# Patient Record
Sex: FEMALE | Race: WHITE | NOT HISPANIC OR LATINO | Employment: OTHER | URBAN - NONMETROPOLITAN AREA
[De-identification: names, ages, dates, MRNs, and addresses within clinical notes are randomized per-mention and may not be internally consistent; named-entity substitution may affect disease eponyms.]

---

## 2017-01-13 ENCOUNTER — OFFICE VISIT (OUTPATIENT)
Dept: PULMONOLOGY | Facility: CLINIC | Age: 72
End: 2017-01-13

## 2017-01-13 VITALS
SYSTOLIC BLOOD PRESSURE: 128 MMHG | RESPIRATION RATE: 18 BRPM | HEIGHT: 59 IN | HEART RATE: 81 BPM | DIASTOLIC BLOOD PRESSURE: 64 MMHG | BODY MASS INDEX: 16.53 KG/M2 | WEIGHT: 82 LBS | OXYGEN SATURATION: 99 %

## 2017-01-13 DIAGNOSIS — J44.9 CHRONIC OBSTRUCTIVE PULMONARY DISEASE, UNSPECIFIED COPD TYPE (HCC): ICD-10-CM

## 2017-01-13 DIAGNOSIS — R06.02 SHORTNESS OF BREATH: Primary | ICD-10-CM

## 2017-01-13 DIAGNOSIS — R09.02 HYPOXIA: ICD-10-CM

## 2017-01-13 DIAGNOSIS — R93.89 ABNORMAL CT OF THE CHEST: ICD-10-CM

## 2017-01-13 DIAGNOSIS — J30.89 OTHER ALLERGIC RHINITIS: ICD-10-CM

## 2017-01-13 PROCEDURE — 99214 OFFICE O/P EST MOD 30 MIN: CPT | Performed by: INTERNAL MEDICINE

## 2017-01-13 RX ORDER — AZELASTINE 1 MG/ML
2 SPRAY, METERED NASAL 2 TIMES DAILY
Qty: 1 EACH | Refills: 5 | Status: SHIPPED | OUTPATIENT
Start: 2017-01-13 | End: 2017-10-04 | Stop reason: SDUPTHER

## 2017-01-13 RX ORDER — BUDESONIDE AND FORMOTEROL FUMARATE DIHYDRATE 160; 4.5 UG/1; UG/1
2 AEROSOL RESPIRATORY (INHALATION) 2 TIMES DAILY
Qty: 1 INHALER | Refills: 5 | Status: SHIPPED | OUTPATIENT
Start: 2017-01-13 | End: 2017-03-02 | Stop reason: SDUPTHER

## 2017-01-13 RX ORDER — PREDNISONE 20 MG/1
20 TABLET ORAL DAILY
Status: ON HOLD | COMMUNITY
End: 2017-02-14

## 2017-01-13 NOTE — LETTER
"January 13, 2017     DONTRELL Regan  [address]  [address], [place] [id number]    Patient: Joelle Yee   YOB: 1945   Date of Visit: 1/13/2017       Dear DONTRELL Cedeño:    Joelle Yee was in my office today. Below is a copy of my note.    If you have questions, please do not hesitate to call me. I look forward to following Joelle along with you.         Sincerely,        Rebeka Esparza MD        CC: No Recipients    Chief Complaint   Patient presents with   • Follow-up         Subjective   Joelle Yee is a 71 y.o. female.     History of Present Illness   Patient comes in today for follow-up after 6 months.  She is complaining of somewhat worsening symptoms of shortness of breath.  She says that about 2-3 months ago she started noticing worsening symptoms including increased cough and sputum production and actually has been given 3 different rounds of steroids and antibiotics.  She was later on started on steroids by her surgeon for some \"issues with her colon and weight loss\"    The patient continues to have shortness of breath on exertion and feels that her exercise capacity is definitely worsened.  She is also complaining of lower extremity pain along with some back pain but does not feel that that has worsened to the point where it could explain her symptoms    She is using her Symbicort on a regular basis although start using Spiriva more than 3 months ago.  She is using her Combivent at least 2-3 times a day     She unfortunately continues to smoke 3-4 cigarettes per day.    She is complaining of runny nose and dribbling in the back of her throat which occasionally gets worse.  She also says that she has a \"sore\" in her right nostril which gets worse because of oxygen use.    She is also using oxygen      The following portions of the patient's history were reviewed and updated as appropriate: allergies, current medications, past family history, past medical " "history, past social history and past surgical history.    Review of Systems   HENT: Positive for postnasal drip. Negative for sinus pressure, sneezing and sore throat.    Respiratory: Positive for cough, shortness of breath and wheezing.    Cardiovascular: Negative for leg swelling.       Objective   Visit Vitals   • /64   • Pulse 81   • Resp 18   • Ht 59\" (149.9 cm)   • Wt 82 lb (37.2 kg)   • SpO2 99%   • BMI 16.56 kg/m2     Physical Exam  General:              Did not seem to be in any acute distress.    ENT:               Nares were erythematous.              Oropharynx was cobblestoned. No lesions noted.               Dentures noted.      Neck:               Supple    Cardiovascular:               S1 + S2.  Regular     Respiratory:              Respiratory effort was adequate              Hyperresonance to percussion.              Decreased Air Entry Bilaterally with scattered wheezing. No crackles heard.    Musculoskeletal/Extremities:              No significant edema noted in the lower extremities.              No clubbing noted.              Normal gait.    Neurologic/Psychiatric:              A&Ox3.               Affect was appropriate.      Assessment/Plan   Joelle was seen today for follow-up.    Diagnoses and all orders for this visit:    Shortness of breath    Chronic obstructive pulmonary disease, unspecified COPD type  -     Pulmonary Function Test; Future    Abnormal CT of the chest  Comments:  Resolved.    Other allergic rhinitis    Hypoxia    Other orders  -     tiotropium (SPIRIVA HANDIHALER) 18 MCG per inhalation capsule; Place 1 capsule into inhaler and inhale Daily.  -     budesonide-formoterol (SYMBICORT) 160-4.5 MCG/ACT inhaler; Inhale 2 puffs 2 (Two) Times a Day. Inhale 1 puff twice daily. Rinse mouth after use.  -     azelastine (ASTELIN) 0.1 % nasal spray; 2 sprays into each nostril 2 (Two) Times a Day. Use in each nostril as directed         Return in about 6 weeks (around " 2/24/2017) for Recheck, PFTs.    DISCUSSION (if any):  I reviewed the patient's PFTs from the last visit and unfortunately they were not currently performed and were difficult to interpret.  Her alpha-1 antitrypsin level was normal.  I have also reviewed the patient's CT images and shared them with her.  There is definite improvement in the right lower lobe abnormality with some ongoing scarring.    As far as patient's worsening symptoms are concerned, I have told her that she has stopped Spiriva which may explain her chronic worsening.  Also the fact that she is on decent dose of steroids for abdominal/colonic reasons, but continues to struggle with exertion points towards a chronic change rather than an acute event such as acute bronchitis etc.    I have restarted the patient on Spiriva     She was advised to continue Symbicort     Patient was advised to use albuterol based rescue inhaler for when necessary purposes    Patient was also advised to keep a log of the use of rescue inhaler.      I've advised the patient to continue using Astelin on a regular basis, since she continues to have symptoms suggestive of poor controlled allergic rhinitis    I've also asked her to consider nasal saline use    I will try to obtain her stress test and echocardiogram from the last cardiology office visit to make sure that her worsening symptoms and not from underlying cardiac etiology    PFTs will be obtained upon follow-up      Errors in dictation may reflect use of voice recognition software and not all errors in transcription may have been detected prior to signing    This document was electronically signed by Rebeka Esparza MD January 13, 2017  11:09 AM

## 2017-01-13 NOTE — PROGRESS NOTES
"Chief Complaint   Patient presents with   • Follow-up         Subjective   Joelle Yee is a 71 y.o. female.     History of Present Illness   Patient comes in today for follow-up after 6 months.  She is complaining of somewhat worsening symptoms of shortness of breath.  She says that about 2-3 months ago she started noticing worsening symptoms including increased cough and sputum production and actually has been given 3 different rounds of steroids and antibiotics.  She was later on started on steroids by her surgeon for some \"issues with her colon and weight loss\"    The patient continues to have shortness of breath on exertion and feels that her exercise capacity is definitely worsened.  She is also complaining of lower extremity pain along with some back pain but does not feel that that has worsened to the point where it could explain her symptoms    She is using her Symbicort on a regular basis although start using Spiriva more than 3 months ago.  She is using her Combivent at least 2-3 times a day     She unfortunately continues to smoke 3-4 cigarettes per day.    She is complaining of runny nose and dribbling in the back of her throat which occasionally gets worse.  She also says that she has a \"sore\" in her right nostril which gets worse because of oxygen use.    She is also using oxygen      The following portions of the patient's history were reviewed and updated as appropriate: allergies, current medications, past family history, past medical history, past social history and past surgical history.    Review of Systems   HENT: Positive for postnasal drip. Negative for sinus pressure, sneezing and sore throat.    Respiratory: Positive for cough, shortness of breath and wheezing.    Cardiovascular: Negative for leg swelling.       Objective   Visit Vitals   • /64   • Pulse 81   • Resp 18   • Ht 59\" (149.9 cm)   • Wt 82 lb (37.2 kg)   • SpO2 99%   • BMI 16.56 kg/m2     Physical Exam  General:       "        Did not seem to be in any acute distress.    ENT:               Nares were erythematous.              Oropharynx was cobblestoned. No lesions noted.               Dentures noted.      Neck:               Supple    Cardiovascular:               S1 + S2.  Regular     Respiratory:              Respiratory effort was adequate              Hyperresonance to percussion.              Decreased Air Entry Bilaterally with scattered wheezing. No crackles heard.    Musculoskeletal/Extremities:              No significant edema noted in the lower extremities.              No clubbing noted.              Normal gait.    Neurologic/Psychiatric:              A&Ox3.               Affect was appropriate.      Assessment/Plan   Joelle was seen today for follow-up.    Diagnoses and all orders for this visit:    Shortness of breath    Chronic obstructive pulmonary disease, unspecified COPD type  -     Pulmonary Function Test; Future    Abnormal CT of the chest  Comments:  Resolved.    Other allergic rhinitis    Hypoxia    Other orders  -     tiotropium (SPIRIVA HANDIHALER) 18 MCG per inhalation capsule; Place 1 capsule into inhaler and inhale Daily.  -     budesonide-formoterol (SYMBICORT) 160-4.5 MCG/ACT inhaler; Inhale 2 puffs 2 (Two) Times a Day. Inhale 1 puff twice daily. Rinse mouth after use.  -     azelastine (ASTELIN) 0.1 % nasal spray; 2 sprays into each nostril 2 (Two) Times a Day. Use in each nostril as directed         Return in about 6 weeks (around 2/24/2017) for Recheck, PFTs.    DISCUSSION (if any):  I reviewed the patient's PFTs from the last visit and unfortunately they were not currently performed and were difficult to interpret.  Her alpha-1 antitrypsin level was normal.  I have also reviewed the patient's CT images and shared them with her.  There is definite improvement in the right lower lobe abnormality with some ongoing scarring.    As far as patient's worsening symptoms are concerned, I have told her  that she has stopped Spiriva which may explain her chronic worsening.  Also the fact that she is on decent dose of steroids for abdominal/colonic reasons, but continues to struggle with exertion points towards a chronic change rather than an acute event such as acute bronchitis etc.    I have restarted the patient on Spiriva     She was advised to continue Symbicort     Patient was advised to use albuterol based rescue inhaler for when necessary purposes    Patient was also advised to keep a log of the use of rescue inhaler.      I've advised the patient to continue using Astelin on a regular basis, since she continues to have symptoms suggestive of poor controlled allergic rhinitis    I've also asked her to consider nasal saline use    I will try to obtain her stress test and echocardiogram from the last cardiology office visit to make sure that her worsening symptoms and not from underlying cardiac etiology    PFTs will be obtained upon follow-up      Errors in dictation may reflect use of voice recognition software and not all errors in transcription may have been detected prior to signing    This document was electronically signed by Rebeka Esparza MD January 13, 2017  11:09 AM

## 2017-01-13 NOTE — MR AVS SNAPSHOT
Joelle Yee   1/13/2017 10:00 AM   Office Visit    Dept Phone:  [phone]   Encounter #:  [id number]    Provider:  Rebeka Esparza MD   Department:  [facility] PULMONARY CRITICAL CARE AND SLEEP                Your Full Care Plan              Today's Medication Changes          These changes are accurate as of: 1/13/17 11:13 AM.  If you have any questions, ask your nurse or doctor.               Medication(s)that have changed:     tiotropium 18 MCG per inhalation capsule   Commonly known as:  SPIRIVA HANDIHALER   Place 1 capsule into inhaler and inhale Daily.   What changed:  See the new instructions.   Changed by:  Rebeka Esparza MD            Where to Get Your Medications      These medications were sent to [address] - [phone] [place] [id number] [address] [address]     Phone:  [phone]     azelastine 0.1 % nasal spray    budesonide-formoterol 160-4.5 MCG/ACT inhaler    tiotropium 18 MCG per inhalation capsule                  Your Updated Medication List          This list is accurate as of: 1/13/17 11:13 AM.  Always use your most recent med list.                albuterol (2.5 MG/3ML) 0.083% nebulizer solution   Commonly known as:  PROVENTIL       azelastine 0.1 % nasal spray   Commonly known as:  ASTELIN   2 sprays into each nostril 2 (Two) Times a Day. Use in each nostril as directed       budesonide-formoterol 160-4.5 MCG/ACT inhaler   Commonly known as:  SYMBICORT   Inhale 2 puffs 2 (Two) Times a Day. Inhale 1 puff twice daily. Rinse mouth after use.       calcium acetate 667 MG capsule   Commonly known as:  PHOSLO       CARAFATE PO       CLARITIN 10 MG capsule   Generic drug:  Loratadine       cyclobenzaprine 10 MG tablet   Commonly known as:  FLEXERIL       estradiol 0.1 MG/GM vaginal cream   Commonly known as:  ESTRACE       FLUTTER device   1 Device as needed (as directed).       gabapentin 400 MG capsule   Commonly known as:  NEURONTIN       HYDROcodone-acetaminophen  MG per tablet   Commonly known as:  NORCO       IMODIUM A-D 2 MG tablet   Generic drug:  loperamide       * ipratropium-albuterol 0.5-2.5 mg/mL nebulizer   Commonly known as:  DUO-NEB       * COMBIVENT IN       melatonin 5 MG tablet tablet       metoprolol succinate XL 50 MG 24 hr tablet   Commonly known as:  TOPROL-XL       METOPROLOL TARTRATE PO       MYRBETRIQ 50 MG tablet sustained-release 24 hour   Generic drug:  Mirabegron ER       NICOTROL IN       nystatin 708625 UNIT/GM cream   Commonly known as:  MYCOSTATIN       ondansetron ODT 4 MG disintegrating tablet   Commonly known as:  ZOFRAN-ODT       oxybutynin XL 10 MG 24 hr tablet   Commonly known as:  DITROPAN-XL       OXYGEN-HELIUM IN       phenazopyridine 100 MG tablet   Commonly known as:  PYRIDIUM       potassium bicarbornate & potassium chloride 25 MEQ disintegrating tablet       predniSONE 20 MG tablet   Commonly known as:  DELTASONE       * PREMARIN VA       * PREMARIN 0.625 MG/GM vaginal cream   Generic drug:  conjugated estrogens       * PRILOSEC 40 MG capsule   Generic drug:  omeprazole       * omeprazole 20 MG capsule   Commonly known as:  priLOSEC       ranitidine 300 MG capsule   Commonly known as:  ZANTAC       tiotropium 18 MCG per inhalation capsule   Commonly known as:  SPIRIVA HANDIHALER   Place 1 capsule into inhaler and inhale Daily.       traZODone 50 MG tablet   Commonly known as:  DESYREL       venlafaxine 75 MG tablet   Commonly known as:  EFFEXOR       VITAMIN B12 PO       VOLTAREN TD       * Notice:  This list has 6 medication(s) that are the same as other medications prescribed for you. Read the directions carefully, and ask your doctor or other care provider to review them with you.            You Were Diagnosed With        Codes Comments    Shortness of breath    -  Primary ICD-10-CM: R06.02  ICD-9-CM: 786.05     Chronic obstructive  pulmonary disease, unspecified COPD type     ICD-10-CM: J44.9  ICD-9-CM: 496     Abnormal CT of the chest     ICD-10-CM: R93.8  ICD-9-CM: 793.2 Resolved.    Other allergic rhinitis     ICD-10-CM: J30.89  ICD-9-CM: 477.8     Hypoxia     ICD-10-CM: R09.02  ICD-9-CM: 799.02       Medications to be Given to You by a Medical Professional     Due       Frequency    2016 urea (CARMOL) 40 % cream  2 Times Daily      Instructions     None    Patient Instructions History      Upcoming Appointments     Visit Type Date Time Department    FOLLOW UP 2017 10:00 AM [facility]    FOLLOW UP 3/2/2017 11:15 AM [facility]      MyChart Signup     [facility] [employer] allows you to send messages to your doctor, view your test results, renew your prescriptions, schedule appointments, and more. To sign up, go to [employer] and click on the Sign Up Now link in the New User? box. Enter your [employer] Activation Code exactly as it appears below along with the last four digits of your Social Security Number and your Date of Birth () to complete the sign-up process. If you do not sign up before the expiration date, you must request a new code.    [employer] Activation Code: [email or web address]  Expires: 2017 11:13 AM    If you have questions, you can email [email or web address] or call [phone] to talk to our [employer] staff. Remember, [employer] is NOT to be used for urgent needs. For medical emergencies, dial 911.               Other Info from Your Visit           Your Appointments     Mar 02, 2017 11:15 AM EST   Follow Up with Rebeka Esparza MD   [facility]ULMON[facility]ITICAL CARE AND SLEEP (--)    [address] [facility]  [address] [place] [id number] [phone]           Arrive 15 minutes prior to appointment.              Allergies     Dust Mite Extract      Dust    Grass      Mold Extract [Trichophyton] Allergy       Reason for Visit     Follow-up       "     Vital Signs     Blood Pressure Pulse Respirations Height Weight Oxygen Saturation    128/64 81 18 59\" (149.9 cm) 82 lb (37.2 kg) 99%    Body Mass Index Smoking Status                16.56 kg/m2 Current Every Day Smoker          Problems and Diagnoses Noted     Chronic airway obstruction    Shortness of breath    -  Primary    Abnormal CT of the chest        Other allergic rhinitis        Decreased oxygen supply            "

## 2017-01-22 ENCOUNTER — APPOINTMENT (OUTPATIENT)
Dept: GENERAL RADIOLOGY | Facility: HOSPITAL | Age: 72
End: 2017-01-22

## 2017-01-22 ENCOUNTER — HOSPITAL ENCOUNTER (EMERGENCY)
Facility: HOSPITAL | Age: 72
Discharge: HOME | End: 2017-01-23
Attending: STUDENT IN AN ORGANIZED HEALTH CARE EDUCATION/TRAINING PROGRAM | Admitting: STUDENT IN AN ORGANIZED HEALTH CARE EDUCATION/TRAINING PROGRAM

## 2017-01-22 DIAGNOSIS — J44.9 CHRONIC OBSTRUCTIVE PULMONARY DISEASE, UNSPECIFIED COPD TYPE (HCC): ICD-10-CM

## 2017-01-22 DIAGNOSIS — F41.9 ANXIETY: Primary | ICD-10-CM

## 2017-01-22 LAB
ALBUMIN SERPL-MCNC: 3.7 G/DL (ref 3.5–5)
ALBUMIN/GLOB SERPL: 1.2 G/DL (ref 1–2)
ALP SERPL-CCNC: 100 U/L (ref 38–126)
ALT SERPL W P-5'-P-CCNC: 33 U/L (ref 13–69)
ANION GAP SERPL CALCULATED.3IONS-SCNC: 12.6 MMOL/L
AST SERPL-CCNC: 22 U/L (ref 15–46)
BASOPHILS # BLD AUTO: 0.07 10*3/MM3 (ref 0–0.2)
BASOPHILS NFR BLD AUTO: 0.4 % (ref 0–2.5)
BILIRUB SERPL-MCNC: 0.4 MG/DL (ref 0.2–1.3)
BUN BLD-MCNC: 22 MG/DL (ref 7–20)
BUN/CREAT SERPL: 31.4 (ref 7.1–23.5)
CALCIUM SPEC-SCNC: 8.9 MG/DL (ref 8.4–10.2)
CHLORIDE SERPL-SCNC: 98 MMOL/L (ref 98–107)
CO2 SERPL-SCNC: 29 MMOL/L (ref 26–30)
CREAT BLD-MCNC: 0.7 MG/DL (ref 0.6–1.3)
DEPRECATED RDW RBC AUTO: 56 FL (ref 37–54)
EOSINOPHIL # BLD AUTO: 0.07 10*3/MM3 (ref 0–0.7)
EOSINOPHIL NFR BLD AUTO: 0.4 % (ref 0–7)
ERYTHROCYTE [DISTWIDTH] IN BLOOD BY AUTOMATED COUNT: 15.3 % (ref 11.5–14.5)
GFR SERPL CREATININE-BSD FRML MDRD: 82 ML/MIN/1.73
GLOBULIN UR ELPH-MCNC: 3.1 GM/DL
GLUCOSE BLD-MCNC: 91 MG/DL (ref 74–98)
HCT VFR BLD AUTO: 40.6 % (ref 37–47)
HGB BLD-MCNC: 13.2 G/DL (ref 12–16)
IMM GRANULOCYTES # BLD: 0.93 10*3/MM3 (ref 0–0.06)
IMM GRANULOCYTES NFR BLD: 5.4 % (ref 0–0.6)
LYMPHOCYTES # BLD AUTO: 2.04 10*3/MM3 (ref 0.6–3.4)
LYMPHOCYTES NFR BLD AUTO: 11.8 % (ref 10–50)
MCH RBC QN AUTO: 32.5 PG (ref 27–31)
MCHC RBC AUTO-ENTMCNC: 32.5 G/DL (ref 30–37)
MCV RBC AUTO: 100 FL (ref 81–99)
MONOCYTES # BLD AUTO: 1.16 10*3/MM3 (ref 0–0.9)
MONOCYTES NFR BLD AUTO: 6.7 % (ref 0–12)
NEUTROPHILS # BLD AUTO: 13.01 10*3/MM3 (ref 2–6.9)
NEUTROPHILS NFR BLD AUTO: 75.3 % (ref 37–80)
NRBC BLD MANUAL-RTO: 0 /100 WBC (ref 0–0)
NT-PROBNP SERPL-MCNC: 130 PG/ML (ref 0–125)
PLAT MORPH BLD: NORMAL
PLATELET # BLD AUTO: 184 10*3/MM3 (ref 130–400)
PMV BLD AUTO: 9.4 FL (ref 6–12)
POTASSIUM BLD-SCNC: 3.6 MMOL/L (ref 3.5–5.1)
PROT SERPL-MCNC: 6.8 G/DL (ref 6.3–8.2)
RBC # BLD AUTO: 4.06 10*6/MM3 (ref 4.2–5.4)
RBC MORPH BLD: NORMAL
SODIUM BLD-SCNC: 136 MMOL/L (ref 137–145)
TROPONIN I SERPL-MCNC: <0.012 NG/ML (ref 0.03–0.11)
WBC MORPH BLD: NORMAL
WBC NRBC COR # BLD: 17.28 10*3/MM3 (ref 4.8–10.8)

## 2017-01-22 PROCEDURE — 80053 COMPREHEN METABOLIC PANEL: CPT | Performed by: STUDENT IN AN ORGANIZED HEALTH CARE EDUCATION/TRAINING PROGRAM

## 2017-01-22 PROCEDURE — 94640 AIRWAY INHALATION TREATMENT: CPT

## 2017-01-22 PROCEDURE — 99284 EMERGENCY DEPT VISIT MOD MDM: CPT

## 2017-01-22 PROCEDURE — 82375 ASSAY CARBOXYHB QUANT: CPT

## 2017-01-22 PROCEDURE — 83880 ASSAY OF NATRIURETIC PEPTIDE: CPT | Performed by: STUDENT IN AN ORGANIZED HEALTH CARE EDUCATION/TRAINING PROGRAM

## 2017-01-22 PROCEDURE — 36600 WITHDRAWAL OF ARTERIAL BLOOD: CPT

## 2017-01-22 PROCEDURE — 85007 BL SMEAR W/DIFF WBC COUNT: CPT | Performed by: STUDENT IN AN ORGANIZED HEALTH CARE EDUCATION/TRAINING PROGRAM

## 2017-01-22 PROCEDURE — 96366 THER/PROPH/DIAG IV INF ADDON: CPT

## 2017-01-22 PROCEDURE — 96375 TX/PRO/DX INJ NEW DRUG ADDON: CPT

## 2017-01-22 PROCEDURE — 25010000002 MAGNESIUM SULFATE 2 GM/50ML SOLUTION: Performed by: STUDENT IN AN ORGANIZED HEALTH CARE EDUCATION/TRAINING PROGRAM

## 2017-01-22 PROCEDURE — 25010000002 METHYLPREDNISOLONE PER 125 MG: Performed by: STUDENT IN AN ORGANIZED HEALTH CARE EDUCATION/TRAINING PROGRAM

## 2017-01-22 PROCEDURE — 84484 ASSAY OF TROPONIN QUANT: CPT | Performed by: STUDENT IN AN ORGANIZED HEALTH CARE EDUCATION/TRAINING PROGRAM

## 2017-01-22 PROCEDURE — 25010000002 LORAZEPAM PER 2 MG: Performed by: STUDENT IN AN ORGANIZED HEALTH CARE EDUCATION/TRAINING PROGRAM

## 2017-01-22 PROCEDURE — 71020 HC CHEST PA AND LATERAL: CPT

## 2017-01-22 PROCEDURE — 85025 COMPLETE CBC W/AUTO DIFF WBC: CPT | Performed by: STUDENT IN AN ORGANIZED HEALTH CARE EDUCATION/TRAINING PROGRAM

## 2017-01-22 PROCEDURE — 93005 ELECTROCARDIOGRAM TRACING: CPT | Performed by: STUDENT IN AN ORGANIZED HEALTH CARE EDUCATION/TRAINING PROGRAM

## 2017-01-22 PROCEDURE — 82805 BLOOD GASES W/O2 SATURATION: CPT

## 2017-01-22 PROCEDURE — 83050 HGB METHEMOGLOBIN QUAN: CPT

## 2017-01-22 PROCEDURE — 96365 THER/PROPH/DIAG IV INF INIT: CPT

## 2017-01-22 RX ORDER — IPRATROPIUM BROMIDE AND ALBUTEROL SULFATE 2.5; .5 MG/3ML; MG/3ML
3 SOLUTION RESPIRATORY (INHALATION) ONCE
Status: COMPLETED | OUTPATIENT
Start: 2017-01-22 | End: 2017-01-22

## 2017-01-22 RX ORDER — MAGNESIUM SULFATE HEPTAHYDRATE 40 MG/ML
2 INJECTION, SOLUTION INTRAVENOUS ONCE
Status: COMPLETED | OUTPATIENT
Start: 2017-01-22 | End: 2017-01-23

## 2017-01-22 RX ORDER — SODIUM CHLORIDE 0.9 % (FLUSH) 0.9 %
10 SYRINGE (ML) INJECTION AS NEEDED
Status: DISCONTINUED | OUTPATIENT
Start: 2017-01-22 | End: 2017-01-23 | Stop reason: HOSPADM

## 2017-01-22 RX ORDER — METHYLPREDNISOLONE SODIUM SUCCINATE 125 MG/2ML
125 INJECTION, POWDER, LYOPHILIZED, FOR SOLUTION INTRAMUSCULAR; INTRAVENOUS ONCE
Status: COMPLETED | OUTPATIENT
Start: 2017-01-22 | End: 2017-01-22

## 2017-01-22 RX ORDER — LORAZEPAM 2 MG/ML
1 INJECTION INTRAMUSCULAR ONCE
Status: COMPLETED | OUTPATIENT
Start: 2017-01-22 | End: 2017-01-22

## 2017-01-22 RX ADMIN — METHYLPREDNISOLONE SODIUM SUCCINATE 125 MG: 125 INJECTION, POWDER, FOR SOLUTION INTRAMUSCULAR; INTRAVENOUS at 23:43

## 2017-01-22 RX ADMIN — LORAZEPAM 1 MG: 2 INJECTION INTRAMUSCULAR; INTRAVENOUS at 23:45

## 2017-01-22 RX ADMIN — IPRATROPIUM BROMIDE AND ALBUTEROL SULFATE 3 ML: .5; 3 SOLUTION RESPIRATORY (INHALATION) at 23:37

## 2017-01-22 RX ADMIN — MAGNESIUM SULFATE HEPTAHYDRATE 2 G: 40 INJECTION, SOLUTION INTRAVENOUS at 23:47

## 2017-01-23 VITALS
SYSTOLIC BLOOD PRESSURE: 104 MMHG | HEIGHT: 60 IN | BODY MASS INDEX: 16.69 KG/M2 | TEMPERATURE: 97.9 F | RESPIRATION RATE: 18 BRPM | WEIGHT: 85 LBS | HEART RATE: 112 BPM | DIASTOLIC BLOOD PRESSURE: 63 MMHG | OXYGEN SATURATION: 97 %

## 2017-01-23 LAB
ARTERIAL PATENCY WRIST A: POSITIVE
BASE EXCESS BLDA CALC-SCNC: 2.2 MMOL/L
BDY SITE: ABNORMAL
COHGB MFR BLD: ABNORMAL %
HCO3 BLDA-SCNC: 26.1 MMOL/L (ref 22–28)
HGB BLDA-MCNC: 14.4 G/DL (ref 12–18)
HOLD SPECIMEN: NORMAL
HOROWITZ INDEX BLD+IHG-RTO: 28 %
METHGB BLD QL: 0.4 %
MODALITY: ABNORMAL
OXYHGB MFR BLDV: 92 % (ref 94–99)
PCO2 BLDA: 36.9 MM HG (ref 35–45)
PH BLDA: 7.46 PH UNITS
PO2 BLDA: 78.6 MM HG (ref 75–100)
WHOLE BLOOD HOLD SPECIMEN: NORMAL
WHOLE BLOOD HOLD SPECIMEN: NORMAL

## 2017-01-23 PROCEDURE — 25010000002 DEXAMETHASONE SODIUM PHOSPHATE 10 MG/ML SOLUTION: Performed by: STUDENT IN AN ORGANIZED HEALTH CARE EDUCATION/TRAINING PROGRAM

## 2017-01-23 PROCEDURE — 96361 HYDRATE IV INFUSION ADD-ON: CPT

## 2017-01-23 RX ORDER — DEXAMETHASONE SODIUM PHOSPHATE 10 MG/ML
10 INJECTION, SOLUTION INTRAMUSCULAR; INTRAVENOUS ONCE
Status: COMPLETED | OUTPATIENT
Start: 2017-01-23 | End: 2017-01-23

## 2017-01-23 RX ORDER — LEVOFLOXACIN 500 MG/1
500 TABLET, FILM COATED ORAL DAILY
Qty: 7 TABLET | Refills: 0 | Status: SHIPPED | OUTPATIENT
Start: 2017-01-23 | End: 2017-02-13

## 2017-01-23 RX ADMIN — SODIUM CHLORIDE 500 ML: 9 INJECTION, SOLUTION INTRAVENOUS at 01:29

## 2017-01-23 RX ADMIN — DEXAMETHASONE SODIUM PHOSPHATE 10 MG: 10 INJECTION, SOLUTION INTRAMUSCULAR; INTRAVENOUS at 03:35

## 2017-01-23 NOTE — ED PROVIDER NOTES
Subjective   HPI Comments: Patient is a 71-year-old female that presents with approximately 3 months of persistent shortness of breath.  She states she feels like she cannot get enough air in.  States she was lying there at home tonight in bed and was concerned that she would not wake up tomorrow.  Patient states it very scary because she lives alone.  States she did use 2 breathing treatments before she came in.  Patient denies fever, chills, sweats, but does endorse a productive cough which produces a whitish phlegm.  Patient wears 2 L via nasal cannula 24 hours a day and is on oral steroids chronically.  She states 2 months ago she was placed on 40 mg daily and has tapered herself down to 10 mg daily currently.  She reports she's been on 2 different rounds of antibiotics but is currently off of antibiotics.  Patient's shortness of breath is worse with exertion and currently nothing makes better.      Review of Systems   All other systems reviewed and are negative.      Past Medical History   Diagnosis Date   • Abdominal pain    • Acute bronchitis    • Ankle pain    • Anxiety 1980   • Atopic dermatitis    • Backache    • Bipolar disorder    • Bronchiectasis    • Chronic obstructive lung disease 2008   • Colon cancer screening    • COPD (chronic obstructive pulmonary disease)    • Cystocele    • Depressed    • Depression 1980   • Fatigue      Chronic pafigue 2012   • Fibromyalgia 2008   • GERD (gastroesophageal reflux disease)    • Gout    • Heartburn      Chronic historu of epigastric heartburn currently controlled on Prilesec 40 mg every morning along with Zantac 300 Mg daily at bedtime   • High cholesterol 2012   • Hip pain    • Hyperlipidemia    • Insomnia    • Joint pain    • Kidney infection    • Loss of appetite    • Low back pain 1995   • Melena    • Nausea and vomiting    • On home oxygen therapy    • Osteoarthritis 2010   • Pain in limb    • Palpitations    • Pinched nerve      Pinched nerves 2011   •  Recurrent urinary tract infection    • Sciatica 6789-1049   • Shortness of breath    • Sinus problem    • Sleep apnea 1998   • Upset stomach    • Vitamin B12 deficiency    • Weight loss, abnormal      Weight loss is stabel. On pund weight gain since 01/2016       Allergies   Allergen Reactions   • Dust Mite Extract      Dust   • Grass    • Mold Extract [Trichophyton]        Past Surgical History   Procedure Laterality Date   • Appendectomy  1965   • Back surgery  2005   • Cataract extraction Bilateral 1978   • Gallbladder surgery  1985   • Knee surgery Left 1979     Knee Cap   • Tubal abdominal ligation  1971   • Eye surgery       Put in implants    • Abdominal hernia repair     • Cholecystectomy         Family History   Problem Relation Age of Onset   • Ovarian cancer Mother    • Cancer Mother    • Lung cancer Father    • Heart disease Brother        Social History     Social History   • Marital status: Single     Spouse name: N/A   • Number of children: N/A   • Years of education: N/A     Social History Main Topics   • Smoking status: Current Every Day Smoker     Packs/day: 0.25     Years: 35.00   • Smokeless tobacco: Never Used      Comment: 1PPD   • Alcohol use No   • Drug use: No   • Sexual activity: Defer     Other Topics Concern   • None     Social History Narrative   • None           Objective   Physical Exam   Nursing note and vitals reviewed.  GEN: Mild respiratory distress  Head: Normocephalic, atraumatic  Eyes: Pupils equal round reactive to light  ENT: Posterior pharynx normal in appearance, oral mucosa is moist  Chest: Nontender to palpation  Cardiovascular: Tachycardic in the 100-110 range  Lungs: Tachypnea with a rate of 22, coarse wheezes throughout all air fields  Abdomen: Soft, nontender, nondistended, no peritoneal signs  Extremities: No edema, normal appearance  Neuro: GCS 15  Psych: Patient seems very anxious    Procedures         ED Course  ED Course                  MDM  Number of Diagnoses  or Management Options  Anxiety:   Chronic obstructive pulmonary disease, unspecified COPD type:   Diagnosis management comments: Differential diagnosis for this patient would include COPD, asthma, bronchitis, pneumonia, congestive heart failure, pulmonary embolus, acute coronary syndrome, anxiety, or other concerns.  EKG shows sinus tachycardia with a rate of 105.  FL interval is 110.  QTC is 359.  QRS is 66.  No significant ST segments.  This is an abnormal EKG  No significant laboratory abnormalities at this time  Chest x-ray shows no acute cardiopulmonary pathology       Amount and/or Complexity of Data Reviewed  Clinical lab tests: ordered and reviewed  Tests in the radiology section of CPT®: ordered and reviewed  Decide to obtain previous medical records or to obtain history from someone other than the patient: yes  Review and summarize past medical records: yes  Independent visualization of images, tracings, or specimens: yes        Final diagnoses:   Anxiety   Chronic obstructive pulmonary disease, unspecified COPD type            Femi Doherty MD  01/23/17 0309

## 2017-02-03 ENCOUNTER — TRANSCRIBE ORDERS (OUTPATIENT)
Dept: CT IMAGING | Facility: HOSPITAL | Age: 72
End: 2017-02-03

## 2017-02-03 DIAGNOSIS — J44.1 ACUTE EXACERBATION OF CHRONIC OBSTRUCTIVE PULMONARY DISEASE (COPD) (HCC): Primary | ICD-10-CM

## 2017-02-06 ENCOUNTER — HOSPITAL ENCOUNTER (OUTPATIENT)
Dept: CT IMAGING | Facility: HOSPITAL | Age: 72
Discharge: HOME | End: 2017-02-06
Admitting: PHYSICIAN ASSISTANT

## 2017-02-06 DIAGNOSIS — J44.1 ACUTE EXACERBATION OF CHRONIC OBSTRUCTIVE PULMONARY DISEASE (COPD) (HCC): ICD-10-CM

## 2017-02-06 PROCEDURE — 0 IOPAMIDOL 61 % SOLUTION: Performed by: PHYSICIAN ASSISTANT

## 2017-02-06 PROCEDURE — 71260 CT THORAX DX C+: CPT

## 2017-02-06 RX ADMIN — IOPAMIDOL 100 ML: 612 INJECTION, SOLUTION INTRAVENOUS at 14:15

## 2017-02-13 ENCOUNTER — APPOINTMENT (OUTPATIENT)
Dept: CT IMAGING | Facility: HOSPITAL | Age: 72
End: 2017-02-13

## 2017-02-13 ENCOUNTER — HOSPITAL ENCOUNTER (INPATIENT)
Facility: HOSPITAL | Age: 72
LOS: 3 days | Discharge: HOME HEALTH | End: 2017-02-17
Attending: EMERGENCY MEDICINE | Admitting: INTERNAL MEDICINE

## 2017-02-13 ENCOUNTER — APPOINTMENT (OUTPATIENT)
Dept: GENERAL RADIOLOGY | Facility: HOSPITAL | Age: 72
End: 2017-02-13

## 2017-02-13 DIAGNOSIS — J44.9 CHRONIC BRONCHITIS WITH COPD (CHRONIC OBSTRUCTIVE PULMONARY DISEASE) (HCC): ICD-10-CM

## 2017-02-13 DIAGNOSIS — N30.00 ACUTE CYSTITIS WITHOUT HEMATURIA: ICD-10-CM

## 2017-02-13 DIAGNOSIS — J42 CHRONIC BRONCHITIS, UNSPECIFIED CHRONIC BRONCHITIS TYPE (HCC): ICD-10-CM

## 2017-02-13 DIAGNOSIS — K51.00 PANCOLITIS (HCC): Primary | ICD-10-CM

## 2017-02-13 DIAGNOSIS — J18.9 PNEUMONIA OF RIGHT LOWER LOBE DUE TO INFECTIOUS ORGANISM: ICD-10-CM

## 2017-02-13 PROBLEM — R19.7 DIARRHEA OF PRESUMED INFECTIOUS ORIGIN: Status: ACTIVE | Noted: 2017-02-13

## 2017-02-13 LAB
ALBUMIN SERPL-MCNC: 4.2 G/DL (ref 3.5–5)
ALBUMIN/GLOB SERPL: 1.3 G/DL (ref 1–2)
ALP SERPL-CCNC: 109 U/L (ref 38–126)
ALT SERPL W P-5'-P-CCNC: 18 U/L (ref 13–69)
ANION GAP SERPL CALCULATED.3IONS-SCNC: 12.3 MMOL/L
AST SERPL-CCNC: 20 U/L (ref 15–46)
BACTERIA UR QL AUTO: ABNORMAL /HPF
BASOPHILS # BLD AUTO: 0.09 10*3/MM3 (ref 0–0.2)
BASOPHILS NFR BLD AUTO: 0.4 % (ref 0–2.5)
BILIRUB SERPL-MCNC: 0.8 MG/DL (ref 0.2–1.3)
BILIRUB UR QL STRIP: ABNORMAL
BUN BLD-MCNC: 10 MG/DL (ref 7–20)
BUN/CREAT SERPL: 12.5 (ref 7.1–23.5)
C DIFF TOX A+B STL QL IA: POSITIVE
CALCIUM SPEC-SCNC: 9.7 MG/DL (ref 8.4–10.2)
CHLORIDE SERPL-SCNC: 99 MMOL/L (ref 98–107)
CLARITY UR: ABNORMAL
CO2 SERPL-SCNC: 29 MMOL/L (ref 26–30)
COLOR UR: YELLOW
CREAT BLD-MCNC: 0.8 MG/DL (ref 0.6–1.3)
D-LACTATE SERPL-SCNC: 2.1 MMOL/L (ref 0.5–2)
DEPRECATED RDW RBC AUTO: 55 FL (ref 37–54)
EOSINOPHIL # BLD AUTO: 0.02 10*3/MM3 (ref 0–0.7)
EOSINOPHIL NFR BLD AUTO: 0.1 % (ref 0–7)
ERYTHROCYTE [DISTWIDTH] IN BLOOD BY AUTOMATED COUNT: 15.2 % (ref 11.5–14.5)
GASTROCULT GAST QL: NEGATIVE
GFR SERPL CREATININE-BSD FRML MDRD: 71 ML/MIN/1.73
GLOBULIN UR ELPH-MCNC: 3.3 GM/DL
GLUCOSE BLD-MCNC: 158 MG/DL (ref 74–98)
GLUCOSE UR STRIP-MCNC: NEGATIVE MG/DL
HCT VFR BLD AUTO: 43.9 % (ref 37–47)
HGB BLD-MCNC: 14.5 G/DL (ref 12–16)
HGB UR QL STRIP.AUTO: ABNORMAL
HOLD SPECIMEN: NORMAL
HOLD SPECIMEN: NORMAL
HYALINE CASTS UR QL AUTO: ABNORMAL /LPF
IMM GRANULOCYTES # BLD: 0.31 10*3/MM3 (ref 0–0.06)
IMM GRANULOCYTES NFR BLD: 1.4 % (ref 0–0.6)
KETONES UR QL STRIP: ABNORMAL
LEUKOCYTE ESTERASE UR QL STRIP.AUTO: NEGATIVE
LIPASE SERPL-CCNC: <10 U/L (ref 23–300)
LYMPHOCYTES # BLD AUTO: 2.05 10*3/MM3 (ref 0.6–3.4)
LYMPHOCYTES NFR BLD AUTO: 9.4 % (ref 10–50)
MCH RBC QN AUTO: 32.4 PG (ref 27–31)
MCHC RBC AUTO-ENTMCNC: 33 G/DL (ref 30–37)
MCV RBC AUTO: 98 FL (ref 81–99)
MONOCYTES # BLD AUTO: 0.7 10*3/MM3 (ref 0–0.9)
MONOCYTES NFR BLD AUTO: 3.2 % (ref 0–12)
MUCOUS THREADS URNS QL MICRO: ABNORMAL /HPF
NEUTROPHILS # BLD AUTO: 18.63 10*3/MM3 (ref 2–6.9)
NEUTROPHILS NFR BLD AUTO: 85.5 % (ref 37–80)
NITRITE UR QL STRIP: NEGATIVE
NRBC BLD MANUAL-RTO: 0 /100 WBC (ref 0–0)
PH UR STRIP.AUTO: 5.5 [PH] (ref 5–8)
PLATELET # BLD AUTO: 222 10*3/MM3 (ref 130–400)
PMV BLD AUTO: 9.4 FL (ref 6–12)
POTASSIUM BLD-SCNC: 3.3 MMOL/L (ref 3.5–5.1)
PROT SERPL-MCNC: 7.5 G/DL (ref 6.3–8.2)
PROT UR QL STRIP: ABNORMAL
RBC # BLD AUTO: 4.48 10*6/MM3 (ref 4.2–5.4)
RBC # UR: ABNORMAL /HPF
REF LAB TEST METHOD: ABNORMAL
SODIUM BLD-SCNC: 137 MMOL/L (ref 137–145)
SP GR UR STRIP: >=1.03 (ref 1–1.03)
SQUAMOUS #/AREA URNS HPF: ABNORMAL /HPF
TROPONIN I SERPL-MCNC: 0.02 NG/ML (ref 0–0.03)
UROBILINOGEN UR QL STRIP: ABNORMAL
WBC NRBC COR # BLD: 21.8 10*3/MM3 (ref 4.8–10.8)
WBC STL QL MICRO: ABNORMAL
WBC UR QL AUTO: ABNORMAL /HPF
WHOLE BLOOD HOLD SPECIMEN: NORMAL
WHOLE BLOOD HOLD SPECIMEN: NORMAL

## 2017-02-13 PROCEDURE — 83690 ASSAY OF LIPASE: CPT | Performed by: EMERGENCY MEDICINE

## 2017-02-13 PROCEDURE — 82270 OCCULT BLOOD FECES: CPT | Performed by: NURSE PRACTITIONER

## 2017-02-13 PROCEDURE — 81001 URINALYSIS AUTO W/SCOPE: CPT | Performed by: PHYSICIAN ASSISTANT

## 2017-02-13 PROCEDURE — 25010000002 PIPERACILLIN SOD-TAZOBACTAM PER 1 G: Performed by: NURSE PRACTITIONER

## 2017-02-13 PROCEDURE — G0378 HOSPITAL OBSERVATION PER HR: HCPCS

## 2017-02-13 PROCEDURE — 87046 STOOL CULTR AEROBIC BACT EA: CPT | Performed by: NURSE PRACTITIONER

## 2017-02-13 PROCEDURE — 85025 COMPLETE CBC W/AUTO DIFF WBC: CPT | Performed by: EMERGENCY MEDICINE

## 2017-02-13 PROCEDURE — 25010000002 PIPERACILLIN SOD-TAZOBACTAM PER 1 G: Performed by: PHYSICIAN ASSISTANT

## 2017-02-13 PROCEDURE — 87205 SMEAR GRAM STAIN: CPT | Performed by: NURSE PRACTITIONER

## 2017-02-13 PROCEDURE — 94640 AIRWAY INHALATION TREATMENT: CPT

## 2017-02-13 PROCEDURE — 87045 FECES CULTURE AEROBIC BACT: CPT | Performed by: NURSE PRACTITIONER

## 2017-02-13 PROCEDURE — 25010000002 ENOXAPARIN PER 10 MG: Performed by: NURSE PRACTITIONER

## 2017-02-13 PROCEDURE — 83605 ASSAY OF LACTIC ACID: CPT | Performed by: PHYSICIAN ASSISTANT

## 2017-02-13 PROCEDURE — 25810000003 SODIUM CHLORIDE 0.9 % WITH KCL 20 MEQ 20-0.9 MEQ/L-% SOLUTION: Performed by: NURSE PRACTITIONER

## 2017-02-13 PROCEDURE — 84484 ASSAY OF TROPONIN QUANT: CPT | Performed by: PHYSICIAN ASSISTANT

## 2017-02-13 PROCEDURE — 99284 EMERGENCY DEPT VISIT MOD MDM: CPT

## 2017-02-13 PROCEDURE — 93005 ELECTROCARDIOGRAM TRACING: CPT | Performed by: PHYSICIAN ASSISTANT

## 2017-02-13 PROCEDURE — 71020 HC CHEST PA AND LATERAL: CPT

## 2017-02-13 PROCEDURE — 25010000003 POTASSIUM CHLORIDE 10 MEQ/100ML SOLUTION: Performed by: NURSE PRACTITIONER

## 2017-02-13 PROCEDURE — 0 IOPAMIDOL 61 % SOLUTION: Performed by: EMERGENCY MEDICINE

## 2017-02-13 PROCEDURE — 80053 COMPREHEN METABOLIC PANEL: CPT | Performed by: EMERGENCY MEDICINE

## 2017-02-13 PROCEDURE — 74177 CT ABD & PELVIS W/CONTRAST: CPT

## 2017-02-13 PROCEDURE — 87324 CLOSTRIDIUM AG IA: CPT | Performed by: PHYSICIAN ASSISTANT

## 2017-02-13 PROCEDURE — 25010000002 ONDANSETRON PER 1 MG: Performed by: PHYSICIAN ASSISTANT

## 2017-02-13 PROCEDURE — 87040 BLOOD CULTURE FOR BACTERIA: CPT | Performed by: PHYSICIAN ASSISTANT

## 2017-02-13 PROCEDURE — 99222 1ST HOSP IP/OBS MODERATE 55: CPT | Performed by: NURSE PRACTITIONER

## 2017-02-13 RX ORDER — VENLAFAXINE 75 MG/1
75 TABLET ORAL 2 TIMES DAILY
Status: DISCONTINUED | OUTPATIENT
Start: 2017-02-13 | End: 2017-02-17 | Stop reason: HOSPADM

## 2017-02-13 RX ORDER — MIRTAZAPINE 15 MG/1
30 TABLET, FILM COATED ORAL NIGHTLY
Status: DISCONTINUED | OUTPATIENT
Start: 2017-02-13 | End: 2017-02-17 | Stop reason: HOSPADM

## 2017-02-13 RX ORDER — CETIRIZINE HYDROCHLORIDE 10 MG/1
10 TABLET ORAL DAILY
Status: ON HOLD | COMMUNITY
End: 2018-07-10

## 2017-02-13 RX ORDER — METOPROLOL SUCCINATE 50 MG/1
50 TABLET, EXTENDED RELEASE ORAL
Status: DISCONTINUED | OUTPATIENT
Start: 2017-02-13 | End: 2017-02-17 | Stop reason: HOSPADM

## 2017-02-13 RX ORDER — AZELASTINE 1 MG/ML
2 SPRAY, METERED NASAL DAILY
Status: DISCONTINUED | OUTPATIENT
Start: 2017-02-13 | End: 2017-02-17 | Stop reason: HOSPADM

## 2017-02-13 RX ORDER — ONDANSETRON 4 MG/1
4 TABLET, FILM COATED ORAL EVERY 6 HOURS PRN
Status: DISCONTINUED | OUTPATIENT
Start: 2017-02-13 | End: 2017-02-17 | Stop reason: HOSPADM

## 2017-02-13 RX ORDER — ACETAMINOPHEN 325 MG/1
650 TABLET ORAL EVERY 4 HOURS PRN
Status: DISCONTINUED | OUTPATIENT
Start: 2017-02-13 | End: 2017-02-17 | Stop reason: HOSPADM

## 2017-02-13 RX ORDER — OXYBUTYNIN CHLORIDE 5 MG/1
10 TABLET, EXTENDED RELEASE ORAL DAILY
Status: DISCONTINUED | OUTPATIENT
Start: 2017-02-13 | End: 2017-02-17 | Stop reason: HOSPADM

## 2017-02-13 RX ORDER — SODIUM CHLORIDE 9 MG/ML
100 INJECTION, SOLUTION INTRAVENOUS CONTINUOUS
Status: DISCONTINUED | OUTPATIENT
Start: 2017-02-13 | End: 2017-02-13

## 2017-02-13 RX ORDER — ONDANSETRON 4 MG/1
4 TABLET, ORALLY DISINTEGRATING ORAL EVERY 6 HOURS PRN
Status: DISCONTINUED | OUTPATIENT
Start: 2017-02-13 | End: 2017-02-17 | Stop reason: HOSPADM

## 2017-02-13 RX ORDER — CHOLECALCIFEROL (VITAMIN D3) 125 MCG
500 CAPSULE ORAL DAILY
Status: DISCONTINUED | OUTPATIENT
Start: 2017-02-13 | End: 2017-02-17 | Stop reason: HOSPADM

## 2017-02-13 RX ORDER — POTASSIUM CHLORIDE 20 MEQ/1
40 TABLET, EXTENDED RELEASE ORAL ONCE
Status: COMPLETED | OUTPATIENT
Start: 2017-02-13 | End: 2017-02-13

## 2017-02-13 RX ORDER — LANOLIN ALCOHOL/MO/W.PET/CERES
5 CREAM (GRAM) TOPICAL NIGHTLY
Status: DISCONTINUED | OUTPATIENT
Start: 2017-02-13 | End: 2017-02-17 | Stop reason: HOSPADM

## 2017-02-13 RX ORDER — POTASSIUM CHLORIDE 7.45 MG/ML
10 INJECTION INTRAVENOUS
Status: DISCONTINUED | OUTPATIENT
Start: 2017-02-13 | End: 2017-02-13

## 2017-02-13 RX ORDER — HYDROCODONE BITARTRATE AND ACETAMINOPHEN 7.5; 325 MG/1; MG/1
1 TABLET ORAL EVERY 6 HOURS PRN
Status: DISCONTINUED | OUTPATIENT
Start: 2017-02-13 | End: 2017-02-17 | Stop reason: HOSPADM

## 2017-02-13 RX ORDER — NICOTINE 21 MG/24HR
1 PATCH, TRANSDERMAL 24 HOURS TRANSDERMAL EVERY 24 HOURS
Status: DISCONTINUED | OUTPATIENT
Start: 2017-02-13 | End: 2017-02-17 | Stop reason: HOSPADM

## 2017-02-13 RX ORDER — SODIUM CHLORIDE 0.9 % (FLUSH) 0.9 %
1-10 SYRINGE (ML) INJECTION AS NEEDED
Status: DISCONTINUED | OUTPATIENT
Start: 2017-02-13 | End: 2017-02-17 | Stop reason: HOSPADM

## 2017-02-13 RX ORDER — CALCIUM ACETATE 667 MG/1
1334 CAPSULE ORAL
Status: DISCONTINUED | OUTPATIENT
Start: 2017-02-13 | End: 2017-02-17 | Stop reason: HOSPADM

## 2017-02-13 RX ORDER — ONDANSETRON 2 MG/ML
4 INJECTION INTRAMUSCULAR; INTRAVENOUS ONCE
Status: COMPLETED | OUTPATIENT
Start: 2017-02-13 | End: 2017-02-13

## 2017-02-13 RX ORDER — SODIUM CHLORIDE AND POTASSIUM CHLORIDE 150; 900 MG/100ML; MG/100ML
50 INJECTION, SOLUTION INTRAVENOUS CONTINUOUS
Status: DISPENSED | OUTPATIENT
Start: 2017-02-13 | End: 2017-02-16

## 2017-02-13 RX ORDER — BUDESONIDE AND FORMOTEROL FUMARATE DIHYDRATE 160; 4.5 UG/1; UG/1
2 AEROSOL RESPIRATORY (INHALATION) 2 TIMES DAILY
Status: DISCONTINUED | OUTPATIENT
Start: 2017-02-13 | End: 2017-02-17 | Stop reason: HOSPADM

## 2017-02-13 RX ORDER — IPRATROPIUM BROMIDE AND ALBUTEROL SULFATE 2.5; .5 MG/3ML; MG/3ML
3 SOLUTION RESPIRATORY (INHALATION) EVERY 4 HOURS PRN
Status: DISCONTINUED | OUTPATIENT
Start: 2017-02-13 | End: 2017-02-17 | Stop reason: HOSPADM

## 2017-02-13 RX ORDER — CYCLOBENZAPRINE HCL 10 MG
10 TABLET ORAL DAILY
Status: DISCONTINUED | OUTPATIENT
Start: 2017-02-13 | End: 2017-02-17 | Stop reason: HOSPADM

## 2017-02-13 RX ORDER — IPRATROPIUM BROMIDE AND ALBUTEROL SULFATE 2.5; .5 MG/3ML; MG/3ML
3 SOLUTION RESPIRATORY (INHALATION)
Status: DISCONTINUED | OUTPATIENT
Start: 2017-02-13 | End: 2017-02-17 | Stop reason: HOSPADM

## 2017-02-13 RX ORDER — BUDESONIDE AND FORMOTEROL FUMARATE DIHYDRATE 160; 4.5 UG/1; UG/1
2 AEROSOL RESPIRATORY (INHALATION)
Status: DISCONTINUED | OUTPATIENT
Start: 2017-02-13 | End: 2017-02-13 | Stop reason: SDUPTHER

## 2017-02-13 RX ORDER — MIRTAZAPINE 30 MG/1
30 TABLET, FILM COATED ORAL NIGHTLY
COMMUNITY
End: 2020-07-01

## 2017-02-13 RX ORDER — ONDANSETRON 2 MG/ML
4 INJECTION INTRAMUSCULAR; INTRAVENOUS EVERY 6 HOURS PRN
Status: DISCONTINUED | OUTPATIENT
Start: 2017-02-13 | End: 2017-02-17 | Stop reason: HOSPADM

## 2017-02-13 RX ORDER — POTASSIUM CHLORIDE 750 MG/1
40 CAPSULE, EXTENDED RELEASE ORAL ONCE
Status: COMPLETED | OUTPATIENT
Start: 2017-02-13 | End: 2017-02-13

## 2017-02-13 RX ORDER — SODIUM CHLORIDE 0.9 % (FLUSH) 0.9 %
10 SYRINGE (ML) INJECTION AS NEEDED
Status: DISCONTINUED | OUTPATIENT
Start: 2017-02-13 | End: 2017-02-17 | Stop reason: HOSPADM

## 2017-02-13 RX ORDER — PANTOPRAZOLE SODIUM 40 MG/10ML
40 INJECTION, POWDER, LYOPHILIZED, FOR SOLUTION INTRAVENOUS
Status: DISCONTINUED | OUTPATIENT
Start: 2017-02-14 | End: 2017-02-17 | Stop reason: HOSPADM

## 2017-02-13 RX ORDER — CETIRIZINE HYDROCHLORIDE 10 MG/1
10 TABLET ORAL DAILY
Status: DISCONTINUED | OUTPATIENT
Start: 2017-02-13 | End: 2017-02-17 | Stop reason: HOSPADM

## 2017-02-13 RX ADMIN — SODIUM CHLORIDE 1000 ML: 9 INJECTION, SOLUTION INTRAVENOUS at 17:28

## 2017-02-13 RX ADMIN — MIRTAZAPINE 30 MG: 15 TABLET, FILM COATED ORAL at 21:16

## 2017-02-13 RX ADMIN — BUDESONIDE AND FORMOTEROL FUMARATE DIHYDRATE 2 PUFF: 160; 4.5 AEROSOL RESPIRATORY (INHALATION) at 21:17

## 2017-02-13 RX ADMIN — CALCIUM ACETATE 1334 MG: 667 CAPSULE ORAL at 18:12

## 2017-02-13 RX ADMIN — SODIUM CHLORIDE 1000 ML: 9 INJECTION, SOLUTION INTRAVENOUS at 12:11

## 2017-02-13 RX ADMIN — SODIUM CHLORIDE 1158 ML: 9 INJECTION, SOLUTION INTRAVENOUS at 15:10

## 2017-02-13 RX ADMIN — AZELASTINE HYDROCHLORIDE 2 SPRAY: 137 SPRAY, METERED NASAL at 18:04

## 2017-02-13 RX ADMIN — TAZOBACTAM SODIUM AND PIPERACILLIN SODIUM 3.38 G: 375; 3 INJECTION, SOLUTION INTRAVENOUS at 22:23

## 2017-02-13 RX ADMIN — ONDANSETRON 4 MG: 2 INJECTION INTRAMUSCULAR; INTRAVENOUS at 12:12

## 2017-02-13 RX ADMIN — POTASSIUM CHLORIDE 10 MEQ: 10 INJECTION, SOLUTION INTRAVENOUS at 18:05

## 2017-02-13 RX ADMIN — MELATONIN TAB 3 MG 4.5 MG: 3 TAB at 21:15

## 2017-02-13 RX ADMIN — CYANOCOBALAMIN TAB 500 MCG 500 MCG: 500 TAB at 18:12

## 2017-02-13 RX ADMIN — METRONIDAZOLE 500 MG: 500 INJECTION, SOLUTION INTRAVENOUS at 15:36

## 2017-02-13 RX ADMIN — POTASSIUM CHLORIDE 40 MEQ: 750 CAPSULE, EXTENDED RELEASE ORAL at 13:28

## 2017-02-13 RX ADMIN — METOPROLOL SUCCINATE 50 MG: 50 TABLET, EXTENDED RELEASE ORAL at 18:10

## 2017-02-13 RX ADMIN — VENLAFAXINE 75 MG: 75 TABLET ORAL at 18:12

## 2017-02-13 RX ADMIN — NICOTINE 1 PATCH: 21 PATCH TRANSDERMAL at 18:16

## 2017-02-13 RX ADMIN — TAZOBACTAM SODIUM AND PIPERACILLIN SODIUM 4.5 G: 500; 4 INJECTION, SOLUTION INTRAVENOUS at 16:10

## 2017-02-13 RX ADMIN — ENOXAPARIN SODIUM 40 MG: 40 INJECTION SUBCUTANEOUS at 18:08

## 2017-02-13 RX ADMIN — HYDROCODONE BITARTRATE AND ACETAMINOPHEN 1 TABLET: 7.5; 325 TABLET ORAL at 18:55

## 2017-02-13 RX ADMIN — CYCLOBENZAPRINE HYDROCHLORIDE 10 MG: 10 TABLET, FILM COATED ORAL at 18:11

## 2017-02-13 RX ADMIN — METRONIDAZOLE 500 MG: 500 INJECTION, SOLUTION INTRAVENOUS at 23:45

## 2017-02-13 RX ADMIN — IOPAMIDOL 100 ML: 612 INJECTION, SOLUTION INTRAVENOUS at 13:54

## 2017-02-13 RX ADMIN — IPRATROPIUM BROMIDE AND ALBUTEROL SULFATE 3 ML: .5; 3 SOLUTION RESPIRATORY (INHALATION) at 20:16

## 2017-02-13 RX ADMIN — POTASSIUM CHLORIDE AND SODIUM CHLORIDE 100 ML/HR: 900; 150 INJECTION, SOLUTION INTRAVENOUS at 18:58

## 2017-02-13 RX ADMIN — POTASSIUM CHLORIDE 40 MEQ: 20 TABLET, EXTENDED RELEASE ORAL at 18:55

## 2017-02-13 RX ADMIN — CETIRIZINE HYDROCHLORIDE 10 MG: 10 TABLET, FILM COATED ORAL at 18:12

## 2017-02-14 LAB
ANION GAP SERPL CALCULATED.3IONS-SCNC: 7 MMOL/L
BASOPHILS # BLD AUTO: 0.03 10*3/MM3 (ref 0–0.2)
BASOPHILS NFR BLD AUTO: 0.2 % (ref 0–2.5)
BUN BLD-MCNC: 5 MG/DL (ref 7–20)
BUN/CREAT SERPL: 8.3 (ref 7.1–23.5)
CALCIUM SPEC-SCNC: 7.9 MG/DL (ref 8.4–10.2)
CHLORIDE SERPL-SCNC: 111 MMOL/L (ref 98–107)
CO2 SERPL-SCNC: 23 MMOL/L (ref 26–30)
CREAT BLD-MCNC: 0.6 MG/DL (ref 0.6–1.3)
DEPRECATED RDW RBC AUTO: 56.8 FL (ref 37–54)
EOSINOPHIL # BLD AUTO: 0.05 10*3/MM3 (ref 0–0.7)
EOSINOPHIL NFR BLD AUTO: 0.3 % (ref 0–7)
ERYTHROCYTE [DISTWIDTH] IN BLOOD BY AUTOMATED COUNT: 15.3 % (ref 11.5–14.5)
GFR SERPL CREATININE-BSD FRML MDRD: 99 ML/MIN/1.73
GLUCOSE BLD-MCNC: 71 MG/DL (ref 74–98)
HCT VFR BLD AUTO: 33.3 % (ref 37–47)
HGB BLD-MCNC: 10.8 G/DL (ref 12–16)
IMM GRANULOCYTES # BLD: 0.17 10*3/MM3 (ref 0–0.06)
IMM GRANULOCYTES NFR BLD: 1.1 % (ref 0–0.6)
LYMPHOCYTES # BLD AUTO: 1.48 10*3/MM3 (ref 0.6–3.4)
LYMPHOCYTES NFR BLD AUTO: 9.8 % (ref 10–50)
MAGNESIUM SERPL-MCNC: 1.8 MG/DL (ref 1.6–2.3)
MCH RBC QN AUTO: 32.6 PG (ref 27–31)
MCHC RBC AUTO-ENTMCNC: 32.4 G/DL (ref 30–37)
MCV RBC AUTO: 100.6 FL (ref 81–99)
MONOCYTES # BLD AUTO: 0.78 10*3/MM3 (ref 0–0.9)
MONOCYTES NFR BLD AUTO: 5.2 % (ref 0–12)
NEUTROPHILS # BLD AUTO: 12.52 10*3/MM3 (ref 2–6.9)
NEUTROPHILS NFR BLD AUTO: 83.4 % (ref 37–80)
NRBC BLD MANUAL-RTO: 0 /100 WBC (ref 0–0)
PLATELET # BLD AUTO: 140 10*3/MM3 (ref 130–400)
PMV BLD AUTO: 9.9 FL (ref 6–12)
POTASSIUM BLD-SCNC: 4 MMOL/L (ref 3.5–5.1)
RBC # BLD AUTO: 3.31 10*6/MM3 (ref 4.2–5.4)
SODIUM BLD-SCNC: 137 MMOL/L (ref 137–145)
WBC NRBC COR # BLD: 15.03 10*3/MM3 (ref 4.8–10.8)

## 2017-02-14 PROCEDURE — 25010000002 ENOXAPARIN PER 10 MG: Performed by: NURSE PRACTITIONER

## 2017-02-14 PROCEDURE — 94640 AIRWAY INHALATION TREATMENT: CPT

## 2017-02-14 PROCEDURE — 80048 BASIC METABOLIC PNL TOTAL CA: CPT | Performed by: NURSE PRACTITIONER

## 2017-02-14 PROCEDURE — 99223 1ST HOSP IP/OBS HIGH 75: CPT | Performed by: INTERNAL MEDICINE

## 2017-02-14 PROCEDURE — 25810000003 SODIUM CHLORIDE 0.9 % WITH KCL 20 MEQ 20-0.9 MEQ/L-% SOLUTION: Performed by: NURSE PRACTITIONER

## 2017-02-14 PROCEDURE — 99232 SBSQ HOSP IP/OBS MODERATE 35: CPT | Performed by: NURSE PRACTITIONER

## 2017-02-14 PROCEDURE — 25010000002 PIPERACILLIN SOD-TAZOBACTAM PER 1 G: Performed by: NURSE PRACTITIONER

## 2017-02-14 PROCEDURE — 94667 MNPJ CHEST WALL 1ST: CPT

## 2017-02-14 PROCEDURE — 83735 ASSAY OF MAGNESIUM: CPT | Performed by: NURSE PRACTITIONER

## 2017-02-14 PROCEDURE — 85025 COMPLETE CBC W/AUTO DIFF WBC: CPT | Performed by: NURSE PRACTITIONER

## 2017-02-14 RX ORDER — METRONIDAZOLE 500 MG/1
500 TABLET ORAL EVERY 8 HOURS
Status: DISCONTINUED | OUTPATIENT
Start: 2017-02-14 | End: 2017-02-17 | Stop reason: HOSPADM

## 2017-02-14 RX ADMIN — HYDROCODONE BITARTRATE AND ACETAMINOPHEN 1 TABLET: 7.5; 325 TABLET ORAL at 21:57

## 2017-02-14 RX ADMIN — HYDROCODONE BITARTRATE AND ACETAMINOPHEN 1 TABLET: 7.5; 325 TABLET ORAL at 15:34

## 2017-02-14 RX ADMIN — VENLAFAXINE 75 MG: 75 TABLET ORAL at 17:07

## 2017-02-14 RX ADMIN — IPRATROPIUM BROMIDE AND ALBUTEROL SULFATE 3 ML: .5; 3 SOLUTION RESPIRATORY (INHALATION) at 16:07

## 2017-02-14 RX ADMIN — IPRATROPIUM BROMIDE AND ALBUTEROL SULFATE 3 ML: .5; 3 SOLUTION RESPIRATORY (INHALATION) at 07:24

## 2017-02-14 RX ADMIN — CALCIUM ACETATE 1334 MG: 667 CAPSULE ORAL at 17:07

## 2017-02-14 RX ADMIN — IPRATROPIUM BROMIDE AND ALBUTEROL SULFATE 3 ML: .5; 3 SOLUTION RESPIRATORY (INHALATION) at 13:02

## 2017-02-14 RX ADMIN — NYSTATIN 500000 UNITS: 100000 SUSPENSION ORAL at 11:13

## 2017-02-14 RX ADMIN — NICOTINE 1 PATCH: 21 PATCH TRANSDERMAL at 17:08

## 2017-02-14 RX ADMIN — CYANOCOBALAMIN TAB 500 MCG 500 MCG: 500 TAB at 08:09

## 2017-02-14 RX ADMIN — ENOXAPARIN SODIUM 40 MG: 40 INJECTION SUBCUTANEOUS at 08:09

## 2017-02-14 RX ADMIN — BUDESONIDE AND FORMOTEROL FUMARATE DIHYDRATE 2 PUFF: 160; 4.5 AEROSOL RESPIRATORY (INHALATION) at 20:26

## 2017-02-14 RX ADMIN — CETIRIZINE HYDROCHLORIDE 10 MG: 10 TABLET, FILM COATED ORAL at 08:10

## 2017-02-14 RX ADMIN — POTASSIUM CHLORIDE AND SODIUM CHLORIDE 75 ML/HR: 900; 150 INJECTION, SOLUTION INTRAVENOUS at 17:07

## 2017-02-14 RX ADMIN — NYSTATIN 500000 UNITS: 100000 SUSPENSION ORAL at 17:13

## 2017-02-14 RX ADMIN — AZELASTINE HYDROCHLORIDE 2 SPRAY: 137 SPRAY, METERED NASAL at 08:10

## 2017-02-14 RX ADMIN — CYCLOBENZAPRINE HYDROCHLORIDE 10 MG: 10 TABLET, FILM COATED ORAL at 08:10

## 2017-02-14 RX ADMIN — NYSTATIN 500000 UNITS: 100000 SUSPENSION ORAL at 20:25

## 2017-02-14 RX ADMIN — OXYBUTYNIN CHLORIDE 10 MG: 5 TABLET, EXTENDED RELEASE ORAL at 08:09

## 2017-02-14 RX ADMIN — METRONIDAZOLE 500 MG: 500 INJECTION, SOLUTION INTRAVENOUS at 08:09

## 2017-02-14 RX ADMIN — ACETAMINOPHEN 650 MG: 325 TABLET, FILM COATED ORAL at 05:20

## 2017-02-14 RX ADMIN — MELATONIN TAB 3 MG 4.5 MG: 3 TAB at 20:25

## 2017-02-14 RX ADMIN — METOPROLOL SUCCINATE 50 MG: 50 TABLET, EXTENDED RELEASE ORAL at 08:10

## 2017-02-14 RX ADMIN — BUDESONIDE AND FORMOTEROL FUMARATE DIHYDRATE 2 PUFF: 160; 4.5 AEROSOL RESPIRATORY (INHALATION) at 07:25

## 2017-02-14 RX ADMIN — MIRTAZAPINE 30 MG: 15 TABLET, FILM COATED ORAL at 20:25

## 2017-02-14 RX ADMIN — TAZOBACTAM SODIUM AND PIPERACILLIN SODIUM 3.38 G: 375; 3 INJECTION, SOLUTION INTRAVENOUS at 05:20

## 2017-02-14 RX ADMIN — METRONIDAZOLE 500 MG: 500 TABLET ORAL at 23:48

## 2017-02-14 RX ADMIN — PANTOPRAZOLE SODIUM 40 MG: 40 INJECTION, POWDER, FOR SOLUTION INTRAVENOUS at 05:20

## 2017-02-14 RX ADMIN — VENLAFAXINE 75 MG: 75 TABLET ORAL at 08:09

## 2017-02-14 RX ADMIN — CALCIUM ACETATE 1334 MG: 667 CAPSULE ORAL at 08:10

## 2017-02-14 RX ADMIN — METRONIDAZOLE 500 MG: 500 TABLET ORAL at 17:07

## 2017-02-14 RX ADMIN — TAZOBACTAM SODIUM AND PIPERACILLIN SODIUM 3.38 G: 375; 3 INJECTION, SOLUTION INTRAVENOUS at 11:13

## 2017-02-15 LAB
ANION GAP SERPL CALCULATED.3IONS-SCNC: 10.5 MMOL/L
BACTERIA SPEC AEROBE CULT: NORMAL
BASOPHILS # BLD AUTO: 0.02 10*3/MM3 (ref 0–0.2)
BASOPHILS NFR BLD AUTO: 0.2 % (ref 0–2.5)
BUN BLD-MCNC: <2 MG/DL (ref 7–20)
BUN/CREAT SERPL: ABNORMAL (ref 7.1–23.5)
CALCIUM SPEC-SCNC: 8.3 MG/DL (ref 8.4–10.2)
CHLORIDE SERPL-SCNC: 112 MMOL/L (ref 98–107)
CO2 SERPL-SCNC: 22 MMOL/L (ref 26–30)
CREAT BLD-MCNC: 0.5 MG/DL (ref 0.6–1.3)
DEPRECATED RDW RBC AUTO: 54.7 FL (ref 37–54)
EOSINOPHIL # BLD AUTO: 0.07 10*3/MM3 (ref 0–0.7)
EOSINOPHIL NFR BLD AUTO: 0.7 % (ref 0–7)
ERYTHROCYTE [DISTWIDTH] IN BLOOD BY AUTOMATED COUNT: 15.1 % (ref 11.5–14.5)
GFR SERPL CREATININE-BSD FRML MDRD: 122 ML/MIN/1.73
GLUCOSE BLD-MCNC: 91 MG/DL (ref 74–98)
HCT VFR BLD AUTO: 33.4 % (ref 37–47)
HGB BLD-MCNC: 10.7 G/DL (ref 12–16)
IMM GRANULOCYTES # BLD: 0.18 10*3/MM3 (ref 0–0.06)
IMM GRANULOCYTES NFR BLD: 1.8 % (ref 0–0.6)
LYMPHOCYTES # BLD AUTO: 1.43 10*3/MM3 (ref 0.6–3.4)
LYMPHOCYTES NFR BLD AUTO: 14.6 % (ref 10–50)
MAGNESIUM SERPL-MCNC: 1.7 MG/DL (ref 1.6–2.3)
MCH RBC QN AUTO: 31.8 PG (ref 27–31)
MCHC RBC AUTO-ENTMCNC: 32 G/DL (ref 30–37)
MCV RBC AUTO: 99.4 FL (ref 81–99)
MONOCYTES # BLD AUTO: 0.65 10*3/MM3 (ref 0–0.9)
MONOCYTES NFR BLD AUTO: 6.7 % (ref 0–12)
NEUTROPHILS # BLD AUTO: 7.42 10*3/MM3 (ref 2–6.9)
NEUTROPHILS NFR BLD AUTO: 76 % (ref 37–80)
NRBC BLD MANUAL-RTO: 0 /100 WBC (ref 0–0)
PLATELET # BLD AUTO: 192 10*3/MM3 (ref 130–400)
PMV BLD AUTO: 9.6 FL (ref 6–12)
POTASSIUM BLD-SCNC: 3.5 MMOL/L (ref 3.5–5.1)
RBC # BLD AUTO: 3.36 10*6/MM3 (ref 4.2–5.4)
SODIUM BLD-SCNC: 141 MMOL/L (ref 137–145)
WBC NRBC COR # BLD: 9.77 10*3/MM3 (ref 4.8–10.8)

## 2017-02-15 PROCEDURE — 94799 UNLISTED PULMONARY SVC/PX: CPT

## 2017-02-15 PROCEDURE — 25010000002 MAGNESIUM SULFATE 2 GM/50ML SOLUTION: Performed by: NURSE PRACTITIONER

## 2017-02-15 PROCEDURE — 94640 AIRWAY INHALATION TREATMENT: CPT

## 2017-02-15 PROCEDURE — 25010000002 ENOXAPARIN PER 10 MG: Performed by: NURSE PRACTITIONER

## 2017-02-15 PROCEDURE — 99232 SBSQ HOSP IP/OBS MODERATE 35: CPT | Performed by: NURSE PRACTITIONER

## 2017-02-15 PROCEDURE — 99232 SBSQ HOSP IP/OBS MODERATE 35: CPT | Performed by: INTERNAL MEDICINE

## 2017-02-15 PROCEDURE — 85025 COMPLETE CBC W/AUTO DIFF WBC: CPT | Performed by: NURSE PRACTITIONER

## 2017-02-15 PROCEDURE — 83735 ASSAY OF MAGNESIUM: CPT | Performed by: NURSE PRACTITIONER

## 2017-02-15 PROCEDURE — 80048 BASIC METABOLIC PNL TOTAL CA: CPT | Performed by: NURSE PRACTITIONER

## 2017-02-15 RX ORDER — MAGNESIUM SULFATE HEPTAHYDRATE 40 MG/ML
2 INJECTION, SOLUTION INTRAVENOUS ONCE
Status: COMPLETED | OUTPATIENT
Start: 2017-02-15 | End: 2017-02-15

## 2017-02-15 RX ADMIN — CALCIUM ACETATE 1334 MG: 667 CAPSULE ORAL at 09:16

## 2017-02-15 RX ADMIN — MELATONIN TAB 3 MG 4.5 MG: 3 TAB at 22:23

## 2017-02-15 RX ADMIN — CETIRIZINE HYDROCHLORIDE 10 MG: 10 TABLET, FILM COATED ORAL at 09:12

## 2017-02-15 RX ADMIN — METRONIDAZOLE 500 MG: 500 TABLET ORAL at 16:16

## 2017-02-15 RX ADMIN — VENLAFAXINE 75 MG: 75 TABLET ORAL at 18:16

## 2017-02-15 RX ADMIN — NYSTATIN 500000 UNITS: 100000 SUSPENSION ORAL at 12:02

## 2017-02-15 RX ADMIN — OXYBUTYNIN CHLORIDE 10 MG: 5 TABLET, EXTENDED RELEASE ORAL at 09:12

## 2017-02-15 RX ADMIN — MAGNESIUM SULFATE HEPTAHYDRATE 2 G: 40 INJECTION, SOLUTION INTRAVENOUS at 11:56

## 2017-02-15 RX ADMIN — METOPROLOL SUCCINATE 50 MG: 50 TABLET, EXTENDED RELEASE ORAL at 09:12

## 2017-02-15 RX ADMIN — ENOXAPARIN SODIUM 40 MG: 40 INJECTION SUBCUTANEOUS at 09:11

## 2017-02-15 RX ADMIN — NYSTATIN 500000 UNITS: 100000 SUSPENSION ORAL at 22:23

## 2017-02-15 RX ADMIN — IPRATROPIUM BROMIDE AND ALBUTEROL SULFATE 3 ML: .5; 3 SOLUTION RESPIRATORY (INHALATION) at 16:06

## 2017-02-15 RX ADMIN — CYANOCOBALAMIN TAB 500 MCG 500 MCG: 500 TAB at 09:12

## 2017-02-15 RX ADMIN — NYSTATIN 500000 UNITS: 100000 SUSPENSION ORAL at 18:16

## 2017-02-15 RX ADMIN — CYCLOBENZAPRINE HYDROCHLORIDE 10 MG: 10 TABLET, FILM COATED ORAL at 09:12

## 2017-02-15 RX ADMIN — BUDESONIDE AND FORMOTEROL FUMARATE DIHYDRATE 2 PUFF: 160; 4.5 AEROSOL RESPIRATORY (INHALATION) at 20:53

## 2017-02-15 RX ADMIN — NICOTINE 1 PATCH: 21 PATCH TRANSDERMAL at 18:27

## 2017-02-15 RX ADMIN — NYSTATIN 500000 UNITS: 100000 SUSPENSION ORAL at 09:12

## 2017-02-15 RX ADMIN — METRONIDAZOLE 500 MG: 500 TABLET ORAL at 09:16

## 2017-02-15 RX ADMIN — PANTOPRAZOLE SODIUM 40 MG: 40 INJECTION, POWDER, FOR SOLUTION INTRAVENOUS at 05:51

## 2017-02-15 RX ADMIN — BUDESONIDE AND FORMOTEROL FUMARATE DIHYDRATE 2 PUFF: 160; 4.5 AEROSOL RESPIRATORY (INHALATION) at 07:06

## 2017-02-15 RX ADMIN — CALCIUM ACETATE 1334 MG: 667 CAPSULE ORAL at 12:02

## 2017-02-15 RX ADMIN — AZELASTINE HYDROCHLORIDE 2 SPRAY: 137 SPRAY, METERED NASAL at 09:47

## 2017-02-15 RX ADMIN — IPRATROPIUM BROMIDE AND ALBUTEROL SULFATE 3 ML: .5; 3 SOLUTION RESPIRATORY (INHALATION) at 20:53

## 2017-02-15 RX ADMIN — IPRATROPIUM BROMIDE AND ALBUTEROL SULFATE 3 ML: .5; 3 SOLUTION RESPIRATORY (INHALATION) at 07:04

## 2017-02-15 RX ADMIN — MIRTAZAPINE 30 MG: 15 TABLET, FILM COATED ORAL at 22:23

## 2017-02-15 RX ADMIN — VENLAFAXINE 75 MG: 75 TABLET ORAL at 09:12

## 2017-02-16 ENCOUNTER — APPOINTMENT (OUTPATIENT)
Dept: GENERAL RADIOLOGY | Facility: HOSPITAL | Age: 72
End: 2017-02-16

## 2017-02-16 LAB
ANION GAP SERPL CALCULATED.3IONS-SCNC: 8.6 MMOL/L
ANISOCYTOSIS BLD QL: ABNORMAL
BUN BLD-MCNC: <2 MG/DL (ref 7–20)
BUN/CREAT SERPL: ABNORMAL (ref 7.1–23.5)
CALCIUM SPEC-SCNC: 8 MG/DL (ref 8.4–10.2)
CHLORIDE SERPL-SCNC: 111 MMOL/L (ref 98–107)
CO2 SERPL-SCNC: 25 MMOL/L (ref 26–30)
CREAT BLD-MCNC: 0.6 MG/DL (ref 0.6–1.3)
DEPRECATED RDW RBC AUTO: 55.4 FL (ref 37–54)
EOSINOPHIL # BLD MANUAL: 0.07 10*3/MM3 (ref 0–0.7)
EOSINOPHIL NFR BLD MANUAL: 1 % (ref 0–7)
ERYTHROCYTE [DISTWIDTH] IN BLOOD BY AUTOMATED COUNT: 15 % (ref 11.5–14.5)
GFR SERPL CREATININE-BSD FRML MDRD: 99 ML/MIN/1.73
GLUCOSE BLD-MCNC: 80 MG/DL (ref 74–98)
HCT VFR BLD AUTO: 32.9 % (ref 37–47)
HGB BLD-MCNC: 10.6 G/DL (ref 12–16)
LYMPHOCYTES # BLD MANUAL: 2.74 10*3/MM3 (ref 0.6–3.4)
LYMPHOCYTES NFR BLD MANUAL: 38 % (ref 10–50)
LYMPHOCYTES NFR BLD MANUAL: 5 % (ref 0–12)
MCH RBC QN AUTO: 32 PG (ref 27–31)
MCHC RBC AUTO-ENTMCNC: 32.2 G/DL (ref 30–37)
MCV RBC AUTO: 99.4 FL (ref 81–99)
METAMYELOCYTES NFR BLD MANUAL: 3 % (ref 0–0)
MONOCYTES # BLD AUTO: 0.36 10*3/MM3 (ref 0–0.9)
NEUTROPHILS # BLD AUTO: 3.82 10*3/MM3 (ref 2–6.9)
NEUTROPHILS NFR BLD MANUAL: 43 % (ref 37–80)
NEUTS BAND NFR BLD MANUAL: 10 % (ref 0–6)
PLAT MORPH BLD: NORMAL
PLATELET # BLD AUTO: 217 10*3/MM3 (ref 130–400)
PMV BLD AUTO: 9.9 FL (ref 6–12)
POTASSIUM BLD-SCNC: 3.6 MMOL/L (ref 3.5–5.1)
RBC # BLD AUTO: 3.31 10*6/MM3 (ref 4.2–5.4)
SCAN SLIDE: NORMAL
SODIUM BLD-SCNC: 141 MMOL/L (ref 137–145)
TOXIC GRANULATION: ABNORMAL
WBC NRBC COR # BLD: 7.2 10*3/MM3 (ref 4.8–10.8)

## 2017-02-16 PROCEDURE — 94640 AIRWAY INHALATION TREATMENT: CPT

## 2017-02-16 PROCEDURE — 25010000002 ENOXAPARIN PER 10 MG: Performed by: NURSE PRACTITIONER

## 2017-02-16 PROCEDURE — 99232 SBSQ HOSP IP/OBS MODERATE 35: CPT | Performed by: INTERNAL MEDICINE

## 2017-02-16 PROCEDURE — 87147 CULTURE TYPE IMMUNOLOGIC: CPT | Performed by: NURSE PRACTITIONER

## 2017-02-16 PROCEDURE — 87077 CULTURE AEROBIC IDENTIFY: CPT | Performed by: NURSE PRACTITIONER

## 2017-02-16 PROCEDURE — 87186 SC STD MICRODIL/AGAR DIL: CPT | Performed by: NURSE PRACTITIONER

## 2017-02-16 PROCEDURE — 80048 BASIC METABOLIC PNL TOTAL CA: CPT | Performed by: NURSE PRACTITIONER

## 2017-02-16 PROCEDURE — 25810000003 SODIUM CHLORIDE 0.9 % WITH KCL 20 MEQ 20-0.9 MEQ/L-% SOLUTION: Performed by: NURSE PRACTITIONER

## 2017-02-16 PROCEDURE — 85025 COMPLETE CBC W/AUTO DIFF WBC: CPT | Performed by: NURSE PRACTITIONER

## 2017-02-16 PROCEDURE — 87086 URINE CULTURE/COLONY COUNT: CPT | Performed by: NURSE PRACTITIONER

## 2017-02-16 PROCEDURE — 99232 SBSQ HOSP IP/OBS MODERATE 35: CPT | Performed by: NURSE PRACTITIONER

## 2017-02-16 PROCEDURE — 94799 UNLISTED PULMONARY SVC/PX: CPT

## 2017-02-16 PROCEDURE — 71010 HC CHEST PA OR AP: CPT

## 2017-02-16 PROCEDURE — 85007 BL SMEAR W/DIFF WBC COUNT: CPT | Performed by: NURSE PRACTITIONER

## 2017-02-16 RX ADMIN — CYANOCOBALAMIN TAB 500 MCG 500 MCG: 500 TAB at 09:34

## 2017-02-16 RX ADMIN — HYDROCODONE BITARTRATE AND ACETAMINOPHEN 1 TABLET: 7.5; 325 TABLET ORAL at 21:04

## 2017-02-16 RX ADMIN — METOPROLOL SUCCINATE 50 MG: 50 TABLET, EXTENDED RELEASE ORAL at 09:34

## 2017-02-16 RX ADMIN — AZELASTINE HYDROCHLORIDE 2 SPRAY: 137 SPRAY, METERED NASAL at 09:35

## 2017-02-16 RX ADMIN — IPRATROPIUM BROMIDE AND ALBUTEROL SULFATE 3 ML: .5; 3 SOLUTION RESPIRATORY (INHALATION) at 07:15

## 2017-02-16 RX ADMIN — NYSTATIN 500000 UNITS: 100000 SUSPENSION ORAL at 09:34

## 2017-02-16 RX ADMIN — HYDROCODONE BITARTRATE AND ACETAMINOPHEN 1 TABLET: 7.5; 325 TABLET ORAL at 09:35

## 2017-02-16 RX ADMIN — CALCIUM ACETATE 1334 MG: 667 CAPSULE ORAL at 09:33

## 2017-02-16 RX ADMIN — NYSTATIN 500000 UNITS: 100000 SUSPENSION ORAL at 16:56

## 2017-02-16 RX ADMIN — OXYBUTYNIN CHLORIDE 10 MG: 5 TABLET, EXTENDED RELEASE ORAL at 09:34

## 2017-02-16 RX ADMIN — MELATONIN TAB 3 MG 4.5 MG: 3 TAB at 21:01

## 2017-02-16 RX ADMIN — NYSTATIN 500000 UNITS: 100000 SUSPENSION ORAL at 12:10

## 2017-02-16 RX ADMIN — METRONIDAZOLE 500 MG: 500 TABLET ORAL at 09:35

## 2017-02-16 RX ADMIN — IPRATROPIUM BROMIDE AND ALBUTEROL SULFATE 3 ML: .5; 3 SOLUTION RESPIRATORY (INHALATION) at 16:21

## 2017-02-16 RX ADMIN — VENLAFAXINE 75 MG: 75 TABLET ORAL at 09:34

## 2017-02-16 RX ADMIN — PANTOPRAZOLE SODIUM 40 MG: 40 INJECTION, POWDER, FOR SOLUTION INTRAVENOUS at 06:25

## 2017-02-16 RX ADMIN — MIRTAZAPINE 30 MG: 15 TABLET, FILM COATED ORAL at 21:01

## 2017-02-16 RX ADMIN — ENOXAPARIN SODIUM 40 MG: 40 INJECTION SUBCUTANEOUS at 09:33

## 2017-02-16 RX ADMIN — IPRATROPIUM BROMIDE AND ALBUTEROL SULFATE 3 ML: .5; 3 SOLUTION RESPIRATORY (INHALATION) at 19:40

## 2017-02-16 RX ADMIN — NICOTINE 1 PATCH: 21 PATCH TRANSDERMAL at 16:58

## 2017-02-16 RX ADMIN — CYCLOBENZAPRINE HYDROCHLORIDE 10 MG: 10 TABLET, FILM COATED ORAL at 09:34

## 2017-02-16 RX ADMIN — BUDESONIDE AND FORMOTEROL FUMARATE DIHYDRATE 2 PUFF: 160; 4.5 AEROSOL RESPIRATORY (INHALATION) at 19:40

## 2017-02-16 RX ADMIN — CALCIUM ACETATE 1334 MG: 667 CAPSULE ORAL at 16:57

## 2017-02-16 RX ADMIN — NYSTATIN 500000 UNITS: 100000 SUSPENSION ORAL at 21:01

## 2017-02-16 RX ADMIN — METRONIDAZOLE 500 MG: 500 TABLET ORAL at 00:11

## 2017-02-16 RX ADMIN — CALCIUM ACETATE 1334 MG: 667 CAPSULE ORAL at 12:10

## 2017-02-16 RX ADMIN — METRONIDAZOLE 500 MG: 500 TABLET ORAL at 16:57

## 2017-02-16 RX ADMIN — CETIRIZINE HYDROCHLORIDE 10 MG: 10 TABLET, FILM COATED ORAL at 09:34

## 2017-02-16 RX ADMIN — POTASSIUM CHLORIDE AND SODIUM CHLORIDE 50 ML/HR: 900; 150 INJECTION, SOLUTION INTRAVENOUS at 02:33

## 2017-02-16 RX ADMIN — BUDESONIDE AND FORMOTEROL FUMARATE DIHYDRATE 2 PUFF: 160; 4.5 AEROSOL RESPIRATORY (INHALATION) at 07:15

## 2017-02-16 RX ADMIN — METRONIDAZOLE 500 MG: 500 TABLET ORAL at 23:49

## 2017-02-16 RX ADMIN — VENLAFAXINE 75 MG: 75 TABLET ORAL at 16:57

## 2017-02-17 VITALS
WEIGHT: 91.38 LBS | HEART RATE: 90 BPM | DIASTOLIC BLOOD PRESSURE: 72 MMHG | SYSTOLIC BLOOD PRESSURE: 141 MMHG | OXYGEN SATURATION: 98 % | BODY MASS INDEX: 17.94 KG/M2 | HEIGHT: 60 IN | TEMPERATURE: 98.5 F | RESPIRATION RATE: 16 BRPM

## 2017-02-17 PROBLEM — A04.72 C. DIFFICILE COLITIS: Status: ACTIVE | Noted: 2017-02-17

## 2017-02-17 PROBLEM — J44.9 CHRONIC BRONCHITIS WITH COPD (CHRONIC OBSTRUCTIVE PULMONARY DISEASE) (HCC): Status: ACTIVE | Noted: 2017-02-17

## 2017-02-17 LAB
ANION GAP SERPL CALCULATED.3IONS-SCNC: 8.7 MMOL/L
BUN BLD-MCNC: 3 MG/DL (ref 7–20)
BUN/CREAT SERPL: 5 (ref 7.1–23.5)
CALCIUM SPEC-SCNC: 8.4 MG/DL (ref 8.4–10.2)
CHLORIDE SERPL-SCNC: 111 MMOL/L (ref 98–107)
CO2 SERPL-SCNC: 26 MMOL/L (ref 26–30)
CREAT BLD-MCNC: 0.6 MG/DL (ref 0.6–1.3)
DEPRECATED RDW RBC AUTO: 55.5 FL (ref 37–54)
ERYTHROCYTE [DISTWIDTH] IN BLOOD BY AUTOMATED COUNT: 15.3 % (ref 11.5–14.5)
GFR SERPL CREATININE-BSD FRML MDRD: 99 ML/MIN/1.73
GLUCOSE BLD-MCNC: 83 MG/DL (ref 74–98)
HCT VFR BLD AUTO: 33.4 % (ref 37–47)
HGB BLD-MCNC: 10.8 G/DL (ref 12–16)
MAGNESIUM SERPL-MCNC: 1.8 MG/DL (ref 1.6–2.3)
MCH RBC QN AUTO: 32.1 PG (ref 27–31)
MCHC RBC AUTO-ENTMCNC: 32.3 G/DL (ref 30–37)
MCV RBC AUTO: 99.4 FL (ref 81–99)
PLATELET # BLD AUTO: 220 10*3/MM3 (ref 130–400)
PMV BLD AUTO: 9.7 FL (ref 6–12)
POTASSIUM BLD-SCNC: 3.7 MMOL/L (ref 3.5–5.1)
RBC # BLD AUTO: 3.36 10*6/MM3 (ref 4.2–5.4)
SODIUM BLD-SCNC: 142 MMOL/L (ref 137–145)
WBC NRBC COR # BLD: 8.25 10*3/MM3 (ref 4.8–10.8)

## 2017-02-17 PROCEDURE — 94640 AIRWAY INHALATION TREATMENT: CPT

## 2017-02-17 PROCEDURE — 83735 ASSAY OF MAGNESIUM: CPT | Performed by: NURSE PRACTITIONER

## 2017-02-17 PROCEDURE — 80048 BASIC METABOLIC PNL TOTAL CA: CPT | Performed by: NURSE PRACTITIONER

## 2017-02-17 PROCEDURE — 85027 COMPLETE CBC AUTOMATED: CPT | Performed by: NURSE PRACTITIONER

## 2017-02-17 PROCEDURE — 25010000002 ENOXAPARIN PER 10 MG: Performed by: NURSE PRACTITIONER

## 2017-02-17 PROCEDURE — 99232 SBSQ HOSP IP/OBS MODERATE 35: CPT | Performed by: INTERNAL MEDICINE

## 2017-02-17 PROCEDURE — 99239 HOSP IP/OBS DSCHRG MGMT >30: CPT | Performed by: NURSE PRACTITIONER

## 2017-02-17 RX ORDER — METRONIDAZOLE 500 MG/1
500 TABLET ORAL EVERY 8 HOURS
Qty: 21 TABLET | Refills: 0 | Status: SHIPPED | OUTPATIENT
Start: 2017-02-17 | End: 2017-02-24

## 2017-02-17 RX ORDER — NICOTINE 21 MG/24HR
1 PATCH, TRANSDERMAL 24 HOURS TRANSDERMAL EVERY 24 HOURS
Qty: 30 PATCH | Refills: 2 | Status: SHIPPED | OUTPATIENT
Start: 2017-02-17 | End: 2017-10-04

## 2017-02-17 RX ORDER — NICOTINE 21 MG/24HR
1 PATCH, TRANSDERMAL 24 HOURS TRANSDERMAL EVERY 24 HOURS
Status: DISCONTINUED | OUTPATIENT
Start: 2017-02-17 | End: 2017-02-17 | Stop reason: SDUPTHER

## 2017-02-17 RX ORDER — CALCIUM ACETATE 667 MG/1
1334 CAPSULE ORAL
Qty: 180 CAPSULE | Status: ON HOLD
Start: 2017-02-17 | End: 2018-07-10

## 2017-02-17 RX ORDER — NICOTINE 21 MG/24HR
1 PATCH, TRANSDERMAL 24 HOURS TRANSDERMAL EVERY 24 HOURS
Qty: 30 PATCH | Refills: 0 | Status: SHIPPED | OUTPATIENT
Start: 2017-02-17 | End: 2017-03-19

## 2017-02-17 RX ADMIN — ENOXAPARIN SODIUM 40 MG: 40 INJECTION SUBCUTANEOUS at 09:39

## 2017-02-17 RX ADMIN — CYCLOBENZAPRINE HYDROCHLORIDE 10 MG: 10 TABLET, FILM COATED ORAL at 09:40

## 2017-02-17 RX ADMIN — METOPROLOL SUCCINATE 50 MG: 50 TABLET, EXTENDED RELEASE ORAL at 09:40

## 2017-02-17 RX ADMIN — CETIRIZINE HYDROCHLORIDE 10 MG: 10 TABLET, FILM COATED ORAL at 09:40

## 2017-02-17 RX ADMIN — PANTOPRAZOLE SODIUM 40 MG: 40 INJECTION, POWDER, FOR SOLUTION INTRAVENOUS at 06:39

## 2017-02-17 RX ADMIN — BUDESONIDE AND FORMOTEROL FUMARATE DIHYDRATE 2 PUFF: 160; 4.5 AEROSOL RESPIRATORY (INHALATION) at 07:29

## 2017-02-17 RX ADMIN — VENLAFAXINE 75 MG: 75 TABLET ORAL at 09:40

## 2017-02-17 RX ADMIN — NYSTATIN 500000 UNITS: 100000 SUSPENSION ORAL at 09:39

## 2017-02-17 RX ADMIN — AZELASTINE HYDROCHLORIDE 2 SPRAY: 137 SPRAY, METERED NASAL at 09:39

## 2017-02-17 RX ADMIN — CALCIUM ACETATE 1334 MG: 667 CAPSULE ORAL at 09:41

## 2017-02-17 RX ADMIN — CYANOCOBALAMIN TAB 500 MCG 500 MCG: 500 TAB at 09:40

## 2017-02-17 RX ADMIN — IPRATROPIUM BROMIDE AND ALBUTEROL SULFATE 3 ML: .5; 3 SOLUTION RESPIRATORY (INHALATION) at 07:30

## 2017-02-17 RX ADMIN — OXYBUTYNIN CHLORIDE 10 MG: 5 TABLET, EXTENDED RELEASE ORAL at 09:40

## 2017-02-17 RX ADMIN — METRONIDAZOLE 500 MG: 500 TABLET ORAL at 09:40

## 2017-02-18 LAB
BACTERIA SPEC AEROBE CULT: NORMAL
BACTERIA SPEC AEROBE CULT: NORMAL

## 2017-02-19 LAB — BACTERIA SPEC AEROBE CULT: ABNORMAL

## 2017-03-02 ENCOUNTER — OFFICE VISIT (OUTPATIENT)
Dept: PULMONOLOGY | Facility: CLINIC | Age: 72
End: 2017-03-02

## 2017-03-02 VITALS
HEIGHT: 60 IN | OXYGEN SATURATION: 97 % | DIASTOLIC BLOOD PRESSURE: 58 MMHG | RESPIRATION RATE: 16 BRPM | SYSTOLIC BLOOD PRESSURE: 114 MMHG | WEIGHT: 91 LBS | BODY MASS INDEX: 17.87 KG/M2 | HEART RATE: 97 BPM

## 2017-03-02 DIAGNOSIS — R09.02 HYPOXIA: ICD-10-CM

## 2017-03-02 DIAGNOSIS — J44.9 CHRONIC OBSTRUCTIVE PULMONARY DISEASE, UNSPECIFIED COPD TYPE (HCC): ICD-10-CM

## 2017-03-02 DIAGNOSIS — R93.89 ABNORMAL CT OF THE CHEST: ICD-10-CM

## 2017-03-02 DIAGNOSIS — R06.02 SHORTNESS OF BREATH: Primary | ICD-10-CM

## 2017-03-02 PROCEDURE — 94620 PR PULMONARY STRESS TESTING,SIMPLE: CPT | Performed by: INTERNAL MEDICINE

## 2017-03-02 PROCEDURE — 99214 OFFICE O/P EST MOD 30 MIN: CPT | Performed by: INTERNAL MEDICINE

## 2017-03-02 RX ORDER — BUDESONIDE AND FORMOTEROL FUMARATE DIHYDRATE 160; 4.5 UG/1; UG/1
2 AEROSOL RESPIRATORY (INHALATION) 2 TIMES DAILY
Qty: 1 INHALER | Refills: 5 | Status: SHIPPED | OUTPATIENT
Start: 2017-03-02 | End: 2017-06-05 | Stop reason: SDUPTHER

## 2017-03-02 NOTE — PROGRESS NOTES
"Chief Complaint   Patient presents with   • Follow-up         Subjective   Joelle Yee is a 71 y.o. female.     History of Present Illness   Patient comes today for follow up of shortness of breath. Patient has moderate COPD and she was actually recently hospitalized with C. difficile colitis.  It was also felt that she had bronchitis and was prescribed antibiotics and after the antibiotics, she developed C. difficile colitis and had to be admitted to the hospital.    Symptoms have been stable since the hospital discharge.  She is undergoing physical therapy at home    She reports improvement in her appetite and strength.  She continued to smoke 1 or 2 cigarettes a day.  She is using nicotine patches.    She is on Spiriva and Symbicort    The following portions of the patient's history were reviewed and updated as appropriate: allergies, current medications, past family history, past medical history, past social history and past surgical history.    Review of Systems   HENT: Positive for sore throat. Negative for sinus pressure and sneezing.    Respiratory: Positive for cough and shortness of breath. Negative for wheezing.        Objective   Visit Vitals   • /58   • Pulse 97   • Resp 16   • Ht 60\" (152.4 cm)   • Wt 91 lb (41.3 kg)   • SpO2 97%   • BMI 17.77 kg/m2     Physical Exam  General:              Did not seem to be in any acute distress.    ENT:               Nares were patent.              Oropharynx was moist. No lesions noted.               Dentures noted.     Neck:               Supple    Cardiovascular:               S1 + S2.  Regular     Respiratory:              Respiratory effort was adequate.              Hyperresonance to percussion.              Decreased Air Entry Bilaterally with scattered wheezing. No crackles heard.    Musculoskeletal/Extremities:              No significant edema noted in the lower extremities.              No clubbing noted.              Normal " gait.    Neurologic/Psychiatric:              A&Ox3.               Affect was appropriate.      Assessment/Plan   Joelle was seen today for follow-up.    Diagnoses and all orders for this visit:    Shortness of breath  -     Pulmonary Function Test; Future    Chronic obstructive pulmonary disease, unspecified COPD type  -     Pulmonary Function Test; Future  -     Converted Six Minute Walk    Abnormal CT of the chest    Hypoxia    Other orders  -     budesonide-formoterol (SYMBICORT) 160-4.5 MCG/ACT inhaler; Inhale 2 puffs 2 (Two) Times a Day. Inhale 1 puff twice daily. Rinse mouth after use.  -     tiotropium (SPIRIVA HANDIHALER) 18 MCG per inhalation capsule; Place 1 capsule into inhaler and inhale Daily.           Return in about 3 months (around 6/2/2017) for Recheck, PFTs.    DISCUSSION (if any):  Orders as above.    CT scan will be requested in 6 months.  Her last CT scan was done on 6 February which showed bibasilar scarring.    She was advised to quit smoking    Converted Six Minute Walk  Date/Time: 3/9/2017 2:41 PM  Performed by: REBEKA ESPARZA  Authorized by: REBEKA ESPARZA   Comments: 6 minute walk test    Total distance walked: 252 meters  Oxygen levels stayed at more than 94 % during the walk            Errors in dictation may reflect use of voice recognition software and not all errors in transcription may have been detected prior to signing    This document was electronically signed by Rebeka Esparza MD March 2, 2017.  4:50 PM.

## 2017-03-03 ENCOUNTER — LAB REQUISITION (OUTPATIENT)
Dept: LAB | Facility: HOSPITAL | Age: 72
End: 2017-03-03

## 2017-03-03 DIAGNOSIS — A04.72 ENTEROCOLITIS DUE TO CLOSTRIDIUM DIFFICILE: ICD-10-CM

## 2017-03-03 LAB — C DIFF TOX A+B STL QL IA: NEGATIVE

## 2017-03-03 PROCEDURE — 87324 CLOSTRIDIUM AG IA: CPT | Performed by: INTERNAL MEDICINE

## 2017-03-07 ENCOUNTER — APPOINTMENT (OUTPATIENT)
Dept: CT IMAGING | Facility: HOSPITAL | Age: 72
End: 2017-03-07

## 2017-03-07 ENCOUNTER — HOSPITAL ENCOUNTER (INPATIENT)
Facility: HOSPITAL | Age: 72
LOS: 6 days | Discharge: HOME | End: 2017-03-14
Attending: STUDENT IN AN ORGANIZED HEALTH CARE EDUCATION/TRAINING PROGRAM | Admitting: INTERNAL MEDICINE

## 2017-03-07 DIAGNOSIS — R53.1 WEAKNESS: ICD-10-CM

## 2017-03-07 DIAGNOSIS — R09.02 HYPOXIA: ICD-10-CM

## 2017-03-07 DIAGNOSIS — J44.9 CHRONIC OBSTRUCTIVE PULMONARY DISEASE, UNSPECIFIED COPD TYPE (HCC): ICD-10-CM

## 2017-03-07 DIAGNOSIS — Q82.9 PALMAR, PLANTAR, AND DISSEMINATED POROKERATOSIS: ICD-10-CM

## 2017-03-07 DIAGNOSIS — J18.9 PNEUMONIA OF RIGHT LOWER LOBE DUE TO INFECTIOUS ORGANISM: Primary | ICD-10-CM

## 2017-03-07 PROBLEM — J44.1 COPD EXACERBATION (HCC): Status: ACTIVE | Noted: 2017-03-07

## 2017-03-07 LAB
ALBUMIN SERPL-MCNC: 3.5 G/DL (ref 3.5–5)
ALBUMIN/GLOB SERPL: 1.1 G/DL (ref 1–2)
ALP SERPL-CCNC: 120 U/L (ref 38–126)
ALT SERPL W P-5'-P-CCNC: 17 U/L (ref 13–69)
ANION GAP SERPL CALCULATED.3IONS-SCNC: 13.5 MMOL/L
ARTERIAL PATENCY WRIST A: POSITIVE
AST SERPL-CCNC: 17 U/L (ref 15–46)
BASE EXCESS BLDA CALC-SCNC: -0.2 MMOL/L
BASOPHILS # BLD AUTO: 0.05 10*3/MM3 (ref 0–0.2)
BASOPHILS NFR BLD AUTO: 0.3 % (ref 0–2.5)
BDY SITE: ABNORMAL
BILIRUB SERPL-MCNC: 0.4 MG/DL (ref 0.2–1.3)
BUN BLD-MCNC: 9 MG/DL (ref 7–20)
BUN/CREAT SERPL: 15 (ref 7.1–23.5)
CALCIUM SPEC-SCNC: 8.6 MG/DL (ref 8.4–10.2)
CHLORIDE SERPL-SCNC: 103 MMOL/L (ref 98–107)
CO2 SERPL-SCNC: 26 MMOL/L (ref 26–30)
COHGB MFR BLD: 2 %
CREAT BLD-MCNC: 0.6 MG/DL (ref 0.6–1.3)
DEPRECATED RDW RBC AUTO: 55.5 FL (ref 37–54)
EOSINOPHIL # BLD AUTO: 0.05 10*3/MM3 (ref 0–0.7)
EOSINOPHIL NFR BLD AUTO: 0.3 % (ref 0–7)
ERYTHROCYTE [DISTWIDTH] IN BLOOD BY AUTOMATED COUNT: 15 % (ref 11.5–14.5)
FLUAV AG NPH QL: NEGATIVE
FLUBV AG NPH QL IA: NEGATIVE
GFR SERPL CREATININE-BSD FRML MDRD: 99 ML/MIN/1.73
GLOBULIN UR ELPH-MCNC: 3.1 GM/DL
GLUCOSE BLD-MCNC: 121 MG/DL (ref 74–98)
HCO3 BLDA-SCNC: 23.1 MMOL/L (ref 22–28)
HCT VFR BLD AUTO: 36.9 % (ref 37–47)
HGB BLD-MCNC: 11.9 G/DL (ref 12–16)
HGB BLDA-MCNC: 11.1 G/DL (ref 12–18)
HOLD SPECIMEN: NORMAL
HOLD SPECIMEN: NORMAL
IMM GRANULOCYTES # BLD: 0.45 10*3/MM3 (ref 0–0.06)
IMM GRANULOCYTES NFR BLD: 2.3 % (ref 0–0.6)
LYMPHOCYTES # BLD AUTO: 1.58 10*3/MM3 (ref 0.6–3.4)
LYMPHOCYTES NFR BLD AUTO: 8 % (ref 10–50)
MCH RBC QN AUTO: 32.1 PG (ref 27–31)
MCHC RBC AUTO-ENTMCNC: 32.2 G/DL (ref 30–37)
MCV RBC AUTO: 99.5 FL (ref 81–99)
METHGB BLD QL: 0.3 %
MODALITY: ABNORMAL
MONOCYTES # BLD AUTO: 1.11 10*3/MM3 (ref 0–0.9)
MONOCYTES NFR BLD AUTO: 5.6 % (ref 0–12)
NEUTROPHILS # BLD AUTO: 16.59 10*3/MM3 (ref 2–6.9)
NEUTROPHILS NFR BLD AUTO: 83.5 % (ref 37–80)
NRBC BLD MANUAL-RTO: 0 /100 WBC (ref 0–0)
NT-PROBNP SERPL-MCNC: 417 PG/ML (ref 0–125)
OXYHGB MFR BLDV: 88.5 % (ref 94–99)
PCO2 BLDA: 30.1 MM HG (ref 35–45)
PH BLDA: 7.49 PH UNITS
PLATELET # BLD AUTO: 272 10*3/MM3 (ref 130–400)
PMV BLD AUTO: 10.3 FL (ref 6–12)
PO2 BLDA: 55.6 MM HG (ref 75–100)
POTASSIUM BLD-SCNC: 3.5 MMOL/L (ref 3.5–5.1)
PROT SERPL-MCNC: 6.6 G/DL (ref 6.3–8.2)
RBC # BLD AUTO: 3.71 10*6/MM3 (ref 4.2–5.4)
SODIUM BLD-SCNC: 139 MMOL/L (ref 137–145)
TROPONIN I SERPL-MCNC: <0.012 NG/ML (ref 0–0.03)
WBC NRBC COR # BLD: 19.83 10*3/MM3 (ref 4.8–10.8)
WHOLE BLOOD HOLD SPECIMEN: NORMAL
WHOLE BLOOD HOLD SPECIMEN: NORMAL

## 2017-03-07 PROCEDURE — G0378 HOSPITAL OBSERVATION PER HR: HCPCS

## 2017-03-07 PROCEDURE — 94640 AIRWAY INHALATION TREATMENT: CPT

## 2017-03-07 PROCEDURE — 25010000002 LEVOFLOXACIN PER 250 MG: Performed by: PHYSICIAN ASSISTANT

## 2017-03-07 PROCEDURE — 87804 INFLUENZA ASSAY W/OPTIC: CPT | Performed by: STUDENT IN AN ORGANIZED HEALTH CARE EDUCATION/TRAINING PROGRAM

## 2017-03-07 PROCEDURE — 84484 ASSAY OF TROPONIN QUANT: CPT | Performed by: PHYSICIAN ASSISTANT

## 2017-03-07 PROCEDURE — 71250 CT THORAX DX C-: CPT

## 2017-03-07 PROCEDURE — 94799 UNLISTED PULMONARY SVC/PX: CPT

## 2017-03-07 PROCEDURE — 83050 HGB METHEMOGLOBIN QUAN: CPT

## 2017-03-07 PROCEDURE — 36600 WITHDRAWAL OF ARTERIAL BLOOD: CPT

## 2017-03-07 PROCEDURE — 80053 COMPREHEN METABOLIC PANEL: CPT | Performed by: PHYSICIAN ASSISTANT

## 2017-03-07 PROCEDURE — 25010000002 VANCOMYCIN PER 500 MG: Performed by: PHYSICIAN ASSISTANT

## 2017-03-07 PROCEDURE — 83880 ASSAY OF NATRIURETIC PEPTIDE: CPT | Performed by: PHYSICIAN ASSISTANT

## 2017-03-07 PROCEDURE — 87040 BLOOD CULTURE FOR BACTERIA: CPT | Performed by: STUDENT IN AN ORGANIZED HEALTH CARE EDUCATION/TRAINING PROGRAM

## 2017-03-07 PROCEDURE — 99284 EMERGENCY DEPT VISIT MOD MDM: CPT

## 2017-03-07 PROCEDURE — 25010000002 PIPERACILLIN SOD-TAZOBACTAM PER 1 G: Performed by: PHYSICIAN ASSISTANT

## 2017-03-07 PROCEDURE — 93005 ELECTROCARDIOGRAM TRACING: CPT | Performed by: PHYSICIAN ASSISTANT

## 2017-03-07 PROCEDURE — 99222 1ST HOSP IP/OBS MODERATE 55: CPT | Performed by: INTERNAL MEDICINE

## 2017-03-07 PROCEDURE — 25010000002 HEPARIN (PORCINE) 5000 UNIT/0.5ML SOLUTION: Performed by: INTERNAL MEDICINE

## 2017-03-07 PROCEDURE — 82805 BLOOD GASES W/O2 SATURATION: CPT

## 2017-03-07 PROCEDURE — 82375 ASSAY CARBOXYHB QUANT: CPT

## 2017-03-07 PROCEDURE — 85025 COMPLETE CBC W/AUTO DIFF WBC: CPT | Performed by: PHYSICIAN ASSISTANT

## 2017-03-07 PROCEDURE — 25010000002 PIPERACILLIN SOD-TAZOBACTAM PER 1 G: Performed by: INTERNAL MEDICINE

## 2017-03-07 PROCEDURE — 93005 ELECTROCARDIOGRAM TRACING: CPT

## 2017-03-07 RX ORDER — NICOTINE 21 MG/24HR
1 PATCH, TRANSDERMAL 24 HOURS TRANSDERMAL EVERY 24 HOURS
Status: DISCONTINUED | OUTPATIENT
Start: 2017-03-07 | End: 2017-03-08

## 2017-03-07 RX ORDER — CETIRIZINE HYDROCHLORIDE 10 MG/1
5 TABLET ORAL DAILY
Status: DISCONTINUED | OUTPATIENT
Start: 2017-03-08 | End: 2017-03-14 | Stop reason: HOSPADM

## 2017-03-07 RX ORDER — SODIUM CHLORIDE 0.9 % (FLUSH) 0.9 %
1-10 SYRINGE (ML) INJECTION AS NEEDED
Status: DISCONTINUED | OUTPATIENT
Start: 2017-03-07 | End: 2017-03-14 | Stop reason: HOSPADM

## 2017-03-07 RX ORDER — LEVOFLOXACIN 5 MG/ML
750 INJECTION, SOLUTION INTRAVENOUS
Status: DISCONTINUED | OUTPATIENT
Start: 2017-03-09 | End: 2017-03-10

## 2017-03-07 RX ORDER — LACTOBACILLUS ACIDOPHILUS / LACTOBACILLUS BULGARICUS 100 MILLION CFU STRENGTH
1 GRANULES ORAL 3 TIMES DAILY
Status: DISCONTINUED | OUTPATIENT
Start: 2017-03-07 | End: 2017-03-14 | Stop reason: HOSPADM

## 2017-03-07 RX ORDER — ONDANSETRON 4 MG/1
4 TABLET, ORALLY DISINTEGRATING ORAL EVERY 6 HOURS PRN
Status: DISCONTINUED | OUTPATIENT
Start: 2017-03-07 | End: 2017-03-14 | Stop reason: HOSPADM

## 2017-03-07 RX ORDER — ACETAMINOPHEN 325 MG/1
650 TABLET ORAL EVERY 4 HOURS PRN
Status: DISCONTINUED | OUTPATIENT
Start: 2017-03-07 | End: 2017-03-14 | Stop reason: HOSPADM

## 2017-03-07 RX ORDER — VENLAFAXINE 75 MG/1
75 TABLET ORAL 2 TIMES DAILY
Status: DISCONTINUED | OUTPATIENT
Start: 2017-03-08 | End: 2017-03-14 | Stop reason: HOSPADM

## 2017-03-07 RX ORDER — NICOTINE 21 MG/24HR
1 PATCH, TRANSDERMAL 24 HOURS TRANSDERMAL EVERY 24 HOURS
Status: DISCONTINUED | OUTPATIENT
Start: 2017-03-07 | End: 2017-03-14 | Stop reason: HOSPADM

## 2017-03-07 RX ORDER — PANTOPRAZOLE SODIUM 40 MG/1
40 TABLET, DELAYED RELEASE ORAL
Status: DISCONTINUED | OUTPATIENT
Start: 2017-03-08 | End: 2017-03-14 | Stop reason: HOSPADM

## 2017-03-07 RX ORDER — CALCIUM ACETATE 667 MG/1
1334 CAPSULE ORAL
Status: DISCONTINUED | OUTPATIENT
Start: 2017-03-08 | End: 2017-03-14 | Stop reason: HOSPADM

## 2017-03-07 RX ORDER — HEPARIN SODIUM 5000 [USP'U]/.5ML
5000 INJECTION, SOLUTION INTRAVENOUS; SUBCUTANEOUS EVERY 8 HOURS SCHEDULED
Status: DISCONTINUED | OUTPATIENT
Start: 2017-03-07 | End: 2017-03-14 | Stop reason: HOSPADM

## 2017-03-07 RX ORDER — UREA 40 %
CREAM (GRAM) TOPICAL 2 TIMES DAILY
Status: DISCONTINUED | OUTPATIENT
Start: 2017-03-08 | End: 2017-03-07 | Stop reason: CLARIF

## 2017-03-07 RX ORDER — CYCLOBENZAPRINE HCL 10 MG
10 TABLET ORAL DAILY
Status: DISCONTINUED | OUTPATIENT
Start: 2017-03-08 | End: 2017-03-14 | Stop reason: HOSPADM

## 2017-03-07 RX ORDER — IPRATROPIUM BROMIDE AND ALBUTEROL SULFATE 2.5; .5 MG/3ML; MG/3ML
3 SOLUTION RESPIRATORY (INHALATION)
Status: DISCONTINUED | OUTPATIENT
Start: 2017-03-07 | End: 2017-03-08 | Stop reason: SDUPTHER

## 2017-03-07 RX ORDER — CHOLECALCIFEROL (VITAMIN D3) 125 MCG
500 CAPSULE ORAL DAILY
Status: DISCONTINUED | OUTPATIENT
Start: 2017-03-08 | End: 2017-03-14 | Stop reason: HOSPADM

## 2017-03-07 RX ORDER — LANOLIN ALCOHOL/MO/W.PET/CERES
10 CREAM (GRAM) TOPICAL NIGHTLY
Status: DISCONTINUED | OUTPATIENT
Start: 2017-03-07 | End: 2017-03-14 | Stop reason: HOSPADM

## 2017-03-07 RX ORDER — GABAPENTIN 400 MG/1
400 CAPSULE ORAL NIGHTLY
Status: DISCONTINUED | OUTPATIENT
Start: 2017-03-07 | End: 2017-03-14 | Stop reason: HOSPADM

## 2017-03-07 RX ORDER — OXYBUTYNIN CHLORIDE 5 MG/1
10 TABLET, EXTENDED RELEASE ORAL DAILY
Status: DISCONTINUED | OUTPATIENT
Start: 2017-03-08 | End: 2017-03-14 | Stop reason: HOSPADM

## 2017-03-07 RX ORDER — UREA 200 MG/G
GEL TOPICAL 2 TIMES DAILY
Status: DISCONTINUED | OUTPATIENT
Start: 2017-03-08 | End: 2017-03-14 | Stop reason: HOSPADM

## 2017-03-07 RX ORDER — BUDESONIDE AND FORMOTEROL FUMARATE DIHYDRATE 160; 4.5 UG/1; UG/1
2 AEROSOL RESPIRATORY (INHALATION) 2 TIMES DAILY
Status: DISCONTINUED | OUTPATIENT
Start: 2017-03-08 | End: 2017-03-14 | Stop reason: HOSPADM

## 2017-03-07 RX ORDER — AZELASTINE 1 MG/ML
2 SPRAY, METERED NASAL 2 TIMES DAILY
Status: DISCONTINUED | OUTPATIENT
Start: 2017-03-08 | End: 2017-03-14 | Stop reason: HOSPADM

## 2017-03-07 RX ORDER — MIRTAZAPINE 15 MG/1
30 TABLET, FILM COATED ORAL NIGHTLY
Status: DISCONTINUED | OUTPATIENT
Start: 2017-03-07 | End: 2017-03-14 | Stop reason: HOSPADM

## 2017-03-07 RX ORDER — IPRATROPIUM BROMIDE AND ALBUTEROL SULFATE 2.5; .5 MG/3ML; MG/3ML
3 SOLUTION RESPIRATORY (INHALATION) ONCE
Status: COMPLETED | OUTPATIENT
Start: 2017-03-07 | End: 2017-03-07

## 2017-03-07 RX ORDER — SODIUM CHLORIDE 9 MG/ML
50 INJECTION, SOLUTION INTRAVENOUS CONTINUOUS
Status: DISCONTINUED | OUTPATIENT
Start: 2017-03-07 | End: 2017-03-12

## 2017-03-07 RX ORDER — TRAZODONE HYDROCHLORIDE 50 MG/1
50 TABLET ORAL NIGHTLY
Status: DISCONTINUED | OUTPATIENT
Start: 2017-03-07 | End: 2017-03-14 | Stop reason: HOSPADM

## 2017-03-07 RX ORDER — IPRATROPIUM BROMIDE AND ALBUTEROL SULFATE 2.5; .5 MG/3ML; MG/3ML
3 SOLUTION RESPIRATORY (INHALATION)
Status: DISCONTINUED | OUTPATIENT
Start: 2017-03-07 | End: 2017-03-14 | Stop reason: HOSPADM

## 2017-03-07 RX ORDER — METOPROLOL SUCCINATE 50 MG/1
50 TABLET, EXTENDED RELEASE ORAL
Status: DISCONTINUED | OUTPATIENT
Start: 2017-03-08 | End: 2017-03-14 | Stop reason: HOSPADM

## 2017-03-07 RX ORDER — LEVOFLOXACIN 5 MG/ML
750 INJECTION, SOLUTION INTRAVENOUS ONCE
Status: COMPLETED | OUTPATIENT
Start: 2017-03-07 | End: 2017-03-07

## 2017-03-07 RX ORDER — HYDROCODONE BITARTRATE AND ACETAMINOPHEN 10; 325 MG/1; MG/1
1 TABLET ORAL EVERY 6 HOURS PRN
Status: DISCONTINUED | OUTPATIENT
Start: 2017-03-07 | End: 2017-03-14 | Stop reason: HOSPADM

## 2017-03-07 RX ORDER — SUCRALFATE 1 G/1
1 TABLET ORAL
Status: DISCONTINUED | OUTPATIENT
Start: 2017-03-07 | End: 2017-03-08

## 2017-03-07 RX ADMIN — IPRATROPIUM BROMIDE AND ALBUTEROL SULFATE 3 ML: .5; 3 SOLUTION RESPIRATORY (INHALATION) at 15:10

## 2017-03-07 RX ADMIN — LACTOBACILLUS ACIDOPHILUS / LACTOBACILLUS BULGARICUS 1 PACKET: 100 MILLION CFU STRENGTH GRANULES at 21:40

## 2017-03-07 RX ADMIN — METRONIDAZOLE 500 MG: 500 INJECTION, SOLUTION INTRAVENOUS at 21:40

## 2017-03-07 RX ADMIN — SODIUM CHLORIDE 500 ML: 9 INJECTION, SOLUTION INTRAVENOUS at 15:33

## 2017-03-07 RX ADMIN — MIRTAZAPINE 30 MG: 15 TABLET, FILM COATED ORAL at 23:20

## 2017-03-07 RX ADMIN — SODIUM CHLORIDE: 9 INJECTION, SOLUTION INTRAVENOUS at 18:10

## 2017-03-07 RX ADMIN — MELATONIN TAB 3 MG 10.5 MG: 3 TAB at 23:19

## 2017-03-07 RX ADMIN — IPRATROPIUM BROMIDE AND ALBUTEROL SULFATE 3 ML: .5; 3 SOLUTION RESPIRATORY (INHALATION) at 20:04

## 2017-03-07 RX ADMIN — TAZOBACTAM SODIUM AND PIPERACILLIN SODIUM 4.5 G: 500; 4 INJECTION, SOLUTION INTRAVENOUS at 23:22

## 2017-03-07 RX ADMIN — SODIUM CHLORIDE 100 ML/HR: 9 INJECTION, SOLUTION INTRAVENOUS at 20:32

## 2017-03-07 RX ADMIN — GABAPENTIN 400 MG: 400 CAPSULE ORAL at 23:21

## 2017-03-07 RX ADMIN — VANCOMYCIN HYDROCHLORIDE 750 MG: 750 INJECTION, SOLUTION INTRAVENOUS at 18:30

## 2017-03-07 RX ADMIN — TAZOBACTAM SODIUM AND PIPERACILLIN SODIUM 4.5 G: 500; 4 INJECTION, SOLUTION INTRAVENOUS at 16:32

## 2017-03-07 RX ADMIN — HEPARIN SODIUM 5000 UNITS: 5000 INJECTION, SOLUTION INTRAVENOUS; SUBCUTANEOUS at 21:45

## 2017-03-07 RX ADMIN — LEVOFLOXACIN 750 MG: 5 INJECTION, SOLUTION INTRAVENOUS at 20:00

## 2017-03-07 NOTE — H&P
[facility]   HISTORY AND PHYSICAL      Name:  Joelle Yee   Age:  71 y.o.  Sex:  female  :  1945  MRN:  [id number]   Visit Number:  [id number]  Admission Date:  3/7/2017  Date Of Service:  17  Primary Care Physician:  DONTRELL Regan    Admitting Diagnosis:  1. Pneumonia of right lower lobe due to infectious organism    2. Weakness    3. Hypoxia    4. Chronic obstructive pulmonary disease, unspecified COPD type        History Of Presenting Illness:      The patient is a 71-year-old female who was recently discharged on 2017 for acute COPD exacerbation and pancolitis at the time secondary to underlying C. difficile along with acute cystitis who comes into the emergency now for progressive shortness of breath that started worsening since yesterday night.  She reports thick heavy greenish productive sputum.  She reports pleuritic chest pain upon taking deep breath and coughing.  She has been increasing her neb frequencies for the past 24 hours with minimal relief.  She knows of no sick contacts, however, has been recently admitted to the hospital.  She reports no other symptoms including chest pain, nausea, vomiting, abdominal pain, but has had 3 episodes of watery diarrhea earlier today.  She reports that her diarrhea when she left the hospital eventually resolved but has recurred earlier today.  CT of the chest showed right upper, middle and lower lobe pneumonia.  White count is elevated at 19.8.  Patient does report a history of having difficulty swallowing at times, particularly meat that is not cut into small bits.  Patient has been covered for healthcare associated pneumonia with IV Vanco and Levaquin and Zosyn.  Pulmonary has been consulted for further recommendations.  Due to her history of C. Difficile will also initiate Flagyl and a Probiotic.  We'll acquire C. difficile studies and stool cultures.        The following systems were reviewed and  negative;  constitution, eyes, ENT, respiratory, cardiovascular, gastrointestinal, genitourinary, musculoskeletal, neurological and behavioral/psych, skin except as above.     Past Medical History:    Past Medical History   Diagnosis Date   • Abdominal pain    • Acute bronchitis    • Ankle pain    • Anxiety 1980   • Atopic dermatitis    • Backache    • Bipolar disorder    • Bronchiectasis    • Chronic obstructive lung disease 2008   • Colon cancer screening    • COPD (chronic obstructive pulmonary disease)    • Cystocele    • Depressed    • Depression 1980   • Fatigue      Chronic pafigue 2012   • Fibromyalgia 2008   • GERD (gastroesophageal reflux disease)    • Gout    • Heartburn      Chronic historu of epigastric heartburn currently controlled on Prilesec 40 mg every morning along with Zantac 300 Mg daily at bedtime   • High cholesterol 2012   • Hip pain    • Hyperlipidemia    • Hypertension    • Insomnia    • Joint pain    • Kidney infection    • Loss of appetite    • Low back pain 1995   • Melena    • Nausea and vomiting    • On home oxygen therapy    • Osteoarthritis 2010   • Pain in limb    • Palpitations    • Pinched nerve      Pinched nerves 2011   • Recurrent urinary tract infection    • Sciatica [id number]   • Shortness of breath    • Sinus problem    • Sleep apnea 1998   • Upset stomach    • Valvular heart disease    • Vitamin B12 deficiency    • Weight loss, abnormal      Weight loss is stabel. On pund weight gain since 01/2016       Past Surgical history:    Past Surgical History   Procedure Laterality Date   • Appendectomy  1965   • Back surgery  2005   • Cataract extraction Bilateral 1978   • Gallbladder surgery  1985   • Knee surgery Left 1979     Knee Cap   • Tubal abdominal ligation  1971   • Eye surgery       Put in implants    • Abdominal hernia repair     • Cholecystectomy         Social History:    Social History     Social History   • Marital status: Single     Spouse name: N/A   • Number of  children: N/A   • Years of education: N/A     Social History Main Topics   • Smoking status: Current Every Day Smoker     Packs/day: 0.50     Years: 35.00   • Smokeless tobacco: Never Used      Comment: 1/2 to 1 ppd   • Alcohol use No   • Drug use: No   • Sexual activity: Defer     Other Topics Concern   • None     Social History Narrative       Family History:    Family History   Problem Relation Age of Onset   • Ovarian cancer Mother    • Cancer Mother    • Lung cancer Father    • Heart disease Brother            Allergies:      Dust mite extract; Grass; and Mold extract [trichophyton]    Home Medications:    Prior to Admission Medications     Prescriptions Last Dose Informant Patient Reported? Taking?    azelastine (ASTELIN) 0.1 % nasal spray   No No    2 sprays into each nostril 2 (Two) Times a Day. Use in each nostril as directed    budesonide-formoterol (SYMBICORT) 160-4.5 MCG/ACT inhaler   No No    Inhale 2 puffs 2 (Two) Times a Day. Inhale 1 puff twice daily. Rinse mouth after use.    calcium acetate (PHOS BINDER,) 667 MG capsule capsule   No No    Take 2 capsules by mouth 3 (Three) Times a Day With Meals.    cetirizine (zyrTEC) 10 MG tablet   Yes No    Take 10 mg by mouth Daily.    conjugated estrogens (PREMARIN) vaginal cream  Self Yes No    Insert 0.5 g into the vagina Every Other Day.    Cyanocobalamin (VITAMIN B12 PO)  Self Yes No    Take 500 mcg by mouth Daily.    cyclobenzaprine (FLEXERIL) 10 MG tablet   Yes No    Take 1 tablet by mouth daily.    Diclofenac Sodium (VOLTAREN TD)   Yes No    Place  on the skin As Needed. Voltaren GEL     gabapentin (NEURONTIN) 400 MG capsule  Self Yes No    Take 400 mg by mouth Daily.    HYDROcodone-acetaminophen (NORCO)  MG per tablet   Yes No    Take  by mouth. Take 1 tablet every 8 hours as needed for pain.    ipratropium-albuterol (DUO-NEB) 0.5-2.5 mg/mL nebulizer   Yes No    Ipratropium-Albuterol SOLN; Patient Sig: Ipratropium-Albuterol SOLN USE 1 UNIT DOSE  IN NEBULIZER 3-4 TIMES DAILY.; 0; 21-May-2014; Active    melatonin 5 MG tablet tablet   Yes No    Take 5 mg by mouth every night.    metoprolol succinate XL (TOPROL-XL) 50 MG 24 hr tablet   Yes No    Mirabegron ER (MYRBETRIQ) 50 MG tablet sustained-release 24 hour   Yes No    Take  by mouth.    mirtazapine (REMERON) 30 MG tablet   Yes No    Take 30 mg by mouth Every Night.    nicotine (NICODERM CQ) 21 MG/24HR patch   No No    Place 1 patch on the skin Daily for 30 days. Follow up with PCP prior to the end of this prescription for a tapered down dose.    nicotine (NICODERM CQ) 21 MG/24HR patch   No No    Place 1 patch on the skin Daily. DONOT smoke with patch on.    nystatin (MYCOSTATIN) 157999 UNIT/ML suspension   Yes No    Swish and swallow 500,000 Units 4 (Four) Times a Day.    omeprazole (PriLOSEC) 20 MG capsule   Yes No    ondansetron ODT (ZOFRAN-ODT) 4 MG disintegrating tablet   Yes No    Take 1 tablet by mouth every 6 (six) hours as needed.    OXYGEN-HELIUM IN   Yes No    Oxygen; Patient Sig: Oxygen 2 liter as needed; 0; 21-May-2014; Active    Respiratory Therapy Supplies (FLUTTER) device   No No    1 Device as needed (as directed).    Sucralfate (CARAFATE PO)   Yes No    Take 2 tablets by mouth 4 (four) times a day.    tiotropium (SPIRIVA HANDIHALER) 18 MCG per inhalation capsule   No No    Place 1 capsule into inhaler and inhale Daily.    traZODone (DESYREL) 50 MG tablet   Yes No    Take 1 tablet by mouth every night.    urea (CARMOL) 40 % cream   No No    venlafaxine (EFFEXOR) 75 MG tablet   Yes No    Take 1 tablet by mouth 2 (two) times a day.              Vital Signs:    Temp:  [99.3 °F (37.4 °C)] 99.3 °F (37.4 °C)  Heart Rate:  [110-121] 116  Resp:  [20] 20  BP: (114)/(54) 114/54    Last 3 weights    03/07/17  1424   Weight: 86 lb (39 kg)       Body mass index is 16.8 kg/(m^2).    Physical Exam:      General Appearance:    Alert and cooperative, not in any acute distress.   Head:    Atraumatic and  normocephalic, without obvious abnormality.   Eyes:            PERRLA, conjunctivae and sclerae normal, no Icterus. No pallor. Extra-occular movements are within normal limits.   Ears:    Ears appear intact with no abnormalities noted.   Throat:   No oral lesions, no thrush, oral mucosa moist.   Neck:   Supple, trachea midline, no carotid bruit.   Back:     No tenderness to palpation, range of motion normal.   Lungs:    Diffuse expiratory wheezing throughout lung all lung fields, decreased breath sounds on the right lung     Heart:    Normal S1 and S2, no murmur, no gallop, no rub.   Abdomen:     Normal bowel sounds,  Soft non-tender, non-distended, no guarding, no rebound tenderness   Extremities:   Moves all extremities well, no edema      Skin:   No bruising , no rashes   Neurologic:   Cranial nerves 2 - 12 grossly intact, sensation intact, Motor power is normal and equal bilaterally.       EKG:      EKG shows sinus tachycardia 1 21 bpm with no acute ST elevations or depressions.    Labs:      Results from last 7 days  Lab Units 03/07/17  1430   WBC 10*3/mm3 19.83*   HEMOGLOBIN g/dL 11.9*   HEMATOCRIT % 36.9*   PLATELETS 10*3/mm3 272       Results from last 7 days  Lab Units 03/07/17  1430   SODIUM mmol/L 139   POTASSIUM mmol/L 3.5   CHLORIDE mmol/L 103   TOTAL CO2 mmol/L 26.0   BUN mg/dL 9   CREATININE mg/dL 0.60   CALCIUM mg/dL 8.6   BILIRUBIN mg/dL 0.4   ALK PHOS U/L 120   ALT (SGPT) U/L 17   AST (SGOT) U/L 17   GLUCOSE mg/dL 121*             0  Lab Value Date/Time   PTT 33 02/24/2014 0830               Results from last 7 days  Lab Units 03/07/17  1430   TROPONIN I ng/mL <0.012             Cultures:         Radiology:    Imaging Results (last 72 hours)     Procedure Component Value Units Date/Time    CT Chest Without Contrast [[id number]] Collected:  03/07/17 1528     Updated:  03/07/17 1531    Narrative:       PROCEDURE: CT CHEST WO CONTRAST-     HISTORY: soa     COMPARISON:  None .     PROCEDURE:  Multiple  axial CT images were obtained from the thoracic  inlet through the upper abdomen without the use of contrast.      FINDINGS:   Soft tissue windows reveal no axillary, hilar or mediastinal adenopathy.  Heart size is normal. The aorta is normal in caliber. There are no  pleural or pericardial effusions. Lung windows reveal centrilobular  emphysema. There are patchy tree-in-bud opacities throughout the right  upper lobe, right lower lobe and right middle lobe with associated  bronchial wall thickening. The visualized upper abdomen is unremarkable.  Bone windows reveal no acute osseous abnormalities.       Impression:       Patchy opacities involving the right upper lobe, right lower  lobe and right middle lobe consistent with a pneumonia.                  This study was performed with techniques to keep radiation doses as low  as reasonably achievable (ALARA). Individualized dose reduction  techniques using automated exposure control or adjustment of mA and/or  kV according to the patient size were employed.         This report was finalized on 3/7/2017 3:29 PM by Ignacia Lara M.D..          Results Review: I have personally reviewed laboratory data, culture results, radiology studies and antimicrobial therapy.    Assessment:      1.  Acute COPD exacerbation.  2.  Chronic COPD on 2 L nasal cannula at home as needed.  3.  Right-sided pneumonia.  4.  History of C. difficile colitis.  5.  Obstructive sleep apnea, noncompliant.  6.  Hypertension.  7.  Hyperlipidemia.  8.  B12 deficiency.  9.  Fibromyalgia.  10.  Depression, fatigue.  11.  History of dermatitis.  12.  History of acute cystitis.    -We'll initiate broad-spectrum antibiotic coverage with IV Vanco and Levaquin and Zosyn therapy.  CT chest showing right sided pneumonia.  Will acquire speech consult eval.  Initiate neb treatments every 4 hours.  Consult Dr. Esparza for further recommendations regarding right-sided pneumonia.  We'll initiate also Flagyl  therapy for recent history of C. difficile colitis along with probiotic therapy.      Suleman Fernandez MD  03/07/17  5:12 PM

## 2017-03-07 NOTE — ED PROVIDER NOTES
Subjective   HPI Comments: 71-year-old female with shortness of breath.  She is recently in the hospital for C. difficile colitis.  She states she has been home since her discharge was doing well but then began to feel weak and short of breath.  She states that she feels fever and chills weakness and shortness of breath.  She has a history of COPD.  He said despite taking her breathing treatment she still having difficulty breathing.      History provided by:  Patient   used: Yes        Review of Systems   Constitutional: Positive for chills, fatigue and fever.   Respiratory: Positive for cough and shortness of breath.    Neurological: Positive for weakness.       Past Medical History   Diagnosis Date   • Abdominal pain    • Acute bronchitis    • Ankle pain    • Anxiety 1980   • Atopic dermatitis    • Backache    • Bipolar disorder    • Bronchiectasis    • Chronic obstructive lung disease 2008   • Colon cancer screening    • COPD (chronic obstructive pulmonary disease)    • Cystocele    • Depressed    • Depression 1980   • Fatigue      Chronic pafigue 2012   • Fibromyalgia 2008   • GERD (gastroesophageal reflux disease)    • Gout    • Heartburn      Chronic historu of epigastric heartburn currently controlled on Prilesec 40 mg every morning along with Zantac 300 Mg daily at bedtime   • High cholesterol 2012   • Hip pain    • Hyperlipidemia    • Hypertension    • Insomnia    • Joint pain    • Kidney infection    • Loss of appetite    • Low back pain 1995   • Melena    • Nausea and vomiting    • On home oxygen therapy    • Osteoarthritis 2010   • Pain in limb    • Palpitations    • Pinched nerve      Pinched nerves 2011   • Recurrent urinary tract infection    • Sciatica [id number]   • Shortness of breath    • Sinus problem    • Sleep apnea 1998   • Upset stomach    • Valvular heart disease    • Vitamin B12 deficiency    • Weight loss, abnormal      Weight loss is stabel. On pund weight gain since  01/2016       Allergies   Allergen Reactions   • Dust Mite Extract      Dust   • Grass    • Mold Extract [Trichophyton]        Past Surgical History   Procedure Laterality Date   • Appendectomy  1965   • Back surgery  2005   • Cataract extraction Bilateral 1978   • Gallbladder surgery  1985   • Knee surgery Left 1979     Knee Cap   • Tubal abdominal ligation  1971   • Eye surgery       Put in implants    • Abdominal hernia repair     • Cholecystectomy         Family History   Problem Relation Age of Onset   • Ovarian cancer Mother    • Cancer Mother    • Lung cancer Father    • Heart disease Brother        Social History     Social History   • Marital status: Single     Spouse name: N/A   • Number of children: N/A   • Years of education: N/A     Social History Main Topics   • Smoking status: Current Every Day Smoker     Packs/day: 0.50     Years: 35.00   • Smokeless tobacco: Never Used      Comment: 1/2 to 1 ppd   • Alcohol use No   • Drug use: No   • Sexual activity: Defer     Other Topics Concern   • None     Social History Narrative           Objective   Physical Exam   Constitutional: She is oriented to person, place, and time. She appears well-developed and well-nourished.   HENT:   Head: Normocephalic.   Eyes: EOM are normal. Pupils are equal, round, and reactive to light.   Neck: Normal range of motion. Neck supple.   Cardiovascular: Regular rhythm.    tachy   Pulmonary/Chest: Effort normal and breath sounds normal.   Abdominal: Soft. Bowel sounds are normal.   Musculoskeletal: Normal range of motion.   Neurological: She is alert and oriented to person, place, and time. She has normal reflexes.   Skin: Skin is warm and dry.   Psychiatric: She has a normal mood and affect.   Nursing note and vitals reviewed.      Procedures         ED Course  ED Course   Comment By Time   Discussed with Dr. Fernandez he agreed to accept admit to [facility] Main Tee Jr., PA-C 03/07 6330                  [facility]  Number of  Diagnoses or Management Options  Chronic obstructive pulmonary disease, unspecified COPD type: established and improving  Hypoxia: established and improving  Pneumonia of right lower lobe due to infectious organism: established and improving  Weakness: established and improving     Amount and/or Complexity of Data Reviewed  Clinical lab tests: ordered and reviewed  Tests in the radiology section of CPT®: ordered and reviewed  Decide to obtain previous medical records or to obtain history from someone other than the patient: yes  Discuss the patient with other providers: yes    Critical Care  Total time providing critical care: < 30 minutes    Patient Progress  Patient progress: stable      Final diagnoses:   Pneumonia of right lower lobe due to infectious organism   Weakness   Hypoxia   Chronic obstructive pulmonary disease, unspecified COPD type            Main Tee Jr., PA-C  03/07/17 9330

## 2017-03-07 NOTE — PROGRESS NOTES
"Pharmacokinetic Initial Note - Vancomycin    Joelle Yee is a 71 y.o. female  60\" (152.4 cm) 93 lb 8 oz (42.4 kg)    Indication for use: pneumonia, hospital acquired    Results from last 7 days     Lab Units 03/07/17  1430   WBC 10*3/mm3 19.83*   CREATININE mg/dL 0.60      Estimated Creatinine Clearance: 43.2 mL/min (by C-G formula based on Cr of 0.6).  Temp Readings from Last 1 Encounters:   03/07/17 98.4 °F (36.9 °C) (Oral)     Assessment/Plan  Initiated Vancomycin 750 mg (~17mg/kg) IV every 24 hours. Will order Vancomycin trough when pharmacokinetically appropriate.  Pharmacy will monitor renal function and adjust dose accordingly.     Linda Domínguez RPH  03/07/17 6:05 PM    "

## 2017-03-08 LAB
ANION GAP SERPL CALCULATED.3IONS-SCNC: 10.1 MMOL/L
BUN BLD-MCNC: 4 MG/DL (ref 7–20)
BUN/CREAT SERPL: 6.7 (ref 7.1–23.5)
CALCIUM SPEC-SCNC: 8 MG/DL (ref 8.4–10.2)
CHLORIDE SERPL-SCNC: 106 MMOL/L (ref 98–107)
CO2 SERPL-SCNC: 27 MMOL/L (ref 26–30)
CREAT BLD-MCNC: 0.6 MG/DL (ref 0.6–1.3)
DEPRECATED RDW RBC AUTO: 53.6 FL (ref 37–54)
ERYTHROCYTE [DISTWIDTH] IN BLOOD BY AUTOMATED COUNT: 15 % (ref 11.5–14.5)
GFR SERPL CREATININE-BSD FRML MDRD: 99 ML/MIN/1.73
GLUCOSE BLD-MCNC: 89 MG/DL (ref 74–98)
HCT VFR BLD AUTO: 31.1 % (ref 37–47)
HGB BLD-MCNC: 10.1 G/DL (ref 12–16)
MCH RBC QN AUTO: 31.7 PG (ref 27–31)
MCHC RBC AUTO-ENTMCNC: 32.5 G/DL (ref 30–37)
MCV RBC AUTO: 97.5 FL (ref 81–99)
PLATELET # BLD AUTO: 233 10*3/MM3 (ref 130–400)
PMV BLD AUTO: 10.1 FL (ref 6–12)
POTASSIUM BLD-SCNC: 3.1 MMOL/L (ref 3.5–5.1)
RBC # BLD AUTO: 3.19 10*6/MM3 (ref 4.2–5.4)
SODIUM BLD-SCNC: 140 MMOL/L (ref 137–145)
WBC NRBC COR # BLD: 15.04 10*3/MM3 (ref 4.8–10.8)

## 2017-03-08 PROCEDURE — 25010000002 VANCOMYCIN PER 500 MG: Performed by: INTERNAL MEDICINE

## 2017-03-08 PROCEDURE — 94799 UNLISTED PULMONARY SVC/PX: CPT

## 2017-03-08 PROCEDURE — 99232 SBSQ HOSP IP/OBS MODERATE 35: CPT | Performed by: INTERNAL MEDICINE

## 2017-03-08 PROCEDURE — G8998 SWALLOW D/C STATUS: HCPCS

## 2017-03-08 PROCEDURE — 25010000002 HEPARIN (PORCINE) 5000 UNIT/0.5ML SOLUTION: Performed by: INTERNAL MEDICINE

## 2017-03-08 PROCEDURE — 85027 COMPLETE CBC AUTOMATED: CPT | Performed by: INTERNAL MEDICINE

## 2017-03-08 PROCEDURE — 80048 BASIC METABOLIC PNL TOTAL CA: CPT | Performed by: INTERNAL MEDICINE

## 2017-03-08 PROCEDURE — 99223 1ST HOSP IP/OBS HIGH 75: CPT | Performed by: INTERNAL MEDICINE

## 2017-03-08 PROCEDURE — G8996 SWALLOW CURRENT STATUS: HCPCS

## 2017-03-08 PROCEDURE — 92610 EVALUATE SWALLOWING FUNCTION: CPT

## 2017-03-08 PROCEDURE — G8997 SWALLOW GOAL STATUS: HCPCS

## 2017-03-08 PROCEDURE — 25010000002 PIPERACILLIN SOD-TAZOBACTAM PER 1 G: Performed by: INTERNAL MEDICINE

## 2017-03-08 RX ORDER — POTASSIUM CHLORIDE 20 MEQ/1
40 TABLET, EXTENDED RELEASE ORAL ONCE
Status: COMPLETED | OUTPATIENT
Start: 2017-03-08 | End: 2017-03-08

## 2017-03-08 RX ORDER — METRONIDAZOLE 500 MG/1
500 TABLET ORAL EVERY 8 HOURS SCHEDULED
Status: DISCONTINUED | OUTPATIENT
Start: 2017-03-08 | End: 2017-03-10

## 2017-03-08 RX ORDER — POTASSIUM CHLORIDE 20 MEQ/1
40 TABLET, EXTENDED RELEASE ORAL ONCE
Status: DISCONTINUED | OUTPATIENT
Start: 2017-03-08 | End: 2017-03-14 | Stop reason: HOSPADM

## 2017-03-08 RX ORDER — BENZONATATE 100 MG/1
100 CAPSULE ORAL EVERY 6 HOURS PRN
Status: DISCONTINUED | OUTPATIENT
Start: 2017-03-08 | End: 2017-03-14 | Stop reason: HOSPADM

## 2017-03-08 RX ADMIN — NICOTINE 1 PATCH: 21 PATCH TRANSDERMAL at 23:31

## 2017-03-08 RX ADMIN — UREA: 17 CREAM TOPICAL at 10:29

## 2017-03-08 RX ADMIN — HEPARIN SODIUM 5000 UNITS: 5000 INJECTION, SOLUTION INTRAVENOUS; SUBCUTANEOUS at 05:05

## 2017-03-08 RX ADMIN — Medication 10 ML: at 10:10

## 2017-03-08 RX ADMIN — METRONIDAZOLE 500 MG: 500 INJECTION, SOLUTION INTRAVENOUS at 05:06

## 2017-03-08 RX ADMIN — CYANOCOBALAMIN TAB 500 MCG 500 MCG: 500 TAB at 10:09

## 2017-03-08 RX ADMIN — Medication 10 ML: at 14:18

## 2017-03-08 RX ADMIN — BUDESONIDE AND FORMOTEROL FUMARATE DIHYDRATE 2 PUFF: 160; 4.5 AEROSOL RESPIRATORY (INHALATION) at 07:07

## 2017-03-08 RX ADMIN — SODIUM CHLORIDE 125 ML/HR: 9 INJECTION, SOLUTION INTRAVENOUS at 23:38

## 2017-03-08 RX ADMIN — NYSTATIN 500000 UNITS: 500000 SUSPENSION ORAL at 11:47

## 2017-03-08 RX ADMIN — IPRATROPIUM BROMIDE AND ALBUTEROL SULFATE 3 ML: .5; 3 SOLUTION RESPIRATORY (INHALATION) at 00:14

## 2017-03-08 RX ADMIN — METOPROLOL SUCCINATE 50 MG: 50 TABLET, EXTENDED RELEASE ORAL at 10:08

## 2017-03-08 RX ADMIN — LACTOBACILLUS ACIDOPHILUS / LACTOBACILLUS BULGARICUS 1 PACKET: 100 MILLION CFU STRENGTH GRANULES at 10:10

## 2017-03-08 RX ADMIN — BUDESONIDE AND FORMOTEROL FUMARATE DIHYDRATE 2 PUFF: 160; 4.5 AEROSOL RESPIRATORY (INHALATION) at 20:11

## 2017-03-08 RX ADMIN — AZELASTINE HYDROCHLORIDE 2 SPRAY: 137 SPRAY, METERED NASAL at 17:35

## 2017-03-08 RX ADMIN — DICLOFENAC 1 G: 10 GEL TOPICAL at 10:11

## 2017-03-08 RX ADMIN — CALCIUM ACETATE 1334 MG: 667 CAPSULE ORAL at 10:12

## 2017-03-08 RX ADMIN — TAZOBACTAM SODIUM AND PIPERACILLIN SODIUM 4.5 G: 500; 4 INJECTION, SOLUTION INTRAVENOUS at 10:10

## 2017-03-08 RX ADMIN — TAZOBACTAM SODIUM AND PIPERACILLIN SODIUM 4.5 G: 500; 4 INJECTION, SOLUTION INTRAVENOUS at 17:36

## 2017-03-08 RX ADMIN — VENLAFAXINE 75 MG: 75 TABLET ORAL at 10:08

## 2017-03-08 RX ADMIN — HYDROCODONE BITARTRATE AND ACETAMINOPHEN 1 TABLET: 10; 325 TABLET ORAL at 00:52

## 2017-03-08 RX ADMIN — NYSTATIN 500000 UNITS: 500000 SUSPENSION ORAL at 10:10

## 2017-03-08 RX ADMIN — HEPARIN SODIUM 5000 UNITS: 5000 INJECTION, SOLUTION INTRAVENOUS; SUBCUTANEOUS at 14:17

## 2017-03-08 RX ADMIN — HEPARIN SODIUM 5000 UNITS: 5000 INJECTION, SOLUTION INTRAVENOUS; SUBCUTANEOUS at 21:06

## 2017-03-08 RX ADMIN — CALCIUM ACETATE 1334 MG: 667 CAPSULE ORAL at 11:48

## 2017-03-08 RX ADMIN — BENZONATATE 100 MG: 100 CAPSULE, LIQUID FILLED ORAL at 17:37

## 2017-03-08 RX ADMIN — OXYBUTYNIN CHLORIDE 10 MG: 5 TABLET, EXTENDED RELEASE ORAL at 10:09

## 2017-03-08 RX ADMIN — DICLOFENAC 1 G: 10 GEL TOPICAL at 21:07

## 2017-03-08 RX ADMIN — DICLOFENAC 1 G: 10 GEL TOPICAL at 17:36

## 2017-03-08 RX ADMIN — METRONIDAZOLE 500 MG: 500 TABLET ORAL at 14:18

## 2017-03-08 RX ADMIN — AZELASTINE HYDROCHLORIDE 2 SPRAY: 137 SPRAY, METERED NASAL at 10:11

## 2017-03-08 RX ADMIN — HYDROCODONE BITARTRATE AND ACETAMINOPHEN 1 TABLET: 10; 325 TABLET ORAL at 23:30

## 2017-03-08 RX ADMIN — BENZONATATE 100 MG: 100 CAPSULE, LIQUID FILLED ORAL at 23:30

## 2017-03-08 RX ADMIN — BENZONATATE 100 MG: 100 CAPSULE, LIQUID FILLED ORAL at 10:10

## 2017-03-08 RX ADMIN — PANTOPRAZOLE SODIUM 40 MG: 40 TABLET, DELAYED RELEASE ORAL at 05:06

## 2017-03-08 RX ADMIN — NYSTATIN 500000 UNITS: 500000 SUSPENSION ORAL at 17:36

## 2017-03-08 RX ADMIN — NYSTATIN 500000 UNITS: 500000 SUSPENSION ORAL at 21:05

## 2017-03-08 RX ADMIN — MELATONIN TAB 3 MG 10.5 MG: 3 TAB at 23:30

## 2017-03-08 RX ADMIN — ACETAMINOPHEN 650 MG: 325 TABLET, FILM COATED ORAL at 21:02

## 2017-03-08 RX ADMIN — IPRATROPIUM BROMIDE AND ALBUTEROL SULFATE 3 ML: .5; 3 SOLUTION RESPIRATORY (INHALATION) at 16:26

## 2017-03-08 RX ADMIN — TAZOBACTAM SODIUM AND PIPERACILLIN SODIUM 4.5 G: 500; 4 INJECTION, SOLUTION INTRAVENOUS at 04:03

## 2017-03-08 RX ADMIN — VANCOMYCIN HYDROCHLORIDE 750 MG: 750 INJECTION, SOLUTION INTRAVENOUS at 23:38

## 2017-03-08 RX ADMIN — METRONIDAZOLE 500 MG: 500 TABLET ORAL at 21:03

## 2017-03-08 RX ADMIN — MIRTAZAPINE 30 MG: 15 TABLET, FILM COATED ORAL at 23:29

## 2017-03-08 RX ADMIN — CALCIUM ACETATE 1334 MG: 667 CAPSULE ORAL at 17:36

## 2017-03-08 RX ADMIN — VENLAFAXINE 75 MG: 75 TABLET ORAL at 17:35

## 2017-03-08 RX ADMIN — POTASSIUM CHLORIDE 40 MEQ: 20 TABLET, EXTENDED RELEASE ORAL at 11:46

## 2017-03-08 RX ADMIN — IPRATROPIUM BROMIDE AND ALBUTEROL SULFATE 3 ML: .5; 3 SOLUTION RESPIRATORY (INHALATION) at 11:38

## 2017-03-08 RX ADMIN — GABAPENTIN 400 MG: 400 CAPSULE ORAL at 21:03

## 2017-03-08 RX ADMIN — LACTOBACILLUS ACIDOPHILUS / LACTOBACILLUS BULGARICUS 1 PACKET: 100 MILLION CFU STRENGTH GRANULES at 21:05

## 2017-03-08 RX ADMIN — CYCLOBENZAPRINE HYDROCHLORIDE 10 MG: 10 TABLET, FILM COATED ORAL at 10:08

## 2017-03-08 RX ADMIN — ACETAMINOPHEN 650 MG: 325 TABLET, FILM COATED ORAL at 11:51

## 2017-03-08 RX ADMIN — ONDANSETRON 4 MG: 4 TABLET, ORALLY DISINTEGRATING ORAL at 00:48

## 2017-03-08 RX ADMIN — TRAZODONE HYDROCHLORIDE 50 MG: 50 TABLET ORAL at 23:29

## 2017-03-08 RX ADMIN — IPRATROPIUM BROMIDE AND ALBUTEROL SULFATE 3 ML: .5; 3 SOLUTION RESPIRATORY (INHALATION) at 20:11

## 2017-03-08 RX ADMIN — TAZOBACTAM SODIUM AND PIPERACILLIN SODIUM 4.5 G: 500; 4 INJECTION, SOLUTION INTRAVENOUS at 23:31

## 2017-03-08 RX ADMIN — HYDROCODONE BITARTRATE AND ACETAMINOPHEN 1 TABLET: 10; 325 TABLET ORAL at 17:37

## 2017-03-08 RX ADMIN — IPRATROPIUM BROMIDE AND ALBUTEROL SULFATE 3 ML: .5; 3 SOLUTION RESPIRATORY (INHALATION) at 07:06

## 2017-03-08 RX ADMIN — HYDROCODONE BITARTRATE AND ACETAMINOPHEN 1 TABLET: 10; 325 TABLET ORAL at 10:09

## 2017-03-08 RX ADMIN — CETIRIZINE HYDROCHLORIDE 5 MG: 10 TABLET, FILM COATED ORAL at 10:09

## 2017-03-08 RX ADMIN — Medication 10 ML: at 17:36

## 2017-03-08 RX ADMIN — BENZONATATE 100 MG: 100 CAPSULE, LIQUID FILLED ORAL at 01:03

## 2017-03-08 RX ADMIN — LACTOBACILLUS ACIDOPHILUS / LACTOBACILLUS BULGARICUS 1 PACKET: 100 MILLION CFU STRENGTH GRANULES at 17:37

## 2017-03-08 NOTE — PROGRESS NOTES
Acute Care - Speech Language Pathology   Swallow Initial Evaluation  Christopher     Patient Name: Joelle Yee  : 1945  MRN: [id number]  Today's Date: 3/8/2017        Referring Physician: Jim      Admit Date: 3/7/2017    Visit Dx:     ICD-10-CM ICD-9-CM   1. Pneumonia of right lower lobe due to infectious organism J18.9 483.8   2. Weakness R53.1 780.79   3. Hypoxia R09.02 799.02   4. Chronic obstructive pulmonary disease, unspecified COPD type J44.9 496   5. Palmar, plantar, and disseminated porokeratosis Q82.9 757.39     Patient Active Problem List   Diagnosis   • Dysphagia   • Dyspepsia   • Esophageal reflux   • Anxiety   • High cholesterol   • Bipolar disorder   • COPD (chronic obstructive pulmonary disease)   • Depression   • Gout   • Hyperlipidemia   • Insomnia   • Osteoarthritis   • Sciatica   • Sleep apnea   • Vitamin B 12 deficiency   • Pancolitis   • Acute cystitis without hematuria   • C. difficile colitis   • Chronic bronchitis with COPD (chronic obstructive pulmonary disease)   • Pneumonia of right lower lobe due to infectious organism   • COPD exacerbation   • Pneumonia involving right lung     Past Medical History   Diagnosis Date   • Abdominal pain    • Acute bronchitis    • Ankle pain    • Anxiety    • Atopic dermatitis    • Backache    • Bipolar disorder    • Bronchiectasis    • Chronic obstructive lung disease    • Colon cancer screening    • COPD (chronic obstructive pulmonary disease)    • Cystocele    • Depressed    • Depression    • Fatigue      Chronic pafigue    • Fibromyalgia    • GERD (gastroesophageal reflux disease)    • Gout    • Heartburn      Chronic historu of epigastric heartburn currently controlled on Prilesec 40 mg every morning along with Zantac 300 Mg daily at bedtime   • High cholesterol    • Hip pain    • Hyperlipidemia    • Hypertension    • Insomnia    • Joint pain    • Kidney infection    • Loss of appetite    • Low back pain     • Melena    • Nausea and vomiting    • On home oxygen therapy    • Osteoarthritis 2010   • Pain in limb    • Palpitations    • Pinched nerve      Pinched nerves 2011   • Recurrent urinary tract infection    • Sciatica [id number]   • Shortness of breath    • Sinus problem    • Sleep apnea 1998   • Upset stomach    • Valvular heart disease    • Vitamin B12 deficiency    • Weight loss, abnormal      Weight loss is stabel. On pund weight gain since 01/2016     Past Surgical History   Procedure Laterality Date   • Appendectomy  1965   • Back surgery  2005   • Cataract extraction Bilateral 1978   • Gallbladder surgery  1985   • Knee surgery Left 1979     Knee Cap   • Tubal abdominal ligation  1971   • Eye surgery       Put in implants    • Abdominal hernia repair     • Cholecystectomy            SWALLOW EVALUATION (last 72 hours)      Swallow Evaluation       03/08/17 1035                Rehab Evaluation    Document Type evaluation  -TM        Subjective Information no complaints  -TM        Patient Effort, Rehab Treatment good  -TM        Symptoms Noted During/After Treatment other (see comments)   coughing prior to po trials  -TM        General Information    Patient Profile Review yes  -TM        Onset of Illness/Injury 03/07/17  -TM        Referring Physician Jim  -TM        Pertinent History Of Current Problem pneumonia dx; pt. has hx of third stage dysphagia  -TM        Current Diet Limitations no diet restrictions  -TM        Prior Level of Function- Communication functional in all spheres  -TM        Prior Level of Function- Swallowing no diet consistency restrictions  -TM        Plans/Goals Discussed With patient  -TM        Barriers to Rehab none identified  -TM        Clinical Impression    Patient's Goals For Discharge patient did not state   denies dysphagia  -TM        SLP Swallowing Diagnosis other (see comments);esophageal dysfunction   functional swallow without oropharyngeal dysphagia  -TM         Criteria for Skilled Therapeutic Interventions Met no problems identified which require skilled intervention  -TM        FCM, Swallowing 7-->Level 7  -TM        Therapy Frequency other (see comments)   none needed at this time  -TM        SLP Diet Recommendation regular textures;thin liquids  -TM        SLP Rec. for Method of Medication Administration meds whole with thin liquid  -TM        Anticipated Discharge Disposition home  -TM        Vital Signs    O2 Delivery Pre Treatment supplemental O2  -TM        O2 Delivery Intra Treatment supplemental O2  -TM        O2 Delivery Post Treatment supplemental O2  -TM        Pre Patient Position Sitting  -TM        Intra Patient Position Sitting  -TM        Post Patient Position Sitting  -TM        Pain Assessment    Pain Assessment No/denies pain  -TM        Cognitive Assessment/Intervention    Current Cognitive/Communication Assessment functional  -TM        Orientation Status oriented x 4  -TM        Follows Commands/Answers Questions 100% of the time  -TM        Personal Safety WNL/WFL  -TM        Short/Long Term Memory other (see comments)   WNL  -TM        Oral Motor Structure and Function    Oral Motor Anatomy and Physiology patient demonstrates anatomy and physiology that is WNL  -TM        Dentition Assessment present and adequate  -TM        Secretion Management WNL/WFL  -TM        Velar Elevation bilateral:;WNL (within normal limits)  -TM        Volitional Swallow no difficulties initiating volitional swallow  -TM        Volitional Cough no difficulties initiating volitional cough  -TM        Oral Musculature General Assessment WNL (within normal limits)  -TM        General Feeding/Swallowing Observations    Current Feeding Method oral feeding methods  -TM        Respiratory Support Currently in Use nasal cannula in use  -TM        Observations of Posture During Feeding upright and WNL  -TM        General Swallow Observations    General Swallow Observations WFL   -TM        Clinical Swallow Exam    Mode of Presentation fed by clinician  -TM        Oral Phase Results intact oral phase without signs of dysfunction  -TM        Pharyngeal Phase Results no signs/symptoms of pharyngeal impairment  -TM        Summary of Clinical Exam Bedsideval of swallow completed w/pt. seated upright in bed.  Pt. was coughing prior to po trials.  She denied oropharyngeal dysphagia.  She did report, however, that she has third stage dysphagia (GERD and HH).  Pt. demonstrated functional oral phase and no overt s/s aspiration or other pharyngeal phase dysphagia with any trial.  No particular concerns at this time as RN also reported no observed difficulty and pt. denied dysphagia.     -TM        Swallow Recommendations    Eating Assistance no assistance needed with self eating  -TM        Modified Eating Strategies reflux precautions;upright positioning 90 degrees  -TM        Other Recommendations regular;thin;meds whole  -TM        Recommended Diet regular textures;thin liquids  -TM        Positioning and Restraints    Pre-Treatment Position in bed  -TM        Post Treatment Position bed  -TM        In Bed call light within reach;sitting  -TM          User Key  (r) = Recorded By, (t) = Taken By, (c) = Cosigned By    Initials Name Effective Dates     Sonia Méndez 10/26/16 -         EDUCATION  The patient has been educated in the following areas:   Dysphagia (Swallowing Impairment).    SLP Recommendation and Plan  SLP Swallowing Diagnosis: other (see comments), esophageal dysfunction (functional swallow without oropharyngeal dysphagia)  SLP Diet Recommendation: regular textures, thin liquids     SLP Rec. for Method of Medication Administration: meds whole with thin liquid        Criteria for Skilled Therapeutic Interventions Met: no problems identified which require skilled intervention  Anticipated Discharge Disposition: home     Therapy Frequency: other (see comments) (none needed at this  time)             Plan of Care Review  Plan Of Care Reviewed With: patient  Progress: progress toward functional goals as expected  Outcome Summary/Follow up Plan: tolerate regular diet with thin liquids with reflux precautions             Time Calculation:         Time Calculation- SLP       03/08/17 1214          Time Calculation- SLP    SLP Start Time 1035  -TM      SLP Received On 03/07/17  -        User Key  (r) = Recorded By, (t) = Taken By, (c) = Cosigned By    Initials Name Provider Type     Sonia Méndez Speech and Language Pathologist          Therapy Charges for Today     Code Description Service Date Service Provider Modifiers Qty    08779111201 HC ST SWALLOWING CURRENT STATUS 3/8/2017 Sonia Honey , CH 1    74685061142 HC ST SWALLOWING PROJECTED 3/8/2017 Sonia Honey ,  1    46217459495 HC ST SWALLOWING DISCHARGE 3/8/2017 Sonia Honey ,  1    19336523585 HC ST EVAL ORAL PHARYNG SWALLOW 4 3/8/2017 Sonia Méndez GN 1          SLP G-Codes  Functional Limitations: Swallowing  Swallow Current Status (): 0 percent impaired, limited or restricted  Swallow Goal Status (): 0 percent impaired, limited or restricted  Swallow Discharge Status (): 0 percent impaired, limited or restricted    Sonia  Honey  3/8/2017

## 2017-03-08 NOTE — CONSULTS
"Date of consultation:   3/8/2017    Requested by:   Hospitalist Service.     PCP: DONTRELL Regan      Reason:  Pneumonia    History of Present Illness:  71-year-old female with past medical history significant for moderate COPD who was recently discharged from this facility and had to be admitted for C. difficile colitis.  The patient was actually recently seen in my office and says that 2 days later she developed cough, fever and phlegm production.  She felt \"weak\".  She was evaluated in the ER and with evidence of pneumonia diagnosed on the chest x-ray and CT scan, she was started on broad-spectrum antibiotics and was admitted to the hospitalist service.    The patient has significant cough which is sometimes productive of dark yellowish sputum.  She denies any sick contacts    Review of System: All other review of systems negative except weakness, occasional back pain, occasional headaches, cough, easy bruisability.    Past Medical History:  Past Medical History   Diagnosis Date   • Abdominal pain    • Acute bronchitis    • Ankle pain    • Anxiety 1980   • Atopic dermatitis    • Backache    • Bipolar disorder    • Bronchiectasis    • Chronic obstructive lung disease 2008   • Colon cancer screening    • COPD (chronic obstructive pulmonary disease)    • Cystocele    • Depressed    • Depression 1980   • Fatigue      Chronic pafigue 2012   • Fibromyalgia 2008   • GERD (gastroesophageal reflux disease)    • Gout    • Heartburn      Chronic historu of epigastric heartburn currently controlled on Prilesec 40 mg every morning along with Zantac 300 Mg daily at bedtime   • High cholesterol 2012   • Hip pain    • Hyperlipidemia    • Hypertension    • Insomnia    • Joint pain    • Kidney infection    • Loss of appetite    • Low back pain 1995   • Melena    • Nausea and vomiting    • On home oxygen therapy    • Osteoarthritis 2010   • Pain in limb    • Palpitations    • Pinched nerve      Pinched nerves 2011   • " "Recurrent urinary tract infection    • Sciatica [id number]   • Shortness of breath    • Sinus problem    • Sleep apnea 1998   • Upset stomach    • Valvular heart disease    • Vitamin B12 deficiency    • Weight loss, abnormal      Weight loss is stabel. On pund weight gain since 01/2016   Recent C Diff.    Past Surgical History:  Past Surgical History   Procedure Laterality Date   • Appendectomy  1965   • Back surgery  2005   • Cataract extraction Bilateral 1978   • Gallbladder surgery  1985   • Knee surgery Left 1979     Knee Cap   • Tubal abdominal ligation  1971   • Eye surgery       Put in implants    • Abdominal hernia repair     • Cholecystectomy           Family History:  Family History   Problem Relation Age of Onset   • Ovarian cancer Mother    • Cancer Mother    • Lung cancer Father    • Heart disease Brother          Social History:  Social History     Social History   • Marital status: Single     Spouse name: N/A   • Number of children: N/A   • Years of education: N/A     Social History Main Topics   • Smoking status: Current Every Day Smoker     Packs/day: 0.50     Years: 35.00   • Smokeless tobacco: Never Used      Comment: 1/2 to 1 ppd   • Alcohol use No   • Drug use: No   • Sexual activity: Defer     Other Topics Concern   • None     Social History Narrative         Physical Exam:  Visit Vitals   • /64 (BP Location: Left arm, Patient Position: Lying)   • Pulse 113   • Temp 98.2 °F (36.8 °C) (Oral)   • Resp 19   • Ht 60\" (152.4 cm)   • Wt 93 lb 9.6 oz (42.5 kg)   • LMP  (LMP Unknown)   • SpO2 97%   • BMI 18.28 kg/m2       Constitutional:             General: Mild respiratory distress noted.    Head/Face/Eyes:             No significant facial abnormalities seen.             Extra ocular movement was intact.             Pupils appeared equal    ENT:             Hearing was intact              No nasal erythema noted.              Oropharynx was moist. No lesions noted.     Neck:             Supple. " No JVD noted.              Thyroid gland did not seem to be enlarged    Cardiovascular:              S1 + S2. Regular.    Respiratory:            Respiratory effort was somewhat labored.             Decreased Air Entry Bilaterally with Right sided crackles. No wheezing noted.    Abdomen:            Soft.  Bowel sounds were positive.  No obvious organomegaly.    Extremities:            No edema noted.            No cyanosis noted            No clubbing noted            Gait could not be assessed at this time.    Neurologic/Psychiatric:             Affect appeared fair.             Awake, alert and oriented x 3.             Able to follow simple commands.    Skin:             No rash noted.             Warm and dry          Labs:   Reviewed. Pertinent labs were noted.     Lab Results   Component Value Date    WBC 15.04 (H) 03/08/2017    HGB 10.1 (L) 03/08/2017    HCT 31.1 (L) 03/08/2017    MCV 97.5 03/08/2017     03/08/2017       Lab Results   Component Value Date    GLUCOSE 89 03/08/2017    CALCIUM 8.0 (L) 03/08/2017     03/08/2017    K 3.1 (L) 03/08/2017    CO2 27.0 03/08/2017     03/08/2017    BUN 4 (L) 03/08/2017    CREATININE 0.60 03/08/2017    EGFRIFNONA 99 03/08/2017    BCR 6.7 (L) 03/08/2017    ANIONGAP 10.1 03/08/2017         ABG:  Lab Results   Component Value Date    PHART 7.492 03/07/2017    CUL6GSY 30.1 (L) 03/07/2017    PO2ART 55.6 (L) 03/07/2017    HGBBG 11.1 (L) 03/07/2017    CARBOXYHGB 2.0 03/07/2017           Imaging Study: Images reviewed personally and discussed with patient    Imaging Results (last 72 hours)     Procedure Component Value Units Date/Time    CT Chest Without Contrast [[id number]] Collected:  03/07/17 1528     Updated:  03/07/17 1531    Narrative:       PROCEDURE: CT CHEST WO CONTRAST-     HISTORY: soa     COMPARISON:  None .     PROCEDURE:  Multiple axial CT images were obtained from the thoracic  inlet through the upper abdomen without the use of contrast.       FINDINGS:   Soft tissue windows reveal no axillary, hilar or mediastinal adenopathy.  Heart size is normal. The aorta is normal in caliber. There are no  pleural or pericardial effusions. Lung windows reveal centrilobular  emphysema. There are patchy tree-in-bud opacities throughout the right  upper lobe, right lower lobe and right middle lobe with associated  bronchial wall thickening. The visualized upper abdomen is unremarkable.  Bone windows reveal no acute osseous abnormalities.       Impression:       Patchy opacities involving the right upper lobe, right lower  lobe and right middle lobe consistent with a pneumonia.               This study was performed with techniques to keep radiation doses as low  as reasonably achievable (ALARA). Individualized dose reduction  techniques using automated exposure control or adjustment of mA and/or  kV according to the patient size were employed.         This report was finalized on 3/7/2017 3:29 PM by Ignacia Lara M.D..            Assessment:  1.  Right-sided pneumonia  2.  Moderate COPD.  No significant exacerbation at this time  3.  Recent C. difficile colitis  4.  Smoking    Discussion/Recommendations:     I agree with current antibiotic selection and would recommend to continue Flagyl as well.  I would recommend close follow-up of her culture data and de-escalation of therapy as and when appropriate.    Although there is no evidence of exacerbation at this time, we will need to keep a close eye on her.  She may need steroids if she develops any significant wheezing.    The plan was discussed with the patient.  I have also discussed the case with the nursing staff.    Recommendations were also discussed with the referring provider.     I would like to thank you for the opportunity to participate in the care of this patient.  We will communicate changes and recommendations, if and when necessary.      Rebeka Esparza MD  03/08/17  7:48 AM

## 2017-03-08 NOTE — PROGRESS NOTES
[facility]    [facility]    Name:  Joelle Yee   Age:  71 y.o.  Sex:  female  :  1945  MRN:  [id number]   Visit Number:  [id number]  Admission Date:  3/7/2017  Date Of Service:  17  Primary Care Physician:  DONTRELL Regan     LOS: 0 days :  Patient Care Team:  DONTRELL Regan as PCP - General:    Chief Complaint:      SOA, weakness, dehydration    Subjective / Interval History:     Patient fairly soa and wheezy but overall improved from yesterday.  She appears dehydrated and tachycardic.  Will increase IV fluid rate.  Poor po intake.      Vital Signs:    Temp:  [98.1 °F (36.7 °C)-99.3 °F (37.4 °C)] 98.4 °F (36.9 °C)  Heart Rate:  [105-131] 109  Resp:  [16-24] 20  BP: (114-148)/(54-74) 116/70    Intake and output:    Intake/Output       17 0700 - 17 0659 17 0700 - 17 0659    Intake (ml) 1250 480    Output (ml) 600 --    Net (ml) 650 480    Last Weight 93 lb 9.6 oz (42.5 kg) --          Physical Examination:    General Appearance:  Alert and cooperative, not in any acute distress.   Head:  Atraumatic and normocephalic, without obvious abnormality.   Eyes:      PERRLA, Extra-occular movements are within normal limits.   Lungs:   Diffuse heavy expiratory wheezing   Heart:  Normal S1 and S2, no murmur, no gallop, no rub.   Abdomen:   Normal bowel sounds,  Soft non-tender, non-distended, no guarding, no rebound tenderness   Extremities: No edema                     Laboratory results:      Results from last 7 days  Lab Units 17  0611 17  1430   SODIUM mmol/L 140 139   POTASSIUM mmol/L 3.1* 3.5   CHLORIDE mmol/L 106 103   TOTAL CO2 mmol/L 27.0 26.0   BUN mg/dL 4* 9   CREATININE mg/dL 0.60 0.60   CALCIUM mg/dL 8.0* 8.6   BILIRUBIN mg/dL  --  0.4   ALK PHOS U/L  --  120   ALT (SGPT) U/L  --  17   AST (SGOT) U/L  --  17   GLUCOSE mg/dL 89 121*       Results from last 7 days  Lab Units 17  0611 17  1430   WBC  10*3/mm3 15.04* 19.83*   HEMOGLOBIN g/dL 10.1* 11.9*   HEMATOCRIT % 31.1* 36.9*   PLATELETS 10*3/mm3 233 272       Results from last 7 days  Lab Units 03/07/17  1430   TROPONIN I ng/mL <0.012           I have reviewed the patient's laboratory results.    Radiology results:    Imaging Results (last 24 hours)     Procedure Component Value Units Date/Time    CT Chest Without Contrast [[id number]] Collected:  03/07/17 1528     Updated:  03/07/17 1531    Narrative:       PROCEDURE: CT CHEST WO CONTRAST-     HISTORY: soa     COMPARISON:  None .     PROCEDURE:  Multiple axial CT images were obtained from the thoracic  inlet through the upper abdomen without the use of contrast.      FINDINGS:   Soft tissue windows reveal no axillary, hilar or mediastinal adenopathy.  Heart size is normal. The aorta is normal in caliber. There are no  pleural or pericardial effusions. Lung windows reveal centrilobular  emphysema. There are patchy tree-in-bud opacities throughout the right  upper lobe, right lower lobe and right middle lobe with associated  bronchial wall thickening. The visualized upper abdomen is unremarkable.  Bone windows reveal no acute osseous abnormalities.       Impression:       Patchy opacities involving the right upper lobe, right lower  lobe and right middle lobe consistent with a pneumonia.                  This study was performed with techniques to keep radiation doses as low  as reasonably achievable (ALARA). Individualized dose reduction  techniques using automated exposure control or adjustment of mA and/or  kV according to the patient size were employed.         This report was finalized on 3/7/2017 3:29 PM by Ignacia Lara M.D..          I have reviewed the patient's radiology reports.    Medication Review:     I have reviewed the patients active and prn medications.       Assessment/Plan:    1. Acute COPD exacerbation.  2. Chronic COPD on 2 L nasal cannula at home as needed.  3. Right-sided  pneumonia.  4. History of C. difficile colitis.  5. Obstructive sleep apnea, noncompliant.  6. Hypertension.  7. Hyperlipidemia.  8. B12 deficiency.  9. Fibromyalgia.  10. Depression, fatigue.  11. History of dermatitis.  12. History of acute cystitis.     -Continue broad-spectrum antibiotic coverage with IV Vanco and Levaquin and Zosyn therapy and O2 therapy.  Patient only on 2 LNC PRN at home. Leukocytosis improving on current abx regimen. CT chest showing right sided pneumonia. Initiate neb treatments every 4 hours and symbicort. Consult Dr. Esparza for further recommendations regarding right-sided pneumonia. Will concurrently initiate Flagyl therapy for recent history of C. difficile colitis along with probiotic therapy.  No current diarrhea. Speech pathology recommended: regular textures, thin liquids.  Correcting hypokalemia.      Suleman Fernandez MD  03/08/17  1:56 PM

## 2017-03-08 NOTE — PLAN OF CARE
Problem: Patient Care Overview (Adult)  Goal: Plan of Care Review  Outcome: Ongoing (interventions implemented as appropriate)    03/08/17 0128   Coping/Psychosocial Response Interventions   Plan Of Care Reviewed With patient   Patient Care Overview   Progress no change   Outcome Evaluation   Outcome Summary/Follow up Plan Patient admitted with pneumonia, currently on neb treatments, oxygen, and antibiotics to improve.

## 2017-03-08 NOTE — PLAN OF CARE
Problem: Patient Care Overview (Adult)  Goal: Plan of Care Review  Outcome: Ongoing (interventions implemented as appropriate)    03/08/17 1213   Coping/Psychosocial Response Interventions   Plan Of Care Reviewed With patient   Patient Care Overview   Progress progress toward functional goals as expected   Outcome Evaluation   Outcome Summary/Follow up Plan tolerate regular diet with thin liquids with reflux precautions

## 2017-03-08 NOTE — PROGRESS NOTES
"Adult Nutrition  Assessment/PES    Patient Name:  Joelle Yee  YOB: 1945  MRN: [id number]  Admit Date:  3/7/2017    Assessment Date:  3/8/2017        Reason for Assessment       03/08/17 1455    Reason for Assessment    Reason For Assessment/Visit admission assessment    Identified At Risk By Screening Criteria BMI;weight status    Diagnosis Diagnosis    Pulmonary/Critical Care Pneumonia                Anthropometrics       03/08/17 1457    Anthropometrics    Height Method Stated    Height 152.4 cm (60\")    Weight Method Bed scale    Weight 42.5 kg (93 lb 11.1 oz)    Ideal Body Weight (IBW)    Ideal Body Weight (IBW), Female 46.26    % Ideal Body Weight 92.06    Body Mass Index (BMI)    BMI (kg/m2) 18.34    BMI Grade 17 - 19 low grade I            Labs/Tests/Procedures/Meds       03/08/17 1458    Labs/Tests/Procedures/Meds    Labs/Tests Review Reviewed   Low: K+, BUN, Calcium, Hgb, Hct    Procedure Review SLP    Swallow eval status Done    Type of SLP Evaluation Bedside   SLP recs. Regular solids with thin liquids    Medication Review Reviewed, pertinent;Antibiotic;Other (comment)   Probiotic Vitamin B12 noted              Estimated/Assessed Needs       03/08/17 1459    Calculation Measurements    Weight Used For Calculations 46.3 kg (101 lb 15.8 oz)   IBW    Height Used for Calculations 1.524 m (5')    Estimated/Assessed Energy Needs    Energy Need Method --    Age 71    RMR ([place]-St. Jeor Equation) 899.1    Activity Factors ([place] St Jeor)  Confined to bed  1.2    Estimated Kcal Range  ~7112-0735 Kcal    Estimated/Assessed Protein Needs    Weight Used for Protein Calculation 46.3 kg (101 lb 15.8 oz)   IBW    Protein (gm/kg) 1.2    1.2 Gm Protein (gm) 55.51    Estimated Protein Range ~46.26 - 55.51 gm    Estimated/Assessed Fluid Needs    Fluid Need Method RDA method    RDA Method (mL)  1700   ~1678-1916 ml/day              Evaluation of Received Nutrient/Fluid Intake       03/08/17 1503 "    PO Evaluation    Number of Days PO Intake Evaluated 1 day    Number of Meals 2    % PO Intake 62.5              Problem/Interventions:        Problem 1       03/08/17 1504    Nutrition Diagnoses Problem 1    Problem 1 Increased Nutrient Needs    Macronutrient Kcal;Protein;Fluid    Micronutrient Vitamin;Mineral    Etiology (related to) Medical Diagnosis    Pulmonary/Critical Care COPD;Pneumonia    Signs/Symptoms (evidenced by) Other (comment)   Excessive Caloric Expenditure secondary to pulmonary dysfunction            Problem 2       03/08/17 1505    Nutrition Diagnoses Problem 2    Problem 2 Underweight    Etiology (related to) Factors Affecting Nutrition    Appetite Fair    Signs/Symptoms (evidenced by) BMI    BMI 18 - 18.9                  Intervention Goal       03/08/17 1506    Intervention Goal    General Meet nutritional needs for age/condition    PO PO intake (%)    PO Intake % 75 %    Weight Appropriate weight gain            Nutrition Intervention       03/08/17 1506    Nutrition Intervention    RD/Tech Action Encourage intake;Supplement provided;Follow Tx progress;Advise available snack;Interview for preference            Nutrition Prescription       03/08/17 1506    Nutrition Prescription PO    PO Prescription Begin/change supplement;Begin/change diet    Supplement Boost Plus;Magic Cup    Supplement Frequency 3 times a day;2 times a day   Magic Cup BID, Boost Plus TID    Other Modifiers High protein/high calorie    New PO Prescription Ordered? No, recommended   Supplements ordered    Other Orders    Obtain Weight Daily    Obtain Weight Ordered? No, recommended    Supplement Vitamin mineral supplement   Consider adding MVI with minerals daily    Supplement Ordered? No, recommended    Other Consider adding appetite stimulant            Education/Evaluation       03/08/17 1508    Education    Education Provided education regarding;Education topics    Education Topics Cardiac heart health;Other (comment)    COPD, PNA diet     Monitor/Evaluation    Monitor Per protocol;PO intake;Supplement intake;Pertinent labs;Weight        Comments:  Rec. #1: Consider adding High Calorie, High Protein to current diet order. Rec. #2: Consider adding appetite stimulant and MVI with minerals daily. Pt. Consumes ~62.5% of meals on average per NSG report. Pt. To receive nutritional supplements Boost TID and Magic Cup BID. RD to follow pt. Consult RD PRN.     Electronically signed by:  Deirdre Harrison RD  03/08/17 3:12 PM

## 2017-03-08 NOTE — PROGRESS NOTES
Continued Stay Note   Christopher     Patient Name: Joelle Yee  MRN: [id number]  Today's Date: 3/8/2017    Admit Date: 3/7/2017          Discharge Plan       03/08/17 1004    Case Management/Social Work Plan    Plan Discharge Planning    Additional Comments JAMES met with pt at bedside for discharge planning. Pt was also discharged from hospital less than a month ago. Pt reports that she still uses 2 liters of oxygen PRN provided by [employer]. She currently has HH services through [facility] and plans on resuming care with them upon discharge. JAMES spoke with Bridgette with [facility] and she advised everything they will need upon pt's discharge. She states that she is still awaiting contact with [facility] [place]. I will f/u on this. Pt states that she was doing okay and able to get her medications including smoking cessation patches since last discharge from hospital until now. She states that she has pneumonia. Her anticipated goal at discharge is to return home with HH services.     Final Note    Final Note Will continue to follow and assist with disposition needs.              Discharge Codes     None            April  MACO Mendoza  10:14 AM  03/08/17

## 2017-03-09 LAB
ANION GAP SERPL CALCULATED.3IONS-SCNC: 9.4 MMOL/L
BUN BLD-MCNC: 7 MG/DL (ref 7–20)
BUN/CREAT SERPL: 10 (ref 7.1–23.5)
CALCIUM SPEC-SCNC: 8.2 MG/DL (ref 8.4–10.2)
CHLORIDE SERPL-SCNC: 110 MMOL/L (ref 98–107)
CO2 SERPL-SCNC: 26 MMOL/L (ref 26–30)
CREAT BLD-MCNC: 0.7 MG/DL (ref 0.6–1.3)
DEPRECATED RDW RBC AUTO: 55.7 FL (ref 37–54)
ERYTHROCYTE [DISTWIDTH] IN BLOOD BY AUTOMATED COUNT: 15.1 % (ref 11.5–14.5)
GFR SERPL CREATININE-BSD FRML MDRD: 82 ML/MIN/1.73
GLUCOSE BLD-MCNC: 75 MG/DL (ref 74–98)
HCT VFR BLD AUTO: 29.6 % (ref 37–47)
HGB BLD-MCNC: 9.4 G/DL (ref 12–16)
MCH RBC QN AUTO: 31.5 PG (ref 27–31)
MCHC RBC AUTO-ENTMCNC: 31.8 G/DL (ref 30–37)
MCV RBC AUTO: 99.3 FL (ref 81–99)
PLATELET # BLD AUTO: 247 10*3/MM3 (ref 130–400)
PMV BLD AUTO: 10.3 FL (ref 6–12)
POTASSIUM BLD-SCNC: 3.4 MMOL/L (ref 3.5–5.1)
RBC # BLD AUTO: 2.98 10*6/MM3 (ref 4.2–5.4)
SODIUM BLD-SCNC: 142 MMOL/L (ref 137–145)
WBC NRBC COR # BLD: 12.33 10*3/MM3 (ref 4.8–10.8)

## 2017-03-09 PROCEDURE — 99232 SBSQ HOSP IP/OBS MODERATE 35: CPT | Performed by: INTERNAL MEDICINE

## 2017-03-09 PROCEDURE — 25010000002 PIPERACILLIN SOD-TAZOBACTAM PER 1 G: Performed by: INTERNAL MEDICINE

## 2017-03-09 PROCEDURE — 25010000002 LEVOFLOXACIN PER 250 MG: Performed by: INTERNAL MEDICINE

## 2017-03-09 PROCEDURE — 25010000002 HEPARIN (PORCINE) 5000 UNIT/0.5ML SOLUTION: Performed by: INTERNAL MEDICINE

## 2017-03-09 PROCEDURE — 94799 UNLISTED PULMONARY SVC/PX: CPT

## 2017-03-09 PROCEDURE — 85027 COMPLETE CBC AUTOMATED: CPT | Performed by: INTERNAL MEDICINE

## 2017-03-09 PROCEDURE — 80202 ASSAY OF VANCOMYCIN: CPT

## 2017-03-09 PROCEDURE — 80048 BASIC METABOLIC PNL TOTAL CA: CPT | Performed by: INTERNAL MEDICINE

## 2017-03-09 RX ORDER — CODEINE PHOSPHATE AND GUAIFENESIN 10; 100 MG/5ML; MG/5ML
5 SOLUTION ORAL EVERY 6 HOURS PRN
Status: DISCONTINUED | OUTPATIENT
Start: 2017-03-09 | End: 2017-03-14 | Stop reason: HOSPADM

## 2017-03-09 RX ORDER — POTASSIUM CHLORIDE 20 MEQ/1
40 TABLET, EXTENDED RELEASE ORAL 2 TIMES DAILY WITH MEALS
Status: COMPLETED | OUTPATIENT
Start: 2017-03-09 | End: 2017-03-10

## 2017-03-09 RX ORDER — MEGESTROL ACETATE 40 MG/ML
400 SUSPENSION ORAL DAILY
Status: DISCONTINUED | OUTPATIENT
Start: 2017-03-09 | End: 2017-03-14 | Stop reason: HOSPADM

## 2017-03-09 RX ORDER — POTASSIUM CHLORIDE 20 MEQ/1
40 TABLET, EXTENDED RELEASE ORAL ONCE
Status: COMPLETED | OUTPATIENT
Start: 2017-03-09 | End: 2017-03-09

## 2017-03-09 RX ADMIN — LACTOBACILLUS ACIDOPHILUS / LACTOBACILLUS BULGARICUS 1 PACKET: 100 MILLION CFU STRENGTH GRANULES at 20:27

## 2017-03-09 RX ADMIN — ACETAMINOPHEN 650 MG: 325 TABLET, FILM COATED ORAL at 05:31

## 2017-03-09 RX ADMIN — HEPARIN SODIUM 5000 UNITS: 5000 INJECTION, SOLUTION INTRAVENOUS; SUBCUTANEOUS at 14:42

## 2017-03-09 RX ADMIN — POTASSIUM CHLORIDE 40 MEQ: 20 TABLET, EXTENDED RELEASE ORAL at 18:04

## 2017-03-09 RX ADMIN — HYDROCODONE BITARTRATE AND ACETAMINOPHEN 1 TABLET: 10; 325 TABLET ORAL at 20:26

## 2017-03-09 RX ADMIN — METRONIDAZOLE 500 MG: 500 TABLET ORAL at 06:03

## 2017-03-09 RX ADMIN — METRONIDAZOLE 500 MG: 500 TABLET ORAL at 21:35

## 2017-03-09 RX ADMIN — MIRTAZAPINE 30 MG: 15 TABLET, FILM COATED ORAL at 21:35

## 2017-03-09 RX ADMIN — BUDESONIDE AND FORMOTEROL FUMARATE DIHYDRATE 2 PUFF: 160; 4.5 AEROSOL RESPIRATORY (INHALATION) at 19:36

## 2017-03-09 RX ADMIN — LEVOFLOXACIN 750 MG: 5 INJECTION, SOLUTION INTRAVENOUS at 17:55

## 2017-03-09 RX ADMIN — CETIRIZINE HYDROCHLORIDE 5 MG: 10 TABLET, FILM COATED ORAL at 08:52

## 2017-03-09 RX ADMIN — NYSTATIN 500000 UNITS: 500000 SUSPENSION ORAL at 20:26

## 2017-03-09 RX ADMIN — VENLAFAXINE 75 MG: 75 TABLET ORAL at 08:52

## 2017-03-09 RX ADMIN — IPRATROPIUM BROMIDE AND ALBUTEROL SULFATE 3 ML: .5; 3 SOLUTION RESPIRATORY (INHALATION) at 07:26

## 2017-03-09 RX ADMIN — TAZOBACTAM SODIUM AND PIPERACILLIN SODIUM 4.5 G: 500; 4 INJECTION, SOLUTION INTRAVENOUS at 16:31

## 2017-03-09 RX ADMIN — MELATONIN TAB 3 MG 10.5 MG: 3 TAB at 21:35

## 2017-03-09 RX ADMIN — BUDESONIDE AND FORMOTEROL FUMARATE DIHYDRATE 2 PUFF: 160; 4.5 AEROSOL RESPIRATORY (INHALATION) at 07:27

## 2017-03-09 RX ADMIN — DICLOFENAC 1 G: 10 GEL TOPICAL at 20:29

## 2017-03-09 RX ADMIN — CALCIUM ACETATE 1334 MG: 667 CAPSULE ORAL at 08:54

## 2017-03-09 RX ADMIN — TAZOBACTAM SODIUM AND PIPERACILLIN SODIUM 4.5 G: 500; 4 INJECTION, SOLUTION INTRAVENOUS at 06:04

## 2017-03-09 RX ADMIN — TRAZODONE HYDROCHLORIDE 50 MG: 50 TABLET ORAL at 21:35

## 2017-03-09 RX ADMIN — DICLOFENAC 1 G: 10 GEL TOPICAL at 08:50

## 2017-03-09 RX ADMIN — UREA: 17 CREAM TOPICAL at 08:51

## 2017-03-09 RX ADMIN — TAZOBACTAM SODIUM AND PIPERACILLIN SODIUM 4.5 G: 500; 4 INJECTION, SOLUTION INTRAVENOUS at 21:36

## 2017-03-09 RX ADMIN — METRONIDAZOLE 500 MG: 500 TABLET ORAL at 14:42

## 2017-03-09 RX ADMIN — METOPROLOL SUCCINATE 50 MG: 50 TABLET, EXTENDED RELEASE ORAL at 08:52

## 2017-03-09 RX ADMIN — GUAIFENESIN AND CODEINE PHOSPHATE 5 ML: 100; 10 SOLUTION ORAL at 18:23

## 2017-03-09 RX ADMIN — IPRATROPIUM BROMIDE AND ALBUTEROL SULFATE 3 ML: .5; 3 SOLUTION RESPIRATORY (INHALATION) at 19:33

## 2017-03-09 RX ADMIN — LACTOBACILLUS ACIDOPHILUS / LACTOBACILLUS BULGARICUS 1 PACKET: 100 MILLION CFU STRENGTH GRANULES at 16:31

## 2017-03-09 RX ADMIN — HYDROCODONE BITARTRATE AND ACETAMINOPHEN 1 TABLET: 10; 325 TABLET ORAL at 12:36

## 2017-03-09 RX ADMIN — HEPARIN SODIUM 5000 UNITS: 5000 INJECTION, SOLUTION INTRAVENOUS; SUBCUTANEOUS at 21:35

## 2017-03-09 RX ADMIN — HEPARIN SODIUM 5000 UNITS: 5000 INJECTION, SOLUTION INTRAVENOUS; SUBCUTANEOUS at 06:03

## 2017-03-09 RX ADMIN — UREA: 17 CREAM TOPICAL at 17:59

## 2017-03-09 RX ADMIN — MEGESTROL ACETATE 400 MG: 40 SUSPENSION ORAL at 12:31

## 2017-03-09 RX ADMIN — IPRATROPIUM BROMIDE AND ALBUTEROL SULFATE 3 ML: .5; 3 SOLUTION RESPIRATORY (INHALATION) at 13:11

## 2017-03-09 RX ADMIN — CALCIUM ACETATE 1334 MG: 667 CAPSULE ORAL at 17:56

## 2017-03-09 RX ADMIN — SODIUM CHLORIDE 125 ML/HR: 9 INJECTION, SOLUTION INTRAVENOUS at 10:23

## 2017-03-09 RX ADMIN — GABAPENTIN 400 MG: 400 CAPSULE ORAL at 20:26

## 2017-03-09 RX ADMIN — AZELASTINE HYDROCHLORIDE 2 SPRAY: 137 SPRAY, METERED NASAL at 17:57

## 2017-03-09 RX ADMIN — PANTOPRAZOLE SODIUM 40 MG: 40 TABLET, DELAYED RELEASE ORAL at 06:03

## 2017-03-09 RX ADMIN — ONDANSETRON 4 MG: 4 TABLET, ORALLY DISINTEGRATING ORAL at 09:03

## 2017-03-09 RX ADMIN — VENLAFAXINE 75 MG: 75 TABLET ORAL at 17:54

## 2017-03-09 RX ADMIN — BENZONATATE 100 MG: 100 CAPSULE, LIQUID FILLED ORAL at 06:03

## 2017-03-09 RX ADMIN — AZELASTINE HYDROCHLORIDE 2 SPRAY: 137 SPRAY, METERED NASAL at 08:54

## 2017-03-09 RX ADMIN — LACTOBACILLUS ACIDOPHILUS / LACTOBACILLUS BULGARICUS 1 PACKET: 100 MILLION CFU STRENGTH GRANULES at 08:51

## 2017-03-09 RX ADMIN — TAZOBACTAM SODIUM AND PIPERACILLIN SODIUM 4.5 G: 500; 4 INJECTION, SOLUTION INTRAVENOUS at 10:23

## 2017-03-09 RX ADMIN — CYCLOBENZAPRINE HYDROCHLORIDE 10 MG: 10 TABLET, FILM COATED ORAL at 08:53

## 2017-03-09 RX ADMIN — CALCIUM ACETATE 1334 MG: 667 CAPSULE ORAL at 12:30

## 2017-03-09 RX ADMIN — CONJUGATED ESTROGENS 0.5 APPLICATION: 0.62 CREAM VAGINAL at 08:50

## 2017-03-09 RX ADMIN — CYANOCOBALAMIN TAB 500 MCG 500 MCG: 500 TAB at 08:51

## 2017-03-09 RX ADMIN — NYSTATIN 500000 UNITS: 500000 SUSPENSION ORAL at 17:54

## 2017-03-09 RX ADMIN — HYDROCODONE BITARTRATE AND ACETAMINOPHEN 1 TABLET: 10; 325 TABLET ORAL at 06:03

## 2017-03-09 RX ADMIN — NYSTATIN 500000 UNITS: 500000 SUSPENSION ORAL at 12:31

## 2017-03-09 RX ADMIN — SODIUM CHLORIDE 125 ML/HR: 9 INJECTION, SOLUTION INTRAVENOUS at 22:16

## 2017-03-09 RX ADMIN — NYSTATIN 500000 UNITS: 500000 SUSPENSION ORAL at 08:52

## 2017-03-09 RX ADMIN — NICOTINE 1 PATCH: 21 PATCH TRANSDERMAL at 22:16

## 2017-03-09 RX ADMIN — POTASSIUM CHLORIDE 40 MEQ: 20 TABLET, EXTENDED RELEASE ORAL at 09:03

## 2017-03-09 NOTE — NURSING NOTE
LACE teaching for COPD/ Pneumonia instructed on what they are, causes, signs and symptoms and treatment with teachback

## 2017-03-09 NOTE — PROGRESS NOTES
S: Awake and alert.  Continues to complain of cough.  No diarrhea reported    O:Vital signs reviewed.     General: Mild respiratory distress noted.  Neck: No significant JVD   Cardiovascular: S1 + S2.  Regular   Respiratory: Minimal wheezing heard.  Right greater than left crackles noted  Extremities: No significant  edema noted.  Neurologic:  AAOx3. Was able to follow commands     Labs:   Lab Results (last 24 hours)     Procedure Component Value Units Date/Time    Blood Culture With YONAS [[id number]]  (Normal) Collected:  03/07/17 1645    Specimen:  Blood from Arm, Right Updated:  03/08/17 1801     Blood Culture No growth at 24 hours     Blood Culture With YONAS [[id number]]  (Normal) Collected:  03/07/17 1656    Specimen:  Blood from Arm, Right Updated:  03/08/17 1801     Blood Culture No growth at 24 hours     CBC (No Diff) [[id number]]  (Abnormal) Collected:  03/09/17 0528    Specimen:  Blood Updated:  03/09/17 0638     WBC 12.33 (H) 10*3/mm3      RBC 2.98 (L) 10*6/mm3      Hemoglobin 9.4 (L) g/dL      Hematocrit 29.6 (L) %      MCV 99.3 (H) fL      MCH 31.5 (H) pg      MCHC 31.8 g/dL      RDW 15.1 (H) %      RDW-SD 55.7 (H) fl      MPV 10.3 fL      Platelets 247 10*3/mm3     Basic Metabolic Panel [[id number]]  (Abnormal) Collected:  03/09/17 0528    Specimen:  Blood Updated:  03/09/17 0655     Glucose 75 mg/dL      BUN 7 mg/dL      Creatinine 0.70 mg/dL      Sodium 142 mmol/L      Potassium 3.4 (L) mmol/L      Chloride 110 (H) mmol/L      CO2 26.0 mmol/L      Calcium 8.2 (L) mg/dL      eGFR Non African Amer 82 mL/min/1.73      BUN/Creatinine Ratio 10.0      Anion Gap 9.4 mmol/L     Narrative:       The MDRD GFR formula is only valid for adults with stable renal function between ages 18 and 70.            Assessment & Recommendations/Plan:   1.  Right-sided pneumonia     2.  Underlying severe COPD     3.  Recent C. difficile     The patient continues to have significant cough therefore I will try codeine.  She has failed  to respond to Tessalon Perles.      Rebeka Esparza MD  03/09/17  9:14 AM

## 2017-03-09 NOTE — PROGRESS NOTES
[facility]    PROGRESS NOTE    Name:  Joelle Yee   Age:  71 y.o.  Sex:  female  :  1945  MRN:  [id number]   Visit Number:  [id number]  Admission Date:  3/7/2017  Date Of Service:  17  Primary Care Physician:  DONTRELL Regan     LOS: 1 day :  Patient Care Team:  DONTRELL Regan as PCP - General:    Chief Complaint:      SOA    Subjective / Interval History:     Patient SOA slowly improving.   Still with heavy expiratory wheezing. No CP, no N/V or abdominal pain.  Poor chronic appetite.  Will add Megace per dietary recs.  Continue with Broad-Spectrum IV abx.  Dr. Esparza following.      Vital Signs:    Temp:  [97.7 °F (36.5 °C)-98.4 °F (36.9 °C)] 98 °F (36.7 °C)  Heart Rate:  [] 94  Resp:  [16-20] 19  BP: (103-121)/(50-72) 114/71    Intake and output:    Intake/Output       17 0700 - 17 0659 17 0700 - 03/10/17 0659    Intake (ml) 2646 4387.5    Output (ml) 600 --    Net (ml) 2046 4387.5    Last Weight 93 lb 9.6 oz (42.5 kg) --          Physical Examination:    General Appearance:  Alert and cooperative, not in any acute distress.   Head:  Atraumatic and normocephalic, without obvious abnormality.   Eyes:      PERRLA, Extra-occular movements are within normal limits.   Lungs:   Diffuse heavy expiratory wheezing   Heart:  Normal S1 and S2, no murmur, no gallop, no rub.   Abdomen:   Normal bowel sounds,  Soft non-tender, non-distended, no guarding, no rebound tenderness   Extremities: No edema                     Laboratory results:      Results from last 7 days  Lab Units 17  0528 17  0611 17  1430   SODIUM mmol/L 142 140 139   POTASSIUM mmol/L 3.4* 3.1* 3.5   CHLORIDE mmol/L 110* 106 103   TOTAL CO2 mmol/L 26.0 27.0 26.0   BUN mg/dL 7 4* 9   CREATININE mg/dL 0.70 0.60 0.60   CALCIUM mg/dL 8.2* 8.0* 8.6   BILIRUBIN mg/dL  --   --  0.4   ALK PHOS U/L  --   --  120   ALT (SGPT) U/L  --   --  17   AST (SGOT) U/L  --    --  17   GLUCOSE mg/dL 75 89 121*       Results from last 7 days  Lab Units 03/09/17  0528 03/08/17  0611 03/07/17  1430   WBC 10*3/mm3 12.33* 15.04* 19.83*   HEMOGLOBIN g/dL 9.4* 10.1* 11.9*   HEMATOCRIT % 29.6* 31.1* 36.9*   PLATELETS 10*3/mm3 247 233 272       Results from last 7 days  Lab Units 03/07/17  1430   TROPONIN I ng/mL <0.012           I have reviewed the patient's laboratory results.    Radiology results:    Imaging Results (last 24 hours)     ** No results found for the last 24 hours. **          I have reviewed the patient's radiology reports.    Medication Review:     I have reviewed the patients active and prn medications.       Assessment/Plan:  1. Acute COPD exacerbation.  2. Chronic COPD on 2 L nasal cannula at home as needed.  3. Right-sided pneumonia.  4. History of C. difficile colitis.  5. Obstructive sleep apnea, noncompliant.  6. Hypertension.  7. Hyperlipidemia.  8. B12 deficiency.  9. Fibromyalgia.  10. Depression, fatigue.  11. History of dermatitis.  12. History of acute cystitis.      -Continue broad-spectrum antibiotic coverage with IV Vanco and Levaquin and Zosyn therapy and O2 therapy for HCAP. Patient only on 2 LNC PRN at home.   Currently on 3 LNC. Leukocytosis has steadily improved on current abx regimen. CT chest showing right sided pneumonia, upper/mid/lower. Initiate neb treatments every 4 hours and symbicort. Consulted Dr. Esparza for further recommendations regarding right-sided pneumonia. Will concurrently initiate Flagyl therapy for recent history of C. difficile colitis along with probiotic therapy. No current diarrhea. Speech pathology recommended: regular textures, thin liquids. Correcting hypokalemia, repeat labs for am.  Likely DC in 2-3 days.    Suleman Fernandez MD  03/09/17  11:40 AM

## 2017-03-10 ENCOUNTER — APPOINTMENT (OUTPATIENT)
Dept: GENERAL RADIOLOGY | Facility: HOSPITAL | Age: 72
End: 2017-03-10

## 2017-03-10 DIAGNOSIS — R06.02 SHORTNESS OF BREATH: ICD-10-CM

## 2017-03-10 DIAGNOSIS — J44.9 CHRONIC OBSTRUCTIVE PULMONARY DISEASE, UNSPECIFIED COPD TYPE (HCC): ICD-10-CM

## 2017-03-10 LAB
ANION GAP SERPL CALCULATED.3IONS-SCNC: 7.6 MMOL/L
ANISOCYTOSIS BLD QL: ABNORMAL
BASOPHILS # BLD MANUAL: 0.13 10*3/MM3 (ref 0–0.2)
BASOPHILS NFR BLD AUTO: 1 % (ref 0–2.5)
BUN BLD-MCNC: 4 MG/DL (ref 7–20)
BUN/CREAT SERPL: 6.7 (ref 7.1–23.5)
C DIFF TOX A+B STL QL IA: NEGATIVE
CALCIUM SPEC-SCNC: 8.5 MG/DL (ref 8.4–10.2)
CHLORIDE SERPL-SCNC: 107 MMOL/L (ref 98–107)
CO2 SERPL-SCNC: 29 MMOL/L (ref 26–30)
CREAT BLD-MCNC: 0.6 MG/DL (ref 0.6–1.3)
DEPRECATED RDW RBC AUTO: 54.9 FL (ref 37–54)
ERYTHROCYTE [DISTWIDTH] IN BLOOD BY AUTOMATED COUNT: 15.1 % (ref 11.5–14.5)
GFR SERPL CREATININE-BSD FRML MDRD: 99 ML/MIN/1.73
GLUCOSE BLD-MCNC: 88 MG/DL (ref 74–98)
HCT VFR BLD AUTO: 31.6 % (ref 37–47)
HGB BLD-MCNC: 10.1 G/DL (ref 12–16)
LYMPHOCYTES # BLD MANUAL: 2.11 10*3/MM3 (ref 0.6–3.4)
LYMPHOCYTES NFR BLD MANUAL: 16 % (ref 10–50)
LYMPHOCYTES NFR BLD MANUAL: 4 % (ref 0–12)
MCH RBC QN AUTO: 31.9 PG (ref 27–31)
MCHC RBC AUTO-ENTMCNC: 32 G/DL (ref 30–37)
MCV RBC AUTO: 99.7 FL (ref 81–99)
METAMYELOCYTES NFR BLD MANUAL: 6 % (ref 0–0)
MONOCYTES # BLD AUTO: 0.53 10*3/MM3 (ref 0–0.9)
NEUTROPHILS # BLD AUTO: 9.63 10*3/MM3 (ref 2–6.9)
NEUTROPHILS NFR BLD MANUAL: 66 % (ref 37–80)
NEUTS BAND NFR BLD MANUAL: 7 % (ref 0–6)
PLATELET # BLD AUTO: 253 10*3/MM3 (ref 130–400)
PMV BLD AUTO: 10 FL (ref 6–12)
POIKILOCYTOSIS BLD QL SMEAR: ABNORMAL
POTASSIUM BLD-SCNC: 3.6 MMOL/L (ref 3.5–5.1)
RBC # BLD AUTO: 3.17 10*6/MM3 (ref 4.2–5.4)
SCAN SLIDE: NORMAL
SMALL PLATELETS BLD QL SMEAR: ADEQUATE
SODIUM BLD-SCNC: 140 MMOL/L (ref 137–145)
TOXIC GRANULATION: ABNORMAL
VANCOMYCIN TROUGH SERPL-MCNC: <5 MCG/ML (ref 5–15)
WBC NRBC COR # BLD: 13.19 10*3/MM3 (ref 4.8–10.8)

## 2017-03-10 PROCEDURE — 87045 FECES CULTURE AEROBIC BACT: CPT | Performed by: INTERNAL MEDICINE

## 2017-03-10 PROCEDURE — 25010000002 METHYLPREDNISOLONE PER 40 MG: Performed by: FAMILY MEDICINE

## 2017-03-10 PROCEDURE — 85025 COMPLETE CBC W/AUTO DIFF WBC: CPT | Performed by: INTERNAL MEDICINE

## 2017-03-10 PROCEDURE — 87324 CLOSTRIDIUM AG IA: CPT | Performed by: INTERNAL MEDICINE

## 2017-03-10 PROCEDURE — 87046 STOOL CULTR AEROBIC BACT EA: CPT | Performed by: INTERNAL MEDICINE

## 2017-03-10 PROCEDURE — 25010000002 VANCOMYCIN PER 500 MG: Performed by: INTERNAL MEDICINE

## 2017-03-10 PROCEDURE — 94799 UNLISTED PULMONARY SVC/PX: CPT

## 2017-03-10 PROCEDURE — 80048 BASIC METABOLIC PNL TOTAL CA: CPT | Performed by: INTERNAL MEDICINE

## 2017-03-10 PROCEDURE — 25010000002 PIPERACILLIN SOD-TAZOBACTAM PER 1 G: Performed by: INTERNAL MEDICINE

## 2017-03-10 PROCEDURE — 71010 HC CHEST PA OR AP: CPT

## 2017-03-10 PROCEDURE — 85007 BL SMEAR W/DIFF WBC COUNT: CPT | Performed by: INTERNAL MEDICINE

## 2017-03-10 PROCEDURE — 99232 SBSQ HOSP IP/OBS MODERATE 35: CPT | Performed by: FAMILY MEDICINE

## 2017-03-10 PROCEDURE — 25010000002 HEPARIN (PORCINE) 5000 UNIT/0.5ML SOLUTION: Performed by: INTERNAL MEDICINE

## 2017-03-10 RX ORDER — LEVOFLOXACIN 5 MG/ML
500 INJECTION, SOLUTION INTRAVENOUS
Status: DISCONTINUED | OUTPATIENT
Start: 2017-03-11 | End: 2017-03-14 | Stop reason: HOSPADM

## 2017-03-10 RX ORDER — FLUCONAZOLE 100 MG/1
100 TABLET ORAL EVERY 24 HOURS
Status: DISCONTINUED | OUTPATIENT
Start: 2017-03-10 | End: 2017-03-14 | Stop reason: HOSPADM

## 2017-03-10 RX ORDER — METHYLPREDNISOLONE SODIUM SUCCINATE 40 MG/ML
40 INJECTION, POWDER, LYOPHILIZED, FOR SOLUTION INTRAMUSCULAR; INTRAVENOUS EVERY 8 HOURS
Status: DISCONTINUED | OUTPATIENT
Start: 2017-03-10 | End: 2017-03-13

## 2017-03-10 RX ADMIN — MELATONIN TAB 3 MG 10.5 MG: 3 TAB at 21:19

## 2017-03-10 RX ADMIN — IPRATROPIUM BROMIDE AND ALBUTEROL SULFATE 3 ML: .5; 3 SOLUTION RESPIRATORY (INHALATION) at 19:20

## 2017-03-10 RX ADMIN — GUAIFENESIN AND CODEINE PHOSPHATE 5 ML: 100; 10 SOLUTION ORAL at 16:02

## 2017-03-10 RX ADMIN — HEPARIN SODIUM 5000 UNITS: 5000 INJECTION, SOLUTION INTRAVENOUS; SUBCUTANEOUS at 13:34

## 2017-03-10 RX ADMIN — LACTOBACILLUS ACIDOPHILUS / LACTOBACILLUS BULGARICUS 1 PACKET: 100 MILLION CFU STRENGTH GRANULES at 21:19

## 2017-03-10 RX ADMIN — VANCOMYCIN HYDROCHLORIDE 750 MG: 750 INJECTION, SOLUTION INTRAVENOUS at 00:19

## 2017-03-10 RX ADMIN — PANTOPRAZOLE SODIUM 40 MG: 40 TABLET, DELAYED RELEASE ORAL at 05:27

## 2017-03-10 RX ADMIN — METHYLPREDNISOLONE SODIUM SUCCINATE 40 MG: 40 INJECTION, POWDER, FOR SOLUTION INTRAMUSCULAR; INTRAVENOUS at 17:17

## 2017-03-10 RX ADMIN — UREA: 17 CREAM TOPICAL at 09:09

## 2017-03-10 RX ADMIN — NYSTATIN 500000 UNITS: 500000 SUSPENSION ORAL at 09:00

## 2017-03-10 RX ADMIN — HYDROCODONE BITARTRATE AND ACETAMINOPHEN 1 TABLET: 10; 325 TABLET ORAL at 05:29

## 2017-03-10 RX ADMIN — LACTOBACILLUS ACIDOPHILUS / LACTOBACILLUS BULGARICUS 1 PACKET: 100 MILLION CFU STRENGTH GRANULES at 16:02

## 2017-03-10 RX ADMIN — LACTOBACILLUS ACIDOPHILUS / LACTOBACILLUS BULGARICUS 1 PACKET: 100 MILLION CFU STRENGTH GRANULES at 09:07

## 2017-03-10 RX ADMIN — VENLAFAXINE 75 MG: 75 TABLET ORAL at 17:17

## 2017-03-10 RX ADMIN — DICLOFENAC 1 G: 10 GEL TOPICAL at 21:22

## 2017-03-10 RX ADMIN — HEPARIN SODIUM 5000 UNITS: 5000 INJECTION, SOLUTION INTRAVENOUS; SUBCUTANEOUS at 05:27

## 2017-03-10 RX ADMIN — GUAIFENESIN AND CODEINE PHOSPHATE 5 ML: 100; 10 SOLUTION ORAL at 21:19

## 2017-03-10 RX ADMIN — AZELASTINE HYDROCHLORIDE 2 SPRAY: 137 SPRAY, METERED NASAL at 17:17

## 2017-03-10 RX ADMIN — TRAZODONE HYDROCHLORIDE 50 MG: 50 TABLET ORAL at 21:20

## 2017-03-10 RX ADMIN — SODIUM CHLORIDE 125 ML/HR: 9 INJECTION, SOLUTION INTRAVENOUS at 12:01

## 2017-03-10 RX ADMIN — BUDESONIDE AND FORMOTEROL FUMARATE DIHYDRATE 2 PUFF: 160; 4.5 AEROSOL RESPIRATORY (INHALATION) at 07:39

## 2017-03-10 RX ADMIN — GUAIFENESIN AND CODEINE PHOSPHATE 5 ML: 100; 10 SOLUTION ORAL at 00:19

## 2017-03-10 RX ADMIN — FLUCONAZOLE 100 MG: 100 TABLET ORAL at 17:17

## 2017-03-10 RX ADMIN — TAZOBACTAM SODIUM AND PIPERACILLIN SODIUM 4.5 G: 500; 4 INJECTION, SOLUTION INTRAVENOUS at 10:53

## 2017-03-10 RX ADMIN — NYSTATIN 500000 UNITS: 500000 SUSPENSION ORAL at 12:00

## 2017-03-10 RX ADMIN — NICOTINE 1 PATCH: 21 PATCH TRANSDERMAL at 22:24

## 2017-03-10 RX ADMIN — IPRATROPIUM BROMIDE AND ALBUTEROL SULFATE 3 ML: .5; 3 SOLUTION RESPIRATORY (INHALATION) at 16:05

## 2017-03-10 RX ADMIN — NYSTATIN 500000 UNITS: 500000 SUSPENSION ORAL at 17:17

## 2017-03-10 RX ADMIN — MEGESTROL ACETATE 400 MG: 40 SUSPENSION ORAL at 09:07

## 2017-03-10 RX ADMIN — IPRATROPIUM BROMIDE AND ALBUTEROL SULFATE 3 ML: .5; 3 SOLUTION RESPIRATORY (INHALATION) at 13:15

## 2017-03-10 RX ADMIN — METOPROLOL SUCCINATE 50 MG: 50 TABLET, EXTENDED RELEASE ORAL at 09:08

## 2017-03-10 RX ADMIN — CALCIUM ACETATE 1334 MG: 667 CAPSULE ORAL at 12:02

## 2017-03-10 RX ADMIN — CALCIUM ACETATE 1334 MG: 667 CAPSULE ORAL at 17:18

## 2017-03-10 RX ADMIN — CETIRIZINE HYDROCHLORIDE 5 MG: 10 TABLET, FILM COATED ORAL at 09:07

## 2017-03-10 RX ADMIN — VENLAFAXINE 75 MG: 75 TABLET ORAL at 09:07

## 2017-03-10 RX ADMIN — HYDROCODONE BITARTRATE AND ACETAMINOPHEN 1 TABLET: 10; 325 TABLET ORAL at 12:00

## 2017-03-10 RX ADMIN — IPRATROPIUM BROMIDE AND ALBUTEROL SULFATE 3 ML: .5; 3 SOLUTION RESPIRATORY (INHALATION) at 07:39

## 2017-03-10 RX ADMIN — METRONIDAZOLE 500 MG: 500 TABLET ORAL at 13:34

## 2017-03-10 RX ADMIN — TAZOBACTAM SODIUM AND PIPERACILLIN SODIUM 4.5 G: 500; 4 INJECTION, SOLUTION INTRAVENOUS at 03:52

## 2017-03-10 RX ADMIN — POTASSIUM CHLORIDE 40 MEQ: 20 TABLET, EXTENDED RELEASE ORAL at 09:29

## 2017-03-10 RX ADMIN — HEPARIN SODIUM 5000 UNITS: 5000 INJECTION, SOLUTION INTRAVENOUS; SUBCUTANEOUS at 21:19

## 2017-03-10 RX ADMIN — CALCIUM ACETATE 1334 MG: 667 CAPSULE ORAL at 09:00

## 2017-03-10 RX ADMIN — MIRTAZAPINE 30 MG: 15 TABLET, FILM COATED ORAL at 21:20

## 2017-03-10 RX ADMIN — BUDESONIDE AND FORMOTEROL FUMARATE DIHYDRATE 2 PUFF: 160; 4.5 AEROSOL RESPIRATORY (INHALATION) at 19:20

## 2017-03-10 RX ADMIN — OXYBUTYNIN CHLORIDE 10 MG: 5 TABLET, EXTENDED RELEASE ORAL at 09:07

## 2017-03-10 RX ADMIN — CYANOCOBALAMIN TAB 500 MCG 500 MCG: 500 TAB at 09:09

## 2017-03-10 RX ADMIN — DICLOFENAC 1 G: 10 GEL TOPICAL at 09:09

## 2017-03-10 RX ADMIN — METRONIDAZOLE 500 MG: 500 TABLET ORAL at 05:27

## 2017-03-10 RX ADMIN — CYCLOBENZAPRINE HYDROCHLORIDE 10 MG: 10 TABLET, FILM COATED ORAL at 09:09

## 2017-03-10 RX ADMIN — HYDROCODONE BITARTRATE AND ACETAMINOPHEN 1 TABLET: 10; 325 TABLET ORAL at 19:32

## 2017-03-10 RX ADMIN — NYSTATIN 500000 UNITS: 500000 SUSPENSION ORAL at 21:19

## 2017-03-10 RX ADMIN — GABAPENTIN 400 MG: 400 CAPSULE ORAL at 21:19

## 2017-03-10 RX ADMIN — AZELASTINE HYDROCHLORIDE 2 SPRAY: 137 SPRAY, METERED NASAL at 09:09

## 2017-03-10 NOTE — PLAN OF CARE
Problem: Patient Care Overview (Adult)  Goal: Plan of Care Review  Outcome: Ongoing (interventions implemented as appropriate)    03/10/17 1612   Coping/Psychosocial Response Interventions   Plan Of Care Reviewed With patient   Patient Care Overview   Progress improving   Outcome Evaluation   Outcome Summary/Follow up Plan Pt. is still very weak; some coughing spells and SOA; but O2 sats stay 95-97% on 2 L.       Goal: Adult Individualization and Mutuality  Outcome: Ongoing (interventions implemented as appropriate)  Goal: Discharge Needs Assessment  Outcome: Ongoing (interventions implemented as appropriate)    Problem: Pneumonia (Adult)  Goal: Signs and Symptoms of Listed Potential Problems Will be Absent or Manageable (Pneumonia)  Outcome: Ongoing (interventions implemented as appropriate)

## 2017-03-10 NOTE — PLAN OF CARE
Problem: Patient Care Overview (Adult)  Goal: Plan of Care Review  Outcome: Ongoing (interventions implemented as appropriate)    03/10/17 0400   Coping/Psychosocial Response Interventions   Plan Of Care Reviewed With patient   Patient Care Overview   Progress progress towards functional goals is fair   Outcome Evaluation   Outcome Summary/Follow up Plan o2 sats maintaining >90%, pt continues to c/o dyspnea w/activity, freq np cough       Goal: Adult Individualization and Mutuality  Outcome: Ongoing (interventions implemented as appropriate)  Goal: Discharge Needs Assessment  Outcome: Ongoing (interventions implemented as appropriate)    Problem: Pneumonia (Adult)  Goal: Signs and Symptoms of Listed Potential Problems Will be Absent or Manageable (Pneumonia)  Outcome: Ongoing (interventions implemented as appropriate)

## 2017-03-10 NOTE — NURSING NOTE
LACE teaching for COPD/ Pneumonia  Pt able to give teachback regarding prevention, and management and give return demonstration of purse lip breathing

## 2017-03-10 NOTE — PROGRESS NOTES
"Pharmacokinetic Follow-up Note - Vancomycin    Joelle Yee is a 71 y.o. female  60\" (152.4 cm) 93 lb 9.6 oz (42.5 kg)    Indication for use: HCA-pneumonia      Results from last 7 days     Lab Units 03/09/17  0528 03/08/17  0611 03/07/17  1430   WBC 10*3/mm3 12.33* 15.04* 19.83*   CREATININE mg/dL 0.70 0.60 0.60      Estimated Creatinine Clearance: 43.3 mL/min (by C-G formula based on Cr of 0.7).  Temp Readings from Last 1 Encounters:   03/10/17 98 °F (36.7 °C) (Oral)     Lab Results   Component Value Date    [facility] <5.00 (L) 03/09/2017       Current Vancomycin Dose:  750 mg IVPB every 24 hours, day 3 of therapy.    Assessment/Plan:  Trough is low, will change patient to 750mg iv every 18 hours, next dose due 3-10-17 1800. Next trough at 3-12-17 at 0530. Pharmacy will continue to monitor renal function and adjust dose accordingly.    Clarisa Main RPH   03/10/17 3:15 AM    "

## 2017-03-10 NOTE — PROGRESS NOTES
[facility]    Name:  Joelle Yee   Age:  71 y.o.  Sex:  female  :  1945  MRN:  [id number]   Visit Number:  [id number]  Admission Date:  3/7/2017  Date Of Service:  03/10/17  Primary Care Physician:  DONTRELL Regan     LOS: 2 days :  Patient Care Team:  DONTRELL Regan as PCP - General:    Chief Complaint:      Right lung pneumonia/acute exacerbation of COPD.    Subjective / Interval History:     Patient is sitting up at the edge of the bed at time of my exam.  She denies any fever or chills.  She does continue to have cough which is productive of clear to green phlegm at times.  No rashes.  No intolerance to current medications.  She is tolerating by mouth intake.  No dysphagia or nausea or vomiting.  Good urine output without hematuria or dysuria.  Denies any bright red blood or black or tarry stools.  Patient continues to have contact precautions.  Nursing has no concerns at this time.  Patient underwent a repeat chest x-ray this morning.    Review of Systems:     General ROS: Patient denies any fevers, chills or loss of consciousness.  Respiratory ROS: Cough productive of phlegm.  Cardiovascular ROS: Denies chest pain or palpitations. No history of exertional chest pain.  Gastrointestinal ROS: Denies nausea and vomiting. Denies any abdominal pain. No diarrhea.  Neurological ROS: Denies any focal weakness. No loss of consciousness. Denies any numbness. Denies neck pain.  Dermatological ROS: Denies any redness or pruritis.    Vital Signs:    Temp:  [97.4 °F (36.3 °C)-98.3 °F (36.8 °C)] 98.3 °F (36.8 °C)  Heart Rate:  [] 88  Resp:  [16-22] 20  BP: (108-130)/(69-84) 110/71    Intake and output:    I/O last 3 completed shifts:  In: 8784.5 [P.O.:1440; I.V.:7094.5; IV Piggyback:250]  Out: 3950 [Urine:3950]  I/O this shift:  In: 1000 [P.O.:600; I.V.:300; IV Piggyback:100]  Out: 1700 [Urine:1700]    Physical Examination:    General  Appearance:  Alert and cooperative, not in any acute distress. thin/frail build.     Head:  Atraumatic and normocephalic, without obvious abnormality.   Eyes:          PERRLA, conjunctivae and sclerae normal, no Icterus. No pallor. Extra-occular movements are within normal limits.   Neck: Supple, trachea midline, no thyromegaly, no carotid bruit.   Lungs:   Chest shape is normal. Breath sounds heard bilaterally.  End expiratory wheezes bilaterally. No Pleural rub or bronchial breathing. prolonged expiratory phase bilaterally.  Moderate to severe referred upper airway congestion, worse in the right lung.  Audible air exchange with clearing of phlegm on cough.  Mildly rhonchus breath sounds noted to the right lung on comparison to the left although present on both.     Heart:  Normal S1 and S2, no murmur, no gallop, no rub. No JVD   Abdomen:   Normal bowel sounds, no masses, no organomegaly. Soft       non-tender, non-distended, no guarding, no rebound tenderness.   Extremities: Moves all extremities well, no edema, no cyanosis, no            clubbing.   Skin: No bleeding, bruising or rash.   Neurologic: Awake, alert and oriented times 3. Moves all 4 extremities equally.   Laboratory results:      Results from last 7 days  Lab Units 03/10/17  0616 03/09/17  0528 03/08/17  0611 03/07/17  1430   SODIUM mmol/L 140 142 140 139   POTASSIUM mmol/L 3.6 3.4* 3.1* 3.5   CHLORIDE mmol/L 107 110* 106 103   TOTAL CO2 mmol/L 29.0 26.0 27.0 26.0   BUN mg/dL 4* 7 4* 9   CREATININE mg/dL 0.60 0.70 0.60 0.60   CALCIUM mg/dL 8.5 8.2* 8.0* 8.6   BILIRUBIN mg/dL  --   --   --  0.4   ALK PHOS U/L  --   --   --  120   ALT (SGPT) U/L  --   --   --  17   AST (SGOT) U/L  --   --   --  17   GLUCOSE mg/dL 88 75 89 121*       Results from last 7 days  Lab Units 03/10/17  0616 03/09/17  0528 03/08/17  0611   WBC 10*3/mm3 13.19* 12.33* 15.04*   HEMOGLOBIN g/dL 10.1* 9.4* 10.1*   HEMATOCRIT % 31.6* 29.6* 31.1*   PLATELETS 10*3/mm3 253 641 233            Results from last 7 days  Lab Units 03/07/17  1430   TROPONIN I ng/mL <0.012       Results from last 7 days  Lab Units 03/07/17  1656 03/07/17  1645   BLOODCX  No growth at 2 days No growth at 2 days       I have reviewed the patient's laboratory results.    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     FINDINGS: The heart is normal in size. The mediastinum is unremarkable.  There are chronic interstitial lung changes. There are no focal  opacities were effusions. There is no pneumothorax.  There are no acute  osseous abnormalities.           Impression:       No acute cardiopulmonary process.     Continued followup is recommended.     This report was finalized on 3/10/2017 9:34 AM by Ignacia Lara M.D..          I have reviewed the patient's radiology reports.    Medication Review:     I have reviewed the patients active and prn medications.         Assessment:  Principal Problem:    Pneumonia involving right lung  Active Problems:    Pneumonia of right lower lobe due to infectious organism    COPD exacerbation        Plan:    Pulmonology is not available to see patient today or tomorrow.  I have made the decision to discontinue broad-spectrum use of antibiotics given her negative blood cultures ×2.  To continue her on Levaquin 500 mg IV daily at this time.  We'll add Diflucan 100 mg orally daily.  I have also started Solu-Medrol 40 mg IV every 8 hours.  Discontinue Flagyl use due to negative C. difficile toxin and no active diarrhea.  Continue DuoNeb breathing treatments.  Chest x-ray  reviewed today which showed no active or acute abnormalities.  Again reviewed findings on CT scan done 3 days ago which showed opacity to the right upper, lower and middle lobes.  Tree in bud presentation as well.  Plan to follow patient clinically over the course of the next 2448 hrs.  Pulmonology will be available on Sunday.  If patient fails to improve or continues to worsen, likely will need bronchoscopy.  Continue oxygen supplementation at this time as well.  Influenza A and B were negative, we'll discontinue precautions.    Erlin Pool DO  03/10/17  3:59 PM

## 2017-03-11 PROCEDURE — 94799 UNLISTED PULMONARY SVC/PX: CPT

## 2017-03-11 PROCEDURE — 25010000002 HEPARIN (PORCINE) 5000 UNIT/0.5ML SOLUTION: Performed by: INTERNAL MEDICINE

## 2017-03-11 PROCEDURE — 25010000002 LEVOFLOXACIN PER 250 MG: Performed by: FAMILY MEDICINE

## 2017-03-11 PROCEDURE — 99232 SBSQ HOSP IP/OBS MODERATE 35: CPT | Performed by: INTERNAL MEDICINE

## 2017-03-11 PROCEDURE — 25010000002 METHYLPREDNISOLONE PER 40 MG: Performed by: FAMILY MEDICINE

## 2017-03-11 RX ADMIN — NYSTATIN 500000 UNITS: 500000 SUSPENSION ORAL at 17:39

## 2017-03-11 RX ADMIN — CYCLOBENZAPRINE HYDROCHLORIDE 10 MG: 10 TABLET, FILM COATED ORAL at 09:24

## 2017-03-11 RX ADMIN — METOPROLOL SUCCINATE 50 MG: 50 TABLET, EXTENDED RELEASE ORAL at 09:25

## 2017-03-11 RX ADMIN — CALCIUM ACETATE 1334 MG: 667 CAPSULE ORAL at 12:51

## 2017-03-11 RX ADMIN — MEGESTROL ACETATE 400 MG: 40 SUSPENSION ORAL at 09:26

## 2017-03-11 RX ADMIN — BUDESONIDE AND FORMOTEROL FUMARATE DIHYDRATE 2 PUFF: 160; 4.5 AEROSOL RESPIRATORY (INHALATION) at 07:33

## 2017-03-11 RX ADMIN — BUDESONIDE AND FORMOTEROL FUMARATE DIHYDRATE 2 PUFF: 160; 4.5 AEROSOL RESPIRATORY (INHALATION) at 17:41

## 2017-03-11 RX ADMIN — OXYBUTYNIN CHLORIDE 10 MG: 5 TABLET, EXTENDED RELEASE ORAL at 09:24

## 2017-03-11 RX ADMIN — GUAIFENESIN AND CODEINE PHOSPHATE 5 ML: 100; 10 SOLUTION ORAL at 22:31

## 2017-03-11 RX ADMIN — MIRTAZAPINE 30 MG: 15 TABLET, FILM COATED ORAL at 22:32

## 2017-03-11 RX ADMIN — GUAIFENESIN AND CODEINE PHOSPHATE 5 ML: 100; 10 SOLUTION ORAL at 15:02

## 2017-03-11 RX ADMIN — METHYLPREDNISOLONE SODIUM SUCCINATE 40 MG: 40 INJECTION, POWDER, FOR SOLUTION INTRAMUSCULAR; INTRAVENOUS at 17:39

## 2017-03-11 RX ADMIN — AZELASTINE HYDROCHLORIDE 2 SPRAY: 137 SPRAY, METERED NASAL at 09:27

## 2017-03-11 RX ADMIN — CYANOCOBALAMIN TAB 500 MCG 500 MCG: 500 TAB at 09:24

## 2017-03-11 RX ADMIN — AZELASTINE HYDROCHLORIDE 2 SPRAY: 137 SPRAY, METERED NASAL at 17:41

## 2017-03-11 RX ADMIN — HYDROCODONE BITARTRATE AND ACETAMINOPHEN 1 TABLET: 10; 325 TABLET ORAL at 09:26

## 2017-03-11 RX ADMIN — CALCIUM ACETATE 1334 MG: 667 CAPSULE ORAL at 17:41

## 2017-03-11 RX ADMIN — LACTOBACILLUS ACIDOPHILUS / LACTOBACILLUS BULGARICUS 1 PACKET: 100 MILLION CFU STRENGTH GRANULES at 15:02

## 2017-03-11 RX ADMIN — DICLOFENAC 1 G: 10 GEL TOPICAL at 17:41

## 2017-03-11 RX ADMIN — LACTOBACILLUS ACIDOPHILUS / LACTOBACILLUS BULGARICUS 1 PACKET: 100 MILLION CFU STRENGTH GRANULES at 09:24

## 2017-03-11 RX ADMIN — METHYLPREDNISOLONE SODIUM SUCCINATE 40 MG: 40 INJECTION, POWDER, FOR SOLUTION INTRAMUSCULAR; INTRAVENOUS at 00:03

## 2017-03-11 RX ADMIN — NICOTINE 1 PATCH: 21 PATCH TRANSDERMAL at 22:32

## 2017-03-11 RX ADMIN — MELATONIN TAB 3 MG 10.5 MG: 3 TAB at 22:32

## 2017-03-11 RX ADMIN — UREA: 17 CREAM TOPICAL at 09:30

## 2017-03-11 RX ADMIN — VENLAFAXINE 75 MG: 75 TABLET ORAL at 09:26

## 2017-03-11 RX ADMIN — HEPARIN SODIUM 5000 UNITS: 5000 INJECTION, SOLUTION INTRAVENOUS; SUBCUTANEOUS at 06:06

## 2017-03-11 RX ADMIN — HYDROCODONE BITARTRATE AND ACETAMINOPHEN 1 TABLET: 10; 325 TABLET ORAL at 17:40

## 2017-03-11 RX ADMIN — TRAZODONE HYDROCHLORIDE 50 MG: 50 TABLET ORAL at 21:23

## 2017-03-11 RX ADMIN — HEPARIN SODIUM 5000 UNITS: 5000 INJECTION, SOLUTION INTRAVENOUS; SUBCUTANEOUS at 21:23

## 2017-03-11 RX ADMIN — ONDANSETRON 4 MG: 4 TABLET, ORALLY DISINTEGRATING ORAL at 09:26

## 2017-03-11 RX ADMIN — HEPARIN SODIUM 5000 UNITS: 5000 INJECTION, SOLUTION INTRAVENOUS; SUBCUTANEOUS at 15:02

## 2017-03-11 RX ADMIN — BENZONATATE 100 MG: 100 CAPSULE, LIQUID FILLED ORAL at 09:24

## 2017-03-11 RX ADMIN — BENZONATATE 100 MG: 100 CAPSULE, LIQUID FILLED ORAL at 21:23

## 2017-03-11 RX ADMIN — METHYLPREDNISOLONE SODIUM SUCCINATE 40 MG: 40 INJECTION, POWDER, FOR SOLUTION INTRAMUSCULAR; INTRAVENOUS at 09:24

## 2017-03-11 RX ADMIN — IPRATROPIUM BROMIDE AND ALBUTEROL SULFATE 3 ML: .5; 3 SOLUTION RESPIRATORY (INHALATION) at 12:15

## 2017-03-11 RX ADMIN — CONJUGATED ESTROGENS 0.5 APPLICATION: 0.62 CREAM VAGINAL at 09:28

## 2017-03-11 RX ADMIN — LACTOBACILLUS ACIDOPHILUS / LACTOBACILLUS BULGARICUS 1 PACKET: 100 MILLION CFU STRENGTH GRANULES at 21:23

## 2017-03-11 RX ADMIN — IPRATROPIUM BROMIDE AND ALBUTEROL SULFATE 3 ML: .5; 3 SOLUTION RESPIRATORY (INHALATION) at 07:32

## 2017-03-11 RX ADMIN — VENLAFAXINE 75 MG: 75 TABLET ORAL at 17:40

## 2017-03-11 RX ADMIN — NYSTATIN 500000 UNITS: 500000 SUSPENSION ORAL at 09:26

## 2017-03-11 RX ADMIN — PANTOPRAZOLE SODIUM 40 MG: 40 TABLET, DELAYED RELEASE ORAL at 06:06

## 2017-03-11 RX ADMIN — IPRATROPIUM BROMIDE AND ALBUTEROL SULFATE 3 ML: .5; 3 SOLUTION RESPIRATORY (INHALATION) at 19:27

## 2017-03-11 RX ADMIN — NYSTATIN 500000 UNITS: 500000 SUSPENSION ORAL at 12:51

## 2017-03-11 RX ADMIN — LEVOFLOXACIN 500 MG: 5 INJECTION, SOLUTION INTRAVENOUS at 18:05

## 2017-03-11 RX ADMIN — DICLOFENAC 1 G: 10 GEL TOPICAL at 22:33

## 2017-03-11 RX ADMIN — FLUCONAZOLE 100 MG: 100 TABLET ORAL at 17:40

## 2017-03-11 RX ADMIN — CALCIUM ACETATE 1334 MG: 667 CAPSULE ORAL at 09:27

## 2017-03-11 RX ADMIN — NYSTATIN 500000 UNITS: 500000 SUSPENSION ORAL at 21:23

## 2017-03-11 RX ADMIN — DICLOFENAC 1 G: 10 GEL TOPICAL at 09:29

## 2017-03-11 RX ADMIN — GABAPENTIN 400 MG: 400 CAPSULE ORAL at 21:23

## 2017-03-11 RX ADMIN — CETIRIZINE HYDROCHLORIDE 5 MG: 10 TABLET, FILM COATED ORAL at 09:25

## 2017-03-11 RX ADMIN — UREA: 17 CREAM TOPICAL at 17:41

## 2017-03-11 NOTE — PROGRESS NOTES
"Hospitalist Progress Note.    LOS: 3 days    Patient Care Team:  DONTRELL Regan as PCP - General    Chief Complaint:    Chief Complaint   Patient presents with   • Shortness of Breath       Subjective   Patient seen and examined this morning.  Events from last night noted.  Patient denies having any fevers chills.  No nausea or vomiting no abdominal pain.  Denies any chest pain still gets short of breath and cough as well as sputum production.  There is no significant edema.   Patient also denies having new onset weakness of numbness of either extremity.  She does feel that she may be slightly better but was significantly short of breath walking to the bathroom which is about 10 steps.      Review of Systems:    The pertinent  ROS was done and it is noted above, rest  was negative.    Objective     Vital Signs  Visit Vitals   • /79 (BP Location: Right arm, Patient Position: Lying)   • Pulse 98   • Temp 98.3 °F (36.8 °C) (Oral)   • Resp 18   • Ht 60\" (152.4 cm)   • Wt 94 lb 9.6 oz (42.9 kg)   • LMP  (LMP Unknown)   • SpO2 97%   • BMI 18.48 kg/m2         I/O this shift:  In: 720 [P.O.:720]  Out: 200 [Urine:200]    Intake/Output Summary (Last 24 hours) at 03/11/17 1531  Last data filed at 03/11/17 1522   Gross per 24 hour   Intake   1510 ml   Output   2500 ml   Net   -990 ml       Physical Exam:    General Appearance: alert, oriented x 3, no acute distress,   HEENT: pupils round and reactive to light, oral mucosa dry, extra occular movements intact.  Neck: supple, no JVD, trachea midline  Lungs: Scattered wheezing all over the chest and rhonchi  Heart: RRR, normal S1 and S2, no S3, no rub  Abdomen: soft, non-tender, no palpable bladder, present bowel sounds to auscultation  Extremities: no edema, cyanosis or clubbing.   Neuro: normal speech and mental status, grossly non focal.     Results Review:      Results from last 7 days  Lab Units 03/10/17  0616 03/09/17  0528 03/08/17  0611 03/07/17  1430   SODIUM " mmol/L 140 142 140 139   POTASSIUM mmol/L 3.6 3.4* 3.1* 3.5   CHLORIDE mmol/L 107 110* 106 103   TOTAL CO2 mmol/L 29.0 26.0 27.0 26.0   BUN mg/dL 4* 7 4* 9   CREATININE mg/dL 0.60 0.70 0.60 0.60   CALCIUM mg/dL 8.5 8.2* 8.0* 8.6   BILIRUBIN mg/dL  --   --   --  0.4   ALK PHOS U/L  --   --   --  120   ALT (SGPT) U/L  --   --   --  17   AST (SGOT) U/L  --   --   --  17   GLUCOSE mg/dL 88 75 89 121*       Estimated Creatinine Clearance: 43.7 mL/min (by C-G formula based on Cr of 0.6).                  Results from last 7 days  Lab Units 03/10/17  0616 03/09/17  0528 03/08/17  0611 03/07/17  1430   WBC 10*3/mm3 13.19* 12.33* 15.04* 19.83*   HEMOGLOBIN g/dL 10.1* 9.4* 10.1* 11.9*   PLATELETS 10*3/mm3 253 247 233 272               Imaging Results (last 24 hours)     ** No results found for the last 24 hours. **          azelastine 2 spray Each Nare BID   budesonide-formoterol 2 puff Inhalation BID   calcium acetate 1,334 mg Oral TID With Meals   cetirizine 5 mg Oral Daily   conjugated estrogens 0.5 g Vaginal Every Other Day   cyclobenzaprine 10 mg Oral Daily   diclofenac 1 g Topical TID   FLORANEX 1 packet Oral TID   fluconazole 100 mg Oral Q24H   gabapentin 400 mg Oral Nightly   heparin (porcine) 5,000 Units Subcutaneous Q8H   ipratropium-albuterol 3 mL Nebulization 4x Daily - RT   levoFLOXacin 500 mg Intravenous Q48H   megestrol acetate 400 mg Oral Daily   melatonin 10.5 mg Oral Nightly   methylPREDNISolone sodium succinate 40 mg Intravenous Q8H   metoprolol succinate XL 50 mg Oral Q24H   mirtazapine 30 mg Oral Nightly   nicotine 1 patch Transdermal Q24H   nystatin 500,000 Units Swish & Swallow 4x Daily   oxybutynin XL 10 mg Oral Daily   pantoprazole 40 mg Oral Q AM   potassium chloride 40 mEq Oral Once   traZODone 50 mg Oral Nightly   urea  Topical BID   venlafaxine 75 mg Oral BID   cyancobalamin 500 mcg Oral Daily       Pharmacy Consult     Pharmacy to dose vancomycin     sodium chloride 50 mL/hr Last Rate: 125 mL/hr  (03/10/17 1201)       Medication Review:   Current Facility-Administered Medications   Medication Dose Route Frequency Provider Last Rate Last Dose   • acetaminophen (TYLENOL) tablet 650 mg  650 mg Oral Q4H PRN Suleman Fernandez MD   650 mg at 03/09/17 0531   • azelastine (ASTELIN) nasal spray 2 spray  2 spray Each Nare BID Maurilio Melvin MD   2 spray at 03/11/17 0927   • benzonatate (TESSALON) capsule 100 mg  100 mg Oral Q6H PRN Maurilio Melvin MD   100 mg at 03/11/17 0924   • budesonide-formoterol (SYMBICORT) 160-4.5 MCG/ACT inhaler 2 puff  2 puff Inhalation BID Maurilio Melvin MD   2 puff at 03/11/17 0733   • calcium acetate (PHOS BINDER)) capsule 1,334 mg  1,334 mg Oral TID With Meals Maurilio Melvin MD   1,334 mg at 03/11/17 1251   • cetirizine (zyrTEC) tablet 5 mg  5 mg Oral Daily Maurilio Melvin MD   5 mg at 03/11/17 0925   • conjugated estrogens (PREMARIN) vaginal cream 0.5 application  0.5 g Vaginal Every Other Day Maurilio Melvin MD   0.5 application at 03/11/17 0928   • cyclobenzaprine (FLEXERIL) tablet 10 mg  10 mg Oral Daily Maurilio Melvin MD   10 mg at 03/11/17 0924   • diclofenac (VOLTAREN) 1 % gel 1 g  1 g Topical TID Maurilio Melvin MD   1 g at 03/11/17 0929   • FLORANEX oral packet 1 packet  1 packet Oral TID Suleman Fernandez MD   1 packet at 03/11/17 1502   • fluconazole (DIFLUCAN) tablet 100 mg  100 mg Oral Q24H Erlin Pool DO   100 mg at 03/10/17 1717   • gabapentin (NEURONTIN) capsule 400 mg  400 mg Oral Nightly Maurilio Melvin MD   400 mg at 03/10/17 2119   • guaiFENesin-codeine (ROMILAR-AC) syrup 5 mL  5 mL Oral Q6H PRN Rebeka Esparza MD   5 mL at 03/11/17 1502   • heparin (porcine) injection 5,000 Units  5,000 Units Subcutaneous Q8H Suleman Fernandez MD   5,000 Units at 03/11/17 1502   • HYDROcodone-acetaminophen (NORCO)  MG per tablet 1 tablet  1 tablet Oral Q6H PRN Maurilio Melvin MD   1 tablet at 03/11/17 0986   • ipratropium-albuterol (DUO-NEB) nebulizer solution 3 mL  3 mL Nebulization 4x  Daily - RT Maurilio Melvin MD   3 mL at 03/11/17 1215   • levoFLOXacin (LEVAQUIN) 500 mg/100 mL D5W (premix) (LEVAQUIN) 500 mg  500 mg Intravenous Q48H Erlin Pool DO       • megestrol acetate (MEGACE) oral suspension 400 mg  400 mg Oral Daily Suleman Fernandez MD   400 mg at 03/11/17 0926   • melatonin tablet 10.5 mg  10.5 mg Oral Nightly Maurilio Melvin MD   10.5 mg at 03/10/17 2119   • methylPREDNISolone sodium succinate (SOLU-Medrol) injection 40 mg  40 mg Intravenous Q8H Erlin Pool DO   40 mg at 03/11/17 0924   • metoprolol succinate XL (TOPROL-XL) 24 hr tablet 50 mg  50 mg Oral Q24H Maurilio Melvin MD   50 mg at 03/11/17 0925   • mirtazapine (REMERON) tablet 30 mg  30 mg Oral Nightly Maurilio Melvin MD   30 mg at 03/10/17 2120   • nicotine (NICODERM CQ) 21 MG/24HR patch 1 patch  1 patch Transdermal Q24H Maurilio Melvin MD   1 patch at 03/10/17 2224   • nystatin (MYCOSTATIN) 985249 UNIT/ML suspension 500,000 Units  500,000 Units Swish & Swallow 4x Daily Maurilio Melvin MD   500,000 Units at 03/11/17 1251   • ondansetron ODT (ZOFRAN-ODT) disintegrating tablet 4 mg  4 mg Oral Q6H PRN Maurilio Melvin MD   4 mg at 03/11/17 0926   • oxybutynin XL (DITROPAN-XL) 24 hr tablet 10 mg  10 mg Oral Daily Maurilio Melvin MD   10 mg at 03/11/17 0924   • pantoprazole (PROTONIX) EC tablet 40 mg  40 mg Oral Q AM Maurilio Melvin MD   40 mg at 03/11/17 0606   • Pharmacy Consult   Does not apply Continuous PRN Suleman Fernandez MD       • Pharmacy to dose vancomycin   Does not apply Continuous PRN Suleman Fernandez MD       • potassium chloride (K-DUR,KLOR-CON) CR tablet 40 mEq  40 mEq Oral Once Suleman Fernandez MD   40 mEq at 03/08/17 1022   • sodium chloride 0.9 % flush 1-10 mL  1-10 mL Intravenous PRN Suleman Fernandez MD   10 mL at 03/08/17 1736   • sodium chloride 0.9 % infusion  50 mL/hr Intravenous Continuous Erlinremedios Pool  mL/hr at 03/10/17 1201 125 mL/hr at 03/10/17 1201   • traZODone (DESYREL) tablet 50 mg  50 mg Oral  Nightly Maurilio Melvin MD   50 mg at 03/10/17 2120   • urea (CARMOL) 20 % cream   Topical BID Maurilio Melvin MD       • venlafaxine (EFFEXOR) tablet 75 mg  75 mg Oral BID Maurilio Melvin MD   75 mg at 03/11/17 0926   • vitamin B-12 (CYANOCOBALAMIN) tablet 500 mcg  500 mcg Oral Daily Mauriilo Melvin MD   500 mcg at 03/11/17 0924       Assessment/Plan      Principal Problem:    Pneumonia involving right lung  Active Problems:    Pneumonia of right lower lobe due to infectious organism    COPD exacerbation    Patient still is the significantly short of breath continue current treatment plan including steroids and nebulizers and antibiotics.  She does clearly feel that she is improving.  Continue to follow labs.    Details were discussed with the patient  Rest as ordered.    Jordi Morris MD  03/11/17  3:31 PM  Please note that portions of this note may have been completed with a voice recognition program. Efforts were made to edit the dictations, but occasionally words are mistranscribed.

## 2017-03-11 NOTE — PLAN OF CARE
Problem: Patient Care Overview (Adult)  Goal: Plan of Care Review  Outcome: Ongoing (interventions implemented as appropriate)    03/11/17 0321   Coping/Psychosocial Response Interventions   Plan Of Care Reviewed With patient   Patient Care Overview   Progress improving   Outcome Evaluation   Outcome Summary/Follow up Plan dyspnea on exertion, c/o weakness, frequent nonproductive cough       Goal: Adult Individualization and Mutuality  Outcome: Ongoing (interventions implemented as appropriate)  Goal: Discharge Needs Assessment  Outcome: Ongoing (interventions implemented as appropriate)    Problem: Pneumonia (Adult)  Goal: Signs and Symptoms of Listed Potential Problems Will be Absent or Manageable (Pneumonia)  Outcome: Ongoing (interventions implemented as appropriate)

## 2017-03-12 LAB
ALBUMIN SERPL-MCNC: 3.1 G/DL (ref 3.5–5)
ANION GAP SERPL CALCULATED.3IONS-SCNC: 10.2 MMOL/L
BACTERIA SPEC AEROBE CULT: NORMAL
BUN BLD-MCNC: 14 MG/DL (ref 7–20)
BUN/CREAT SERPL: 23.3 (ref 7.1–23.5)
C DIFF TOX A+B STL QL IA: NEGATIVE
CALCIUM SPEC-SCNC: 9.3 MG/DL (ref 8.4–10.2)
CHLORIDE SERPL-SCNC: 110 MMOL/L (ref 98–107)
CO2 SERPL-SCNC: 28 MMOL/L (ref 26–30)
CREAT BLD-MCNC: 0.6 MG/DL (ref 0.6–1.3)
DEPRECATED RDW RBC AUTO: 55.3 FL (ref 37–54)
ERYTHROCYTE [DISTWIDTH] IN BLOOD BY AUTOMATED COUNT: 15.2 % (ref 11.5–14.5)
GFR SERPL CREATININE-BSD FRML MDRD: 99 ML/MIN/1.73
GLUCOSE BLD-MCNC: 125 MG/DL (ref 74–98)
HCT VFR BLD AUTO: 33.3 % (ref 37–47)
HGB BLD-MCNC: 10.6 G/DL (ref 12–16)
MCH RBC QN AUTO: 31.5 PG (ref 27–31)
MCHC RBC AUTO-ENTMCNC: 31.8 G/DL (ref 30–37)
MCV RBC AUTO: 99.1 FL (ref 81–99)
PHOSPHATE SERPL-MCNC: 3.6 MG/DL (ref 2.5–4.5)
PLATELET # BLD AUTO: 329 10*3/MM3 (ref 130–400)
PMV BLD AUTO: 10.2 FL (ref 6–12)
POTASSIUM BLD-SCNC: 4.2 MMOL/L (ref 3.5–5.1)
RBC # BLD AUTO: 3.36 10*6/MM3 (ref 4.2–5.4)
SODIUM BLD-SCNC: 144 MMOL/L (ref 137–145)
WBC NRBC COR # BLD: 17.49 10*3/MM3 (ref 4.8–10.8)

## 2017-03-12 PROCEDURE — 99232 SBSQ HOSP IP/OBS MODERATE 35: CPT | Performed by: INTERNAL MEDICINE

## 2017-03-12 PROCEDURE — 25010000002 HEPARIN (PORCINE) 5000 UNIT/0.5ML SOLUTION: Performed by: INTERNAL MEDICINE

## 2017-03-12 PROCEDURE — 87324 CLOSTRIDIUM AG IA: CPT | Performed by: FAMILY MEDICINE

## 2017-03-12 PROCEDURE — 25010000002 METHYLPREDNISOLONE PER 40 MG: Performed by: FAMILY MEDICINE

## 2017-03-12 PROCEDURE — 94799 UNLISTED PULMONARY SVC/PX: CPT

## 2017-03-12 PROCEDURE — 80069 RENAL FUNCTION PANEL: CPT | Performed by: INTERNAL MEDICINE

## 2017-03-12 PROCEDURE — 85027 COMPLETE CBC AUTOMATED: CPT | Performed by: INTERNAL MEDICINE

## 2017-03-12 RX ADMIN — HYDROCODONE BITARTRATE AND ACETAMINOPHEN 1 TABLET: 10; 325 TABLET ORAL at 17:42

## 2017-03-12 RX ADMIN — NYSTATIN 500000 UNITS: 500000 SUSPENSION ORAL at 10:24

## 2017-03-12 RX ADMIN — METHYLPREDNISOLONE SODIUM SUCCINATE 40 MG: 40 INJECTION, POWDER, FOR SOLUTION INTRAMUSCULAR; INTRAVENOUS at 10:26

## 2017-03-12 RX ADMIN — SODIUM CHLORIDE 50 ML/HR: 9 INJECTION, SOLUTION INTRAVENOUS at 05:29

## 2017-03-12 RX ADMIN — IPRATROPIUM BROMIDE AND ALBUTEROL SULFATE 3 ML: .5; 3 SOLUTION RESPIRATORY (INHALATION) at 20:58

## 2017-03-12 RX ADMIN — METOPROLOL SUCCINATE 50 MG: 50 TABLET, EXTENDED RELEASE ORAL at 10:29

## 2017-03-12 RX ADMIN — HEPARIN SODIUM 5000 UNITS: 5000 INJECTION, SOLUTION INTRAVENOUS; SUBCUTANEOUS at 05:22

## 2017-03-12 RX ADMIN — VENLAFAXINE 75 MG: 75 TABLET ORAL at 17:33

## 2017-03-12 RX ADMIN — DICLOFENAC 1 G: 10 GEL TOPICAL at 22:43

## 2017-03-12 RX ADMIN — FLUCONAZOLE 100 MG: 100 TABLET ORAL at 17:33

## 2017-03-12 RX ADMIN — NYSTATIN 500000 UNITS: 500000 SUSPENSION ORAL at 17:33

## 2017-03-12 RX ADMIN — METHYLPREDNISOLONE SODIUM SUCCINATE 40 MG: 40 INJECTION, POWDER, FOR SOLUTION INTRAMUSCULAR; INTRAVENOUS at 00:29

## 2017-03-12 RX ADMIN — MIRTAZAPINE 30 MG: 15 TABLET, FILM COATED ORAL at 22:57

## 2017-03-12 RX ADMIN — AZELASTINE HYDROCHLORIDE 2 SPRAY: 137 SPRAY, METERED NASAL at 18:07

## 2017-03-12 RX ADMIN — NYSTATIN 500000 UNITS: 500000 SUSPENSION ORAL at 22:42

## 2017-03-12 RX ADMIN — GUAIFENESIN AND CODEINE PHOSPHATE 5 ML: 100; 10 SOLUTION ORAL at 04:14

## 2017-03-12 RX ADMIN — DICLOFENAC 1 G: 10 GEL TOPICAL at 17:36

## 2017-03-12 RX ADMIN — UREA: 17 CREAM TOPICAL at 17:37

## 2017-03-12 RX ADMIN — HYDROCODONE BITARTRATE AND ACETAMINOPHEN 1 TABLET: 10; 325 TABLET ORAL at 10:26

## 2017-03-12 RX ADMIN — BUDESONIDE AND FORMOTEROL FUMARATE DIHYDRATE 2 PUFF: 160; 4.5 AEROSOL RESPIRATORY (INHALATION) at 08:18

## 2017-03-12 RX ADMIN — METHYLPREDNISOLONE SODIUM SUCCINATE 40 MG: 40 INJECTION, POWDER, FOR SOLUTION INTRAMUSCULAR; INTRAVENOUS at 17:37

## 2017-03-12 RX ADMIN — UREA: 17 CREAM TOPICAL at 10:32

## 2017-03-12 RX ADMIN — GUAIFENESIN AND CODEINE PHOSPHATE 5 ML: 100; 10 SOLUTION ORAL at 22:57

## 2017-03-12 RX ADMIN — CYANOCOBALAMIN TAB 500 MCG 500 MCG: 500 TAB at 10:28

## 2017-03-12 RX ADMIN — NICOTINE 1 PATCH: 21 PATCH TRANSDERMAL at 22:43

## 2017-03-12 RX ADMIN — LACTOBACILLUS ACIDOPHILUS / LACTOBACILLUS BULGARICUS 1 PACKET: 100 MILLION CFU STRENGTH GRANULES at 22:42

## 2017-03-12 RX ADMIN — OXYBUTYNIN CHLORIDE 10 MG: 5 TABLET, EXTENDED RELEASE ORAL at 10:26

## 2017-03-12 RX ADMIN — IPRATROPIUM BROMIDE AND ALBUTEROL SULFATE 3 ML: .5; 3 SOLUTION RESPIRATORY (INHALATION) at 08:16

## 2017-03-12 RX ADMIN — AZELASTINE HYDROCHLORIDE 2 SPRAY: 137 SPRAY, METERED NASAL at 10:32

## 2017-03-12 RX ADMIN — PANTOPRAZOLE SODIUM 40 MG: 40 TABLET, DELAYED RELEASE ORAL at 05:22

## 2017-03-12 RX ADMIN — CYCLOBENZAPRINE HYDROCHLORIDE 10 MG: 10 TABLET, FILM COATED ORAL at 10:30

## 2017-03-12 RX ADMIN — ONDANSETRON 4 MG: 4 TABLET, ORALLY DISINTEGRATING ORAL at 10:26

## 2017-03-12 RX ADMIN — CETIRIZINE HYDROCHLORIDE 5 MG: 10 TABLET, FILM COATED ORAL at 10:28

## 2017-03-12 RX ADMIN — CALCIUM ACETATE 1334 MG: 667 CAPSULE ORAL at 12:05

## 2017-03-12 RX ADMIN — NYSTATIN 500000 UNITS: 500000 SUSPENSION ORAL at 12:04

## 2017-03-12 RX ADMIN — GABAPENTIN 400 MG: 400 CAPSULE ORAL at 22:42

## 2017-03-12 RX ADMIN — DICLOFENAC 1 G: 10 GEL TOPICAL at 10:32

## 2017-03-12 RX ADMIN — TRAZODONE HYDROCHLORIDE 50 MG: 50 TABLET ORAL at 22:42

## 2017-03-12 RX ADMIN — VENLAFAXINE 75 MG: 75 TABLET ORAL at 10:30

## 2017-03-12 RX ADMIN — MEGESTROL ACETATE 400 MG: 40 SUSPENSION ORAL at 10:25

## 2017-03-12 RX ADMIN — CALCIUM ACETATE 1334 MG: 667 CAPSULE ORAL at 10:38

## 2017-03-12 RX ADMIN — MELATONIN TAB 3 MG 10.5 MG: 3 TAB at 22:57

## 2017-03-12 RX ADMIN — CALCIUM ACETATE 1334 MG: 667 CAPSULE ORAL at 17:33

## 2017-03-12 RX ADMIN — HEPARIN SODIUM 5000 UNITS: 5000 INJECTION, SOLUTION INTRAVENOUS; SUBCUTANEOUS at 14:55

## 2017-03-12 RX ADMIN — BUDESONIDE AND FORMOTEROL FUMARATE DIHYDRATE 2 PUFF: 160; 4.5 AEROSOL RESPIRATORY (INHALATION) at 20:59

## 2017-03-12 RX ADMIN — LACTOBACILLUS ACIDOPHILUS / LACTOBACILLUS BULGARICUS 1 PACKET: 100 MILLION CFU STRENGTH GRANULES at 10:24

## 2017-03-12 RX ADMIN — LACTOBACILLUS ACIDOPHILUS / LACTOBACILLUS BULGARICUS 1 PACKET: 100 MILLION CFU STRENGTH GRANULES at 17:33

## 2017-03-12 NOTE — PROGRESS NOTES
"Hospitalist Progress Note.    LOS: 4 days    Patient Care Team:  DONTRELL Regan as PCP - General    Chief Complaint:    Chief Complaint   Patient presents with   • Shortness of Breath       Subjective   Patient seen and examined this morning.  Events from last night noted.  Patient denies having any fevers chills.  No nausea or vomiting no abdominal pain.  Denies any chest pain still gets short of breath and cough as well as sputum production.  Today she feels that she is slightly better as compared to yesterday.  There is no significant edema.   Patient also denies having new onset weakness of numbness of either extremity.      Review of Systems:    The pertinent  ROS was done and it is noted above, rest  was negative.    Objective     Vital Signs  Visit Vitals   • /72   • Pulse 100   • Temp 98.5 °F (36.9 °C) (Oral)   • Resp 18   • Ht 60\" (152.4 cm)   • Wt 96 lb 1.6 oz (43.6 kg)   • LMP  (LMP Unknown)   • SpO2 97%   • BMI 18.77 kg/m2         I/O this shift:  In: 360 [P.O.:360]  Out: 100 [Urine:100]    Intake/Output Summary (Last 24 hours) at 03/12/17 1536  Last data filed at 03/12/17 0818   Gross per 24 hour   Intake   3666 ml   Output   1550 ml   Net   2116 ml       Physical Exam:    General Appearance: alert, oriented x 3, no acute distress,   HEENT: pupils round and reactive to light, oral mucosa dry, extra occular movements intact.  Neck: supple, no JVD, trachea midline  Lungs: Scattered wheezing all over the chest and rhonchi  Heart: RRR, normal S1 and S2, no S3, no rub  Abdomen: soft, non-tender, no palpable bladder, present bowel sounds to auscultation  Extremities: no edema, cyanosis or clubbing.   Neuro: normal speech and mental status, grossly non focal.     Results Review:      Results from last 7 days  Lab Units 03/12/17  0646 03/10/17  0616 03/09/17  0528  03/07/17  1430   SODIUM mmol/L 144 140 142  < > 139   POTASSIUM mmol/L 4.2 3.6 3.4*  < > 3.5   CHLORIDE mmol/L 110* 107 110*  < > 103 "   TOTAL CO2 mmol/L 28.0 29.0 26.0  < > 26.0   BUN mg/dL 14 4* 7  < > 9   CREATININE mg/dL 0.60 0.60 0.70  < > 0.60   CALCIUM mg/dL 9.3 8.5 8.2*  < > 8.6   BILIRUBIN mg/dL  --   --   --   --  0.4   ALK PHOS U/L  --   --   --   --  120   ALT (SGPT) U/L  --   --   --   --  17   AST (SGOT) U/L  --   --   --   --  17   GLUCOSE mg/dL 125* 88 75  < > 121*   < > = values in this interval not displayed.    Estimated Creatinine Clearance: 44.4 mL/min (by C-G formula based on Cr of 0.6).      Results from last 7 days  Lab Units 03/12/17  0646   PHOSPHORUS mg/dL 3.6               Results from last 7 days  Lab Units 03/12/17  0646 03/10/17  0616 03/09/17  0528 03/08/17  0611 03/07/17  1430   WBC 10*3/mm3 17.49* 13.19* 12.33* 15.04* 19.83*   HEMOGLOBIN g/dL 10.6* 10.1* 9.4* 10.1* 11.9*   PLATELETS 10*3/mm3 329 253 247 233 272               Imaging Results (last 24 hours)     ** No results found for the last 24 hours. **          azelastine 2 spray Each Nare BID   budesonide-formoterol 2 puff Inhalation BID   calcium acetate 1,334 mg Oral TID With Meals   cetirizine 5 mg Oral Daily   conjugated estrogens 0.5 g Vaginal Every Other Day   cyclobenzaprine 10 mg Oral Daily   diclofenac 1 g Topical TID   FLORANEX 1 packet Oral TID   fluconazole 100 mg Oral Q24H   gabapentin 400 mg Oral Nightly   heparin (porcine) 5,000 Units Subcutaneous Q8H   ipratropium-albuterol 3 mL Nebulization 4x Daily - RT   levoFLOXacin 500 mg Intravenous Q48H   megestrol acetate 400 mg Oral Daily   melatonin 10.5 mg Oral Nightly   methylPREDNISolone sodium succinate 40 mg Intravenous Q8H   metoprolol succinate XL 50 mg Oral Q24H   mirtazapine 30 mg Oral Nightly   nicotine 1 patch Transdermal Q24H   nystatin 500,000 Units Swish & Swallow 4x Daily   oxybutynin XL 10 mg Oral Daily   pantoprazole 40 mg Oral Q AM   potassium chloride 40 mEq Oral Once   traZODone 50 mg Oral Nightly   urea  Topical BID   venlafaxine 75 mg Oral BID   cyancobalamin 500 mcg Oral Daily        Pharmacy Consult     sodium chloride 50 mL/hr Last Rate: 50 mL/hr (03/12/17 0529)       Medication Review:   Current Facility-Administered Medications   Medication Dose Route Frequency Provider Last Rate Last Dose   • acetaminophen (TYLENOL) tablet 650 mg  650 mg Oral Q4H PRN Suleman Fernandez MD   650 mg at 03/09/17 0531   • azelastine (ASTELIN) nasal spray 2 spray  2 spray Each Nare BID Maurilio Melvin MD   2 spray at 03/12/17 1032   • benzonatate (TESSALON) capsule 100 mg  100 mg Oral Q6H PRN Maurilio Melvin MD   100 mg at 03/11/17 2123   • budesonide-formoterol (SYMBICORT) 160-4.5 MCG/ACT inhaler 2 puff  2 puff Inhalation BID Maurilio Melvin MD   2 puff at 03/12/17 0818   • calcium acetate (PHOS BINDER)) capsule 1,334 mg  1,334 mg Oral TID With Meals Maurilio Melvin MD   1,334 mg at 03/12/17 1205   • cetirizine (zyrTEC) tablet 5 mg  5 mg Oral Daily Maurilio Melvin MD   5 mg at 03/12/17 1028   • conjugated estrogens (PREMARIN) vaginal cream 0.5 application  0.5 g Vaginal Every Other Day Maurilio Melvin MD   0.5 application at 03/11/17 0928   • cyclobenzaprine (FLEXERIL) tablet 10 mg  10 mg Oral Daily Maurilio Melvin MD   10 mg at 03/12/17 1030   • diclofenac (VOLTAREN) 1 % gel 1 g  1 g Topical TID Maurilio Melvin MD   1 g at 03/12/17 1032   • FLORANEX oral packet 1 packet  1 packet Oral TID Suleman Fernandez MD   1 packet at 03/12/17 1024   • fluconazole (DIFLUCAN) tablet 100 mg  100 mg Oral Q24H Erlin Pool DO   100 mg at 03/11/17 1740   • gabapentin (NEURONTIN) capsule 400 mg  400 mg Oral Nightly Maurilio Melvin MD   400 mg at 03/11/17 2123   • guaiFENesin-codeine (ROMILAR-AC) syrup 5 mL  5 mL Oral Q6H PRN Rebeka Esparza MD   5 mL at 03/12/17 0414   • heparin (porcine) injection 5,000 Units  5,000 Units Subcutaneous Q8H Suleman Fernandez MD   5,000 Units at 03/12/17 1455   • HYDROcodone-acetaminophen (NORCO)  MG per tablet 1 tablet  1 tablet Oral Q6H PRN Maurilio Melvin MD   1 tablet at 03/12/17 1026   •  ipratropium-albuterol (DUO-NEB) nebulizer solution 3 mL  3 mL Nebulization 4x Daily - RT Maurilio Melvin MD   3 mL at 03/12/17 0816   • levoFLOXacin (LEVAQUIN) 500 mg/100 mL D5W (premix) (LEVAQUIN) 500 mg  500 mg Intravenous Q48H Erlin DARRIN Pool, DO   500 mg at 03/11/17 1805   • megestrol acetate (MEGACE) oral suspension 400 mg  400 mg Oral Daily Suleman Fernandez MD   400 mg at 03/12/17 1025   • melatonin tablet 10.5 mg  10.5 mg Oral Nightly Maurilio Melvin MD   10.5 mg at 03/11/17 2232   • methylPREDNISolone sodium succinate (SOLU-Medrol) injection 40 mg  40 mg Intravenous Q8H Erlin DARRIN Pool, DO   40 mg at 03/12/17 1026   • metoprolol succinate XL (TOPROL-XL) 24 hr tablet 50 mg  50 mg Oral Q24H Maurilio Melvin MD   50 mg at 03/12/17 1029   • mirtazapine (REMERON) tablet 30 mg  30 mg Oral Nightly Maurilio Melvin MD   30 mg at 03/11/17 2232   • nicotine (NICODERM CQ) 21 MG/24HR patch 1 patch  1 patch Transdermal Q24H Maurilio Melvin MD   1 patch at 03/11/17 2232   • nystatin (MYCOSTATIN) 050835 UNIT/ML suspension 500,000 Units  500,000 Units Swish & Swallow 4x Daily Maurilio Melvin MD   500,000 Units at 03/12/17 1204   • ondansetron ODT (ZOFRAN-ODT) disintegrating tablet 4 mg  4 mg Oral Q6H PRN Maurilio Melvin MD   4 mg at 03/12/17 1026   • oxybutynin XL (DITROPAN-XL) 24 hr tablet 10 mg  10 mg Oral Daily Maurilio Melvin MD   10 mg at 03/12/17 1026   • pantoprazole (PROTONIX) EC tablet 40 mg  40 mg Oral Q AM Maurilio Melvin MD   40 mg at 03/12/17 0522   • Pharmacy Consult   Does not apply Continuous PRN Suleman Fernandez MD       • potassium chloride (K-DUR,KLOR-CON) CR tablet 40 mEq  40 mEq Oral Once Suleman Fernandez MD   40 mEq at 03/08/17 1022   • sodium chloride 0.9 % flush 1-10 mL  1-10 mL Intravenous PRN Suleman Fernandez MD   10 mL at 03/08/17 1736   • sodium chloride 0.9 % infusion  50 mL/hr Intravenous Continuous Erlin DARRIN Pool DO 50 mL/hr at 03/12/17 0529 50 mL/hr at 03/12/17 0529   • traZODone (DESYREL) tablet 50 mg  50  mg Oral Nightly Maurilio Melvin MD   50 mg at 03/11/17 2123   • urea (CARMOL) 20 % cream   Topical BID Maurilio Melvin MD       • venlafaxine (EFFEXOR) tablet 75 mg  75 mg Oral BID Maurilio Melvin MD   75 mg at 03/12/17 1030   • vitamin B-12 (CYANOCOBALAMIN) tablet 500 mcg  500 mcg Oral Daily Maurilio Melvin MD   500 mcg at 03/12/17 1028       Assessment/Plan      Principal Problem:    Pneumonia involving right lung  Active Problems:    Pneumonia of right lower lobe due to infectious organism    COPD exacerbation    Patient still is the significantly short of breath continue current treatment plan including steroids and nebulizers and antibiotics.  She does clearly feel that she is improving.  If she continues to improve as I can see the difference between yesterday and today she may be able to go home tomorrow.  She will still need prednisone taper when she goes home.  Continue to follow labs.    Details were discussed with the patient  Rest as ordered.    Jordi Morris MD  03/12/17  3:36 PM  Please note that portions of this note may have been completed with a voice recognition program. Efforts were made to edit the dictations, but occasionally words are mistranscribed.

## 2017-03-12 NOTE — PLAN OF CARE
Problem: Patient Care Overview (Adult)  Goal: Plan of Care Review  Outcome: Ongoing (interventions implemented as appropriate)    03/12/17 0604   Coping/Psychosocial Response Interventions   Plan Of Care Reviewed With patient   Patient Care Overview   Progress improving   Outcome Evaluation   Outcome Summary/Follow up Plan supplemental oxygen prn (mostly at night), dyspnea on exertion, frequent nonproductive cough, possible d/c home today       Goal: Adult Individualization and Mutuality  Outcome: Ongoing (interventions implemented as appropriate)  Goal: Discharge Needs Assessment  Outcome: Ongoing (interventions implemented as appropriate)    Problem: Pneumonia (Adult)  Goal: Signs and Symptoms of Listed Potential Problems Will be Absent or Manageable (Pneumonia)  Outcome: Ongoing (interventions implemented as appropriate)

## 2017-03-12 NOTE — PLAN OF CARE
Problem: Patient Care Overview (Adult)  Goal: Plan of Care Review  Outcome: Ongoing (interventions implemented as appropriate)    03/12/17 8197   Coping/Psychosocial Response Interventions   Plan Of Care Reviewed With patient   Patient Care Overview   Progress improving   Outcome Evaluation   Outcome Summary/Follow up Plan Pt. is still SOA on exertion and still has crackles in the bases. But she feels stronger and is hoping to go home tomorrow.       Goal: Adult Individualization and Mutuality  Outcome: Ongoing (interventions implemented as appropriate)  Goal: Discharge Needs Assessment  Outcome: Ongoing (interventions implemented as appropriate)    Problem: Pneumonia (Adult)  Goal: Signs and Symptoms of Listed Potential Problems Will be Absent or Manageable (Pneumonia)  Outcome: Ongoing (interventions implemented as appropriate)

## 2017-03-13 ENCOUNTER — APPOINTMENT (OUTPATIENT)
Dept: GENERAL RADIOLOGY | Facility: HOSPITAL | Age: 72
End: 2017-03-13

## 2017-03-13 LAB
ALBUMIN SERPL-MCNC: 2.9 G/DL (ref 3.5–5)
ANION GAP SERPL CALCULATED.3IONS-SCNC: 8.1 MMOL/L
BACTERIA SPEC AEROBE CULT: ABNORMAL
BACTERIA SPEC AEROBE CULT: NORMAL
BILIRUB UR QL STRIP: NEGATIVE
BUN BLD-MCNC: 16 MG/DL (ref 7–20)
BUN/CREAT SERPL: 26.7 (ref 7.1–23.5)
CALCIUM SPEC-SCNC: 9.2 MG/DL (ref 8.4–10.2)
CHLORIDE SERPL-SCNC: 106 MMOL/L (ref 98–107)
CLARITY UR: CLEAR
CO2 SERPL-SCNC: 30 MMOL/L (ref 26–30)
COLOR UR: YELLOW
CREAT BLD-MCNC: 0.6 MG/DL (ref 0.6–1.3)
DEPRECATED RDW RBC AUTO: 53.9 FL (ref 37–54)
ERYTHROCYTE [DISTWIDTH] IN BLOOD BY AUTOMATED COUNT: 15.1 % (ref 11.5–14.5)
GFR SERPL CREATININE-BSD FRML MDRD: 99 ML/MIN/1.73
GLUCOSE BLD-MCNC: 115 MG/DL (ref 74–98)
GLUCOSE UR STRIP-MCNC: NEGATIVE MG/DL
HCT VFR BLD AUTO: 31 % (ref 37–47)
HGB BLD-MCNC: 10.2 G/DL (ref 12–16)
HGB UR QL STRIP.AUTO: NEGATIVE
KETONES UR QL STRIP: NEGATIVE
LEUKOCYTE ESTERASE UR QL STRIP.AUTO: NEGATIVE
MCH RBC QN AUTO: 32.2 PG (ref 27–31)
MCHC RBC AUTO-ENTMCNC: 32.9 G/DL (ref 30–37)
MCV RBC AUTO: 97.8 FL (ref 81–99)
NITRITE UR QL STRIP: NEGATIVE
PH UR STRIP.AUTO: 6 [PH] (ref 5–8)
PHOSPHATE SERPL-MCNC: 3.7 MG/DL (ref 2.5–4.5)
PLATELET # BLD AUTO: 316 10*3/MM3 (ref 130–400)
PMV BLD AUTO: 10.1 FL (ref 6–12)
POTASSIUM BLD-SCNC: 4.1 MMOL/L (ref 3.5–5.1)
PROT UR QL STRIP: NEGATIVE
RBC # BLD AUTO: 3.17 10*6/MM3 (ref 4.2–5.4)
SODIUM BLD-SCNC: 140 MMOL/L (ref 137–145)
SP GR UR STRIP: 1.02 (ref 1–1.03)
UROBILINOGEN UR QL STRIP: NORMAL
WBC NRBC COR # BLD: 18.82 10*3/MM3 (ref 4.8–10.8)

## 2017-03-13 PROCEDURE — 63710000001 PREDNISONE PER 5 MG: Performed by: INTERNAL MEDICINE

## 2017-03-13 PROCEDURE — 94799 UNLISTED PULMONARY SVC/PX: CPT

## 2017-03-13 PROCEDURE — 25010000002 METHYLPREDNISOLONE PER 40 MG: Performed by: FAMILY MEDICINE

## 2017-03-13 PROCEDURE — 71010 HC CHEST PA OR AP: CPT

## 2017-03-13 PROCEDURE — 25010000002 HEPARIN (PORCINE) 5000 UNIT/0.5ML SOLUTION: Performed by: INTERNAL MEDICINE

## 2017-03-13 PROCEDURE — 25010000002 LEVOFLOXACIN PER 250 MG: Performed by: FAMILY MEDICINE

## 2017-03-13 PROCEDURE — 99232 SBSQ HOSP IP/OBS MODERATE 35: CPT | Performed by: NURSE PRACTITIONER

## 2017-03-13 PROCEDURE — 81003 URINALYSIS AUTO W/O SCOPE: CPT | Performed by: INTERNAL MEDICINE

## 2017-03-13 PROCEDURE — 80069 RENAL FUNCTION PANEL: CPT | Performed by: INTERNAL MEDICINE

## 2017-03-13 PROCEDURE — 99232 SBSQ HOSP IP/OBS MODERATE 35: CPT | Performed by: INTERNAL MEDICINE

## 2017-03-13 PROCEDURE — 85027 COMPLETE CBC AUTOMATED: CPT | Performed by: INTERNAL MEDICINE

## 2017-03-13 RX ORDER — LORAZEPAM 0.5 MG/1
0.5 TABLET ORAL EVERY 6 HOURS PRN
Status: DISCONTINUED | OUTPATIENT
Start: 2017-03-13 | End: 2017-03-14 | Stop reason: HOSPADM

## 2017-03-13 RX ORDER — PREDNISONE 20 MG/1
20 TABLET ORAL
Status: DISCONTINUED | OUTPATIENT
Start: 2017-03-13 | End: 2017-03-14 | Stop reason: HOSPADM

## 2017-03-13 RX ADMIN — IPRATROPIUM BROMIDE AND ALBUTEROL SULFATE 3 ML: .5; 3 SOLUTION RESPIRATORY (INHALATION) at 07:54

## 2017-03-13 RX ADMIN — LACTOBACILLUS ACIDOPHILUS / LACTOBACILLUS BULGARICUS 1 PACKET: 100 MILLION CFU STRENGTH GRANULES at 16:56

## 2017-03-13 RX ADMIN — MEGESTROL ACETATE 400 MG: 40 SUSPENSION ORAL at 09:37

## 2017-03-13 RX ADMIN — METOPROLOL SUCCINATE 50 MG: 50 TABLET, EXTENDED RELEASE ORAL at 09:40

## 2017-03-13 RX ADMIN — CALCIUM ACETATE 1334 MG: 667 CAPSULE ORAL at 09:37

## 2017-03-13 RX ADMIN — DICLOFENAC 1 G: 10 GEL TOPICAL at 17:16

## 2017-03-13 RX ADMIN — GABAPENTIN 400 MG: 400 CAPSULE ORAL at 22:17

## 2017-03-13 RX ADMIN — CETIRIZINE HYDROCHLORIDE 5 MG: 10 TABLET, FILM COATED ORAL at 09:38

## 2017-03-13 RX ADMIN — HYDROCODONE BITARTRATE AND ACETAMINOPHEN 1 TABLET: 10; 325 TABLET ORAL at 17:15

## 2017-03-13 RX ADMIN — DICLOFENAC 1 G: 10 GEL TOPICAL at 22:18

## 2017-03-13 RX ADMIN — MELATONIN TAB 3 MG 10.5 MG: 3 TAB at 23:44

## 2017-03-13 RX ADMIN — LACTOBACILLUS ACIDOPHILUS / LACTOBACILLUS BULGARICUS 1 PACKET: 100 MILLION CFU STRENGTH GRANULES at 09:35

## 2017-03-13 RX ADMIN — PREDNISONE 20 MG: 20 TABLET ORAL at 16:57

## 2017-03-13 RX ADMIN — FLUCONAZOLE 100 MG: 100 TABLET ORAL at 16:57

## 2017-03-13 RX ADMIN — PREDNISONE 20 MG: 20 TABLET ORAL at 09:50

## 2017-03-13 RX ADMIN — CYANOCOBALAMIN TAB 500 MCG 500 MCG: 500 TAB at 09:40

## 2017-03-13 RX ADMIN — AZELASTINE HYDROCHLORIDE 2 SPRAY: 137 SPRAY, METERED NASAL at 18:14

## 2017-03-13 RX ADMIN — CALCIUM ACETATE 1334 MG: 667 CAPSULE ORAL at 18:12

## 2017-03-13 RX ADMIN — CONJUGATED ESTROGENS 0.5 APPLICATION: 0.62 CREAM VAGINAL at 09:51

## 2017-03-13 RX ADMIN — BUDESONIDE AND FORMOTEROL FUMARATE DIHYDRATE 2 PUFF: 160; 4.5 AEROSOL RESPIRATORY (INHALATION) at 18:13

## 2017-03-13 RX ADMIN — IPRATROPIUM BROMIDE AND ALBUTEROL SULFATE 3 ML: .5; 3 SOLUTION RESPIRATORY (INHALATION) at 20:31

## 2017-03-13 RX ADMIN — DICLOFENAC 1 G: 10 GEL TOPICAL at 09:51

## 2017-03-13 RX ADMIN — VENLAFAXINE 75 MG: 75 TABLET ORAL at 18:13

## 2017-03-13 RX ADMIN — AZELASTINE HYDROCHLORIDE 2 SPRAY: 137 SPRAY, METERED NASAL at 09:43

## 2017-03-13 RX ADMIN — NYSTATIN 500000 UNITS: 500000 SUSPENSION ORAL at 18:29

## 2017-03-13 RX ADMIN — UREA: 17 CREAM TOPICAL at 09:45

## 2017-03-13 RX ADMIN — PANTOPRAZOLE SODIUM 40 MG: 40 TABLET, DELAYED RELEASE ORAL at 05:09

## 2017-03-13 RX ADMIN — LACTOBACILLUS ACIDOPHILUS / LACTOBACILLUS BULGARICUS 1 PACKET: 100 MILLION CFU STRENGTH GRANULES at 22:17

## 2017-03-13 RX ADMIN — LEVOFLOXACIN 500 MG: 5 INJECTION, SOLUTION INTRAVENOUS at 22:27

## 2017-03-13 RX ADMIN — ACETAMINOPHEN 650 MG: 325 TABLET, FILM COATED ORAL at 15:00

## 2017-03-13 RX ADMIN — NYSTATIN 500000 UNITS: 500000 SUSPENSION ORAL at 09:40

## 2017-03-13 RX ADMIN — HEPARIN SODIUM 5000 UNITS: 5000 INJECTION, SOLUTION INTRAVENOUS; SUBCUTANEOUS at 22:17

## 2017-03-13 RX ADMIN — NYSTATIN 500000 UNITS: 500000 SUSPENSION ORAL at 12:31

## 2017-03-13 RX ADMIN — METHYLPREDNISOLONE SODIUM SUCCINATE 40 MG: 40 INJECTION, POWDER, FOR SOLUTION INTRAMUSCULAR; INTRAVENOUS at 00:19

## 2017-03-13 RX ADMIN — CYCLOBENZAPRINE HYDROCHLORIDE 10 MG: 10 TABLET, FILM COATED ORAL at 09:38

## 2017-03-13 RX ADMIN — BUDESONIDE AND FORMOTEROL FUMARATE DIHYDRATE 2 PUFF: 160; 4.5 AEROSOL RESPIRATORY (INHALATION) at 07:55

## 2017-03-13 RX ADMIN — VENLAFAXINE 75 MG: 75 TABLET ORAL at 09:37

## 2017-03-13 RX ADMIN — HEPARIN SODIUM 5000 UNITS: 5000 INJECTION, SOLUTION INTRAVENOUS; SUBCUTANEOUS at 14:17

## 2017-03-13 RX ADMIN — NYSTATIN 500000 UNITS: 500000 SUSPENSION ORAL at 22:17

## 2017-03-13 RX ADMIN — IPRATROPIUM BROMIDE AND ALBUTEROL SULFATE 3 ML: .5; 3 SOLUTION RESPIRATORY (INHALATION) at 16:30

## 2017-03-13 RX ADMIN — GUAIFENESIN AND CODEINE PHOSPHATE 5 ML: 100; 10 SOLUTION ORAL at 23:44

## 2017-03-13 RX ADMIN — NICOTINE 1 PATCH: 21 PATCH TRANSDERMAL at 22:19

## 2017-03-13 RX ADMIN — HEPARIN SODIUM 5000 UNITS: 5000 INJECTION, SOLUTION INTRAVENOUS; SUBCUTANEOUS at 05:09

## 2017-03-13 RX ADMIN — MIRTAZAPINE 30 MG: 15 TABLET, FILM COATED ORAL at 22:17

## 2017-03-13 RX ADMIN — LORAZEPAM 0.5 MG: 0.5 TABLET ORAL at 10:15

## 2017-03-13 RX ADMIN — TRAZODONE HYDROCHLORIDE 50 MG: 50 TABLET ORAL at 22:20

## 2017-03-13 RX ADMIN — CALCIUM ACETATE 1334 MG: 667 CAPSULE ORAL at 12:31

## 2017-03-13 NOTE — PLAN OF CARE
Problem: Patient Care Overview (Adult)  Goal: Plan of Care Review  Outcome: Ongoing (interventions implemented as appropriate)    03/13/17 0627   Coping/Psychosocial Response Interventions   Plan Of Care Reviewed With patient   Patient Care Overview   Progress improving   Outcome Evaluation   Outcome Summary/Follow up Plan soa with exertion continues, pt feels like she is getting stronger, pt coughing less frequently, anticipates d/c home today       Goal: Adult Individualization and Mutuality  Outcome: Ongoing (interventions implemented as appropriate)  Goal: Discharge Needs Assessment  Outcome: Ongoing (interventions implemented as appropriate)    Problem: Pneumonia (Adult)  Goal: Signs and Symptoms of Listed Potential Problems Will be Absent or Manageable (Pneumonia)  Outcome: Ongoing (interventions implemented as appropriate)

## 2017-03-13 NOTE — PROGRESS NOTES
"  S: Awake and alert.  She is breathing better. Cough has improved. Currently on 2L NC.    O:Vital signs reviewed.   Visit Vitals   • /83 (BP Location: Right arm, Patient Position: Lying)   • Pulse 82   • Temp 97.8 °F (36.6 °C) (Oral)   • Resp 19   • Ht 60\" (152.4 cm)   • Wt 97 lb (44 kg)   • LMP  (LMP Unknown)   • SpO2 98%   • BMI 18.94 kg/m2       General: Mild respiratory distress noted.  Neck: No significant JVD   Cardiovascular: S1 + S2.  Regular, tachycardic.  Respiratory: Minimal wheezing heard.  Right greater than left crackles noted.   Extremities: No significant  edema noted.  Neurologic:  AAOx3. Was able to follow commands     Labs:   Lab Results (last 24 hours)     Procedure Component Value Units Date/Time    Clostridium Difficile EIA [[id number]]  (Normal) Collected:  03/12/17 1132    Specimen:  Stool from Per Rectum Updated:  03/12/17 1232     C difficile Toxins A+B, EIA Negative     Blood Culture With YONAS [[id number]]  (Normal) Collected:  03/07/17 1656    Specimen:  Blood from Arm, Right Updated:  03/12/17 1901     Blood Culture No growth at 5 days     Blood Culture With YONAS [[id number]]  (Normal) Collected:  03/07/17 1645    Specimen:  Blood from Arm, Right Updated:  03/13/17 0601     Blood Culture No growth at 5 days     CBC (No Diff) [[id number]]  (Abnormal) Collected:  03/13/17 0611    Specimen:  Blood Updated:  03/13/17 0632     WBC 18.82 (H) 10*3/mm3      RBC 3.17 (L) 10*6/mm3      Hemoglobin 10.2 (L) g/dL      Hematocrit 31.0 (L) %      MCV 97.8 fL      MCH 32.2 (H) pg      MCHC 32.9 g/dL      RDW 15.1 (H) %      RDW-SD 53.9 fl      MPV 10.1 fL      Platelets 316 10*3/mm3     Stool Culture [[id number]]  (Abnormal) Collected:  03/10/17 1859    Specimen:  Stool from Per Rectum Updated:  03/13/17 0646     Stool Culture Scant growth (1+) Candida albicans (A)       Yeast - id to follow          Narrative:       No Salmonella, Shigella, Campylobacter, E coli O157:H7, or Yersinia isolated  No Normal " Fecal Ashly    Renal Function Panel [[id number]]  (Abnormal) Collected:  03/13/17 0611    Specimen:  Blood Updated:  03/13/17 0723     Glucose 115 (H) mg/dL      BUN 16 mg/dL      Creatinine 0.60 mg/dL      Sodium 140 mmol/L      Potassium 4.1 mmol/L      Chloride 106 mmol/L      CO2 30.0 mmol/L      Calcium 9.2 mg/dL      Albumin 2.90 (L) g/dL      Phosphorus 3.7 mg/dL      Anion Gap 8.1 mmol/L      BUN/Creatinine Ratio 26.7 (H)      eGFR Non African Amer 99 mL/min/1.73     Narrative:       The MDRD GFR formula is only valid for adults with stable renal function between ages 18 and 70.            Assessment & Recommendations/Plan:   1.  Right-sided pneumonia   Currently on Levaquin.    2.  Underlying severe COPD   She continues Symbicort and Duonebs.    3.  Recent C. difficile   No diarrhea.    Will order an chest x-ray today.      DWAYNE Molina  03/13/17  9:19 AM    The elevated WBC could also be from steroids. Chest X Ray was reviewed and there seems to be improvement in the subtle opacities that were visible on the Chest X Ray from 3/10/2017.    I supervised the care provided by the APRN. Patient was seen & examined independently. Patient's records were reviewed with the APRN. Imaging studies & laboratory data were reviewed, as appropriate. Assessment & recommendations were discussed with APRN in detail. I agree with recommendations, as outlined in the note.    Rebeka Esparza MD  03/13/17  12:36 PM

## 2017-03-13 NOTE — NURSING NOTE
LACE Teaching for COPD/ Pneumonia  Pt able to give teachback  Regarding when to call the doctor or come to ER with what signs and symptoms

## 2017-03-13 NOTE — PROGRESS NOTES
"Hospitalist Progress Note.    LOS: 5 days    Patient Care Team:  DONTRELL Regan as PCP - General    Chief Complaint:    Chief Complaint   Patient presents with   • Shortness of Breath       Subjective   Patient seen and examined this morning.  Events from last night noted.  Patient denies having any fevers chills.  No nausea or vomiting no abdominal pain.  Denies any chest pain still gets short of breath and cough as well as sputum production.  Today she feels that she is slightly better as compared to yesterday.  There is no significant edema.   Patient also denies having new onset weakness of numbness of either extremity.  She was able to walk to the bathroom and back without much problem.  Clinically she does seems to be fairly stable.    Review of Systems:    The pertinent  ROS was done and it is noted above, rest  was negative.    Objective     Vital Signs  Visit Vitals   • /73 (BP Location: Right arm, Patient Position: Lying)   • Pulse 92   • Temp 98.1 °F (36.7 °C) (Oral)   • Resp 18   • Ht 60\" (152.4 cm)   • Wt 97 lb (44 kg)   • LMP  (LMP Unknown)   • SpO2 98%   • BMI 18.94 kg/m2         I/O this shift:  In: 720 [P.O.:720]  Out: 100 [Urine:100]    Intake/Output Summary (Last 24 hours) at 03/13/17 1605  Last data filed at 03/13/17 1533   Gross per 24 hour   Intake   1330 ml   Output    750 ml   Net    580 ml       Physical Exam:    General Appearance: alert, oriented x 3, no acute distress,   HEENT: pupils round and reactive to light, oral mucosa dry, extra occular movements intact.  Neck: supple, no JVD, trachea midline  Lungs: Scattered wheezing all over the chest and rhonchi  Heart: RRR, normal S1 and S2, no S3, no rub  Abdomen: soft, non-tender, no palpable bladder, present bowel sounds to auscultation  Extremities: no edema, cyanosis or clubbing.   Neuro: normal speech and mental status, grossly non focal.     Results Review:      Results from last 7 days  Lab Units 03/13/17  0611 " 03/12/17  0646 03/10/17  0616  03/07/17  1430   SODIUM mmol/L 140 144 140  < > 139   POTASSIUM mmol/L 4.1 4.2 3.6  < > 3.5   CHLORIDE mmol/L 106 110* 107  < > 103   TOTAL CO2 mmol/L 30.0 28.0 29.0  < > 26.0   BUN mg/dL 16 14 4*  < > 9   CREATININE mg/dL 0.60 0.60 0.60  < > 0.60   CALCIUM mg/dL 9.2 9.3 8.5  < > 8.6   BILIRUBIN mg/dL  --   --   --   --  0.4   ALK PHOS U/L  --   --   --   --  120   ALT (SGPT) U/L  --   --   --   --  17   AST (SGOT) U/L  --   --   --   --  17   GLUCOSE mg/dL 115* 125* 88  < > 121*   < > = values in this interval not displayed.    Estimated Creatinine Clearance: 44.8 mL/min (by C-G formula based on Cr of 0.6).      Results from last 7 days  Lab Units 03/13/17  0611 03/12/17  0646   PHOSPHORUS mg/dL 3.7 3.6               Results from last 7 days  Lab Units 03/13/17  0611 03/12/17  0646 03/10/17  0616 03/09/17  0528 03/08/17  0611   WBC 10*3/mm3 18.82* 17.49* 13.19* 12.33* 15.04*   HEMOGLOBIN g/dL 10.2* 10.6* 10.1* 9.4* 10.1*   PLATELETS 10*3/mm3 316 329 253 247 233               Imaging Results (last 24 hours)     ** No results found for the last 24 hours. **          azelastine 2 spray Each Nare BID   budesonide-formoterol 2 puff Inhalation BID   calcium acetate 1,334 mg Oral TID With Meals   cetirizine 5 mg Oral Daily   conjugated estrogens 0.5 g Vaginal Every Other Day   cyclobenzaprine 10 mg Oral Daily   diclofenac 1 g Topical TID   FLORANEX 1 packet Oral TID   fluconazole 100 mg Oral Q24H   gabapentin 400 mg Oral Nightly   heparin (porcine) 5,000 Units Subcutaneous Q8H   ipratropium-albuterol 3 mL Nebulization 4x Daily - RT   levoFLOXacin 500 mg Intravenous Q48H   megestrol acetate 400 mg Oral Daily   melatonin 10.5 mg Oral Nightly   metoprolol succinate XL 50 mg Oral Q24H   mirtazapine 30 mg Oral Nightly   nicotine 1 patch Transdermal Q24H   nystatin 500,000 Units Swish & Swallow 4x Daily   oxybutynin XL 10 mg Oral Daily   pantoprazole 40 mg Oral Q AM   potassium chloride 40 mEq  Oral Once   predniSONE 20 mg Oral Daily With Breakfast   traZODone 50 mg Oral Nightly   urea  Topical BID   venlafaxine 75 mg Oral BID   cyancobalamin 500 mcg Oral Daily       Pharmacy Consult        Medication Review:   Current Facility-Administered Medications   Medication Dose Route Frequency Provider Last Rate Last Dose   • acetaminophen (TYLENOL) tablet 650 mg  650 mg Oral Q4H PRN Suleman Fernandez MD   650 mg at 03/13/17 1500   • azelastine (ASTELIN) nasal spray 2 spray  2 spray Each Nare BID Maurilio Melvin MD   2 spray at 03/13/17 0943   • benzonatate (TESSALON) capsule 100 mg  100 mg Oral Q6H PRN Maurilio Melvin MD   100 mg at 03/11/17 2123   • budesonide-formoterol (SYMBICORT) 160-4.5 MCG/ACT inhaler 2 puff  2 puff Inhalation BID Maurilio Melvin MD   2 puff at 03/13/17 0755   • calcium acetate (PHOS BINDER)) capsule 1,334 mg  1,334 mg Oral TID With Meals Maurilio Melvin MD   1,334 mg at 03/13/17 1231   • cetirizine (zyrTEC) tablet 5 mg  5 mg Oral Daily Maurilio Melvin MD   5 mg at 03/13/17 0938   • conjugated estrogens (PREMARIN) vaginal cream 0.5 application  0.5 g Vaginal Every Other Day Maurilio Melvin MD   0.5 application at 03/13/17 0951   • cyclobenzaprine (FLEXERIL) tablet 10 mg  10 mg Oral Daily Maurilio Melvin MD   10 mg at 03/13/17 0938   • diclofenac (VOLTAREN) 1 % gel 1 g  1 g Topical TID Maurilio Melvin MD   1 g at 03/13/17 0951   • FLORANEX oral packet 1 packet  1 packet Oral TID Suleman Fernandez MD   1 packet at 03/13/17 0935   • fluconazole (DIFLUCAN) tablet 100 mg  100 mg Oral Q24H Erlin Pool DO   100 mg at 03/12/17 1733   • gabapentin (NEURONTIN) capsule 400 mg  400 mg Oral Nightly Maurilio Melvin MD   400 mg at 03/12/17 2242   • guaiFENesin-codeine (ROMILAR-AC) syrup 5 mL  5 mL Oral Q6H PRN Rebeka Esparza MD   5 mL at 03/12/17 2258   • heparin (porcine) injection 5,000 Units  5,000 Units Subcutaneous Q8H Suleman Fernandez MD   5,000 Units at 03/13/17 1417   • HYDROcodone-acetaminophen (NORCO)   MG per tablet 1 tablet  1 tablet Oral Q6H PRN Maurilio Melvin MD   1 tablet at 03/12/17 1742   • ipratropium-albuterol (DUO-NEB) nebulizer solution 3 mL  3 mL Nebulization 4x Daily - RT Maurilio Melvin MD   3 mL at 03/13/17 0754   • levoFLOXacin (LEVAQUIN) 500 mg/100 mL D5W (premix) (LEVAQUIN) 500 mg  500 mg Intravenous Q48H Erlinremedios Pool,    500 mg at 03/11/17 1805   • LORazepam (ATIVAN) tablet 0.5 mg  0.5 mg Oral Q6H PRN Jordi Morris MD   0.5 mg at 03/13/17 1015   • megestrol acetate (MEGACE) oral suspension 400 mg  400 mg Oral Daily Suleman Fernandez MD   400 mg at 03/13/17 0937   • melatonin tablet 10.5 mg  10.5 mg Oral Nightly Maurilio Melvin MD   10.5 mg at 03/12/17 2257   • metoprolol succinate XL (TOPROL-XL) 24 hr tablet 50 mg  50 mg Oral Q24H Maurilio Melvin MD   50 mg at 03/13/17 0940   • mirtazapine (REMERON) tablet 30 mg  30 mg Oral Nightly Maurilio Melvin MD   30 mg at 03/12/17 2257   • nicotine (NICODERM CQ) 21 MG/24HR patch 1 patch  1 patch Transdermal Q24H Maurilio Melvin MD   1 patch at 03/12/17 2243   • nystatin (MYCOSTATIN) 837588 UNIT/ML suspension 500,000 Units  500,000 Units Swish & Swallow 4x Daily Maurilio Melvin MD   500,000 Units at 03/13/17 1231   • ondansetron ODT (ZOFRAN-ODT) disintegrating tablet 4 mg  4 mg Oral Q6H PRN Maurilio Melvin MD   4 mg at 03/12/17 1026   • oxybutynin XL (DITROPAN-XL) 24 hr tablet 10 mg  10 mg Oral Daily Maurilio Melvin MD   10 mg at 03/12/17 1026   • pantoprazole (PROTONIX) EC tablet 40 mg  40 mg Oral Q AM Maurilio Melvin MD   40 mg at 03/13/17 0509   • Pharmacy Consult   Does not apply Continuous PRN Suleman Fernandez MD       • potassium chloride (K-DUR,KLOR-CON) CR tablet 40 mEq  40 mEq Oral Once Suleman Fernandez MD   40 mEq at 03/08/17 1022   • predniSONE (DELTASONE) tablet 20 mg  20 mg Oral Daily With Breakfast Jordi Morris MD   20 mg at 03/13/17 0950   • sodium chloride 0.9 % flush 1-10 mL  1-10 mL Intravenous PRN Suleman Fernandez MD   10 mL at 03/08/17  1736   • traZODone (DESYREL) tablet 50 mg  50 mg Oral Nightly Maurilio Melvin MD   50 mg at 03/12/17 2242   • urea (CARMOL) 20 % cream   Topical BID Maurilio Melvin MD       • venlafaxine (EFFEXOR) tablet 75 mg  75 mg Oral BID Maurilio Melvin MD   75 mg at 03/13/17 0937   • vitamin B-12 (CYANOCOBALAMIN) tablet 500 mcg  500 mcg Oral Daily Maurilio Melvin MD   500 mcg at 03/13/17 0940       Assessment/Plan        1.   Pneumonia of right lower lobe due to infectious organism: Continue current treatment plan.  There is persistently worsening white count, I'll go ahead and stop the IV steroid-induced and switch her to oral prednisone and see if that would help.  2.   COPD exacerbation: Continue current treatment plan clinically appears to be improving except for the white count that may be contributed because of the steroids.  If she continues to improve and white count is stable likely discharge in the morning.    Details were discussed with the patient  Rest as ordered.    Jordi Morris MD  03/13/17  4:05 PM     Please note that portions of this note may have been completed with a voice recognition program. Efforts were made to edit the dictations, but occasionally words are mistranscribed.

## 2017-03-14 VITALS
HEIGHT: 60 IN | OXYGEN SATURATION: 98 % | RESPIRATION RATE: 18 BRPM | TEMPERATURE: 98.2 F | BODY MASS INDEX: 19.49 KG/M2 | HEART RATE: 88 BPM | WEIGHT: 99.3 LBS | DIASTOLIC BLOOD PRESSURE: 70 MMHG | SYSTOLIC BLOOD PRESSURE: 132 MMHG

## 2017-03-14 LAB
DEPRECATED RDW RBC AUTO: 53.1 FL (ref 37–54)
ERYTHROCYTE [DISTWIDTH] IN BLOOD BY AUTOMATED COUNT: 15.2 % (ref 11.5–14.5)
HCT VFR BLD AUTO: 32.1 % (ref 37–47)
HGB BLD-MCNC: 10.4 G/DL (ref 12–16)
MCH RBC QN AUTO: 31.5 PG (ref 27–31)
MCHC RBC AUTO-ENTMCNC: 32.4 G/DL (ref 30–37)
MCV RBC AUTO: 97.3 FL (ref 81–99)
PLATELET # BLD AUTO: 333 10*3/MM3 (ref 130–400)
PMV BLD AUTO: 10.3 FL (ref 6–12)
RBC # BLD AUTO: 3.3 10*6/MM3 (ref 4.2–5.4)
WBC NRBC COR # BLD: 17.37 10*3/MM3 (ref 4.8–10.8)

## 2017-03-14 PROCEDURE — 94799 UNLISTED PULMONARY SVC/PX: CPT

## 2017-03-14 PROCEDURE — 25010000002 HEPARIN (PORCINE) 5000 UNIT/0.5ML SOLUTION: Performed by: INTERNAL MEDICINE

## 2017-03-14 PROCEDURE — 85027 COMPLETE CBC AUTOMATED: CPT | Performed by: INTERNAL MEDICINE

## 2017-03-14 PROCEDURE — 99232 SBSQ HOSP IP/OBS MODERATE 35: CPT | Performed by: NURSE PRACTITIONER

## 2017-03-14 PROCEDURE — 63710000001 PREDNISONE PER 5 MG: Performed by: INTERNAL MEDICINE

## 2017-03-14 PROCEDURE — 99239 HOSP IP/OBS DSCHRG MGMT >30: CPT | Performed by: INTERNAL MEDICINE

## 2017-03-14 RX ORDER — LEVOFLOXACIN 500 MG/1
500 TABLET, FILM COATED ORAL DAILY
Qty: 5 TABLET | Refills: 0 | Status: SHIPPED | OUTPATIENT
Start: 2017-03-14 | End: 2017-03-29 | Stop reason: HOSPADM

## 2017-03-14 RX ORDER — METHYLPREDNISOLONE 4 MG/1
TABLET ORAL
Qty: 21 TABLET | Refills: 0 | Status: SHIPPED | OUTPATIENT
Start: 2017-03-14 | End: 2017-03-29 | Stop reason: HOSPADM

## 2017-03-14 RX ORDER — BENZONATATE 100 MG/1
100 CAPSULE ORAL 3 TIMES DAILY PRN
Qty: 50 CAPSULE | Refills: 0 | Status: SHIPPED | OUTPATIENT
Start: 2017-03-14 | End: 2018-07-05 | Stop reason: HOSPADM

## 2017-03-14 RX ADMIN — HYDROCODONE BITARTRATE AND ACETAMINOPHEN 1 TABLET: 10; 325 TABLET ORAL at 06:52

## 2017-03-14 RX ADMIN — VENLAFAXINE 75 MG: 75 TABLET ORAL at 08:34

## 2017-03-14 RX ADMIN — METOPROLOL SUCCINATE 50 MG: 50 TABLET, EXTENDED RELEASE ORAL at 08:36

## 2017-03-14 RX ADMIN — PREDNISONE 20 MG: 20 TABLET ORAL at 08:34

## 2017-03-14 RX ADMIN — OXYBUTYNIN CHLORIDE 10 MG: 5 TABLET, EXTENDED RELEASE ORAL at 08:34

## 2017-03-14 RX ADMIN — IPRATROPIUM BROMIDE AND ALBUTEROL SULFATE 3 ML: .5; 3 SOLUTION RESPIRATORY (INHALATION) at 07:33

## 2017-03-14 RX ADMIN — CYANOCOBALAMIN TAB 500 MCG 500 MCG: 500 TAB at 08:35

## 2017-03-14 RX ADMIN — LACTOBACILLUS ACIDOPHILUS / LACTOBACILLUS BULGARICUS 1 PACKET: 100 MILLION CFU STRENGTH GRANULES at 08:33

## 2017-03-14 RX ADMIN — CETIRIZINE HYDROCHLORIDE 5 MG: 10 TABLET, FILM COATED ORAL at 08:35

## 2017-03-14 RX ADMIN — AZELASTINE HYDROCHLORIDE 2 SPRAY: 137 SPRAY, METERED NASAL at 08:41

## 2017-03-14 RX ADMIN — UREA: 17 CREAM TOPICAL at 08:45

## 2017-03-14 RX ADMIN — HEPARIN SODIUM 5000 UNITS: 5000 INJECTION, SOLUTION INTRAVENOUS; SUBCUTANEOUS at 05:59

## 2017-03-14 RX ADMIN — DICLOFENAC 1 G: 10 GEL TOPICAL at 08:43

## 2017-03-14 RX ADMIN — MEGESTROL ACETATE 400 MG: 40 SUSPENSION ORAL at 08:36

## 2017-03-14 RX ADMIN — CYCLOBENZAPRINE HYDROCHLORIDE 10 MG: 10 TABLET, FILM COATED ORAL at 08:34

## 2017-03-14 RX ADMIN — PANTOPRAZOLE SODIUM 40 MG: 40 TABLET, DELAYED RELEASE ORAL at 05:59

## 2017-03-14 RX ADMIN — NYSTATIN 500000 UNITS: 500000 SUSPENSION ORAL at 08:36

## 2017-03-14 RX ADMIN — BUDESONIDE AND FORMOTEROL FUMARATE DIHYDRATE 2 PUFF: 160; 4.5 AEROSOL RESPIRATORY (INHALATION) at 07:33

## 2017-03-14 RX ADMIN — IPRATROPIUM BROMIDE AND ALBUTEROL SULFATE 3 ML: .5; 3 SOLUTION RESPIRATORY (INHALATION) at 12:36

## 2017-03-14 RX ADMIN — LORAZEPAM 0.5 MG: 0.5 TABLET ORAL at 06:52

## 2017-03-14 RX ADMIN — NYSTATIN 500000 UNITS: 500000 SUSPENSION ORAL at 12:31

## 2017-03-14 RX ADMIN — CALCIUM ACETATE 1334 MG: 667 CAPSULE ORAL at 08:40

## 2017-03-14 RX ADMIN — CALCIUM ACETATE 1334 MG: 667 CAPSULE ORAL at 12:30

## 2017-03-14 NOTE — PLAN OF CARE
Problem: Patient Care Overview (Adult)  Goal: Plan of Care Review  Outcome: Ongoing (interventions implemented as appropriate)    03/14/17 0508   Coping/Psychosocial Response Interventions   Plan Of Care Reviewed With patient   Patient Care Overview   Progress progress toward functional goals is gradual   Outcome Evaluation   Outcome Summary/Follow up Plan dyspnea with exertion continues, less frequent coughing bouts, possible d/c home if WBC's improve       Goal: Adult Individualization and Mutuality  Outcome: Ongoing (interventions implemented as appropriate)  Goal: Discharge Needs Assessment  Outcome: Ongoing (interventions implemented as appropriate)    Problem: Pneumonia (Adult)  Goal: Signs and Symptoms of Listed Potential Problems Will be Absent or Manageable (Pneumonia)  Outcome: Ongoing (interventions implemented as appropriate)

## 2017-03-14 NOTE — DISCHARGE SUMMARY
[facility] HOSPITALIST   DISCHARGE SUMMARY      Name:  Joelle Yee   Age:  71 y.o.  Sex:  female  :  1945  MRN:  [id number]   Visit Number:  [id number]  Primary Care Physician:  DONTRELL Regan  Date of Discharge:  3/14/2017  Admission Date:  3/7/2017      Discharge Diagnosis:     Principal Problem:    Pneumonia involving right lung  Active Problems:    Esophageal reflux    Anxiety    High cholesterol    COPD (chronic obstructive pulmonary disease)    Depression    Gout    Insomnia    Pneumonia of right lower lobe due to infectious organism    COPD exacerbation          Consults:     Consults     Date and Time Order Name Status Description    3/7/2017 1741 Inpatient Consult to Pulmonology Completed     3/7/2017 1617 Hospitalist (on-call MD unless specified) Completed     2017 1000 Inpatient Consult to Pulmonology Completed     2017 1509 Hospitalist (on-call MD unless specified) Completed           Procedures Performed:    None           Hospital Course:   The patient was admitted on 3/7/2017  Please see H&P for details on admission HPI and ROS.  Patient was initially admitted and treated for pneumonia and COPD exacerbation Dr. Esparza was consulted whose pulmonologist.  His recommendations were followed patient continued to wheeze and starting to improve.  The day before discharge she had increased white count that also improved at the time of discharge.  She will need a repeat set of labs in about 2 weeks on next follow-up with the primary care.  She is given Medrol Dosepak as well as Levaquin for 5 days she should be okay after all this is completed prescriptions were written and given to the patient.    Vital Signs:    Temp:  [97.3 °F (36.3 °C)-98.2 °F (36.8 °C)] 98.2 °F (36.8 °C)  Heart Rate:  [76-96] 88  Resp:  [16-20] 18  BP: (132-153)/(70-83) 132/70            Physical Exam:   General Appearance: alert, oriented x 3, no acute distress,   HEENT: pupils round and  reactive to light, oral mucosa dry, extra occular movements intact.  Neck: supple, no JVD, trachea midline  Lungs: Clear to Auscultation but decreased all over, unlabored breathing effort.  Heart: RRR, normal S1 and S2, no S3, no rub  Abdomen: soft, non-tender, no palpable bladder, present bowel sounds to auscultation  Extremities: no edema, cyanosis or clubbing.   Neuro: normal speech and mental status, grossly non focal.    Pertinent Lab Results:       Results from last 7 days  Lab Units 03/13/17  0611 03/12/17  0646 03/10/17  0616  03/07/17  1430   SODIUM mmol/L 140 144 140  < > 139   POTASSIUM mmol/L 4.1 4.2 3.6  < > 3.5   CHLORIDE mmol/L 106 110* 107  < > 103   TOTAL CO2 mmol/L 30.0 28.0 29.0  < > 26.0   BUN mg/dL 16 14 4*  < > 9   CREATININE mg/dL 0.60 0.60 0.60  < > 0.60   CALCIUM mg/dL 9.2 9.3 8.5  < > 8.6   BILIRUBIN mg/dL  --   --   --   --  0.4   ALK PHOS U/L  --   --   --   --  120   ALT (SGPT) U/L  --   --   --   --  17   AST (SGOT) U/L  --   --   --   --  17   GLUCOSE mg/dL 115* 125* 88  < > 121*   < > = values in this interval not displayed.    Results from last 7 days  Lab Units 03/14/17  0556 03/13/17  0611 03/12/17  0646   WBC 10*3/mm3 17.37* 18.82* 17.49*   HEMOGLOBIN g/dL 10.4* 10.2* 10.6*   HEMATOCRIT % 32.1* 31.0* 33.3*   PLATELETS 10*3/mm3 333 316 329         BLOOD CULTURE   Date Value Ref Range Status   03/07/2017 No growth at 5 days  Final   03/07/2017 No growth at 5 days  Final       Pertinent Radiology Results:  Imaging Results (most recent)     Procedure Component Value Units Date/Time    CT Chest Without Contrast [[id number]] Collected:  03/07/17 1528     Updated:  03/07/17 1531    Narrative:       PROCEDURE: CT CHEST WO CONTRAST-     HISTORY: soa     COMPARISON:  None .     PROCEDURE:  Multiple axial CT images were obtained from the thoracic  inlet through the upper abdomen without the use of contrast.      FINDINGS:   Soft tissue windows reveal no axillary, hilar or mediastinal  adenopathy.  Heart size is normal. The aorta is normal in caliber. There are no  pleural or pericardial effusions. Lung windows reveal centrilobular  emphysema. There are patchy tree-in-bud opacities throughout the right  upper lobe, right lower lobe and right middle lobe with associated  bronchial wall thickening. The visualized upper abdomen is unremarkable.  Bone windows reveal no acute osseous abnormalities.       Impression:       Patchy opacities involving the right upper lobe, right lower  lobe and right middle lobe consistent with a pneumonia.                  This study was performed with techniques to keep radiation doses as low  as reasonably achievable (ALARA). Individualized dose reduction  techniques using automated exposure control or adjustment of mA and/or  kV according to the patient size were employed.         This report was finalized on 3/7/2017 3:29 PM by Ignacia Lara M.D..    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     FINDINGS: The heart is normal in size. The mediastinum is unremarkable.  There are chronic interstitial lung changes. There are no focal  opacities were effusions. There is no pneumothorax.  There are no acute  osseous abnormalities.           Impression:       No acute cardiopulmonary process.     Continued followup is recommended.     This report was finalized on 3/10/2017 9:34 AM by Ignacia Lara M.D..    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         COMPARISON:  03/10/2017.     FINDINGS:  Portable view of the chest demonstrates the lungs to be  grossly clear. Minimal left pleural scarring or pleural effusion is  noted. The mediastinum is unremarkable.     The heart size is normal.            Impression:       No significant change with left pleural scarring or minimal  left pleural effusion present.      This report was finalized on 3/13/2017 1:10 PM by Hansel Fernández MD.                  Discharge Disposition:    Home or Self Care    Discharge Medication:     JoselitoSudeepJoellekatia Osorioe   Home Medication Instructions KWAN:[id number]    Printed on:03/14/17 1243   Medication Information                      azelastine (ASTELIN) 0.1 % nasal spray  2 sprays into each nostril 2 (Two) Times a Day. Use in each nostril as directed             benzonatate (TESSALON) 100 MG capsule  Take 1 capsule by mouth 3 (Three) Times a Day As Needed for cough.             budesonide-formoterol (SYMBICORT) 160-4.5 MCG/ACT inhaler  Inhale 2 puffs 2 (Two) Times a Day. Inhale 1 puff twice daily. Rinse mouth after use.             calcium acetate (PHOS BINDER,) 667 MG capsule capsule  Take 2 capsules by mouth 3 (Three) Times a Day With Meals.             cetirizine (zyrTEC) 10 MG tablet  Take 10 mg by mouth Daily.             conjugated estrogens (PREMARIN) vaginal cream  Insert 0.5 g into the vagina Every Other Day.             Cyanocobalamin (VITAMIN B12 PO)  Take 500 mcg by mouth Daily.             cyclobenzaprine (FLEXERIL) 10 MG tablet  Take 1 tablet by mouth daily.             Diclofenac Sodium (VOLTAREN TD)  Place  on the skin As Needed. Voltaren GEL              gabapentin (NEURONTIN) 400 MG capsule  Take 400 mg by mouth Every Night.             HYDROcodone-acetaminophen (NORCO)  MG per tablet  Take  by mouth. Take 1 tablet every 8 hours as needed for pain.              ipratropium-albuterol (DUO-NEB) 0.5-2.5 mg/mL nebulizer  Ipratropium-Albuterol SOLN; Patient Sig: Ipratropium-Albuterol SOLN USE 1 UNIT DOSE IN NEBULIZER 3-4 TIMES DAILY.; 0; 21-May-2014; Active             melatonin 5 MG tablet tablet  Take 10 mg by mouth Every Night. Patient takes 10 mg at night             MethylPREDNISolone (MEDROL, THANH,) 4 MG tablet  Take as directed on package instructions.             metoprolol succinate XL (TOPROL-XL) 50 MG 24 hr tablet               Mirabegron ER (MYRBETRIQ) 50 MG tablet sustained-release 24 hour  Take  by mouth.             mirtazapine (REMERON) 30 MG tablet  Take 30 mg by mouth Every Night.             nicotine (NICODERM CQ) 21 MG/24HR patch  Place 1 patch on the skin Daily for 30 days. Follow up with PCP prior to the end of this prescription for a tapered down dose.             nicotine (NICODERM CQ) 21 MG/24HR patch  Place 1 patch on the skin Daily. DONOT smoke with patch on.             nystatin (MYCOSTATIN) 171919 UNIT/ML suspension  Swish and swallow 500,000 Units 4 (Four) Times a Day.             nystatin (MYCOSTATIN) 802589 UNIT/ML suspension  Swish and swallow 5 mL 4 (Four) Times a Day.             omeprazole (PriLOSEC) 20 MG capsule               ondansetron ODT (ZOFRAN-ODT) 4 MG disintegrating tablet  Take 1 tablet by mouth every 6 (six) hours as needed.             OXYGEN-HELIUM IN  Oxygen; Patient Sig: Oxygen 2 liter as needed; 0; 21-May-2014; Active             Respiratory Therapy Supplies (FLUTTER) device  1 Device as needed (as directed).             Sucralfate (CARAFATE PO)  Take 2 tablets by mouth 4 (four) times a day.             tiotropium (SPIRIVA HANDIHALER) 18 MCG per inhalation capsule  Place 1 capsule into inhaler and inhale Daily.             traZODone (DESYREL) 50 MG tablet  Take 1 tablet by mouth every night.             venlafaxine (EFFEXOR) 75 MG tablet  Take 1 tablet by mouth 2 (two) times a day.                 Discharge Diet:       healthy heart diet    Follow-up Appointments:    Future Appointments  Date Time Provider Department Center   6/5/2017 10:30 AM [facility] None   6/5/2017 11:15 AM [facility]     Additional Instructions for the Follow-ups that You Need to Schedule     Discharge Follow-up with Specified Provider    As directed    To:  DWAYNE Maria   Follow Up:  2 Weeks   Follow Up Details:  F/U after hospital stay.                 Test Results Pending at Discharge:           Jordi Morris MD  03/14/17  12:43 PM    Time: Discharge 35 min

## 2017-03-14 NOTE — PLAN OF CARE
Problem: Patient Care Overview (Adult)  Goal: Plan of Care Review  Outcome: Ongoing (interventions implemented as appropriate)    03/14/17 1311   Coping/Psychosocial Response Interventions   Plan Of Care Reviewed With patient   Patient Care Overview   Progress improving   Outcome Evaluation   Outcome Summary/Follow up Plan Pt to be discharged today.        Goal: Adult Individualization and Mutuality  Outcome: Ongoing (interventions implemented as appropriate)    Problem: Pneumonia (Adult)  Goal: Signs and Symptoms of Listed Potential Problems Will be Absent or Manageable (Pneumonia)  Outcome: Ongoing (interventions implemented as appropriate)

## 2017-03-14 NOTE — NURSING NOTE
BETITO teaching complete and able to give teachback on when to call the doctor or come to ER with what signs and symptoms

## 2017-03-14 NOTE — PROGRESS NOTES
"  S: Sitting up on the side of the bed without oxygen and in no respiratory distress. Cough has improved significantly.    O:Vital signs reviewed.   Visit Vitals   • /83   • Pulse 96   • Temp 97.3 °F (36.3 °C) (Oral)   • Resp 18   • Ht 60\" (152.4 cm)   • Wt 99 lb 4.8 oz (45 kg)   • LMP  (LMP Unknown)   • SpO2 99%   • BMI 19.39 kg/m2       General: No respiratory distress noted.  Neck: No significant JVD   Cardiovascular: S1 + S2.  Regular, tachycardic.  Respiratory: No wheezing.  Right greater than left crackles noted.   Extremities: No significant  edema noted.  Neurologic:  AAOx3. Was able to follow commands     Labs:   Lab Results (last 24 hours)     Procedure Component Value Units Date/Time    Urinalysis With / Microscopic If Indicated [[id number]]  (Normal) Collected:  03/13/17 1120    Specimen:  Urine from Urine, Clean Catch Updated:  03/13/17 1143     Color, UA Yellow      Appearance, UA Clear      pH, UA 6.0      Specific Gravity, UA 1.025      Glucose, UA Negative      Ketones, UA Negative      Bilirubin, UA Negative      Blood, UA Negative      Protein, UA Negative      Leuk Esterase, UA Negative      Nitrite, UA Negative      Urobilinogen, UA 0.2 E.U./dL     Narrative:       Urine microscopic not indicated.    CBC (No Diff) [[id number]]  (Abnormal) Collected:  03/14/17 0556    Specimen:  Blood Updated:  03/14/17 0629     WBC 17.37 (H) 10*3/mm3      RBC 3.30 (L) 10*6/mm3      Hemoglobin 10.4 (L) g/dL      Hematocrit 32.1 (L) %      MCV 97.3 fL      MCH 31.5 (H) pg      MCHC 32.4 g/dL      RDW 15.2 (H) %      RDW-SD 53.1 fl      MPV 10.3 fL      Platelets 333 10*3/mm3             Assessment & Recommendations/Plan:   1.  Right-sided pneumonia   Currently on Levaquin.    2.  Underlying severe COPD   She continues Symbicort, Duonebs, and Prednisone.    3.  Recent C. difficile   No diarrhea.      Kaelyn Dixon, APRN  03/14/17  9:48 AM    No C/O diarrhea. No significant cough.    No significant " crackles.    Chest XRay from yesterday was reviewed.     I supervised the care provided by the APRN. Patient was seen & examined independently. Patient's records were reviewed with the APRN. Imaging studies & laboratory data were reviewed, as appropriate. Assessment & recommendations were discussed with APRN in detail. I agree with recommendations, as outlined in the note.    Rebeka Esparza MD  03/14/17  10:49 AM      Can be d/alona home. F/U in 3-4 weeks with DWAYNE Maria.    Rebeka Esparza MD  03/14/17  10:50 AM

## 2017-03-15 NOTE — PROGRESS NOTES
Continued Stay Note  ARLENE White     Patient Name: Joelle Yee  MRN: [id number]  Today's Date: 3/15/2017    Admit Date: 3/7/2017          Discharge Plan       03/15/17 0835    Final Note    Final Note Discharged to home 3/14/17.  Pt established with [facility].              Discharge Codes     None        Expected Discharge Date and Time     Expected Discharge Date Expected Discharge Time    Mar 14, 2017             Jumana Saleh

## 2017-03-20 ENCOUNTER — APPOINTMENT (OUTPATIENT)
Dept: CT IMAGING | Facility: HOSPITAL | Age: 72
End: 2017-03-20

## 2017-03-20 ENCOUNTER — HOSPITAL ENCOUNTER (EMERGENCY)
Facility: HOSPITAL | Age: 72
Discharge: HOME | End: 2017-03-20
Attending: EMERGENCY MEDICINE | Admitting: EMERGENCY MEDICINE

## 2017-03-20 VITALS
HEART RATE: 101 BPM | RESPIRATION RATE: 22 BRPM | WEIGHT: 90 LBS | TEMPERATURE: 98.4 F | OXYGEN SATURATION: 96 % | DIASTOLIC BLOOD PRESSURE: 71 MMHG | SYSTOLIC BLOOD PRESSURE: 140 MMHG | HEIGHT: 60 IN | BODY MASS INDEX: 17.67 KG/M2

## 2017-03-20 DIAGNOSIS — J18.9 PNEUMONIA OF RIGHT MIDDLE LOBE DUE TO INFECTIOUS ORGANISM: ICD-10-CM

## 2017-03-20 DIAGNOSIS — J44.0 CHRONIC OBSTRUCTIVE PULMONARY DISEASE WITH ACUTE LOWER RESPIRATORY INFECTION (HCC): Primary | ICD-10-CM

## 2017-03-20 LAB
ALBUMIN SERPL-MCNC: 4.1 G/DL (ref 3.5–5)
ALBUMIN/GLOB SERPL: 1.3 G/DL (ref 1–2)
ALP SERPL-CCNC: 120 U/L (ref 38–126)
ALT SERPL W P-5'-P-CCNC: 29 U/L (ref 13–69)
ANION GAP SERPL CALCULATED.3IONS-SCNC: 13.1 MMOL/L
ANISOCYTOSIS BLD QL: NORMAL
AST SERPL-CCNC: 37 U/L (ref 15–46)
BASOPHILS # BLD AUTO: 0.05 10*3/MM3 (ref 0–0.2)
BASOPHILS NFR BLD AUTO: 0.4 % (ref 0–2.5)
BILIRUB SERPL-MCNC: 0.3 MG/DL (ref 0.2–1.3)
BILIRUB UR QL STRIP: NEGATIVE
BUN BLD-MCNC: 23 MG/DL (ref 7–20)
BUN/CREAT SERPL: 38.3 (ref 7.1–23.5)
CALCIUM SPEC-SCNC: 9 MG/DL (ref 8.4–10.2)
CHLORIDE SERPL-SCNC: 102 MMOL/L (ref 98–107)
CLARITY UR: CLEAR
CO2 SERPL-SCNC: 26 MMOL/L (ref 26–30)
COLOR UR: YELLOW
CREAT BLD-MCNC: 0.6 MG/DL (ref 0.6–1.3)
D-LACTATE SERPL-SCNC: 1.1 MMOL/L (ref 0.5–2)
DEPRECATED RDW RBC AUTO: 57.1 FL (ref 37–54)
EOSINOPHIL # BLD AUTO: 0.01 10*3/MM3 (ref 0–0.7)
EOSINOPHIL NFR BLD AUTO: 0.1 % (ref 0–7)
ERYTHROCYTE [DISTWIDTH] IN BLOOD BY AUTOMATED COUNT: 15.9 % (ref 11.5–14.5)
GFR SERPL CREATININE-BSD FRML MDRD: 99 ML/MIN/1.73
GLOBULIN UR ELPH-MCNC: 3.1 GM/DL
GLUCOSE BLD-MCNC: 126 MG/DL (ref 74–98)
GLUCOSE UR STRIP-MCNC: NEGATIVE MG/DL
HCT VFR BLD AUTO: 39.2 % (ref 37–47)
HGB BLD-MCNC: 12.6 G/DL (ref 12–16)
HGB UR QL STRIP.AUTO: NEGATIVE
HOLD SPECIMEN: NORMAL
HOLD SPECIMEN: NORMAL
IMM GRANULOCYTES # BLD: 0.96 10*3/MM3 (ref 0–0.06)
IMM GRANULOCYTES NFR BLD: 7 % (ref 0–0.6)
KETONES UR QL STRIP: NEGATIVE
LEUKOCYTE ESTERASE UR QL STRIP.AUTO: NEGATIVE
LYMPHOCYTES # BLD AUTO: 1.49 10*3/MM3 (ref 0.6–3.4)
LYMPHOCYTES NFR BLD AUTO: 10.9 % (ref 10–50)
MCH RBC QN AUTO: 31.6 PG (ref 27–31)
MCHC RBC AUTO-ENTMCNC: 32.1 G/DL (ref 30–37)
MCV RBC AUTO: 98.2 FL (ref 81–99)
MONOCYTES # BLD AUTO: 1.44 10*3/MM3 (ref 0–0.9)
MONOCYTES NFR BLD AUTO: 10.6 % (ref 0–12)
NEUTROPHILS # BLD AUTO: 9.68 10*3/MM3 (ref 2–6.9)
NEUTROPHILS NFR BLD AUTO: 71 % (ref 37–80)
NITRITE UR QL STRIP: NEGATIVE
NRBC BLD MANUAL-RTO: 0 /100 WBC (ref 0–0)
NT-PROBNP SERPL-MCNC: 78.6 PG/ML (ref 0–125)
PH UR STRIP.AUTO: 5.5 [PH] (ref 5–8)
PLATELET # BLD AUTO: 550 10*3/MM3 (ref 130–400)
PMV BLD AUTO: 9.5 FL (ref 6–12)
POTASSIUM BLD-SCNC: 4.1 MMOL/L (ref 3.5–5.1)
PROT SERPL-MCNC: 7.2 G/DL (ref 6.3–8.2)
PROT UR QL STRIP: NEGATIVE
RBC # BLD AUTO: 3.99 10*6/MM3 (ref 4.2–5.4)
SMALL PLATELETS BLD QL SMEAR: NORMAL
SODIUM BLD-SCNC: 137 MMOL/L (ref 137–145)
SP GR UR STRIP: 1.01 (ref 1–1.03)
TROPONIN I SERPL-MCNC: <0.012 NG/ML (ref 0–0.03)
UROBILINOGEN UR QL STRIP: NORMAL
WBC MORPH BLD: NORMAL
WBC NRBC COR # BLD: 13.63 10*3/MM3 (ref 4.8–10.8)
WHOLE BLOOD HOLD SPECIMEN: NORMAL
WHOLE BLOOD HOLD SPECIMEN: NORMAL

## 2017-03-20 PROCEDURE — 84484 ASSAY OF TROPONIN QUANT: CPT | Performed by: EMERGENCY MEDICINE

## 2017-03-20 PROCEDURE — 71275 CT ANGIOGRAPHY CHEST: CPT

## 2017-03-20 PROCEDURE — 96374 THER/PROPH/DIAG INJ IV PUSH: CPT

## 2017-03-20 PROCEDURE — 85025 COMPLETE CBC W/AUTO DIFF WBC: CPT | Performed by: EMERGENCY MEDICINE

## 2017-03-20 PROCEDURE — 83605 ASSAY OF LACTIC ACID: CPT | Performed by: EMERGENCY MEDICINE

## 2017-03-20 PROCEDURE — 25010000002 METHYLPREDNISOLONE PER 125 MG: Performed by: EMERGENCY MEDICINE

## 2017-03-20 PROCEDURE — 83880 ASSAY OF NATRIURETIC PEPTIDE: CPT | Performed by: EMERGENCY MEDICINE

## 2017-03-20 PROCEDURE — 93005 ELECTROCARDIOGRAM TRACING: CPT | Performed by: EMERGENCY MEDICINE

## 2017-03-20 PROCEDURE — 81003 URINALYSIS AUTO W/O SCOPE: CPT | Performed by: EMERGENCY MEDICINE

## 2017-03-20 PROCEDURE — 0 IOPAMIDOL 61 % SOLUTION: Performed by: EMERGENCY MEDICINE

## 2017-03-20 PROCEDURE — 85007 BL SMEAR W/DIFF WBC COUNT: CPT | Performed by: EMERGENCY MEDICINE

## 2017-03-20 PROCEDURE — 80053 COMPREHEN METABOLIC PANEL: CPT | Performed by: EMERGENCY MEDICINE

## 2017-03-20 PROCEDURE — 36415 COLL VENOUS BLD VENIPUNCTURE: CPT

## 2017-03-20 PROCEDURE — 99285 EMERGENCY DEPT VISIT HI MDM: CPT

## 2017-03-20 RX ORDER — PREDNISONE 20 MG/1
60 TABLET ORAL DAILY
Qty: 15 TABLET | Refills: 0 | Status: SHIPPED | OUTPATIENT
Start: 2017-03-20 | End: 2017-03-29 | Stop reason: HOSPADM

## 2017-03-20 RX ORDER — LEVOFLOXACIN 500 MG/1
500 TABLET, FILM COATED ORAL DAILY
Qty: 5 TABLET | Refills: 0 | Status: SHIPPED | OUTPATIENT
Start: 2017-03-20 | End: 2017-03-29 | Stop reason: HOSPADM

## 2017-03-20 RX ORDER — METHYLPREDNISOLONE SODIUM SUCCINATE 125 MG/2ML
125 INJECTION, POWDER, LYOPHILIZED, FOR SOLUTION INTRAMUSCULAR; INTRAVENOUS ONCE
Status: COMPLETED | OUTPATIENT
Start: 2017-03-20 | End: 2017-03-20

## 2017-03-20 RX ORDER — SODIUM CHLORIDE 0.9 % (FLUSH) 0.9 %
10 SYRINGE (ML) INJECTION AS NEEDED
Status: DISCONTINUED | OUTPATIENT
Start: 2017-03-20 | End: 2017-03-20 | Stop reason: HOSPADM

## 2017-03-20 RX ADMIN — IOPAMIDOL 80 ML: 612 INJECTION, SOLUTION INTRAVENOUS at 17:39

## 2017-03-20 RX ADMIN — METHYLPREDNISOLONE SODIUM SUCCINATE 125 MG: 125 INJECTION, POWDER, FOR SOLUTION INTRAMUSCULAR; INTRAVENOUS at 17:21

## 2017-03-20 NOTE — ED PROVIDER NOTES
Subjective   HPI Comments: 71-year-old female presenting with shortness of breath.  She states that for several months she's had shortness of breath.  She's been in and out of the hospital for these complaints.  She most recently was admitted with a right-sided pneumonia.  She left 2 days ago and states that she never really felt much better prior to leaving.  She does have some cough.  Cough has been largely nonproductive.  As any fevers, chest pain, nausea, vomiting, abdominal pain.  She does continue to smoke occasionally.      Review of Systems   Constitutional: Negative for chills and fever.   HENT: Negative for congestion, rhinorrhea and sore throat.    Eyes: Negative for pain.   Respiratory: Positive for cough and shortness of breath.    Cardiovascular: Negative for chest pain, palpitations and leg swelling.   Gastrointestinal: Negative for abdominal pain, diarrhea, nausea and vomiting.   Genitourinary: Negative for dysuria.   Musculoskeletal: Negative for arthralgias.   Skin: Negative for rash.   Neurological: Negative for weakness and numbness.   Psychiatric/Behavioral: Negative for behavioral problems.       Past Medical History   Diagnosis Date   • Abdominal pain    • Acute bronchitis    • Ankle pain    • Anxiety 1980   • Atopic dermatitis    • Backache    • Bipolar disorder    • Bronchiectasis    • Chronic obstructive lung disease 2008   • Colon cancer screening    • COPD (chronic obstructive pulmonary disease)    • Cystocele    • Depressed    • Depression 1980   • Fatigue      Chronic pafigue 2012   • Fibromyalgia 2008   • GERD (gastroesophageal reflux disease)    • Gout    • Heartburn      Chronic historu of epigastric heartburn currently controlled on Prilesec 40 mg every morning along with Zantac 300 Mg daily at bedtime   • High cholesterol 2012   • Hip pain    • Hyperlipidemia    • Hypertension    • Insomnia    • Joint pain    • Kidney infection    • Loss of appetite    • Low back pain 1995   •  Melena    • Nausea and vomiting    • On home oxygen therapy    • Osteoarthritis 2010   • Pain in limb    • Palpitations    • Pinched nerve      Pinched nerves 2011   • Recurrent urinary tract infection    • Sciatica [id number]   • Shortness of breath    • Sinus problem    • Sleep apnea 1998   • Upset stomach    • Valvular heart disease    • Vitamin B12 deficiency    • Weight loss, abnormal      Weight loss is stabel. On pund weight gain since 01/2016       Allergies   Allergen Reactions   • Dust Mite Extract      Dust   • Grass    • Mold Extract [Trichophyton]        Past Surgical History   Procedure Laterality Date   • Appendectomy  1965   • Back surgery  2005   • Cataract extraction Bilateral 1978   • Gallbladder surgery  1985   • Knee surgery Left 1979     Knee Cap   • Tubal abdominal ligation  1971   • Eye surgery       Put in implants    • Abdominal hernia repair     • Cholecystectomy         Family History   Problem Relation Age of Onset   • Ovarian cancer Mother    • Cancer Mother    • Lung cancer Father    • Heart disease Brother        Social History     Social History   • Marital status: Single     Spouse name: N/A   • Number of children: N/A   • Years of education: N/A     Social History Main Topics   • Smoking status: Current Every Day Smoker     Packs/day: 0.50     Years: 35.00   • Smokeless tobacco: Never Used      Comment: 1/2 to 1 ppd   • Alcohol use No   • Drug use: No   • Sexual activity: Defer     Other Topics Concern   • None     Social History Narrative           Objective   Physical Exam   Constitutional: She is oriented to person, place, and time. No distress.   Frail, chronically ill-appearing   HENT:   Head: Normocephalic and atraumatic.   Right Ear: External ear normal.   Left Ear: External ear normal.   Nose: Nose normal.   Mouth/Throat: Oropharynx is clear and moist.   Eyes: Conjunctivae and EOM are normal. Pupils are equal, round, and reactive to light.   Neck: Normal range of motion.  Neck supple.   Cardiovascular: Normal rate, regular rhythm, normal heart sounds and intact distal pulses.    Pulmonary/Chest: Effort normal.   No increased work of breathing, scattered expiratory wheezes throughout, good air movement   Abdominal: Soft. Bowel sounds are normal. She exhibits no distension. There is no tenderness. There is no rebound and no guarding.   Musculoskeletal: Normal range of motion. She exhibits no edema, tenderness or deformity.   Neurological: She is alert and oriented to person, place, and time.   Skin: Skin is warm and dry. No rash noted.   Psychiatric: She has a normal mood and affect. Her behavior is normal.   Nursing note and vitals reviewed.      Procedures         ED Course  ED Course                  MDM  Number of Diagnoses or Management Options  Chronic obstructive pulmonary disease with acute lower respiratory infection:   Pneumonia of right middle lobe due to infectious organism:   Diagnosis management comments: 71-year-old female with shortness of breath.  Chronically ill-appearing frail female in no distress with normal vital signs and exam as above.  She does have some wheezes bilaterally.  Given her significant history we'll check a repeat CT scan of her chest.  We'll check labs and EKG.  We'll treat symptomatically for a COPD exacerbation.  Disposition pending workup.  -labs  -ekg  -ct chest  -iv meds  -neb    Ddx: copd, pna, bronchitis, acs, chf    Ekg: sinus tachycardia, no st changes    Labs with mild leukocytosis, improved from discharge. CT with ?infiltrate in RML, this is dramatically improved from previous scan. She is feeling quite a bit better and would like to go home. I will put her on a few extra days of steroids/antibiotics. She will follow up with her primary and pulmonologist. Return precautions discussed. She is comfortable with and understanding of the plan.       Amount and/or Complexity of Data Reviewed  Decide to obtain previous medical records or to  obtain history from someone other than the patient: yes        Final diagnoses:   Chronic obstructive pulmonary disease with acute lower respiratory infection   Pneumonia of right middle lobe due to infectious organism            Hussein Oconnor MD  03/20/17 0709

## 2017-03-24 ENCOUNTER — APPOINTMENT (OUTPATIENT)
Dept: GENERAL RADIOLOGY | Facility: HOSPITAL | Age: 72
End: 2017-03-24

## 2017-03-24 ENCOUNTER — HOSPITAL ENCOUNTER (INPATIENT)
Facility: HOSPITAL | Age: 72
LOS: 1 days | Discharge: HOME HEALTH | End: 2017-03-29
Attending: FAMILY MEDICINE | Admitting: INTERNAL MEDICINE

## 2017-03-24 DIAGNOSIS — J44.1 COPD WITH ACUTE EXACERBATION (HCC): Primary | ICD-10-CM

## 2017-03-24 DIAGNOSIS — Z74.09 IMPAIRED MOBILITY AND ADLS: ICD-10-CM

## 2017-03-24 DIAGNOSIS — Z78.9 IMPAIRED MOBILITY AND ADLS: ICD-10-CM

## 2017-03-24 DIAGNOSIS — R50.9 FEVER, UNSPECIFIED FEVER CAUSE: ICD-10-CM

## 2017-03-24 DIAGNOSIS — Z74.09 IMPAIRED FUNCTIONAL MOBILITY, BALANCE, GAIT, AND ENDURANCE: ICD-10-CM

## 2017-03-24 DIAGNOSIS — Q82.9 PALMAR, PLANTAR, AND DISSEMINATED POROKERATOSIS: ICD-10-CM

## 2017-03-24 LAB
ALBUMIN SERPL-MCNC: 3.8 G/DL (ref 3.5–5)
ALBUMIN/GLOB SERPL: 1.2 G/DL (ref 1–2)
ALP SERPL-CCNC: 94 U/L (ref 38–126)
ALT SERPL W P-5'-P-CCNC: 40 U/L (ref 13–69)
ANION GAP SERPL CALCULATED.3IONS-SCNC: 11.1 MMOL/L
AST SERPL-CCNC: 35 U/L (ref 15–46)
BASOPHILS # BLD AUTO: 0.03 10*3/MM3 (ref 0–0.2)
BASOPHILS NFR BLD AUTO: 0.2 % (ref 0–2.5)
BILIRUB SERPL-MCNC: 0.4 MG/DL (ref 0.2–1.3)
BUN BLD-MCNC: 17 MG/DL (ref 7–20)
BUN/CREAT SERPL: 34 (ref 7.1–23.5)
CALCIUM SPEC-SCNC: 8.8 MG/DL (ref 8.4–10.2)
CHLORIDE SERPL-SCNC: 100 MMOL/L (ref 98–107)
CO2 SERPL-SCNC: 30 MMOL/L (ref 26–30)
CREAT BLD-MCNC: 0.5 MG/DL (ref 0.6–1.3)
DEPRECATED RDW RBC AUTO: 56.6 FL (ref 37–54)
EOSINOPHIL # BLD AUTO: 0.01 10*3/MM3 (ref 0–0.7)
EOSINOPHIL NFR BLD AUTO: 0.1 % (ref 0–7)
ERYTHROCYTE [DISTWIDTH] IN BLOOD BY AUTOMATED COUNT: 15.8 % (ref 11.5–14.5)
GFR SERPL CREATININE-BSD FRML MDRD: 122 ML/MIN/1.73
GLOBULIN UR ELPH-MCNC: 3.2 GM/DL
GLUCOSE BLD-MCNC: 97 MG/DL (ref 74–98)
HCT VFR BLD AUTO: 36 % (ref 37–47)
HGB BLD-MCNC: 11.7 G/DL (ref 12–16)
IMM GRANULOCYTES # BLD: 0.46 10*3/MM3 (ref 0–0.06)
IMM GRANULOCYTES NFR BLD: 2.9 % (ref 0–0.6)
LYMPHOCYTES # BLD AUTO: 2.49 10*3/MM3 (ref 0.6–3.4)
LYMPHOCYTES NFR BLD AUTO: 15.7 % (ref 10–50)
MCH RBC QN AUTO: 31.6 PG (ref 27–31)
MCHC RBC AUTO-ENTMCNC: 32.5 G/DL (ref 30–37)
MCV RBC AUTO: 97.3 FL (ref 81–99)
MONOCYTES # BLD AUTO: 1.23 10*3/MM3 (ref 0–0.9)
MONOCYTES NFR BLD AUTO: 7.8 % (ref 0–12)
NEUTROPHILS # BLD AUTO: 11.61 10*3/MM3 (ref 2–6.9)
NEUTROPHILS NFR BLD AUTO: 73.3 % (ref 37–80)
NRBC BLD MANUAL-RTO: 0 /100 WBC (ref 0–0)
NT-PROBNP SERPL-MCNC: 265 PG/ML (ref 0–125)
PLATELET # BLD AUTO: 404 10*3/MM3 (ref 130–400)
PMV BLD AUTO: 9 FL (ref 6–12)
POTASSIUM BLD-SCNC: 4.1 MMOL/L (ref 3.5–5.1)
PROT SERPL-MCNC: 7 G/DL (ref 6.3–8.2)
RBC # BLD AUTO: 3.7 10*6/MM3 (ref 4.2–5.4)
SODIUM BLD-SCNC: 137 MMOL/L (ref 137–145)
TROPONIN I SERPL-MCNC: <0.012 NG/ML (ref 0–0.03)
WBC NRBC COR # BLD: 15.83 10*3/MM3 (ref 4.8–10.8)

## 2017-03-24 PROCEDURE — 99285 EMERGENCY DEPT VISIT HI MDM: CPT

## 2017-03-24 PROCEDURE — 93005 ELECTROCARDIOGRAM TRACING: CPT

## 2017-03-24 PROCEDURE — 36415 COLL VENOUS BLD VENIPUNCTURE: CPT

## 2017-03-24 PROCEDURE — 71020 HC CHEST PA AND LATERAL: CPT

## 2017-03-24 PROCEDURE — 85025 COMPLETE CBC W/AUTO DIFF WBC: CPT

## 2017-03-24 PROCEDURE — 84484 ASSAY OF TROPONIN QUANT: CPT

## 2017-03-24 PROCEDURE — 80053 COMPREHEN METABOLIC PANEL: CPT

## 2017-03-24 PROCEDURE — 94640 AIRWAY INHALATION TREATMENT: CPT

## 2017-03-24 PROCEDURE — 93005 ELECTROCARDIOGRAM TRACING: CPT | Performed by: FAMILY MEDICINE

## 2017-03-24 PROCEDURE — 83880 ASSAY OF NATRIURETIC PEPTIDE: CPT

## 2017-03-24 RX ORDER — IPRATROPIUM BROMIDE AND ALBUTEROL SULFATE 2.5; .5 MG/3ML; MG/3ML
3 SOLUTION RESPIRATORY (INHALATION) ONCE
Status: COMPLETED | OUTPATIENT
Start: 2017-03-24 | End: 2017-03-24

## 2017-03-24 RX ORDER — SODIUM CHLORIDE 0.9 % (FLUSH) 0.9 %
10 SYRINGE (ML) INJECTION AS NEEDED
Status: DISCONTINUED | OUTPATIENT
Start: 2017-03-24 | End: 2017-03-25

## 2017-03-24 RX ADMIN — IPRATROPIUM BROMIDE AND ALBUTEROL SULFATE 3 ML: .5; 3 SOLUTION RESPIRATORY (INHALATION) at 23:53

## 2017-03-25 PROBLEM — J44.1 COPD WITH ACUTE EXACERBATION (HCC): Status: ACTIVE | Noted: 2017-03-25

## 2017-03-25 LAB
ALBUMIN SERPL-MCNC: 3.9 G/DL (ref 3.5–5)
ALBUMIN/GLOB SERPL: 1.2 G/DL (ref 1–2)
ALP SERPL-CCNC: 105 U/L (ref 38–126)
ALT SERPL W P-5'-P-CCNC: 42 U/L (ref 13–69)
ANION GAP SERPL CALCULATED.3IONS-SCNC: 13.1 MMOL/L
AST SERPL-CCNC: 30 U/L (ref 15–46)
BASOPHILS # BLD AUTO: 0.03 10*3/MM3 (ref 0–0.2)
BASOPHILS NFR BLD AUTO: 0.3 % (ref 0–2.5)
BILIRUB SERPL-MCNC: 0.4 MG/DL (ref 0.2–1.3)
BUN BLD-MCNC: 13 MG/DL (ref 7–20)
BUN/CREAT SERPL: 26 (ref 7.1–23.5)
CALCIUM SPEC-SCNC: 9.2 MG/DL (ref 8.4–10.2)
CHLORIDE SERPL-SCNC: 103 MMOL/L (ref 98–107)
CO2 SERPL-SCNC: 28 MMOL/L (ref 26–30)
CREAT BLD-MCNC: 0.5 MG/DL (ref 0.6–1.3)
DEPRECATED RDW RBC AUTO: 55.1 FL (ref 37–54)
EOSINOPHIL # BLD AUTO: 0 10*3/MM3 (ref 0–0.7)
EOSINOPHIL NFR BLD AUTO: 0 % (ref 0–7)
ERYTHROCYTE [DISTWIDTH] IN BLOOD BY AUTOMATED COUNT: 15.7 % (ref 11.5–14.5)
GFR SERPL CREATININE-BSD FRML MDRD: 122 ML/MIN/1.73
GLOBULIN UR ELPH-MCNC: 3.2 GM/DL
GLUCOSE BLD-MCNC: 129 MG/DL (ref 74–98)
HCT VFR BLD AUTO: 37.4 % (ref 37–47)
HGB BLD-MCNC: 12.2 G/DL (ref 12–16)
HOLD SPECIMEN: NORMAL
IMM GRANULOCYTES # BLD: 0.46 10*3/MM3 (ref 0–0.06)
IMM GRANULOCYTES NFR BLD: 4.1 % (ref 0–0.6)
LYMPHOCYTES # BLD AUTO: 0.81 10*3/MM3 (ref 0.6–3.4)
LYMPHOCYTES NFR BLD AUTO: 7.2 % (ref 10–50)
MCH RBC QN AUTO: 31.5 PG (ref 27–31)
MCHC RBC AUTO-ENTMCNC: 32.6 G/DL (ref 30–37)
MCV RBC AUTO: 96.6 FL (ref 81–99)
MONOCYTES # BLD AUTO: 0.27 10*3/MM3 (ref 0–0.9)
MONOCYTES NFR BLD AUTO: 2.4 % (ref 0–12)
NEUTROPHILS # BLD AUTO: 9.64 10*3/MM3 (ref 2–6.9)
NEUTROPHILS NFR BLD AUTO: 86 % (ref 37–80)
NRBC BLD MANUAL-RTO: 0 /100 WBC (ref 0–0)
PLATELET # BLD AUTO: 378 10*3/MM3 (ref 130–400)
PMV BLD AUTO: 9.4 FL (ref 6–12)
POTASSIUM BLD-SCNC: 4.1 MMOL/L (ref 3.5–5.1)
PROT SERPL-MCNC: 7.1 G/DL (ref 6.3–8.2)
RBC # BLD AUTO: 3.87 10*6/MM3 (ref 4.2–5.4)
SODIUM BLD-SCNC: 140 MMOL/L (ref 137–145)
WBC NRBC COR # BLD: 11.21 10*3/MM3 (ref 4.8–10.8)
WHOLE BLOOD HOLD SPECIMEN: NORMAL
WHOLE BLOOD HOLD SPECIMEN: NORMAL

## 2017-03-25 PROCEDURE — G0378 HOSPITAL OBSERVATION PER HR: HCPCS

## 2017-03-25 PROCEDURE — 80053 COMPREHEN METABOLIC PANEL: CPT | Performed by: INTERNAL MEDICINE

## 2017-03-25 PROCEDURE — 99219 PR INITIAL OBSERVATION CARE/DAY 50 MINUTES: CPT | Performed by: INTERNAL MEDICINE

## 2017-03-25 PROCEDURE — 94799 UNLISTED PULMONARY SVC/PX: CPT

## 2017-03-25 PROCEDURE — 85025 COMPLETE CBC W/AUTO DIFF WBC: CPT | Performed by: INTERNAL MEDICINE

## 2017-03-25 PROCEDURE — 94640 AIRWAY INHALATION TREATMENT: CPT

## 2017-03-25 PROCEDURE — 25010000002 CEFTRIAXONE: Performed by: FAMILY MEDICINE

## 2017-03-25 PROCEDURE — 25010000002 METHYLPREDNISOLONE PER 40 MG: Performed by: INTERNAL MEDICINE

## 2017-03-25 PROCEDURE — 25010000002 ENOXAPARIN PER 10 MG: Performed by: INTERNAL MEDICINE

## 2017-03-25 RX ORDER — VENLAFAXINE 75 MG/1
75 TABLET ORAL 2 TIMES DAILY
Status: DISCONTINUED | OUTPATIENT
Start: 2017-03-25 | End: 2017-03-29 | Stop reason: HOSPADM

## 2017-03-25 RX ORDER — TRAZODONE HYDROCHLORIDE 50 MG/1
50 TABLET ORAL NIGHTLY
Status: DISCONTINUED | OUTPATIENT
Start: 2017-03-25 | End: 2017-03-29 | Stop reason: HOSPADM

## 2017-03-25 RX ORDER — NICOTINE 21 MG/24HR
1 PATCH, TRANSDERMAL 24 HOURS TRANSDERMAL EVERY 24 HOURS
Status: DISCONTINUED | OUTPATIENT
Start: 2017-03-25 | End: 2017-03-29 | Stop reason: HOSPADM

## 2017-03-25 RX ORDER — TRAZODONE HYDROCHLORIDE 50 MG/1
50 TABLET ORAL NIGHTLY
Status: DISCONTINUED | OUTPATIENT
Start: 2017-03-25 | End: 2017-03-25 | Stop reason: SDUPTHER

## 2017-03-25 RX ORDER — BUDESONIDE AND FORMOTEROL FUMARATE DIHYDRATE 160; 4.5 UG/1; UG/1
2 AEROSOL RESPIRATORY (INHALATION) 2 TIMES DAILY
Status: DISCONTINUED | OUTPATIENT
Start: 2017-03-25 | End: 2017-03-29 | Stop reason: HOSPADM

## 2017-03-25 RX ORDER — GABAPENTIN 400 MG/1
400 CAPSULE ORAL NIGHTLY
Status: DISCONTINUED | OUTPATIENT
Start: 2017-03-25 | End: 2017-03-29 | Stop reason: HOSPADM

## 2017-03-25 RX ORDER — HYDROCODONE BITARTRATE AND ACETAMINOPHEN 10; 325 MG/1; MG/1
1 TABLET ORAL EVERY 6 HOURS PRN
Status: DISCONTINUED | OUTPATIENT
Start: 2017-03-25 | End: 2017-03-29 | Stop reason: HOSPADM

## 2017-03-25 RX ORDER — METOPROLOL SUCCINATE 25 MG/1
25 TABLET, EXTENDED RELEASE ORAL
Status: DISCONTINUED | OUTPATIENT
Start: 2017-03-25 | End: 2017-03-28

## 2017-03-25 RX ORDER — METHYLPREDNISOLONE SODIUM SUCCINATE 40 MG/ML
40 INJECTION, POWDER, LYOPHILIZED, FOR SOLUTION INTRAMUSCULAR; INTRAVENOUS EVERY 8 HOURS
Status: DISCONTINUED | OUTPATIENT
Start: 2017-03-25 | End: 2017-03-28

## 2017-03-25 RX ORDER — IPRATROPIUM BROMIDE AND ALBUTEROL SULFATE 2.5; .5 MG/3ML; MG/3ML
3 SOLUTION RESPIRATORY (INHALATION)
Status: DISCONTINUED | OUTPATIENT
Start: 2017-03-25 | End: 2017-03-27

## 2017-03-25 RX ORDER — PANTOPRAZOLE SODIUM 40 MG/1
40 TABLET, DELAYED RELEASE ORAL
Status: DISCONTINUED | OUTPATIENT
Start: 2017-03-25 | End: 2017-03-29 | Stop reason: HOSPADM

## 2017-03-25 RX ORDER — MIRTAZAPINE 15 MG/1
30 TABLET, FILM COATED ORAL NIGHTLY
Status: DISCONTINUED | OUTPATIENT
Start: 2017-03-25 | End: 2017-03-29 | Stop reason: HOSPADM

## 2017-03-25 RX ORDER — ACETAMINOPHEN 325 MG/1
650 TABLET ORAL EVERY 4 HOURS PRN
Status: DISCONTINUED | OUTPATIENT
Start: 2017-03-25 | End: 2017-03-29 | Stop reason: HOSPADM

## 2017-03-25 RX ORDER — CALCIUM ACETATE 667 MG/1
1334 CAPSULE ORAL
Status: DISCONTINUED | OUTPATIENT
Start: 2017-03-25 | End: 2017-03-27 | Stop reason: SDUPTHER

## 2017-03-25 RX ORDER — BENZONATATE 100 MG/1
100 CAPSULE ORAL 3 TIMES DAILY PRN
Status: DISCONTINUED | OUTPATIENT
Start: 2017-03-25 | End: 2017-03-29 | Stop reason: HOSPADM

## 2017-03-25 RX ORDER — LORAZEPAM 0.5 MG/1
0.5 TABLET ORAL EVERY 6 HOURS PRN
Status: DISCONTINUED | OUTPATIENT
Start: 2017-03-25 | End: 2017-03-29 | Stop reason: HOSPADM

## 2017-03-25 RX ORDER — UREA 40 %
CREAM (GRAM) TOPICAL 2 TIMES DAILY
Status: DISCONTINUED | OUTPATIENT
Start: 2017-03-25 | End: 2017-03-25 | Stop reason: RX

## 2017-03-25 RX ORDER — MIRTAZAPINE 15 MG/1
30 TABLET, FILM COATED ORAL NIGHTLY
Status: DISCONTINUED | OUTPATIENT
Start: 2017-03-25 | End: 2017-03-25

## 2017-03-25 RX ORDER — MAGNESIUM HYDROXIDE/ALUMINUM HYDROXICE/SIMETHICONE 120; 1200; 1200 MG/30ML; MG/30ML; MG/30ML
30 SUSPENSION ORAL EVERY 6 HOURS PRN
Status: DISCONTINUED | OUTPATIENT
Start: 2017-03-25 | End: 2017-03-29 | Stop reason: HOSPADM

## 2017-03-25 RX ORDER — UREA 200 MG/G
GEL TOPICAL 2 TIMES DAILY
Status: DISCONTINUED | OUTPATIENT
Start: 2017-03-25 | End: 2017-03-29 | Stop reason: HOSPADM

## 2017-03-25 RX ORDER — CYCLOBENZAPRINE HCL 10 MG
10 TABLET ORAL DAILY
Status: DISCONTINUED | OUTPATIENT
Start: 2017-03-25 | End: 2017-03-29 | Stop reason: HOSPADM

## 2017-03-25 RX ADMIN — NYSTATIN 500000 UNITS: 100000 SUSPENSION ORAL at 08:37

## 2017-03-25 RX ADMIN — IPRATROPIUM BROMIDE AND ALBUTEROL SULFATE 3 ML: .5; 3 SOLUTION RESPIRATORY (INHALATION) at 12:53

## 2017-03-25 RX ADMIN — ENOXAPARIN SODIUM 30 MG: 30 INJECTION SUBCUTANEOUS at 08:37

## 2017-03-25 RX ADMIN — METHYLPREDNISOLONE SODIUM SUCCINATE 40 MG: 40 INJECTION, POWDER, FOR SOLUTION INTRAMUSCULAR; INTRAVENOUS at 19:05

## 2017-03-25 RX ADMIN — TRAZODONE HYDROCHLORIDE 50 MG: 50 TABLET ORAL at 03:52

## 2017-03-25 RX ADMIN — MIRTAZAPINE 30 MG: 15 TABLET, FILM COATED ORAL at 03:52

## 2017-03-25 RX ADMIN — TRAZODONE HYDROCHLORIDE 50 MG: 50 TABLET ORAL at 20:04

## 2017-03-25 RX ADMIN — HYDROCODONE BITARTRATE AND ACETAMINOPHEN 1 TABLET: 10; 325 TABLET ORAL at 03:52

## 2017-03-25 RX ADMIN — MIRTAZAPINE 30 MG: 15 TABLET, FILM COATED ORAL at 20:04

## 2017-03-25 RX ADMIN — METHYLPREDNISOLONE SODIUM SUCCINATE 40 MG: 40 INJECTION, POWDER, FOR SOLUTION INTRAMUSCULAR; INTRAVENOUS at 12:55

## 2017-03-25 RX ADMIN — BUDESONIDE AND FORMOTEROL FUMARATE DIHYDRATE 2 PUFF: 160; 4.5 AEROSOL RESPIRATORY (INHALATION) at 20:10

## 2017-03-25 RX ADMIN — NYSTATIN 500000 UNITS: 100000 SUSPENSION ORAL at 12:55

## 2017-03-25 RX ADMIN — BENZONATATE 100 MG: 100 CAPSULE, LIQUID FILLED ORAL at 21:05

## 2017-03-25 RX ADMIN — METOPROLOL SUCCINATE 25 MG: 25 TABLET, EXTENDED RELEASE ORAL at 08:36

## 2017-03-25 RX ADMIN — UREA: 17 CREAM TOPICAL at 08:39

## 2017-03-25 RX ADMIN — BUDESONIDE AND FORMOTEROL FUMARATE DIHYDRATE 2 PUFF: 160; 4.5 AEROSOL RESPIRATORY (INHALATION) at 07:29

## 2017-03-25 RX ADMIN — NICOTINE 1 PATCH: 21 PATCH TRANSDERMAL at 08:37

## 2017-03-25 RX ADMIN — LORAZEPAM 0.5 MG: 0.5 TABLET ORAL at 21:05

## 2017-03-25 RX ADMIN — UREA: 17 CREAM TOPICAL at 17:58

## 2017-03-25 RX ADMIN — VENLAFAXINE 75 MG: 75 TABLET ORAL at 08:36

## 2017-03-25 RX ADMIN — VENLAFAXINE 75 MG: 75 TABLET ORAL at 17:52

## 2017-03-25 RX ADMIN — CEFTRIAXONE 1 G: 1 INJECTION, POWDER, FOR SOLUTION INTRAMUSCULAR; INTRAVENOUS at 02:03

## 2017-03-25 RX ADMIN — IPRATROPIUM BROMIDE AND ALBUTEROL SULFATE 3 ML: .5; 3 SOLUTION RESPIRATORY (INHALATION) at 07:28

## 2017-03-25 RX ADMIN — CALCIUM ACETATE 1334 MG: 667 CAPSULE ORAL at 08:38

## 2017-03-25 RX ADMIN — CALCIUM ACETATE 1334 MG: 667 CAPSULE ORAL at 12:56

## 2017-03-25 RX ADMIN — NYSTATIN 500000 UNITS: 100000 SUSPENSION ORAL at 20:04

## 2017-03-25 RX ADMIN — IPRATROPIUM BROMIDE AND ALBUTEROL SULFATE 3 ML: .5; 3 SOLUTION RESPIRATORY (INHALATION) at 20:10

## 2017-03-25 RX ADMIN — CALCIUM ACETATE 1334 MG: 667 CAPSULE ORAL at 17:58

## 2017-03-25 RX ADMIN — HYDROCODONE BITARTRATE AND ACETAMINOPHEN 1 TABLET: 10; 325 TABLET ORAL at 12:55

## 2017-03-25 RX ADMIN — METHYLPREDNISOLONE SODIUM SUCCINATE 40 MG: 40 INJECTION, POWDER, FOR SOLUTION INTRAMUSCULAR; INTRAVENOUS at 03:51

## 2017-03-25 RX ADMIN — NYSTATIN 500000 UNITS: 100000 SUSPENSION ORAL at 17:52

## 2017-03-25 RX ADMIN — GABAPENTIN 400 MG: 400 CAPSULE ORAL at 20:04

## 2017-03-25 RX ADMIN — PANTOPRAZOLE SODIUM 40 MG: 40 TABLET, DELAYED RELEASE ORAL at 06:01

## 2017-03-25 RX ADMIN — CYCLOBENZAPRINE HYDROCHLORIDE 10 MG: 10 TABLET, FILM COATED ORAL at 08:36

## 2017-03-25 NOTE — H&P
[facility] MEDICINE   HISTORY AND PHYSICAL      Name:  Joelle Yee   Age:  71 y.o.  Sex:  female  :  1945  MRN:  [id number]   Visit Number:  [id number]  Admission Date:  3/24/2017  Date Of Service:  17  Primary Care Physician:  DONTRELL Regan     Admitting diagnosis:    Principal Problem:    COPD with acute exacerbation  Active Problems:    Dysphagia    Esophageal reflux    Anxiety    High cholesterol    Bipolar disorder    Depression    Gout    Hyperlipidemia    Insomnia    Osteoarthritis    Sciatica        History Of Presenting Illness:      Miss Riojas came to the ER because of a smothering shortness of breath and cough.  She has had multiple hospitalizations and ER visits in the recent last few months with exacerbation of COPD and once for pneumonia.  During this ER visit she was evaluated and found to be having the no infiltrate or signs of pneumonia on the x-ray but not dyspneic and short of breath with wheezing.  She had the 2 nebulizer treatments in the ER and did not improve and was still wheezing and was admitted for further management of the above.  She has been recently on oral steroids and Levaquin and also with a history of C. difficile Levaquin has been stopped and a dose of Rocephin was given in the ER.  Besides that patient has a history of GERD and depression insomnia arthritis with chronic pain and hyperlipidemia.  She states that she lives alone and gets nervous and anxious and has been having difficult time with having to quit smoking.  She has tried to stop smoking and since last 1 week she has not smoked.  Other review of systems unremarkable except as stated above    Review Of Systems:     The following systems were reviewed and negative;  constitution, eyes, ENT, respiratory, cardiovascular, gastrointestinal, genitourinary, musculoskeletal, neurological and behavioral/psych,  Skin except as above.     Past Medical History:    Past Medical History:    Diagnosis Date   • Abdominal pain    • Acute bronchitis    • Ankle pain    • Anxiety 1980   • Atopic dermatitis    • Backache    • Bipolar disorder    • Bronchiectasis    • Chronic obstructive lung disease 2008   • Colon cancer screening    • COPD (chronic obstructive pulmonary disease)    • Cystocele    • Depressed    • Depression 1980   • Fatigue     Chronic pafigue 2012   • Fibromyalgia 2008   • GERD (gastroesophageal reflux disease)    • Gout    • Heartburn     Chronic historu of epigastric heartburn currently controlled on Prilesec 40 mg every morning along with Zantac 300 Mg daily at bedtime   • High cholesterol 2012   • Hip pain    • Hyperlipidemia    • Hypertension    • Insomnia    • Joint pain    • Kidney infection    • Loss of appetite    • Low back pain 1995   • Melena    • Nausea and vomiting    • On home oxygen therapy    • Osteoarthritis 2010   • Pain in limb    • Palpitations    • Pinched nerve     Pinched nerves 2011   • Recurrent urinary tract infection    • Sciatica [id number]   • Shortness of breath    • Sinus problem    • Sleep apnea 1998   • Upset stomach    • Valvular heart disease    • Vitamin B12 deficiency    • Weight loss, abnormal     Weight loss is stabel. On pund weight gain since 01/2016       Past Surgical history:    Past Surgical History:   Procedure Laterality Date   • ABDOMINAL HERNIA REPAIR     • APPENDECTOMY  1965   • BACK SURGERY  2005   • CATARACT EXTRACTION Bilateral 1978   • CHOLECYSTECTOMY     • EYE SURGERY      Put in implants    • GALLBLADDER SURGERY  1985   • KNEE SURGERY Left 1979    Knee Cap   • TUBAL ABDOMINAL LIGATION  1971       Social History:    Social History     Social History   • Marital status: Single     Spouse name: N/A   • Number of children: N/A   • Years of education: N/A     Social History Main Topics   • Smoking status: Current Every Day Smoker     Packs/day: 0.50     Years: 35.00   • Smokeless tobacco: Never Used      Comment: 1/2 to 1 ppd   • Alcohol  use No   • Drug use: No   • Sexual activity: Defer     Other Topics Concern   • None     Social History Narrative       Family History:    Family History   Problem Relation Age of Onset   • Ovarian cancer Mother    • Cancer Mother    • Lung cancer Father    • Heart disease Brother          Allergies:      Dust mite extract; Grass; and Mold extract [trichophyton]    Home Medications:    Prior to Admission Medications     Prescriptions Last Dose Informant Patient Reported? Taking?    azelastine (ASTELIN) 0.1 % nasal spray 3/24/2017  No Yes    2 sprays into each nostril 2 (Two) Times a Day. Use in each nostril as directed    benzonatate (TESSALON) 100 MG capsule 3/24/2017  No Yes    Take 1 capsule by mouth 3 (Three) Times a Day As Needed for cough.    budesonide-formoterol (SYMBICORT) 160-4.5 MCG/ACT inhaler 3/24/2017  No Yes    Inhale 2 puffs 2 (Two) Times a Day. Inhale 1 puff twice daily. Rinse mouth after use.    calcium acetate (PHOS BINDER,) 667 MG capsule capsule 3/24/2017  No Yes    Take 2 capsules by mouth 3 (Three) Times a Day With Meals.    cetirizine (zyrTEC) 10 MG tablet 3/24/2017  Yes Yes    Take 10 mg by mouth Daily.    conjugated estrogens (PREMARIN) vaginal cream 3/24/2017 Self Yes Yes    Insert 0.5 g into the vagina Every Other Day.    Cyanocobalamin (VITAMIN B12 PO) 3/24/2017 Self Yes Yes    Take 500 mcg by mouth Daily.    cyclobenzaprine (FLEXERIL) 10 MG tablet 3/24/2017  Yes Yes    Take 1 tablet by mouth daily.    Diclofenac Sodium (VOLTAREN TD) 3/24/2017  Yes Yes    Place  on the skin As Needed. Voltaren GEL     gabapentin (NEURONTIN) 400 MG capsule 3/24/2017 Self Yes Yes    Take 400 mg by mouth Every Night.    HYDROcodone-acetaminophen (NORCO)  MG per tablet 3/24/2017  Yes Yes    Take  by mouth. Take 1 tablet every 8 hours as needed for pain.    ipratropium-albuterol (DUO-NEB) 0.5-2.5 mg/mL nebulizer 3/24/2017  Yes Yes    Ipratropium-Albuterol SOLN; Patient Sig: Ipratropium-Albuterol SOLN  USE 1 UNIT DOSE IN NEBULIZER 3-4 TIMES DAILY.; 0; 21-May-2014; Active    melatonin 5 MG tablet tablet 3/24/2017  Yes Yes    Take 10 mg by mouth Every Night. Patient takes 10 mg at night    metoprolol succinate XL (TOPROL-XL) 50 MG 24 hr tablet 3/24/2017  Yes Yes    Mirabegron ER (MYRBETRIQ) 50 MG tablet sustained-release 24 hour 3/24/2017  Yes Yes    Take  by mouth.    mirtazapine (REMERON) 30 MG tablet 3/24/2017  Yes Yes    Take 30 mg by mouth Every Night.    nicotine (NICODERM CQ) 21 MG/24HR patch 3/24/2017  No Yes    Place 1 patch on the skin Daily. DONOT smoke with patch on.    nystatin (MYCOSTATIN) 162710 UNIT/ML suspension 3/24/2017  Yes Yes    Swish and swallow 500,000 Units 4 (Four) Times a Day.    omeprazole (PriLOSEC) 20 MG capsule 3/24/2017  Yes Yes    ondansetron ODT (ZOFRAN-ODT) 4 MG disintegrating tablet 3/24/2017  Yes Yes    Take 1 tablet by mouth every 6 (six) hours as needed.    OXYGEN-HELIUM IN 3/25/2017  Yes Yes    Oxygen; Patient Sig: Oxygen 2 liter as needed; 0; 21-May-2014; Active    Respiratory Therapy Supplies (FLUTTER) device 3/25/2017  No Yes    1 Device as needed (as directed).    Sucralfate (CARAFATE PO)   Yes Yes    Take 2 tablets by mouth 4 (four) times a day.    tiotropium (SPIRIVA HANDIHALER) 18 MCG per inhalation capsule 3/24/2017  No Yes    Place 1 capsule into inhaler and inhale Daily.    traZODone (DESYREL) 50 MG tablet 3/24/2017  Yes Yes    Take 1 tablet by mouth every night.    venlafaxine (EFFEXOR) 75 MG tablet 3/24/2017  Yes Yes    Take 1 tablet by mouth 2 (two) times a day.    levoFLOXacin (LEVAQUIN) 500 MG tablet   No No    Take 1 tablet by mouth Daily.    levoFLOXacin (LEVAQUIN) 500 MG tablet   No No    Take 1 tablet by mouth Daily for 5 days.    MethylPREDNISolone (MEDROL, THANH,) 4 MG tablet   No No    Take as directed on package instructions.    nystatin (MYCOSTATIN) 030718 UNIT/ML suspension   No No    Swish and swallow 5 mL 4 (Four) Times a Day.    predniSONE (DELTASONE)  20 MG tablet   No No    Take 3 tablets by mouth Daily for 5 days.    urea (CARMOL) 40 % cream   No No                 Vital Signs:    Temp:  [98.2 °F (36.8 °C)-99 °F (37.2 °C)] 98.2 °F (36.8 °C)  Heart Rate:  [105-110] 105  Resp:  [18-22] 22  BP: (144-151)/(75-76) 151/75    Last 3 weights    03/24/17 2152   Weight: 86 lb (39 kg)       Body mass index is 16.8 kg/(m^2).    Physical Exam:      General Appearance:    Alert and cooperative,  And lying comfortably in bed   Head:    Atraumatic and normocephalic, without obvious abnormality.   Eyes:            PERRLA, conjunctivae and sclerae normal, no Icterus. No pallor. Extra-occular movements are within normal limits.   Ears:    Ears appear intact with no abnormalities noted.   Throat:   No oral lesions, no thrush, oral mucosa moist.   Neck:   Supple, trachea midline, no thyromegaly, no carotid bruit, no lymphadenopathy   Lungs:     Chest shape is normal. Breath sounds heard bilaterally equally.  No crackles or wheezing. No Pleural rub or bronchial breathing.      Heart:    Normal S1 and S2, no murmur, no gallop, no rub. No JVD   Abdomen:     Normal bowel sounds, no masses, no organomegaly. Soft        non-tender, non-distended, no guarding, no rebound                tenderness   Extremities:   Moves all extremities well, no edema, no cyanosis, no             clubbing   Skin:   No  bruising or rash   Neurologic:   Cranial nerves 2 - 12 grossly intact, sensation intact, Motor power is normal and equal bilaterally.       EKG:      No acute ischemic findings    Labs:    Lab Results (last 24 hours)     Procedure Component Value Units Date/Time    CBC & Differential [[id number]] Collected:  03/24/17 2215    Specimen:  Blood Updated:  03/24/17 2225    Narrative:       The following orders were created for panel order CBC & Differential.  Procedure                               Abnormality         Status                     ---------                               -----------          ------                     CBC Auto Differential[[id number]]         Abnormal            Final result                 Please view results for these tests on the individual orders.    CBC Auto Differential [[id number]]  (Abnormal) Collected:  03/24/17 2215    Specimen:  Blood Updated:  03/24/17 2225     WBC 15.83 (H) 10*3/mm3      RBC 3.70 (L) 10*6/mm3      Hemoglobin 11.7 (L) g/dL      Hematocrit 36.0 (L) %      MCV 97.3 fL      MCH 31.6 (H) pg      MCHC 32.5 g/dL      RDW 15.8 (H) %      RDW-SD 56.6 (H) fl      MPV 9.0 fL      Platelets 404 (H) 10*3/mm3      Neutrophil % 73.3 %      Lymphocyte % 15.7 %      Monocyte % 7.8 %      Eosinophil % 0.1 %      Basophil % 0.2 %      Immature Grans % 2.9 (H) %      Neutrophils, Absolute 11.61 (H) 10*3/mm3      Lymphocytes, Absolute 2.49 10*3/mm3      Monocytes, Absolute 1.23 (H) 10*3/mm3      Eosinophils, Absolute 0.01 10*3/mm3      Basophils, Absolute 0.03 10*3/mm3      Immature Grans, Absolute 0.46 (H) 10*3/mm3      nRBC 0.0 /100 WBC     Comprehensive Metabolic Panel [[id number]]  (Abnormal) Collected:  03/24/17 2216    Specimen:  Blood Updated:  03/24/17 2253     Glucose 97 mg/dL      BUN 17 mg/dL      Creatinine 0.50 (L) mg/dL      Sodium 137 mmol/L      Potassium 4.1 mmol/L      Chloride 100 mmol/L      CO2 30.0 mmol/L      Calcium 8.8 mg/dL      Total Protein 7.0 g/dL      Albumin 3.80 g/dL      ALT (SGPT) 40 U/L      AST (SGOT) 35 U/L      Alkaline Phosphatase 94 U/L      Total Bilirubin 0.4 mg/dL      eGFR Non African Amer 122 mL/min/1.73      Globulin 3.2 gm/dL      A/G Ratio 1.2 g/dL      BUN/Creatinine Ratio 34.0 (H)     Anion Gap 11.1 mmol/L     Narrative:       The MDRD GFR formula is only valid for adults with stable renal function between ages 18 and 70.    Troponin [[id number]]  (Normal) Collected:  03/24/17 2216    Specimen:  Blood Updated:  03/24/17 2253     Troponin I <0.012 ng/mL     Narrative:       Normal Patient Upper Reference Limit (URL) (99th  Percentile)=0.03 ng/mL   Non-AMI Illness Reference Limit=0.03-0.11 ng/mL   AMI Confirmation=0.12 ng/mL and above    BNP [[id number]]  (Abnormal) Collected:  03/24/17 2216    Specimen:  Blood Updated:  03/24/17 2255     proBNP 265.0 (C) pg/mL     Gold Top - SST [[id number]] Collected:  03/24/17 2216    Specimen:  Blood Updated:  03/25/17 0306     Extra Tube Hold for add-ons.      Auto resulted.       Light Blue Top [[id number]] Collected:  03/24/17 2216    Specimen:  Blood Updated:  03/25/17 0306     Extra Tube hold for add-on      Auto resulted       Lavender Top [[id number]] Collected:  03/24/17 2215    Specimen:  Blood Updated:  03/25/17 0306     Extra Tube hold for add-on      Auto resulted       [place] Draw [[id number]] Collected:  03/24/17 2215    Specimen:  Blood Updated:  03/25/17 0308    Narrative:       The following orders were created for panel order [place] Draw.  Procedure                               Abnormality         Status                     ---------                               -----------         ------                     Light Blue Top[[id number]]                                    Final result               Lavender Top[[id number]]                                      Final result               Gold Top - SST[[id number]]                                    Final result               Green Top (No Gel)[[id number]]                                                             Please view results for these tests on the individual orders.          Radiology:    Imaging Results (last 72 hours)     Procedure Component Value Units Date/Time    XR Chest 2 View [[id number]] Resulted:  03/25/17 0109     Updated:  03/25/17 0109          Assessment:    Assessment/Plan     Principal Problem:    COPD with acute exacerbation  Active Problems:    Dysphagia    Esophageal reflux    Anxiety    High cholesterol    Bipolar disorder    Depression    Gout    Hyperlipidemia    Insomnia    Osteoarthritis    Sciatica        Plan:     #1  acute exacerbation of COPD patient will be admitted for observation started on IV bronchodilators IV steroids and Rocephin for possible bronchopulmonary infection but no true pneumonia.  #2 leukocytosis this seems to be because of her steroid related and not to infectious etiology as a patient has been adequately treated and the other is been no fever and other source of infection but the is covered with Rocephin at present for possible bronchitis  Depression and anxiety we will continue her home medication and follow up.  Also will continue rest of her other medications for other medical problems and the patient will possibly be able to be discharged and now 24-48 hours when stabilized with her COPD and needs to quit smoking for long-term to help with her condition.    Gordo Edmonds MD  03/25/17  3:08 AM    Please note that portions of this note may have been completed with a voice recognition program. Efforts were made to edit the dictations, but occasionally words are mistranscribed.

## 2017-03-25 NOTE — PLAN OF CARE
Problem: COPD, Chronic Bronchitis/Emphysema (Adult)  Goal: Signs and Symptoms of Listed Potential Problems Will be Absent or Manageable (COPD, Chronic Bronchitis/Emphysema)  Outcome: Ongoing (interventions implemented as appropriate)    Problem: Patient Care Overview (Adult)  Goal: Plan of Care Review  Outcome: Ongoing (interventions implemented as appropriate)  Goal: Adult Individualization and Mutuality  Outcome: Ongoing (interventions implemented as appropriate)  Goal: Discharge Needs Assessment  Outcome: Ongoing (interventions implemented as appropriate)

## 2017-03-25 NOTE — PLAN OF CARE
Problem: COPD, Chronic Bronchitis/Emphysema (Adult)  Goal: Signs and Symptoms of Listed Potential Problems Will be Absent or Manageable (COPD, Chronic Bronchitis/Emphysema)  Outcome: Ongoing (interventions implemented as appropriate)

## 2017-03-25 NOTE — PROGRESS NOTES
Continued Stay Note  ARLENE White     Patient Name: Joelle Yee  MRN: [id number]  Today's Date: 3/25/2017    Admit Date: 3/24/2017          Discharge Plan       03/25/17 0858    Case Management/Social Work Plan    Plan Home home [facility]     Patient/Family In Agreement With Plan yes    Additional Comments Pt lives alone has [facility] and [facility] home program that  will coming three days a week .She has [facility] at [facility] continuosly she is compliant with Premie[facility] .Elises any needs               Discharge Codes     None            Sydney Goff

## 2017-03-25 NOTE — ED PROVIDER NOTES
Subjective   HPI Comments: 71 yr old WF presenting to ED secondary to having increased SOA, mild fever. Pt with history of COPD, and having increased air hunger over the past 2 days. Seen recently on Monday and discharged with antibiotics and steroids. Pt wears 2 L oxygen at home. Been using nicotine patch for past couple days and trying not to smoke. Pt reports that since Oct. She hasn't been able to get over bronchitis with admissions for pneumonia. No sore throat, no chest pain. No leg swelling. Pt states that she has been on so many antibiotics in the past that she ended up with C. Diff. No n/v/d. No abdominal pain.       History provided by:  Patient      Review of Systems   Constitutional: Positive for fatigue and fever.   Respiratory: Positive for cough, chest tightness and shortness of breath.    All other systems reviewed and are negative.      Past Medical History:   Diagnosis Date   • Abdominal pain    • Acute bronchitis    • Ankle pain    • Anxiety 1980   • Atopic dermatitis    • Backache    • Bipolar disorder    • Bronchiectasis    • Chronic obstructive lung disease 2008   • Colon cancer screening    • COPD (chronic obstructive pulmonary disease)    • Cystocele    • Depressed    • Depression 1980   • Fatigue     Chronic pafigue 2012   • Fibromyalgia 2008   • GERD (gastroesophageal reflux disease)    • Gout    • Heartburn     Chronic historu of epigastric heartburn currently controlled on Prilesec 40 mg every morning along with Zantac 300 Mg daily at bedtime   • High cholesterol 2012   • Hip pain    • Hyperlipidemia    • Hypertension    • Insomnia    • Joint pain    • Kidney infection    • Loss of appetite    • Low back pain 1995   • Melena    • Nausea and vomiting    • On home oxygen therapy    • Osteoarthritis 2010   • Pain in limb    • Palpitations    • Pinched nerve     Pinched nerves 2011   • Recurrent urinary tract infection    • Sciatica [id number]   • Shortness of breath    • Sinus problem    •  Sleep apnea 1998   • Upset stomach    • Valvular heart disease    • Vitamin B12 deficiency    • Weight loss, abnormal     Weight loss is stabel. On pund weight gain since 01/2016       Allergies   Allergen Reactions   • Dust Mite Extract      Dust   • Grass    • Mold Extract [Trichophyton]        Past Surgical History:   Procedure Laterality Date   • ABDOMINAL HERNIA REPAIR     • APPENDECTOMY  1965   • BACK SURGERY  2005   • CATARACT EXTRACTION Bilateral 1978   • CHOLECYSTECTOMY     • EYE SURGERY      Put in implants    • GALLBLADDER SURGERY  1985   • KNEE SURGERY Left 1979    Knee Cap   • TUBAL ABDOMINAL LIGATION  1971       Family History   Problem Relation Age of Onset   • Ovarian cancer Mother    • Cancer Mother    • Lung cancer Father    • Heart disease Brother        Social History     Social History   • Marital status: Single     Spouse name: N/A   • Number of children: N/A   • Years of education: N/A     Social History Main Topics   • Smoking status: Current Every Day Smoker     Packs/day: 0.50     Years: 35.00   • Smokeless tobacco: Never Used      Comment: 1/2 to 1 ppd   • Alcohol use No   • Drug use: No   • Sexual activity: Defer     Other Topics Concern   • None     Social History Narrative           Objective   Physical Exam   Constitutional: She is oriented to person, place, and time. She appears well-developed.   Thin female in mild respiratory distress   HENT:   Head: Normocephalic and atraumatic.   Mouth/Throat: Oropharynx is clear and moist.   Eyes: EOM are normal. Pupils are equal, round, and reactive to light.   Neck: Neck supple.   Cardiovascular: Regular rhythm and normal heart sounds.  Tachycardia present.    Pulmonary/Chest: She is in respiratory distress. She has wheezes.   Pt with coarse breath sounds throughout   Abdominal: Soft.   Musculoskeletal: Normal range of motion. She exhibits no edema.   Neurological: She is alert and oriented to person, place, and time.   Skin: Skin is warm and  dry.   Psychiatric: She has a normal mood and affect. Her behavior is normal.   Nursing note and vitals reviewed.      ECG 12 Lead    Date/Time: 3/25/2017 1:08 AM  Performed by: THOMAS TAVAREZ  Authorized by: THOMAS TAVAREZ   Interpreted by physician  Comparison: not compared with previous ECG   Rhythm: sinus tachycardia  Rate: tachycardic  BPM: 100  Conduction: conduction normal  ST Segments: ST segments normal  T Waves: T waves normal  Other: no other findings  Clinical impression: non-specific ECG               ED Course  ED Course      XR Chest 2 View   ED Interpretation   No acute cardiopulmonary process        Pt stable; Decreased Oxygen to 3L from 6 on arrival. Sat and vitals within normal limits except for HR which fluctuates from   Pt with Sputum change, fever, increased SOA, and failure of outpatient treatmen       MDM    Final diagnoses:   COPD with acute exacerbation   Fever, unspecified fever cause            Thomas Tavarez DO  03/25/17 0124

## 2017-03-26 LAB
ALBUMIN SERPL-MCNC: 3.7 G/DL (ref 3.5–5)
ALBUMIN/GLOB SERPL: 1.2 G/DL (ref 1–2)
ALP SERPL-CCNC: 113 U/L (ref 38–126)
ALT SERPL W P-5'-P-CCNC: 37 U/L (ref 13–69)
ANION GAP SERPL CALCULATED.3IONS-SCNC: 12.1 MMOL/L
AST SERPL-CCNC: 24 U/L (ref 15–46)
BASOPHILS # BLD AUTO: 0.04 10*3/MM3 (ref 0–0.2)
BASOPHILS NFR BLD AUTO: 0.3 % (ref 0–2.5)
BILIRUB SERPL-MCNC: 0.3 MG/DL (ref 0.2–1.3)
BUN BLD-MCNC: 20 MG/DL (ref 7–20)
BUN/CREAT SERPL: 33.3 (ref 7.1–23.5)
CALCIUM SPEC-SCNC: 9 MG/DL (ref 8.4–10.2)
CHLORIDE SERPL-SCNC: 104 MMOL/L (ref 98–107)
CO2 SERPL-SCNC: 29 MMOL/L (ref 26–30)
CREAT BLD-MCNC: 0.6 MG/DL (ref 0.6–1.3)
DEPRECATED RDW RBC AUTO: 55.7 FL (ref 37–54)
EOSINOPHIL # BLD AUTO: 0 10*3/MM3 (ref 0–0.7)
EOSINOPHIL NFR BLD AUTO: 0 % (ref 0–7)
ERYTHROCYTE [DISTWIDTH] IN BLOOD BY AUTOMATED COUNT: 15.6 % (ref 11.5–14.5)
GFR SERPL CREATININE-BSD FRML MDRD: 99 ML/MIN/1.73
GLOBULIN UR ELPH-MCNC: 3.1 GM/DL
GLUCOSE BLD-MCNC: 116 MG/DL (ref 74–98)
HCT VFR BLD AUTO: 37.1 % (ref 37–47)
HGB BLD-MCNC: 12.1 G/DL (ref 12–16)
IMM GRANULOCYTES # BLD: 0.52 10*3/MM3 (ref 0–0.06)
IMM GRANULOCYTES NFR BLD: 3.3 % (ref 0–0.6)
LYMPHOCYTES # BLD AUTO: 1.18 10*3/MM3 (ref 0.6–3.4)
LYMPHOCYTES NFR BLD AUTO: 7.4 % (ref 10–50)
MCH RBC QN AUTO: 31.7 PG (ref 27–31)
MCHC RBC AUTO-ENTMCNC: 32.6 G/DL (ref 30–37)
MCV RBC AUTO: 97.1 FL (ref 81–99)
MONOCYTES # BLD AUTO: 1.12 10*3/MM3 (ref 0–0.9)
MONOCYTES NFR BLD AUTO: 7 % (ref 0–12)
NEUTROPHILS # BLD AUTO: 13.07 10*3/MM3 (ref 2–6.9)
NEUTROPHILS NFR BLD AUTO: 82 % (ref 37–80)
NRBC BLD MANUAL-RTO: 0 /100 WBC (ref 0–0)
PLATELET # BLD AUTO: 359 10*3/MM3 (ref 130–400)
PMV BLD AUTO: 9.5 FL (ref 6–12)
POTASSIUM BLD-SCNC: 4.1 MMOL/L (ref 3.5–5.1)
PROT SERPL-MCNC: 6.8 G/DL (ref 6.3–8.2)
RBC # BLD AUTO: 3.82 10*6/MM3 (ref 4.2–5.4)
SODIUM BLD-SCNC: 141 MMOL/L (ref 137–145)
WBC NRBC COR # BLD: 15.93 10*3/MM3 (ref 4.8–10.8)

## 2017-03-26 PROCEDURE — 25010000002 METHYLPREDNISOLONE PER 40 MG: Performed by: INTERNAL MEDICINE

## 2017-03-26 PROCEDURE — 80053 COMPREHEN METABOLIC PANEL: CPT | Performed by: INTERNAL MEDICINE

## 2017-03-26 PROCEDURE — 25010000002 CEFTRIAXONE: Performed by: INTERNAL MEDICINE

## 2017-03-26 PROCEDURE — 85025 COMPLETE CBC W/AUTO DIFF WBC: CPT | Performed by: INTERNAL MEDICINE

## 2017-03-26 PROCEDURE — G0378 HOSPITAL OBSERVATION PER HR: HCPCS

## 2017-03-26 PROCEDURE — 25010000002 ENOXAPARIN PER 10 MG: Performed by: INTERNAL MEDICINE

## 2017-03-26 PROCEDURE — 99232 SBSQ HOSP IP/OBS MODERATE 35: CPT | Performed by: HOSPITALIST

## 2017-03-26 PROCEDURE — 94799 UNLISTED PULMONARY SVC/PX: CPT

## 2017-03-26 RX ORDER — SODIUM CHLORIDE 0.9 % (FLUSH) 0.9 %
1-10 SYRINGE (ML) INJECTION AS NEEDED
Status: DISCONTINUED | OUTPATIENT
Start: 2017-03-26 | End: 2017-03-29 | Stop reason: HOSPADM

## 2017-03-26 RX ADMIN — HYDROCODONE BITARTRATE AND ACETAMINOPHEN 1 TABLET: 10; 325 TABLET ORAL at 23:04

## 2017-03-26 RX ADMIN — Medication 10 ML: at 17:04

## 2017-03-26 RX ADMIN — METHYLPREDNISOLONE SODIUM SUCCINATE 40 MG: 40 INJECTION, POWDER, FOR SOLUTION INTRAMUSCULAR; INTRAVENOUS at 11:40

## 2017-03-26 RX ADMIN — GABAPENTIN 400 MG: 400 CAPSULE ORAL at 21:30

## 2017-03-26 RX ADMIN — CONJUGATED ESTROGENS 0.5 APPLICATION: 0.62 CREAM VAGINAL at 08:49

## 2017-03-26 RX ADMIN — VENLAFAXINE 75 MG: 75 TABLET ORAL at 08:49

## 2017-03-26 RX ADMIN — Medication 10 ML: at 11:41

## 2017-03-26 RX ADMIN — NYSTATIN 500000 UNITS: 100000 SUSPENSION ORAL at 11:40

## 2017-03-26 RX ADMIN — METHYLPREDNISOLONE SODIUM SUCCINATE 40 MG: 40 INJECTION, POWDER, FOR SOLUTION INTRAMUSCULAR; INTRAVENOUS at 21:30

## 2017-03-26 RX ADMIN — CALCIUM ACETATE 1334 MG: 667 CAPSULE ORAL at 08:49

## 2017-03-26 RX ADMIN — LORAZEPAM 0.5 MG: 0.5 TABLET ORAL at 23:04

## 2017-03-26 RX ADMIN — BUDESONIDE AND FORMOTEROL FUMARATE DIHYDRATE 2 PUFF: 160; 4.5 AEROSOL RESPIRATORY (INHALATION) at 19:39

## 2017-03-26 RX ADMIN — NYSTATIN 500000 UNITS: 100000 SUSPENSION ORAL at 21:30

## 2017-03-26 RX ADMIN — IPRATROPIUM BROMIDE AND ALBUTEROL SULFATE 3 ML: .5; 3 SOLUTION RESPIRATORY (INHALATION) at 19:39

## 2017-03-26 RX ADMIN — METHYLPREDNISOLONE SODIUM SUCCINATE 40 MG: 40 INJECTION, POWDER, FOR SOLUTION INTRAMUSCULAR; INTRAVENOUS at 04:42

## 2017-03-26 RX ADMIN — IPRATROPIUM BROMIDE AND ALBUTEROL SULFATE 3 ML: .5; 3 SOLUTION RESPIRATORY (INHALATION) at 15:23

## 2017-03-26 RX ADMIN — HYDROCODONE BITARTRATE AND ACETAMINOPHEN 1 TABLET: 10; 325 TABLET ORAL at 17:04

## 2017-03-26 RX ADMIN — CALCIUM ACETATE 1334 MG: 667 CAPSULE ORAL at 17:04

## 2017-03-26 RX ADMIN — CEFTRIAXONE 1 G: 1 INJECTION, POWDER, FOR SOLUTION INTRAMUSCULAR; INTRAVENOUS at 01:12

## 2017-03-26 RX ADMIN — NYSTATIN 500000 UNITS: 100000 SUSPENSION ORAL at 08:49

## 2017-03-26 RX ADMIN — ENOXAPARIN SODIUM 30 MG: 30 INJECTION SUBCUTANEOUS at 08:48

## 2017-03-26 RX ADMIN — IPRATROPIUM BROMIDE AND ALBUTEROL SULFATE 3 ML: .5; 3 SOLUTION RESPIRATORY (INHALATION) at 09:31

## 2017-03-26 RX ADMIN — TRAZODONE HYDROCHLORIDE 50 MG: 50 TABLET ORAL at 21:30

## 2017-03-26 RX ADMIN — NICOTINE 1 PATCH: 21 PATCH TRANSDERMAL at 08:49

## 2017-03-26 RX ADMIN — PANTOPRAZOLE SODIUM 40 MG: 40 TABLET, DELAYED RELEASE ORAL at 05:09

## 2017-03-26 RX ADMIN — LORAZEPAM 0.5 MG: 0.5 TABLET ORAL at 17:03

## 2017-03-26 RX ADMIN — BUDESONIDE AND FORMOTEROL FUMARATE DIHYDRATE 2 PUFF: 160; 4.5 AEROSOL RESPIRATORY (INHALATION) at 09:33

## 2017-03-26 RX ADMIN — VENLAFAXINE 75 MG: 75 TABLET ORAL at 17:03

## 2017-03-26 RX ADMIN — CYCLOBENZAPRINE HYDROCHLORIDE 10 MG: 10 TABLET, FILM COATED ORAL at 08:49

## 2017-03-26 RX ADMIN — METOPROLOL SUCCINATE 25 MG: 25 TABLET, EXTENDED RELEASE ORAL at 08:49

## 2017-03-26 RX ADMIN — MIRTAZAPINE 30 MG: 15 TABLET, FILM COATED ORAL at 21:30

## 2017-03-26 RX ADMIN — UREA: 17 CREAM TOPICAL at 17:04

## 2017-03-26 RX ADMIN — HYDROCODONE BITARTRATE AND ACETAMINOPHEN 1 TABLET: 10; 325 TABLET ORAL at 09:01

## 2017-03-26 RX ADMIN — CALCIUM ACETATE 1334 MG: 667 CAPSULE ORAL at 12:52

## 2017-03-26 RX ADMIN — NYSTATIN 500000 UNITS: 100000 SUSPENSION ORAL at 17:03

## 2017-03-26 RX ADMIN — LORAZEPAM 0.5 MG: 0.5 TABLET ORAL at 09:01

## 2017-03-26 NOTE — PROGRESS NOTES
Patient is seen and examined in follow-up of early morning admission for AECOPD by Dr. Edmonds.   She is feeling well today, reports some minor improvement in her breathing.  She is in no distress and has just finished her lunch.    Will continue with plan as outlined by Dr. Edmonds in his H&P earlier today.

## 2017-03-26 NOTE — PLAN OF CARE
Problem: COPD, Chronic Bronchitis/Emphysema (Adult)  Goal: Signs and Symptoms of Listed Potential Problems Will be Absent or Manageable (COPD, Chronic Bronchitis/Emphysema)  Outcome: Ongoing (interventions implemented as appropriate)    Problem: Patient Care Overview (Adult)  Goal: Plan of Care Review  Outcome: Ongoing (interventions implemented as appropriate)

## 2017-03-26 NOTE — PLAN OF CARE
Problem: COPD, Chronic Bronchitis/Emphysema (Adult)  Goal: Signs and Symptoms of Listed Potential Problems Will be Absent or Manageable (COPD, Chronic Bronchitis/Emphysema)  Outcome: Outcome(s) achieved Date Met:  03/26/17

## 2017-03-27 LAB
ALBUMIN SERPL-MCNC: 3.6 G/DL (ref 3.5–5)
ALBUMIN/GLOB SERPL: 1.2 G/DL (ref 1–2)
ALP SERPL-CCNC: 89 U/L (ref 38–126)
ALT SERPL W P-5'-P-CCNC: 47 U/L (ref 13–69)
ANION GAP SERPL CALCULATED.3IONS-SCNC: 11.7 MMOL/L
AST SERPL-CCNC: 36 U/L (ref 15–46)
BASOPHILS # BLD AUTO: 0.05 10*3/MM3 (ref 0–0.2)
BASOPHILS NFR BLD AUTO: 0.3 % (ref 0–2.5)
BILIRUB SERPL-MCNC: 0.4 MG/DL (ref 0.2–1.3)
BUN BLD-MCNC: 23 MG/DL (ref 7–20)
BUN/CREAT SERPL: 38.3 (ref 7.1–23.5)
CALCIUM SPEC-SCNC: 9.2 MG/DL (ref 8.4–10.2)
CHLORIDE SERPL-SCNC: 99 MMOL/L (ref 98–107)
CO2 SERPL-SCNC: 33 MMOL/L (ref 26–30)
CREAT BLD-MCNC: 0.6 MG/DL (ref 0.6–1.3)
DEPRECATED RDW RBC AUTO: 56.2 FL (ref 37–54)
EOSINOPHIL # BLD AUTO: 0 10*3/MM3 (ref 0–0.7)
EOSINOPHIL NFR BLD AUTO: 0 % (ref 0–7)
ERYTHROCYTE [DISTWIDTH] IN BLOOD BY AUTOMATED COUNT: 15.5 % (ref 11.5–14.5)
GFR SERPL CREATININE-BSD FRML MDRD: 99 ML/MIN/1.73
GLOBULIN UR ELPH-MCNC: 3.1 GM/DL
GLUCOSE BLD-MCNC: 105 MG/DL (ref 74–98)
HCT VFR BLD AUTO: 38.1 % (ref 37–47)
HGB BLD-MCNC: 12.5 G/DL (ref 12–16)
IMM GRANULOCYTES # BLD: 0.65 10*3/MM3 (ref 0–0.06)
IMM GRANULOCYTES NFR BLD: 4 % (ref 0–0.6)
LYMPHOCYTES # BLD AUTO: 1.1 10*3/MM3 (ref 0.6–3.4)
LYMPHOCYTES NFR BLD AUTO: 6.9 % (ref 10–50)
MCH RBC QN AUTO: 32.2 PG (ref 27–31)
MCHC RBC AUTO-ENTMCNC: 32.8 G/DL (ref 30–37)
MCV RBC AUTO: 98.2 FL (ref 81–99)
MONOCYTES # BLD AUTO: 0.47 10*3/MM3 (ref 0–0.9)
MONOCYTES NFR BLD AUTO: 2.9 % (ref 0–12)
NEUTROPHILS # BLD AUTO: 13.78 10*3/MM3 (ref 2–6.9)
NEUTROPHILS NFR BLD AUTO: 85.9 % (ref 37–80)
NRBC BLD MANUAL-RTO: 0 /100 WBC (ref 0–0)
PLATELET # BLD AUTO: 289 10*3/MM3 (ref 130–400)
PMV BLD AUTO: 9.8 FL (ref 6–12)
POTASSIUM BLD-SCNC: 4.7 MMOL/L (ref 3.5–5.1)
PROT SERPL-MCNC: 6.7 G/DL (ref 6.3–8.2)
RBC # BLD AUTO: 3.88 10*6/MM3 (ref 4.2–5.4)
SODIUM BLD-SCNC: 139 MMOL/L (ref 137–145)
WBC NRBC COR # BLD: 16.05 10*3/MM3 (ref 4.8–10.8)

## 2017-03-27 PROCEDURE — G0378 HOSPITAL OBSERVATION PER HR: HCPCS

## 2017-03-27 PROCEDURE — 80053 COMPREHEN METABOLIC PANEL: CPT | Performed by: INTERNAL MEDICINE

## 2017-03-27 PROCEDURE — 25010000002 ENOXAPARIN PER 10 MG: Performed by: INTERNAL MEDICINE

## 2017-03-27 PROCEDURE — 94799 UNLISTED PULMONARY SVC/PX: CPT

## 2017-03-27 PROCEDURE — 85025 COMPLETE CBC W/AUTO DIFF WBC: CPT | Performed by: INTERNAL MEDICINE

## 2017-03-27 PROCEDURE — 25010000002 METHYLPREDNISOLONE PER 40 MG: Performed by: INTERNAL MEDICINE

## 2017-03-27 PROCEDURE — 25010000002 CEFTRIAXONE: Performed by: INTERNAL MEDICINE

## 2017-03-27 PROCEDURE — 99232 SBSQ HOSP IP/OBS MODERATE 35: CPT | Performed by: NURSE PRACTITIONER

## 2017-03-27 RX ORDER — GUAIFENESIN 600 MG/1
600 TABLET, EXTENDED RELEASE ORAL 2 TIMES DAILY
Status: DISCONTINUED | OUTPATIENT
Start: 2017-03-27 | End: 2017-03-29 | Stop reason: HOSPADM

## 2017-03-27 RX ORDER — METOPROLOL SUCCINATE 25 MG/1
25 TABLET, EXTENDED RELEASE ORAL DAILY
COMMUNITY
End: 2017-03-29 | Stop reason: HOSPADM

## 2017-03-27 RX ORDER — LOSARTAN POTASSIUM 25 MG/1
25 TABLET ORAL DAILY
COMMUNITY
End: 2018-12-06 | Stop reason: HOSPADM

## 2017-03-27 RX ORDER — IPRATROPIUM BROMIDE AND ALBUTEROL SULFATE 2.5; .5 MG/3ML; MG/3ML
3 SOLUTION RESPIRATORY (INHALATION)
Status: DISCONTINUED | OUTPATIENT
Start: 2017-03-27 | End: 2017-03-29 | Stop reason: HOSPADM

## 2017-03-27 RX ORDER — CALCIUM ACETATE 667 MG/1
1334 TABLET ORAL
Status: DISCONTINUED | OUTPATIENT
Start: 2017-03-27 | End: 2017-03-29 | Stop reason: HOSPADM

## 2017-03-27 RX ORDER — MONTELUKAST SODIUM 10 MG/1
10 TABLET ORAL NIGHTLY
COMMUNITY
End: 2017-06-05

## 2017-03-27 RX ORDER — ALBUTEROL SULFATE 2.5 MG/3ML
2.5 SOLUTION RESPIRATORY (INHALATION)
COMMUNITY
End: 2018-07-24

## 2017-03-27 RX ADMIN — HYDROCODONE BITARTRATE AND ACETAMINOPHEN 1 TABLET: 10; 325 TABLET ORAL at 13:09

## 2017-03-27 RX ADMIN — VENLAFAXINE 75 MG: 75 TABLET ORAL at 18:09

## 2017-03-27 RX ADMIN — HYDROCODONE BITARTRATE AND ACETAMINOPHEN 1 TABLET: 10; 325 TABLET ORAL at 21:39

## 2017-03-27 RX ADMIN — UREA: 17 CREAM TOPICAL at 18:09

## 2017-03-27 RX ADMIN — IPRATROPIUM BROMIDE AND ALBUTEROL SULFATE 3 ML: .5; 3 SOLUTION RESPIRATORY (INHALATION) at 07:13

## 2017-03-27 RX ADMIN — Medication 10 ML: at 21:40

## 2017-03-27 RX ADMIN — BUDESONIDE AND FORMOTEROL FUMARATE DIHYDRATE 2 PUFF: 160; 4.5 AEROSOL RESPIRATORY (INHALATION) at 19:05

## 2017-03-27 RX ADMIN — NYSTATIN 500000 UNITS: 100000 SUSPENSION ORAL at 21:39

## 2017-03-27 RX ADMIN — NYSTATIN 500000 UNITS: 100000 SUSPENSION ORAL at 13:10

## 2017-03-27 RX ADMIN — METHYLPREDNISOLONE SODIUM SUCCINATE 40 MG: 40 INJECTION, POWDER, FOR SOLUTION INTRAMUSCULAR; INTRAVENOUS at 21:39

## 2017-03-27 RX ADMIN — NYSTATIN 500000 UNITS: 100000 SUSPENSION ORAL at 18:08

## 2017-03-27 RX ADMIN — CYCLOBENZAPRINE HYDROCHLORIDE 10 MG: 10 TABLET, FILM COATED ORAL at 08:55

## 2017-03-27 RX ADMIN — CALCIUM ACETATE 1334 MG: 667 TABLET ORAL at 13:09

## 2017-03-27 RX ADMIN — ENOXAPARIN SODIUM 30 MG: 30 INJECTION SUBCUTANEOUS at 08:59

## 2017-03-27 RX ADMIN — LORAZEPAM 0.5 MG: 0.5 TABLET ORAL at 21:39

## 2017-03-27 RX ADMIN — GABAPENTIN 400 MG: 400 CAPSULE ORAL at 21:39

## 2017-03-27 RX ADMIN — NYSTATIN 500000 UNITS: 100000 SUSPENSION ORAL at 08:56

## 2017-03-27 RX ADMIN — CALCIUM ACETATE 1334 MG: 667 CAPSULE ORAL at 08:57

## 2017-03-27 RX ADMIN — VENLAFAXINE 75 MG: 75 TABLET ORAL at 08:55

## 2017-03-27 RX ADMIN — METHYLPREDNISOLONE SODIUM SUCCINATE 40 MG: 40 INJECTION, POWDER, FOR SOLUTION INTRAMUSCULAR; INTRAVENOUS at 03:54

## 2017-03-27 RX ADMIN — IPRATROPIUM BROMIDE AND ALBUTEROL SULFATE 3 ML: .5; 3 SOLUTION RESPIRATORY (INHALATION) at 18:56

## 2017-03-27 RX ADMIN — METOPROLOL SUCCINATE 25 MG: 25 TABLET, EXTENDED RELEASE ORAL at 08:55

## 2017-03-27 RX ADMIN — METHYLPREDNISOLONE SODIUM SUCCINATE 40 MG: 40 INJECTION, POWDER, FOR SOLUTION INTRAMUSCULAR; INTRAVENOUS at 13:10

## 2017-03-27 RX ADMIN — CALCIUM ACETATE 1334 MG: 667 TABLET ORAL at 18:08

## 2017-03-27 RX ADMIN — CEFTRIAXONE 1 G: 1 INJECTION, POWDER, FOR SOLUTION INTRAMUSCULAR; INTRAVENOUS at 02:43

## 2017-03-27 RX ADMIN — GUAIFENESIN 600 MG: 600 TABLET, EXTENDED RELEASE ORAL at 15:23

## 2017-03-27 RX ADMIN — NICOTINE 1 PATCH: 21 PATCH TRANSDERMAL at 08:55

## 2017-03-27 RX ADMIN — PANTOPRAZOLE SODIUM 40 MG: 40 TABLET, DELAYED RELEASE ORAL at 05:33

## 2017-03-27 RX ADMIN — TRAZODONE HYDROCHLORIDE 50 MG: 50 TABLET ORAL at 21:40

## 2017-03-27 RX ADMIN — GUAIFENESIN 600 MG: 600 TABLET, EXTENDED RELEASE ORAL at 18:09

## 2017-03-27 RX ADMIN — MIRTAZAPINE 30 MG: 15 TABLET, FILM COATED ORAL at 21:40

## 2017-03-27 RX ADMIN — LORAZEPAM 0.5 MG: 0.5 TABLET ORAL at 15:24

## 2017-03-27 RX ADMIN — BUDESONIDE AND FORMOTEROL FUMARATE DIHYDRATE 2 PUFF: 160; 4.5 AEROSOL RESPIRATORY (INHALATION) at 07:13

## 2017-03-27 NOTE — PROGRESS NOTES
"  INPATIENT PROGRESS NOTE    Date of Admission: 3/24/2017  Length of Stay: 0  Primary Care Physician: DONTRELL Regan    Subjective   HPI:  Patient was admitted with AE COPD - she has had multiple admissions/ER presentations for the same recently.    Interval History:  03/27/17 - Patient seen and examined. She reports ongoing cough and occasional feelings of \"smothering\".  She had some trouble with anxiety since yesterday and was given ativan which \"helped her calm down\" a little bit.  She states her breathing is better but not back to normal.    Review Of Systems:  Otherwise complete ROS is negative except as mentioned above    Objective      Temp:  [97.6 °F (36.4 °C)-98.7 °F (37.1 °C)] 98.6 °F (37 °C)  Heart Rate:  [] 100  Resp:  [16-20] 18  BP: (145-164)/(77-89) 164/85    Gen: Alert, appropriate, pleasant and interactive, although somewhat anxious.  HEENT: EOMI, ATNC, MMM  Neck: Supple  Heart: S1S2, RRR  Lungs: exp wheezes bilaterally  Abdomen: Soft, NTND, BS+  Extremities: Warm, well-perfused, + pulses  Skin: P/W/D  Neuro: A/O x3, speech clear    Results Review:    I have reviewed the labs, radiology results and diagnostic studies.      Results from last 7 days  Lab Units 03/26/17 0559 03/25/17  0631 03/24/17  2216   SODIUM mmol/L 141 140 137   POTASSIUM mmol/L 4.1 4.1 4.1   CHLORIDE mmol/L 104 103 100   TOTAL CO2 mmol/L 29.0 28.0 30.0   BUN mg/dL 20 13 17   CREATININE mg/dL 0.60 0.50* 0.50*   CALCIUM mg/dL 9.0 9.2 8.8   BILIRUBIN mg/dL 0.3 0.4 0.4   ALK PHOS U/L 113 105 94   ALT (SGPT) U/L 37 42 40   AST (SGOT) U/L 24 30 35   GLUCOSE mg/dL 116* 129* 97         Results from last 7 days  Lab Units 03/26/17  0559 03/25/17  0631 03/24/17  2215   WBC 10*3/mm3 15.93* 11.21* 15.83*   HEMOGLOBIN g/dL 12.1 12.2 11.7*   HEMATOCRIT % 37.1 37.4 36.0*   PLATELETS 10*3/mm3 359 378 404*     Imaging Results (most recent)     Procedure Component Value Units Date/Time    XR Chest 2 View [[id number]] Collected:  " 03/25/17 0824     Updated:  03/26/17 0723    Narrative:       2 VIEW CHEST     INDICATION: Shortness of breath.     FINDINGS: 2 views, compared with 03/13/2017. EKG leads overlie the  chest. Heart size is normal. No pneumothorax. Slightly coarsened  interstitial opacities are likely chronic. Trace scarring or atelectasis  in the lung bases appears fairly similar. No effusion. Degenerative  changes are present in the spine and shoulders. Lower left rib fractures  are probably old and appear unchanged.       Impression:       Trace basilar scarring or atelectasis. No significant change  otherwise.     This report was finalized on 3/26/2017 7:21 AM by Reese Keane MD.          I have reviewed the medications.      Current Facility-Administered Medications:   •  acetaminophen (TYLENOL) tablet 650 mg, 650 mg, Oral, Q4H PRN, Gordo Edmonds MD  •  aluminum-magnesium hydroxide-simethicone (MAALOX/MYLANTA) suspension 30 mL, 30 mL, Oral, Q6H PRN, Gordo Edmonds MD  •  benzonatate (TESSALON) capsule 100 mg, 100 mg, Oral, TID PRN, Gordo Edmonds MD, 100 mg at 03/25/17 2105  •  budesonide-formoterol (SYMBICORT) 160-4.5 MCG/ACT inhaler 2 puff, 2 puff, Inhalation, BID, Gordo Edmonds MD, 2 puff at 03/26/17 1939  •  calcium acetate (PHOS BINDER)) capsule 1,334 mg, 1,334 mg, Oral, TID With Meals, Gordo Edmonds MD, 1,334 mg at 03/26/17 1704  •  cefTRIAXone (ROCEPHIN) 1 g/50 mL 0.9% NS VTB (mbp), 1 g, Intravenous, Q24H, Gordo Edmonds MD, Stopped at 03/26/17 0242  •  conjugated estrogens (PREMARIN) vaginal cream 0.5 application, 0.5 g, Vaginal, Every Other Day, Gordo Edmonds MD, 0.5 application at 03/26/17 0849  •  cyclobenzaprine (FLEXERIL) tablet 10 mg, 10 mg, Oral, Daily, Gordo Edmonds MD, 10 mg at 03/26/17 0849  •  enoxaparin (LOVENOX) syringe 30 mg, 30 mg, Subcutaneous, Daily, Gordo Edmonds MD, 30 mg at 03/26/17 0848  •  gabapentin (NEURONTIN) capsule 400 mg, 400 mg, Oral, Nightly, Gordo Edmonds MD, 400 mg at  03/26/17 2130  •  HYDROcodone-acetaminophen (NORCO)  MG per tablet 1 tablet, 1 tablet, Oral, Q6H PRN, Gordo Edmonds MD, 1 tablet at 03/26/17 2304  •  ipratropium-albuterol (DUO-NEB) nebulizer solution 3 mL, 3 mL, Nebulization, 4x Daily - RT, Gordo Edmonds MD, 3 mL at 03/26/17 1939  •  LORazepam (ATIVAN) tablet 0.5 mg, 0.5 mg, Oral, Q6H PRN, Jeremi Luo DO, 0.5 mg at 03/26/17 2304  •  magnesium hydroxide (MILK OF MAGNESIA) suspension 2400 mg/10mL 10 mL, 10 mL, Oral, Daily PRN, Gordo Edmonds MD  •  methylPREDNISolone sodium succinate (SOLU-Medrol) injection 40 mg, 40 mg, Intravenous, Q8H, Gordo Edmonds MD, 40 mg at 03/26/17 2130  •  metoprolol succinate XL (TOPROL-XL) 24 hr tablet 25 mg, 25 mg, Oral, Q24H, Gordo Edmonds MD, 25 mg at 03/26/17 0849  •  mirtazapine (REMERON) tablet 30 mg, 30 mg, Oral, Nightly, Gordo Edmonds MD, 30 mg at 03/26/17 2130  •  nicotine (NICODERM CQ) 21 MG/24HR patch 1 patch, 1 patch, Transdermal, Q24H, Gordo Edmonds MD, 1 patch at 03/26/17 0849  •  nystatin (MYCOSTATIN) 813450 UNIT/ML suspension 500,000 Units, 500,000 Units, Swish & Swallow, 4x Daily, Gordo Edmonds MD, 500,000 Units at 03/26/17 2130  •  pantoprazole (PROTONIX) EC tablet 40 mg, 40 mg, Oral, Q AM, Gordo Edmonds MD, 40 mg at 03/26/17 0509  •  sodium chloride 0.9 % flush 1-10 mL, 1-10 mL, Intravenous, PRN, Nas Patricia MD, 10 mL at 03/26/17 1704  •  traZODone (DESYREL) tablet 50 mg, 50 mg, Oral, Nightly, Gordo Edmonds MD, 50 mg at 03/26/17 0770  •  urea (CARMOL) 20 % cream, , Topical, BID, Gordo Edmonds MD  •  venlafaxine (EFFEXOR) tablet 75 mg, 75 mg, Oral, BID, Gordo Edmonds MD, 75 mg at 03/26/17 0968    Assessment/Plan     Problem List  Principal Problem:    COPD with acute exacerbation  Active Problems:    Dysphagia    Esophageal reflux    Anxiety    High cholesterol    Bipolar disorder    Depression    Gout    Hyperlipidemia    Insomnia    Osteoarthritis     Sciatica      Assessment/Plan      AE COPD  -- bronchodilators, IV steroids and ceftriaxone    Leukocytosis  - likely steroid induced, continue to monitor    Anxiety  - PRN ativan  - wellbutrin    HTN  - home meds    Tobacco Abuse  - cessation counseling  - nicotine replacement    DVT Prophylaxis  - enoxaparin    Continue treatment as above  Will need to f/u with pulmonary after discharge  Anticipate home soon               Nas Patricia MD 03/27/17 1:47 AM

## 2017-03-27 NOTE — PLAN OF CARE
Problem: Patient Care Overview (Adult)  Goal: Discharge Needs Assessment  Outcome: Ongoing (interventions implemented as appropriate)    03/27/17 5266   Discharge Needs Assessment   Concerns To Be Addressed no discharge needs identified   Readmission Within The Last 30 Days unable to assess

## 2017-03-27 NOTE — PLAN OF CARE
Problem: Patient Care Overview (Adult)  Goal: Plan of Care Review  Outcome: Ongoing (interventions implemented as appropriate)    03/27/17 0439   Coping/Psychosocial Response Interventions   Plan Of Care Reviewed With patient   Patient Care Overview   Progress improving   Outcome Evaluation   Outcome Summary/Follow up Plan reports less short of air than yesterday       Goal: Adult Individualization and Mutuality  Outcome: Ongoing (interventions implemented as appropriate)  Goal: Discharge Needs Assessment  Outcome: Ongoing (interventions implemented as appropriate)

## 2017-03-27 NOTE — PROGRESS NOTES
PROGRESS NOTE        Date of Admission: 3/24/2017  Length of Stay: 0  Primary Care Physician: DONTRELL Regan    Subjective   Chief Complaint: Cough and SOA  HPI:  Patient was seen today.  No events overnight.  She is sitting up to bedside in a chair.  Denies chest pain.  She was recently discharged from this facility for treatment of COPD exacerbation.        Review Of Systems:   Review of Systems   General ROS: Patient denies any fevers, chills or loss of consciousness. Complains of generalized weakness.   Psychological ROS: Denies any hallucinations and delusions.  Ophthalmic ROS: Denies any diplopia or transient loss of vision.  ENT ROS: Denies sore throat, nasal congestion or ear pain.   Allergy and Immunology ROS: Denies rash or itching.  Hematological and Lymphatic ROS: Denies neck swelling or easy bleeding.  Endocrine ROS: Denies any recent unintentional weight gain or loss.  Breast ROS: Denies any pain or swelling.  Respiratory ROS: cough and shortness of breath.   Cardiovascular ROS: Denies chest pain or palpitations. No history of exertional chest pain.   Gastrointestinal ROS: Denies nausea and vomiting. Denies any abdominal pain. No diarrhea.  Genito-Urinary ROS: Denies dysuria or hematuria.  Musculoskeletal ROS: Complains of chronic back pain. No muscle pain. No calf pain.   Neurological ROS: Denies any focal weakness. No loss of consciousness. Denies any numbness. Denies neck pain.   Dermatological ROS: Denies any redness or pruritis.    Objective      Temp:  [97.2 °F (36.2 °C)-98.6 °F (37 °C)] 97.6 °F (36.4 °C)  Heart Rate:  [] 111  Resp:  [16-20] 18  BP: (105-173)/(78-94) 145/78  Physical Exam    General Appearance:  Alert and cooperative, not in any acute distress.   Head:  Atraumatic and normocephalic, without obvious abnormality.   Eyes:          PERRLA, conjunctivae and sclerae normal, no Icterus. No pallor. Extra-occular movements are within normal limits.   Ears:  Ears appear  intact with no abnormalities noted.   Throat: No oral lesions, no thrush, oral mucosa moist.   Neck: Supple, trachea midline, no thyromegaly, no carotid bruit.   Back:   No kyphoscoliosis present. No tenderness to palpation,   range of motion normal.   Lungs:   Chest shape is normal. Breath sounds heard bilaterally equally.  wheezing. No Pleural rub or bronchial breathing.   Heart:  Normal S1 and S2, no murmur, no gallop, no rub. No JVD   Abdomen:   Normal bowel sounds, no masses, no organomegaly. Soft     non-tender, non-distended, no guarding, no rebound                tenderness   Extremities: Moves all extremities well, no edema, no cyanosis, no clubbing.   Pulses: Pulses palpable and equal bilaterally   Skin: No bleeding, bruising or rash   Lymph nodes: No palpable adenopathy   Neurologic:    Psychiatric/Behavior:     Cranial nerves 2 - 12 grossly intact, sensation intact, Motor power is normal and equal bilaterally.  Mood normal, behavior normal       Results Review:    I have reviewed the labs, radiology results and diagnostic studies.      Results from last 7 days  Lab Units 03/27/17  0510   WBC 10*3/mm3 16.05*   HEMOGLOBIN g/dL 12.5   PLATELETS 10*3/mm3 289       Results from last 7 days  Lab Units 03/27/17  0510   SODIUM mmol/L 139   POTASSIUM mmol/L 4.7   TOTAL CO2 mmol/L 33.0*   CREATININE mg/dL 0.60   GLUCOSE mg/dL 105*       Culture Data:    Radiology Data:   Cardiology Data:    I have reviewed the medications.    Assessment/Plan     Assessment and Plan:  1.  COPD with exacerbation-  Patient is on IV antibiotics, breathing treatments, mucolytics, and IV steroids.  Dr. Esparza has been consulted.   2.  Anxiety- Ativan as needed  3.  Leukocytosis- Likely secondary to steroids  4.  Tobacco abuse- COunseling          DVT prophylaxis:  Discharge Planning:   DWAYNE Bhatt 03/27/17 12:09 PM

## 2017-03-28 LAB
ANION GAP SERPL CALCULATED.3IONS-SCNC: 11.8 MMOL/L
BASOPHILS # BLD AUTO: 0.06 10*3/MM3 (ref 0–0.2)
BASOPHILS NFR BLD AUTO: 0.4 % (ref 0–2.5)
BUN BLD-MCNC: 22 MG/DL (ref 7–20)
BUN/CREAT SERPL: 36.7 (ref 7.1–23.5)
CALCIUM SPEC-SCNC: 9.3 MG/DL (ref 8.4–10.2)
CHLORIDE SERPL-SCNC: 100 MMOL/L (ref 98–107)
CO2 SERPL-SCNC: 34 MMOL/L (ref 26–30)
CREAT BLD-MCNC: 0.6 MG/DL (ref 0.6–1.3)
DEPRECATED RDW RBC AUTO: 56.8 FL (ref 37–54)
EOSINOPHIL # BLD AUTO: 0.01 10*3/MM3 (ref 0–0.7)
EOSINOPHIL NFR BLD AUTO: 0.1 % (ref 0–7)
ERYTHROCYTE [DISTWIDTH] IN BLOOD BY AUTOMATED COUNT: 15.7 % (ref 11.5–14.5)
GFR SERPL CREATININE-BSD FRML MDRD: 99 ML/MIN/1.73
GLUCOSE BLD-MCNC: 113 MG/DL (ref 74–98)
HCT VFR BLD AUTO: 41.9 % (ref 37–47)
HGB BLD-MCNC: 13.4 G/DL (ref 12–16)
IMM GRANULOCYTES # BLD: 0.8 10*3/MM3 (ref 0–0.06)
IMM GRANULOCYTES NFR BLD: 5.3 % (ref 0–0.6)
LYMPHOCYTES # BLD AUTO: 0.86 10*3/MM3 (ref 0.6–3.4)
LYMPHOCYTES NFR BLD AUTO: 5.7 % (ref 10–50)
MCH RBC QN AUTO: 31.4 PG (ref 27–31)
MCHC RBC AUTO-ENTMCNC: 32 G/DL (ref 30–37)
MCV RBC AUTO: 98.1 FL (ref 81–99)
MONOCYTES # BLD AUTO: 0.56 10*3/MM3 (ref 0–0.9)
MONOCYTES NFR BLD AUTO: 3.7 % (ref 0–12)
NEUTROPHILS # BLD AUTO: 12.85 10*3/MM3 (ref 2–6.9)
NEUTROPHILS NFR BLD AUTO: 84.8 % (ref 37–80)
NRBC BLD MANUAL-RTO: 0 /100 WBC (ref 0–0)
PLAT MORPH BLD: NORMAL
PLATELET # BLD AUTO: 300 10*3/MM3 (ref 130–400)
PMV BLD AUTO: 9.7 FL (ref 6–12)
POTASSIUM BLD-SCNC: 4.8 MMOL/L (ref 3.5–5.1)
RBC # BLD AUTO: 4.27 10*6/MM3 (ref 4.2–5.4)
RBC MORPH BLD: NORMAL
SODIUM BLD-SCNC: 141 MMOL/L (ref 137–145)
WBC MORPH BLD: NORMAL
WBC NRBC COR # BLD: 15.14 10*3/MM3 (ref 4.8–10.8)

## 2017-03-28 PROCEDURE — 25010000002 METHYLPREDNISOLONE PER 40 MG: Performed by: INTERNAL MEDICINE

## 2017-03-28 PROCEDURE — 80048 BASIC METABOLIC PNL TOTAL CA: CPT | Performed by: NURSE PRACTITIONER

## 2017-03-28 PROCEDURE — 97165 OT EVAL LOW COMPLEX 30 MIN: CPT

## 2017-03-28 PROCEDURE — 25010000002 CEFTRIAXONE: Performed by: INTERNAL MEDICINE

## 2017-03-28 PROCEDURE — 25010000002 METHYLPREDNISOLONE PER 80 MG: Performed by: INTERNAL MEDICINE

## 2017-03-28 PROCEDURE — 97162 PT EVAL MOD COMPLEX 30 MIN: CPT

## 2017-03-28 PROCEDURE — 99232 SBSQ HOSP IP/OBS MODERATE 35: CPT | Performed by: NURSE PRACTITIONER

## 2017-03-28 PROCEDURE — 99223 1ST HOSP IP/OBS HIGH 75: CPT | Performed by: INTERNAL MEDICINE

## 2017-03-28 PROCEDURE — 94799 UNLISTED PULMONARY SVC/PX: CPT

## 2017-03-28 PROCEDURE — 85025 COMPLETE CBC W/AUTO DIFF WBC: CPT | Performed by: NURSE PRACTITIONER

## 2017-03-28 PROCEDURE — 85007 BL SMEAR W/DIFF WBC COUNT: CPT | Performed by: NURSE PRACTITIONER

## 2017-03-28 PROCEDURE — 25010000002 ENOXAPARIN PER 10 MG: Performed by: INTERNAL MEDICINE

## 2017-03-28 PROCEDURE — 94760 N-INVAS EAR/PLS OXIMETRY 1: CPT

## 2017-03-28 RX ORDER — METHYLPREDNISOLONE ACETATE 80 MG/ML
40 INJECTION, SUSPENSION INTRA-ARTICULAR; INTRALESIONAL; INTRAMUSCULAR; SOFT TISSUE ONCE
Status: COMPLETED | OUTPATIENT
Start: 2017-03-28 | End: 2017-03-28

## 2017-03-28 RX ORDER — METOPROLOL SUCCINATE 50 MG/1
50 TABLET, EXTENDED RELEASE ORAL
Status: DISCONTINUED | OUTPATIENT
Start: 2017-03-28 | End: 2017-03-29 | Stop reason: HOSPADM

## 2017-03-28 RX ORDER — LORAZEPAM 0.5 MG/1
1 TABLET ORAL EVERY 12 HOURS
Status: DISCONTINUED | OUTPATIENT
Start: 2017-03-28 | End: 2017-03-29 | Stop reason: HOSPADM

## 2017-03-28 RX ADMIN — CEFTRIAXONE 1 G: 1 INJECTION, POWDER, FOR SOLUTION INTRAMUSCULAR; INTRAVENOUS at 02:56

## 2017-03-28 RX ADMIN — CALCIUM ACETATE 1334 MG: 667 TABLET ORAL at 12:16

## 2017-03-28 RX ADMIN — CALCIUM ACETATE 1334 MG: 667 TABLET ORAL at 08:31

## 2017-03-28 RX ADMIN — NYSTATIN 500000 UNITS: 100000 SUSPENSION ORAL at 08:31

## 2017-03-28 RX ADMIN — METHYLPREDNISOLONE ACETATE 40 MG: 80 INJECTION, SUSPENSION INTRA-ARTICULAR; INTRALESIONAL; INTRAMUSCULAR; SOFT TISSUE at 08:49

## 2017-03-28 RX ADMIN — GUAIFENESIN 600 MG: 600 TABLET, EXTENDED RELEASE ORAL at 17:13

## 2017-03-28 RX ADMIN — ENOXAPARIN SODIUM 30 MG: 30 INJECTION SUBCUTANEOUS at 08:31

## 2017-03-28 RX ADMIN — LORAZEPAM 1 MG: 0.5 TABLET ORAL at 08:33

## 2017-03-28 RX ADMIN — BUDESONIDE AND FORMOTEROL FUMARATE DIHYDRATE 2 PUFF: 160; 4.5 AEROSOL RESPIRATORY (INHALATION) at 06:46

## 2017-03-28 RX ADMIN — LORAZEPAM 1 MG: 0.5 TABLET ORAL at 21:47

## 2017-03-28 RX ADMIN — IPRATROPIUM BROMIDE AND ALBUTEROL SULFATE 3 ML: .5; 3 SOLUTION RESPIRATORY (INHALATION) at 12:46

## 2017-03-28 RX ADMIN — METOPROLOL SUCCINATE 50 MG: 50 TABLET, EXTENDED RELEASE ORAL at 08:32

## 2017-03-28 RX ADMIN — NYSTATIN 500000 UNITS: 100000 SUSPENSION ORAL at 17:13

## 2017-03-28 RX ADMIN — VENLAFAXINE 75 MG: 75 TABLET ORAL at 08:32

## 2017-03-28 RX ADMIN — CYCLOBENZAPRINE HYDROCHLORIDE 10 MG: 10 TABLET, FILM COATED ORAL at 08:32

## 2017-03-28 RX ADMIN — BUDESONIDE AND FORMOTEROL FUMARATE DIHYDRATE 2 PUFF: 160; 4.5 AEROSOL RESPIRATORY (INHALATION) at 19:16

## 2017-03-28 RX ADMIN — NYSTATIN 500000 UNITS: 100000 SUSPENSION ORAL at 21:46

## 2017-03-28 RX ADMIN — IPRATROPIUM BROMIDE AND ALBUTEROL SULFATE 3 ML: .5; 3 SOLUTION RESPIRATORY (INHALATION) at 19:16

## 2017-03-28 RX ADMIN — GABAPENTIN 400 MG: 400 CAPSULE ORAL at 21:46

## 2017-03-28 RX ADMIN — PANTOPRAZOLE SODIUM 40 MG: 40 TABLET, DELAYED RELEASE ORAL at 05:31

## 2017-03-28 RX ADMIN — TRAZODONE HYDROCHLORIDE 50 MG: 50 TABLET ORAL at 21:47

## 2017-03-28 RX ADMIN — VENLAFAXINE 75 MG: 75 TABLET ORAL at 17:12

## 2017-03-28 RX ADMIN — METHYLPREDNISOLONE SODIUM SUCCINATE 40 MG: 40 INJECTION, POWDER, FOR SOLUTION INTRAMUSCULAR; INTRAVENOUS at 05:30

## 2017-03-28 RX ADMIN — CALCIUM ACETATE 1334 MG: 667 TABLET ORAL at 17:12

## 2017-03-28 RX ADMIN — NICOTINE 1 PATCH: 21 PATCH TRANSDERMAL at 11:28

## 2017-03-28 RX ADMIN — NYSTATIN 500000 UNITS: 100000 SUSPENSION ORAL at 12:16

## 2017-03-28 RX ADMIN — IPRATROPIUM BROMIDE AND ALBUTEROL SULFATE 3 ML: .5; 3 SOLUTION RESPIRATORY (INHALATION) at 00:30

## 2017-03-28 RX ADMIN — MIRTAZAPINE 30 MG: 15 TABLET, FILM COATED ORAL at 21:46

## 2017-03-28 RX ADMIN — HYDROCODONE BITARTRATE AND ACETAMINOPHEN 1 TABLET: 10; 325 TABLET ORAL at 08:48

## 2017-03-28 RX ADMIN — GUAIFENESIN 600 MG: 600 TABLET, EXTENDED RELEASE ORAL at 08:32

## 2017-03-28 RX ADMIN — HYDROCODONE BITARTRATE AND ACETAMINOPHEN 1 TABLET: 10; 325 TABLET ORAL at 17:12

## 2017-03-28 RX ADMIN — IPRATROPIUM BROMIDE AND ALBUTEROL SULFATE 3 ML: .5; 3 SOLUTION RESPIRATORY (INHALATION) at 06:45

## 2017-03-28 NOTE — CONSULTS
"Date of consultation:   March 28, 2017    Requested by:   Hospitalist Service.     PCP: DONTRELL Regan      Reason:  SOB. ? AECOPD?    History of Present Illness:  Patient was admitted to the hospitalist service after she presented to the emergency room twice with symptoms of almost sudden onset shortness of breath.  The patient denies any fever, chills or increased cough or phlegm production.  Apart from the fact that she is extremely weak from the past 2 admissions, one of them related to Clostridium difficile and the other one related to a pneumonia, she denies any significant change in her baseline shortness of breath.    She was using a medication including Spiriva, Symbicort and oxygen.  She was also using her nebulized treatments as prescribed.     She also mentions no significant increase in wheezing.  She says that even after her discharge she was feeling \"jittery\".    Review of System: All other review of systems negative except indicated in HPI. Also c/o weakness, leg cramps occasionally. No diarrhea. No fever. No chills.     Past Medical History:  Past Medical History:   Diagnosis Date   • Abdominal pain    • Acute bronchitis    • Ankle pain    • Anxiety 1980   • Atopic dermatitis    • Backache    • Bipolar disorder    • Bronchiectasis    • Chronic obstructive lung disease 2008   • Colon cancer screening    • COPD (chronic obstructive pulmonary disease)    • Cystocele    • Depressed    • Depression 1980   • Fatigue     Chronic pafigue 2012   • Fibromyalgia 2008   • GERD (gastroesophageal reflux disease)    • Gout    • Heartburn     Chronic historu of epigastric heartburn currently controlled on Prilesec 40 mg every morning along with Zantac 300 Mg daily at bedtime   • High cholesterol 2012   • Hip pain    • Hyperlipidemia    • Hypertension    • Insomnia    • Joint pain    • Kidney infection    • Loss of appetite    • Low back pain 1995   • Melena    • Nausea and vomiting    • On home oxygen therapy " "   • Osteoarthritis 2010   • Pain in limb    • Palpitations    • Pinched nerve     Pinched nerves 2011   • Recurrent urinary tract infection    • Sciatica [id number]   • Shortness of breath    • Sinus problem    • Sleep apnea 1998   • Upset stomach    • Valvular heart disease    • Vitamin B12 deficiency    • Weight loss, abnormal     Weight loss is stabel. On pund weight gain since 01/2016         Past Surgical History:  Past Surgical History:   Procedure Laterality Date   • ABDOMINAL HERNIA REPAIR     • APPENDECTOMY  1965   • BACK SURGERY  2005   • CATARACT EXTRACTION Bilateral 1978   • CHOLECYSTECTOMY     • EYE SURGERY      Put in implants    • GALLBLADDER SURGERY  1985   • KNEE SURGERY Left 1979    Knee Cap   • TUBAL ABDOMINAL LIGATION  1971         Family History:  Family History   Problem Relation Age of Onset   • Ovarian cancer Mother    • Cancer Mother    • Lung cancer Father    • Heart disease Brother          Social History:  Social History     Social History   • Marital status: Single     Spouse name: N/A   • Number of children: N/A   • Years of education: N/A     Social History Main Topics   • Smoking status: Current Every Day Smoker     Packs/day: 0.50     Years: 35.00   • Smokeless tobacco: Never Used      Comment: 1/2 to 1 ppd   • Alcohol use No   • Drug use: No   • Sexual activity: Defer     Other Topics Concern   • None     Social History Narrative         Physical Exam:  /94 (BP Location: Right arm, Patient Position: Lying)  Pulse 104  Temp 98.4 °F (36.9 °C) (Oral)   Resp 18  Ht 60\" (152.4 cm)  Wt 92 lb 1 oz (41.8 kg)  LMP  (LMP Unknown)  SpO2 99%  BMI 17.98 kg/m2    Constitutional:             General: No significant respiratory distress noted.    Head/Face/Eyes:             No significant facial abnormalities seen.             Extra ocular movement was intact.             Pupils appeared equal    ENT:             Hearing was intact.             Dentures noted.              No nasal " erythema noted.              Oropharynx was moist. No lesions noted.     Neck:             Supple. No JVD noted.              Thyroid gland did not seem to be enlarged    Cardiovascular:              S1 + S2. Tachy.    Respiratory:            Respiratory effort was minimally labored.             Hyperresonance to percussion.            Decreased Air Entry Bilaterally with mild to moderate wheezing.    Abdomen:            Soft.  Bowel sounds were positive.  No obvious organomegaly.    Extremities:            No edema noted.            No cyanosis noted            No clubbing noted            Gait could not be assessed at this time.    Neurologic/Psychiatric:             Affect appeared fair.             Awake, alert and oriented x 3.             Able to follow simple commands.             Appears anxious and somewhat tremulous.    Skin:             No rash noted.             Warm and dry          Labs:   Reviewed. Pertinent labs were noted.     Lab Results   Component Value Date    WBC 15.14 (H) 03/28/2017    HGB 13.4 03/28/2017    HCT 41.9 03/28/2017    MCV 98.1 03/28/2017     03/28/2017       Lab Results   Component Value Date    GLUCOSE 113 (H) 03/28/2017    CALCIUM 9.3 03/28/2017     03/28/2017    K 4.8 03/28/2017    CO2 34.0 (H) 03/28/2017     03/28/2017    BUN 22 (H) 03/28/2017    CREATININE 0.60 03/28/2017    EGFRIFNONA 99 03/28/2017    BCR 36.7 (H) 03/28/2017    ANIONGAP 11.8 03/28/2017           Imaging Study: Images reviewed personally and discussed with patient.    Imaging Results (last 72 hours)     Procedure Component Value Units Date/Time    XR Chest 2 View [[id number]] Collected:  03/25/17 0824     Updated:  03/26/17 0723    Narrative:       2 VIEW CHEST     INDICATION: Shortness of breath.     FINDINGS: 2 views, compared with 03/13/2017. EKG leads overlie the  chest. Heart size is normal. No pneumothorax. Slightly coarsened  interstitial opacities are likely chronic. Trace scarring or  atelectasis  in the lung bases appears fairly similar. No effusion. Degenerative  changes are present in the spine and shoulders. Lower left rib fractures  are probably old and appear unchanged.       Impression:       Trace basilar scarring or atelectasis. No significant change  otherwise.     This report was finalized on 3/26/2017 7:21 AM by Reese Keane MD.            Assessment:  1.  Shortness of breath  2.  Anxiety  3.  Likely COPD exacerbation  4.  No evidence of pneumonia  5.  Chronic hypoxemia  6.  Hypertension  7.  Tachycardia      Discussion/Recommendations:   Although COPD exacerbation definitely remains a concern, I believe her symptoms are more in line with anxiety causing panic attacks.  The fact that there has been no significant increase in cough, phlegm production or fever and the fact that the symptoms were somewhat of a sudden onset which improved after she was given Ativan with considerable improvement in shortness of breath, makes anxiety more likely rather than a true exacerbation.    Since there is no bronchospasm at this time, I have discontinued the Solu-Medrol and will administer Depo-Medrol ×1.    The patient is clearly tachycardic and somewhat hypertensive therefore I have increased the metoprolol to 50 mg.    The plan was discussed with the patient.  I have also discussed the case with the nursing staff.    Recommendations were also discussed with the referring provider.     I would like to thank you for the opportunity to participate in the care of this patient.  We will communicate changes and recommendations, if and when necessary.      Rebeka Esparza MD  03/28/17  7:24 AM    Please note that portions of this note may have been completed with a voice recognition software. Every effort was made to edit the dictation, but occasionally words are mistranscribed.

## 2017-03-28 NOTE — PROGRESS NOTES
PROGRESS NOTE        Date of Admission: 3/24/2017  Length of Stay: 0  Primary Care Physician: DNOTRELL Regan    Subjective   Chief Complaint: Cough and SOA  HPI:  Patient was seen today.  No events overnight.  She is sitting up to bedside in a chair.  Denies chest pain.  She did ambulate in the vaughan today.  She feels that the Ativan is helping most of any treatment.   consulted for outpatient referral to behavioral health for anxiety.      Review Of Systems:   Review of Systems   General ROS: Patient denies any fevers, chills or loss of consciousness. Complains of generalized weakness.   Psychological ROS: Denies any hallucinations and delusions.  Ophthalmic ROS: Denies any diplopia or transient loss of vision.  ENT ROS: Denies sore throat, nasal congestion or ear pain.   Allergy and Immunology ROS: Denies rash or itching.  Hematological and Lymphatic ROS: Denies neck swelling or easy bleeding.  Endocrine ROS: Denies any recent unintentional weight gain or loss.  Breast ROS: Denies any pain or swelling.  Respiratory ROS: cough and shortness of breath.   Cardiovascular ROS: Denies chest pain or palpitations. No history of exertional chest pain.   Gastrointestinal ROS: Denies nausea and vomiting. Denies any abdominal pain. No diarrhea.  Genito-Urinary ROS: Denies dysuria or hematuria.  Musculoskeletal ROS: Complains of chronic back pain. No muscle pain. No calf pain.   Neurological ROS: Denies any focal weakness. No loss of consciousness. Denies any numbness. Denies neck pain.   Dermatological ROS: Denies any redness or pruritis.    Objective      Temp:  [97.3 °F (36.3 °C)-98.6 °F (37 °C)] 98.6 °F (37 °C)  Heart Rate:  [102-113] 103  Resp:  [18-20] 18  BP: (139-158)/(74-94) 139/77  Physical Exam    General Appearance:  Alert and cooperative, not in any acute distress.   Head:  Atraumatic and normocephalic, without obvious abnormality.   Eyes:          PERRLA, conjunctivae and sclerae normal, no  Icterus. No pallor. Extra-occular movements are within normal limits.   Ears:  Ears appear intact with no abnormalities noted.   Throat: No oral lesions, no thrush, oral mucosa moist.   Neck: Supple, trachea midline, no thyromegaly, no carotid bruit.   Back:   No kyphoscoliosis present. No tenderness to palpation,   range of motion normal.   Lungs:   Chest shape is normal. Breath sounds heard bilaterally equally.  Wheezing resolved. No Pleural rub or bronchial breathing.   Heart:  Normal S1 and S2, no murmur, no gallop, no rub. No JVD   Abdomen:   Normal bowel sounds, no masses, no organomegaly. Soft     non-tender, non-distended, no guarding, no rebound                tenderness   Extremities: Moves all extremities well, no edema, no cyanosis, no clubbing.   Pulses: Pulses palpable and equal bilaterally   Skin: No bleeding, bruising or rash   Lymph nodes: No palpable adenopathy   Neurologic:    Psychiatric/Behavior:     Cranial nerves 2 - 12 grossly intact, sensation intact, Motor power is normal and equal bilaterally.  Mood normal, behavior normal       Results Review:    I have reviewed the labs, radiology results and diagnostic studies.      Results from last 7 days  Lab Units 03/28/17  0510   WBC 10*3/mm3 15.14*   HEMOGLOBIN g/dL 13.4   PLATELETS 10*3/mm3 300       Results from last 7 days  Lab Units 03/28/17  0510   SODIUM mmol/L 141   POTASSIUM mmol/L 4.8   TOTAL CO2 mmol/L 34.0*   CREATININE mg/dL 0.60   GLUCOSE mg/dL 113*       Culture Data:    Radiology Data:   Cardiology Data:    I have reviewed the medications.    Assessment/Plan     Assessment and Plan:  1.  COPD with exacerbation-  Patient is on IV antibiotics, breathing treatments, mucolytics, and IV steroids.  Dr. Esparza has been consulted.   2.  Anxiety- Ativan as needed.  Will arrange outpatient evaluation with behavioral health.  3.  Leukocytosis- Likely secondary to steroids showing some improvement.   4.  Chronic hypoxic respiratory failure- On  home oxygen   5.  Tobacco abuse- COunseling          DVT prophylaxis:  Discharge Planning:   DWAYNE Bhatt 03/28/17 12:09 PM

## 2017-03-28 NOTE — PLAN OF CARE
Problem: Patient Care Overview (Adult)  Goal: Plan of Care Review  Outcome: Ongoing (interventions implemented as appropriate)    03/28/17 1825   Coping/Psychosocial Response Interventions   Plan Of Care Reviewed With patient   Patient Care Overview   Progress improving       Goal: Adult Individualization and Mutuality  Outcome: Ongoing (interventions implemented as appropriate)  Goal: Discharge Needs Assessment  Outcome: Ongoing (interventions implemented as appropriate)

## 2017-03-28 NOTE — PROGRESS NOTES
Acute Care - Occupational Therapy Initial Evaluation  [facility]     Patient Name: Joelle Yee  : 1945  MRN: [id number]  Today's Date: 3/28/2017  Onset of Illness/Injury or Date of Surgery Date: 17  Date of Referral to OT: 17  Referring Physician: Dr. Esparza    Admit Date: 3/24/2017       ICD-10-CM ICD-9-CM   1. COPD with acute exacerbation J44.1 491.21   2. Fever, unspecified fever cause R50.9 780.60   3. Palmar, plantar, and disseminated porokeratosis Q82.9 757.39   4. Impaired mobility and ADLs Z74.09 799.89     Patient Active Problem List   Diagnosis   • Dysphagia   • Dyspepsia   • Esophageal reflux   • Anxiety   • High cholesterol   • Bipolar disorder   • COPD (chronic obstructive pulmonary disease)   • Depression   • Gout   • Hyperlipidemia   • Insomnia   • Osteoarthritis   • Sciatica   • Sleep apnea   • Vitamin B 12 deficiency   • Pancolitis   • Acute cystitis without hematuria   • C. difficile colitis   • Chronic bronchitis with COPD (chronic obstructive pulmonary disease)   • Pneumonia of right lower lobe due to infectious organism   • COPD exacerbation   • Pneumonia involving right lung   • COPD with acute exacerbation     Past Medical History:   Diagnosis Date   • Abdominal pain    • Acute bronchitis    • Ankle pain    • Anxiety    • Atopic dermatitis    • Backache    • Bipolar disorder    • Bronchiectasis    • Chronic obstructive lung disease    • Colon cancer screening    • COPD (chronic obstructive pulmonary disease)    • Cystocele    • Depressed    • Depression    • Fatigue     Chronic pafigue    • Fibromyalgia    • GERD (gastroesophageal reflux disease)    • Gout    • Heartburn     Chronic historu of epigastric heartburn currently controlled on Prilesec 40 mg every morning along with Zantac 300 Mg daily at bedtime   • High cholesterol    • Hip pain    • Hyperlipidemia    • Hypertension    • Insomnia    • Joint pain    • Kidney infection    • Loss of  appetite    • Low back pain 1995   • Melena    • Nausea and vomiting    • On home oxygen therapy    • Osteoarthritis 2010   • Pain in limb    • Palpitations    • Pinched nerve     Pinched nerves 2011   • Recurrent urinary tract infection    • Sciatica [id number]   • Shortness of breath    • Sinus problem    • Sleep apnea 1998   • Upset stomach    • Valvular heart disease    • Vitamin B12 deficiency    • Weight loss, abnormal     Weight loss is stabel. On pund weight gain since 01/2016     Past Surgical History:   Procedure Laterality Date   • ABDOMINAL HERNIA REPAIR     • APPENDECTOMY  1965   • BACK SURGERY  2005   • CATARACT EXTRACTION Bilateral 1978   • CHOLECYSTECTOMY     • EYE SURGERY      Put in implants    • GALLBLADDER SURGERY  1985   • KNEE SURGERY Left 1979    Knee Cap   • TUBAL ABDOMINAL LIGATION  1971          OT ASSESSMENT FLOWSHEET (last 72 hours)      OT Evaluation       03/28/17 1022 03/28/17 1021             Rehab Evaluation    Document Type evaluation  - evaluation  -JR       Subjective Information agree to therapy;complains of;pain  - agree to therapy;complains of;pain;fatigue  -JR       Patient Effort, Rehab Treatment adequate  - adequate  -JR       Symptoms Noted During/After Treatment increased pain;shortness of breath  - increased pain;shortness of breath  -       General Information    Patient Profile Review yes  - yes  -JR       Onset of Illness/Injury or Date of Surgery Date 03/24/17  -AH 03/27/17  -JR       Referring Physician Dr. Esparza  - Erik Esparza MD  -JR       General Observations Pt received supine in bed with 2L O2 via nc  - Supine IV LUE, Oxygen at 2LPM per nasal cannula  -       Pertinent History Of Current Problem soa, COPD exacerbation  -        Precautions/Limitations oxygen therapy device and L/min  - oxygen therapy device and L/min   2LPM per nasal cannula  -JR       Prior Level of Function independent:;all household mobility;ADL's  -  independent:;all household mobility  -       Equipment Currently Used at Home shower chair;grab bar;oxygen  - shower chair;oxygen;grab bar  -       Plans/Goals Discussed With patient;agreed upon  - patient;agreed upon  -       Risks Reviewed patient:;increased discomfort  - patient:;increased discomfort  -       Benefits Reviewed patient:;improve function;increase independence;increase strength  - patient:;increase balance;increase strength;increase independence;improve function  -       Barriers to Rehab medically complex  - medically complex  -       Living Environment    Lives With alone  - alone  -       Living Arrangements apartment  - apartment  -       Home Accessibility bed and bath on same level  - bed and bath on same level;stairs to enter home  -       Number of Stairs to Enter Home 1  - 1  -JR       Clinical Impression    Date of Referral to OT 03/28/17  -        OT Diagnosis weakness, decreased independence with self care tasks  -        Patient/Family Goals Statement Pt wants to return home and get back to her PLOF  -        Criteria for Skilled Therapeutic Interventions Met yes;treatment indicated  -        Rehab Potential good, to achieve stated therapy goals  -        Therapy Frequency 3-5 times/wk  -        Anticipated Discharge Disposition home with home health  -        Functional Level Prior    Ambulation 0-->independent  -        Transferring 0-->independent  -        Toileting 0-->independent  -        Bathing 0-->independent  -        Dressing 0-->independent  -        Eating 0-->independent  -        Communication 0-->understands/communicates without difficulty  -        Swallowing 0-->swallows foods/liquids without difficulty  -        Vital Signs    O2 Delivery Pre Treatment supplemental O2   2L  -AH        O2 Delivery Intra Treatment supplemental O2   2L  -AH        O2 Delivery Post Treatment supplemental O2   2L  -AH         Pain Assessment    Pain Assessment 0-10  - 0-10  -JR       Pain Score 6  - 6  -JR       Pain Type Chronic pain  - Chronic pain  -JR       Pain Location Back  - Back  -JR       Pain Orientation Lower  - Lower  -JR       Pain Descriptors Aching  - Aching  -JR       Pain Frequency Constant/continuous  - Constant/continuous  -JR       Pain Intervention(s) Repositioned;Ambulation/increased activity  -        Vision Assessment/Intervention    Visual Impairment WFL with corrective lenses  -        Cognitive Assessment/Intervention    Current Cognitive/Communication Assessment functional  -        Orientation Status oriented x 4  -        Follows Commands/Answers Questions able to follow multi-step instructions  -        ROM (Range of Motion)    General ROM no range of motion deficits identified  -        MMT (Manual Muscle Testing)    General MMT Assessment no strength deficits identified  -        Bed Mobility, Assessment/Treatment    Bed Mob, Supine to Sit, [place] independent  -        Transfer Assessment/Treatment    Transfers, Sit-Stand [place] contact guard assist  -        Transfers, Stand-Sit [place] contact guard assist  -        Functional Mobility    Functional Mobility- Ind. Level contact guard assist  -        Functional Mobility-Distance (Feet) 356  -        Functional Mobility- Safety Issues supplemental O2  -        Upper Body Bathing Assessment/Training    UB Bathing Assess/Train, [place] Level independent  -        Lower Body Bathing Assessment/Training    LB Bathing Assess/Train, [place] Level contact guard assist  -        LB Bathing Assess/Train, Comment Pt gets soa with bathing tasks  -        Upper Body Dressing Assessment/Training    UB Dressing Assess/Train, [place] independent  -        Lower Body Dressing Assessment/Training    LB Dressing Assess/Train, [place] contact guard assist  -        LB Dressing  Assess/Train, Comment Pt gets soa with dressing tasks  -        Toileting Assessment/Training    Toileting Assess/Train, Indepen Level independent  -        Grooming Assessment/Training    Grooming Assess/Train, Indepen Level independent  -        General Therapy Interventions    Planned Therapy Interventions energy conservation;strengthening  -        Positioning and Restraints    Pre-Treatment Position in bed  -        Post Treatment Position chair  -        In Chair reclined;call light within reach;encouraged to call for assist  -          User Key  (r) = Recorded By, (t) = Taken By, (c) = Cosigned By    Initials Name Effective Dates     Jonna Callahan 10/26/16 -     JR Adelina Jones, PT 10/26/16 -            Occupational Therapy Education     Title: PT OT SLP Therapies (Active)     Topic: Occupational Therapy (Active)     Point: ADL training (Done)    Description: Instruct learner(s) on proper safety adaptation and remediation techniques during self care or transfers.   Instruct in proper use of assistive devices.    Learning Progress Summary    Learner Readiness Method Response Comment Documented by Status   Patient Acceptance E VU Role of OT  03/28/17 1349 Done                      User Key     Initials Effective Dates Name Provider Type Discipline     10/26/16 -  Jonna Callahan Occupational Therapist OT                  OT Recommendation and Plan  Anticipated Discharge Disposition: home with home health  Planned Therapy Interventions: energy conservation, strengthening  Therapy Frequency: 3-5 times/wk  Plan of Care Review  Plan Of Care Reviewed With: patient  Progress:  (OT evaluation completed today.)  Outcome Summary/Follow up Plan: Pt presents with decreased tolerance to activity d/t soa.  Pt is expected to benefit from OT to improve her self care, functional mobility and tolerance to activity.          OT Goals       03/28/17 1350          Strength OT LTG    Strength Goal OT LTG, Date  Established 03/28/17  -      Strength Goal OT LTG, Time to Achieve 2 wks  -AH      Strength Goal OT LTG, Measure to Achieve Pt will participate in BUE strengthening ex to improve her functional strength and endurance  -      Strength Goal OT LTG, Outcome goal ongoing  -AH      LB Dressing OT LTG    LB Dressing Goal OT LTG, Date Established 03/28/17  -AH      LB Dressing Goal OT LTG, Time to Achieve 2 wks  -AH      LB Dressing Goal OT LTG, [place] Level independent  -AH      LB Dressing Goal OT LTG, Outcome goal ongoing  -      Activity Tolerance OT LTG    Activity Tolerance Goal OT LTG, Date Established 03/28/17  -AH      Activity Tolerance Goal OT LTG, Time to Achieve 2 wks  -AH      Activity Tolerance Goal OT LTG, Activity Level 15 min activity  -      Activity Tolerance Goal OT LTG, Outcome goal ongoing  -        User Key  (r) = Recorded By, (t) = Taken By, (c) = Cosigned By    Initials Name Provider Type     Jonna Callahan Occupational Therapist                Outcome Measures       03/28/17 1022          How much help from another is currently needed...    Putting on and taking off regular lower body clothing? 3  -AH      Bathing (including washing, rinsing, and drying) 3  -AH      Toileting (which includes using toilet bed pan or urinal) 4  -AH      Putting on and taking off regular upper body clothing 4  -AH      Taking care of personal grooming (such as brushing teeth) 4  -AH      Eating meals 4  -      Score 22  -      Functional Assessment    Outcome Measure Options AM-PAC 6 Clicks Daily Activity (OT)  -        User Key  (r) = Recorded By, (t) = Taken By, (c) = Cosigned By    Initials Name Provider Type     Jonna Callahan Occupational Therapist          Time Calculation:   OT Start Time: 1022    Therapy Charges for Today     Code Description Service Date Service Provider Modifiers Qty    [id number]  OT EVAL LOW COMPLEXITY 4 3/28/2017 Jonna Callahan GO 1               Jonna   [place]  3/28/2017

## 2017-03-28 NOTE — PLAN OF CARE
Problem: Patient Care Overview (Adult)  Goal: Plan of Care Review  Outcome: Ongoing (interventions implemented as appropriate)    03/28/17 1350   Coping/Psychosocial Response Interventions   Plan Of Care Reviewed With patient   Patient Care Overview   Progress (OT evaluation completed today.)   Outcome Evaluation   Outcome Summary/Follow up Plan Pt presents with decreased tolerance to activity d/t soa. Pt is expected to benefit from OT to improve her self care, functional mobility and tolerance to activity.         Problem: Inpatient Occupational Therapy  Goal: Strength Goal LTG- OT  Outcome: Ongoing (interventions implemented as appropriate)  Goal: LB Dressing Goal LTG- OT  Outcome: Ongoing (interventions implemented as appropriate)    03/28/17 1350   LB Dressing OT LTG   LB Dressing Goal OT LTG, Date Established 03/28/17   LB Dressing Goal OT LTG, Time to Achieve 2 wks   LB Dressing Goal OT LTG, [place] Level independent   LB Dressing Goal OT LTG, Outcome goal ongoing       Goal: Activity Tolerance Goal LTG- OT  Outcome: Ongoing (interventions implemented as appropriate)    03/28/17 1350   Activity Tolerance OT LTG   Activity Tolerance Goal OT LTG, Date Established 03/28/17   Activity Tolerance Goal OT LTG, Time to Achieve 2 wks   Activity Tolerance Goal OT LTG, Activity Level 15 min activity   Activity Tolerance Goal OT LTG, Outcome goal ongoing

## 2017-03-28 NOTE — PROGRESS NOTES
"Adult Nutrition  Assessment/PES    Patient Name:  Joelle Yee  YOB: 1945  MRN: [id number]  Admit Date:  3/24/2017    Assessment Date:  3/28/2017        Reason for Assessment       03/28/17 1135    Reason for Assessment    Reason For Assessment/Visit admission assessment;diagnosis/disease state    Diagnosis Diagnosis    Endocrine DM Type 2    Pulmonary/Critical Care COPD                Anthropometrics       03/28/17 1138    Anthropometrics    Height Method Stated    Height 152.4 cm (60\")    Weight Method Bed scale    Weight 41.8 kg (92 lb 2.4 oz)    Ideal Body Weight (IBW)    Ideal Body Weight (IBW), Female 46.26    % Ideal Body Weight 90.55    Body Mass Index (BMI)    BMI (kg/m2) 18.03    BMI Grade 17 - 19 low grade I            Labs/Tests/Procedures/Meds       03/28/17 1139    Labs/Tests/Procedures/Meds    Labs/Tests Review Reviewed   High: Glu, BUN              Estimated/Assessed Needs       03/28/17 1141    Calculation Measurements    Weight Used For Calculations 41.8 kg (92 lb 2.4 oz)    Height Used for Calculations 1.524 m (5')    Estimated/Assessed Energy Needs    Energy Need Method Mozio-St Wuglyor    Age 89    RMR (Mozio-StNow In Storeor Equation) 764.5    Activity Factors (Mozio St Wuglyor)  Confined to bed  1.2    Estimated Kcal Range  ~2097-0109 kcal    Estimated/Assessed Protein Needs    Weight Used for Protein Calculation 41.8 kg (92 lb 2.4 oz)    Protein (gm/kg) 1.2    1.2 Gm Protein (gm) 50.16    Estimated Protein Range ~41.8-50 grams    Estimated/Assessed Fluid Needs    Fluid Need Method RDA method    RDA Method (mL)  1700   9866-2871 mL            Nutrition Prescription Ordered       03/28/17 1143    Nutrition Prescription PO    Current PO Diet Regular    Common Modifiers Cardiac;Consistent Carbohydrate            Evaluation of Received Nutrient/Fluid Intake       03/28/17 1141    PO Evaluation    Number of Days PO Intake Evaluated 2 days    Number of Meals 6    % PO Intake 45.8%    "           Problem/Interventions:        Problem 1       03/28/17 1147    Nutrition Diagnoses Problem 1    Problem 1 Underweight    Etiology (related to) Medical Diagnosis;Functional Diagnosis    Functional Diagnosis Dysphagia    Signs/Symptoms (evidenced by) PO Intake    Percent (%) intake recorded 45 %    Over number of meals 6            Problem 2       03/28/17 1148    Nutrition Diagnoses Problem 2    Problem 2 Increased Nutrient Needs    Macronutrient Kcal;Protein    Micronutrient Vitamin;Mineral    Etiology (related to) Medical Diagnosis    Pulmonary/Critical Care COPD    Signs/Symptoms (evidenced by) --   Increased energy expenditure due to pulmonary dysfunction                  Intervention Goal       03/28/17 1158    Intervention Goal    General Disease management/therapy;Meet nutritional needs for age/condition;Provide information regarding MNT for treatment/condition    PO PO intake (%);Increase intake    PO Intake % 75 %    Weight Appropriate weight gain            Nutrition Intervention       03/28/17 1159    Nutrition Intervention    RD/Tech Action Advise available snack;Follow Tx progress;Encourage intake;Recommend/ordered    Recommended/Ordered Supplement            Nutrition Prescription       03/28/17 1159    Nutrition Prescription PO    PO Prescription Begin/change supplement    Supplement Boost    Supplement Frequency 2 times a day    Common Modifiers Other (comment)   Remove Consistent Carbohydrate    New PO Prescription Ordered? Yes    Other Orders    Obtain Weight Daily    Obtain Weight Ordered? No, recommended    Supplement Vitamin mineral supplement    Supplement Ordered? No, recommended            Education/Evaluation       03/28/17 1200    Education    Education Will Instruct as appropriate    Monitor/Evaluation    Monitor Per protocol;I&O;PO intake;Supplement intake;Weight;Pertinent labs        Comments:  Rec #1: Consider removing Consistent Carbohydrate from diet order; encourage intake.  Rec #2: Consider offering MVI with minerals daily. Supplement ordered Boost BID. RD to follow pt. Consult RD PRN.    Electronically signed by:  Prema Mooney RD  03/28/17 12:05 PM

## 2017-03-28 NOTE — PROGRESS NOTES
Continued Stay Note  [facility]     Patient Name: Joelle Yee  MRN: [id number]  Today's Date: 3/28/2017    Admit Date: 3/24/2017          Discharge Plan       03/28/17 1303    Case Management/Social Work Plan    Plan Consult from medical provider and CM for behavioral health follow up. Discussed with patient [facility] behavioral health clinic and provided contact information. Also, provided [employer] 211 number for other resources. Patient aware of  medical transportation options.     Patient/Family In Agreement With Plan yes    Additional Comments Behavioral health resources provided for follow up.      Final Note    Final Note Following for social needs.       03/28/17 1150    Case Management/Social Work Plan    Plan Home with home health    Patient/Family In Agreement With Plan yes    Additional Comments Spoke to patient, [facility] Will need new order. Denies any needs today. CM to follow. Patient [place] Medicaid  [place] program and will not need new order for that              Discharge Codes     None        Expected Discharge Date and Time     Expected Discharge Date Expected Discharge Time    Mar 29, 2017             Geno Blanco

## 2017-03-28 NOTE — PROGRESS NOTES
Continued Stay Note  ARLENE White     Patient Name: Joelle Yee  MRN: [id number]  Today's Date: 3/28/2017    Admit Date: 3/24/2017          Discharge Plan       03/28/17 1150    Case Management/Social Work Plan    Plan Home with home health    Patient/Family In Agreement With Plan yes    Additional Comments Spoke to patient, plans to go home with home health per [employer]. Will need new order. Denies any needs today. CM to follow. Patient has Medicaid  [place] program and will not need new order for that              Discharge Codes     None        Expected Discharge Date and Time     Expected Discharge Date Expected Discharge Time    Mar 27, 2017             Amy Garnica RN

## 2017-03-28 NOTE — PLAN OF CARE
Problem: Patient Care Overview (Adult)  Goal: Plan of Care Review  Outcome: Ongoing (interventions implemented as appropriate)    03/27/17 0439 03/28/17 0652   Coping/Psychosocial Response Interventions   Plan Of Care Reviewed With --  patient   Patient Care Overview   Progress --  improving   Outcome Evaluation   Outcome Summary/Follow up Plan reports less short of air than yesterday --

## 2017-03-28 NOTE — PLAN OF CARE
Problem: Patient Care Overview (Adult)  Goal: Plan of Care Review  Outcome: Ongoing (interventions implemented as appropriate)    03/28/17 1808   Coping/Psychosocial Response Interventions   Plan Of Care Reviewed With patient   Outcome Evaluation   Outcome Summary/Follow up Plan Patient participates well in PT evaluation and is expected to benefit from additional PT while hospitalized and upon discharge to home with home health care.         Problem: Inpatient Physical Therapy  Goal: Transfer Training Goal 1 LTG- PT  Outcome: Ongoing (interventions implemented as appropriate)    03/28/17 1021   Transfer Training PT LTG   Transfer Training PT LTG, Date Established 03/28/17   Transfer Training PT LTG, Time to Achieve 1 wk   Transfer Training PT LTG, Activity Type bed to chair /chair to bed;sit to stand/stand to sit   Transfer Training PT LTG, [place] Level supervision required   Transfer Training PT LTG, Assist Device walker, rolling   Transfer Training PT LTG, Date Goal Reviewed 03/28/17   Transfer Training PT LTG, Outcome goal ongoing       Goal: Gait Training Goal LTG- PT  Outcome: Ongoing (interventions implemented as appropriate)    03/28/17 1021   Gait Training PT LTG   Gait Training Goal PT LTG, Date Established 03/28/17   Gait Training Goal PT LTG, Time to Achieve 2 wks   Gait Training Goal PT LTG, [place] Level supervision required   Gait Training Goal PT LTG, Assist Device walker, rolling   Gait Training Goal PT LTG, Distance to Achieve 350   Gait Training Goal PT LTG, Additional Goal without shortness of breath   Gait Training Goal PT LTG, Date Goal Reviewed 03/28/17   Gait Training Goal PT LTG, Outcome goal ongoing

## 2017-03-28 NOTE — PROGRESS NOTES
Acute Care - Physical Therapy Initial Evaluation  [facility]     Patient Name: Joelle Yee  : 1945  MRN: [id number]  Today's Date: 3/28/2017   Onset of Illness/Injury or Date of Surgery Date: 17  Date of Referral to PT: 17  Referring Physician: Dr. Esparza      Admit Date: 3/24/2017     Visit Dx:    ICD-10-CM ICD-9-CM   1. COPD with acute exacerbation J44.1 491.21   2. Fever, unspecified fever cause R50.9 780.60   3. Palmar, plantar, and disseminated porokeratosis Q82.9 757.39   4. Impaired mobility and ADLs Z74.09 799.89   5. Impaired functional mobility, balance, gait, and endurance Z74.09 V49.89     Patient Active Problem List   Diagnosis   • Dysphagia   • Dyspepsia   • Esophageal reflux   • Anxiety   • High cholesterol   • Bipolar disorder   • COPD (chronic obstructive pulmonary disease)   • Depression   • Gout   • Hyperlipidemia   • Insomnia   • Osteoarthritis   • Sciatica   • Sleep apnea   • Vitamin B 12 deficiency   • Pancolitis   • Acute cystitis without hematuria   • C. difficile colitis   • Chronic bronchitis with COPD (chronic obstructive pulmonary disease)   • Pneumonia of right lower lobe due to infectious organism   • COPD exacerbation   • Pneumonia involving right lung   • COPD with acute exacerbation     Past Medical History:   Diagnosis Date   • Abdominal pain    • Acute bronchitis    • Ankle pain    • Anxiety    • Atopic dermatitis    • Backache    • Bipolar disorder    • Bronchiectasis    • Chronic obstructive lung disease    • Colon cancer screening    • COPD (chronic obstructive pulmonary disease)    • Cystocele    • Depressed    • Depression    • Fatigue     Chronic pafigue    • Fibromyalgia    • GERD (gastroesophageal reflux disease)    • Gout    • Heartburn     Chronic historu of epigastric heartburn currently controlled on Prilesec 40 mg every morning along with Zantac 300 Mg daily at bedtime   • High cholesterol    • Hip pain    • Hyperlipidemia     • Hypertension    • Insomnia    • Joint pain    • Kidney infection    • Loss of appetite    • Low back pain 1995   • Melena    • Nausea and vomiting    • On home oxygen therapy    • Osteoarthritis 2010   • Pain in limb    • Palpitations    • Pinched nerve     Pinched nerves 2011   • Recurrent urinary tract infection    • Sciatica [id number]   • Shortness of breath    • Sinus problem    • Sleep apnea 1998   • Upset stomach    • Valvular heart disease    • Vitamin B12 deficiency    • Weight loss, abnormal     Weight loss is stabel. On pund weight gain since 01/2016     Past Surgical History:   Procedure Laterality Date   • ABDOMINAL HERNIA REPAIR     • APPENDECTOMY  1965   • BACK SURGERY  2005   • CATARACT EXTRACTION Bilateral 1978   • CHOLECYSTECTOMY     • EYE SURGERY      Put in implants    • GALLBLADDER SURGERY  1985   • KNEE SURGERY Left 1979    Knee Cap   • TUBAL ABDOMINAL LIGATION  1971          PT ASSESSMENT (last 72 hours)      PT Evaluation       03/28/17 1022 03/28/17 1021    Rehab Evaluation    Document Type evaluation  - evaluation  -    Subjective Information agree to therapy;complains of;pain  - agree to therapy;complains of;pain;fatigue  -JR    Patient Effort, Rehab Treatment adequate  - adequate  -JR    Symptoms Noted During/After Treatment increased pain;shortness of breath  - increased pain;shortness of breath  -    General Information    Patient Profile Review yes  - yes  -JR    Onset of Illness/Injury or Date of Surgery Date 03/24/17  - 03/27/17  -JR    Referring Physician Dr. Esparza  - Erik Esparza MD  -JR    General Observations Pt received supine in bed with 2L O2 via nc  - Supine IV LUE, Oxygen at 2LPM per nasal cannula  -JR    Pertinent History Of Current Problem soa, COPD exacerbation  -     Precautions/Limitations oxygen therapy device and L/min  - oxygen therapy device and L/min   2LPM per nasal cannula  -JR    Prior Level of Function independent:;all household  mobility;ADL's  - independent:;all household mobility  -JR    Equipment Currently Used at Home shower chair;grab bar;oxygen  - shower chair;oxygen;grab bar  -JR    Plans/Goals Discussed With patient;agreed upon  - patient;agreed upon  -    Risks Reviewed patient:;increased discomfort  - patient:;increased discomfort  -    Benefits Reviewed patient:;improve function;increase independence;increase strength  - patient:;increase balance;increase strength;increase independence;improve function  -    Barriers to Rehab medically complex  - medically complex  -    Living Environment    Lives With alone  - alone  -JR    Living Arrangements apartment  - apartment  -    Home Accessibility bed and bath on same level  - bed and bath on same level;stairs to enter home  -    Number of Stairs to Enter Home 1  - 1  -JR    Clinical Impression    Date of Referral to PT  03/28/17  -    PT Diagnosis  Gait abnormality, QUEZADA  -JR    Prognosis  Good  -    Patient/Family Goals Statement  Patient wants to go home as soon as possible  -    Criteria for Skilled Therapeutic Interventions Met  yes;treatment indicated  -    Pathology/Pathophysiology Noted (Describe Specifically for Each System)  musculoskeletal;pulmonary;cardiovascular  -    Impairments Found (describe specific impairments)  gait, locomotion, and balance;aerobic capacity/endurance  -    Rehab Potential  good, to achieve stated therapy goals  -    Vital Signs    O2 Delivery Pre Treatment supplemental O2   2L  -AH     O2 Delivery Intra Treatment supplemental O2   2L  -AH     O2 Delivery Post Treatment supplemental O2   2L  -AH     Pain Assessment    Pain Assessment 0-10  - 0-10  -    Pain Score 6  - 6  -JR    Pain Type Chronic pain  - Chronic pain  -    Pain Location Back  - Back  -JR    Pain Orientation Lower  - Lower  -JR    Pain Descriptors Aching  - Aching  -JR    Pain Frequency Constant/continuous  -  Constant/continuous  -    Pain Intervention(s) Repositioned;Ambulation/increased activity  - Ambulation/increased activity  -    Vision Assessment/Intervention    Visual Impairment WFL with corrective lenses  -     Cognitive Assessment/Intervention    Current Cognitive/Communication Assessment functional  - functional  -    Orientation Status oriented x 4  - oriented x 4  -    Follows Commands/Answers Questions able to follow multi-step instructions  - able to follow multi-step instructions;100% of the time  -    Personal Safety  decreased awareness, need for safety  -    ROM (Range of Motion)    General ROM no range of motion deficits identified  - no range of motion deficits identified  -    MMT (Manual Muscle Testing)    General MMT Assessment no strength deficits identified  -     General MMT Assessment Detail  Functionally 3+/5  -JR    Bed Mobility, Assessment/Treatment    Bed Mob, Supine to Sit, [place] independent  - independent  -    Transfer Assessment/Treatment    Transfers, Sit-Stand [place] contact guard assist  - contact guard assist  -    Transfers, Stand-Sit [place] contact guard assist  - contact guard assist  -    Gait Assessment/Treatment    Gait, [place] Level  contact guard assist  -    Gait, Assistive Device  other (see comments)   oxygen 2 LPM per nasal cannula  -    Gait, Distance (Feet)  356  -    Gait, Gait Pattern Analysis  swing-to gait  -    Gait, Gait Deviations  toe-to-floor clearance decreased;stride length decreased;narrow base;victorino decreased  -    Gait, Safety Issues  supplemental O2   2LPM per nasal cannula  -JR    Positioning and Restraints    Pre-Treatment Position in bed  - in bed  -    Post Treatment Position chair  - chair  -    In Chair reclined;call light within reach;encouraged to call for assist  - reclined;call light within reach;encouraged to call for assist  -      User Key  (r) =  Recorded By, (t) = Taken By, (c) = Cosigned By    Initials Name Provider Type    CADEN Callahan Occupational Therapist     Adelina Jones, PT Physical Therapist          Physical Therapy Education     Title: PT OT SLP Therapies (Active)     Topic: Physical Therapy (Active)     Point: Mobility training (Done)    Learning Progress Summary    Learner Readiness Method Response Comment Documented by Status   Patient Acceptance E VU Pursed lip breathing coordinating with mobility  03/28/17 1805 Done                      User Key     Initials Effective Dates Name Provider Type Discipline     10/26/16 -  Adelina Jones, PT Physical Therapist PT                PT Recommendation and Plan  Anticipated Discharge Disposition: home with home health  Planned Therapy Interventions: strengthening, balance training, transfer training, gait training, patient/family education  PT Frequency: daily  Plan of Care Review  Plan Of Care Reviewed With: (P) patient  Outcome Summary/Follow up Plan: (P) Patient participates well in PT evaluation and is expected to benefit from additional PT while hospitalized and upon discharge to home with home health care.          IP PT Goals       03/28/17 1021          Transfer Training PT LTG    Transfer Training PT LTG, Date Established (P)  03/28/17  -      Transfer Training PT LTG, Time to Achieve (P)  1 wk  -JR      Transfer Training PT LTG, Activity Type (P)  bed to chair /chair to bed;sit to stand/stand to sit  -JR      Transfer Training PT LTG, [place] Level (P)  supervision required  -JR      Transfer Training PT LTG, Assist Device (P)  walker, rolling  -JR      Transfer Training PT  LTG, Date Goal Reviewed (P)  03/28/17  -      Transfer Training PT LTG, Outcome (P)  goal ongoing  -JR      Gait Training PT LTG    Gait Training Goal PT LTG, Date Established (P)  03/28/17  -      Gait Training Goal PT LTG, Time to Achieve (P)  2 wks  -JR      Gait Training Goal PT LTG, [place] Level  (P)  supervision required  -      Gait Training Goal PT LTG, Assist Device (P)  walker, rolling  -JR      Gait Training Goal PT LTG, Distance to Achieve (P)  350  -      Gait Training Goal PT LTG, Additional Goal (P)  without shortness of breath  -      Gait Training Goal PT LTG, Date Goal Reviewed (P)  03/28/17  -      Gait Training Goal PT LTG, Outcome (P)  goal ongoing  -        User Key  (r) = Recorded By, (t) = Taken By, (c) = Cosigned By    Initials Name Provider Type    JR Adelina Jones, PT Physical Therapist                Outcome Measures       03/28/17 1022 03/28/17 1021       How much help from another person do you currently need...    Turning from your back to your side while in flat bed without using bedrails?  4  -JR     Moving from lying on back to sitting on the side of a flat bed without bedrails?  4  -JR     Moving to and from a bed to a chair (including a wheelchair)?  3  -JR     Standing up from a chair using your arms (e.g., wheelchair, bedside chair)?  3  -JR     Climbing 3-5 steps with a railing?  3  -JR     To walk in hospital room?  3  -JR     AM-PAC 6 Clicks Score  20  -JR     How much help from another is currently needed...    Putting on and taking off regular lower body clothing? 3  -AH      Bathing (including washing, rinsing, and drying) 3  -AH      Toileting (which includes using toilet bed pan or urinal) 4  -AH      Putting on and taking off regular upper body clothing 4  -AH      Taking care of personal grooming (such as brushing teeth) 4  -AH      Eating meals 4  -      Score 22  -      Functional Assessment    Outcome Measure Options AM-PAC 6 Clicks Daily Activity (OT)  - AM-PAC 6 Clicks Basic Mobility (PT)  -       User Key  (r) = Recorded By, (t) = Taken By, (c) = Cosigned By    Initials Name Provider Type     Jonna Callahan Occupational Therapist    JR Adelina Jones, PT Physical Therapist           Time Calculation:         PT Charges       03/28/17 2916           Time Calculation    Start Time 1021  -JR      PT Received On 03/28/17  -      PT Goal Re-Cert Due Date 04/07/17  -JR        User Key  (r) = Recorded By, (t) = Taken By, (c) = Cosigned By    Initials Name Provider Type    JR Adelina Jones PT Physical Therapist          Therapy Charges for Today     Code Description Service Date Service Provider Modifiers Qty    17684919508 HC PT EVAL MOD COMPLEXITY 4 3/28/2017 Adelina Jones, PT GP 1          PT G-Codes  Outcome Measure Options: AM-PAC 6 Clicks Daily Activity (OT)      Adelina Jones PT  3/28/2017

## 2017-03-29 ENCOUNTER — APPOINTMENT (OUTPATIENT)
Dept: GENERAL RADIOLOGY | Facility: HOSPITAL | Age: 72
End: 2017-03-29

## 2017-03-29 VITALS
BODY MASS INDEX: 18.28 KG/M2 | RESPIRATION RATE: 19 BRPM | OXYGEN SATURATION: 99 % | WEIGHT: 93.1 LBS | TEMPERATURE: 97.9 F | HEART RATE: 100 BPM | HEIGHT: 60 IN | SYSTOLIC BLOOD PRESSURE: 141 MMHG | DIASTOLIC BLOOD PRESSURE: 76 MMHG

## 2017-03-29 PROBLEM — J96.11 CHRONIC RESPIRATORY FAILURE WITH HYPOXIA (HCC): Status: ACTIVE | Noted: 2017-03-29

## 2017-03-29 LAB
ANISOCYTOSIS BLD QL: ABNORMAL
DEPRECATED RDW RBC AUTO: 55.8 FL (ref 37–54)
ERYTHROCYTE [DISTWIDTH] IN BLOOD BY AUTOMATED COUNT: 15.7 % (ref 11.5–14.5)
HCT VFR BLD AUTO: 38.7 % (ref 37–47)
HGB BLD-MCNC: 12.3 G/DL (ref 12–16)
LYMPHOCYTES # BLD MANUAL: 1.48 10*3/MM3 (ref 0.6–3.4)
LYMPHOCYTES NFR BLD MANUAL: 5 % (ref 0–12)
LYMPHOCYTES NFR BLD MANUAL: 8 % (ref 10–50)
MCH RBC QN AUTO: 31 PG (ref 27–31)
MCHC RBC AUTO-ENTMCNC: 31.8 G/DL (ref 30–37)
MCV RBC AUTO: 97.5 FL (ref 81–99)
MONOCYTES # BLD AUTO: 0.93 10*3/MM3 (ref 0–0.9)
NEUTROPHILS # BLD AUTO: 16.13 10*3/MM3 (ref 2–6.9)
NEUTROPHILS NFR BLD MANUAL: 82 % (ref 37–80)
NEUTS BAND NFR BLD MANUAL: 5 % (ref 0–6)
PLATELET # BLD AUTO: 237 10*3/MM3 (ref 130–400)
PMV BLD AUTO: 9.6 FL (ref 6–12)
RBC # BLD AUTO: 3.97 10*6/MM3 (ref 4.2–5.4)
SCAN SLIDE: NORMAL
SMALL PLATELETS BLD QL SMEAR: ADEQUATE
STOMATOCYTES BLD QL SMEAR: ABNORMAL
WBC MORPH BLD: NORMAL
WBC NRBC COR # BLD: 18.54 10*3/MM3 (ref 4.8–10.8)

## 2017-03-29 PROCEDURE — 94799 UNLISTED PULMONARY SVC/PX: CPT

## 2017-03-29 PROCEDURE — 71020 HC CHEST PA AND LATERAL: CPT

## 2017-03-29 PROCEDURE — 85007 BL SMEAR W/DIFF WBC COUNT: CPT | Performed by: NURSE PRACTITIONER

## 2017-03-29 PROCEDURE — 99239 HOSP IP/OBS DSCHRG MGMT >30: CPT | Performed by: NURSE PRACTITIONER

## 2017-03-29 PROCEDURE — 99232 SBSQ HOSP IP/OBS MODERATE 35: CPT | Performed by: NURSE PRACTITIONER

## 2017-03-29 PROCEDURE — 25010000002 CEFTRIAXONE: Performed by: INTERNAL MEDICINE

## 2017-03-29 PROCEDURE — 85025 COMPLETE CBC W/AUTO DIFF WBC: CPT | Performed by: NURSE PRACTITIONER

## 2017-03-29 PROCEDURE — 25010000002 METHYLPREDNISOLONE PER 40 MG: Performed by: NURSE PRACTITIONER

## 2017-03-29 PROCEDURE — 25010000002 ENOXAPARIN PER 10 MG: Performed by: INTERNAL MEDICINE

## 2017-03-29 RX ORDER — METOPROLOL SUCCINATE 50 MG/1
50 TABLET, EXTENDED RELEASE ORAL
Qty: 30 TABLET | Refills: 0 | Status: SHIPPED | OUTPATIENT
Start: 2017-03-29 | End: 2018-02-28 | Stop reason: DRUGHIGH

## 2017-03-29 RX ORDER — METHYLPREDNISOLONE ACETATE 40 MG/ML
40 INJECTION, SUSPENSION INTRA-ARTICULAR; INTRALESIONAL; INTRAMUSCULAR; SOFT TISSUE ONCE
Status: COMPLETED | OUTPATIENT
Start: 2017-03-29 | End: 2017-03-29

## 2017-03-29 RX ORDER — LORAZEPAM 0.5 MG/1
0.5 TABLET ORAL EVERY 8 HOURS PRN
Qty: 20 TABLET | Refills: 0 | Status: SHIPPED | OUTPATIENT
Start: 2017-03-29 | End: 2017-04-04

## 2017-03-29 RX ADMIN — LORAZEPAM 1 MG: 0.5 TABLET ORAL at 08:27

## 2017-03-29 RX ADMIN — CALCIUM ACETATE 1334 MG: 667 TABLET ORAL at 08:27

## 2017-03-29 RX ADMIN — BUDESONIDE AND FORMOTEROL FUMARATE DIHYDRATE 2 PUFF: 160; 4.5 AEROSOL RESPIRATORY (INHALATION) at 07:16

## 2017-03-29 RX ADMIN — METOPROLOL SUCCINATE 50 MG: 50 TABLET, EXTENDED RELEASE ORAL at 08:26

## 2017-03-29 RX ADMIN — IPRATROPIUM BROMIDE AND ALBUTEROL SULFATE 3 ML: .5; 3 SOLUTION RESPIRATORY (INHALATION) at 13:05

## 2017-03-29 RX ADMIN — PANTOPRAZOLE SODIUM 40 MG: 40 TABLET, DELAYED RELEASE ORAL at 06:08

## 2017-03-29 RX ADMIN — NICOTINE 1 PATCH: 21 PATCH TRANSDERMAL at 08:28

## 2017-03-29 RX ADMIN — IPRATROPIUM BROMIDE AND ALBUTEROL SULFATE 3 ML: .5; 3 SOLUTION RESPIRATORY (INHALATION) at 00:30

## 2017-03-29 RX ADMIN — HYDROCODONE BITARTRATE AND ACETAMINOPHEN 1 TABLET: 10; 325 TABLET ORAL at 08:39

## 2017-03-29 RX ADMIN — NYSTATIN 500000 UNITS: 100000 SUSPENSION ORAL at 08:26

## 2017-03-29 RX ADMIN — CALCIUM ACETATE 1334 MG: 667 TABLET ORAL at 11:33

## 2017-03-29 RX ADMIN — CYCLOBENZAPRINE HYDROCHLORIDE 10 MG: 10 TABLET, FILM COATED ORAL at 08:28

## 2017-03-29 RX ADMIN — NYSTATIN 500000 UNITS: 100000 SUSPENSION ORAL at 11:33

## 2017-03-29 RX ADMIN — GUAIFENESIN 600 MG: 600 TABLET, EXTENDED RELEASE ORAL at 08:28

## 2017-03-29 RX ADMIN — ENOXAPARIN SODIUM 30 MG: 30 INJECTION SUBCUTANEOUS at 08:26

## 2017-03-29 RX ADMIN — IPRATROPIUM BROMIDE AND ALBUTEROL SULFATE 3 ML: .5; 3 SOLUTION RESPIRATORY (INHALATION) at 07:15

## 2017-03-29 RX ADMIN — METHYLPREDNISOLONE ACETATE 40 MG: 40 INJECTION, SUSPENSION INTRA-ARTICULAR; INTRALESIONAL; INTRAMUSCULAR; SOFT TISSUE at 15:13

## 2017-03-29 RX ADMIN — CEFTRIAXONE 1 G: 1 INJECTION, POWDER, FOR SOLUTION INTRAMUSCULAR; INTRAVENOUS at 02:35

## 2017-03-29 RX ADMIN — VENLAFAXINE 75 MG: 75 TABLET ORAL at 08:27

## 2017-03-29 NOTE — PROGRESS NOTES
"  S: She is still having some shortness of breath but it has improved. She has a non-productive cough. She requests something possibly for anxiety. Her oxygen saturation is 98% on 3 LPM.     O:Vital signs reviewed.   /76 (BP Location: Right arm, Patient Position: Lying)  Pulse 104  Temp 97.9 °F (36.6 °C) (Oral)   Resp 18  Ht 60\" (152.4 cm)  Wt 93 lb 1.6 oz (42.2 kg)  LMP  (LMP Unknown)  SpO2 99%  BMI 18.18 kg/m2      General:  respiratory distress noted.  Neck: No JVD   Cardiovascular: S1 + S2. Tachycardic.  Respiratory: Expiratory wheezing heard. No crackles noted.  Extremities: No edema noted.  Neurologic: AAOx3. Was able to follow commands     Labs: Reviewed.      Results from last 7 days  Lab Units 03/28/17  0510 03/27/17  0510 03/26/17  0559 03/25/17  0631   SODIUM mmol/L 141 139 141 140   POTASSIUM mmol/L 4.8 4.7 4.1 4.1   CHLORIDE mmol/L 100 99 104 103   TOTAL CO2 mmol/L 34.0* 33.0* 29.0 28.0   BUN mg/dL 22* 23* 20 13   CREATININE mg/dL 0.60 0.60 0.60 0.50*   CALCIUM mg/dL 9.3 9.2 9.0 9.2   BILIRUBIN mg/dL  --  0.4 0.3 0.4   ALK PHOS U/L  --  89 113 105   ALT (SGPT) U/L  --  47 37 42   AST (SGOT) U/L  --  36 24 30   GLUCOSE mg/dL 113* 105* 116* 129*                 Results from last 7 days  Lab Units 03/29/17  0514 03/28/17  0510 03/27/17  0510 03/26/17  0559 03/25/17  0631   WBC 10*3/mm3 18.54* 15.14* 16.05* 15.93* 11.21*   HEMOGLOBIN g/dL 12.3 13.4 12.5 12.1 12.2   PLATELETS 10*3/mm3 237 300 289 359 378                      CXRay:   Imaging Results (last 24 hours)     Procedure Component Value Units Date/Time    XR Chest PA & Lateral [[id number]] Collected:  03/29/17 1015     Updated:  03/29/17 1027    Narrative:       PROCEDURE: XR CHEST PA AND LATERAL-        HISTORY: cough wheeze worsening; J44.1-Chronic obstructive pulmonary  disease with (acute) exacerbation; R50.9-Fever, unspecified;  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     FINDINGS: The heart is normal in size. The mediastinum is unremarkable.  Linear opacities are present in both lung bases likely reflecting  atelectasis. The lungs are otherwise clear. There is no pneumothorax.  There are no acute osseous abnormalities.           Impression:       Linear opacities in the lung bases likely reflecting  atelectasis.           This report was finalized on 3/29/2017 10:18 AM by Ignacia Lara M.D..            Assessment & Recommendations/Plan:     1. Shortness of breath  2. Anxiety  3. Likely COPD exacerbation  4. No evidence of pneumonia  5. Chronic hypoxemia  6. Hypertension  7. Tachycardia    I will give the patient a steroid injection today prior to discharge. I will see her in the clinic next week.     Kaelyn Dixon, DWAYNE  03/29/17  12:44 PM    Please note that portions of this note may have been completed with a voice recognition software. Every effort was made to edit the dictation, but occasionally words are mistranscribed.

## 2017-03-29 NOTE — DISCHARGE SUMMARY
[facility]   DISCHARGE SUMMARY    Name:  Joelle Yee   Age:  71 y.o.  Sex:  female  :  1945  MRN:  [id number]   Visit Number:  [id number]  Primary Care Physician:  DONTRELL Regan  Date of Discharge:  3/29/2017  Admission Date:  3/24/2017      Presenting Problem:    COPD with acute exacerbation [J44.1]  COPD with acute exacerbation [J44.1]  COPD with acute exacerbation [J44.1]       Discharge Diagnosis:     Principal Problem:    COPD with acute exacerbation  Active Problems:    Anxiety    Chronic respiratory failure with hypoxia    Esophageal reflux    High cholesterol    Bipolar disorder    Depression    Gout    Hyperlipidemia    Insomnia    Osteoarthritis    Sciatica        Past Medical History:  Past Medical History:   Diagnosis Date   • Abdominal pain    • Acute bronchitis    • Ankle pain    • Anxiety    • Atopic dermatitis    • Backache    • Bipolar disorder    • Bronchiectasis    • Chronic obstructive lung disease    • Colon cancer screening    • COPD (chronic obstructive pulmonary disease)    • Cystocele    • Depressed    • Depression    • Fatigue     Chronic pafigue    • Fibromyalgia    • GERD (gastroesophageal reflux disease)    • Gout    • Heartburn     Chronic historu of epigastric heartburn currently controlled on Prilesec 40 mg every morning along with Zantac 300 Mg daily at bedtime   • High cholesterol    • Hip pain    • Hyperlipidemia    • Hypertension    • Insomnia    • Joint pain    • Kidney infection    • Loss of appetite    • Low back pain    • Melena    • Nausea and vomiting    • On home oxygen therapy    • Osteoarthritis    • Pain in limb    • Palpitations    • Pinched nerve     Pinched nerves    • Recurrent urinary tract infection    • Sciatica [id number]   • Shortness of breath    • Sinus problem    • Sleep apnea    • Upset stomach    • Valvular heart disease    • Vitamin B12 deficiency    • Weight loss,  abnormal     Weight loss is stabel. On pund weight gain since 01/2016         Consults:     Consults     Date and Time Order Name Status Description    3/27/2017 1209 Inpatient Consult to Pulmonology Completed     3/7/2017 1741 Inpatient Consult to Pulmonology Completed     3/7/2017 1617 Hospitalist (on-call MD unless specified) Completed           Procedures Performed:  None             History of presenting illness:  This is a 71-year-old female with past medical history significant for COPD on home oxygen, chronic hypoxic respiratory failure, chronic back pain, and hypertension.  He was recently discharged from this facility on 3/14/17 were at that time she was treated for right lower lobe pneumonia.  According to the patient after she got home and finished her antibiotic she started becoming more symptomatic with cough shortness of breath.  Have a history of nicotine abuse however she tried to stop smoking 1 week prior to readmission.  She was seen and evaluated in the emergency room chest x-ray was done which did not show any evidence of pneumonia.  She was admitted to the hospitalist service for COPD exacerbation      Hospital Course:  Upon admission to the hospitalist service patient was continued on IV antibodies in the form Rocephin and azithromycin.  She was continued on IV steroids as well as breathing treatments and mucolytics.  Dr. Esparza with pulmonary was consulted.  Patient had reported to the emergency room twice with symptoms of sudden onset of shortness of breath.  She does have a history of anxiety.  She was started on Ativan during this admission which she feels is helping her breathing more than any of the other medications.  Patient states that her biggest concern after her last discharge which she was just feeling anxious and jittery which then caused her to become increasingly short of breath.  Social work was consulted to provide information for outpatient behavioral health for evaluation of  her anxiety.    I have spoken with pulmonary services who feels that she is stable for discharge home.  She does appear to be at her baseline.  Her biggest issue does appear to be more anxiety related and these appear to be more episodes of anxiety.  The Ativan has been helping.  I will give her prescription for Ativan as well as encouraged patient to make an appointment with outpatient behavioral services so that she can have further evaluation for her anxiety.  He has been given a dose of Depo-Medrol today so she will not need any steroids upon discharge.  I will stop her antibiotic since she had been on antibiotic since she came back in this admission and she's had 5 days of antibiotic during this admission.  Due to the risk of C. difficile given that she's had C. difficile in the past.  There is no evidence of pneumonia on a repeat chest x-ray so at this point she does not need to go home on antibiotic.  He is stable from the hospitalist standpoint for discharge home.  She does have some leukocytosis which is likely secondary to the dose of Depo-Medrol that she received yesterday and IV steroids should been receiving prior to that.  She will need a follow-up CBC next week.  She can follow-up with her primary care provider and have this done on an outpatient basis.    Vital Signs:    Temp:  [93.1 °F (33.9 °C)-98.9 °F (37.2 °C)] 97.9 °F (36.6 °C)  Heart Rate:  [] 100  Resp:  [16-22] 19  BP: (125-167)/(69-80) 141/76    Physical Exam:  General Appearance:    Alert and cooperative, not in any acute distress.   Head:    Atraumatic and normocephalic, without obvious abnormality.   Eyes:            PERRLA, conjunctivae and sclerae normal, no Icterus. No pallor. Extra-occular movements are within normal limits.   Ears:    Ears appear intact with no abnormalities noted.   Throat:   No oral lesions, no thrush, oral mucosa moist.   Neck:   Supple, trachea midline, no thyromegaly, no carotid bruit.   Back:     No  kyphoscoliosis present. No tenderness to palpation,   range of motion normal.   Lungs:     Chest shape is normal. Breath sounds heard bilaterally equally.  No crackles.  Few Wheezes. No Pleural rub or bronchial breathing.    Heart:    Normal S1 and S2, no murmur, no gallop, no rub. No JVD   Abdomen:     Normal bowel sounds, no masses, no organomegaly. Soft        non-tender, non-distended, no guarding, no rebound                tenderness   Extremities:   Moves all extremities well, no edema, no cyanosis, no             clubbing   Pulses:   Pulses palpable and equal bilaterally   Skin:   No bleeding, bruising or rash   Lymph nodes:  Psychiatric/Behavior:    No palpable adenopathy    Normal mood, normal behavior   Neurologic:   Cranial nerves 2 - 12 grossly intact, sensation intact, Motor power is normal and equal bilaterally.           Pertinent Lab Results:       Results from last 7 days  Lab Units 03/28/17  0510 03/27/17  0510 03/26/17  0559 03/25/17  0631   SODIUM mmol/L 141 139 141 140   POTASSIUM mmol/L 4.8 4.7 4.1 4.1   CHLORIDE mmol/L 100 99 104 103   TOTAL CO2 mmol/L 34.0* 33.0* 29.0 28.0   BUN mg/dL 22* 23* 20 13   CREATININE mg/dL 0.60 0.60 0.60 0.50*   CALCIUM mg/dL 9.3 9.2 9.0 9.2   BILIRUBIN mg/dL  --  0.4 0.3 0.4   ALK PHOS U/L  --  89 113 105   ALT (SGPT) U/L  --  47 37 42   AST (SGOT) U/L  --  36 24 30   GLUCOSE mg/dL 113* 105* 116* 129*       Results from last 7 days  Lab Units 03/29/17  0514 03/28/17  0510 03/27/17  0510   WBC 10*3/mm3 18.54* 15.14* 16.05*   HEMOGLOBIN g/dL 12.3 13.4 12.5   HEMATOCRIT % 38.7 41.9 38.1   PLATELETS 10*3/mm3 237 300 289                Pertinent Radiology Results:    Imaging Results (all)     Procedure Component Value Units Date/Time    XR Chest 2 View [[id number]] Collected:  03/25/17 0824     Updated:  03/26/17 0723    Narrative:       2 VIEW CHEST     INDICATION: Shortness of breath.     FINDINGS: 2 views, compared with 03/13/2017. EKG leads overlie the  chest. Heart  size is normal. No pneumothorax. Slightly coarsened  interstitial opacities are likely chronic. Trace scarring or atelectasis  in the lung bases appears fairly similar. No effusion. Degenerative  changes are present in the spine and shoulders. Lower left rib fractures  are probably old and appear unchanged.       Impression:       Trace basilar scarring or atelectasis. No significant change  otherwise.     This report was finalized on 3/26/2017 7:21 AM by Reese Keane MD.    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     FINDINGS: The heart is normal in size. The mediastinum is unremarkable.  Linear opacities are present in both lung bases likely reflecting  atelectasis. The lungs are otherwise clear. There is no pneumothorax.  There are no acute osseous abnormalities.           Impression:       Linear opacities in the lung bases likely reflecting  atelectasis.           This report was finalized on 3/29/2017 10:18 AM by Ignacia Lara M.D..          Condition on Discharge:    Stable        Discharge Disposition:    Home or Self Care    Discharge Medication:     Joelle Yee   Home Medication Instructions KWAN:[id number]    Printed on:03/29/17 1305   Medication Information                      albuterol (PROVENTIL) (2.5 MG/3ML) 0.083% nebulizer solution  Take 2.5 mg by nebulization 4 (Four) Times a Day.             azelastine (ASTELIN) 0.1 % nasal spray  2 sprays into each nostril 2 (Two) Times a Day. Use in each nostril as directed             benzonatate (TESSALON) 100 MG capsule  Take 1 capsule by mouth 3 (Three) Times a Day As  Needed for cough.             budesonide-formoterol (SYMBICORT) 160-4.5 MCG/ACT inhaler  Inhale 2 puffs 2 (Two) Times a Day. Inhale 1 puff twice daily. Rinse mouth after use.             calcium acetate (PHOS BINDER,) 667 MG capsule capsule  Take 2 capsules by mouth 3 (Three) Times a Day With Meals.             cetirizine (zyrTEC) 10 MG tablet  Take 10 mg by mouth Daily.             conjugated estrogens (PREMARIN) vaginal cream  Insert 0.5 g into the vagina Every Other Day.             Cyanocobalamin (VITAMIN B12 PO)  Take 500 mcg by mouth Daily.             cyclobenzaprine (FLEXERIL) 10 MG tablet  Take 1 tablet by mouth daily.             Diclofenac Sodium (VOLTAREN TD)  Place  on the skin As Needed. Voltaren GEL              gabapentin (NEURONTIN) 400 MG capsule  Take 400 mg by mouth Every Night.             HYDROcodone-acetaminophen (NORCO)  MG per tablet  Take  by mouth. Take 1 tablet every 8 hours as needed for pain.             ipratropium-albuterol (COMBIVENT RESPIMAT)  MCG/ACT inhaler  Inhale 1 puff 4 (Four) Times a Day As Needed for Wheezing.             LORazepam (ATIVAN) 0.5 MG tablet  Take 1 tablet by mouth Every 8 (Eight) Hours As Needed for Anxiety for up to 6 days.             losartan (COZAAR) 25 MG tablet  Take 25 mg by mouth Daily.             melatonin 5 MG tablet tablet  Take 10 mg by mouth Every Night. Patient takes 10 mg at night             metoprolol succinate XL (TOPROL-XL) 50 MG 24 hr tablet  Take 1 tablet by mouth Daily.             Mirabegron ER (MYRBETRIQ) 50 MG tablet sustained-release 24 hour  Take 50 mg by mouth Daily.             mirtazapine (REMERON) 30 MG tablet  Take 30 mg by mouth Every Night.             montelukast (SINGULAIR) 10 MG tablet  Take 10 mg by mouth Every Night.             nicotine (NICODERM CQ) 21 MG/24HR patch  Place 1 patch on the skin Daily. DONOT smoke with patch on.             nystatin (MYCOSTATIN) 039606 UNIT/ML suspension  Swish and swallow 5 mL  4 (Four) Times a Day.             omeprazole (PriLOSEC) 20 MG capsule  Take 20 mg by mouth 2 (Two) Times a Day.             ondansetron ODT (ZOFRAN-ODT) 4 MG disintegrating tablet  Take 1 tablet by mouth every 6 (six) hours as needed.             OXYGEN-HELIUM IN  Oxygen; Patient Sig: Oxygen 2 liter as needed; 0; 21-May-2014; Active             Respiratory Therapy Supplies (FLUTTER) device  1 Device as needed (as directed).             tiotropium (SPIRIVA HANDIHALER) 18 MCG per inhalation capsule  Place 1 capsule into inhaler and inhale Daily.             traZODone (DESYREL) 50 MG tablet  Take 1 tablet by mouth every night.             venlafaxine (EFFEXOR) 75 MG tablet  Take 1 tablet by mouth 2 (two) times a day.                 Discharge Diet:     Diet Instructions     Diet: Regular; Thin Liquids, No Restrictions       Discharge Diet:  Regular   Fluid Consistency:  Thin Liquids, No Restrictions                 Activity at Discharge:     Activity Instructions     Discharge Activity                   Additional Instructions:  CBC next week when following up with PCP  Follow up with Kaelyn in Pulmonology in 1 week.    Patient encouraged to schedule appt with outaptient behavioral health for her anxiety.        Follow-up Appointments:    Future Appointments  Date Time Provider Department Center   6/5/2017 10:30 AM [facility] None   6/5/2017 11:15 AM Rebeka Esparza MD [facility] None         Test Results Pending at Discharge:           DWAYNE Bhatt  03/29/17  1:08 PM    Time:  Greater than 30 minutes were spent reviewing labs, history, evaluating patient and discharge planning.

## 2017-03-29 NOTE — THERAPY DISCHARGE NOTE
Acute Care - Occupational Therapy Discharge Summary  [facility]     Patient Name: Joelle Yee  : 1945  MRN: [id number]    Today's Date: 3/29/2017  Onset of Illness/Injury or Date of Surgery Date: 17    Date of Referral to OT: 17  Referring Physician: Dr. Esparza      Admit Date: 3/24/2017        OT Recommendation and Plan    Visit Dx:    ICD-10-CM ICD-9-CM   1. COPD with acute exacerbation J44.1 491.21   2. Fever, unspecified fever cause R50.9 780.60   3. Palmar, plantar, and disseminated porokeratosis Q82.9 757.39   4. Impaired mobility and ADLs Z74.09 799.89   5. Impaired functional mobility, balance, gait, and endurance Z74.09 V49.89                     OT Goals       17 1658 17 1350       Strength OT LTG    Strength Goal OT LTG, Date Established  17  -     Strength Goal OT LTG, Time to Achieve  2 wks  -AH     Strength Goal OT LTG, Measure to Achieve  Pt will participate in BUE strengthening ex to improve her functional strength and endurance  -     Strength Goal OT LTG, Outcome (P)  goal not met  -AC goal ongoing  -     Strength Goal OT LTG, Reason Goal Not Met (P)  discharged from facility  -AC      LB Dressing OT LTG    LB Dressing Goal OT LTG, Date Established  17  -     LB Dressing Goal OT LTG, Time to Achieve  2 wks  -AH     LB Dressing Goal OT LTG, [place] Level  independent  -     LB Dressing Goal OT LTG, Outcome (P)  goal not met  -AC goal ongoing  -AH     LB Dressing Goal OT LTG, Reason Goal Not Met (P)  discharged from facility  -      Activity Tolerance OT LTG    Activity Tolerance Goal OT LTG, Date Established  17  -     Activity Tolerance Goal OT LTG, Time to Achieve  2 wks  -AH     Activity Tolerance Goal OT LTG, Activity Level  15 min activity  -     Activity Tolerance Goal OT LTG, Outcome (P)  goal not met  -AC goal ongoing  -     Activity Tolerance Goal OT LTG, Reason Goal Not Met (P)  discharged from facility  -AC         User Key  (r) = Recorded By, (t) = Taken By, (c) = Cosigned By    Initials Name Provider Type    KIMMIE Vale, FOSTER Occupational Therapist    CADEN Callahan Occupational Therapist                Outcome Measures       03/28/17 1022 03/28/17 1021       How much help from another person do you currently need...    Turning from your back to your side while in flat bed without using bedrails?  4  -JR     Moving from lying on back to sitting on the side of a flat bed without bedrails?  4  -JR     Moving to and from a bed to a chair (including a wheelchair)?  3  -JR     Standing up from a chair using your arms (e.g., wheelchair, bedside chair)?  3  -JR     Climbing 3-5 steps with a railing?  3  -JR     To walk in hospital room?  3  -JR     AM-PAC 6 Clicks Score  20  -JR     How much help from another is currently needed...    Putting on and taking off regular lower body clothing? 3  -AH      Bathing (including washing, rinsing, and drying) 3  -AH      Toileting (which includes using toilet bed pan or urinal) 4  -AH      Putting on and taking off regular upper body clothing 4  -AH      Taking care of personal grooming (such as brushing teeth) 4  -AH      Eating meals 4  -      Score 22  -      Functional Assessment    Outcome Measure Options AM-PAC 6 Clicks Daily Activity (OT)  - AM-PAC 6 Clicks Basic Mobility (PT)  -JR       User Key  (r) = Recorded By, (t) = Taken By, (c) = Cosigned By    Initials Name Provider Type     Jonna Callahan Occupational Therapist    JR Adelina Jones, PT Physical Therapist              OT Discharge Summary  Anticipated Discharge Disposition: home with home health  Reason for Discharge: Discharge from facility  Outcomes Achieved:  (Goals not met as pt was seen for eval only prior to d/c d/t [facility])  Discharge Destination: Home with home health      Jonna Vale OT  3/29/2017

## 2017-03-29 NOTE — PLAN OF CARE
Problem: Patient Care Overview (Adult)  Goal: Plan of Care Review  Outcome: Unable to achieve outcome(s) by discharge Date Met:  03/29/17 03/29/17 1658   Coping/Psychosocial Response Interventions   Plan Of Care Reviewed With patient   Outcome Evaluation   Outcome Summary/Follow up Plan No goals met as pt was seen for eval only prior to d/c d/t [facility]         Problem: Inpatient Occupational Therapy  Goal: Strength Goal LTG- OT  Outcome: Unable to achieve outcome(s) by discharge Date Met:  03/29/17 03/28/17 1350 03/29/17 1658   Strength OT LTG   Strength Goal OT LTG, Date Established 03/28/17 --    Strength Goal OT LTG, Time to Achieve 2 wks --    Strength Goal OT LTG, Measure to Achieve Pt will participate in BUE strengthening ex to improve her functional strength and endurance --    Strength Goal OT LTG, Outcome --  goal not met   Strength Goal OT LTG, Reason Goal Not Met --  discharged from facility       Goal: LB Dressing Goal LTG- OT  Outcome: Unable to achieve outcome(s) by discharge Date Met:  03/29/17 03/28/17 1350 03/29/17 1658   LB Dressing OT LTG   LB Dressing Goal OT LTG, Date Established 03/28/17 --    LB Dressing Goal OT LTG, Time to Achieve 2 wks --    LB Dressing Goal OT LTG, [place] Level independent --    LB Dressing Goal OT LTG, Outcome --  goal not met   LB Dressing Goal OT LTG, Reason Goal Not Met --  discharged from facility       Goal: Activity Tolerance Goal LTG- OT  Outcome: Unable to achieve outcome(s) by discharge Date Met:  03/29/17 03/28/17 1350 03/29/17 1658   Activity Tolerance OT LTG   Activity Tolerance Goal OT LTG, Date Established 03/28/17 --    Activity Tolerance Goal OT LTG, Time to Achieve 2 wks --    Activity Tolerance Goal OT LTG, Activity Level 15 min activity --    Activity Tolerance Goal OT LTG, Outcome --  goal not met   Activity Tolerance Goal OT LTG, Reason Goal Not Met --  discharged from facility

## 2017-03-29 NOTE — PROGRESS NOTES
Continued Stay Note  ARLENE White     Patient Name: Joelle Yee  MRN: [id number]  Today's Date: 3/29/2017    Admit Date: 3/24/2017          Discharge Plan       03/29/17 1413    Case Management/Social Work Plan    Additional Comments Called and faxed H&P and Orders to [facility] for resumption of care               Discharge Codes     None        Expected Discharge Date and Time     Expected Discharge Date Expected Discharge Time    Mar 29, 2017             Amy Garnica RN

## 2017-03-29 NOTE — THERAPY DISCHARGE NOTE
Acute Care - Occupational Therapy Discharge Summary  [facility]     Patient Name: Joelle Yee  : 1945  MRN: [id number]    Today's Date: 3/29/2017  Onset of Illness/Injury or Date of Surgery Date: 17    Date of Referral to OT: 17  Referring Physician: Dr. Esparza      Admit Date: 3/24/2017        OT Recommendation and Plan    Visit Dx:    ICD-10-CM ICD-9-CM   1. COPD with acute exacerbation J44.1 491.21   2. Fever, unspecified fever cause R50.9 780.60   3. Palmar, plantar, and disseminated porokeratosis Q82.9 757.39   4. Impaired mobility and ADLs Z74.09 799.89   5. Impaired functional mobility, balance, gait, and endurance Z74.09 V49.89                     OT Goals       17 1658 17 1350       Strength OT LTG    Strength Goal OT LTG, Date Established  17  -     Strength Goal OT LTG, Time to Achieve  2 wks  -AH     Strength Goal OT LTG, Measure to Achieve  Pt will participate in BUE strengthening ex to improve her functional strength and endurance  -     Strength Goal OT LTG, Outcome goal not met  -AC goal ongoing  -AH     Strength Goal OT LTG, Reason Goal Not Met discharged from facility  -      LB Dressing OT LTG    LB Dressing Goal OT LTG, Date Established  17  -     LB Dressing Goal OT LTG, Time to Achieve  2 wks  -AH     LB Dressing Goal OT LTG, [place] Level  independent  -AH     LB Dressing Goal OT LTG, Outcome goal not met  -AC goal ongoing  -AH     LB Dressing Goal OT LTG, Reason Goal Not Met discharged from facility  -      Activity Tolerance OT LTG    Activity Tolerance Goal OT LTG, Date Established  17  -     Activity Tolerance Goal OT LTG, Time to Achieve  2 wks  -AH     Activity Tolerance Goal OT LTG, Activity Level  15 min activity  -     Activity Tolerance Goal OT LTG, Outcome goal not met  -AC goal ongoing  -     Activity Tolerance Goal OT LTG, Reason Goal Not Met discharged from facility  -        User Key  (r) = Recorded By,  (t) = Taken By, (c) = Cosigned By    Initials Name Provider Type     Jonna Vale, FOSTER Occupational Therapist     Jonna Callahan Occupational Therapist                Outcome Measures       03/28/17 1022 03/28/17 1021       How much help from another person do you currently need...    Turning from your back to your side while in flat bed without using bedrails?  4  -JR     Moving from lying on back to sitting on the side of a flat bed without bedrails?  4  -JR     Moving to and from a bed to a chair (including a wheelchair)?  3  -JR     Standing up from a chair using your arms (e.g., wheelchair, bedside chair)?  3  -JR     Climbing 3-5 steps with a railing?  3  -JR     To walk in hospital room?  3  -JR     AM-PAC 6 Clicks Score  20  -JR     How much help from another is currently needed...    Putting on and taking off regular lower body clothing? 3  -AH      Bathing (including washing, rinsing, and drying) 3  -AH      Toileting (which includes using toilet bed pan or urinal) 4  -AH      Putting on and taking off regular upper body clothing 4  -AH      Taking care of personal grooming (such as brushing teeth) 4  -AH      Eating meals 4  -      Score 22  -AH      Functional Assessment    Outcome Measure Options AM-PAC 6 Clicks Daily Activity (OT)  - AM-PAC 6 Clicks Basic Mobility (PT)  -       User Key  (r) = Recorded By, (t) = Taken By, (c) = Cosigned By    Initials Name Provider Type     Jonna Callahan Occupational Therapist    JR Adelina Jones, PT Physical Therapist              OT Discharge Summary  Anticipated Discharge Disposition: home with home health  Reason for Discharge: Discharge from facility  Outcomes Achieved:  (Goals not met as pt was seen for eval only prior to d/c d/t [facility])  Discharge Destination: Home with home health      Jonna Vale OT  3/29/2017

## 2017-03-29 NOTE — NURSING NOTE
BETITO teaching for COPD pt able to give teachback regarding what it is, causes, signs and symptoms, triggers, prevention of infection, management and when to call the doctor or come to ER with what signs and symptoms  Pt able to give return demonstration of purse lip breathing

## 2017-06-05 ENCOUNTER — OFFICE VISIT (OUTPATIENT)
Dept: PULMONOLOGY | Facility: CLINIC | Age: 72
End: 2017-06-05

## 2017-06-05 VITALS
SYSTOLIC BLOOD PRESSURE: 118 MMHG | DIASTOLIC BLOOD PRESSURE: 78 MMHG | HEIGHT: 60 IN | WEIGHT: 85.2 LBS | RESPIRATION RATE: 20 BRPM | BODY MASS INDEX: 16.73 KG/M2 | OXYGEN SATURATION: 84 % | HEART RATE: 106 BPM

## 2017-06-05 DIAGNOSIS — R06.02 SHORTNESS OF BREATH: ICD-10-CM

## 2017-06-05 DIAGNOSIS — J30.89 OTHER ALLERGIC RHINITIS: ICD-10-CM

## 2017-06-05 DIAGNOSIS — J42 CHRONIC BRONCHITIS, UNSPECIFIED CHRONIC BRONCHITIS TYPE (HCC): ICD-10-CM

## 2017-06-05 DIAGNOSIS — R09.02 HYPOXIA: ICD-10-CM

## 2017-06-05 DIAGNOSIS — J44.9 CHRONIC OBSTRUCTIVE PULMONARY DISEASE, UNSPECIFIED COPD TYPE (HCC): ICD-10-CM

## 2017-06-05 DIAGNOSIS — R93.89 ABNORMAL CT OF THE CHEST: ICD-10-CM

## 2017-06-05 DIAGNOSIS — J44.9 CHRONIC OBSTRUCTIVE PULMONARY DISEASE, UNSPECIFIED COPD TYPE (HCC): Primary | ICD-10-CM

## 2017-06-05 PROCEDURE — 94726 PLETHYSMOGRAPHY LUNG VOLUMES: CPT | Performed by: INTERNAL MEDICINE

## 2017-06-05 PROCEDURE — 94060 EVALUATION OF WHEEZING: CPT | Performed by: INTERNAL MEDICINE

## 2017-06-05 PROCEDURE — 99214 OFFICE O/P EST MOD 30 MIN: CPT | Performed by: INTERNAL MEDICINE

## 2017-06-05 PROCEDURE — 94729 DIFFUSING CAPACITY: CPT | Performed by: INTERNAL MEDICINE

## 2017-06-05 RX ORDER — LORAZEPAM 0.5 MG/1
0.5 TABLET ORAL 2 TIMES DAILY PRN
Refills: 0 | COMMUNITY
Start: 2017-05-05 | End: 2021-02-03 | Stop reason: SDUPTHER

## 2017-06-05 RX ORDER — BUDESONIDE AND FORMOTEROL FUMARATE DIHYDRATE 160; 4.5 UG/1; UG/1
2 AEROSOL RESPIRATORY (INHALATION) 2 TIMES DAILY
Qty: 1 INHALER | Refills: 5 | Status: SHIPPED | OUTPATIENT
Start: 2017-06-05 | End: 2017-10-04 | Stop reason: SDUPTHER

## 2017-06-05 NOTE — PROGRESS NOTES
"Chief Complaint   Patient presents with   • Follow-up   • Shortness of Breath         Subjective   Joelle Yee is a 71 y.o. female.     History of Present Illness   Patient comes back today to follow-up on COPD, hypoxia, allergic rhinitis and abnormal CT of the chest.    She denies any recent exacerbations.    She not been readmitted to the hospital since her last 2 visits in March 2017.    She is using oxygen on a regular basis.    She stopped using Spiriva 1-1/2 months ago and although she did not notice any significant change, she has been using her Combivent 5-6 times a week and while she was on Spiriva, she was using it once or twice a week.    She continues to stay away from smoking although occasionally smokes vapor cigarettes.  She denies any recent episodes of C. difficile colitis    The following portions of the patient's history were reviewed and updated as appropriate: allergies, current medications, past family history, past medical history, past social history and past surgical history.    Review of Systems   HENT: Positive for sinus pressure and sore throat. Negative for sneezing.    Respiratory: Positive for cough, shortness of breath and wheezing.        Objective   Visit Vitals   • /78   • Pulse 106   • Resp 20   • Ht 60\" (152.4 cm)   • Wt 85 lb 3.2 oz (38.6 kg)   • LMP  (LMP Unknown)   • SpO2 (!) 84%  Comment: on room air   • BMI 16.64 kg/m2   O2: 97 on 2LPM.    Physical Exam   Constitutional: She is oriented to person, place, and time. She appears well-developed and well-nourished.   HENT:   Head: Atraumatic.   Dentures   Eyes: EOM are normal.   Neck: Neck supple.   Cardiovascular: Normal rate and regular rhythm.    Pulmonary/Chest: Effort normal. No respiratory distress. She has rales.   Somewhat hyperresonant to percussion  Somewhat decreased A/E with out wheezing noted.   Musculoskeletal: She exhibits no edema.   Neurological: She is alert and oriented to person, place, and time. "   Skin: Skin is warm and dry.   Psychiatric: She has a normal mood and affect.   Vitals reviewed.      Assessment/Plan   Joelle was seen today for follow-up and shortness of breath.    Diagnoses and all orders for this visit:    Chronic obstructive pulmonary disease, unspecified COPD type    Chronic bronchitis, unspecified chronic bronchitis type    Abnormal CT of the chest  -     CT Chest Without Contrast; Future    Hypoxia  -     CT Chest Without Contrast; Future    Shortness of breath  -     CT Chest Without Contrast; Future    Other allergic rhinitis    Other orders  -     budesonide-formoterol (SYMBICORT) 160-4.5 MCG/ACT inhaler; Inhale 2 puffs 2 (Two) Times a Day. Inhale 1 puff twice daily. Rinse mouth after use.  -     ipratropium-albuterol (COMBIVENT RESPIMAT)  MCG/ACT inhaler; Inhale 1 puff 4 (Four) Times a Day As Needed for Wheezing.  -     Tiotropium Bromide Monohydrate (SPIRIVA RESPIMAT) 2.5 MCG/ACT aerosol solution; Inhale 2 puffs Daily.           Return in about 4 months (around 10/5/2017) for Recheck, Imaging study, For Kaelyn.    DISCUSSION (if any):  The patient will definitely need a repeat CT scan to document resolution of the abnormality detected on the last CT scan.    I've advised her to use her flutter valve on a daily basis rather than as needed.  I've advised her to use it at least 6-8 times a day.    She will definitely need to restart Spiriva, as she has severe COPD based on the PFTs which were reviewed today with her.    I will discontinue his Singulair.    She is advised to continue Symbicort and Combivent    The patient was advised to continue oxygen, since there has been a good response since the patient is using it as prescribed.      Errors in dictation may reflect use of voice recognition software and not all errors in transcription may have been detected prior to signing    This document was electronically signed by Rebeka Esparza MD June 5, 2017  11:57 AM

## 2017-07-18 ENCOUNTER — HOSPITAL ENCOUNTER (OUTPATIENT)
Dept: CT IMAGING | Facility: HOSPITAL | Age: 72
Discharge: HOME | End: 2017-07-18
Attending: INTERNAL MEDICINE

## 2017-07-26 ENCOUNTER — TRANSCRIBE ORDERS (OUTPATIENT)
Dept: GENERAL RADIOLOGY | Facility: HOSPITAL | Age: 72
End: 2017-07-26

## 2017-07-26 ENCOUNTER — HOSPITAL ENCOUNTER (OUTPATIENT)
Dept: GENERAL RADIOLOGY | Facility: HOSPITAL | Age: 72
Discharge: HOME | End: 2017-07-26

## 2017-07-26 ENCOUNTER — HOSPITAL ENCOUNTER (OUTPATIENT)
Dept: CT IMAGING | Facility: HOSPITAL | Age: 72
Discharge: HOME | End: 2017-07-26
Attending: INTERNAL MEDICINE | Admitting: INTERNAL MEDICINE

## 2017-07-26 DIAGNOSIS — R09.02 HYPOXIA: ICD-10-CM

## 2017-07-26 DIAGNOSIS — R06.02 SHORTNESS OF BREATH: ICD-10-CM

## 2017-07-26 DIAGNOSIS — G56.11: ICD-10-CM

## 2017-07-26 DIAGNOSIS — R93.89 ABNORMAL CT OF THE CHEST: ICD-10-CM

## 2017-07-26 DIAGNOSIS — G56.11: Primary | ICD-10-CM

## 2017-07-26 PROCEDURE — 71250 CT THORAX DX C-: CPT

## 2017-07-26 PROCEDURE — 73090 X-RAY EXAM OF FOREARM: CPT

## 2017-09-25 ENCOUNTER — HOSPITAL ENCOUNTER (OUTPATIENT)
Dept: GENERAL RADIOLOGY | Facility: HOSPITAL | Age: 72
Discharge: HOME | End: 2017-09-25
Admitting: NURSE PRACTITIONER

## 2017-09-25 ENCOUNTER — TRANSCRIBE ORDERS (OUTPATIENT)
Dept: ADMINISTRATIVE | Facility: HOSPITAL | Age: 72
End: 2017-09-25

## 2017-09-25 DIAGNOSIS — M54.9 BACK PAIN, UNSPECIFIED BACK LOCATION, UNSPECIFIED BACK PAIN LATERALITY, UNSPECIFIED CHRONICITY: Primary | ICD-10-CM

## 2017-09-25 PROCEDURE — 72110 X-RAY EXAM L-2 SPINE 4/>VWS: CPT

## 2017-10-04 ENCOUNTER — OFFICE VISIT (OUTPATIENT)
Dept: PULMONOLOGY | Facility: CLINIC | Age: 72
End: 2017-10-04

## 2017-10-04 VITALS
DIASTOLIC BLOOD PRESSURE: 60 MMHG | HEIGHT: 60 IN | HEART RATE: 85 BPM | BODY MASS INDEX: 15.35 KG/M2 | WEIGHT: 78.2 LBS | RESPIRATION RATE: 16 BRPM | SYSTOLIC BLOOD PRESSURE: 105 MMHG | OXYGEN SATURATION: 96 %

## 2017-10-04 DIAGNOSIS — R09.02 HYPOXIA: ICD-10-CM

## 2017-10-04 DIAGNOSIS — J30.89 OTHER ALLERGIC RHINITIS: ICD-10-CM

## 2017-10-04 DIAGNOSIS — J47.9 BRONCHIECTASIS WITHOUT COMPLICATION (HCC): ICD-10-CM

## 2017-10-04 DIAGNOSIS — J44.9 CHRONIC OBSTRUCTIVE PULMONARY DISEASE, UNSPECIFIED COPD TYPE (HCC): ICD-10-CM

## 2017-10-04 DIAGNOSIS — R06.02 SHORTNESS OF BREATH: Primary | ICD-10-CM

## 2017-10-04 DIAGNOSIS — F17.200 SMOKING: ICD-10-CM

## 2017-10-04 PROBLEM — R63.0 LOSS OF APPETITE: Status: ACTIVE | Noted: 2017-10-04

## 2017-10-04 PROBLEM — R19.7 DIARRHEA: Status: ACTIVE | Noted: 2017-10-04

## 2017-10-04 PROBLEM — M11.20 PSEUDOGOUT: Status: ACTIVE | Noted: 2017-10-04

## 2017-10-04 PROBLEM — R10.9 ABDOMINAL PAIN: Status: ACTIVE | Noted: 2017-10-04

## 2017-10-04 PROBLEM — R11.0 NAUSEA: Status: ACTIVE | Noted: 2017-10-04

## 2017-10-04 PROBLEM — K30 UPSET STOMACH: Status: ACTIVE | Noted: 2017-10-04

## 2017-10-04 PROBLEM — R11.2 NAUSEA AND VOMITING: Status: ACTIVE | Noted: 2017-10-04

## 2017-10-04 PROBLEM — K92.1 MELENA: Status: ACTIVE | Noted: 2017-10-04

## 2017-10-04 PROBLEM — R12 HEARTBURN: Status: ACTIVE | Noted: 2017-10-04

## 2017-10-04 PROBLEM — R63.4 ABNORMAL WEIGHT LOSS: Status: ACTIVE | Noted: 2017-10-04

## 2017-10-04 PROCEDURE — 99214 OFFICE O/P EST MOD 30 MIN: CPT | Performed by: INTERNAL MEDICINE

## 2017-10-04 PROCEDURE — 90662 IIV NO PRSV INCREASED AG IM: CPT | Performed by: INTERNAL MEDICINE

## 2017-10-04 PROCEDURE — G0008 ADMIN INFLUENZA VIRUS VAC: HCPCS | Performed by: INTERNAL MEDICINE

## 2017-10-04 RX ORDER — GEMFIBROZIL 600 MG/1
TABLET, FILM COATED ORAL
Refills: 3 | COMMUNITY
Start: 2017-07-03 | End: 2019-01-21

## 2017-10-04 RX ORDER — AZELASTINE 1 MG/ML
2 SPRAY, METERED NASAL 2 TIMES DAILY
Qty: 1 EACH | Refills: 5 | Status: SHIPPED | OUTPATIENT
Start: 2017-10-04 | End: 2018-08-27 | Stop reason: SDUPTHER

## 2017-10-04 RX ORDER — KETOTIFEN FUMARATE 0.35 MG/ML
SOLUTION/ DROPS OPHTHALMIC
Refills: 0 | COMMUNITY
Start: 2017-07-24 | End: 2019-01-21

## 2017-10-04 RX ORDER — BUDESONIDE AND FORMOTEROL FUMARATE DIHYDRATE 160; 4.5 UG/1; UG/1
2 AEROSOL RESPIRATORY (INHALATION) 2 TIMES DAILY
Qty: 1 INHALER | Refills: 5 | Status: SHIPPED | OUTPATIENT
Start: 2017-10-04 | End: 2018-12-17 | Stop reason: SDUPTHER

## 2017-10-04 RX ORDER — MONTELUKAST SODIUM 10 MG/1
TABLET ORAL
Refills: 3 | COMMUNITY
Start: 2017-07-26 | End: 2019-01-21

## 2017-10-04 RX ORDER — GABAPENTIN 100 MG/1
CAPSULE ORAL
Refills: 0 | COMMUNITY
Start: 2017-07-20 | End: 2018-12-06 | Stop reason: HOSPADM

## 2017-10-04 NOTE — PROGRESS NOTES
"Chief Complaint   Patient presents with   • Follow-up   • Shortness of Breath         Subjective   Joelle Yee is a 71 y.o. female.     History of Present Illness   Comes in today to follow-up on shortness of breath, COPD, bronchiectasis, allergic rhinitis and hypoxia.    She denies any recent exacerbations.    She continues to have exercise limitation and shortness of breath with exertion.  She unfortunately continues to smoke 6 cigarettes per day.    She is using all her medications and reports improvement in symptoms of allergic rhinitis as well as decreased cough and phlegm production.    She is also using her flutter valve on a regular basis.    The following portions of the patient's history were reviewed and updated as appropriate: allergies, current medications, past family history, past medical history, past social history and past surgical history.    Review of Systems   Constitutional: Negative for chills and fever.   HENT: Negative for sinus pressure, sneezing and sore throat.    Respiratory: Positive for cough, shortness of breath and wheezing. Negative for chest tightness.        Objective   Visit Vitals   • /60   • Pulse 85   • Resp 16   • Ht 60\" (152.4 cm)   • Wt 78 lb 3.2 oz (35.5 kg)   • LMP  (LMP Unknown)   • SpO2 96%   • BMI 15.27 kg/m2       Physical Exam   Constitutional: She is oriented to person, place, and time. She appears well-developed and well-nourished.   HENT:   Head: Atraumatic.   Dentures noted.    Eyes: EOM are normal.   Neck: Neck supple.   Cardiovascular: Normal rate and regular rhythm.    Pulmonary/Chest: Effort normal. No respiratory distress.   Somewhat hyperresonant to percussion  Somewhat decreased A/E with out  wheezing noted.   Musculoskeletal: She exhibits no edema.   Neurological: She is alert and oriented to person, place, and time.   Skin: Skin is warm and dry.   Psychiatric: She has a normal mood and affect.   Vitals reviewed.          Assessment/Plan "   Joelle was seen today for follow-up and shortness of breath.    Diagnoses and all orders for this visit:    Shortness of breath    Chronic obstructive pulmonary disease, unspecified COPD type    Other allergic rhinitis    Hypoxia    Bronchiectasis without complication    Smoking    Other orders  -     azelastine (ASTELIN) 0.1 % nasal spray; 2 sprays into each nostril 2 (Two) Times a Day. Use in each nostril as directed  -     budesonide-formoterol (SYMBICORT) 160-4.5 MCG/ACT inhaler; Inhale 2 puffs 2 (Two) Times a Day. Inhale 1 puff twice daily. Rinse mouth after use.  -     ipratropium-albuterol (COMBIVENT RESPIMAT)  MCG/ACT inhaler; Inhale 1 puff 4 (Four) Times a Day As Needed for Wheezing.  -     Umeclidinium Bromide (INCRUSE ELLIPTA) 62.5 MCG/INH aerosol powder ; Inhale 1 puff Daily.  -     nicotine (NICODERM CQ) 7 MG/24HR patch; Place 1 patch on the skin Daily. DONOT smoke with patch on.  -     Flu Vaccine High Dose PF 65YR+           Return in about 5 months (around 3/4/2018) for Recheck, For Kaelyn.    DISCUSSION (if any):  I reviewed the patient's CT images personally and there is definite evidence of bronchiectasis along with other changes consistent with COPD.  The patient's PFTs confirmed severe COPD and I once again reiterated that fact the patient.    The patient was once again encouraged to quit smoking and I offered her multiple modalities including Chantix.  The patient actually wants to try nicotine patches and these were prescribed.    Her shortness of breath seems to be responding to the current regimen and I have made no changes.    The patient was advised to continue oxygen, since there has been a good response since the patient is using it as prescribed..    For her bronchiectasis, she will definitely need to continue flutter valve on a regular basis.    I also went over other methods of mucosal clearance including postural drainage.    She is up-to-date with Pneumovax.  Prevnar will  be considered upon follow-up visit.  Influenza vaccination was given today.      Dictated utilizing Dragon dictation.    This document was electronically signed by Rebeka Esparza MD October 4, 2017  2:16 PM

## 2017-10-16 ENCOUNTER — APPOINTMENT (OUTPATIENT)
Dept: GENERAL RADIOLOGY | Facility: HOSPITAL | Age: 72
End: 2017-10-16

## 2017-10-16 ENCOUNTER — HOSPITAL ENCOUNTER (INPATIENT)
Facility: HOSPITAL | Age: 72
LOS: 2 days | Discharge: HOME HEALTH | End: 2017-10-18
Attending: EMERGENCY MEDICINE | Admitting: FAMILY MEDICINE

## 2017-10-16 DIAGNOSIS — J18.9 PNEUMONIA OF RIGHT UPPER LOBE DUE TO INFECTIOUS ORGANISM: ICD-10-CM

## 2017-10-16 DIAGNOSIS — Z78.9 IMPAIRED MOBILITY AND ADLS: ICD-10-CM

## 2017-10-16 DIAGNOSIS — J44.1 COPD WITH ACUTE EXACERBATION (HCC): ICD-10-CM

## 2017-10-16 DIAGNOSIS — J96.11 CHRONIC RESPIRATORY FAILURE WITH HYPOXIA (HCC): ICD-10-CM

## 2017-10-16 DIAGNOSIS — J44.0 CHRONIC OBSTRUCTIVE PULMONARY DISEASE WITH ACUTE LOWER RESPIRATORY INFECTION (HCC): Primary | ICD-10-CM

## 2017-10-16 DIAGNOSIS — Z74.09 IMPAIRED MOBILITY AND ADLS: ICD-10-CM

## 2017-10-16 PROBLEM — J96.21 ACUTE ON CHRONIC RESPIRATORY FAILURE WITH HYPOXIA (HCC): Status: ACTIVE | Noted: 2017-10-16

## 2017-10-16 LAB
ALBUMIN SERPL-MCNC: 3.6 G/DL (ref 3.5–5)
ALBUMIN/GLOB SERPL: 1.2 G/DL (ref 1–2)
ALP SERPL-CCNC: 109 U/L (ref 38–126)
ALT SERPL W P-5'-P-CCNC: 25 U/L (ref 13–69)
ANION GAP SERPL CALCULATED.3IONS-SCNC: 12 MMOL/L
AST SERPL-CCNC: 21 U/L (ref 15–46)
BACTERIA UR QL AUTO: ABNORMAL /HPF
BASOPHILS # BLD AUTO: 0.03 10*3/MM3 (ref 0–0.2)
BASOPHILS NFR BLD AUTO: 0.3 % (ref 0–2.5)
BILIRUB SERPL-MCNC: 0.3 MG/DL (ref 0.2–1.3)
BILIRUB UR QL STRIP: NEGATIVE
BUN BLD-MCNC: 11 MG/DL (ref 7–20)
BUN/CREAT SERPL: 15.7 (ref 7.1–23.5)
CALCIUM SPEC-SCNC: 8.5 MG/DL (ref 8.4–10.2)
CHLORIDE SERPL-SCNC: 97 MMOL/L (ref 98–107)
CLARITY UR: CLEAR
CO2 SERPL-SCNC: 27 MMOL/L (ref 26–30)
COLOR UR: YELLOW
CREAT BLD-MCNC: 0.7 MG/DL (ref 0.6–1.3)
D-LACTATE SERPL-SCNC: 1 MMOL/L (ref 0.5–2)
DEPRECATED RDW RBC AUTO: 57.6 FL (ref 37–54)
EOSINOPHIL # BLD AUTO: 0 10*3/MM3 (ref 0–0.7)
EOSINOPHIL NFR BLD AUTO: 0 % (ref 0–7)
ERYTHROCYTE [DISTWIDTH] IN BLOOD BY AUTOMATED COUNT: 17 % (ref 11.5–14.5)
FLUAV AG NPH QL: NEGATIVE
FLUBV AG NPH QL IA: NEGATIVE
GFR SERPL CREATININE-BSD FRML MDRD: 82 ML/MIN/1.73
GLOBULIN UR ELPH-MCNC: 3.1 GM/DL
GLUCOSE BLD-MCNC: 107 MG/DL (ref 74–98)
GLUCOSE UR STRIP-MCNC: NEGATIVE MG/DL
HCT VFR BLD AUTO: 33.9 % (ref 37–47)
HGB BLD-MCNC: 11 G/DL (ref 12–16)
HGB UR QL STRIP.AUTO: NEGATIVE
HOLD SPECIMEN: NORMAL
HOLD SPECIMEN: NORMAL
HYALINE CASTS UR QL AUTO: ABNORMAL /LPF
IMM GRANULOCYTES # BLD: 0.08 10*3/MM3 (ref 0–0.06)
IMM GRANULOCYTES NFR BLD: 0.7 % (ref 0–0.6)
KETONES UR QL STRIP: ABNORMAL
LEUKOCYTE ESTERASE UR QL STRIP.AUTO: ABNORMAL
LIPASE SERPL-CCNC: 23 U/L (ref 23–300)
LYMPHOCYTES # BLD AUTO: 1.44 10*3/MM3 (ref 0.6–3.4)
LYMPHOCYTES NFR BLD AUTO: 12.9 % (ref 10–50)
MCH RBC QN AUTO: 29.8 PG (ref 27–31)
MCHC RBC AUTO-ENTMCNC: 32.4 G/DL (ref 30–37)
MCV RBC AUTO: 91.9 FL (ref 81–99)
MONOCYTES # BLD AUTO: 0.75 10*3/MM3 (ref 0–0.9)
MONOCYTES NFR BLD AUTO: 6.7 % (ref 0–12)
MUCOUS THREADS URNS QL MICRO: ABNORMAL /HPF
NEUTROPHILS # BLD AUTO: 8.87 10*3/MM3 (ref 2–6.9)
NEUTROPHILS NFR BLD AUTO: 79.4 % (ref 37–80)
NITRITE UR QL STRIP: NEGATIVE
NRBC BLD MANUAL-RTO: 0 /100 WBC (ref 0–0)
PH UR STRIP.AUTO: 6.5 [PH] (ref 5–8)
PLATELET # BLD AUTO: 203 10*3/MM3 (ref 130–400)
PMV BLD AUTO: 10.2 FL (ref 6–12)
POTASSIUM BLD-SCNC: 4 MMOL/L (ref 3.5–5.1)
PROT SERPL-MCNC: 6.7 G/DL (ref 6.3–8.2)
PROT UR QL STRIP: ABNORMAL
RBC # BLD AUTO: 3.69 10*6/MM3 (ref 4.2–5.4)
RBC # UR: ABNORMAL /HPF
REF LAB TEST METHOD: ABNORMAL
SODIUM BLD-SCNC: 132 MMOL/L (ref 137–145)
SP GR UR STRIP: 1.02 (ref 1–1.03)
SQUAMOUS #/AREA URNS HPF: ABNORMAL /HPF
TROPONIN I SERPL-MCNC: <0.012 NG/ML (ref 0–0.03)
UROBILINOGEN UR QL STRIP: ABNORMAL
WBC NRBC COR # BLD: 11.17 10*3/MM3 (ref 4.8–10.8)
WBC UR QL AUTO: ABNORMAL /HPF
WHOLE BLOOD HOLD SPECIMEN: NORMAL
WHOLE BLOOD HOLD SPECIMEN: NORMAL

## 2017-10-16 PROCEDURE — 80053 COMPREHEN METABOLIC PANEL: CPT | Performed by: EMERGENCY MEDICINE

## 2017-10-16 PROCEDURE — 71010 HC CHEST PA OR AP: CPT

## 2017-10-16 PROCEDURE — 94799 UNLISTED PULMONARY SVC/PX: CPT

## 2017-10-16 PROCEDURE — 94640 AIRWAY INHALATION TREATMENT: CPT

## 2017-10-16 PROCEDURE — 99222 1ST HOSP IP/OBS MODERATE 55: CPT | Performed by: FAMILY MEDICINE

## 2017-10-16 PROCEDURE — 87086 URINE CULTURE/COLONY COUNT: CPT | Performed by: EMERGENCY MEDICINE

## 2017-10-16 PROCEDURE — 25010000002 METHYLPREDNISOLONE PER 125 MG: Performed by: EMERGENCY MEDICINE

## 2017-10-16 PROCEDURE — 25010000002 AZITHROMYCIN: Performed by: EMERGENCY MEDICINE

## 2017-10-16 PROCEDURE — 84484 ASSAY OF TROPONIN QUANT: CPT | Performed by: EMERGENCY MEDICINE

## 2017-10-16 PROCEDURE — 85025 COMPLETE CBC W/AUTO DIFF WBC: CPT | Performed by: EMERGENCY MEDICINE

## 2017-10-16 PROCEDURE — 25010000002 CEFTRIAXONE PER 250 MG: Performed by: EMERGENCY MEDICINE

## 2017-10-16 PROCEDURE — 87804 INFLUENZA ASSAY W/OPTIC: CPT | Performed by: EMERGENCY MEDICINE

## 2017-10-16 PROCEDURE — 25010000002 METHYLPREDNISOLONE PER 125 MG: Performed by: FAMILY MEDICINE

## 2017-10-16 PROCEDURE — 83605 ASSAY OF LACTIC ACID: CPT | Performed by: EMERGENCY MEDICINE

## 2017-10-16 PROCEDURE — 81001 URINALYSIS AUTO W/SCOPE: CPT | Performed by: EMERGENCY MEDICINE

## 2017-10-16 PROCEDURE — 87040 BLOOD CULTURE FOR BACTERIA: CPT | Performed by: FAMILY MEDICINE

## 2017-10-16 PROCEDURE — 25010000002 ENOXAPARIN PER 10 MG: Performed by: FAMILY MEDICINE

## 2017-10-16 PROCEDURE — 25010000002 PIPERACILLIN SOD-TAZOBACTAM PER 1 G: Performed by: FAMILY MEDICINE

## 2017-10-16 PROCEDURE — 25010000002 VANCOMYCIN PER 500 MG: Performed by: FAMILY MEDICINE

## 2017-10-16 PROCEDURE — 99285 EMERGENCY DEPT VISIT HI MDM: CPT

## 2017-10-16 PROCEDURE — 83690 ASSAY OF LIPASE: CPT | Performed by: EMERGENCY MEDICINE

## 2017-10-16 PROCEDURE — 93005 ELECTROCARDIOGRAM TRACING: CPT

## 2017-10-16 RX ORDER — METHYLPREDNISOLONE SODIUM SUCCINATE 125 MG/2ML
125 INJECTION, POWDER, LYOPHILIZED, FOR SOLUTION INTRAMUSCULAR; INTRAVENOUS ONCE
Status: COMPLETED | OUTPATIENT
Start: 2017-10-16 | End: 2017-10-16

## 2017-10-16 RX ORDER — IPRATROPIUM BROMIDE AND ALBUTEROL SULFATE 2.5; .5 MG/3ML; MG/3ML
3 SOLUTION RESPIRATORY (INHALATION) EVERY 6 HOURS PRN
Status: DISCONTINUED | OUTPATIENT
Start: 2017-10-16 | End: 2017-10-18 | Stop reason: HOSPADM

## 2017-10-16 RX ORDER — ONDANSETRON 4 MG/1
4 TABLET, ORALLY DISINTEGRATING ORAL ONCE
Status: COMPLETED | OUTPATIENT
Start: 2017-10-16 | End: 2017-10-16

## 2017-10-16 RX ORDER — SODIUM CHLORIDE 0.9 % (FLUSH) 0.9 %
10 SYRINGE (ML) INJECTION AS NEEDED
Status: DISCONTINUED | OUTPATIENT
Start: 2017-10-16 | End: 2017-10-18 | Stop reason: HOSPADM

## 2017-10-16 RX ORDER — ONDANSETRON 4 MG/1
TABLET, ORALLY DISINTEGRATING ORAL
Status: DISPENSED
Start: 2017-10-16 | End: 2017-10-16

## 2017-10-16 RX ORDER — ACETAMINOPHEN 325 MG/1
975 TABLET ORAL ONCE
Status: COMPLETED | OUTPATIENT
Start: 2017-10-16 | End: 2017-10-16

## 2017-10-16 RX ORDER — GUAIFENESIN 600 MG/1
600 TABLET, EXTENDED RELEASE ORAL 2 TIMES DAILY
Status: DISCONTINUED | OUTPATIENT
Start: 2017-10-16 | End: 2017-10-18 | Stop reason: HOSPADM

## 2017-10-16 RX ORDER — LORAZEPAM 0.5 MG/1
0.5 TABLET ORAL 2 TIMES DAILY PRN
Status: DISCONTINUED | OUTPATIENT
Start: 2017-10-16 | End: 2017-10-18 | Stop reason: HOSPADM

## 2017-10-16 RX ORDER — PREDNISONE 50 MG/1
50 TABLET ORAL DAILY
Qty: 5 TABLET | Refills: 0 | Status: SHIPPED | OUTPATIENT
Start: 2017-10-16 | End: 2017-10-18

## 2017-10-16 RX ORDER — HYDROCODONE BITARTRATE AND ACETAMINOPHEN 5; 325 MG/1; MG/1
1 TABLET ORAL EVERY 6 HOURS PRN
Status: DISCONTINUED | OUTPATIENT
Start: 2017-10-16 | End: 2017-10-18 | Stop reason: HOSPADM

## 2017-10-16 RX ORDER — GABAPENTIN 100 MG/1
100 CAPSULE ORAL EVERY 8 HOURS SCHEDULED
Status: DISCONTINUED | OUTPATIENT
Start: 2017-10-16 | End: 2017-10-18 | Stop reason: HOSPADM

## 2017-10-16 RX ORDER — METHYLPREDNISOLONE SODIUM SUCCINATE 125 MG/2ML
80 INJECTION, POWDER, LYOPHILIZED, FOR SOLUTION INTRAMUSCULAR; INTRAVENOUS EVERY 8 HOURS
Status: DISCONTINUED | OUTPATIENT
Start: 2017-10-16 | End: 2017-10-18 | Stop reason: HOSPADM

## 2017-10-16 RX ORDER — IPRATROPIUM BROMIDE AND ALBUTEROL SULFATE 2.5; .5 MG/3ML; MG/3ML
3 SOLUTION RESPIRATORY (INHALATION) ONCE
Status: COMPLETED | OUTPATIENT
Start: 2017-10-16 | End: 2017-10-16

## 2017-10-16 RX ORDER — FAMOTIDINE 20 MG/1
20 TABLET, FILM COATED ORAL 2 TIMES DAILY
Status: DISCONTINUED | OUTPATIENT
Start: 2017-10-16 | End: 2017-10-18 | Stop reason: HOSPADM

## 2017-10-16 RX ORDER — LANOLIN ALCOHOL/MO/W.PET/CERES
10 CREAM (GRAM) TOPICAL NIGHTLY PRN
Status: DISCONTINUED | OUTPATIENT
Start: 2017-10-16 | End: 2017-10-18 | Stop reason: HOSPADM

## 2017-10-16 RX ORDER — VENLAFAXINE 75 MG/1
75 TABLET ORAL 2 TIMES DAILY
Status: DISCONTINUED | OUTPATIENT
Start: 2017-10-16 | End: 2017-10-18 | Stop reason: HOSPADM

## 2017-10-16 RX ORDER — BENZONATATE 100 MG/1
100 CAPSULE ORAL 3 TIMES DAILY PRN
Status: DISCONTINUED | OUTPATIENT
Start: 2017-10-16 | End: 2017-10-18 | Stop reason: HOSPADM

## 2017-10-16 RX ORDER — SODIUM CHLORIDE 0.9 % (FLUSH) 0.9 %
1-10 SYRINGE (ML) INJECTION AS NEEDED
Status: DISCONTINUED | OUTPATIENT
Start: 2017-10-16 | End: 2017-10-18 | Stop reason: HOSPADM

## 2017-10-16 RX ORDER — DOXYCYCLINE 100 MG/1
100 CAPSULE ORAL 2 TIMES DAILY
Qty: 14 CAPSULE | Refills: 0 | Status: SHIPPED | OUTPATIENT
Start: 2017-10-16 | End: 2018-06-10 | Stop reason: HOSPADM

## 2017-10-16 RX ORDER — TRAZODONE HYDROCHLORIDE 50 MG/1
50 TABLET ORAL NIGHTLY
Status: DISCONTINUED | OUTPATIENT
Start: 2017-10-16 | End: 2017-10-18 | Stop reason: HOSPADM

## 2017-10-16 RX ORDER — MIRTAZAPINE 15 MG/1
30 TABLET, FILM COATED ORAL NIGHTLY
Status: DISCONTINUED | OUTPATIENT
Start: 2017-10-16 | End: 2017-10-18 | Stop reason: HOSPADM

## 2017-10-16 RX ORDER — SODIUM CHLORIDE 9 MG/ML
90 INJECTION, SOLUTION INTRAVENOUS CONTINUOUS
Status: ACTIVE | OUTPATIENT
Start: 2017-10-16 | End: 2017-10-17

## 2017-10-16 RX ORDER — IPRATROPIUM BROMIDE AND ALBUTEROL SULFATE 2.5; .5 MG/3ML; MG/3ML
3 SOLUTION RESPIRATORY (INHALATION)
Status: DISCONTINUED | OUTPATIENT
Start: 2017-10-16 | End: 2017-10-17

## 2017-10-16 RX ORDER — CYCLOBENZAPRINE HCL 10 MG
10 TABLET ORAL DAILY PRN
Status: DISCONTINUED | OUTPATIENT
Start: 2017-10-16 | End: 2017-10-18 | Stop reason: HOSPADM

## 2017-10-16 RX ORDER — BUDESONIDE 0.5 MG/2ML
0.5 INHALANT ORAL
Status: DISCONTINUED | OUTPATIENT
Start: 2017-10-16 | End: 2017-10-18 | Stop reason: HOSPADM

## 2017-10-16 RX ORDER — ACETAMINOPHEN 325 MG/1
650 TABLET ORAL EVERY 4 HOURS PRN
Status: DISCONTINUED | OUTPATIENT
Start: 2017-10-16 | End: 2017-10-18 | Stop reason: HOSPADM

## 2017-10-16 RX ORDER — AZELASTINE 1 MG/ML
2 SPRAY, METERED NASAL 2 TIMES DAILY
Status: DISCONTINUED | OUTPATIENT
Start: 2017-10-16 | End: 2017-10-18 | Stop reason: HOSPADM

## 2017-10-16 RX ADMIN — AZELASTINE HYDROCHLORIDE 2 SPRAY: 137 SPRAY, METERED NASAL at 20:27

## 2017-10-16 RX ADMIN — IPRATROPIUM BROMIDE AND ALBUTEROL SULFATE 3 ML: .5; 3 SOLUTION RESPIRATORY (INHALATION) at 16:28

## 2017-10-16 RX ADMIN — CYCLOBENZAPRINE HYDROCHLORIDE 10 MG: 10 TABLET, FILM COATED ORAL at 20:28

## 2017-10-16 RX ADMIN — CEFTRIAXONE 1 G: 1 INJECTION, SOLUTION INTRAVENOUS at 04:23

## 2017-10-16 RX ADMIN — IPRATROPIUM BROMIDE AND ALBUTEROL SULFATE 3 ML: .5; 3 SOLUTION RESPIRATORY (INHALATION) at 04:15

## 2017-10-16 RX ADMIN — NICOTINE 1 PATCH: 7 PATCH, EXTENDED RELEASE TRANSDERMAL at 09:06

## 2017-10-16 RX ADMIN — TAZOBACTAM SODIUM AND PIPERACILLIN SODIUM 3.38 G: 375; 3 INJECTION, SOLUTION INTRAVENOUS at 09:04

## 2017-10-16 RX ADMIN — IPRATROPIUM BROMIDE AND ALBUTEROL SULFATE 3 ML: .5; 3 SOLUTION RESPIRATORY (INHALATION) at 19:54

## 2017-10-16 RX ADMIN — HYDROCODONE BITARTRATE AND ACETAMINOPHEN 1 TABLET: 5; 325 TABLET ORAL at 20:28

## 2017-10-16 RX ADMIN — MELATONIN TAB 3 MG 10.5 MG: 3 TAB at 20:29

## 2017-10-16 RX ADMIN — SODIUM CHLORIDE 90 ML/HR: 9 INJECTION, SOLUTION INTRAVENOUS at 09:08

## 2017-10-16 RX ADMIN — BUDESONIDE 0.5 MG: 0.5 INHALANT RESPIRATORY (INHALATION) at 19:54

## 2017-10-16 RX ADMIN — MIRTAZAPINE 30 MG: 15 TABLET, FILM COATED ORAL at 20:30

## 2017-10-16 RX ADMIN — VANCOMYCIN HYDROCHLORIDE 1000 MG: 1 INJECTION, POWDER, LYOPHILIZED, FOR SOLUTION INTRAVENOUS at 09:52

## 2017-10-16 RX ADMIN — GUAIFENESIN 600 MG: 600 TABLET, EXTENDED RELEASE ORAL at 09:03

## 2017-10-16 RX ADMIN — ACETAMINOPHEN 975 MG: 325 TABLET, FILM COATED ORAL at 03:11

## 2017-10-16 RX ADMIN — METHYLPREDNISOLONE SODIUM SUCCINATE 80 MG: 125 INJECTION, POWDER, FOR SOLUTION INTRAMUSCULAR; INTRAVENOUS at 11:42

## 2017-10-16 RX ADMIN — GABAPENTIN 100 MG: 100 CAPSULE ORAL at 13:43

## 2017-10-16 RX ADMIN — GABAPENTIN 100 MG: 100 CAPSULE ORAL at 21:25

## 2017-10-16 RX ADMIN — SODIUM CHLORIDE 90 ML/HR: 9 INJECTION, SOLUTION INTRAVENOUS at 20:35

## 2017-10-16 RX ADMIN — SODIUM CHLORIDE 1000 ML: 9 INJECTION, SOLUTION INTRAVENOUS at 05:02

## 2017-10-16 RX ADMIN — GUAIFENESIN 600 MG: 600 TABLET, EXTENDED RELEASE ORAL at 20:28

## 2017-10-16 RX ADMIN — SODIUM CHLORIDE 1000 ML: 9 INJECTION, SOLUTION INTRAVENOUS at 04:22

## 2017-10-16 RX ADMIN — TAZOBACTAM SODIUM AND PIPERACILLIN SODIUM 3.38 G: 375; 3 INJECTION, SOLUTION INTRAVENOUS at 14:42

## 2017-10-16 RX ADMIN — TRAZODONE HYDROCHLORIDE 50 MG: 50 TABLET ORAL at 20:31

## 2017-10-16 RX ADMIN — ONDANSETRON 4 MG: 4 TABLET, ORALLY DISINTEGRATING ORAL at 06:27

## 2017-10-16 RX ADMIN — IPRATROPIUM BROMIDE AND ALBUTEROL SULFATE 3 ML: .5; 3 SOLUTION RESPIRATORY (INHALATION) at 12:47

## 2017-10-16 RX ADMIN — AZITHROMYCIN 500 MG: 500 INJECTION, POWDER, LYOPHILIZED, FOR SOLUTION INTRAVENOUS at 05:57

## 2017-10-16 RX ADMIN — VENLAFAXINE 75 MG: 75 TABLET ORAL at 20:30

## 2017-10-16 RX ADMIN — LORAZEPAM 0.5 MG: 0.5 TABLET ORAL at 20:29

## 2017-10-16 RX ADMIN — BENZONATATE 100 MG: 100 CAPSULE, LIQUID FILLED ORAL at 20:27

## 2017-10-16 RX ADMIN — VENLAFAXINE 75 MG: 75 TABLET ORAL at 09:02

## 2017-10-16 RX ADMIN — METHYLPREDNISOLONE SODIUM SUCCINATE 80 MG: 125 INJECTION, POWDER, FOR SOLUTION INTRAMUSCULAR; INTRAVENOUS at 20:30

## 2017-10-16 RX ADMIN — AZELASTINE HYDROCHLORIDE 2 SPRAY: 137 SPRAY, METERED NASAL at 09:04

## 2017-10-16 RX ADMIN — METHYLPREDNISOLONE SODIUM SUCCINATE 125 MG: 125 INJECTION, POWDER, FOR SOLUTION INTRAMUSCULAR; INTRAVENOUS at 04:19

## 2017-10-16 RX ADMIN — FAMOTIDINE 20 MG: 20 TABLET, FILM COATED ORAL at 09:02

## 2017-10-16 RX ADMIN — ENOXAPARIN SODIUM 40 MG: 40 INJECTION SUBCUTANEOUS at 09:03

## 2017-10-16 RX ADMIN — FAMOTIDINE 20 MG: 20 TABLET, FILM COATED ORAL at 20:28

## 2017-10-16 RX ADMIN — IPRATROPIUM BROMIDE AND ALBUTEROL SULFATE 3 ML: .5; 3 SOLUTION RESPIRATORY (INHALATION) at 03:16

## 2017-10-16 RX ADMIN — TAZOBACTAM SODIUM AND PIPERACILLIN SODIUM 3.38 G: 375; 3 INJECTION, SOLUTION INTRAVENOUS at 20:35

## 2017-10-16 NOTE — PROGRESS NOTES
Discharge Planning Assessment  [facility]     Patient Name: Joelle Yee  MRN: [id number]  Today's Date: 10/16/2017    Admit Date: 10/16/2017          Discharge Needs Assessment       10/16/17 1636    Living Environment    Lives With alone    Living Arrangements apartment    Home Accessibility no concerns    Stair Railings at Home none    Type of Financial/Environmental Concern none    Transportation Available family or friend will provide;car    Living Environment    Provides Primary Care For no one    Quality Of Family Relationships unable to assess;involved    Able to Return to Prior Living Arrangements yes    Discharge Needs Assessment    Concerns To Be Addressed basic needs concerns    Readmission Within The Last 30 Days no previous admission in last 30 days    Anticipated Changes Related to Illness none    Equipment Currently Used at Home shower chair;oxygen   2 Liters as needed provided by [employer]    Equipment Needed After Discharge rollator    Discharge Disposition still a patient            Discharge Plan       10/16/17 7685    Case Management/Social Work Plan    Plan Discharge Planning    Additional Comments SW met with pt at bedside while in ICU. Pt reports that she lives alone in an apt and is independent with ADL's but does receive assistance. Pt states that she has [employer] and [employer] that come into the home and help with homemaking, etc. Pt states that she is interested in a rollator as she is unable to walk very far without sitting. Currently, pt is on 2 liters of oxygen at home PRN provided by [employer]. Pt also has a shower chair. She is able to afford meds, has a PCP, and an Advance Directive on file. Pt plans on returning home at discharge.    Final Note    Final Note Pt would like a rollator. She denies any other needs at this time. CM will continue to follow and assist.        Discharge Placement     No information found                Demographic Summary     None            Functional  Status       10/16/17 1634    Functional Status Prior    Ambulation 0-->independent    Transferring 0-->independent    Toileting 0-->independent    Bathing 0-->independent    Dressing 0-->independent    Eating 0-->independent    Communication 0-->understands/communicates without difficulty    Swallowing 0-->swallows foods/liquids without difficulty    IADL    Medications independent    Meal Preparation assistive person    Housekeeping assistive person    Laundry assistive person    Shopping assistive person    Oral Care independent    IADL Comments Pt receives homemaking assistance through both [employer] and [employer]    Activity Tolerance    Current Activity Limitations none    Usual Activity Tolerance fair    Current Activity Tolerance fair    Cognitive/Perceptual/Developmental    Current Mental Status/Cognitive Functioning no deficits noted    Employment/Financial    Source Of Income social security    Application For Public Assistance applied            Psychosocial     None            Abuse/Neglect     None            Legal     None            Substance Abuse     None            Patient Forms     None          Faye DEWEY, NIHARIKAW  10/16/17  4:44 PM

## 2017-10-16 NOTE — PHARMACY RECOMMENDATION
"Pharmacokinetic Initial Note - Vancomycin    Joelle Yee is a 71 y.o. female  60\" (152.4 cm) 82 lb 7 oz (37.4 kg)    Indication for use: Sepsis; pneumonia      Results from last 7 days     Lab Units 10/16/17  0257   WBC 10*3/mm3 11.17*   CREATININE mg/dL 0.70      Estimated Creatinine Clearance: 38.1 mL/min (by C-G formula based on Cr of 0.7).  Temp Readings from Last 1 Encounters:   10/16/17 98.6 °F (37 °C) (Oral)       Culture results  Microbiology Results (last 10 days)       Procedure Component Value - Date/Time      Influenza Antigen, Rapid - Swab, Nasopharynx [[id number]]  (Normal) Collected:  10/16/17 0312    Lab Status:  Edited Result - FINAL Specimen:  Swab from Nasopharynx Updated:  10/16/17 0714     Influenza A Ag, EIA Negative     Influenza B Ag, EIA Negative            Other Antimicrobials  Ceftriaxone 1 gm IV q 24 hrs;  Azithromycin 500 mg IV q 24 hrs.      Assessment/Plan  Initiated Vancomycin 1000 mg IV x 1, followed by 750 mg IV q 36 hrs. Ordered Vancomycin random 10/17 0600 (with AM labs). Dosing at higher end of dosing sprectrum for critically-ill patient; monitoring drug levels frequently for optimal therapy in extremely low-weight patient. Pharmacy will monitor renal function and adjust dose accordingly.    Jeff Haro RPH  10/16/17 8:44 AM    "

## 2017-10-16 NOTE — PROGRESS NOTES
Adult Nutrition  Assessment/PES    Patient Name:  Joelle Yee  YOB: 1945  MRN: [id number]  Admit Date:  10/16/2017    Assessment Date:  10/16/2017    Comments:  Rec. #1: Advance diet once medically feasible to High Calorie/High Protein. RD to provide nutritional supplements once diet advances. Rec. #2: Continue to monitor/replace electrolytes. Rec. #3: If pt. To remain NPO for greater than or equal to 5 days consider nutrition support. RD to follow pt. Consult RD PRN.           Reason for Assessment       10/16/17 1058    Reason for Assessment    Reason For Assessment/Visit admission assessment;nurse/nurse practitioner consult    Identified At Risk By Screening Criteria diagnosis;BMI    Diagnosis Diagnosis    Nutrition related Increased nutrition needs    Gastrointestinal Nausea    Pulmonary/Critical Care Pneumonia;COPD;A/C respiratory failure;Other (comment)   Hypoxia; Acute respiratory infection    Other diagnosis abnormal weight loss,                   Labs/Tests/Procedures/Meds       10/16/17 1100    Labs/Tests/Procedures/Meds    Labs/Tests Review Reviewed;Glucose;Na+;Cl-;Hgb Hct   High: Glucose    Medication Review Antacid;Antibiotic;Steroid            Physical Findings       10/16/17 1100    Physical Findings/Assessment    Additional Documentation Physical Appearance (Group)    Physical Appearance    Overall Physical Appearance underweight            Estimated/Assessed Needs       10/16/17 1101    Calculation Measurements    Weight Used For Calculations 46.3 kg (101 lb 15.8 oz)    Height Used for Calculations 1.524 m (5')    Estimated/Assessed Energy Needs    Energy Need Method [place]    Age 71    RMR ([place] [facility] Equation) 899.1    Activity Factors ([place] [facility] [place])  Out of bed, ambulatory  1.3    Estimated Kcal Range  ~6247-3883    Estimated/Assessed Protein Needs    Weight Used for Protein Calculation 46.3 kg (101 lb 15.8 oz)    Protein (gm/kg) 1.5    1.5 Gm Protein  (gm) 69.39    Estimated Protein Range ~55.51-69.39 gm    Estimated/Assessed Fluid Needs    Fluid Need Method RDA method    RDA Method (mL)  1600            Nutrition Prescription Ordered       10/16/17 1102    Nutrition Prescription PO    Current PO Diet NPO   x 1 day            Evaluation of Received Nutrient/Fluid Intake       10/16/17 1102    PO Evaluation    Number of Days PO Intake Evaluated Insufficient Data   NPO              Malnutrition Severity Assessment       10/16/17 1102    Malnutrition Severity Assessment    Malnutrition Type Chronic Illness Malnutrition    Physical Signs of Malnutrition (Chronic)    Muscle Wasting Mild    Fat Loss Mild    Secondary Physical Signs None    Weight Status (Chronic)    BMI Mod (<17)    %IBW Mild (<90%)    Energy Intake Status (Chronic)    Energy Intake Mod (<75% / > or equal to 1 mo)    Criteria Met (Must meet criteria for severity in at least 2 of these categories: M Wasting, Fat Loss, Fluid, Secondary Signs, Wt. Status, Intake)    Patient meets criteria for  Moderate malnutrition        Problem/Interventions:        Problem 1       10/16/17 1104    Nutrition Diagnoses Problem 1    Problem 1 Underweight    Etiology (related to) Factors Affecting Nutrition    Appetite Other   Poor PTA    Signs/Symptoms (evidenced by) BMI    BMI 16 - 16.9            Problem 2       10/16/17 1104    Nutrition Diagnoses Problem 2    Problem 2 Inadequate Intake/Infusion    Inadequate Intake Type Oral    Macronutrient Kcal;Fluid;Fiber;Protein;Carbohydrate;Fat    Micronutrient Vitamin;Mineral    Etiology (related to) MNT for Treatment/Condition    Signs/Symptoms (evidenced by) NPO                  Intervention Goal       10/16/17 1105    Intervention Goal    General Meet nutritional needs for age/condition    PO Meet estimated needs;Advance diet    Weight Appropriate weight gain            Nutrition Intervention       10/16/17 1105    Nutrition Intervention    RD/Tech Action Await begin  PO;Follow Tx progress            Nutrition Prescription       10/16/17 1105    Nutrition Prescription PO    PO Prescription Begin/change diet;Begin/change supplement    Begin/Change Diet to Clear Liquid    Supplement Ensure Clear;Jhony    Supplement Frequency 3 times a day;2 times a day    New PO Prescription Ordered? No, recommended    Other Orders    Obtain Weight Daily    Obtain Weight Ordered? No, recommended    Supplement Vitamin mineral supplement    Other Continue to monitor/replace electrolytes; Consider adding appetite stimulant.            Education/Evaluation       10/16/17 1106    Education    Education Provided education regarding;Education topics    Education Topics Weight management - maintain    Monitor/Evaluation    Monitor Per protocol;I&O;PO intake;Pertinent labs;Weight        Electronically signed by:  Deirdre Harirson RD  10/16/17 11:07 AM

## 2017-10-16 NOTE — ED PROVIDER NOTES
Subjective   Patient is a 71 y.o. female presenting with cough.   History provided by:  Patient   used: No    Cough   Cough characteristics:  Dry  Sputum characteristics:  Nondescript  Severity:  Moderate  Onset quality:  Gradual  Timing:  Constant  Progression:  Worsening  Chronicity:  New  Smoker: no    Context: upper respiratory infection    Context: not sick contacts, not weather changes and not with activity    Relieved by:  Nothing  Worsened by:  Nothing  Ineffective treatments:  None tried  Associated symptoms: chills, fever, rhinorrhea, shortness of breath and wheezing    Associated symptoms: no chest pain, no diaphoresis, no eye discharge, no headaches, no myalgias, no rash, no sinus congestion and no sore throat        Review of Systems   Constitutional: Positive for chills and fever. Negative for diaphoresis.   HENT: Positive for rhinorrhea. Negative for congestion, sore throat and trouble swallowing.    Eyes: Negative for discharge and visual disturbance.   Respiratory: Positive for cough, shortness of breath and wheezing. Negative for chest tightness.    Cardiovascular: Negative for chest pain, palpitations and leg swelling.   Gastrointestinal: Negative for abdominal pain, constipation, diarrhea, nausea and vomiting.   Genitourinary: Negative for dysuria, flank pain and hematuria.   Musculoskeletal: Negative for back pain, myalgias and neck pain.   Skin: Negative for color change and rash.   Neurological: Negative for dizziness, weakness, numbness and headaches.   Psychiatric/Behavioral: Negative for self-injury and suicidal ideas.       Past Medical History:   Diagnosis Date   • Abdominal pain    • Acute bronchitis    • Ankle pain    • Anxiety 1980   • Atopic dermatitis    • Backache    • Bipolar disorder    • Bronchiectasis    • Chronic obstructive lung disease 2008   • Colon cancer screening    • COPD (chronic obstructive pulmonary disease)    • Cystocele    • Depressed    •  Depression 1980   • Fatigue     Chronic pafigue 2012   • Fibromyalgia 2008   • GERD (gastroesophageal reflux disease)    • Gout    • Heartburn     Chronic historu of epigastric heartburn currently controlled on Prilesec 40 mg every morning along with Zantac 300 Mg daily at bedtime   • High cholesterol 2012   • Hip pain    • Hyperlipidemia    • Hypertension    • Insomnia    • Joint pain    • Kidney infection    • Loss of appetite    • Low back pain 1995   • Melena    • Nausea and vomiting    • On home oxygen therapy    • Osteoarthritis 2010   • Pain in limb    • Palpitations    • Pinched nerve     Pinched nerves 2011   • Recurrent urinary tract infection    • Sciatica [id number]   • Shortness of breath    • Sinus problem    • Sleep apnea 1998   • Upset stomach    • Valvular heart disease    • Vitamin B12 deficiency    • Weight loss, abnormal     Weight loss is stabel. On pund weight gain since 01/2016       Allergies   Allergen Reactions   • Dust Mite Extract      Dust   • Grass    • Mold Extract [Trichophyton]        Past Surgical History:   Procedure Laterality Date   • ABDOMINAL HERNIA REPAIR     • APPENDECTOMY  1965   • BACK SURGERY  2005   • CATARACT EXTRACTION Bilateral 1978   • CHOLECYSTECTOMY     • EYE SURGERY      Put in implants    • GALLBLADDER SURGERY  1985   • KNEE SURGERY Left 1979    Knee Cap   • TUBAL ABDOMINAL LIGATION  1971       Family History   Problem Relation Age of Onset   • Ovarian cancer Mother    • Cancer Mother    • Lung cancer Father    • Heart disease Brother        Social History     Social History   • Marital status: Single     Spouse name: N/A   • Number of children: N/A   • Years of education: N/A     Social History Main Topics   • Smoking status: Former Smoker     Packs/day: 0.50     Years: 35.00     Quit date: 3/5/2017   • Smokeless tobacco: Never Used      Comment: 1/2 to 1 ppd   • Alcohol use No   • Drug use: No   • Sexual activity: Defer     Other Topics Concern   • None      Social History Narrative           Objective   Physical Exam   Constitutional: She is oriented to person, place, and time. She appears well-nourished.   cachetic   HENT:   Head: Normocephalic and atraumatic.   Nose: Nose normal.   Mouth/Throat: Oropharynx is clear and moist.   Eyes: Conjunctivae and EOM are normal. Pupils are equal, round, and reactive to light.   Neck: Normal range of motion. Neck supple.   Cardiovascular: Normal rate, regular rhythm, normal heart sounds and intact distal pulses.    Pulmonary/Chest: Effort normal. No respiratory distress. She has wheezes. She exhibits no tenderness.   Abdominal: Soft. Bowel sounds are normal. There is no tenderness. There is no rebound and no guarding.   Musculoskeletal: Normal range of motion. She exhibits no edema, tenderness or deformity.   Neurological: She is alert and oriented to person, place, and time. No cranial nerve deficit. Coordination normal.   Skin: Skin is warm and dry. No rash noted. No erythema. No pallor.   Psychiatric: She has a normal mood and affect. Her behavior is normal. Judgment and thought content normal.   Nursing note and vitals reviewed.      Procedures         ED Course  ED Course                  MDM  Number of Diagnoses or Management Options  Chronic obstructive pulmonary disease with acute lower respiratory infection:   Pneumonia of right upper lobe due to infectious organism:   Diagnosis management comments: EKG: Ventricular rate 90, MA interval 124, , castration 70, sinus rhythm.  No ischemic changes.    Patient presents emergency department by EMS with complaints of fever, chills, cough, diarrhea.  Patient states that this is resolving going on for a few days.  She lives at home alone and has been taking over-the-counter medication for symptomatic relief.  Patient has a history of COPD and is on oxygen at home.  Patient states that she's been wearing her oxygen frequently over the last couple of days.  Vitals  remarkable for fever. Physical exam is remarkable for wheezing. Tylenol, solumedrol and duoneb ordered. Patient given 1L of fluids.  Labs and imaging obtained. WBC is slightly elevated. CXR shows chronic changes. Possible new infiltrate in the right upper lobe. Patient given 1G of rocephin. 500mg of azithromycin.  Vitals repeated. Temperature decreasing. Remainder of vitals stable. Patient still has wheezing on exam. A second duoneb treatment ordered. CURB 65 score is intermediate. Discussed admission vs discharge with the patient. She wants to go home. Will give rx for doxycycline and prednisone. Patient told to follow up with PCP this week, to take medication as directed, and to return for worsening symptoms.     While attempting to discharge the patient, her Blood pressure persisted to be low. Patient given a 2nd liter of fluids with no response. Patient is now agreeable with admission. Dr. Nicholson is agreeable with admitting her to the ICU.       Final diagnoses:   Chronic obstructive pulmonary disease with acute lower respiratory infection   Pneumonia of right upper lobe due to infectious organism            Bob Anand MD  10/16/17 0456       Bob Anand MD  10/16/17 6838       Bob Anand MD  10/16/17 0272

## 2017-10-16 NOTE — PLAN OF CARE
Problem: Patient Care Overview (Adult)  Goal: Plan of Care Review  Outcome: Ongoing (interventions implemented as appropriate)    10/16/17 1436   Coping/Psychosocial Response Interventions   Plan Of Care Reviewed With patient   Patient Care Overview   Progress improving         Problem: COPD, Chronic Bronchitis/Emphysema (Adult)  Goal: Signs and Symptoms of Listed Potential Problems Will be Absent or Manageable (COPD, Chronic Bronchitis/Emphysema)  Outcome: Ongoing (interventions implemented as appropriate)    Problem: Pneumonia (Adult)  Goal: Signs and Symptoms of Listed Potential Problems Will be Absent or Manageable (Pneumonia)  Outcome: Ongoing (interventions implemented as appropriate)

## 2017-10-16 NOTE — PROGRESS NOTES
Malnutrition Severity Assessment    Patient Name:  Joelle Yee  YOB: 1945  MRN: [id number]  Admit Date:  10/16/2017    Patient meets criteria for : Moderate malnutrition    Comments: Rec. #1: Advance diet once medically feasible to High Calorie/High Protein. RD to provide nutritional supplements once diet advances. Rec. #2: Continue to monitor/replace electrolytes. Rec. #3: If pt. To remain NPO for greater than or equal to 5 days consider nutrition support. RD to follow pt. Consult RD PRN     Malnutrition Type: Chronic Illness Malnutrition     Malnutrition Type (last 8 hours)      Malnutrition Severity Assessment       10/16/17 1102    Malnutrition Severity Assessment    Malnutrition Type Chronic Illness Malnutrition      10/16/17 1102    Physical Signs of Malnutrition (Chronic)    Muscle Wasting Mild    Fat Loss Mild    Secondary Physical Signs None      10/16/17 1102    Weight Status (Chronic)    BMI Mod (<17)    %IBW Mild (<90%)      10/16/17 1102    Energy Intake Status (Chronic)    Energy Intake Mod (<75% / > or equal to 1 mo)      10/16/17 1102    Criteria Met (Must meet criteria for severity in at least 2 of these categories: M Wasting, Fat Loss, Fluid, Secondary Signs, Wt. Status, Intake)    Patient meets criteria for  Moderate malnutrition          Electronically signed by:  Deirdre Harrison RD  10/16/17 11:35 AM

## 2017-10-16 NOTE — H&P
[facility]   HISTORY AND PHYSICAL      Name:  Joelle Yee   Age:  71 y.o.  Sex:  female  :  1945  MRN:  [id number]   Visit Number:  [id number]  Admission Date:  10/16/2017  Date Of Service:  10/16/17  Primary Care Physician:  DONTRELL Regan    Chief Complaint: shortness of breath and cough        History Of Presenting Illness:  Patient 71-year-old  lady with past medical history of COPD, chronic hypoxic respiratory failure on 2 L home oxygen, active tobacco dependency, who presented to the emergency room for shortness of breath and cough.  Patient presented to the emergency room via EMS.  He reports being in her normal state of health until approximately 2 days ago when she developed progressively worsening fever, chills, runny nose, shortness of breath, increased wheezing, myalgias, severe nausea, and cough with white sputum.  She reports having difficulty coughing her sputum.  She has had reduced oral intake over the past 2 days.  She reports having tried her machine, 3 inhalers without improvement.  She was reluctant to Sherry that she had restarted smoking.    The emergency room, preliminary chest x-ray does look like she has increased right upper lobe opacity consistent with her clinical pneumonia.  She has severe bilateral expiratory wheezes improved mildly with DuoNeb.  She was given ceftriaxone and azithromycin as well as Solu-Medrol 125 mg IV.  1 L normal saline bolus was initially provided.  After this therapy the patient's blood pressure still dropped to the 70s systolic and additional 1 L normal saline bolus was provided with improvement to systolic of 80s to 90s.  The patient does qualify for sepsis with elevated white blood cell count, elevated heart rate, elevated respirations, acute respiratory failure, infection, and low blood pressure and has been given a weight-based sepsis fluid bolus appropriate.  Patient will be admitted to the ICU  for close monitoring at this time.    I did evaluate the patient in the emergency room room where she was mildly tachypnea but otherwise alert and oriented in no acute distress.  She can barely ambulate without significant distress with increased cough and congestion but this doesn't improve at rest.        Review Of Systems:     General ROS: Patient denies any fevers, chills or loss of consciousness. Complains of generalized weakness.   Psychological ROS: Denies any hallucinations and delusions.  Ophthalmic ROS: Denies any diplopia or transient loss of vision.  ENT ROS: Denies sore throat, nasal congestion or ear pain.   Allergy and Immunology ROS: Denies rash or itching.  Hematological and Lymphatic ROS: Denies neck swelling or easy bleeding.  Endocrine ROS: Progressive  unintentional weight gain or loss.  Breast ROS: Denies any pain or swelling.  Respiratory ROS: Increased cough and shortness of breath with significant wheezing.   Cardiovascular ROS: Denies chest pain or palpitations. No history of exertional chest pain.  Gastrointestinal ROS: Significant nausea without vomiting. Denies any abdominal pain. No diarrhea.  Genito-Urinary ROS: Denies dysuria or hematuria.  Musculoskeletal ROS: Denies back pain. No muscle pain. No calf pain.   Neurological ROS: Denies any focal weakness. No loss of consciousness. Denies any numbness. Denies neck pain.   Dermatological ROS: Denies any redness or pruritis.       Past Medical History:    Past Medical History:   Diagnosis Date   • Abdominal pain    • Acute bronchitis    • Ankle pain    • Anxiety 1980   • Atopic dermatitis    • Backache    • Bipolar disorder    • Bronchiectasis    • Chronic obstructive lung disease 2008   • Colon cancer screening    • COPD (chronic obstructive pulmonary disease)    • Cystocele    • Depressed    • Depression 1980   • Fatigue     Chronic pafigue 2012   • Fibromyalgia 2008   • GERD (gastroesophageal reflux disease)    • Gout    • Heartburn      Chronic historu of epigastric heartburn currently controlled on Prilesec 40 mg every morning along with Zantac 300 Mg daily at bedtime   • High cholesterol 2012   • Hip pain    • Hyperlipidemia    • Hypertension    • Insomnia    • Joint pain    • Kidney infection    • Loss of appetite    • Low back pain 1995   • Melena    • Nausea and vomiting    • On home oxygen therapy    • Osteoarthritis 2010   • Pain in limb    • Palpitations    • Pinched nerve     Pinched nerves 2011   • Recurrent urinary tract infection    • Sciatica [id number]   • Shortness of breath    • Sinus problem    • Sleep apnea 1998   • Upset stomach    • Valvular heart disease    • Vitamin B12 deficiency    • Weight loss, abnormal     Weight loss is stabel. On pund weight gain since 01/2016       Past Surgical history:    Past Surgical History:   Procedure Laterality Date   • ABDOMINAL HERNIA REPAIR     • APPENDECTOMY  1965   • BACK SURGERY  2005   • CATARACT EXTRACTION Bilateral 1978   • CHOLECYSTECTOMY     • EYE SURGERY      Put in implants    • GALLBLADDER SURGERY  1985   • KNEE SURGERY Left 1979    Knee Cap   • TUBAL ABDOMINAL LIGATION  1971       Social History:  Pediatric History   Patient Guardian Status   • Not on file.     Other Topics Concern   • Not on file     Social History Narrative       Family History:    Family History   Problem Relation Age of Onset   • Ovarian cancer Mother    • Cancer Mother    • Lung cancer Father    • Heart disease Brother        Allergies:      Dust mite extract; Grass; and Mold extract [trichophyton]    Home Medications:    Prior to Admission Medications     Prescriptions Last Dose Informant Patient Reported? Taking?    albuterol (PROVENTIL) (2.5 MG/3ML) 0.083% nebulizer solution  Pharmacy Yes No    Take 2.5 mg by nebulization 4 (Four) Times a Day.    azelastine (ASTELIN) 0.1 % nasal spray   No No    2 sprays into each nostril 2 (Two) Times a Day. Use in each nostril as directed    benzonatate (TESSALON) 100  MG capsule   No No    Take 1 capsule by mouth 3 (Three) Times a Day As Needed for cough.    budesonide-formoterol (SYMBICORT) 160-4.5 MCG/ACT inhaler   No No    Inhale 2 puffs 2 (Two) Times a Day. Inhale 1 puff twice daily. Rinse mouth after use.    calcium acetate (PHOS BINDER,) 667 MG capsule capsule   No No    Take 2 capsules by mouth 3 (Three) Times a Day With Meals.    cetirizine (zyrTEC) 10 MG tablet   Yes No    Take 10 mg by mouth Daily.    conjugated estrogens (PREMARIN) vaginal cream  Self Yes No    Insert 0.5 g into the vagina Every Other Day.    Cyanocobalamin (VITAMIN B12 PO)  Self Yes No    Take 500 mcg by mouth Daily.    cyclobenzaprine (FLEXERIL) 10 MG tablet   Yes No    Take 1 tablet by mouth daily.    Diclofenac Sodium (VOLTAREN TD)   Yes No    Place  on the skin As Needed. Voltaren GEL     gabapentin (NEURONTIN) 100 MG capsule   Yes No    TAKE ONE CAPSULE BY MOUTH THREE TIMES A DAY    gemfibrozil (LOPID) 600 MG tablet   Yes No    TAKE ONE TABLET WITH SUPPER    HYDROcodone-acetaminophen (NORCO)  MG per tablet   Yes No    Take  by mouth. Take 1 tablet every 8 hours as needed for pain.    ipratropium-albuterol (COMBIVENT RESPIMAT)  MCG/ACT inhaler   No No    Inhale 1 puff 4 (Four) Times a Day As Needed for Wheezing.    ketotifen (ZADITOR) 0.025 % ophthalmic solution   Yes No    USE 1-2 DROPS IN BOTH EYES EVERY DAY    LORazepam (ATIVAN) 0.5 MG tablet   Yes No    losartan (COZAAR) 25 MG tablet  Pharmacy Yes No    Take 25 mg by mouth Daily.    melatonin 5 MG tablet tablet   Yes No    Take 10 mg by mouth Every Night. Patient takes 10 mg at night    metoprolol succinate XL (TOPROL-XL) 50 MG 24 hr tablet   No No    Take 1 tablet by mouth Daily.    Mirabegron ER (MYRBETRIQ) 50 MG tablet sustained-release 24 hour  Pharmacy Yes No    Take 50 mg by mouth Daily.    mirtazapine (REMERON) 30 MG tablet   Yes No    Take 30 mg by mouth Every Night.    montelukast (SINGULAIR) 10 MG tablet   Yes No    TAKE  ONE TABLET BY MOUTH AT BEDTIME    nicotine (NICODERM CQ) 7 MG/24HR patch   No No    Place 1 patch on the skin Daily. DONOT smoke with patch on.    nystatin (MYCOSTATIN) 668259 UNIT/ML suspension   No No    Swish and swallow 5 mL 4 (Four) Times a Day.    omeprazole (PriLOSEC) 20 MG capsule   Yes No    Take 20 mg by mouth 2 (Two) Times a Day.    ondansetron ODT (ZOFRAN-ODT) 4 MG disintegrating tablet   Yes No    Take 1 tablet by mouth every 6 (six) hours as needed.    OXYGEN-HELIUM IN   Yes No    Oxygen; Patient Sig: Oxygen 2 liter as needed; 0; 21-May-2014; Active    Respiratory Therapy Supplies (FLUTTER) device   No No    1 Device as needed (as directed).    traZODone (DESYREL) 50 MG tablet   Yes No    Take 1 tablet by mouth every night.    Umeclidinium Bromide (INCRUSE ELLIPTA) 62.5 MCG/INH aerosol powder    No No    Inhale 1 puff Daily.    urea (CARMOL) 40 % cream   No No    venlafaxine (EFFEXOR) 75 MG tablet   Yes No    Take 1 tablet by mouth 2 (two) times a day.             ED Medications:    Medications   sodium chloride 0.9 % flush 10 mL (not administered)   sodium chloride 0.9 % flush 10 mL (not administered)   AZITHROMYCIN 500 MG/250 ML 0.9% NS IVPB (vial-mate) (500 mg Intravenous New Bag 10/16/17 0557)   ipratropium-albuterol (DUO-NEB) nebulizer solution 3 mL (3 mL Nebulization Given 10/16/17 0316)   acetaminophen (TYLENOL) tablet 975 mg (975 mg Oral Given 10/16/17 0311)   sodium chloride 0.9 % bolus 1,000 mL (0 mL Intravenous Stopped 10/16/17 0459)   methylPREDNISolone sodium succinate (SOLU-Medrol) injection 125 mg (125 mg Intravenous Given 10/16/17 0419)   cefTRIAXone (ROCEPHIN) IVPB 1 g/50ml dextrose (premix) (0 g Intravenous Stopped 10/16/17 0459)   ipratropium-albuterol (DUO-NEB) nebulizer solution 3 mL (3 mL Nebulization Given 10/16/17 0415)   sodium chloride 0.9 % bolus 1,000 mL (0 mL Intravenous Stopped 10/16/17 0604)       Vital Signs:    Temp:  [99.8 °F (37.7 °C)-100.3 °F (37.9 °C)] 99.8 °F (37.7  °C)  Heart Rate:  [83-91] 90  Resp:  [16-20] 18  BP: ()/(40-57) 96/49      Intake/Output Summary (Last 24 hours) at 10/16/17 0615  Last data filed at 10/16/17 0604   Gross per 24 hour   Intake             2050 ml   Output                0 ml   Net             2050 ml       Last 3 weights    10/16/17  0252   Weight: 80 lb (36.3 kg)       Body mass index is 15.62 kg/(m^2).    Physical Exam:      General Appearance:    Alert and cooperative, very mild acute distress with ambulating to bedside commode, oriented x 3. Cachectic appearing and tachypneic with conversation   Head:    Atraumatic and normocephalic, without obvious defect.   Eyes:            PERRLA, conjunctivae and sclerae normal, no icterus. No pallor. Extra-occular movements are within normal limits.   Ears:    Ears appear intact with no abnormalities noted.   Throat:   No oral lesions, no thrush, oral mucosa dry   Neck:   Supple, trachea midline, no thyromegaly, no carotid bruit.   Back:     No kyphoscoliosis present. No tenderness to palpation,   range of motion normal.   Lungs:     Chest shape is normal. Breath sounds heard bilaterally equally with bilateral severe increased wheezing. Positive right upper lobe rhonchi. No Pleural rub or bronchial breathing.      Heart:    Normal S1 and S2, no murmur, no gallop, no rub. No JVD   Abdomen:     Normal bowel sounds, no masses, no organomegaly. Soft        non-tender, non-distended, no guarding, no rebound                tenderness   Extremities:   Moves all extremities well, no edema, no cyanosis, no             clubbing   Pulses:   Pulses palpable and equal bilaterally   Skin:   No bleeding, bruising or rash   Lymph nodes:   No palpable adenopathy   Neurologic:   Cranial nerves 2 - 12 grossly intact, sensation intact, Motor power is normal and equal bilaterally.       EKG:    Sinus 90, nonspecific st changes    Labs:    Lab Results (last 24 hours)     Procedure Component Value Units Date/Time    CBC &  Differential [[id number]] Collected:  10/16/17 0257    Specimen:  Blood Updated:  10/16/17 0304    Narrative:       The following orders were created for panel order CBC & Differential.  Procedure                               Abnormality         Status                     ---------                               -----------         ------                     CBC Auto Differential[[id number]]        Abnormal            Final result                 Please view results for these tests on the individual orders.    CBC Auto Differential [[id number]]  (Abnormal) Collected:  10/16/17 0257    Specimen:  Blood Updated:  10/16/17 0304     WBC 11.17 (H) 10*3/mm3      RBC 3.69 (L) 10*6/mm3      Hemoglobin 11.0 (L) g/dL      Hematocrit 33.9 (L) %      MCV 91.9 fL      MCH 29.8 pg      MCHC 32.4 g/dL      RDW 17.0 (H) %      RDW-SD 57.6 (H) fl      MPV 10.2 fL      Platelets 203 10*3/mm3      Neutrophil % 79.4 %      Lymphocyte % 12.9 %      Monocyte % 6.7 %      Eosinophil % 0.0 %      Basophil % 0.3 %      Immature Grans % 0.7 (H) %      Neutrophils, Absolute 8.87 (H) 10*3/mm3      Lymphocytes, Absolute 1.44 10*3/mm3      Monocytes, Absolute 0.75 10*3/mm3      Eosinophils, Absolute 0.00 10*3/mm3      Basophils, Absolute 0.03 10*3/mm3      Immature Grans, Absolute 0.08 (H) 10*3/mm3      nRBC 0.0 /100 WBC     Comprehensive Metabolic Panel [[id number]]  (Abnormal) Collected:  10/16/17 0257    Specimen:  Blood Updated:  10/16/17 0316     Glucose 107 (H) mg/dL      BUN 11 mg/dL      Creatinine 0.70 mg/dL      Sodium 132 (L) mmol/L      Potassium 4.0 mmol/L      Chloride 97 (L) mmol/L      CO2 27.0 mmol/L      Calcium 8.5 mg/dL      Total Protein 6.7 g/dL      Albumin 3.60 g/dL      ALT (SGPT) 25 U/L      AST (SGOT) 21 U/L      Alkaline Phosphatase 109 U/L      Total Bilirubin 0.3 mg/dL      eGFR Non African Amer 82 mL/min/1.73      Globulin 3.1 gm/dL      A/G Ratio 1.2 g/dL      BUN/Creatinine Ratio 15.7     Anion Gap 12.0 mmol/L      Narrative:       The MDRD GFR formula is only valid for adults with stable renal function between ages 18 and 70.    Lipase [[id number]]  (Normal) Collected:  10/16/17 0257    Specimen:  Blood Updated:  10/16/17 0316     Lipase 23 U/L     Troponin [[id number]]  (Normal) Collected:  10/16/17 0257    Specimen:  Blood Updated:  10/16/17 0330     Troponin I <0.012 ng/mL     Narrative:       Normal Patient Upper Reference Limit (URL) (99th Percentile)=0.03 ng/mL   Non-AMI Illness Reference Limit=0.03-0.11 ng/mL   AMI Confirmation=0.12 ng/mL and above    Lactic Acid, Plasma [[id number]]  (Normal) Collected:  10/16/17 0312    Specimen:  Blood Updated:  10/16/17 0333     Lactate 1.0 mmol/L     Influenza Antigen, Rapid - Swab, Nasopharynx [[id number]]  (Normal) Collected:  10/16/17 0312    Specimen:  Swab from Nasopharynx Updated:  10/16/17 0336     Influenza A Ag, EIA Negative     Influenza B Ag, EIA Negative    [place] Draw [[id number]] Collected:  10/16/17 0257    Specimen:  Blood Updated:  10/16/17 0401    Narrative:       The following orders were created for panel order [place] Draw.  Procedure                               Abnormality         Status                     ---------                               -----------         ------                     Light Blue Top[[id number]]                                   Final result               Lavender Top[[id number]]                                     Final result               Gold Top - SST[[id number]]                                   Final result               Green Top (No Gel)[[id number]]                               Final result                 Please view results for these tests on the individual orders.    Light Blue Top [[id number]] Collected:  10/16/17 0257    Specimen:  Blood Updated:  10/16/17 0401     Extra Tube hold for add-on      Auto resulted       Lavender Top [[id number]] Collected:  10/16/17 0257    Specimen:  Blood Updated:  10/16/17 0401     Extra Tube  hold for add-on      Auto resulted       Gold Top - SST [[id number]] Collected:  10/16/17 0257    Specimen:  Blood Updated:  10/16/17 0401     Extra Tube Hold for add-ons.      Auto resulted.       Green Top (No Gel) [[id number]] Collected:  10/16/17 0257    Specimen:  Blood Updated:  10/16/17 0401     Extra Tube Hold for add-ons.      Auto resulted.       Urine Culture - Urine, Urine, Clean Catch [[id number]] Collected:  10/16/17 0408    Specimen:  Urine from Urine, Clean Catch Updated:  10/16/17 0414    Urinalysis With / Culture If Indicated - Urine, Clean Catch [[id number]]  (Abnormal) Collected:  10/16/17 0408    Specimen:  Urine from Urine, Clean Catch Updated:  10/16/17 0416     Color, UA Yellow     Appearance, UA Clear     pH, UA 6.5     Specific Gravity, UA 1.025     Glucose, UA Negative     Ketones, UA Trace (A)     Bilirubin, UA Negative     Blood, UA Negative     Protein, UA 30 mg/dL (1+) (A)     Leuk Esterase, UA Small (1+) (A)     Nitrite, UA Negative     Urobilinogen, UA 0.2 E.U./dL    Urinalysis, Microscopic Only - Urine, Clean Catch [[id number]]  (Abnormal) Collected:  10/16/17 0408    Specimen:  Urine from Urine, Clean Catch Updated:  10/16/17 0424     RBC, UA None Seen /HPF      WBC, UA 3-5 (A) /HPF      Bacteria, UA 2+ (A) /HPF      Squamous Epithelial Cells, UA 13-20 (A) /HPF      Hyaline Casts, UA None Seen /LPF      Mucus, UA Moderate/2+ (A) /HPF      Methodology Manual Light Microscopy          Radiology:    Imaging Results (last 72 hours)     Procedure Component Value Units Date/Time    XR Chest 1 View [[id number]] Updated:  10/16/17 0324          Assessment:  Principal Problem:    Right upper lobe pneumonia  Active Problems:    COPD with acute exacerbation    Chronic obstructive pulmonary disease with acute lower respiratory infection    Nausea    Abnormal weight loss    Acute on chronic respiratory failure with hypoxia      Plan:    Admit to ICU for close monitoring.  Continue oxygen as needed  and continuous pulse oximetry.  Zosyn,  azithromycin, and vancomycin have been initiated. Solumedrol ordered.  Continue IV normal saline.  Hold her scheduled metoprolol this morning and order an as needed dose IV when necessary tachycardia or hypertension.  Additionally continue to hold her current opiate and benzo therapy today while monitoring closely her blood pressure.  Continue duo nebs, budesonide nebs, Mucinex. Sputum and blood cultures were added. Expect 2-3 days for clinical improvement. Antiemetics as needed. Continue other home meds including remeron. Nutrition consult. She requested to be full code. After clinically improved, consider PT. Plan to return home with home health once clinically improved. If she does not improve, would consider consulting primary pulmonologist Dr Esparza.     Medication risks and benefits were discussed in detail. Patient reported being satisfied with current treatment plan.    Adelina Nicholson,   10/16/17  6:15 AM

## 2017-10-16 NOTE — ED NOTES
Called Dr. Nicholson per Dr. Anand's request. Call was transferred.      [facility]  10/16/17 4324

## 2017-10-17 PROBLEM — R11.0 NAUSEA: Status: RESOLVED | Noted: 2017-10-04 | Resolved: 2017-10-17

## 2017-10-17 LAB
BACTERIA SPEC AEROBE CULT: NO GROWTH
VANCOMYCIN SERPL-MCNC: <5 MCG/ML (ref 5–40)

## 2017-10-17 PROCEDURE — 80202 ASSAY OF VANCOMYCIN: CPT | Performed by: FAMILY MEDICINE

## 2017-10-17 PROCEDURE — 25010000002 METHYLPREDNISOLONE PER 125 MG: Performed by: FAMILY MEDICINE

## 2017-10-17 PROCEDURE — 25010000002 AZITHROMYCIN: Performed by: FAMILY MEDICINE

## 2017-10-17 PROCEDURE — 94799 UNLISTED PULMONARY SVC/PX: CPT

## 2017-10-17 PROCEDURE — 25010000002 PIPERACILLIN SOD-TAZOBACTAM PER 1 G: Performed by: FAMILY MEDICINE

## 2017-10-17 PROCEDURE — 99233 SBSQ HOSP IP/OBS HIGH 50: CPT | Performed by: FAMILY MEDICINE

## 2017-10-17 PROCEDURE — 25010000002 CEFTRIAXONE PER 250 MG: Performed by: FAMILY MEDICINE

## 2017-10-17 PROCEDURE — 25010000002 ENOXAPARIN PER 10 MG: Performed by: FAMILY MEDICINE

## 2017-10-17 RX ORDER — L.ACID,CASEI/B.ANIMAL/S.THERMO 16B CELL
1 CAPSULE ORAL DAILY
COMMUNITY
End: 2019-06-18 | Stop reason: HOSPADM

## 2017-10-17 RX ORDER — MULTIPLE VITAMINS W/ MINERALS TAB 9MG-400MCG
1 TAB ORAL DAILY
COMMUNITY
End: 2021-10-07

## 2017-10-17 RX ORDER — ACETYLCYSTEINE 200 MG/ML
600 SOLUTION ORAL; RESPIRATORY (INHALATION)
Status: DISCONTINUED | OUTPATIENT
Start: 2017-10-17 | End: 2017-10-18 | Stop reason: HOSPADM

## 2017-10-17 RX ORDER — AZITHROMYCIN 250 MG/1
250 TABLET, FILM COATED ORAL
Status: DISCONTINUED | OUTPATIENT
Start: 2017-10-18 | End: 2017-10-18 | Stop reason: HOSPADM

## 2017-10-17 RX ORDER — UREA 200 MG/G
1 GEL TOPICAL 2 TIMES DAILY
Status: ON HOLD | COMMUNITY
End: 2018-06-09

## 2017-10-17 RX ORDER — IPRATROPIUM BROMIDE AND ALBUTEROL SULFATE 2.5; .5 MG/3ML; MG/3ML
3 SOLUTION RESPIRATORY (INHALATION) EVERY 4 HOURS PRN
Status: DISCONTINUED | OUTPATIENT
Start: 2017-10-17 | End: 2017-10-18 | Stop reason: HOSPADM

## 2017-10-17 RX ADMIN — GABAPENTIN 100 MG: 100 CAPSULE ORAL at 05:11

## 2017-10-17 RX ADMIN — CYCLOBENZAPRINE HYDROCHLORIDE 10 MG: 10 TABLET, FILM COATED ORAL at 08:55

## 2017-10-17 RX ADMIN — FAMOTIDINE 20 MG: 20 TABLET, FILM COATED ORAL at 08:12

## 2017-10-17 RX ADMIN — HYDROCODONE BITARTRATE AND ACETAMINOPHEN 1 TABLET: 5; 325 TABLET ORAL at 08:55

## 2017-10-17 RX ADMIN — ACETYLCYSTEINE 3 ML: 200 SOLUTION ORAL; RESPIRATORY (INHALATION) at 12:47

## 2017-10-17 RX ADMIN — VENLAFAXINE 75 MG: 75 TABLET ORAL at 08:12

## 2017-10-17 RX ADMIN — VENLAFAXINE 75 MG: 75 TABLET ORAL at 21:00

## 2017-10-17 RX ADMIN — BUDESONIDE 0.5 MG: 0.5 INHALANT RESPIRATORY (INHALATION) at 20:39

## 2017-10-17 RX ADMIN — HYDROCODONE BITARTRATE AND ACETAMINOPHEN 1 TABLET: 5; 325 TABLET ORAL at 22:42

## 2017-10-17 RX ADMIN — MIRTAZAPINE 30 MG: 15 TABLET, FILM COATED ORAL at 21:00

## 2017-10-17 RX ADMIN — HYDROCODONE BITARTRATE AND ACETAMINOPHEN 1 TABLET: 5; 325 TABLET ORAL at 16:38

## 2017-10-17 RX ADMIN — METHYLPREDNISOLONE SODIUM SUCCINATE 80 MG: 125 INJECTION, POWDER, FOR SOLUTION INTRAMUSCULAR; INTRAVENOUS at 03:17

## 2017-10-17 RX ADMIN — TRAZODONE HYDROCHLORIDE 50 MG: 50 TABLET ORAL at 21:00

## 2017-10-17 RX ADMIN — MELATONIN TAB 3 MG 10.5 MG: 3 TAB at 22:42

## 2017-10-17 RX ADMIN — CEFTRIAXONE 1 G: 1 INJECTION, SOLUTION INTRAVENOUS at 10:09

## 2017-10-17 RX ADMIN — GUAIFENESIN 600 MG: 600 TABLET, EXTENDED RELEASE ORAL at 21:00

## 2017-10-17 RX ADMIN — GABAPENTIN 100 MG: 100 CAPSULE ORAL at 14:46

## 2017-10-17 RX ADMIN — AZELASTINE HYDROCHLORIDE 2 SPRAY: 137 SPRAY, METERED NASAL at 08:13

## 2017-10-17 RX ADMIN — GUAIFENESIN 600 MG: 600 TABLET, EXTENDED RELEASE ORAL at 08:12

## 2017-10-17 RX ADMIN — AZELASTINE HYDROCHLORIDE 2 SPRAY: 137 SPRAY, METERED NASAL at 21:00

## 2017-10-17 RX ADMIN — ACETYLCYSTEINE 600 MG: 200 SOLUTION ORAL; RESPIRATORY (INHALATION) at 20:40

## 2017-10-17 RX ADMIN — IPRATROPIUM BROMIDE AND ALBUTEROL SULFATE 3 ML: .5; 3 SOLUTION RESPIRATORY (INHALATION) at 20:39

## 2017-10-17 RX ADMIN — TAZOBACTAM SODIUM AND PIPERACILLIN SODIUM 3.38 G: 375; 3 INJECTION, SOLUTION INTRAVENOUS at 03:16

## 2017-10-17 RX ADMIN — IPRATROPIUM BROMIDE AND ALBUTEROL SULFATE 3 ML: .5; 3 SOLUTION RESPIRATORY (INHALATION) at 12:47

## 2017-10-17 RX ADMIN — BUDESONIDE 0.5 MG: 0.5 INHALANT RESPIRATORY (INHALATION) at 06:40

## 2017-10-17 RX ADMIN — BENZONATATE 100 MG: 100 CAPSULE, LIQUID FILLED ORAL at 12:39

## 2017-10-17 RX ADMIN — TAZOBACTAM SODIUM AND PIPERACILLIN SODIUM 3.38 G: 375; 3 INJECTION, SOLUTION INTRAVENOUS at 08:10

## 2017-10-17 RX ADMIN — METHYLPREDNISOLONE SODIUM SUCCINATE 80 MG: 125 INJECTION, POWDER, FOR SOLUTION INTRAMUSCULAR; INTRAVENOUS at 21:00

## 2017-10-17 RX ADMIN — NICOTINE 1 PATCH: 7 PATCH, EXTENDED RELEASE TRANSDERMAL at 09:00

## 2017-10-17 RX ADMIN — LORAZEPAM 0.5 MG: 0.5 TABLET ORAL at 16:39

## 2017-10-17 RX ADMIN — AZITHROMYCIN 500 MG: 500 INJECTION, POWDER, LYOPHILIZED, FOR SOLUTION INTRAVENOUS at 05:11

## 2017-10-17 RX ADMIN — IPRATROPIUM BROMIDE AND ALBUTEROL SULFATE 3 ML: .5; 3 SOLUTION RESPIRATORY (INHALATION) at 06:39

## 2017-10-17 RX ADMIN — METHYLPREDNISOLONE SODIUM SUCCINATE 80 MG: 125 INJECTION, POWDER, FOR SOLUTION INTRAMUSCULAR; INTRAVENOUS at 12:39

## 2017-10-17 RX ADMIN — ENOXAPARIN SODIUM 40 MG: 40 INJECTION SUBCUTANEOUS at 08:11

## 2017-10-17 RX ADMIN — GABAPENTIN 100 MG: 100 CAPSULE ORAL at 21:00

## 2017-10-17 RX ADMIN — FAMOTIDINE 20 MG: 20 TABLET, FILM COATED ORAL at 21:00

## 2017-10-17 NOTE — PROGRESS NOTES
[facility]    Name:  Joelle Yee   Age:  71 y.o.  Sex:  female  :  1945  MRN:  [id number]   Visit Number:  [id number]  Admission Date:  10/16/2017  Date Of Service:  10/17/17  Primary Care Physician:  DONTRELL Regan     LOS: 1 day :  Patient Care Team:  DONTRELL Regan as PCP - General:    Chief Complaint:      RUL pneumonia/Sepsis    Subjective / Interval History:     I have reviewed labs/imaging/records from this hospitalization, including ER staff and admitting/attending physicians H/P's and progress notes to establish a comprehensive understanding of this patient's clinical hospital course, as well as to establish a transition of care appropriately.    Pt is a 70 yo female, admitted d/t acute on chronic resp failure with hypoxia, noted to have sepsis d/t RUL pneumonia; given aggressive bolus fluid hydration, and continued on IV broad spectrum abx and steroids; duonebs ordered; admitted to ICU.    Pt BP has improved considerably.  More alert and awake yesterday and again this am; slept well; states her cough is productive, but heavy and difficult to clear phlegm; no hemoptysis; no dysuria or hematuria; good po intake, but chronic difficulty with appetite.  Denies dysphagia/N/V; no BRB/BTS; no CP/palp/vertigo.  Tolerating abx without problems.    Review of Systems:     General ROS: Patient denies any fevers, chills or loss of consciousness.  Respiratory ROS: Mild SOA at times with heavy cough/phlegm prod; no hemoptysis.  Cardiovascular ROS: Denies chest pain or palpitations. No history of exertional chest pain.  Gastrointestinal ROS: Denies nausea and vomiting. Denies any abdominal pain. No diarrhea.  Neurological ROS: Gen weakness. No loss of consciousness. Denies any numbness. Denies neck pain.  Dermatological ROS: Denies any redness or pruritis.    Vital Signs:    Temp:  [97.7 °F (36.5 °C)-98.5 °F (36.9 °C)] 97.9 °F (36.6 °C)  Heart  Rate:  [] 101  Resp:  [9-25] 19  BP: ()/(48-73) 121/73    Intake and output:    I/O last 3 completed shifts:  In: 4564 [P.O.:700; I.V.:1564; IV Piggyback:2300]  Out: 1400 [Urine:1400]  I/O this shift:  In: 320 [P.O.:320]  Out: -     Physical Examination:    General Appearance:  Alert and cooperative, not in any acute distress.  Thin, frail build.   Head:  Atraumatic and normocephalic, without obvious abnormality.   Eyes:          PERRLA, conjunctivae and sclerae normal, no Icterus. No pallor. Extra-occular movements are within normal limits.   Neck: Supple, trachea midline, no thyromegaly, no carotid bruit.   Lungs:   Chest shape is normal. Breath sounds heard bilaterally with AAE to all lung fields.  Mild rhonchus bs to RUL, referred congestion to all other lung fields, cleared somewhat with cough. No Pleural rub or bronchial breathing.   Heart:  Normal S1 and S2, no murmur, no gallop, no rub. No JVD   Abdomen:   Normal bowel sounds, no masses, no organomegaly. Soft       non-tender, non-distended, no guarding, no rebound tenderness.   Extremities: Moves all extremities well, no edema, no cyanosis, no clubbing; thin frail build.   Skin: No bleeding, bruising or rash.  Age related atrophy of skin.   Neurologic: Awake, alert and oriented times 3. Moves all 4 extremities equally.   Laboratory results:      Results from last 7 days  Lab Units 10/16/17  0257   SODIUM mmol/L 132*   POTASSIUM mmol/L 4.0   CHLORIDE mmol/L 97*   CO2 mmol/L 27.0   BUN mg/dL 11   CREATININE mg/dL 0.70   CALCIUM mg/dL 8.5   BILIRUBIN mg/dL 0.3   ALK PHOS U/L 109   ALT (SGPT) U/L 25   AST (SGOT) U/L 21   GLUCOSE mg/dL 107*       Results from last 7 days  Lab Units 10/16/17  0257   WBC 10*3/mm3 11.17*   HEMOGLOBIN g/dL 11.0*   HEMATOCRIT % 33.9*   PLATELETS 10*3/mm3 203           Results from last 7 days  Lab Units 10/16/17  0257   TROPONIN I ng/mL <0.012       Results from last 7 days  Lab Units 10/16/17  0818 10/16/17  0816  10/16/17  0408   BLOODCX  No growth at 24 hours No growth at 24 hours  --    URINECX   --   --  No growth           I have reviewed the patient's laboratory results.    Radiology results:    Imaging Results (last 24 hours)     ** No results found for the last 24 hours. **          I have reviewed the patient's radiology reports.    Medication Review:     I have reviewed the patients active and prn medications.         Assessment:  Principal Problem:    Right upper lobe pneumonia  Active Problems:    Acute on chronic respiratory failure with hypoxia    COPD with acute exacerbation    Abnormal weight loss    Chronic obstructive pulmonary disease with acute lower respiratory infection        Plan:  Cultures have all been negative thus far; HD stable; will de-escalate her ABX coverage at this time, will d/c her vancomycin/zosyn and lower dose of her azithromycin, as well as change to po; will start IV rocephin and follow clinically, hopeful to transition to oral omnicef tomorrow if able; dec IVF rate, encourage continuation of good hydration by mouth and inc po intake overall; cont duonebs prn, as well as chronic oxygen supplementation; will transfer to med/surg floor, telemetry today if bed available; add mucolytic agent to nebulizer tx's to aid in heavy phlegm clearance.  I have discussed POC in detail with pt today, she verb understanding and agrees.  Expect 1-2 more days of hospitalization.    I have spent a total of 35 mins in direct pt care today.      Erlin Pool,   10/17/17  9:12 AM

## 2017-10-17 NOTE — PLAN OF CARE
Problem: Patient Care Overview (Adult)  Goal: Plan of Care Review  Outcome: Ongoing (interventions implemented as appropriate)    10/17/17 8363   Coping/Psychosocial Response Interventions   Plan Of Care Reviewed With patient   Patient Care Overview   Progress progress toward functional goals as expected   Outcome Evaluation   Outcome Summary/Follow up Plan Patient alert and cooperative. Pain medication administered as ordered. Ambulating to and from bathroom with minimal assistance. All questions answered.

## 2017-10-17 NOTE — PROGRESS NOTES
JAMES spoke with pt's CM with [facility] ([phone]) and she advised that pt will receive her Ensure supplement and her bed railings at any time shipped to her. SW will inform her of this as this was one of the concerns pt voiced to JAMES.    MACO Stevenson, CSW  10/17/17  2:18 PM

## 2017-10-17 NOTE — PROGRESS NOTES
"Adult Nutrition  Assessment/PES    Patient Name:  Joelle Yee  YOB: 1945  MRN: [id number]  Admit Date:  10/16/2017    Assessment Date:  10/17/2017    Comments:  Rec #1: Continue current diet order; Encouraging intake. Pt receiving 25% PO intake over 2 meals. Nutritional supplement ordered Boost TID to promote PO intake. Rec #2: Consider MVI with minerals daily. Rec #3: Continue to monitor/replace electrolytes PRN. RD to follow pt. Consult RD PRN.             Reason for Assessment       10/17/17 1259    Reason for Assessment    Reason For Assessment/Visit follow up protocol    Identified At Risk By Screening Criteria diagnosis;BMI    Diagnosis Diagnosis    Nutrition related Increased nutrition needs    Gastrointestinal Nausea    Pulmonary/Critical Care COPD;A/C respiratory failure;Other (comment);Pneumonia   Hypoxia    Other diagnosis abnormal weight loss                Anthropometrics       10/17/17 1300    Anthropometrics    Height Method Stated    Height 152.4 cm (60\")    Weight Method Bed scale    Weight 40.6 kg (89 lb 8.1 oz)    Ideal Body Weight (IBW)    Ideal Body Weight (IBW), Female 46.26    % Ideal Body Weight 87.95    % Ideal Body Weight Malnutrition 80-90% - mild deficit    Body Mass Index (BMI)    BMI (kg/m2) 17.52    BMI Grade 17 - 19 low grade I            Labs/Tests/Procedures/Meds       10/17/17 1300    Labs/Tests/Procedures/Meds    Labs/Tests Review Reviewed;Na+;Cl-   High: Glu    Medication Review Reviewed, pertinent;Antibiotic;Steroid            Physical Findings       10/17/17 1301    Physical Appearance    Overall Physical Appearance underweight              Nutrition Prescription Ordered       10/17/17 1302    Nutrition Prescription PO    Current PO Diet Regular            Evaluation of Received Nutrient/Fluid Intake       10/17/17 1302    PO Evaluation    Number of Days PO Intake Evaluated 1 day    Number of Meals 1    % PO Intake 25            Problem/Interventions:   "      Problem 1       10/17/17 1305    Nutrition Diagnoses Problem 1    Problem 1 Underweight    Etiology (related to) Factors Affecting Nutrition    Appetite Other   Poor PTA    Signs/Symptoms (evidenced by) BMI    BMI 17 - 17.9            Problem 2       10/17/17 1306    Nutrition Diagnoses Problem 2    Problem 2 Inadequate Intake/Infusion    Inadequate Intake Type Oral    Macronutrient Kcal;Fluid;Fiber;Protein;Carbohydrate;Fat    Micronutrient Vitamin;Mineral    Etiology (related to) MNT for Treatment/Condition    Signs/Symptoms (evidenced by) NPO    Resolved? Yes                  Intervention Goal       10/17/17 1306    Intervention Goal    General Meet nutritional needs for age/condition    PO Meet estimated needs;Increase intake;PO intake (%)    PO Intake % 50 %    Weight Appropriate weight gain            Nutrition Intervention       10/17/17 1307    Nutrition Intervention    RD/Tech Action Care plan reviewd;Follow Tx progress;Recommend/ordered    Recommended/Ordered Supplement            Nutrition Prescription       10/17/17 1548    Nutrition Prescription PO    PO Prescription Begin/change supplement    Supplement Boost    Supplement Frequency 3 times a day    New PO Prescription Ordered? Yes    Other Orders    Obtain Weight Daily    Obtain Weight Ordered? No, recommended    Supplement Vitamin mineral supplement    Supplement Ordered? No, recommended            Education/Evaluation       10/17/17 1549    Education    Education Previous education by RD/DESHAWN    Monitor/Evaluation    Monitor Per protocol;I&O;PO intake;Supplement intake;Pertinent labs;Weight        Electronically signed by:  Prema Mooney RD  10/17/17 3:49 PM

## 2017-10-17 NOTE — PAYOR COMM NOTE
"TO:HUMANA  FROM:SHORTY PULIDO, RN PHONE [phone]  INPT CLINICALS    Joelle Yee (71 y.o. Female)     Date of Birth Social Security Number Address Home Phone MRN    1945  [address] [id number] [phone] [id number]    Pent[employer]al Marital Status          Sikh Single       Admission Date Admission Type Admitting Provider Attending Provider Department, Room/Bed    10/16/17 Emergency Erlin Pool DO Devers, Dustin K, DO [facility] MED SURG  3, 322/1    Discharge Date Discharge Disposition Discharge Destination                      Attending Provider: Erlin Pool DO     Allergies:  Dust Mite Extract, Grass, Mold Extract [Trichophyton]    Isolation:  None   Infection:  None   Code Status:  FULL    Ht:  60\" (152.4 cm)   Wt:  89 lb 8 oz (40.6 kg)    Admission Cmt:  None   Principal Problem:  Right upper lobe pneumonia [J18.1]                 Active Insurance as of 10/16/2017     Primary Coverage     Payor Plan Insurance Group Employer/Plan Group    HUMANA MEDICARE REPLACEMENT HUMANA MEDICARE REPL [id number]     Payor Plan Address Payor Plan Phone Number Effective From Effective To    [address] [address] [phone] 10/1/2017     [place] [address]       Subscriber Name Subscriber Birth Date Member ID       JOELLE YEE 1945 [id number]           Secondary Coverage     Payor Plan Insurance Group Employer/Plan Group    [place]      Payor Plan Address Payor Plan Phone Number Effective From Effective To    [address] [phone] 3/1/2016     [place] [address]       Subscriber Name Subscriber Birth Date Member ID       JOELLE YEE 1945 [id number]                 Emergency Contacts      (Rel.) Home Phone Work Phone Mobile Phone    Cayetano Yee (Brother) [phone] -- [phone]               History & Physical      Adelina Nicholson DO at 10/16/2017  6:14 AM      "         [facility]   HISTORY AND PHYSICAL      Name:  Joelle Yee   Age:  71 y.o.  Sex:  female  :  1945  MRN:  [id number]   Visit Number:  [id number]  Admission Date:  10/16/2017  Date Of Service:  10/16/17  Primary Care Physician:  DONTRELL Regan    Chief Complaint: shortness of breath and cough        History Of Presenting Illness:  Patient 71-year-old  lady with past medical history of COPD, chronic hypoxic respiratory failure on 2 L home oxygen, active tobacco dependency, who presented to the emergency room for shortness of breath and cough.  Patient presented to the emergency room via EMS.  He reports being in her normal state of health until approximately 2 days ago when she developed progressively worsening fever, chills, runny nose, shortness of breath, increased wheezing, myalgias, severe nausea, and cough with white sputum.  She reports having difficulty coughing her sputum.  She has had reduced oral intake over the past 2 days.  She reports having tried her machine, 3 inhalers without improvement.  She was reluctant to Sherry that she had restarted smoking.    The emergency room, preliminary chest x-ray does look like she has increased right upper lobe opacity consistent with her clinical pneumonia.  She has severe bilateral expiratory wheezes improved mildly with DuoNeb.  She was given ceftriaxone and azithromycin as well as Solu-Medrol 125 mg IV.  1 L normal saline bolus was initially provided.  After this therapy the patient's blood pressure still dropped to the 70s systolic and additional 1 L normal saline bolus was provided with improvement to systolic of 80s to 90s.  The patient does qualify for sepsis with elevated white blood cell count, elevated heart rate, elevated respirations, acute respiratory failure, infection, and low blood pressure and has been given a weight-based sepsis fluid bolus appropriate.  Patient will be admitted to the  ICU for close monitoring at this time.    I did evaluate the patient in the emergency room room where she was mildly tachypnea but otherwise alert and oriented in no acute distress.  She can barely ambulate without significant distress with increased cough and congestion but this doesn't improve at rest.        Review Of Systems:     General ROS: Patient denies any fevers, chills or loss of consciousness. Complains of generalized weakness.   Psychological ROS: Denies any hallucinations and delusions.  Ophthalmic ROS: Denies any diplopia or transient loss of vision.  ENT ROS: Denies sore throat, nasal congestion or ear pain.   Allergy and Immunology ROS: Denies rash or itching.  Hematological and Lymphatic ROS: Denies neck swelling or easy bleeding.  Endocrine ROS: Progressive  unintentional weight gain or loss.  Breast ROS: Denies any pain or swelling.  Respiratory ROS: Increased cough and shortness of breath with significant wheezing.   Cardiovascular ROS: Denies chest pain or palpitations. No history of exertional chest pain.  Gastrointestinal ROS: Significant nausea without vomiting. Denies any abdominal pain. No diarrhea.  Genito-Urinary ROS: Denies dysuria or hematuria.  Musculoskeletal ROS: Denies back pain. No muscle pain. No calf pain.   Neurological ROS: Denies any focal weakness. No loss of consciousness. Denies any numbness. Denies neck pain.   Dermatological ROS: Denies any redness or pruritis.       Past Medical History:    Past Medical History:   Diagnosis Date   • Abdominal pain    • Acute bronchitis    • Ankle pain    • Anxiety 1980   • Atopic dermatitis    • Backache    • Bipolar disorder    • Bronchiectasis    • Chronic obstructive lung disease 2008   • Colon cancer screening    • COPD (chronic obstructive pulmonary disease)    • Cystocele    • Depressed    • Depression 1980   • Fatigue     Chronic pafigue 2012   • Fibromyalgia 2008   • GERD (gastroesophageal reflux disease)    • Gout    •  Heartburn     Chronic historu of epigastric heartburn currently controlled on Prilesec 40 mg every morning along with Zantac 300 Mg daily at bedtime   • High cholesterol 2012   • Hip pain    • Hyperlipidemia    • Hypertension    • Insomnia    • Joint pain    • Kidney infection    • Loss of appetite    • Low back pain 1995   • Melena    • Nausea and vomiting    • On home oxygen therapy    • Osteoarthritis 2010   • Pain in limb    • Palpitations    • Pinched nerve     Pinched nerves 2011   • Recurrent urinary tract infection    • Sciatica [id number]   • Shortness of breath    • Sinus problem    • Sleep apnea 1998   • Upset stomach    • Valvular heart disease    • Vitamin B12 deficiency    • Weight loss, abnormal     Weight loss is stabel. On pund weight gain since 01/2016       Past Surgical history:    Past Surgical History:   Procedure Laterality Date   • ABDOMINAL HERNIA REPAIR     • APPENDECTOMY  1965   • BACK SURGERY  2005   • CATARACT EXTRACTION Bilateral 1978   • CHOLECYSTECTOMY     • EYE SURGERY      Put in implants    • GALLBLADDER SURGERY  1985   • KNEE SURGERY Left 1979    Knee Cap   • TUBAL ABDOMINAL LIGATION  1971       Social History:  Pediatric History   Patient Guardian Status   • Not on file.     Other Topics Concern   • Not on file     Social History Narrative       Family History:    Family History   Problem Relation Age of Onset   • Ovarian cancer Mother    • Cancer Mother    • Lung cancer Father    • Heart disease Brother        Allergies:      Dust mite extract; Grass; and Mold extract [trichophyton]    Home Medications:    Prior to Admission Medications     Prescriptions Last Dose Informant Patient Reported? Taking?    albuterol (PROVENTIL) (2.5 MG/3ML) 0.083% nebulizer solution  Pharmacy Yes No    Take 2.5 mg by nebulization 4 (Four) Times a Day.    azelastine (ASTELIN) 0.1 % nasal spray   No No    2 sprays into each nostril 2 (Two) Times a Day. Use in each nostril as directed    benzonatate  (TESSALON) 100 MG capsule   No No    Take 1 capsule by mouth 3 (Three) Times a Day As Needed for cough.    budesonide-formoterol (SYMBICORT) 160-4.5 MCG/ACT inhaler   No No    Inhale 2 puffs 2 (Two) Times a Day. Inhale 1 puff twice daily. Rinse mouth after use.    calcium acetate (PHOS BINDER,) 667 MG capsule capsule   No No    Take 2 capsules by mouth 3 (Three) Times a Day With Meals.    cetirizine (zyrTEC) 10 MG tablet   Yes No    Take 10 mg by mouth Daily.    conjugated estrogens (PREMARIN) vaginal cream  Self Yes No    Insert 0.5 g into the vagina Every Other Day.    Cyanocobalamin (VITAMIN B12 PO)  Self Yes No    Take 500 mcg by mouth Daily.    cyclobenzaprine (FLEXERIL) 10 MG tablet   Yes No    Take 1 tablet by mouth daily.    Diclofenac Sodium (VOLTAREN TD)   Yes No    Place  on the skin As Needed. Voltaren GEL     gabapentin (NEURONTIN) 100 MG capsule   Yes No    TAKE ONE CAPSULE BY MOUTH THREE TIMES A DAY    gemfibrozil (LOPID) 600 MG tablet   Yes No    TAKE ONE TABLET WITH SUPPER    HYDROcodone-acetaminophen (NORCO)  MG per tablet   Yes No    Take  by mouth. Take 1 tablet every 8 hours as needed for pain.    ipratropium-albuterol (COMBIVENT RESPIMAT)  MCG/ACT inhaler   No No    Inhale 1 puff 4 (Four) Times a Day As Needed for Wheezing.    ketotifen (ZADITOR) 0.025 % ophthalmic solution   Yes No    USE 1-2 DROPS IN BOTH EYES EVERY DAY    LORazepam (ATIVAN) 0.5 MG tablet   Yes No    losartan (COZAAR) 25 MG tablet  Pharmacy Yes No    Take 25 mg by mouth Daily.    melatonin 5 MG tablet tablet   Yes No    Take 10 mg by mouth Every Night. Patient takes 10 mg at night    metoprolol succinate XL (TOPROL-XL) 50 MG 24 hr tablet   No No    Take 1 tablet by mouth Daily.    Mirabegron ER (MYRBETRIQ) 50 MG tablet sustained-release 24 hour  Pharmacy Yes No    Take 50 mg by mouth Daily.    mirtazapine (REMERON) 30 MG tablet   Yes No    Take 30 mg by mouth Every Night.    montelukast (SINGULAIR) 10 MG tablet    Yes No    TAKE ONE TABLET BY MOUTH AT BEDTIME    nicotine (NICODERM CQ) 7 MG/24HR patch   No No    Place 1 patch on the skin Daily. DONOT smoke with patch on.    nystatin (MYCOSTATIN) 831170 UNIT/ML suspension   No No    Swish and swallow 5 mL 4 (Four) Times a Day.    omeprazole (PriLOSEC) 20 MG capsule   Yes No    Take 20 mg by mouth 2 (Two) Times a Day.    ondansetron ODT (ZOFRAN-ODT) 4 MG disintegrating tablet   Yes No    Take 1 tablet by mouth every 6 (six) hours as needed.    OXYGEN-HELIUM IN   Yes No    Oxygen; Patient Sig: Oxygen 2 liter as needed; 0; 21-May-2014; Active    Respiratory Therapy Supplies (FLUTTER) device   No No    1 Device as needed (as directed).    traZODone (DESYREL) 50 MG tablet   Yes No    Take 1 tablet by mouth every night.    Umeclidinium Bromide (INCRUSE ELLIPTA) 62.5 MCG/INH aerosol powder    No No    Inhale 1 puff Daily.    urea (CARMOL) 40 % cream   No No    venlafaxine (EFFEXOR) 75 MG tablet   Yes No    Take 1 tablet by mouth 2 (two) times a day.             ED Medications:    Medications   sodium chloride 0.9 % flush 10 mL (not administered)   sodium chloride 0.9 % flush 10 mL (not administered)   AZITHROMYCIN 500 MG/250 ML 0.9% NS IVPB (vial-mate) (500 mg Intravenous New Bag 10/16/17 0557)   ipratropium-albuterol (DUO-NEB) nebulizer solution 3 mL (3 mL Nebulization Given 10/16/17 0316)   acetaminophen (TYLENOL) tablet 975 mg (975 mg Oral Given 10/16/17 0311)   sodium chloride 0.9 % bolus 1,000 mL (0 mL Intravenous Stopped 10/16/17 0459)   methylPREDNISolone sodium succinate (SOLU-Medrol) injection 125 mg (125 mg Intravenous Given 10/16/17 0419)   cefTRIAXone (ROCEPHIN) IVPB 1 g/50ml dextrose (premix) (0 g Intravenous Stopped 10/16/17 0459)   ipratropium-albuterol (DUO-NEB) nebulizer solution 3 mL (3 mL Nebulization Given 10/16/17 0415)   sodium chloride 0.9 % bolus 1,000 mL (0 mL Intravenous Stopped 10/16/17 0604)       Vital Signs:    Temp:  [99.8 °F (37.7 °C)-100.3 °F (37.9  °C)] 99.8 °F (37.7 °C)  Heart Rate:  [83-91] 90  Resp:  [16-20] 18  BP: ()/(40-57) 96/49      Intake/Output Summary (Last 24 hours) at 10/16/17 0615  Last data filed at 10/16/17 0604   Gross per 24 hour   Intake             2050 ml   Output                0 ml   Net             2050 ml       Last 3 weights    10/16/17  0252   Weight: 80 lb (36.3 kg)       Body mass index is 15.62 kg/(m^2).    Physical Exam:      General Appearance:    Alert and cooperative, very mild acute distress with ambulating to bedside commode, oriented x 3. Cachectic appearing and tachypneic with conversation   Head:    Atraumatic and normocephalic, without obvious defect.   Eyes:            PERRLA, conjunctivae and sclerae normal, no icterus. No pallor. Extra-occular movements are within normal limits.   Ears:    Ears appear intact with no abnormalities noted.   Throat:   No oral lesions, no thrush, oral mucosa dry   Neck:   Supple, trachea midline, no thyromegaly, no carotid bruit.   Back:     No kyphoscoliosis present. No tenderness to palpation,   range of motion normal.   Lungs:     Chest shape is normal. Breath sounds heard bilaterally equally with bilateral severe increased wheezing. Positive right upper lobe rhonchi. No Pleural rub or bronchial breathing.      Heart:    Normal S1 and S2, no murmur, no gallop, no rub. No JVD   Abdomen:     Normal bowel sounds, no masses, no organomegaly. Soft        non-tender, non-distended, no guarding, no rebound                tenderness   Extremities:   Moves all extremities well, no edema, no cyanosis, no             clubbing   Pulses:   Pulses palpable and equal bilaterally   Skin:   No bleeding, bruising or rash   Lymph nodes:   No palpable adenopathy   Neurologic:   Cranial nerves 2 - 12 grossly intact, sensation intact, Motor power is normal and equal bilaterally.       EKG:    Sinus 90, nonspecific st changes    Labs:    Lab Results (last 24 hours)     Procedure Component Value Units  Date/Time    CBC & Differential [[id number]] Collected:  10/16/17 0257    Specimen:  Blood Updated:  10/16/17 0304    Narrative:       The following orders were created for panel order CBC & Differential.  Procedure                               Abnormality         Status                     ---------                               -----------         ------                     CBC Auto Differential[[id number]]        Abnormal            Final result                 Please view results for these tests on the individual orders.    CBC Auto Differential [[id number]]  (Abnormal) Collected:  10/16/17 0257    Specimen:  Blood Updated:  10/16/17 0304     WBC 11.17 (H) 10*3/mm3      RBC 3.69 (L) 10*6/mm3      Hemoglobin 11.0 (L) g/dL      Hematocrit 33.9 (L) %      MCV 91.9 fL      MCH 29.8 pg      MCHC 32.4 g/dL      RDW 17.0 (H) %      RDW-SD 57.6 (H) fl      MPV 10.2 fL      Platelets 203 10*3/mm3      Neutrophil % 79.4 %      Lymphocyte % 12.9 %      Monocyte % 6.7 %      Eosinophil % 0.0 %      Basophil % 0.3 %      Immature Grans % 0.7 (H) %      Neutrophils, Absolute 8.87 (H) 10*3/mm3      Lymphocytes, Absolute 1.44 10*3/mm3      Monocytes, Absolute 0.75 10*3/mm3      Eosinophils, Absolute 0.00 10*3/mm3      Basophils, Absolute 0.03 10*3/mm3      Immature Grans, Absolute 0.08 (H) 10*3/mm3      nRBC 0.0 /100 WBC     Comprehensive Metabolic Panel [[id number]]  (Abnormal) Collected:  10/16/17 0257    Specimen:  Blood Updated:  10/16/17 0316     Glucose 107 (H) mg/dL      BUN 11 mg/dL      Creatinine 0.70 mg/dL      Sodium 132 (L) mmol/L      Potassium 4.0 mmol/L      Chloride 97 (L) mmol/L      CO2 27.0 mmol/L      Calcium 8.5 mg/dL      Total Protein 6.7 g/dL      Albumin 3.60 g/dL      ALT (SGPT) 25 U/L      AST (SGOT) 21 U/L      Alkaline Phosphatase 109 U/L      Total Bilirubin 0.3 mg/dL      eGFR Non African Amer 82 mL/min/1.73      Globulin 3.1 gm/dL      A/G Ratio 1.2 g/dL      BUN/Creatinine Ratio 15.7     Anion Gap  12.0 mmol/L     Narrative:       The MDRD GFR formula is only valid for adults with stable renal function between ages 18 and 70.    Lipase [[id number]]  (Normal) Collected:  10/16/17 0257    Specimen:  Blood Updated:  10/16/17 0316     Lipase 23 U/L     Troponin [[id number]]  (Normal) Collected:  10/16/17 0257    Specimen:  Blood Updated:  10/16/17 0330     Troponin I <0.012 ng/mL     Narrative:       Normal Patient Upper Reference Limit (URL) (99th Percentile)=0.03 ng/mL   Non-AMI Illness Reference Limit=0.03-0.11 ng/mL   AMI Confirmation=0.12 ng/mL and above    Lactic Acid, Plasma [[id number]]  (Normal) Collected:  10/16/17 0312    Specimen:  Blood Updated:  10/16/17 0333     Lactate 1.0 mmol/L     Influenza Antigen, Rapid - Swab, Nasopharynx [[id number]]  (Normal) Collected:  10/16/17 0312    Specimen:  Swab from Nasopharynx Updated:  10/16/17 0336     Influenza A Ag, EIA Negative     Influenza B Ag, EIA Negative    Addison Draw [[id number]] Collected:  10/16/17 0257    Specimen:  Blood Updated:  10/16/17 0401    Narrative:       The following orders were created for panel order Addison Draw.  Procedure                               Abnormality         Status                     ---------                               -----------         ------                     Light Blue Top[[id number]]                                   Final result               Lavender Top[[id number]]                                     Final result               Gold Top - SST[[id number]]                                   Final result               Green Top (No Gel)[[id number]]                               Final result                 Please view results for these tests on the individual orders.    Light Blue Top [[id number]] Collected:  10/16/17 0257    Specimen:  Blood Updated:  10/16/17 0401     Extra Tube hold for add-on      Auto resulted       Lavender Top [[id number]] Collected:  10/16/17 0257    Specimen:  Blood Updated:  10/16/17 0401      Extra Tube hold for add-on      Auto resulted       Gold Top - SST [[id number]] Collected:  10/16/17 0257    Specimen:  Blood Updated:  10/16/17 0401     Extra Tube Hold for add-ons.      Auto resulted.       Green Top (No Gel) [[id number]] Collected:  10/16/17 0257    Specimen:  Blood Updated:  10/16/17 0401     Extra Tube Hold for add-ons.      Auto resulted.       Urine Culture - Urine, Urine, Clean Catch [[id number]] Collected:  10/16/17 0408    Specimen:  Urine from Urine, Clean Catch Updated:  10/16/17 0414    Urinalysis With / Culture If Indicated - Urine, Clean Catch [[id number]]  (Abnormal) Collected:  10/16/17 0408    Specimen:  Urine from Urine, Clean Catch Updated:  10/16/17 0416     Color, UA Yellow     Appearance, UA Clear     pH, UA 6.5     Specific Gravity, UA 1.025     Glucose, UA Negative     Ketones, UA Trace (A)     Bilirubin, UA Negative     Blood, UA Negative     Protein, UA 30 mg/dL (1+) (A)     Leuk Esterase, UA Small (1+) (A)     Nitrite, UA Negative     Urobilinogen, UA 0.2 E.U./dL    Urinalysis, Microscopic Only - Urine, Clean Catch [[id number]]  (Abnormal) Collected:  10/16/17 0408    Specimen:  Urine from Urine, Clean Catch Updated:  10/16/17 0424     RBC, UA None Seen /HPF      WBC, UA 3-5 (A) /HPF      Bacteria, UA 2+ (A) /HPF      Squamous Epithelial Cells, UA 13-20 (A) /HPF      Hyaline Casts, UA None Seen /LPF      Mucus, UA Moderate/2+ (A) /HPF      Methodology Manual Light Microscopy          Radiology:    Imaging Results (last 72 hours)     Procedure Component Value Units Date/Time    XR Chest 1 View [[id number]] Updated:  10/16/17 0324          Assessment:  Principal Problem:    Right upper lobe pneumonia  Active Problems:    COPD with acute exacerbation    Chronic obstructive pulmonary disease with acute lower respiratory infection    Nausea    Abnormal weight loss    Acute on chronic respiratory failure with hypoxia      Plan:    Admit to ICU for close monitoring.  Continue oxygen  as needed and continuous pulse oximetry.  Zosyn,  azithromycin, and vancomycin have been initiated. Solumedrol ordered.  Continue IV normal saline.  Hold her scheduled metoprolol this morning and order an as needed dose IV when necessary tachycardia or hypertension.  Additionally continue to hold her current opiate and benzo therapy today while monitoring closely her blood pressure.  Continue duo nebs, budesonide nebs, Mucinex. Sputum and blood cultures were added. Expect 2-3 days for clinical improvement. Antiemetics as needed. Continue other home meds including remeron. Nutrition consult. She requested to be full code. After clinically improved, consider PT. Plan to return home with home health once clinically improved. If she does not improve, would consider consulting primary pulmonologist Dr Esparza.     Medication risks and benefits were discussed in detail. Patient reported being satisfied with current treatment plan.    Adelina Nicholson DO  10/16/17  6:15 AM         Electronically signed by Adelina Nicholson DO at 10/16/2017  6:59 AM           Emergency Department Notes      Bob Anand MD at 10/16/2017  3:09 AM          Subjective   Patient is a 71 y.o. female presenting with cough.   History provided by:  Patient   used: No    Cough   Cough characteristics:  Dry  Sputum characteristics:  Nondescript  Severity:  Moderate  Onset quality:  Gradual  Timing:  Constant  Progression:  Worsening  Chronicity:  New  Smoker: no    Context: upper respiratory infection    Context: not sick contacts, not weather changes and not with activity    Relieved by:  Nothing  Worsened by:  Nothing  Ineffective treatments:  None tried  Associated symptoms: chills, fever, rhinorrhea, shortness of breath and wheezing    Associated symptoms: no chest pain, no diaphoresis, no eye discharge, no headaches, no myalgias, no rash, no sinus congestion and no sore throat        Review of Systems   Constitutional:  Positive for chills and fever. Negative for diaphoresis.   HENT: Positive for rhinorrhea. Negative for congestion, sore throat and trouble swallowing.    Eyes: Negative for discharge and visual disturbance.   Respiratory: Positive for cough, shortness of breath and wheezing. Negative for chest tightness.    Cardiovascular: Negative for chest pain, palpitations and leg swelling.   Gastrointestinal: Negative for abdominal pain, constipation, diarrhea, nausea and vomiting.   Genitourinary: Negative for dysuria, flank pain and hematuria.   Musculoskeletal: Negative for back pain, myalgias and neck pain.   Skin: Negative for color change and rash.   Neurological: Negative for dizziness, weakness, numbness and headaches.   Psychiatric/Behavioral: Negative for self-injury and suicidal ideas.       Past Medical History:   Diagnosis Date   • Abdominal pain    • Acute bronchitis    • Ankle pain    • Anxiety 1980   • Atopic dermatitis    • Backache    • Bipolar disorder    • Bronchiectasis    • Chronic obstructive lung disease 2008   • Colon cancer screening    • COPD (chronic obstructive pulmonary disease)    • Cystocele    • Depressed    • Depression 1980   • Fatigue     Chronic pafigue 2012   • Fibromyalgia 2008   • GERD (gastroesophageal reflux disease)    • Gout    • Heartburn     Chronic historu of epigastric heartburn currently controlled on Prilesec 40 mg every morning along with Zantac 300 Mg daily at bedtime   • High cholesterol 2012   • Hip pain    • Hyperlipidemia    • Hypertension    • Insomnia    • Joint pain    • Kidney infection    • Loss of appetite    • Low back pain 1995   • Melena    • Nausea and vomiting    • On home oxygen therapy    • Osteoarthritis 2010   • Pain in limb    • Palpitations    • Pinched nerve     Pinched nerves 2011   • Recurrent urinary tract infection    • Sciatica [id number]   • Shortness of breath    • Sinus problem    • Sleep apnea 1998   • Upset stomach    • Valvular heart disease     • Vitamin B12 deficiency    • Weight loss, abnormal     Weight loss is stabel. On pund weight gain since 01/2016       Allergies   Allergen Reactions   • Dust Mite Extract      Dust   • Grass    • Mold Extract [Trichophyton]        Past Surgical History:   Procedure Laterality Date   • ABDOMINAL HERNIA REPAIR     • APPENDECTOMY  1965   • BACK SURGERY  2005   • CATARACT EXTRACTION Bilateral 1978   • CHOLECYSTECTOMY     • EYE SURGERY      Put in implants    • GALLBLADDER SURGERY  1985   • KNEE SURGERY Left 1979    Knee Cap   • TUBAL ABDOMINAL LIGATION  1971       Family History   Problem Relation Age of Onset   • Ovarian cancer Mother    • Cancer Mother    • Lung cancer Father    • Heart disease Brother        Social History     Social History   • Marital status: Single     Spouse name: N/A   • Number of children: N/A   • Years of education: N/A     Social History Main Topics   • Smoking status: Former Smoker     Packs/day: 0.50     Years: 35.00     Quit date: 3/5/2017   • Smokeless tobacco: Never Used      Comment: 1/2 to 1 ppd   • Alcohol use No   • Drug use: No   • Sexual activity: Defer     Other Topics Concern   • None     Social History Narrative           Objective   Physical Exam   Constitutional: She is oriented to person, place, and time. She appears well-nourished.   cachetic   HENT:   Head: Normocephalic and atraumatic.   Nose: Nose normal.   Mouth/Throat: Oropharynx is clear and moist.   Eyes: Conjunctivae and EOM are normal. Pupils are equal, round, and reactive to light.   Neck: Normal range of motion. Neck supple.   Cardiovascular: Normal rate, regular rhythm, normal heart sounds and intact distal pulses.    Pulmonary/Chest: Effort normal. No respiratory distress. She has wheezes. She exhibits no tenderness.   Abdominal: Soft. Bowel sounds are normal. There is no tenderness. There is no rebound and no guarding.   Musculoskeletal: Normal range of motion. She exhibits no edema, tenderness or deformity.    Neurological: She is alert and oriented to person, place, and time. No cranial nerve deficit. Coordination normal.   Skin: Skin is warm and dry. No rash noted. No erythema. No pallor.   Psychiatric: She has a normal mood and affect. Her behavior is normal. Judgment and thought content normal.   Nursing note and vitals reviewed.      Procedures        ED Course  ED Course                  MDM  Number of Diagnoses or Management Options  Chronic obstructive pulmonary disease with acute lower respiratory infection:   Pneumonia of right upper lobe due to infectious organism:   Diagnosis management comments: EKG: Ventricular rate 90, IA interval 124, , castration 70, sinus rhythm.  No ischemic changes.    Patient presents emergency department by EMS with complaints of fever, chills, cough, diarrhea.  Patient states that this is resolving going on for a few days.  She lives at home alone and has been taking over-the-counter medication for symptomatic relief.  Patient has a history of COPD and is on oxygen at home.  Patient states that she's been wearing her oxygen frequently over the last couple of days.  Vitals remarkable for fever. Physical exam is remarkable for wheezing. Tylenol, solumedrol and duoneb ordered. Patient given 1L of fluids.  Labs and imaging obtained. WBC is slightly elevated. CXR shows chronic changes. Possible new infiltrate in the right upper lobe. Patient given 1G of rocephin. 500mg of azithromycin.  Vitals repeated. Temperature decreasing. Remainder of vitals stable. Patient still has wheezing on exam. A second duoneb treatment ordered. CURB 65 score is intermediate. Discussed admission vs discharge with the patient. She wants to go home. Will give rx for doxycycline and prednisone. Patient told to follow up with PCP this week, to take medication as directed, and to return for worsening symptoms.     While attempting to discharge the patient, her Blood pressure persisted to be low. Patient  given a 2nd liter of fluids with no response. Patient is now agreeable with admission. Dr. Nicholson is agreeable with admitting her to the ICU.       Final diagnoses:   Chronic obstructive pulmonary disease with acute lower respiratory infection   Pneumonia of right upper lobe due to infectious organism            Bob Anand MD  10/16/17 0451       Bob Anand MD  10/16/17 0556       Bob Anand MD  10/16/17 0605       Electronically signed by Bob nAand MD at 10/16/2017  6:05 AM      Kerri Graff at 10/16/2017  5:49 AM          Called Dr. Nicholson per Dr. Anand's request. Call was transferred.      Kerri Graff  10/16/17 0549       Electronically signed by Kerri Graff at 10/16/2017  5:49 AM        Vital Signs (last 24 hours)       10/16 0700  -  10/17 0659 10/17 0700  -  10/17 1256   Most Recent    Temp (°F) 97.7 -  98.6      97.9     97.9 (36.6)    Heart Rate 72 -  103    94 -  109     94    Resp 9 -  25      20     20    BP 91/57 -  119/67    106/65 -  121/73     117/62    SpO2 (%) 92 -  100    92 -  97     97          Hospital Medications (active)       Dose Frequency Start End    acetaminophen (TYLENOL) tablet 650 mg 650 mg Every 4 Hours PRN 10/16/2017     Sig - Route: Take 2 tablets by mouth Every 4 (Four) Hours As Needed for Headache or Fever (temperature greater than 101.5 F). - Oral    acetylcysteine (MUCOMYST) 20 % solution 600 mg 600 mg 2 Times Daily - RT 10/17/2017 10/19/2017    Sig - Route: Take 3 mL by nebulization 2 (Two) Times a Day. - Nebulization    azelastine (ASTELIN) nasal spray 2 spray 2 Times Daily 10/16/2017     Sig - Route: 2 sprays by Each Nare route 2 (Two) Times a Day. - Each Nare    azithromycin (ZITHROMAX) tablet 250 mg 250 mg Every 24 Hours Scheduled 10/18/2017 10/22/2017    Sig - Route: Take 1 tablet by mouth Daily. - Oral    benzonatate (TESSALON) capsule 100 mg 100 mg 3 Times Daily PRN 10/16/2017     Sig - Route:  Take 1 capsule by mouth 3 (Three) Times a Day As Needed for Cough. - Oral    budesonide (PULMICORT) nebulizer solution 0.5 mg 0.5 mg 2 Times Daily - RT 10/16/2017     Sig - Route: Take 2 mL by nebulization 2 (Two) Times a Day. - Nebulization    cefTRIAXone (ROCEPHIN) IVPB 1 g/50ml dextrose (premix) 1 g Every 24 Hours 10/17/2017     Sig - Route: Infuse 50 mL into a venous catheter Daily. - Intravenous    cyclobenzaprine (FLEXERIL) tablet 10 mg 10 mg Daily PRN 10/16/2017     Sig - Route: Take 1 tablet by mouth Daily As Needed for Muscle Spasms. - Oral    enoxaparin (LOVENOX) syringe 40 mg 40 mg Every 24 Hours 10/16/2017     Sig - Route: Inject 0.4 mL under the skin Daily. - Subcutaneous    famotidine (PEPCID) tablet 20 mg 20 mg 2 Times Daily 10/16/2017     Sig - Route: Take 1 tablet by mouth 2 (Two) Times a Day. - Oral    gabapentin (NEURONTIN) capsule 100 mg 100 mg Every 8 Hours Scheduled 10/16/2017     Sig - Route: Take 1 capsule by mouth Every 8 (Eight) Hours. - Oral    guaiFENesin (MUCINEX) 12 hr tablet 600 mg 600 mg 2 Times Daily 10/16/2017     Sig - Route: Take 1 tablet by mouth 2 (Two) Times a Day. - Oral    HYDROcodone-acetaminophen (NORCO) 5-325 MG per tablet 1 tablet 1 tablet Every 6 Hours PRN 10/16/2017 10/26/2017    Sig - Route: Take 1 tablet by mouth Every 6 (Six) Hours As Needed for Moderate Pain . - Oral    ipratropium-albuterol (DUO-NEB) nebulizer solution 3 mL 3 mL Every 6 Hours PRN 10/16/2017     Sig - Route: Take 3 mL by nebulization Every 6 (Six) Hours As Needed for Wheezing or Shortness of Air. - Nebulization    ipratropium-albuterol (DUO-NEB) nebulizer solution 3 mL 3 mL Every 4 Hours PRN 10/17/2017     Sig - Route: Take 3 mL by nebulization Every 4 (Four) Hours As Needed for Wheezing or Shortness of Air. - Nebulization    LORazepam (ATIVAN) tablet 0.5 mg 0.5 mg 2 Times Daily PRN 10/16/2017 10/26/2017    Sig - Route: Take 1 tablet by mouth 2 (Two) Times a Day As Needed for Anxiety. - Oral     "melatonin tablet 10.5 mg 10.5 mg Nightly PRN 10/16/2017     Sig - Route: Take 3.5 tablets by mouth At Night As Needed for Sleep. - Oral    methylPREDNISolone sodium succinate (SOLU-Medrol) injection 80 mg 80 mg Every 8 Hours 10/16/2017     Sig - Route: Infuse 1.28 mL into a venous catheter Every 8 (Eight) Hours. - Intravenous    metoprolol tartrate (LOPRESSOR) injection 2.5 mg 2.5 mg Every 6 Hours PRN 10/16/2017     Sig - Route: Infuse 2.5 mL into a venous catheter Every 6 (Six) Hours As Needed for High Blood Pressure or Heart Rate (tachycardia or hypertension). - Intravenous    mirtazapine (REMERON) tablet 30 mg 30 mg Nightly 10/16/2017     Sig - Route: Take 2 tablets by mouth Every Night. - Oral    nicotine (NICODERM CQ) 7 MG/24HR patch 1 patch 1 patch Every 24 Hours 10/16/2017     Sig - Route: Place 1 patch on the skin Daily. - Transdermal    ondansetron ODT (ZOFRAN-ODT) 4 MG disintegrating tablet  - ADS Override Pull   10/16/2017 10/16/2017    Notes to Pharmacy: Created by cabinet override    Pharmacy to dose vancomycin  Continuous PRN 10/16/2017     Sig - Route: Continuous As Needed for Consult. - Does not apply    Linked Group 1:  \"And\" Linked Group Details        sodium chloride 0.9 % flush 1-10 mL 1-10 mL As Needed 10/16/2017     Sig - Route: Infuse 1-10 mL into a venous catheter As Needed for Line Care. - Intravenous    sodium chloride 0.9 % flush 10 mL 10 mL As Needed 10/16/2017     Sig - Route: Infuse 10 mL into a venous catheter As Needed for Line Care. - Intravenous    Cosign for Ordering: Accepted by Bob Anand MD on 10/16/2017  6:49 AM    sodium chloride 0.9 % flush 10 mL 10 mL As Needed 10/16/2017     Sig - Route: Infuse 10 mL into a venous catheter As Needed for Line Care. - Intravenous    Linked Group 2:  \"And\" Linked Group Details        sodium chloride 0.9 % infusion 90 mL/hr Continuous 10/16/2017 10/17/2017    Sig - Route: Infuse 90 mL/hr into a venous catheter Continuous. - " "Intravenous    traZODone (DESYREL) tablet 50 mg 50 mg Nightly 10/16/2017     Sig - Route: Take 1 tablet by mouth Every Night. - Oral    venlafaxine (EFFEXOR) tablet 75 mg 75 mg 2 Times Daily 10/16/2017     Sig - Route: Take 1 tablet by mouth 2 (Two) Times a Day. - Oral    AZITHROMYCIN 500 MG/250 ML 0.9% NS IVPB (vial-mate) (Discontinued) 500 mg Every 24 Hours 10/17/2017 10/17/2017    Sig - Route: Infuse 500 mg into a venous catheter Daily. - Intravenous    Linked Group 3:  \"And\" Linked Group Details        ipratropium-albuterol (DUO-NEB) nebulizer solution 3 mL (Discontinued) 3 mL 4 Times Daily - RT 10/16/2017 10/17/2017    Sig - Route: Take 3 mL by nebulization 4 (Four) Times a Day. - Nebulization    piperacillin-tazobactam (ZOSYN) 3.375 g in iso-osmotic dextrose 50 ml (premix) (Discontinued) 3.375 g Every 6 Hours 10/16/2017 10/17/2017    Sig - Route: Infuse 50 mL into a venous catheter Every 6 (Six) Hours. - Intravenous    Linked Group 3:  \"And\" Linked Group Details        vancomycin in dextrose 5% 150 mL (VANCOCIN) IVPB 750 mg (Discontinued) 750 mg Every 36 Hours 10/17/2017 10/17/2017    Sig - Route: Infuse 150 mL into a venous catheter Every 36 (Thirty-Six) Hours. - Intravenous    Linked Group 1:  \"And\" Linked Group Details                Lab Results (last 24 hours)     Procedure Component Value Units Date/Time    Vancomycin, Random [[id number]]  (Abnormal) Collected:  10/17/17 0417    Specimen:  Blood Updated:  10/17/17 0618     Vancomycin Random <5.00 (L) mcg/mL     Urine Culture - Urine, Urine, Clean Catch [[id number]]  (Normal) Collected:  10/16/17 0408    Specimen:  Urine from Urine, Clean Catch Updated:  10/17/17 0701     Urine Culture No growth    Blood Culture With YONAS - Blood, [[id number]]  (Normal) Collected:  10/16/17 0816    Specimen:  Blood from Hand, Left Updated:  10/17/17 0831     Blood Culture No growth at 24 hours    Blood Culture With YONAS - Blood, [[id number]]  (Normal) Collected:  10/16/17 0818 "    Specimen:  Blood from Arm, Right Updated:  10/17/17 0831     Blood Culture No growth at 24 hours           Physician Progress Notes (last 24 hours) (Notes from 10/16/2017 12:56 PM through 10/17/2017 12:56 PM)      Erlin Pool, DO at 10/17/2017  8:50 AM  Version 2 of 2               [facility]    Name:  Joelle Yee   Age:  71 y.o.  Sex:  female  :  1945  MRN:  [id number]   Visit Number:  [id number]  Admission Date:  10/16/2017  Date Of Service:  10/17/17  Primary Care Physician:  DONTRELL Regan     LOS: 1 day :  Patient Care Team:  DONTRELL Regan as PCP - General:    Chief Complaint:      RUL pneumonia/Sepsis    Subjective / Interval History:     I have reviewed labs/imaging/records from this hospitalization, including ER staff and admitting/attending physicians H/P's and progress notes to establish a comprehensive understanding of this patient's clinical hospital course, as well as to establish a transition of care appropriately.    Pt is a 70 yo female, admitted d/t acute on chronic resp failure with hypoxia, noted to have sepsis d/t RUL pneumonia; given aggressive bolus fluid hydration, and continued on IV broad spectrum abx and steroids; duonebs ordered; admitted to ICU.    Pt BP has improved considerably.  More alert and awake yesterday and again this am; slept well; states her cough is productive, but heavy and difficult to clear phlegm; no hemoptysis; no dysuria or hematuria; good po intake, but chronic difficulty with appetite.  Denies dysphagia/N/V; no BRB/BTS; no CP/palp/vertigo.  Tolerating abx without problems.    Review of Systems:     General ROS: Patient denies any fevers, chills or loss of consciousness.  Respiratory ROS: Mild SOA at times with heavy cough/phlegm prod; no hemoptysis.  Cardiovascular ROS: Denies chest pain or palpitations. No history of exertional chest pain.  Gastrointestinal ROS: Denies nausea and vomiting.  Denies any abdominal pain. No diarrhea.  Neurological ROS: Gen weakness. No loss of consciousness. Denies any numbness. Denies neck pain.  Dermatological ROS: Denies any redness or pruritis.    Vital Signs:    Temp:  [97.7 °F (36.5 °C)-98.5 °F (36.9 °C)] 97.9 °F (36.6 °C)  Heart Rate:  [] 101  Resp:  [9-25] 19  BP: ()/(48-73) 121/73    Intake and output:    I/O last 3 completed shifts:  In: 4564 [P.O.:700; I.V.:1564; IV Piggyback:2300]  Out: 1400 [Urine:1400]  I/O this shift:  In: 320 [P.O.:320]  Out: -     Physical Examination:    General Appearance:  Alert and cooperative, not in any acute distress.  Thin, frail build.   Head:  Atraumatic and normocephalic, without obvious abnormality.   Eyes:          PERRLA, conjunctivae and sclerae normal, no Icterus. No pallor. Extra-occular movements are within normal limits.   Neck: Supple, trachea midline, no thyromegaly, no carotid bruit.   Lungs:   Chest shape is normal. Breath sounds heard bilaterally with AAE to all lung fields.  Mild rhonchus bs to RUL, referred congestion to all other lung fields, cleared somewhat with cough. No Pleural rub or bronchial breathing.   Heart:  Normal S1 and S2, no murmur, no gallop, no rub. No JVD   Abdomen:   Normal bowel sounds, no masses, no organomegaly. Soft       non-tender, non-distended, no guarding, no rebound tenderness.   Extremities: Moves all extremities well, no edema, no cyanosis, no clubbing; thin frail build.   Skin: No bleeding, bruising or rash.  Age related atrophy of skin.   Neurologic: Awake, alert and oriented times 3. Moves all 4 extremities equally.   Laboratory results:      Results from last 7 days  Lab Units 10/16/17  0257   SODIUM mmol/L 132*   POTASSIUM mmol/L 4.0   CHLORIDE mmol/L 97*   CO2 mmol/L 27.0   BUN mg/dL 11   CREATININE mg/dL 0.70   CALCIUM mg/dL 8.5   BILIRUBIN mg/dL 0.3   ALK PHOS U/L 109   ALT (SGPT) U/L 25   AST (SGOT) U/L 21   GLUCOSE mg/dL 107*       Results from last 7 days  Lab  Units 10/16/17  0257   WBC 10*3/mm3 11.17*   HEMOGLOBIN g/dL 11.0*   HEMATOCRIT % 33.9*   PLATELETS 10*3/mm3 203           Results from last 7 days  Lab Units 10/16/17  0257   TROPONIN I ng/mL <0.012       Results from last 7 days  Lab Units 10/16/17  0818 10/16/17  0816 10/16/17  0408   BLOODCX  No growth at 24 hours No growth at 24 hours  --    URINECX   --   --  No growth           I have reviewed the patient's laboratory results.    Radiology results:    Imaging Results (last 24 hours)     ** No results found for the last 24 hours. **          I have reviewed the patient's radiology reports.    Medication Review:     I have reviewed the patients active and prn medications.         Assessment:  Principal Problem:    Right upper lobe pneumonia  Active Problems:    Acute on chronic respiratory failure with hypoxia    COPD with acute exacerbation    Abnormal weight loss    Chronic obstructive pulmonary disease with acute lower respiratory infection        Plan:  Cultures have all been negative thus far; HD stable; will de-escalate her ABX coverage at this time, will d/c her vancomycin/zosyn and lower dose of her azithromycin, as well as change to po; will start IV rocephin and follow clinically, hopeful to transition to oral omnicef tomorrow if able; dec IVF rate, encourage continuation of good hydration by mouth and inc po intake overall; cont duonebs prn, as well as chronic oxygen supplementation; will transfer to med/surg floor, telemetry today if bed available; add mucolytic agent to nebulizer tx's to aid in heavy phlegm clearance.  I have discussed POC in detail with pt today, she verb understanding and agrees.  Expect 1-2 more days of hospitalization.    I have spent a total of 35 mins in direct pt care today.      Erlin Pool DO  10/17/17  9:12 AM               Electronically signed by Erlin Pool DO at 10/17/2017  9:15 AM      Erlin Pool DO at 10/17/2017  8:50 AM  Version 1 of 2                [facility]    Name:  Joelle Yee   Age:  71 y.o.  Sex:  female  :  1945  MRN:  [id number]   Visit Number:  [id number]  Admission Date:  10/16/2017  Date Of Service:  10/17/17  Primary Care Physician:  DONTRELL Regan     LOS: 1 day :  Patient Care Team:  DONTRELL Regan as PCP - General:    Chief Complaint:      RUL pneumonia/Sepsis    Subjective / Interval History:     I have reviewed labs/imaging/records from this hospitalization, including ER staff and admitting/attending physicians H/P's and progress notes to establish a comprehensive understanding of this patient's clinical hospital course, as well as to establish a transition of care appropriately.    Pt is a 70 yo female, admitted d/t acute on chronic resp failure with hypoxia, noted to have sepsis d/t RUL pneumonia; given aggressive bolus fluid hydration, and continued on IV broad spectrum abx and steroids; duonebs ordered; admitted to ICU.    Pt BP has improved considerably.  More alert and awake yesterday and again this am; slept well; states her cough is productive, but heavy and difficult to clear phlegm; no hemoptysis; no dysuria or hematuria; good po intake, but chronic difficulty with appetite.  Denies dysphagia/N/V; no BRB/BTS; no CP/palp/vertigo.  Tolerating abx without problems.    Review of Systems:     General ROS: Patient denies any fevers, chills or loss of consciousness.  Respiratory ROS: Mild SOA at times with heavy cough/phlegm prod; no hemoptysis.  Cardiovascular ROS: Denies chest pain or palpitations. No history of exertional chest pain.  Gastrointestinal ROS: Denies nausea and vomiting. Denies any abdominal pain. No diarrhea.  Neurological ROS: Gen weakness. No loss of consciousness. Denies any numbness. Denies neck pain.  Dermatological ROS: Denies any redness or pruritis.    Vital Signs:    Temp:  [97.7 °F (36.5 °C)-98.5 °F (36.9 °C)] 97.9 °F (36.6 °C)  Heart  Rate:  [] 101  Resp:  [9-25] 19  BP: ()/(48-73) 121/73    Intake and output:    I/O last 3 completed shifts:  In: 4564 [P.O.:700; I.V.:1564; IV Piggyback:2300]  Out: 1400 [Urine:1400]  I/O this shift:  In: 320 [P.O.:320]  Out: -     Physical Examination:    General Appearance:  Alert and cooperative, not in any acute distress.  Thin, frail build.   Head:  Atraumatic and normocephalic, without obvious abnormality.   Eyes:          PERRLA, conjunctivae and sclerae normal, no Icterus. No pallor. Extra-occular movements are within normal limits.   Neck: Supple, trachea midline, no thyromegaly, no carotid bruit.   Lungs:   Chest shape is normal. Breath sounds heard bilaterally with AAE to all lung fields.  Mild rhonchus bs to RUL, referred congestion to all other lung fields, cleared somewhat with cough. No Pleural rub or bronchial breathing.   Heart:  Normal S1 and S2, no murmur, no gallop, no rub. No JVD   Abdomen:   Normal bowel sounds, no masses, no organomegaly. Soft       non-tender, non-distended, no guarding, no rebound tenderness.   Extremities: Moves all extremities well, no edema, no cyanosis, no clubbing; thin frail build.   Skin: No bleeding, bruising or rash.  Age related atrophy of skin.   Neurologic: Awake, alert and oriented times 3. Moves all 4 extremities equally.   Laboratory results:      Results from last 7 days  Lab Units 10/16/17  0257   SODIUM mmol/L 132*   POTASSIUM mmol/L 4.0   CHLORIDE mmol/L 97*   CO2 mmol/L 27.0   BUN mg/dL 11   CREATININE mg/dL 0.70   CALCIUM mg/dL 8.5   BILIRUBIN mg/dL 0.3   ALK PHOS U/L 109   ALT (SGPT) U/L 25   AST (SGOT) U/L 21   GLUCOSE mg/dL 107*       Results from last 7 days  Lab Units 10/16/17  0257   WBC 10*3/mm3 11.17*   HEMOGLOBIN g/dL 11.0*   HEMATOCRIT % 33.9*   PLATELETS 10*3/mm3 203           Results from last 7 days  Lab Units 10/16/17  0257   TROPONIN I ng/mL <0.012       Results from last 7 days  Lab Units 10/16/17  0818 10/16/17  0816  10/16/17  0408   BLOODCX  No growth at 24 hours No growth at 24 hours  --    URINECX   --   --  No growth           I have reviewed the patient's laboratory results.    Radiology results:    Imaging Results (last 24 hours)     ** No results found for the last 24 hours. **          I have reviewed the patient's radiology reports.    Medication Review:     I have reviewed the patients active and prn medications.         Assessment:  Principal Problem:    Right upper lobe pneumonia  Active Problems:    Acute on chronic respiratory failure with hypoxia    COPD with acute exacerbation    Abnormal weight loss    Chronic obstructive pulmonary disease with acute lower respiratory infection        Plan:  Cultures have all been negative thus far; HD stable; will de-escalate her ABX coverage at this time, will d/c her vancomycin and lower dose of her azithromycin, as well as change to po; dec IVF rate, encourage continuation of good hydration by mouth and inc po intake overall; cont duonebs prn, as well as chronic oxygen supplementation; will transfer to med/surg floor, telemetry today if bed available; add mucolytic agent to nebulizer tx's to aid in heavy phlegm clearance.  I have discussed POC in detail with pt today, she verb understanding and agrees.  Expect 1-2 more days of hospitalization.    I have spent a total of 35 mins in direct pt care today.      Erlin Pool DO  10/17/17  9:12 AM               Electronically signed by Erlin Pool DO at 10/17/2017  9:12 AM        Consult Notes (last 24 hours) (Notes from 10/16/2017 12:56 PM through 10/17/2017 12:56 PM)     No notes of this type exist for this encounter.

## 2017-10-17 NOTE — PLAN OF CARE
Problem: Patient Care Overview (Adult)  Goal: Plan of Care Review  Outcome: Ongoing (interventions implemented as appropriate)    10/16/17 2123   Coping/Psychosocial Response Interventions   Plan Of Care Reviewed With patient   Patient Care Overview   Progress improving       Goal: Adult Individualization and Mutuality  Outcome: Ongoing (interventions implemented as appropriate)  Goal: Discharge Needs Assessment  Outcome: Ongoing (interventions implemented as appropriate)    Problem: COPD, Chronic Bronchitis/Emphysema (Adult)  Goal: Signs and Symptoms of Listed Potential Problems Will be Absent or Manageable (COPD, Chronic Bronchitis/Emphysema)  Outcome: Ongoing (interventions implemented as appropriate)    Problem: Pneumonia (Adult)  Goal: Signs and Symptoms of Listed Potential Problems Will be Absent or Manageable (Pneumonia)  Outcome: Ongoing (interventions implemented as appropriate)

## 2017-10-18 VITALS
TEMPERATURE: 98 F | BODY MASS INDEX: 17.83 KG/M2 | RESPIRATION RATE: 20 BRPM | HEART RATE: 97 BPM | DIASTOLIC BLOOD PRESSURE: 99 MMHG | WEIGHT: 90.8 LBS | SYSTOLIC BLOOD PRESSURE: 146 MMHG | HEIGHT: 60 IN | OXYGEN SATURATION: 96 %

## 2017-10-18 PROCEDURE — 94799 UNLISTED PULMONARY SVC/PX: CPT

## 2017-10-18 PROCEDURE — 87070 CULTURE OTHR SPECIMN AEROBIC: CPT | Performed by: FAMILY MEDICINE

## 2017-10-18 PROCEDURE — 25010000002 METHYLPREDNISOLONE PER 125 MG: Performed by: FAMILY MEDICINE

## 2017-10-18 PROCEDURE — 99239 HOSP IP/OBS DSCHRG MGMT >30: CPT | Performed by: FAMILY MEDICINE

## 2017-10-18 PROCEDURE — 87106 FUNGI IDENTIFICATION YEAST: CPT | Performed by: FAMILY MEDICINE

## 2017-10-18 PROCEDURE — 25010000002 ENOXAPARIN PER 10 MG: Performed by: FAMILY MEDICINE

## 2017-10-18 PROCEDURE — 87205 SMEAR GRAM STAIN: CPT | Performed by: FAMILY MEDICINE

## 2017-10-18 RX ORDER — ACETAMINOPHEN 325 MG/1
650 TABLET ORAL EVERY 4 HOURS PRN
Start: 2017-10-18

## 2017-10-18 RX ORDER — CHOLECALCIFEROL (VITAMIN D3) 125 MCG
10 CAPSULE ORAL NIGHTLY PRN
Qty: 60 TABLET | Refills: 0 | Status: SHIPPED | OUTPATIENT
Start: 2017-10-18

## 2017-10-18 RX ORDER — CEFDINIR 300 MG/1
300 CAPSULE ORAL 2 TIMES DAILY
Qty: 9 CAPSULE | Refills: 0 | Status: SHIPPED | OUTPATIENT
Start: 2017-10-18 | End: 2017-10-23

## 2017-10-18 RX ORDER — CEFDINIR 300 MG/1
300 CAPSULE ORAL EVERY 12 HOURS SCHEDULED
Status: DISCONTINUED | OUTPATIENT
Start: 2017-10-18 | End: 2017-10-18 | Stop reason: HOSPADM

## 2017-10-18 RX ORDER — PREDNISONE 10 MG/1
TABLET ORAL
Qty: 30 TABLET | Refills: 0 | Status: SHIPPED | OUTPATIENT
Start: 2017-10-18 | End: 2018-06-10 | Stop reason: HOSPADM

## 2017-10-18 RX ORDER — AZITHROMYCIN 250 MG/1
TABLET, FILM COATED ORAL
Qty: 4 TABLET | Refills: 0 | Status: SHIPPED | OUTPATIENT
Start: 2017-10-19 | End: 2018-06-10 | Stop reason: HOSPADM

## 2017-10-18 RX ORDER — FAMOTIDINE 20 MG/1
20 TABLET, FILM COATED ORAL 2 TIMES DAILY
Qty: 60 TABLET | Refills: 0 | Status: SHIPPED | OUTPATIENT
Start: 2017-10-18 | End: 2018-02-28 | Stop reason: ALTCHOICE

## 2017-10-18 RX ADMIN — IPRATROPIUM BROMIDE AND ALBUTEROL SULFATE 3 ML: .5; 3 SOLUTION RESPIRATORY (INHALATION) at 03:45

## 2017-10-18 RX ADMIN — IPRATROPIUM BROMIDE AND ALBUTEROL SULFATE 3 ML: .5; 3 SOLUTION RESPIRATORY (INHALATION) at 07:05

## 2017-10-18 RX ADMIN — LORAZEPAM 0.5 MG: 0.5 TABLET ORAL at 09:03

## 2017-10-18 RX ADMIN — GABAPENTIN 100 MG: 100 CAPSULE ORAL at 05:03

## 2017-10-18 RX ADMIN — GUAIFENESIN 600 MG: 600 TABLET, EXTENDED RELEASE ORAL at 09:04

## 2017-10-18 RX ADMIN — ENOXAPARIN SODIUM 40 MG: 40 INJECTION SUBCUTANEOUS at 08:54

## 2017-10-18 RX ADMIN — BUDESONIDE 0.5 MG: 0.5 INHALANT RESPIRATORY (INHALATION) at 07:05

## 2017-10-18 RX ADMIN — AZELASTINE HYDROCHLORIDE 2 SPRAY: 137 SPRAY, METERED NASAL at 09:06

## 2017-10-18 RX ADMIN — HYDROCODONE BITARTRATE AND ACETAMINOPHEN 1 TABLET: 5; 325 TABLET ORAL at 09:23

## 2017-10-18 RX ADMIN — FAMOTIDINE 20 MG: 20 TABLET, FILM COATED ORAL at 09:02

## 2017-10-18 RX ADMIN — ACETYLCYSTEINE 600 MG: 200 SOLUTION ORAL; RESPIRATORY (INHALATION) at 07:03

## 2017-10-18 RX ADMIN — METHYLPREDNISOLONE SODIUM SUCCINATE 80 MG: 125 INJECTION, POWDER, FOR SOLUTION INTRAMUSCULAR; INTRAVENOUS at 03:34

## 2017-10-18 RX ADMIN — VENLAFAXINE 75 MG: 75 TABLET ORAL at 09:03

## 2017-10-18 RX ADMIN — AZITHROMYCIN MONOHYDRATE 250 MG: 250 TABLET ORAL at 05:03

## 2017-10-18 RX ADMIN — METHYLPREDNISOLONE SODIUM SUCCINATE 80 MG: 125 INJECTION, POWDER, FOR SOLUTION INTRAMUSCULAR; INTRAVENOUS at 12:00

## 2017-10-18 RX ADMIN — NICOTINE 1 PATCH: 7 PATCH, EXTENDED RELEASE TRANSDERMAL at 09:18

## 2017-10-18 RX ADMIN — CEFDINIR 300 MG: 300 CAPSULE ORAL at 09:05

## 2017-10-18 NOTE — PLAN OF CARE
Problem: Patient Care Overview (Adult)  Goal: Plan of Care Review  Outcome: Ongoing (interventions implemented as appropriate)    10/18/17 0514   Coping/Psychosocial Response Interventions   Plan Of Care Reviewed With patient   Patient Care Overview   Progress improving   Outcome Evaluation   Outcome Summary/Follow up Plan Patient admitted for pneumonia. The patient is on 3L O2 NC with O2 sats staying above 90%. The patient is receiving IV fluids and antibiotics. Will continue to monitor.

## 2017-10-18 NOTE — PROGRESS NOTES
Case Management Discharge Note    Final Note: Pt would like a rollator. She denies any other needs at this time. CM will continue to follow and assist.    Discharge Placement     No information found        Taxi: other    Discharge Codes: 06  Discharged/transferred to home under care of organized home health service organization in anticipation of skilled care

## 2017-10-18 NOTE — DISCHARGE SUMMARY
[facility]   DISCHARGE SUMMARY      Name:  Joelle Yee   Age:  71 y.o.  Sex:  female  :  1945  MRN:  [id number]   Visit Number:  [id number]  Primary Care Physician:  DONTRELL Regan  Date of Discharge:  10/18/2017  Admission Date:  10/16/2017      Discharge Diagnosis:         Principal Problem:    Right upper lobe pneumonia  Active Problems:    Acute on chronic respiratory failure with hypoxia    COPD with acute exacerbation    Abnormal weight loss    Chronic obstructive pulmonary disease with acute lower respiratory infection      Presenting Problem/History of Present Illness:    Chronic obstructive pulmonary disease with acute lower respiratory infection [J44.0]     Consults:     Consults     No orders found from 2017 to 10/17/2017.          Procedures Performed:             History of presenting illness:    Patient 71-year-old  lady with past medical history of COPD, chronic hypoxic respiratory failure on 2 L home oxygen, active tobacco dependency, who presented to the emergency room for shortness of breath and cough.  Patient presented to the emergency room via EMS.  He reports being in her normal state of health until approximately 2 days ago when she developed progressively worsening fever, chills, runny nose, shortness of breath, increased wheezing, myalgias, severe nausea, and cough with white sputum.  She reports having difficulty coughing her sputum.  She has had reduced oral intake over the past 2 days.  She reports having tried her machine, 3 inhalers without improvement.  She was reluctant to Sherry that she had restarted smoking.     The emergency room, preliminary chest x-ray does look like she has increased right upper lobe opacity consistent with her clinical pneumonia.  She has severe bilateral expiratory wheezes improved mildly with DuoNeb.  She was given ceftriaxone and azithromycin as well as Solu-Medrol 125 mg IV.  1 L normal  saline bolus was initially provided.  After this therapy the patient's blood pressure still dropped to the 70s systolic and additional 1 L normal saline bolus was provided with improvement to systolic of 80s to 90s.  The patient does qualify for sepsis with elevated white blood cell count, elevated heart rate, elevated respirations, acute respiratory failure, infection, and low blood pressure and has been given a weight-based sepsis fluid bolus appropriate.  Patient will be admitted to the ICU for close monitoring at this time.     Evaluated the patient in the emergency room room where she was mildly tachypnea but otherwise alert and oriented in no acute distress.  She can barely ambulate without significant distress with increased cough and congestion but this doesn't improve at rest.    Hospital Course:    I have reviewed labs/imaging/records from this hospitalization, including ER staff and admitting/attending physicians H/P's and progress notes to establish a comprehensive understanding of this patient's clinical hospital course, as well as to establish a transition of care appropriately.    Pt is a 70 yo female, admitted d/t acute on chronic resp failure with hypoxia, noted to have sepsis d/t RUL pneumonia; given aggressive bolus fluid hydration, and continued on IV broad spectrum abx and steroids; duonebs ordered; admitted to ICU.    Pt has progressively improved since admission; BP has improved considerably.  More alert and awake yesterday and again today; sleeping well; states her cough was productive, less pronounced over last 24hr; no hemoptysis; no dysuria or hematuria; good po intake, but chronic difficulty with appetite, although very picky eater.  Denies dysphagia/N/V; no BRB/BTS; no CP/palp/vertigo.  Tolerating abx without problems.  Transitioned to po today; pt is already established with [facility], as well as [facility].    Will d/c home with planned 10 day continued steroid taper, and  finishing course of omnicef and azithromycin; encouraged pt to continue good hydration habits.  Inc po intake as able.  F/U with her PCP within 1 week for post-hospitalization follow up.    Vital Signs:    Temp:  [97.9 °F (36.6 °C)-98.2 °F (36.8 °C)] 97.9 °F (36.6 °C)  Heart Rate:  [] 101  Resp:  [18-22] 18  BP: (105-139)/(73-85) 135/73    Physical Exam:    General Appearance:  Alert and cooperative, not in any acute distress.  Pleasant; chronically ill-appearing female.   Head:  Atraumatic and normocephalic, without obvious abnormality.   Eyes:          PERRLA, conjunctivae and sclerae normal, no Icterus. No pallor. Extra-occular movements are within normal limits.   Ears:  Ears appear intact with no abnormalities noted.   Throat: No oral lesions, no thrush, oral mucosa moist.   Neck: Supple, trachea midline, no thyromegaly, no carotid bruit.   Back:   No kyphoscoliosis present. No tenderness to palpation,   range of motion normal.   Lungs:   Chest shape is normal. Breath sounds heard bilaterally equally, with AAE to all lung fields.  No crackles; mild referred upper airway congestion terence easily cleared with light cough. No Pleural rub or bronchial breathing.   Heart:  Normal S1 and S2, no murmur, no gallop, no rub. No JVD.   Abdomen:   Normal bowel sounds, no masses, no organomegaly. Soft     non-tender, non-distended, no guarding, no rebound tenderness.   Extremities: Moves all extremities well, no edema, no cyanosis, no clubbing.  Thin, frail build.   Pulses: Pulses palpable and equal bilaterally.   Skin: No bleeding, bruising or rash.  Age related atrophy of skin.   Lymph nodes: No palpable adenopathy.   Neurologic: Alert and oriented x 3. Moves all four limbs equally. No tremors. No facial asymetry.  Impaired core strength.       Pertinent Lab Results:       Results from last 7 days  Lab Units 10/16/17  0257   SODIUM mmol/L 132*   POTASSIUM mmol/L 4.0   CHLORIDE mmol/L 97*   CO2 mmol/L 27.0   BUN mg/dL  11   CREATININE mg/dL 0.70   CALCIUM mg/dL 8.5   BILIRUBIN mg/dL 0.3   ALK PHOS U/L 109   ALT (SGPT) U/L 25   AST (SGOT) U/L 21   GLUCOSE mg/dL 107*       Results from last 7 days  Lab Units 10/16/17  0257   WBC 10*3/mm3 11.17*   HEMOGLOBIN g/dL 11.0*   HEMATOCRIT % 33.9*   PLATELETS 10*3/mm3 203           Results from last 7 days  Lab Units 10/16/17  0257   TROPONIN I ng/mL <0.012               Results from last 7 days  Lab Units 10/16/17  0257   LIPASE U/L 23           Results from last 7 days  Lab Units 10/16/17  0408   COLOR UA  Yellow   GLUCOSE UA  Negative   KETONES UA  Trace*   LEUKOCYTES UA  Small (1+)*   PH, URINE  6.5   BILIRUBIN UA  Negative   UROBILINOGEN UA  0.2 E.U./dL     Pain Management Panel     There is no flowsheet data to display.          Results from last 7 days  Lab Units 10/16/17  0818 10/16/17  0816 10/16/17  0408   BLOODCX  No growth at 2 days No growth at 2 days  --    URINECX   --   --  No growth       Pertinent Radiology Results:    Imaging Results (all)     Procedure Component Value Units Date/Time    XR Chest 1 View [[id number]] Collected:  10/16/17 0757     Updated:  10/16/17 0801    Narrative:       PROCEDURE: XR CHEST 1 VW-     HISTORY: cough     COMPARISON: March 29, 2017.     FINDINGS: The heart is normal in size. The mediastinum is unremarkable.  There are chronic interstitial lung changes. There is an ill-defined  opacity in the right midlung field which may reflect a pneumonia. There  is no effusion. The left lung is clear. There is no pneumothorax.  There  are no acute osseous abnormalities.           Impression:       Ill-defined opacity in the right midlung field which may  reflect a pneumonia.     Continued followup is recommended.     This report was finalized on 10/16/2017 7:59 AM by Ignacia Lara M.D..          Condition on Discharge:      Improved/Stable    Code status during the hospital stay:    Full Code    Discharge Disposition:    Home-Health Care  [facility]    Discharge Medication:     Joelle Yee   Home Medication Instructions KWAN:[id number]    Printed on:10/18/17 112   Medication Information                      acetaminophen (TYLENOL) 325 MG tablet  Take 2 tablets by mouth Every 4 (Four) Hours As Needed for Headache or Fever (temperature greater than 101.5 F).             albuterol (PROVENTIL) (2.5 MG/3ML) 0.083% nebulizer solution  Take 2.5 mg by nebulization 4 (Four) Times a Day.             azelastine (ASTELIN) 0.1 % nasal spray  2 sprays into each nostril 2 (Two) Times a Day. Use in each nostril as directed             azithromycin (ZITHROMAX) 250 MG tablet  1 tablet daily for 4 days  Indications: Pneumonia             benzonatate (TESSALON) 100 MG capsule  Take 1 capsule by mouth 3 (Three) Times a Day As Needed for cough.             budesonide-formoterol (SYMBICORT) 160-4.5 MCG/ACT inhaler  Inhale 2 puffs 2 (Two) Times a Day. Inhale 1 puff twice daily. Rinse mouth after use.             calcium acetate (PHOS BINDER,) 667 MG capsule capsule  Take 2 capsules by mouth 3 (Three) Times a Day With Meals.             cefdinir (OMNICEF) 300 MG capsule  Take 1 capsule by mouth 2 (Two) Times a Day for 9 doses. Indications: Pneumonia             cetirizine (zyrTEC) 10 MG tablet  Take 10 mg by mouth Daily.             conjugated estrogens (PREMARIN) vaginal cream  Insert 0.5 g into the vagina Every Other Day.             Cyanocobalamin (VITAMIN B12 PO)  Take 500 mcg by mouth Daily.             cyclobenzaprine (FLEXERIL) 10 MG tablet  Take 1 tablet by mouth daily.             Diclofenac Sodium (VOLTAREN TD)  Place  on the skin As Needed. Voltaren GEL              doxycycline (MONODOX) 100 MG capsule  Take 1 capsule by mouth 2 (Two) Times a Day.             famotidine (PEPCID) 20 MG tablet  Take 1 tablet by mouth 2 (Two) Times a Day.             gabapentin (NEURONTIN) 100 MG capsule  TAKE ONE CAPSULE BY MOUTH THREE TIMES A DAY             gemfibrozil (LOPID)  600 MG tablet  TAKE ONE TABLET WITH SUPPER             HYDROcodone-acetaminophen (NORCO)  MG per tablet  Take  by mouth. Take 1 tablet every 8 hours as needed for pain.             ipratropium-albuterol (COMBIVENT RESPIMAT)  MCG/ACT inhaler  Inhale 1 puff 4 (Four) Times a Day As Needed for Wheezing.             ketotifen (ZADITOR) 0.025 % ophthalmic solution  USE 1-2 DROPS IN BOTH EYES TWICE DAILY             LORazepam (ATIVAN) 0.5 MG tablet  Take 0.5 mg by mouth Daily As Needed. 1-2 TABLETS DAILY PRN             losartan (COZAAR) 25 MG tablet  Take 25 mg by mouth Daily.             melatonin 10 MG tablet  Take 1 tablet by mouth At Night As Needed for Sleep.             melatonin 5 MG tablet tablet  Take 10 mg by mouth Every Night. Patient takes 10 mg at night             metoprolol succinate XL (TOPROL-XL) 50 MG 24 hr tablet  Take 1 tablet by mouth Daily.             Mirabegron ER (MYRBETRIQ) 50 MG tablet sustained-release 24 hour  Take 50 mg by mouth Daily.             mirtazapine (REMERON) 30 MG tablet  Take 30 mg by mouth Every Night.             Misc. Devices (ROLLATOR) misc  1 Units As Needed (ambulation assistance).             montelukast (SINGULAIR) 10 MG tablet  TAKE ONE TABLET BY MOUTH AT BEDTIME             Multiple Vitamins-Minerals (MULTIVITAMIN WITH MINERALS) tablet tablet  Take 1 tablet by mouth Daily.             nicotine (NICODERM CQ) 7 MG/24HR patch  Place 1 patch on the skin Daily. DONOT smoke with patch on.             nystatin (MYCOSTATIN) 731649 UNIT/ML suspension  Swish and swallow 5 mL 4 (Four) Times a Day.             ondansetron ODT (ZOFRAN-ODT) 4 MG disintegrating tablet  Take 1 tablet by mouth every 6 (six) hours as needed.             OXYGEN-HELIUM IN  Oxygen; Patient Sig: Oxygen 2 liter as needed; 0; 21-May-2014; Active             potassium bicarbonate (K-LYTE) 25 MEQ disintegrating tablet  Take 25 mEq by mouth Every Night.             predniSONE (DELTASONE) 10 MG  tablet  5 po daily x 2d; then 4 po daily x 2d; then 3 po daily x 2d; then 2 po daily x 2d; then 1 po daily x 2d; then STOP             Probiotic Product (RISAQUAD-2) capsule capsule  Take 1 capsule by mouth Daily.             Respiratory Therapy Supplies (FLUTTER) device  1 Device as needed (as directed).             traZODone (DESYREL) 50 MG tablet  Take 1 tablet by mouth every night.             Umeclidinium Bromide (INCRUSE ELLIPTA) 62.5 MCG/INH aerosol powder   Inhale 1 puff Daily.             urea (CARMOL) 20 % cream  Apply 1 application topically 2 (Two) Times a Day.             venlafaxine (EFFEXOR) 75 MG tablet  Take 1 tablet by mouth 2 (two) times a day.                 Discharge Diet:     Diet Instructions     Diet: Regular, Cardiac; Thin       Discharge Diet:   Regular  Cardiac      Fluid Consistency:  Thin                 Activity at Discharge:     Activity Instructions     Activity as Tolerated                     Follow-up Appointments:    Additional Instructions for the Follow-ups that You Need to Schedule     Discharge Follow-up with PCP    As directed    Follow Up Details:  within 1 week for post-hospitalization follow up       Referral to Home Health    As directed    Pt established with [facility]   Face to Face Visit Date:  10/18/2017   Follow-up Provider for Plan of Care?:  I treated the patient in an acute care facility and will not continue treatment after discharge.   Follow-up Provider:  HARSHA REYES   Reason/Clinical Findings:  Impaired mobility and ADLs   Describe mobility limitations that make leaving home difficult:  impaired mobility and ADLs/Chronic respiratory failure with hypoxia   Nursing/Therapeutic Services Requested:   Physical Therapy  Occupational Therapy      PT orders:   Strengthening  Home safety assessment  Therapeutic exercise      Occupational orders:   Activities of daily living  Strengthening  Home safety assessment      Frequency:  Other Comment - twice weekly              Follow-up Information     Follow up with DONTRELL Regan. Call today.    Specialty:  Family Medicine    Contact information:    [address]  [address]  [facility] [address]  [phone]          Follow up with DONTRELL Regan .    Specialty:  Family Medicine    Why:  within 1 week for post-hospitalization follow up    Contact information:    [address]  [address]  [facility] [address]  [phone]            Additional Instructions for the Follow-ups that You Need to Schedule     Discharge Follow-up with PCP    As directed    Follow Up Details:  within 1 week for post-hospitalization follow up       Referral to Home Health    As directed    Pt established with [facility]   Face to Face Visit Date:  10/18/2017   Follow-up Provider for Plan of Care?:  I treated the patient in an acute care facility and will not continue treatment after discharge.   Follow-up Provider:  HARSHA REYES   Reason/Clinical Findings:  Impaired mobility and ADLs   Describe mobility limitations that make leaving home difficult:  impaired mobility and ADLs/Chronic respiratory failure with hypoxia   Nursing/Therapeutic Services Requested:   Physical Therapy  Occupational Therapy      PT orders:   Strengthening  Home safety assessment  Therapeutic exercise      Occupational orders:   Activities of daily living  Strengthening  Home safety assessment      Frequency:  Other Comment - twice weekly                 Test Results Pending at Discharge:     Order Current Status    Blood Culture With YONAS - Blood, Preliminary result    Blood Culture With YONAS - Blood, Preliminary result    Respiratory Culture - Sputum, Cough Preliminary result             Erlin Pool DO  10/18/17  11:24 AM    Time: Discharge 35 min

## 2017-10-18 NOTE — PROGRESS NOTES
Discharge Planning Assessment   [facility]     Patient Name: Joelle Yee  MRN: [id number]  Today's Date: 10/18/2017    Admit Date: 10/16/2017          Discharge Needs Assessment     None            Discharge Plan       10/18/17 1146    Case Management/Social Work Plan    Plan Received order for HH and rollator.  Call to [facility] and advised of discharge today and that patient has an order for rollator.  Bridgette advises patient is currently on the waiver program and they will arrange the rollator. Faxed DC summary, and orders to [phone].  Patient updated.     Patient/Family In Agreement With Plan yes        Discharge Placement     No information found        Expected Discharge Date and Time     Expected Discharge Date Expected Discharge Time    Oct 18, 2017               Demographic Summary     None            Functional Status     None            Psychosocial     None            Abuse/Neglect     None            Legal     None            Substance Abuse     None            Patient Forms     None          Antonietta Salcedo

## 2017-10-20 LAB
BACTERIA SPEC RESP CULT: ABNORMAL
BACTERIA SPEC RESP CULT: ABNORMAL
GRAM STN SPEC: ABNORMAL

## 2017-10-20 NOTE — PROGRESS NOTES
Continued Stay Note  [facility]     Patient Name: Joelle Yee  MRN: [id number]  Today's Date: 10/20/2017    Admit Date: 10/16/2017          Discharge Plan       10/20/17 6351    Case Management/Social Work Plan    Additional Comments Received VM from Milli Mendez with [facility]. They are ready to start services, but need to have Face to Face form and HH order signed.  Forms completed and Dr. Pool signed.  Forms were faxed to [facility].  Received notification that pt called and was unable to get Rollator from [place].  Called [facility], spoke with Milli.  Milli informed that pt was trying to get rollator from [employer].  She states that pt is a part of waiver program and PT will assist pt in getting this equipment.  She requests that script be signed and faxed.  Dr. Pool signed script for rollatory and it was faxed to [facility].              Discharge Codes     None        Expected Discharge Date and Time     Expected Discharge Date Expected Discharge Time    Oct 18, 2017             Sydney Chino

## 2017-10-21 LAB
BACTERIA SPEC AEROBE CULT: NORMAL
BACTERIA SPEC AEROBE CULT: NORMAL

## 2017-12-29 ENCOUNTER — APPOINTMENT (OUTPATIENT)
Dept: GENERAL RADIOLOGY | Facility: HOSPITAL | Age: 72
End: 2017-12-29

## 2017-12-29 ENCOUNTER — HOSPITAL ENCOUNTER (EMERGENCY)
Facility: HOSPITAL | Age: 72
Discharge: HOME | End: 2017-12-29
Attending: EMERGENCY MEDICINE | Admitting: EMERGENCY MEDICINE

## 2017-12-29 VITALS
TEMPERATURE: 98 F | RESPIRATION RATE: 18 BRPM | HEIGHT: 60 IN | HEART RATE: 93 BPM | DIASTOLIC BLOOD PRESSURE: 59 MMHG | SYSTOLIC BLOOD PRESSURE: 116 MMHG | BODY MASS INDEX: 14.72 KG/M2 | OXYGEN SATURATION: 94 % | WEIGHT: 75 LBS

## 2017-12-29 DIAGNOSIS — J18.9 PNEUMONIA OF LEFT LOWER LOBE DUE TO INFECTIOUS ORGANISM: Primary | ICD-10-CM

## 2017-12-29 LAB
ALBUMIN SERPL-MCNC: 4.1 G/DL (ref 3.5–5)
ALBUMIN/GLOB SERPL: 1.5 G/DL (ref 1–2)
ALP SERPL-CCNC: 106 U/L (ref 38–126)
ALT SERPL W P-5'-P-CCNC: 31 U/L (ref 13–69)
ANION GAP SERPL CALCULATED.3IONS-SCNC: 9.3 MMOL/L
AST SERPL-CCNC: 28 U/L (ref 15–46)
BASOPHILS # BLD AUTO: 0.1 10*3/MM3 (ref 0–0.2)
BASOPHILS NFR BLD AUTO: 0.7 % (ref 0–2.5)
BILIRUB SERPL-MCNC: 0.3 MG/DL (ref 0.2–1.3)
BILIRUB UR QL STRIP: NEGATIVE
BUN BLD-MCNC: 14 MG/DL (ref 7–20)
BUN/CREAT SERPL: 20 (ref 7.1–23.5)
CALCIUM SPEC-SCNC: 9.2 MG/DL (ref 8.4–10.2)
CHLORIDE SERPL-SCNC: 103 MMOL/L (ref 98–107)
CLARITY UR: CLEAR
CO2 SERPL-SCNC: 31 MMOL/L (ref 26–30)
COLOR UR: YELLOW
CREAT BLD-MCNC: 0.7 MG/DL (ref 0.6–1.3)
DEPRECATED RDW RBC AUTO: 60.4 FL (ref 37–54)
EOSINOPHIL # BLD AUTO: 0.16 10*3/MM3 (ref 0–0.7)
EOSINOPHIL NFR BLD AUTO: 1.1 % (ref 0–7)
ERYTHROCYTE [DISTWIDTH] IN BLOOD BY AUTOMATED COUNT: 17 % (ref 11.5–14.5)
GFR SERPL CREATININE-BSD FRML MDRD: 82 ML/MIN/1.73
GLOBULIN UR ELPH-MCNC: 2.7 GM/DL
GLUCOSE BLD-MCNC: 95 MG/DL (ref 74–98)
GLUCOSE UR STRIP-MCNC: NEGATIVE MG/DL
HCT VFR BLD AUTO: 38.3 % (ref 37–47)
HGB BLD-MCNC: 12.3 G/DL (ref 12–16)
HGB UR QL STRIP.AUTO: NEGATIVE
HOLD SPECIMEN: NORMAL
HOLD SPECIMEN: NORMAL
IMM GRANULOCYTES # BLD: 0.18 10*3/MM3 (ref 0–0.06)
IMM GRANULOCYTES NFR BLD: 1.3 % (ref 0–0.6)
KETONES UR QL STRIP: NEGATIVE
LEUKOCYTE ESTERASE UR QL STRIP.AUTO: NEGATIVE
LYMPHOCYTES # BLD AUTO: 3.73 10*3/MM3 (ref 0.6–3.4)
LYMPHOCYTES NFR BLD AUTO: 26.8 % (ref 10–50)
MCH RBC QN AUTO: 31 PG (ref 27–31)
MCHC RBC AUTO-ENTMCNC: 32.1 G/DL (ref 30–37)
MCV RBC AUTO: 96.5 FL (ref 81–99)
MONOCYTES # BLD AUTO: 1.6 10*3/MM3 (ref 0–0.9)
MONOCYTES NFR BLD AUTO: 11.5 % (ref 0–12)
NEUTROPHILS # BLD AUTO: 8.16 10*3/MM3 (ref 2–6.9)
NEUTROPHILS NFR BLD AUTO: 58.6 % (ref 37–80)
NITRITE UR QL STRIP: NEGATIVE
NRBC BLD MANUAL-RTO: 0 /100 WBC (ref 0–0)
NT-PROBNP SERPL-MCNC: 89 PG/ML (ref 0–125)
PH UR STRIP.AUTO: 5.5 [PH] (ref 5–8)
PLATELET # BLD AUTO: 288 10*3/MM3 (ref 130–400)
PMV BLD AUTO: 9.6 FL (ref 6–12)
POTASSIUM BLD-SCNC: 4.3 MMOL/L (ref 3.5–5.1)
PROT SERPL-MCNC: 6.8 G/DL (ref 6.3–8.2)
PROT UR QL STRIP: NEGATIVE
RBC # BLD AUTO: 3.97 10*6/MM3 (ref 4.2–5.4)
SODIUM BLD-SCNC: 139 MMOL/L (ref 137–145)
SP GR UR STRIP: 1.02 (ref 1–1.03)
TROPONIN I SERPL-MCNC: <0.012 NG/ML (ref 0–0.03)
UROBILINOGEN UR QL STRIP: NORMAL
WBC NRBC COR # BLD: 13.93 10*3/MM3 (ref 4.8–10.8)
WHOLE BLOOD HOLD SPECIMEN: NORMAL
WHOLE BLOOD HOLD SPECIMEN: NORMAL

## 2017-12-29 PROCEDURE — 81003 URINALYSIS AUTO W/O SCOPE: CPT

## 2017-12-29 PROCEDURE — 25010000002 CEFTRIAXONE IN SWFI 1 GRAM/10ML IV PUSH SYRINGE (SIMPLE): Performed by: EMERGENCY MEDICINE

## 2017-12-29 PROCEDURE — 84484 ASSAY OF TROPONIN QUANT: CPT

## 2017-12-29 PROCEDURE — 85025 COMPLETE CBC W/AUTO DIFF WBC: CPT

## 2017-12-29 PROCEDURE — 94640 AIRWAY INHALATION TREATMENT: CPT

## 2017-12-29 PROCEDURE — 93005 ELECTROCARDIOGRAM TRACING: CPT

## 2017-12-29 PROCEDURE — 99284 EMERGENCY DEPT VISIT MOD MDM: CPT

## 2017-12-29 PROCEDURE — 80053 COMPREHEN METABOLIC PANEL: CPT

## 2017-12-29 PROCEDURE — 94799 UNLISTED PULMONARY SVC/PX: CPT

## 2017-12-29 PROCEDURE — 25010000002 METHYLPREDNISOLONE PER 125 MG: Performed by: EMERGENCY MEDICINE

## 2017-12-29 PROCEDURE — 96374 THER/PROPH/DIAG INJ IV PUSH: CPT

## 2017-12-29 PROCEDURE — 96375 TX/PRO/DX INJ NEW DRUG ADDON: CPT

## 2017-12-29 PROCEDURE — 83880 ASSAY OF NATRIURETIC PEPTIDE: CPT

## 2017-12-29 PROCEDURE — 71020 HC CHEST PA AND LATERAL: CPT

## 2017-12-29 RX ORDER — LEVOFLOXACIN 500 MG/1
500 TABLET, FILM COATED ORAL ONCE
Qty: 7 TABLET | Refills: 0 | Status: SHIPPED | OUTPATIENT
Start: 2017-12-29 | End: 2017-12-29

## 2017-12-29 RX ORDER — IPRATROPIUM BROMIDE AND ALBUTEROL SULFATE 2.5; .5 MG/3ML; MG/3ML
3 SOLUTION RESPIRATORY (INHALATION) ONCE
Status: COMPLETED | OUTPATIENT
Start: 2017-12-29 | End: 2017-12-29

## 2017-12-29 RX ORDER — METHYLPREDNISOLONE SODIUM SUCCINATE 125 MG/2ML
125 INJECTION, POWDER, LYOPHILIZED, FOR SOLUTION INTRAMUSCULAR; INTRAVENOUS ONCE
Status: COMPLETED | OUTPATIENT
Start: 2017-12-29 | End: 2017-12-29

## 2017-12-29 RX ORDER — SODIUM CHLORIDE 0.9 % (FLUSH) 0.9 %
10 SYRINGE (ML) INJECTION AS NEEDED
Status: DISCONTINUED | OUTPATIENT
Start: 2017-12-29 | End: 2017-12-29 | Stop reason: HOSPADM

## 2017-12-29 RX ADMIN — CEFTRIAXONE SODIUM 1 G: 1 INJECTION, POWDER, FOR SOLUTION INTRAMUSCULAR; INTRAVENOUS at 10:53

## 2017-12-29 RX ADMIN — METHYLPREDNISOLONE SODIUM SUCCINATE 125 MG: 125 INJECTION, POWDER, FOR SOLUTION INTRAMUSCULAR; INTRAVENOUS at 09:50

## 2017-12-29 RX ADMIN — IPRATROPIUM BROMIDE AND ALBUTEROL SULFATE 3 ML: .5; 3 SOLUTION RESPIRATORY (INHALATION) at 09:51

## 2018-02-28 ENCOUNTER — OFFICE VISIT (OUTPATIENT)
Dept: GASTROENTEROLOGY | Facility: CLINIC | Age: 73
End: 2018-02-28

## 2018-02-28 VITALS
WEIGHT: 84 LBS | DIASTOLIC BLOOD PRESSURE: 67 MMHG | HEART RATE: 86 BPM | HEIGHT: 60 IN | TEMPERATURE: 97.5 F | BODY MASS INDEX: 16.49 KG/M2 | SYSTOLIC BLOOD PRESSURE: 127 MMHG | RESPIRATION RATE: 20 BRPM

## 2018-02-28 DIAGNOSIS — R12 HEARTBURN: ICD-10-CM

## 2018-02-28 DIAGNOSIS — R63.4 WEIGHT LOSS: ICD-10-CM

## 2018-02-28 DIAGNOSIS — R11.0 NAUSEA: Primary | ICD-10-CM

## 2018-02-28 DIAGNOSIS — R13.10 DYSPHAGIA, UNSPECIFIED TYPE: ICD-10-CM

## 2018-02-28 DIAGNOSIS — R19.4 CHANGE IN BOWEL HABITS: ICD-10-CM

## 2018-02-28 DIAGNOSIS — R19.7 DIARRHEA, UNSPECIFIED TYPE: ICD-10-CM

## 2018-02-28 PROCEDURE — 99204 OFFICE O/P NEW MOD 45 MIN: CPT | Performed by: INTERNAL MEDICINE

## 2018-02-28 RX ORDER — AMMONIUM LACTATE 120 MG/G
CREAM TOPICAL AS NEEDED
Status: ON HOLD | COMMUNITY
End: 2018-07-10

## 2018-02-28 RX ORDER — ESTRADIOL 0.1 MG/G
2 CREAM VAGINAL EVERY OTHER DAY
Status: ON HOLD | COMMUNITY
End: 2018-07-10

## 2018-02-28 RX ORDER — OMEPRAZOLE 40 MG/1
40 CAPSULE, DELAYED RELEASE ORAL DAILY
COMMUNITY
Start: 2018-02-23 | End: 2020-07-01

## 2018-02-28 RX ORDER — MEGESTROL ACETATE 40 MG/ML
SUSPENSION ORAL
COMMUNITY
Start: 2017-12-19 | End: 2018-07-05 | Stop reason: HOSPADM

## 2018-02-28 RX ORDER — METOPROLOL SUCCINATE 25 MG/1
25 TABLET, EXTENDED RELEASE ORAL DAILY
COMMUNITY
Start: 2018-02-14 | End: 2018-12-06 | Stop reason: HOSPADM

## 2018-02-28 NOTE — PATIENT INSTRUCTIONS
1. Antireflux measures.  2. Continue to support for smoking cessation. The patient quit smoking about 4 weeks ago.  3. Dietary instructions.  The patient should eat relatively soft diet.  She should eat in upright position and chew well.  The patient should drink water after to 3 bites and take medications with liberal amounts of water.  Furthermore after eating, and taking medications the patient should remain in upright position for 10-15 minutes.   4. Care should be exercised especially when taking medications that have a potential to cause pill esophagitis including potassium and tetracycline and bisphosphonates, iron tablets as well as NSAIDs. These are best avoided or be taken in an alternative form. Should there be a necessity to use the formulation, the patient should take these medications with liberal amounts of water and remain in upright position for about 15-20 minutes after taking the medication.  5. Upper endoscopy (EGD) counseling:  Description of the procedure, risks, benefits, alternatives and options including nonoperative options were discussed with the patient in detail.  The patient understands and wishes to wait.  6. Colonoscopy: Description of the procedure, risks benefits alternatives and options including non-operative options were discussed with the patient in detail.  The patient understands and wishes to wait.  7. Obtain medical records regarding recent laboratory test results including lipase.  8. Follow-up:  The patient will call back.

## 2018-02-28 NOTE — PROGRESS NOTES
Chief Complaint   Patient presents with   • Nausea       History of Present Illness     The patient has history of recurrent nausea for the last 4 weeks.  The nausea is described as mild, frequency being a couple of times a week.  Nauseous feeling may last for several minutes.  Eating worsens the symptom however no definite relieving factors of nausea.  There is associated vomiting.Of note the patient has been taking Chantix for the last 4 weeks to stop smoking. Since then she has been feeling nauseated whenever she takes Chantix.    There is history of irregular bowel habits described as diarrhea and at times constipation off and on for the last 6 months. This is described as change in bowel habits.  She is more prone to have diarrhea.  Severity is moderate, frequency of bowel movements being 3-5 times a day.  The stools are described as loose and at times watery.  Occasionally, there is a nocturnal element of diarrhea. The diarrhea is associated with tenesmus at times. Episodes of diarrhea are followed by episodes of constipation that may last for 2-3 days. Occasionally the patient uses a laxative. She denies taking antidiarrheal medications. There is history of diarrhea in the past as well.    The patient has a history of reflux off and on for the last several years.  The reflux is moderately severe.  Symptoms are described as retrosternal burning sensation, and indigestion.  There is history of occasional regurgitative symptoms.  Frequency being several times per week.  The symptoms are worse at night.  The patient takes acid suppressive therapy with reasonable control of his symptoms.    The patient has difficulty swallowing off and for the last 3-4 years or perhaps more . The symptom is mild to moderate in severity, occurs occasionally perhaps twice a week and is mostly associated with solid foods especially hamburgers and meat.  The symptoms are not progressive.  The patient points towards the lower  substernal area.      There is history of unintentional progressive weight loss of about 50 pounds over the last 3 years. Upon review of records the patient was noted to have lost 6 pounds over 2 months since October 2017. The patient weighed 84 pounds in December 2017. Over the last 2 months the patient has gained about 6 pounds and currently weighing 90 pounds. She had been as low as 75 pounds in December 2017. The patient claims that her appetite is good. She denies abdominal pain. There is no upper abdominal fullness.  There is no history of overt GI bleed (Hematemesis, melena or hematochezia). There is no history of anemia. She denies liver or pancreatic disease. There is history of bipolar disorder. However,      Review of Systems   Constitutional: Positive for fatigue. Negative for appetite change, chills, fever and unexpected weight change.   HENT: Positive for trouble swallowing. Negative for mouth sores and nosebleeds.    Eyes: Negative for discharge and redness.   Respiratory: Positive for apnea, cough and shortness of breath.    Cardiovascular: Positive for chest pain. Negative for palpitations and leg swelling.   Gastrointestinal: Positive for constipation, diarrhea and nausea. Negative for abdominal distention, abdominal pain, anal bleeding, blood in stool and vomiting.   Endocrine: Negative for cold intolerance, heat intolerance and polydipsia.   Genitourinary: Negative for dysuria, hematuria and urgency.   Musculoskeletal: Positive for arthralgias and back pain. Negative for joint swelling and myalgias.   Skin: Negative for rash.   Allergic/Immunologic: Negative for food allergies and immunocompromised state.   Neurological: Positive for light-headedness. Negative for dizziness, seizures, syncope and headaches.   Hematological: Negative for adenopathy. Bruises/bleeds easily.   Psychiatric/Behavioral: Negative for dysphoric mood. The patient is not nervous/anxious and is not hyperactive.      Patient  Active Problem List   Diagnosis   • Dyspepsia   • Esophageal reflux   • Anxiety   • High cholesterol   • Bipolar disorder   • COPD (chronic obstructive pulmonary disease)   • Depression   • Gout   • Hyperlipidemia   • Insomnia   • Osteoarthritis   • Sciatica   • Sleep apnea   • Vitamin B 12 deficiency   • Pancolitis   • Acute cystitis without hematuria   • C. difficile colitis   • Chronic bronchitis with COPD (chronic obstructive pulmonary disease)   • Pneumonia of right lower lobe due to infectious organism   • COPD exacerbation   • Pneumonia involving right lung   • COPD with acute exacerbation   • Chronic respiratory failure with hypoxia   • Abdominal pain   • Diarrhea   • Heartburn   • Loss of appetite   • Melena   • Nausea and vomiting   • Pseudogout   • Upset stomach   • Abnormal weight loss   • Chronic obstructive pulmonary disease with acute lower respiratory infection   • Right upper lobe pneumonia   • Acute on chronic respiratory failure with hypoxia     Past Medical History:   Diagnosis Date   • Abdominal pain    • Acute bronchitis    • Ankle pain    • Anxiety 1980   • Atopic dermatitis    • Backache    • Bipolar disorder    • Bronchiectasis    • Chronic obstructive lung disease 2008   • Colon cancer screening    • COPD (chronic obstructive pulmonary disease)    • Cystocele    • Depressed    • Depression 1980   • Fatigue     Chronic pafigue 2012   • Fibromyalgia 2008   • GERD (gastroesophageal reflux disease)    • Gout    • Heartburn     Chronic historu of epigastric heartburn currently controlled on Prilesec 40 mg every morning along with Zantac 300 Mg daily at bedtime   • High cholesterol 2012   • Hip pain    • Hyperlipidemia    • Hypertension    • Insomnia    • Joint pain    • Kidney infection    • Loss of appetite    • Low back pain 1995   • Melena    • Nausea and vomiting    • On home oxygen therapy    • Osteoarthritis 2010   • Pain in limb    • Palpitations    • Pinched nerve     Pinched nerves 2011    • Recurrent urinary tract infection    • Sciatica [id number]   • Shortness of breath    • Sinus problem    • Sleep apnea 1998   • Upset stomach    • Valvular heart disease    • Vitamin B12 deficiency    • Weight loss, abnormal     Weight loss is stabel. On pund weight gain since 01/2016     Past Surgical History:   Procedure Laterality Date   • ABDOMINAL HERNIA REPAIR  2016   • APPENDECTOMY  1965   • BACK SURGERY  2005   • CATARACT EXTRACTION Bilateral 1978   • CHOLECYSTECTOMY  1985   • EYE SURGERY      Put in implants    • GALLBLADDER SURGERY  1985   • KNEE SURGERY Left 1979    Knee Cap   • TUBAL ABDOMINAL LIGATION  1971     Family History   Problem Relation Age of Onset   • Ovarian cancer Mother    • Cancer Mother    • Lung cancer Father    • Heart disease Brother      Social History   Substance Use Topics   • Smoking status: Current Every Day Smoker     Packs/day: 0.50     Years: 35.00     Types: Cigarettes     Last attempt to quit: 3/5/2017   • Smokeless tobacco: Never Used      Comment: ON CHANTIX   • Alcohol use No       Current Outpatient Prescriptions:   •  albuterol (PROVENTIL) (2.5 MG/3ML) 0.083% nebulizer solution, Take 2.5 mg by nebulization 4 (Four) Times a Day., Disp: , Rfl:   •  ammonium lactate (AMLACTIN) 12 % cream, Apply  topically As Needed for Dry Skin., Disp: , Rfl:   •  azelastine (ASTELIN) 0.1 % nasal spray, 2 sprays into each nostril 2 (Two) Times a Day. Use in each nostril as directed, Disp: 1 each, Rfl: 5  •  budesonide-formoterol (SYMBICORT) 160-4.5 MCG/ACT inhaler, Inhale 2 puffs 2 (Two) Times a Day. Inhale 1 puff twice daily. Rinse mouth after use., Disp: 1 inhaler, Rfl: 5  •  cyclobenzaprine (FLEXERIL) 10 MG tablet, Take 1 tablet by mouth daily., Disp: , Rfl:   •  Diclofenac Sodium (VOLTAREN TD), Place  on the skin As Needed. Voltaren GEL , Disp: , Rfl:   •  estradiol (ESTRACE VAGINAL) 0.1 MG/GM vaginal cream, Insert 2 g into the vagina Every Other Day., Disp: , Rfl:   •  gabapentin  (NEURONTIN) 100 MG capsule, TAKE ONE CAPSULE BY MOUTH THREE TIMES A DAY, Disp: , Rfl: 0  •  gemfibrozil (LOPID) 600 MG tablet, TAKE ONE TABLET WITH SUPPER, Disp: , Rfl: 3  •  HYDROcodone-acetaminophen (NORCO)  MG per tablet, Take  by mouth. Take 1 tablet every 8 hours as needed for pain., Disp: , Rfl:   •  ipratropium-albuterol (COMBIVENT RESPIMAT)  MCG/ACT inhaler, Inhale 1 puff 4 (Four) Times a Day As Needed for Wheezing., Disp: 4 g, Rfl: 5  •  ketotifen (ZADITOR) 0.025 % ophthalmic solution, USE 1-2 DROPS IN BOTH EYES TWICE DAILY, Disp: , Rfl: 0  •  LORazepam (ATIVAN) 0.5 MG tablet, Take 0.5 mg by mouth Daily As Needed. 1-2 TABLETS DAILY PRN, Disp: , Rfl: 0  •  megestrol (MEGACE) 40 MG/ML suspension, , Disp: , Rfl:   •  melatonin 5 MG tablet tablet, Take 2 tablets by mouth At Night As Needed for sleep, Disp: 60 tablet, Rfl: 0  •  metoprolol succinate XL (TOPROL-XL) 25 MG 24 hr tablet, , Disp: , Rfl:   •  Mirabegron ER (MYRBETRIQ) 50 MG tablet sustained-release 24 hour, Take 50 mg by mouth Daily., Disp: , Rfl:   •  mirtazapine (REMERON) 30 MG tablet, Take 30 mg by mouth Every Night., Disp: , Rfl:   •  Misc. Devices (ROLLATOR) misc, 1 Units As Needed (ambulation assistance)., Disp: 1 each, Rfl: 0  •  montelukast (SINGULAIR) 10 MG tablet, TAKE ONE TABLET BY MOUTH AT BEDTIME, Disp: , Rfl: 3  •  Nutritional Supplements (ENSURE COMPLETE PO), Take  by mouth., Disp: , Rfl:   •  nystatin (MYCOSTATIN) 369088 UNIT/ML suspension, Swish and swallow 5 mL 4 (Four) Times a Day., Disp: 240 mL, Rfl: 0  •  omeprazole (priLOSEC) 40 MG capsule, , Disp: , Rfl:   •  ondansetron ODT (ZOFRAN-ODT) 4 MG disintegrating tablet, Take 1 tablet by mouth every 6 (six) hours as needed., Disp: , Rfl:   •  OXYGEN-HELIUM IN, Oxygen; Patient Sig: Oxygen 2 liter as needed; 0; 21-May-2014; Active, Disp: , Rfl:   •  potassium bicarbonate (K-LYTE) 25 MEQ disintegrating tablet, Take 25 mEq by mouth Every Night., Disp: , Rfl:   •  Probiotic  Product (RISAQUAD-2) capsule capsule, Take 1 capsule by mouth Daily., Disp: , Rfl:   •  Respiratory Therapy Supplies (FLUTTER) device, 1 Device as needed (as directed)., Disp: 1 each, Rfl: 0  •  traZODone (DESYREL) 50 MG tablet, Take 1 tablet by mouth every night., Disp: , Rfl:   •  Umeclidinium Bromide (INCRUSE ELLIPTA) 62.5 MCG/INH aerosol powder , Inhale 1 puff Daily., Disp: 30 each, Rfl: 5  •  Varenicline Tartrate (CHANTIX PO), Take  by mouth., Disp: , Rfl:   •  venlafaxine (EFFEXOR) 75 MG tablet, Take 1 tablet by mouth 2 (two) times a day., Disp: , Rfl:   •  acetaminophen (TYLENOL) 325 MG tablet, Take 2 tablets by mouth Every 4 (Four) Hours As Needed for Headache or Fever (temperature greater than 101.5 F)., Disp: , Rfl:   •  azithromycin (ZITHROMAX) 250 MG tablet, 1 tablet by mouth daily for 4 days, Disp: 4 tablet, Rfl: 0  •  benzonatate (TESSALON) 100 MG capsule, Take 1 capsule by mouth 3 (Three) Times a Day As Needed for cough., Disp: 50 capsule, Rfl: 0  •  calcium acetate (PHOS BINDER,) 667 MG capsule capsule, Take 2 capsules by mouth 3 (Three) Times a Day With Meals., Disp: 180 capsule, Rfl:   •  cetirizine (zyrTEC) 10 MG tablet, Take 10 mg by mouth Daily., Disp: , Rfl:   •  conjugated estrogens (PREMARIN) vaginal cream, Insert 0.5 g into the vagina Every Other Day., Disp: , Rfl:   •  Cyanocobalamin (VITAMIN B12 PO), Take 500 mcg by mouth Daily., Disp: , Rfl:   •  doxycycline (MONODOX) 100 MG capsule, Take 1 capsule by mouth 2 (Two) Times a Day., Disp: 14 capsule, Rfl: 0  •  losartan (COZAAR) 25 MG tablet, Take 25 mg by mouth Daily., Disp: , Rfl:   •  melatonin 5 MG tablet tablet, Take 10 mg by mouth Every Night. Patient takes 10 mg at night, Disp: , Rfl:   •  Multiple Vitamins-Minerals (MULTIVITAMIN WITH MINERALS) tablet tablet, Take 1 tablet by mouth Daily., Disp: , Rfl:   •  nicotine (NICODERM CQ) 7 MG/24HR patch, Place 1 patch on the skin Daily. DONOT smoke with patch on., Disp: 30 patch, Rfl: 3  •   "predniSONE (DELTASONE) 10 MG tablet, Take 5 tablets by mouth daily for 2 days, then 4 for 2 days, then 3 for 2 days, then 2 for 2 days, then 1 for 2 days, then STOP, Disp: 30 tablet, Rfl: 0  •  urea (CARMOL) 20 % cream, Apply 1 application topically 2 (Two) Times a Day., Disp: , Rfl:     Current Facility-Administered Medications:   •  urea (CARMOL) 40 % cream, , Topical, BID, Vlad Moody, RENAY    Allergies   Allergen Reactions   • Dust Mite Extract Other (See Comments)     Dust  SINUS   • Grass Other (See Comments)     SINUS   • Mold Extract [Trichophyton] Other (See Comments)     SINUS       Blood pressure 127/67, pulse 86, temperature 97.5 °F (36.4 °C), resp. rate 20, height 152.4 cm (60\"), weight 38.1 kg (84 lb).    Physical Exam   Constitutional: She is oriented to person, place, and time. She appears well-developed and well-nourished. No distress.   HENT:   Head: Normocephalic and atraumatic.   Right Ear: Hearing and external ear normal.   Left Ear: Hearing and external ear normal.   Nose: Nose normal.   Mouth/Throat: Oropharynx is clear and moist and mucous membranes are normal. Mucous membranes are not pale, not dry and not cyanotic. No oral lesions. No oropharyngeal exudate.   Eyes: Conjunctivae and EOM are normal. Right eye exhibits no discharge. Left eye exhibits no discharge. No scleral icterus.   Neck: Trachea normal. Neck supple. No JVD present. No edema present. No thyroid mass and no thyromegaly present.   Cardiovascular: Normal rate, regular rhythm, S2 normal and normal heart sounds.  Exam reveals no gallop, no S3 and no friction rub.    No murmur heard.  Pulmonary/Chest: Effort normal and breath sounds normal. No respiratory distress. She has no wheezes. She has no rales. She exhibits no tenderness.   Abdominal: Soft. Normal appearance and bowel sounds are normal. She exhibits no distension, no ascites and no mass. There is no splenomegaly or hepatomegaly. There is no tenderness. There is no " rigidity, no rebound and no guarding. No hernia.   Musculoskeletal: She exhibits no tenderness or deformity.       Vascular Status -  Her exam exhibits no right foot edema. Her exam exhibits no left foot edema.  Lymphadenopathy:     She has no cervical adenopathy.        Left: No supraclavicular adenopathy present.   Neurological: She is alert and oriented to person, place, and time. She has normal strength. No cranial nerve deficit or sensory deficit. She exhibits normal muscle tone. Coordination normal.   Skin: No rash noted. She is not diaphoretic. No cyanosis. No pallor. Nails show no clubbing.   Psychiatric: She has a normal mood and affect. Her behavior is normal. Judgment and thought content normal.   Nursing note and vitals reviewed.    Stigmata of chronic liver disease:  None.  Asterixis:  None.      Laboratory Testing:  Upon review of medical records:      Dated December 29, 2017 sodium 139 potassium 4.3 chloride 103 CO2 31 BUN 14 serum creatinine 0.70 glucose 95.  Calcium 9.2.  Total protein 6.8.  Albumin 4.10.  T bili 0.3 AST 28 ALT 31 alkaline phosphatase 106.  WBC 13.93 hemoglobin 12.3 hematocrit 38.3 MCV 96.5 and platelet count 288.    Abdominal Imaging:  Upon review of medical records:     Dated February 13, 2017 the patient underwent a CT of the abdomen and pelvis with contrast which revealed: Airspace disease in the right lower lobe with material in the right lower lobe bronchus which either reflects a pneumonia or aspiration.  Heart is normal in size.  Liver is normal.  Spleen is unremarkable.  Cystic lesion in the splenic hilum which is unchanged compared to chest CT from September 2011 which is most certainly benign.  No adrenal masses present.  Pancreas is normal.  Kidneys enhance appropriately.  There is a hypodense lesion in the superior pole of the left kidney consistent with renal cyst.  Aorta is normal in caliber.  No free fluid or adenopathy.  Abdominal portions of the GI tract are  unremarkable with no evidence of obstruction.  The appendix is not identified.  Diffuse small thickening involving the entirety of the colon likely infectious or inflammatory in nature.  Urinary bladder is unremarkable.  No significant free fluid or adenopathy.    Procedures:  Upon review of medical records:    Dated July 8, 2014 the patient underwent an upper endoscopy which revealed: Sliding hiatal hernia less than 3 cm, erythematous gastritis with evidence of healed ulceration, and duodenal bulb polyp.  No Park mucosa was seen.  No scalloping was noted within the second portion of the duodenum.  Good distensibility of the stomach was achieved.  No giant folds were noted.  Second portion of duodenum, biopsy revealed no significant pathologic findings.  Negative celiac sprue.  Antrum and body, biopsy revealed mild chronic gastritis.  Negative for H. pylori.  Stomach, fundus, greater curvature, biopsy revealed mild chronic gastritis.  Negative for H. pylori.  Duodenal polyp, bulb, biopsy revealed unremarkable duodenal mucosa with minimal Brunner's gland hyperplasia.    Dated July 22, 2014 the patient underwent a colonoscopy to the terminal ileum which revealed: Internal hemorrhoids.  No evidence of ileitis or colitis was seen.  Random biopsies were obtained from the colon including rectum.  Random colon biopsy revealed collagenous colitis (microscopic colitis).      Assessment and Plan:      Joelle was seen today for nausea.    Diagnoses and all orders for this visit:    Nausea  Comments:  Recurrent nausea. This is possibly related to use of Chantix.    Change in bowel habits  Comments:  Described as constipation at times with episodes of diarrhea.    Diarrhea, unspecified type  Comments:  Likely multifactorial.    Heartburn  Comments:  Under reasonable control.    Weight loss  Comments:  History of significant weight loss.    Dysphagia, unspecified type  Comments:  Intermittent dysphagia.              Plan  and  Patient Instructions:    Patient Instructions   1. Antireflux measures.  2. Continue to support for smoking cessation. The patient quit smoking about 4 weeks ago.  3. Dietary instructions.  The patient should eat relatively soft diet.  She should eat in upright position and chew well.  The patient should drink water after to 3 bites and take medications with liberal amounts of water.  Furthermore after eating, and taking medications the patient should remain in upright position for 10-15 minutes.   4. Care should be exercised especially when taking medications that have a potential to cause pill esophagitis including potassium and tetracycline and bisphosphonates, iron tablets as well as NSAIDs. These are best avoided or be taken in an alternative form. Should there be a necessity to use the formulation, the patient should take these medications with liberal amounts of water and remain in upright position for about 15-20 minutes after taking the medication.  5. Upper endoscopy (EGD) counseling:  Description of the procedure, risks, benefits, alternatives and options including nonoperative options were discussed with the patient in detail.  The patient understands and wishes to wait.  6. Colonoscopy: Description of the procedure, risks benefits alternatives and options including non-operative options were discussed with the patient in detail.  The patient understands and wishes to wait.  7. Obtain medical records regarding recent laboratory test results including lipase.  8. Follow-up:  The patient will call back.        Iglesia Ramirez MD

## 2018-06-07 ENCOUNTER — APPOINTMENT (OUTPATIENT)
Dept: GENERAL RADIOLOGY | Facility: HOSPITAL | Age: 73
End: 2018-06-07

## 2018-06-07 ENCOUNTER — HOSPITAL ENCOUNTER (EMERGENCY)
Facility: HOSPITAL | Age: 73
Discharge: HOME | End: 2018-06-07
Attending: EMERGENCY MEDICINE | Admitting: EMERGENCY MEDICINE

## 2018-06-07 ENCOUNTER — HOSPITAL ENCOUNTER (OUTPATIENT)
Facility: HOSPITAL | Age: 73
Setting detail: OBSERVATION
Discharge: HOME HEALTH | End: 2018-06-10
Attending: STUDENT IN AN ORGANIZED HEALTH CARE EDUCATION/TRAINING PROGRAM | Admitting: INTERNAL MEDICINE

## 2018-06-07 VITALS
BODY MASS INDEX: 16.1 KG/M2 | DIASTOLIC BLOOD PRESSURE: 73 MMHG | OXYGEN SATURATION: 98 % | SYSTOLIC BLOOD PRESSURE: 122 MMHG | RESPIRATION RATE: 18 BRPM | WEIGHT: 82 LBS | TEMPERATURE: 98.1 F | HEIGHT: 60 IN | HEART RATE: 82 BPM

## 2018-06-07 DIAGNOSIS — J44.1 ACUTE EXACERBATION OF CHRONIC OBSTRUCTIVE PULMONARY DISEASE (COPD) (HCC): Primary | ICD-10-CM

## 2018-06-07 DIAGNOSIS — J42 CHRONIC BRONCHITIS, UNSPECIFIED CHRONIC BRONCHITIS TYPE (HCC): ICD-10-CM

## 2018-06-07 DIAGNOSIS — Z72.0 TOBACCO ABUSE: ICD-10-CM

## 2018-06-07 DIAGNOSIS — J44.1 CHRONIC OBSTRUCTIVE PULMONARY DISEASE WITH ACUTE EXACERBATION (HCC): Primary | ICD-10-CM

## 2018-06-07 PROBLEM — E44.0 MODERATE MALNUTRITION: Status: ACTIVE | Noted: 2018-06-07

## 2018-06-07 LAB
ALBUMIN SERPL-MCNC: 4.3 G/DL (ref 3.5–5)
ALBUMIN/GLOB SERPL: 1.4 G/DL (ref 1–2)
ALP SERPL-CCNC: 97 U/L (ref 38–126)
ALT SERPL W P-5'-P-CCNC: 22 U/L (ref 13–69)
ANION GAP SERPL CALCULATED.3IONS-SCNC: 14.5 MMOL/L (ref 10–20)
ARTERIAL PATENCY WRIST A: ABNORMAL
AST SERPL-CCNC: 27 U/L (ref 15–46)
BASE EXCESS BLDA CALC-SCNC: 4.6 MMOL/L (ref 0–2)
BASOPHILS # BLD AUTO: 0.05 10*3/MM3 (ref 0–0.2)
BASOPHILS NFR BLD AUTO: 0.3 % (ref 0–2.5)
BDY SITE: ABNORMAL
BILIRUB SERPL-MCNC: 0.4 MG/DL (ref 0.2–1.3)
BUN BLD-MCNC: 27 MG/DL (ref 7–20)
BUN/CREAT SERPL: 45 (ref 7.1–23.5)
CALCIUM SPEC-SCNC: 9.3 MG/DL (ref 8.4–10.2)
CHLORIDE SERPL-SCNC: 101 MMOL/L (ref 98–107)
CO2 SERPL-SCNC: 31 MMOL/L (ref 26–30)
COHGB MFR BLD: 2.7 % (ref 0–2)
CREAT BLD-MCNC: 0.6 MG/DL (ref 0.6–1.3)
D-LACTATE SERPL-SCNC: 1.1 MMOL/L (ref 0.5–2)
DEPRECATED RDW RBC AUTO: 56.6 FL (ref 37–54)
EOSINOPHIL # BLD AUTO: 0.02 10*3/MM3 (ref 0–0.7)
EOSINOPHIL NFR BLD AUTO: 0.1 % (ref 0–7)
ERYTHROCYTE [DISTWIDTH] IN BLOOD BY AUTOMATED COUNT: 16 % (ref 11.5–14.5)
GFR SERPL CREATININE-BSD FRML MDRD: 98 ML/MIN/1.73
GLOBULIN UR ELPH-MCNC: 3 GM/DL
GLUCOSE BLD-MCNC: 102 MG/DL (ref 74–98)
HCO3 BLDA-SCNC: 29.2 MMOL/L (ref 22–28)
HCT VFR BLD AUTO: 41 % (ref 37–47)
HGB BLD-MCNC: 13.7 G/DL (ref 12–16)
HGB BLDA-MCNC: 13.4 G/DL (ref 12–18)
HOLD SPECIMEN: NORMAL
HOROWITZ INDEX BLD+IHG-RTO: 28 %
IMM GRANULOCYTES # BLD: 0.17 10*3/MM3 (ref 0–0.06)
IMM GRANULOCYTES NFR BLD: 1.1 % (ref 0–0.6)
LYMPHOCYTES # BLD AUTO: 1.85 10*3/MM3 (ref 0.6–3.4)
LYMPHOCYTES NFR BLD AUTO: 11.8 % (ref 10–50)
MCH RBC QN AUTO: 32.2 PG (ref 27–31)
MCHC RBC AUTO-ENTMCNC: 33.4 G/DL (ref 30–37)
MCV RBC AUTO: 96.2 FL (ref 81–99)
METHGB BLD QL: 0.8 % (ref 0–1.5)
MODALITY: ABNORMAL
MONOCYTES # BLD AUTO: 1.03 10*3/MM3 (ref 0–0.9)
MONOCYTES NFR BLD AUTO: 6.6 % (ref 0–12)
NEUTROPHILS # BLD AUTO: 12.56 10*3/MM3 (ref 2–6.9)
NEUTROPHILS NFR BLD AUTO: 80.1 % (ref 37–80)
NRBC BLD MANUAL-RTO: 0 /100 WBC (ref 0–0)
NT-PROBNP SERPL-MCNC: 57.5 PG/ML (ref 0–125)
OXYHGB MFR BLDV: 90.3 % (ref 94–99)
PCO2 BLDA: 46.2 MM HG (ref 35–45)
PCO2 TEMP ADJ BLD: ABNORMAL MM HG (ref 35–45)
PH BLDA: 7.41 PH UNITS (ref 7.3–7.5)
PH, TEMP CORRECTED: ABNORMAL PH UNITS
PLATELET # BLD AUTO: 361 10*3/MM3 (ref 130–400)
PMV BLD AUTO: 9.8 FL (ref 6–12)
PO2 BLDA: 72.7 MM HG (ref 75–100)
PO2 TEMP ADJ BLD: ABNORMAL MM HG (ref 83–108)
POTASSIUM BLD-SCNC: 4.5 MMOL/L (ref 3.5–5.1)
PROT SERPL-MCNC: 7.3 G/DL (ref 6.3–8.2)
RBC # BLD AUTO: 4.26 10*6/MM3 (ref 4.2–5.4)
SAO2 % BLDCOA: 93.6 % (ref 94–100)
SODIUM BLD-SCNC: 142 MMOL/L (ref 137–145)
TROPONIN I SERPL-MCNC: <0.012 NG/ML (ref 0–0.03)
WBC NRBC COR # BLD: 15.68 10*3/MM3 (ref 4.8–10.8)
WHOLE BLOOD HOLD SPECIMEN: NORMAL

## 2018-06-07 PROCEDURE — 85025 COMPLETE CBC W/AUTO DIFF WBC: CPT

## 2018-06-07 PROCEDURE — 96367 TX/PROPH/DG ADDL SEQ IV INF: CPT

## 2018-06-07 PROCEDURE — 25010000002 MAGNESIUM SULFATE 2 GM/50ML SOLUTION: Performed by: STUDENT IN AN ORGANIZED HEALTH CARE EDUCATION/TRAINING PROGRAM

## 2018-06-07 PROCEDURE — 83050 HGB METHEMOGLOBIN QUAN: CPT

## 2018-06-07 PROCEDURE — 93005 ELECTROCARDIOGRAM TRACING: CPT

## 2018-06-07 PROCEDURE — 80053 COMPREHEN METABOLIC PANEL: CPT

## 2018-06-07 PROCEDURE — 96372 THER/PROPH/DIAG INJ SC/IM: CPT

## 2018-06-07 PROCEDURE — G0378 HOSPITAL OBSERVATION PER HR: HCPCS

## 2018-06-07 PROCEDURE — 93005 ELECTROCARDIOGRAM TRACING: CPT | Performed by: STUDENT IN AN ORGANIZED HEALTH CARE EDUCATION/TRAINING PROGRAM

## 2018-06-07 PROCEDURE — 25010000002 AZITHROMYCIN: Performed by: INTERNAL MEDICINE

## 2018-06-07 PROCEDURE — 82375 ASSAY CARBOXYHB QUANT: CPT

## 2018-06-07 PROCEDURE — 96365 THER/PROPH/DIAG IV INF INIT: CPT

## 2018-06-07 PROCEDURE — 96375 TX/PRO/DX INJ NEW DRUG ADDON: CPT

## 2018-06-07 PROCEDURE — 36600 WITHDRAWAL OF ARTERIAL BLOOD: CPT

## 2018-06-07 PROCEDURE — 99285 EMERGENCY DEPT VISIT HI MDM: CPT

## 2018-06-07 PROCEDURE — 25010000002 ENOXAPARIN PER 10 MG: Performed by: INTERNAL MEDICINE

## 2018-06-07 PROCEDURE — 71046 X-RAY EXAM CHEST 2 VIEWS: CPT

## 2018-06-07 PROCEDURE — 84484 ASSAY OF TROPONIN QUANT: CPT

## 2018-06-07 PROCEDURE — 99284 EMERGENCY DEPT VISIT MOD MDM: CPT

## 2018-06-07 PROCEDURE — 83605 ASSAY OF LACTIC ACID: CPT

## 2018-06-07 PROCEDURE — 83880 ASSAY OF NATRIURETIC PEPTIDE: CPT

## 2018-06-07 PROCEDURE — 82805 BLOOD GASES W/O2 SATURATION: CPT

## 2018-06-07 PROCEDURE — 25010000002 METHYLPREDNISOLONE PER 125 MG: Performed by: STUDENT IN AN ORGANIZED HEALTH CARE EDUCATION/TRAINING PROGRAM

## 2018-06-07 PROCEDURE — 94640 AIRWAY INHALATION TREATMENT: CPT

## 2018-06-07 PROCEDURE — 94799 UNLISTED PULMONARY SVC/PX: CPT

## 2018-06-07 RX ORDER — KETOTIFEN FUMARATE 0.35 MG/ML
1 SOLUTION/ DROPS OPHTHALMIC 2 TIMES DAILY
Status: DISCONTINUED | OUTPATIENT
Start: 2018-06-07 | End: 2018-06-10 | Stop reason: HOSPADM

## 2018-06-07 RX ORDER — SODIUM CHLORIDE 0.9 % (FLUSH) 0.9 %
10 SYRINGE (ML) INJECTION AS NEEDED
Status: DISCONTINUED | OUTPATIENT
Start: 2018-06-07 | End: 2018-06-10 | Stop reason: HOSPADM

## 2018-06-07 RX ORDER — L.ACID,PARA/B.BIFIDUM/S.THERM 8B CELL
1 CAPSULE ORAL DAILY
Status: DISCONTINUED | OUTPATIENT
Start: 2018-06-08 | End: 2018-06-10 | Stop reason: HOSPADM

## 2018-06-07 RX ORDER — MAGNESIUM SULFATE HEPTAHYDRATE 40 MG/ML
2 INJECTION, SOLUTION INTRAVENOUS ONCE
Status: COMPLETED | OUTPATIENT
Start: 2018-06-07 | End: 2018-06-07

## 2018-06-07 RX ORDER — ACETAMINOPHEN 325 MG/1
650 TABLET ORAL EVERY 4 HOURS PRN
Status: DISCONTINUED | OUTPATIENT
Start: 2018-06-07 | End: 2018-06-10 | Stop reason: HOSPADM

## 2018-06-07 RX ORDER — MIRTAZAPINE 15 MG/1
30 TABLET, FILM COATED ORAL NIGHTLY
Status: DISCONTINUED | OUTPATIENT
Start: 2018-06-07 | End: 2018-06-10 | Stop reason: HOSPADM

## 2018-06-07 RX ORDER — MONTELUKAST SODIUM 10 MG/1
10 TABLET ORAL NIGHTLY
Status: DISCONTINUED | OUTPATIENT
Start: 2018-06-07 | End: 2018-06-10 | Stop reason: HOSPADM

## 2018-06-07 RX ORDER — PANTOPRAZOLE SODIUM 40 MG/1
40 TABLET, DELAYED RELEASE ORAL EVERY MORNING
Status: DISCONTINUED | OUTPATIENT
Start: 2018-06-08 | End: 2018-06-10 | Stop reason: HOSPADM

## 2018-06-07 RX ORDER — CHOLECALCIFEROL (VITAMIN D3) 125 MCG
500 CAPSULE ORAL DAILY
Status: DISCONTINUED | OUTPATIENT
Start: 2018-06-08 | End: 2018-06-10 | Stop reason: HOSPADM

## 2018-06-07 RX ORDER — MULTIPLE VITAMINS W/ MINERALS TAB 9MG-400MCG
1 TAB ORAL DAILY
Status: DISCONTINUED | OUTPATIENT
Start: 2018-06-08 | End: 2018-06-10 | Stop reason: HOSPADM

## 2018-06-07 RX ORDER — GABAPENTIN 100 MG/1
100 CAPSULE ORAL EVERY 8 HOURS SCHEDULED
Status: DISCONTINUED | OUTPATIENT
Start: 2018-06-07 | End: 2018-06-10 | Stop reason: HOSPADM

## 2018-06-07 RX ORDER — IPRATROPIUM BROMIDE AND ALBUTEROL SULFATE 2.5; .5 MG/3ML; MG/3ML
3 SOLUTION RESPIRATORY (INHALATION) ONCE
Status: COMPLETED | OUTPATIENT
Start: 2018-06-07 | End: 2018-06-07

## 2018-06-07 RX ORDER — AZITHROMYCIN 250 MG/1
250 TABLET, FILM COATED ORAL
Status: DISCONTINUED | OUTPATIENT
Start: 2018-06-08 | End: 2018-06-10

## 2018-06-07 RX ORDER — PREDNISONE 10 MG/1
TABLET ORAL
Qty: 48 EACH | Refills: 0 | Status: SHIPPED | OUTPATIENT
Start: 2018-06-07 | End: 2018-06-10 | Stop reason: HOSPADM

## 2018-06-07 RX ORDER — GUAIFENESIN 600 MG/1
600 TABLET, EXTENDED RELEASE ORAL EVERY 12 HOURS SCHEDULED
Status: DISCONTINUED | OUTPATIENT
Start: 2018-06-07 | End: 2018-06-09

## 2018-06-07 RX ORDER — HYDROCODONE BITARTRATE AND ACETAMINOPHEN 10; 325 MG/1; MG/1
1 TABLET ORAL EVERY 8 HOURS PRN
Status: DISCONTINUED | OUTPATIENT
Start: 2018-06-07 | End: 2018-06-10 | Stop reason: HOSPADM

## 2018-06-07 RX ORDER — METHYLPREDNISOLONE SODIUM SUCCINATE 40 MG/ML
40 INJECTION, POWDER, LYOPHILIZED, FOR SOLUTION INTRAMUSCULAR; INTRAVENOUS EVERY 8 HOURS
Status: DISCONTINUED | OUTPATIENT
Start: 2018-06-08 | End: 2018-06-09

## 2018-06-07 RX ORDER — LANOLIN ALCOHOL/MO/W.PET/CERES
10 CREAM (GRAM) TOPICAL NIGHTLY
Status: DISCONTINUED | OUTPATIENT
Start: 2018-06-07 | End: 2018-06-10 | Stop reason: HOSPADM

## 2018-06-07 RX ORDER — METHYLPREDNISOLONE SODIUM SUCCINATE 125 MG/2ML
125 INJECTION, POWDER, LYOPHILIZED, FOR SOLUTION INTRAMUSCULAR; INTRAVENOUS ONCE
Status: COMPLETED | OUTPATIENT
Start: 2018-06-07 | End: 2018-06-07

## 2018-06-07 RX ORDER — BENZONATATE 100 MG/1
100 CAPSULE ORAL 3 TIMES DAILY PRN
Status: DISCONTINUED | OUTPATIENT
Start: 2018-06-07 | End: 2018-06-10 | Stop reason: HOSPADM

## 2018-06-07 RX ORDER — IPRATROPIUM BROMIDE AND ALBUTEROL SULFATE 2.5; .5 MG/3ML; MG/3ML
3 SOLUTION RESPIRATORY (INHALATION) EVERY 4 HOURS PRN
Status: DISCONTINUED | OUTPATIENT
Start: 2018-06-07 | End: 2018-06-10 | Stop reason: HOSPADM

## 2018-06-07 RX ORDER — BUDESONIDE 0.5 MG/2ML
0.5 INHALANT ORAL
Status: DISCONTINUED | OUTPATIENT
Start: 2018-06-07 | End: 2018-06-10 | Stop reason: HOSPADM

## 2018-06-07 RX ORDER — SODIUM CHLORIDE 0.9 % (FLUSH) 0.9 %
10 SYRINGE (ML) INJECTION AS NEEDED
Status: DISCONTINUED | OUTPATIENT
Start: 2018-06-07 | End: 2018-06-07 | Stop reason: HOSPADM

## 2018-06-07 RX ORDER — IPRATROPIUM BROMIDE AND ALBUTEROL SULFATE 2.5; .5 MG/3ML; MG/3ML
3 SOLUTION RESPIRATORY (INHALATION)
Status: DISCONTINUED | OUTPATIENT
Start: 2018-06-08 | End: 2018-06-10 | Stop reason: HOSPADM

## 2018-06-07 RX ORDER — ONDANSETRON 2 MG/ML
4 INJECTION INTRAMUSCULAR; INTRAVENOUS EVERY 6 HOURS PRN
Status: DISCONTINUED | OUTPATIENT
Start: 2018-06-07 | End: 2018-06-10 | Stop reason: HOSPADM

## 2018-06-07 RX ORDER — AZELASTINE 1 MG/ML
1 SPRAY, METERED NASAL DAILY
Status: DISCONTINUED | OUTPATIENT
Start: 2018-06-08 | End: 2018-06-10 | Stop reason: HOSPADM

## 2018-06-07 RX ORDER — CETIRIZINE HYDROCHLORIDE 10 MG/1
10 TABLET ORAL DAILY
Status: DISCONTINUED | OUTPATIENT
Start: 2018-06-08 | End: 2018-06-10 | Stop reason: HOSPADM

## 2018-06-07 RX ORDER — NICOTINE 21 MG/24HR
1 PATCH, TRANSDERMAL 24 HOURS TRANSDERMAL EVERY 24 HOURS
Status: DISCONTINUED | OUTPATIENT
Start: 2018-06-08 | End: 2018-06-08

## 2018-06-07 RX ORDER — MEGESTROL ACETATE 40 MG/ML
400 SUSPENSION ORAL DAILY
Status: DISCONTINUED | OUTPATIENT
Start: 2018-06-08 | End: 2018-06-10 | Stop reason: HOSPADM

## 2018-06-07 RX ORDER — LOSARTAN POTASSIUM 25 MG/1
25 TABLET ORAL DAILY
Status: DISCONTINUED | OUTPATIENT
Start: 2018-06-08 | End: 2018-06-10 | Stop reason: HOSPADM

## 2018-06-07 RX ORDER — METOPROLOL SUCCINATE 25 MG/1
25 TABLET, EXTENDED RELEASE ORAL
Status: DISCONTINUED | OUTPATIENT
Start: 2018-06-08 | End: 2018-06-10 | Stop reason: HOSPADM

## 2018-06-07 RX ADMIN — AZITHROMYCIN 500 MG: 500 INJECTION, POWDER, LYOPHILIZED, FOR SOLUTION INTRAVENOUS at 23:35

## 2018-06-07 RX ADMIN — KETOTIFEN FUMARATE 1 DROP: 0.35 SOLUTION/ DROPS OPHTHALMIC at 23:40

## 2018-06-07 RX ADMIN — IPRATROPIUM BROMIDE AND ALBUTEROL SULFATE 3 ML: .5; 3 SOLUTION RESPIRATORY (INHALATION) at 13:42

## 2018-06-07 RX ADMIN — MONTELUKAST SODIUM 10 MG: 10 TABLET, FILM COATED ORAL at 23:42

## 2018-06-07 RX ADMIN — MIRTAZAPINE 30 MG: 15 TABLET, FILM COATED ORAL at 23:39

## 2018-06-07 RX ADMIN — GABAPENTIN 100 MG: 100 CAPSULE ORAL at 23:39

## 2018-06-07 RX ADMIN — METHYLPREDNISOLONE SODIUM SUCCINATE 125 MG: 125 INJECTION, POWDER, FOR SOLUTION INTRAMUSCULAR; INTRAVENOUS at 20:25

## 2018-06-07 RX ADMIN — GUAIFENESIN 600 MG: 600 TABLET, EXTENDED RELEASE ORAL at 23:38

## 2018-06-07 RX ADMIN — ENOXAPARIN SODIUM 40 MG: 40 INJECTION SUBCUTANEOUS at 23:41

## 2018-06-07 RX ADMIN — MAGNESIUM SULFATE IN WATER 2 G: 40 INJECTION, SOLUTION INTRAVENOUS at 20:27

## 2018-06-07 RX ADMIN — MELATONIN TAB 3 MG 10.5 MG: 3 TAB at 23:38

## 2018-06-07 NOTE — ED PROVIDER NOTES
Subjective   72-year-old female presents to the emergency department with cough congestion and shortness of breath, she does have a history of COPD and she is on oxygen intermittently, she's been using her oxygen more often.  She was just recently on antibiotic's and steroids for COPD exacerbation.  She states she initially got better and her symptoms worsened.  She's been having a loose cough, she continues to smoke.  Symptoms are worse with exertion or deep breaths.        History provided by:  Patient  Shortness of Breath   Severity:  Mild  Onset quality:  Gradual  Duration:  14 days  Timing:  Constant  Progression:  Worsening  Chronicity:  Recurrent  Context: activity, smoke exposure and weather changes    Relieved by:  Oxygen and rest  Worsened by:  Deep breathing, exertion and activity  Ineffective treatments:  Inhaler, oxygen and rest  Associated symptoms: cough and wheezing    Risk factors: tobacco use        Review of Systems   Respiratory: Positive for cough, shortness of breath and wheezing.    All other systems reviewed and are negative.      Past Medical History:   Diagnosis Date   • Abdominal pain    • Acute bronchitis    • Ankle pain    • Anxiety 1980   • Atopic dermatitis    • Backache    • Bipolar disorder    • Bronchiectasis    • Chronic obstructive lung disease 2008   • Colon cancer screening    • COPD (chronic obstructive pulmonary disease)    • Cystocele    • Depressed    • Depression 1980   • Fatigue     Chronic pafigue 2012   • Fibromyalgia 2008   • GERD (gastroesophageal reflux disease)    • Gout    • Heartburn     Chronic historu of epigastric heartburn currently controlled on Prilesec 40 mg every morning along with Zantac 300 Mg daily at bedtime   • High cholesterol 2012   • Hip pain    • Hyperlipidemia    • Hypertension    • Insomnia    • Joint pain    • Kidney infection    • Loss of appetite    • Low back pain 1995   • Melena    • Nausea and vomiting    • On home oxygen therapy    •  Osteoarthritis 2010   • Pain in limb    • Palpitations    • Pinched nerve     Pinched nerves 2011   • Recurrent urinary tract infection    • Sciatica [id number]   • Shortness of breath    • Sinus problem    • Sleep apnea 1998   • Upset stomach    • Valvular heart disease    • Vitamin B12 deficiency    • Weight loss, abnormal     Weight loss is stabel. On pund weight gain since 01/2016       Allergies   Allergen Reactions   • Dust Mite Extract Other (See Comments)     Dust  SINUS   • Grass Other (See Comments)     SINUS   • Mold Extract [Trichophyton] Other (See Comments)     SINUS       Past Surgical History:   Procedure Laterality Date   • ABDOMINAL HERNIA REPAIR  2016   • APPENDECTOMY  1965   • BACK SURGERY  2005   • CATARACT EXTRACTION Bilateral 1978   • CHOLECYSTECTOMY  1985   • EYE SURGERY      Put in implants    • GALLBLADDER SURGERY  1985   • KNEE SURGERY Left 1979    Knee Cap   • TUBAL ABDOMINAL LIGATION  1971       Family History   Problem Relation Age of Onset   • Ovarian cancer Mother    • Cancer Mother    • Lung cancer Father    • Heart disease Brother        Social History     Social History   • Marital status: Single     Social History Main Topics   • Smoking status: Current Every Day Smoker     Packs/day: 0.50     Years: 35.00     Types: Cigarettes     Last attempt to quit: 3/5/2017   • Smokeless tobacco: Never Used      Comment: ON CHANTIX   • Alcohol use No   • Drug use: No   • Sexual activity: Defer     Other Topics Concern   • Not on file           Objective   Physical Exam   Constitutional: She is oriented to person, place, and time. She appears well-developed and well-nourished.   HENT:   Head: Atraumatic.   Eyes: EOM are normal.   Neck: Normal range of motion. Neck supple.   Cardiovascular: Normal rate and regular rhythm.    Pulmonary/Chest: Effort normal. No respiratory distress. She has wheezes.   Abdominal: Soft. Bowel sounds are normal.   Musculoskeletal: Normal range of motion.    Neurological: She is alert and oriented to person, place, and time. She has normal reflexes.   Skin: Skin is warm and dry.   Psychiatric: She has a normal mood and affect.   Nursing note and vitals reviewed.      Procedures           ED Course                  MDM      Final diagnoses:   Chronic obstructive pulmonary disease with acute exacerbation            Main Tee Jr., JERARDO  06/07/18 1533

## 2018-06-08 LAB
ANION GAP SERPL CALCULATED.3IONS-SCNC: 11.8 MMOL/L (ref 10–20)
BASOPHILS # BLD AUTO: 0.04 10*3/MM3 (ref 0–0.2)
BASOPHILS NFR BLD AUTO: 0.2 % (ref 0–2.5)
BUN BLD-MCNC: 18 MG/DL (ref 7–20)
BUN/CREAT SERPL: 25.7 (ref 7.1–23.5)
CALCIUM SPEC-SCNC: 9.2 MG/DL (ref 8.4–10.2)
CHLORIDE SERPL-SCNC: 101 MMOL/L (ref 98–107)
CO2 SERPL-SCNC: 33 MMOL/L (ref 26–30)
CREAT BLD-MCNC: 0.7 MG/DL (ref 0.6–1.3)
DEPRECATED RDW RBC AUTO: 55.9 FL (ref 37–54)
EOSINOPHIL # BLD AUTO: 0 10*3/MM3 (ref 0–0.7)
EOSINOPHIL NFR BLD AUTO: 0 % (ref 0–7)
ERYTHROCYTE [DISTWIDTH] IN BLOOD BY AUTOMATED COUNT: 15.9 % (ref 11.5–14.5)
GFR SERPL CREATININE-BSD FRML MDRD: 82 ML/MIN/1.73
GLUCOSE BLD-MCNC: 118 MG/DL (ref 74–98)
HCT VFR BLD AUTO: 39.8 % (ref 37–47)
HGB BLD-MCNC: 13 G/DL (ref 12–16)
IMM GRANULOCYTES # BLD: 0.25 10*3/MM3 (ref 0–0.06)
IMM GRANULOCYTES NFR BLD: 1 % (ref 0–0.6)
LYMPHOCYTES # BLD AUTO: 0.93 10*3/MM3 (ref 0.6–3.4)
LYMPHOCYTES NFR BLD AUTO: 3.7 % (ref 10–50)
MAGNESIUM SERPL-MCNC: 2.6 MG/DL (ref 1.6–2.3)
MCH RBC QN AUTO: 31.6 PG (ref 27–31)
MCHC RBC AUTO-ENTMCNC: 32.7 G/DL (ref 30–37)
MCV RBC AUTO: 96.8 FL (ref 81–99)
MONOCYTES # BLD AUTO: 0.68 10*3/MM3 (ref 0–0.9)
MONOCYTES NFR BLD AUTO: 2.7 % (ref 0–12)
NEUTROPHILS # BLD AUTO: 22.96 10*3/MM3 (ref 2–6.9)
NEUTROPHILS NFR BLD AUTO: 92.4 % (ref 37–80)
NRBC BLD MANUAL-RTO: 0 /100 WBC (ref 0–0)
PHOSPHATE SERPL-MCNC: 4.6 MG/DL (ref 2.5–4.5)
PLATELET # BLD AUTO: 303 10*3/MM3 (ref 130–400)
PMV BLD AUTO: 10 FL (ref 6–12)
POTASSIUM BLD-SCNC: 4.8 MMOL/L (ref 3.5–5.1)
RBC # BLD AUTO: 4.11 10*6/MM3 (ref 4.2–5.4)
SODIUM BLD-SCNC: 141 MMOL/L (ref 137–145)
TROPONIN I SERPL-MCNC: <0.012 NG/ML (ref 0–0.03)
WBC NRBC COR # BLD: 24.86 10*3/MM3 (ref 4.8–10.8)

## 2018-06-08 PROCEDURE — 99225 PR SBSQ OBSERVATION CARE/DAY 25 MINUTES: CPT | Performed by: INTERNAL MEDICINE

## 2018-06-08 PROCEDURE — 25010000002 METHYLPREDNISOLONE PER 40 MG: Performed by: INTERNAL MEDICINE

## 2018-06-08 PROCEDURE — 96367 TX/PROPH/DG ADDL SEQ IV INF: CPT

## 2018-06-08 PROCEDURE — 85025 COMPLETE CBC W/AUTO DIFF WBC: CPT | Performed by: INTERNAL MEDICINE

## 2018-06-08 PROCEDURE — 94799 UNLISTED PULMONARY SVC/PX: CPT

## 2018-06-08 PROCEDURE — 94640 AIRWAY INHALATION TREATMENT: CPT

## 2018-06-08 PROCEDURE — 80048 BASIC METABOLIC PNL TOTAL CA: CPT | Performed by: INTERNAL MEDICINE

## 2018-06-08 PROCEDURE — 94760 N-INVAS EAR/PLS OXIMETRY 1: CPT

## 2018-06-08 PROCEDURE — 96376 TX/PRO/DX INJ SAME DRUG ADON: CPT

## 2018-06-08 PROCEDURE — 83735 ASSAY OF MAGNESIUM: CPT | Performed by: INTERNAL MEDICINE

## 2018-06-08 PROCEDURE — 25010000002 CEFTRIAXONE PER 250 MG: Performed by: INTERNAL MEDICINE

## 2018-06-08 PROCEDURE — G0378 HOSPITAL OBSERVATION PER HR: HCPCS

## 2018-06-08 PROCEDURE — 84100 ASSAY OF PHOSPHORUS: CPT | Performed by: INTERNAL MEDICINE

## 2018-06-08 PROCEDURE — 84484 ASSAY OF TROPONIN QUANT: CPT | Performed by: INTERNAL MEDICINE

## 2018-06-08 PROCEDURE — 96366 THER/PROPH/DIAG IV INF ADDON: CPT

## 2018-06-08 RX ORDER — LORAZEPAM 0.5 MG/1
0.5 TABLET ORAL EVERY 12 HOURS PRN
Status: DISCONTINUED | OUTPATIENT
Start: 2018-06-08 | End: 2018-06-10 | Stop reason: HOSPADM

## 2018-06-08 RX ORDER — NICOTINE 21 MG/24HR
1 PATCH, TRANSDERMAL 24 HOURS TRANSDERMAL DAILY
Status: DISCONTINUED | OUTPATIENT
Start: 2018-06-08 | End: 2018-06-10 | Stop reason: HOSPADM

## 2018-06-08 RX ORDER — FLUCONAZOLE 100 MG/1
100 TABLET ORAL EVERY 24 HOURS
Status: DISCONTINUED | OUTPATIENT
Start: 2018-06-08 | End: 2018-06-10

## 2018-06-08 RX ORDER — NICOTINE 21 MG/24HR
1 PATCH, TRANSDERMAL 24 HOURS TRANSDERMAL EVERY 24 HOURS
Status: DISCONTINUED | OUTPATIENT
Start: 2018-06-09 | End: 2018-06-08

## 2018-06-08 RX ADMIN — KETOTIFEN FUMARATE 1 DROP: 0.35 SOLUTION/ DROPS OPHTHALMIC at 20:51

## 2018-06-08 RX ADMIN — Medication 1 CAPSULE: at 08:04

## 2018-06-08 RX ADMIN — BUDESONIDE 0.5 MG: 0.5 INHALANT RESPIRATORY (INHALATION) at 06:47

## 2018-06-08 RX ADMIN — GABAPENTIN 100 MG: 100 CAPSULE ORAL at 21:00

## 2018-06-08 RX ADMIN — GABAPENTIN 100 MG: 100 CAPSULE ORAL at 13:36

## 2018-06-08 RX ADMIN — PANTOPRAZOLE SODIUM 40 MG: 40 TABLET, DELAYED RELEASE ORAL at 06:39

## 2018-06-08 RX ADMIN — METHYLPREDNISOLONE SODIUM SUCCINATE 40 MG: 40 INJECTION, POWDER, FOR SOLUTION INTRAMUSCULAR; INTRAVENOUS at 11:44

## 2018-06-08 RX ADMIN — CEFTRIAXONE 1 G: 1 INJECTION, SOLUTION INTRAVENOUS at 23:25

## 2018-06-08 RX ADMIN — MULTIPLE VITAMINS W/ MINERALS TAB 1 TABLET: TAB at 08:04

## 2018-06-08 RX ADMIN — IPRATROPIUM BROMIDE AND ALBUTEROL SULFATE 3 ML: .5; 3 SOLUTION RESPIRATORY (INHALATION) at 06:47

## 2018-06-08 RX ADMIN — AZELASTINE HYDROCHLORIDE 1 SPRAY: 137 SPRAY, METERED NASAL at 08:02

## 2018-06-08 RX ADMIN — BENZONATATE 100 MG: 100 CAPSULE, LIQUID FILLED ORAL at 18:27

## 2018-06-08 RX ADMIN — GUAIFENESIN 600 MG: 600 TABLET, EXTENDED RELEASE ORAL at 08:04

## 2018-06-08 RX ADMIN — MIRTAZAPINE 30 MG: 15 TABLET, FILM COATED ORAL at 20:53

## 2018-06-08 RX ADMIN — NICOTINE 1 PATCH: 21 PATCH TRANSDERMAL at 08:05

## 2018-06-08 RX ADMIN — METHYLPREDNISOLONE SODIUM SUCCINATE 40 MG: 40 INJECTION, POWDER, FOR SOLUTION INTRAMUSCULAR; INTRAVENOUS at 04:58

## 2018-06-08 RX ADMIN — CETIRIZINE HYDROCHLORIDE 10 MG: 10 TABLET, FILM COATED ORAL at 08:04

## 2018-06-08 RX ADMIN — IPRATROPIUM BROMIDE AND ALBUTEROL SULFATE 3 ML: .5; 3 SOLUTION RESPIRATORY (INHALATION) at 19:31

## 2018-06-08 RX ADMIN — BUDESONIDE 0.5 MG: 0.5 INHALANT RESPIRATORY (INHALATION) at 19:31

## 2018-06-08 RX ADMIN — LORAZEPAM 0.5 MG: 0.5 TABLET ORAL at 17:39

## 2018-06-08 RX ADMIN — IPRATROPIUM BROMIDE AND ALBUTEROL SULFATE 3 ML: .5; 3 SOLUTION RESPIRATORY (INHALATION) at 12:59

## 2018-06-08 RX ADMIN — CEFTRIAXONE 1 G: 1 INJECTION, SOLUTION INTRAVENOUS at 01:15

## 2018-06-08 RX ADMIN — HYDROCODONE BITARTRATE AND ACETAMINOPHEN 1 TABLET: 10; 325 TABLET ORAL at 20:53

## 2018-06-08 RX ADMIN — MELATONIN TAB 3 MG 10.5 MG: 3 TAB at 20:52

## 2018-06-08 RX ADMIN — KETOTIFEN FUMARATE 1 DROP: 0.35 SOLUTION/ DROPS OPHTHALMIC at 08:04

## 2018-06-08 RX ADMIN — LOSARTAN POTASSIUM 25 MG: 25 TABLET, FILM COATED ORAL at 08:04

## 2018-06-08 RX ADMIN — IPRATROPIUM BROMIDE AND ALBUTEROL SULFATE 3 ML: .5; 3 SOLUTION RESPIRATORY (INHALATION) at 00:37

## 2018-06-08 RX ADMIN — BUDESONIDE 0.5 MG: 0.5 INHALANT RESPIRATORY (INHALATION) at 00:37

## 2018-06-08 RX ADMIN — FLUCONAZOLE 100 MG: 100 TABLET ORAL at 18:23

## 2018-06-08 RX ADMIN — HYDROCODONE BITARTRATE AND ACETAMINOPHEN 1 TABLET: 10; 325 TABLET ORAL at 08:36

## 2018-06-08 RX ADMIN — AZITHROMYCIN MONOHYDRATE 250 MG: 250 TABLET ORAL at 08:04

## 2018-06-08 RX ADMIN — METHYLPREDNISOLONE SODIUM SUCCINATE 40 MG: 40 INJECTION, POWDER, FOR SOLUTION INTRAMUSCULAR; INTRAVENOUS at 20:52

## 2018-06-08 RX ADMIN — MONTELUKAST SODIUM 10 MG: 10 TABLET, FILM COATED ORAL at 20:52

## 2018-06-08 RX ADMIN — GABAPENTIN 100 MG: 100 CAPSULE ORAL at 06:39

## 2018-06-08 RX ADMIN — CYANOCOBALAMIN TAB 500 MCG 500 MCG: 500 TAB at 08:04

## 2018-06-08 RX ADMIN — METOPROLOL SUCCINATE 25 MG: 25 TABLET, EXTENDED RELEASE ORAL at 08:04

## 2018-06-08 RX ADMIN — GUAIFENESIN 600 MG: 600 TABLET, EXTENDED RELEASE ORAL at 20:53

## 2018-06-08 NOTE — H&P
[facility]   HISTORY AND PHYSICAL      Name:  Joelle Yee   Age:  72 y.o.  Sex:  female  :  1945  MRN:  [id number]   Visit Number:  [id number]  Admission Date:  2018  Date Of Service:  18  Primary Care Physician:  DONTRELL Regan   Primary Pulmonologist: Rebeka Esparza MD    Chief Complaint:     Increased shortness of breath since 2 weeks.    History Of Presenting Illness:      Patient 71-year-old  lady with past medical history of COPD, chronic hypoxic respiratory failure on 2 L home oxygen, hypertension, GERD who continues to smoke a pack of cigarettes per day was brought to the emergency room by EMS with the above complaints.  Patient in fact came to the emergency room by EMS in the morning today with similar complaints.  She was evaluated in the emergency room and was hemodynamically stable with no evidence of pneumonia on the chest x-ray.  Blood work was fairly stable.  She was subsequently discharged home on prednisone taper.  Patient states that as soon she went home, she continued to have worsening shortness of breath even on minimal exertion.  She called EMS back again and apparently was noted to have her pulse oxygen saturations in the 70s and was brought back to the emergency room.    Patient states that she has been suffering from increasing wheezing and shortness of breath for the last 2-3 weeks.  She was seen by her primary care provider about 2 weeks ago and she was prescribed Levaquin and prednisone taper which she took for one week.  She initially felt better but continued to have worsening symptoms again.  She uses 2 L of oxygen at home and also has inhalers and nebulizers.  Unfortunately however, despite trying Chantix and nicotine patch, she continues to smoke about a pack of cigarettes per day.  She does live alone but is independent in daily activities.  She denies any chest pain, fever, nausea or vomiting.    Patient was  evaluated again this afternoon in the emergency room.  She was hemodynamically stable except for sinus tachycardia of 125 bpm.  She was given bronchodilator nebulizations, IV Solu-Medrol as well as IV magnesium in the emergency room and is currently being admitted to the medical floor with telemetry.  She is currently sitting by the side of the bed and is feeling better.  She is able to speak in full sentences.  She states that she follows up with Dr. Esparza.  She also states that she has seen Dr. Du for chest pains and apparently she told that she did not have any significant coronary blockages.    Review Of Systems:     General ROS: Patient denies any fevers, chills or loss of consciousness. Complains of generalized weakness.  Psychological ROS: No history of any hallucinations and delusions.  Complains of chronic insomnia.  Ophthalmic ROS: No history of any diplopia or transient loss of vision.  ENT ROS: No history of sore throat, nasal congestion or ear pain.   Allergy and Immunology ROS: No history of rash or itching.  Hematological and Lymphatic ROS: No history of neck swelling or easy bleeding.  Endocrine ROS: No history of any recent unintentional weight gain or loss.  Respiratory ROS: As per history of present illness.  Cardiovascular ROS: No history of chest pain or palpitations.   Gastrointestinal ROS: No history of nausea and vomiting. Denies any abdominal pain. No diarrhea.  Genito-Urinary ROS: No history of dysuria or hematuria.  Musculoskeletal ROS: No muscle pain. No calf pain. Complains of chronic back pain.  Neurological ROS: No history of any focal weakness. No loss of consciousness. Denies any numbness.  Dermatological ROS: No history of any redness or pruritis.     Past Medical History:    Past Medical History:   Diagnosis Date   • Abdominal pain    • Acute bronchitis    • Ankle pain    • Anxiety 1980   • Atopic dermatitis    • Backache    • Bipolar disorder    • Bronchiectasis    • Chronic  obstructive lung disease 2008   • Colon cancer screening    • COPD (chronic obstructive pulmonary disease)    • Cystocele    • Depressed    • Depression 1980   • Fatigue     Chronic pafigue 2012   • Fibromyalgia 2008   • GERD (gastroesophageal reflux disease)    • Gout    • Heartburn     Chronic historu of epigastric heartburn currently controlled on Prilesec 40 mg every morning along with Zantac 300 Mg daily at bedtime   • High cholesterol 2012   • Hip pain    • Hyperlipidemia    • Hypertension    • Insomnia    • Joint pain    • Kidney infection    • Loss of appetite    • Low back pain 1995   • Melena    • Nausea and vomiting    • On home oxygen therapy    • Osteoarthritis 2010   • Pain in limb    • Palpitations    • Pinched nerve     Pinched nerves 2011   • Recurrent urinary tract infection    • Sciatica [id number]   • Shortness of breath    • Sinus problem    • Sleep apnea 1998   • Upset stomach    • Valvular heart disease    • Vitamin B12 deficiency    • Weight loss, abnormal     Weight loss is stabel. On pund weight gain since 01/2016     Past Surgical history:    Past Surgical History:   Procedure Laterality Date   • ABDOMINAL HERNIA REPAIR  2016   • APPENDECTOMY  1965   • BACK SURGERY  2005   • CATARACT EXTRACTION Bilateral 1978   • CHOLECYSTECTOMY  1985   • EYE SURGERY      Put in implants    • GALLBLADDER SURGERY  1985   • KNEE SURGERY Left 1979    Knee Cap   • TUBAL ABDOMINAL LIGATION  1971     Social History:    Social History     Social History   • Marital status: Single     Spouse name: N/A   • Number of children: N/A   • Years of education: N/A     Occupational History   • Not on file.     Social History Main Topics   • Smoking status: Current Every Day Smoker     Packs/day: 0.50     Years: 35.00     Types: Cigarettes     Last attempt to quit: 3/5/2017   • Smokeless tobacco: Never Used      Comment: ON CHANTIX   • Alcohol use No   • Drug use: No   • Sexual activity: Defer     Other Topics Concern   •  Not on file     Social History Narrative   • No narrative on file     Family History:    Family History   Problem Relation Age of Onset   • Ovarian cancer Mother    • Cancer Mother    • Lung cancer Father    • Heart disease Brother      Allergies:      Dust mite extract; Grass; and Mold extract [trichophyton]    Home Medications:    Prior to Admission Medications     Prescriptions Last Dose Informant Patient Reported? Taking?    acetaminophen (TYLENOL) 325 MG tablet   No No    Take 2 tablets by mouth Every 4 (Four) Hours As Needed for Headache or Fever (temperature greater than 101.5 F).    albuterol (PROVENTIL) (2.5 MG/3ML) 0.083% nebulizer solution  Pharmacy Yes No    Take 2.5 mg by nebulization 4 (Four) Times a Day.    ammonium lactate (AMLACTIN) 12 % cream   Yes No    Apply  topically As Needed for Dry Skin.    azelastine (ASTELIN) 0.1 % nasal spray   No No    2 sprays into each nostril 2 (Two) Times a Day. Use in each nostril as directed    azithromycin (ZITHROMAX) 250 MG tablet   No No    1 tablet by mouth daily for 4 days    benzonatate (TESSALON) 100 MG capsule   No No    Take 1 capsule by mouth 3 (Three) Times a Day As Needed for cough.    budesonide-formoterol (SYMBICORT) 160-4.5 MCG/ACT inhaler   No No    Inhale 2 puffs 2 (Two) Times a Day. Inhale 1 puff twice daily. Rinse mouth after use.    calcium acetate (PHOS BINDER,) 667 MG capsule capsule   No No    Take 2 capsules by mouth 3 (Three) Times a Day With Meals.    cetirizine (zyrTEC) 10 MG tablet   Yes No    Take 10 mg by mouth Daily.    conjugated estrogens (PREMARIN) vaginal cream  Self Yes No    Insert 0.5 g into the vagina Every Other Day.    Cyanocobalamin (VITAMIN B12 PO)  Self Yes No    Take 500 mcg by mouth Daily.    cyclobenzaprine (FLEXERIL) 10 MG tablet   Yes No    Take 1 tablet by mouth daily.    Diclofenac Sodium (VOLTAREN TD)   Yes No    Place  on the skin As Needed. Voltaren GEL     doxycycline (MONODOX) 100 MG capsule   No No    Take 1  capsule by mouth 2 (Two) Times a Day.    estradiol (ESTRACE VAGINAL) 0.1 MG/GM vaginal cream   Yes No    Insert 2 g into the vagina Every Other Day.    gabapentin (NEURONTIN) 100 MG capsule   Yes No    TAKE ONE CAPSULE BY MOUTH THREE TIMES A DAY    gemfibrozil (LOPID) 600 MG tablet   Yes No    TAKE ONE TABLET WITH SUPPER    HYDROcodone-acetaminophen (NORCO)  MG per tablet   Yes No    Take  by mouth. Take 1 tablet every 8 hours as needed for pain.    INCRUSE ELLIPTA 62.5 MCG/INH aerosol powder    No No    INHALE 1 PUFF INTO THE LUNGS ONCE DAILY    ipratropium-albuterol (COMBIVENT RESPIMAT)  MCG/ACT inhaler   No No    Inhale 1 puff 4 (Four) Times a Day As Needed for Wheezing.    ketotifen (ZADITOR) 0.025 % ophthalmic solution   Yes No    USE 1-2 DROPS IN BOTH EYES TWICE DAILY    LORazepam (ATIVAN) 0.5 MG tablet   Yes No    Take 0.5 mg by mouth Daily As Needed. 1-2 TABLETS DAILY PRN    losartan (COZAAR) 25 MG tablet  Pharmacy Yes No    Take 25 mg by mouth Daily.    megestrol (MEGACE) 40 MG/ML suspension   Yes No    melatonin 5 MG tablet tablet   No No    Take 2 tablets by mouth At Night As Needed for sleep    melatonin 5 MG tablet tablet   Yes No    Take 10 mg by mouth Every Night. Patient takes 10 mg at night    metoprolol succinate XL (TOPROL-XL) 25 MG 24 hr tablet   Yes No    Mirabegron ER (MYRBETRIQ) 50 MG tablet sustained-release 24 hour  Pharmacy Yes No    Take 50 mg by mouth Daily.    mirtazapine (REMERON) 30 MG tablet   Yes No    Take 30 mg by mouth Every Night.    Misc. Devices (ROLLATOR) misc   No No    1 Units As Needed (ambulation assistance).    montelukast (SINGULAIR) 10 MG tablet   Yes No    TAKE ONE TABLET BY MOUTH AT BEDTIME    Multiple Vitamins-Minerals (MULTIVITAMIN WITH MINERALS) tablet tablet  Self Yes No    Take 1 tablet by mouth Daily.    nicotine (NICODERM CQ) 7 MG/24HR patch   No No    Place 1 patch on the skin Daily. DONOT smoke with patch on.    Nutritional Supplements (ENSURE  COMPLETE PO)   Yes No    Take  by mouth.    nystatin (MYCOSTATIN) 599411 UNIT/ML suspension   No No    Swish and swallow 5 mL 4 (Four) Times a Day.    omeprazole (priLOSEC) 40 MG capsule   Yes No    ondansetron ODT (ZOFRAN-ODT) 4 MG disintegrating tablet   Yes No    Take 1 tablet by mouth every 6 (six) hours as needed.    OXYGEN-HELIUM IN   Yes No    Oxygen; Patient Sig: Oxygen 2 liter as needed; 0; 21-May-2014; Active    potassium bicarbonate (K-LYTE) 25 MEQ disintegrating tablet  Self Yes No    Take 25 mEq by mouth Every Night.    PredniSONE (DELTASONE) 10 MG (48) tablet pack   No No    As written    predniSONE (DELTASONE) 10 MG tablet   No No    Take 5 tablets by mouth daily for 2 days, then 4 for 2 days, then 3 for 2 days, then 2 for 2 days, then 1 for 2 days, then STOP    Probiotic Product (RISAQUAD-2) capsule capsule  Self Yes No    Take 1 capsule by mouth Daily.    Respiratory Therapy Supplies (FLUTTER) device   No No    1 Device as needed (as directed).    traZODone (DESYREL) 50 MG tablet   Yes No    Take 1 tablet by mouth every night.    urea (CARMOL) 20 % cream  Self Yes No    Apply 1 application topically 2 (Two) Times a Day.    urea (CARMOL) 40 % cream   No No    Varenicline Tartrate (CHANTIX PO)   Yes No    Take  by mouth.    venlafaxine (EFFEXOR) 75 MG tablet   Yes No    Take 1 tablet by mouth 2 (two) times a day.           ED Medications:    Medications   sodium chloride 0.9 % flush 10 mL (not administered)   methylPREDNISolone sodium succinate (SOLU-Medrol) injection 125 mg (125 mg Intravenous Given 6/7/18 2025)   magnesium sulfate 2g/50 mL (PREMIX) infusion (0 g Intravenous Stopped 6/7/18 2119)     Vital Signs:    Temp:  [98.1 °F (36.7 °C)-99 °F (37.2 °C)] 99 °F (37.2 °C)  Heart Rate:  [] 111  Resp:  [18-24] 24  BP: (117-148)/(65-78) 132/65    1    06/07/18 2033   Weight: 37.2 kg (82 lb)     Body mass index is 16.01 kg/m².    Physical Exam:    General Appearance:  Alert and cooperative, in  mild distress.  Poorly nourished and emaciated.     Head:  Atraumatic and normocephalic, without obvious abnormality.   Eyes:          PERRLA, conjunctivae and sclerae normal, no Icterus. No pallor. Extra-occular movements are within normal limits.   Ears:  Ears appear intact with no abnormalities noted.   Throat: No oral lesions, no thrush, oral mucosa moist.   Neck: Supple, trachea midline, no thyromegaly, no carotid bruit.   Back:   No kyphoscoliosis present. No tenderness to palpation,   range of motion normal.   Lungs:   Chest shape is barrel shaped. Breath sounds heard bilaterally equally.  No crackles.  Bilateral extensive wheezing heard. No Pleural rub or bronchial breathing.   Heart:  Normal S1 and S2, no murmur, no gallop, no rub. No JVD.   Abdomen:   Normal bowel sounds, no masses, no organomegaly. Soft, non-tender, non-distended, no guarding, no rebound tenderness.   Extremities: Moves all extremities well, no edema, no cyanosis, no clubbing. Poor muscle mass.   Pulses: Pulses palpable and equal bilaterally.   Skin: No bleeding, bruising or rash.   Neurologic: Alert and oriented x 3. Moves all four limbs equally. No tremors. No facial asymetry.     Laboratory data:    I have reviewed the labs done in the emergency room.      Results from last 7 days  Lab Units 06/07/18  1336   SODIUM mmol/L 142   POTASSIUM mmol/L 4.5   CHLORIDE mmol/L 101   CO2 mmol/L 31.0*   BUN mg/dL 27*   CREATININE mg/dL 0.60   CALCIUM mg/dL 9.3   BILIRUBIN mg/dL 0.4   ALK PHOS U/L 97   ALT (SGPT) U/L 22   AST (SGOT) U/L 27   GLUCOSE mg/dL 102*       Results from last 7 days  Lab Units 06/07/18  1336   WBC 10*3/mm3 15.68*   HEMOGLOBIN g/dL 13.7   HEMATOCRIT % 41.0   PLATELETS 10*3/mm3 361           Results from last 7 days  Lab Units 06/07/18  1336   TROPONIN I ng/mL <0.012       Results from last 7 days  Lab Units 06/07/18  1336   PROBNP pg/mL 57.5               Results from last 7 days  Lab Units 06/07/18  1351   PH, ARTERIAL pH  units 7.410   PO2 ART mm Hg 72.7*   PCO2, ARTERIAL mm Hg 46.2*   HCO3 ART mmol/L 29.2*     EKG:      EKG done in the emergency room was reviewed by me. It shows sinus tachycardia at 120 bpm. Normal axis. Non specific ST-T changes noted. Occasional PVCs noted.    Radiology:    PROCEDURE: XR CHEST 2 VW- Done in the morning today (6/7/2018)       HISTORY: SOA  triage protocol     COMPARISON: December 29, 2017.     FINDINGS: The heart is normal in size. The mediastinum is unremarkable.  There are chronic interstitial lung changes. There are no focal  opacities were effusions. There is no pneumothorax. There are no acute  osseous abnormalities.         IMPRESSION:  No acute cardiopulmonary process.     This report was finalized on 6/7/2018 2:40 PM by Ignacia Lara M.D.    Assessment:    1.  Acute on chronic hypoxic respiratory failure, present on admission.  2.  Acute COPD exacerbation, present on admission.  3.  Chronic active tobacco dependence.  4.  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  5.  Essential hypertension.  6.  Gastroesophageal reflux disease.  7.  Chronic insomnia.  8.  Chronic back pain.    Plan:    Ms. Yee is currently being admitted to the medical floor with telemetry due to her mild tachycardia.  She does have acute COPD exacerbation and has recently failed outpatient Levaquin therapy.  We will treat her with oxygen, bronchodilators, budesonide, mucolytic agents and IV Solu-Medrol.  I will also place her on IV Rocephin and oral azithromycin for her acute COPD exacerbation and bronchitis.  I have strongly advised her to discontinue smoking and she will be continued on nicotine patch.    Patient does have early pharmacy due to her chronic pain and chronic insomnia.  I have advised her to decrease some of her medications after consultation with her primary care provider.  She already has home oxygen at 2 L/m but may have dropped titrated as her COPD is progressively worsening.  Hopefully,  she should feel better in the next couple of days and may be able to go home with oral antibiotic therapy and steroid taper.    Maximino Bueno MD  06/07/18  9:57 PM    Dictated utilizing Dragon dictation.

## 2018-06-08 NOTE — PROGRESS NOTES
Adult Nutrition  Assessment/PES    Patient Name:  Joelle Yee  YOB: 1945  MRN: [id number]  Admit Date:  6/7/2018    Assessment Date:  6/8/2018    Comments:    Recommend:  1. Consider Regular, High Calorie/High Protein diet order.  2. Encourage PO intake. PO intake average ~50% x 1 meal.  3. RD ordered Ensure Pudding BID and Magic Cups BID.  4. Continue with MVI.  5. Monitor and replace electrolytes as necessary.  6. Pt meets criteria for mild malnutrition, please see consult MSA note for details.     RD to follow pt and available PRN.        Reason for Assessment     Row Name 06/08/18 0918          Reason for Assessment    Reason For Assessment nurse/nurse practitioner consult;diagnosis/disease state;identified at risk by screening criteria     Diagnosis gastrointestinal disease;pulmonary disease;nutrition related history;other (see comments)   GERD, COPD, Malnutrition, HTN     Identified At Risk by Screening Criteria BMI               Anthropometrics     Row Name 06/08/18 0349          Anthropometrics    Weight 37.8 kg (83 lb 6.4 oz)             Labs/Tests/Procedures/Meds     Row Name 06/08/18 0919          Labs/Procedures/Meds    Lab Results Reviewed reviewed, pertinent     Lab Results Comments High: Mg++, Phos, Gluc             Physical Findings     Row Name 06/08/18 0920          Physical Findings    Overall Physical Appearance underweight             Estimated/Assessed Needs     Row Name 06/08/18 0920          Calculation Measurements    Weight Used For Calculations 45.9 kg (101 lb 1.7 oz)   IBW        Estimated/Assessed Needs    Additional Documentation Fluid Requirements (Group);Blair-St. Jeor Equation (Group);Protein Requirements (Group);Calorie Requirements (Group)        Calorie Requirements    Estimated Calorie Requirement (kcal/day) --   AF 1.3, SF 1.3     Estimated Calorie Requirement Comment 1510 - 1710        KCAL/KG    14 Kcal/Kg (kcal) 642.04     15 Kcal/Kg (kcal) 687.9      18 Kcal/Kg (kcal) 825.48     20 Kcal/Kg (kcal) 917.2     25 Kcal/Kg (kcal) 1146.5     30 Kcal/Kg (kcal) 1375.8     35 Kcal/Kg (kcal) 1605.1     40 Kcal/Kg (kcal) 1834.4     45 Kcal/Kg (kcal) 2063.7     50 Kcal/Kg (kcal) 2293        Scott-St. Jeor Equation    RMR (Scott-St. Jeor Equation) 890.1        Protein Requirements    Est Protein Requirement Amount (gms/kg) 2.5 gm protein   69 - 113 gm     Estimated Protein Requirements (gms/day) 114.65        Fluid Requirements    Estimated Fluid Requirement Method Luiz-Segar Formula     [place]-Segar Method (over 20 kg) 2417.2             Nutrition Prescription Ordered     Row Name 06/08/18 0922          Nutrition Prescription PO    Current PO Diet Regular             Evaluation of Received Nutrient/Fluid Intake     Row Name 06/08/18 0920          Calculation Measurements    Weight Used For Calculations 45.9 kg (101 lb 1.7 oz)   IBW        PO Evaluation    Number of Days PO Intake Evaluated 1 day     Number of Meals 1     % PO Intake 50             Evaluation of Prescribed Nutrient/Fluid Intake     Row Name 06/08/18 0920          Calculation Measurements    Weight Used For Calculations 45.9 kg (101 lb 1.7 oz)   IBW             Malnutrition Severity Assessment     Row Name 06/08/18 0923          Malnutrition Severity Assessment    Malnutrition Type Chronic Illness Malnutrition        Physical Signs of Malnutrition (Chronic)    Muscle Wasting Mild     Fat Loss Mild     Fluid Accumulation None     Secondary Physical Signs None        Weight Status (Chronic)    BMI Mod (<17)     %IBW Mild (<90%)        Criteria Met (Must meet criteria for severity in at least 2 of these categories: M Wasting, Fat Loss, Fluid, Secondary Signs, Wt. Status, Intake)    Patient meets criteria for  Mild malnutrition         Problem/Interventions:        Problem 1     Row Name 06/08/18 0924          Nutrition Diagnoses Problem 1    Problem 1 Underweight     Etiology (related to) Factors  Affecting Nutrition     Food Habit/Preferences Small Meals     Signs/Symptoms (evidenced by) BMI     BMI 16 - 16.9             Problem 2     Row Name 06/08/18 0925          Nutrition Diagnoses Problem 2    Problem 2 Increased Nutrient Needs     Macronutrient Kcal;Fluid;Protein     Etiology (related to) Medical Diagnosis     Pulmonary/Critical Care COPD     Signs/Symptoms (evidenced by) Other (comment)   Pulmonary dysfunction                   Intervention Goal     Row Name 06/08/18 0925          Intervention Goal    General Meet nutritional needs for age/condition     PO Meet estimated needs;Increase intake;PO intake (%)     PO Intake % 50 %     Weight Appropriate weight gain             Nutrition Intervention     Row Name 06/08/18 0925          Nutrition Intervention    RD/Tech Action Follow Tx progress;Encourage intake;Recommend/ordered     Recommended/Ordered Supplement;Diet             Nutrition Prescription     Row Name 06/08/18 0925          Nutrition Prescription PO    PO Prescription Begin/change diet;Begin/change supplement     Begin/Change Diet to Regular     Supplement Ensure Enlive;Magic Cup;Ensure Pudding     Supplement Frequency 2 times a day;3 times a day     Other Modifiers High protein/high calorie     New PO Prescription Ordered? No, recommended        Other Orders    Obtain Weight Daily     Obtain Weight Ordered? No, recommended     Supplement --   Continue with MVI     Other Monitor and replace electrolytes as necessary             Education/Evaluation     Row Name 06/08/18 0926          Education    Education Will Instruct as appropriate        Monitor/Evaluation    Monitor Per protocol;I&O;PO intake;Supplement intake;Pertinent labs;Weight;Skin status         Electronically signed by:  Nicole Marin RD  06/08/18 10:09 AM

## 2018-06-08 NOTE — DISCHARGE INSTR - OTHER ORDERS
If you have any questions about your recovery, please call the [facility] Nurse Call Center at [phone]. A registered nurse is available 24 hours a day 7 days a week to assist you.

## 2018-06-08 NOTE — ED NOTES
HOUSE CALLED FOR BED PLACEMENT. BARBARA STATED SHE WOULD TAKE CARE OF THE BED.      Selma Black  06/07/18 8890

## 2018-06-08 NOTE — PROGRESS NOTES
Discharge Planning Assessment  [facility]     Patient Name: Joelle Yee  MRN: [id number]  Today's Date: 6/8/2018    Admit Date: 6/7/2018          Discharge Needs Assessment     Row Name 06/08/18 1554       Discharge Needs Assessment    Equipment Currently Used at Home oxygen;nebulizer   2L nightly and as needed during the day    Row Name 06/08/18 1547       Living Environment    Lives With alone    Current Living Arrangements home/apartment/condo    Primary Care Provided by self    Provides Primary Care For no one    Family Caregiver if Needed none    Quality of Family Relationships unable to assess    Able to Return to Prior Arrangements yes       Resource/Environmental Concerns    Resource/Environmental Concerns none       Transition Planning    Patient/Family Anticipates Transition to home with help/services    Patient/Family Anticipated Services at Transition other (see comments)    waiver services from [employer] and "[employer]    Transportation Anticipated family or friend will provide       Discharge Needs Assessment    Readmission Within the Last 30 Days no previous admission in last 30 days    Concerns to be Addressed denies needs/concerns at this time    Equipment Currently Used at Home shower chair;grab bar    Anticipated Changes Related to Illness none    Equipment Needed After Discharge none            Discharge Plan     Row Name 06/08/18 1543       Plan    Plan Home with  services from [employer] and "[employer]    Patient/Family in Agreement with Plan yes    Plan Comments Spoke with pt in room re [facility]; she is alone.  Pt states that she lives alone in an apt.  She has [employer] for waiver services (personal care, housekeeping and some cooking if needed).  Additionally, she has "[employer] that gets her Ensure and supposed to be getting someone that will help with grocery shopping.  Pt has a walker she uses as needed, a new rollator (that she has not used yet), shower chair, grab bars in bathroom, and rails on bed.   Pt has a life alert system.  She uses oxygen at 2L nightly and as needed throughout the day supplied by [employer].  She has a nebulizer that she purchased about 10 yrs ago, but states it still works fine.  Pt reports she has advance directives.  Address, phone & PCP verified.  CM will continue to follow and assist with discharge as needed.     Called ; spoke with Maya.  She confirms pt's oxygen use.          Destination     No service coordination in this encounter.      Durable Medical Equipment     No service coordination in this encounter.      Dialysis/Infusion     No service coordination in this encounter.      Home Medical Care     No service coordination in this encounter.      Social Care     No service coordination in this encounter.        Expected Discharge Date and Time     Expected Discharge Date Expected Discharge Time    Rodrigo 10, 2018               Demographic Summary     Row Name 06/08/18 1546       General Information    Admission Type observation    Arrived From emergency department    Required Notices Provided Observation Status Notice    Referral Source admission list    Reason for Consult discharge planning    Preferred Language English     Used During This Interaction no            Functional Status     Row Name 06/08/18 1546       Functional Status    Usual Activity Tolerance moderate       Functional Status, IADL    Medications independent    Meal Preparation independent    Housekeeping assistive person    Laundry independent    Shopping assistive person       Mental Status    General Appearance WDL WDL       Mental Status Summary    Recent Changes in Mental Status/Cognitive Functioning no changes            Psychosocial    No documentation.           Abuse/Neglect    No documentation.           Legal    No documentation.           Substance Abuse    No documentation.           Patient Forms    No documentation.         Sydney Chino

## 2018-06-08 NOTE — PLAN OF CARE
Problem: Patient Care Overview  Goal: Plan of Care Review  Outcome: Ongoing (interventions implemented as appropriate)   06/08/18 0530   Coping/Psychosocial   Plan of Care Reviewed With patient   Plan of Care Review   Progress improving   OTHER   Outcome Summary Patient is a new admit. She was given 3 rounds of antibiotics and states she is feeling a little better and less wheezy this morning. She is alert and oriented and has not complaints     Goal: Individualization and Mutuality  Outcome: Ongoing (interventions implemented as appropriate)    Goal: Interprofessional Rounds/Family Conf  Outcome: Ongoing (interventions implemented as appropriate)   06/08/18 0530   Interdisciplinary Rounds/Family Conf   Participants family;nursing;respiratory therapy;patient;pharmacy;physician       Problem: Fall Risk (Adult)  Goal: Identify Related Risk Factors and Signs and Symptoms  Outcome: Ongoing (interventions implemented as appropriate)   06/08/18 0530   Fall Risk (Adult)   Related Risk Factors (Fall Risk) age-related changes;bladder function altered   Signs and Symptoms (Fall Risk) presence of risk factors     Goal: Absence of Fall  Outcome: Ongoing (interventions implemented as appropriate)   06/08/18 0530   Fall Risk (Adult)   Absence of Fall making progress toward outcome       Problem: Skin Injury Risk (Adult)  Goal: Identify Related Risk Factors and Signs and Symptoms  Outcome: Ongoing (interventions implemented as appropriate)   06/08/18 0530   Skin Injury Risk (Adult)   Related Risk Factors (Skin Injury Risk) advanced age;moisture;mobility impaired     Goal: Skin Health and Integrity  Outcome: Ongoing (interventions implemented as appropriate)   06/08/18 0530   Skin Injury Risk (Adult)   Skin Health and Integrity making progress toward outcome       Problem: Chronic Obstructive Pulmonary Disease (Adult)  Goal: Signs and Symptoms of Listed Potential Problems Will be Absent, Minimized or Managed (Chronic Obstructive  Pulmonary Disease)  Outcome: Ongoing (interventions implemented as appropriate)   06/08/18 0506   Goal/Outcome Evaluation   Problems Assessed (Chronic Obstructive Pulmonary Disease (COPD)) all   Problems Present (COPD, Bronch/Emphy) respiratory compromise

## 2018-06-08 NOTE — PROGRESS NOTES
"Hospitalist Progress Note.    LOS: 0 days    Patient Care Team:  DONTRELL Regan as PCP - General    Chief Complaint:    Chief Complaint   Patient presents with   • Shortness of Breath       Subjective   Patient seen and examined this morning. She is sitting up and eating breakfast. Saturating at 95% on 2L of oxygen via NC. She states that she's feeling much better than she did yesterday evening. She does report a productive cough with green phlegm which she feels has slowed down. Does not feel as short of breath and feels that she's wheezing less. She does admit to smoking despite having a recent COPD flare up for which she took 1 week of oral steroid along with Levaquin. She denies any chest pain, palpitations or headache.     Patient was admitted by the overnight hospitalist after she presented to the ER with severe hypoxia and respiratory distress. She was found to be in acute COPD exacerbation with a clear chest xray. She was placed on IV antibiotics - Ceftriaxone and azithromycin and solu-medrol.       Review of Systems:    The pertinent  ROS was done and it is noted above, rest  was negative.    Objective     Vital Signs  /60 (BP Location: Right arm, Patient Position: Lying)   Pulse 98   Temp 97.8 °F (36.6 °C) (Oral)   Resp 18   Ht 152.4 cm (60\")   Wt 37.8 kg (83 lb 6.4 oz)   LMP  (LMP Unknown)   SpO2 96%   BMI 16.29 kg/m²       I/O this shift:  In: 240 [P.O.:240]  Out: 550 [Urine:550]    Intake/Output Summary (Last 24 hours) at 06/08/18 1035  Last data filed at 06/08/18 1027   Gross per 24 hour   Intake              540 ml   Output             1150 ml   Net             -610 ml       Physical Exam:    General Appearance: alert, oriented x 3, no acute distress, thin, frail appearing  HEENT: pupils round and reactive to light, oral mucosa dry, extra occular movements intact.  Neck: supple, no JVD, trachea midline  Lungs: fair air entry bilaterally, diffuse expiratory wheezing in posterior " lung fields with moderate rhonchi   Heart: RRR, normal S1 and S2, no S3, no rub  Abdomen: soft, non-tender, no palpable bladder, present bowel sounds to auscultation  Extremities: no edema, cyanosis or clubbing.   Neuro: normal speech and mental status, grossly non focal.     Results Review:      Results from last 7 days  Lab Units 06/08/18  0529 06/07/18  1336   SODIUM mmol/L 141 142   POTASSIUM mmol/L 4.8 4.5   CHLORIDE mmol/L 101 101   CO2 mmol/L 33.0* 31.0*   BUN mg/dL 18 27*   CREATININE mg/dL 0.70 0.60   CALCIUM mg/dL 9.2 9.3   BILIRUBIN mg/dL  --  0.4   ALK PHOS U/L  --  97   ALT (SGPT) U/L  --  22   AST (SGOT) U/L  --  27   GLUCOSE mg/dL 118* 102*       Estimated Creatinine Clearance: 37.9 mL/min (by C-G formula based on SCr of 0.7 mg/dL).      Results from last 7 days  Lab Units 06/08/18  0529   MAGNESIUM mg/dL 2.6*   PHOSPHORUS mg/dL 4.6*               Results from last 7 days  Lab Units 06/08/18  0529 06/07/18  1336   WBC 10*3/mm3 24.86* 15.68*   HEMOGLOBIN g/dL 13.0 13.7   PLATELETS 10*3/mm3 303 361               Imaging Results (last 24 hours)     ** No results found for the last 24 hours. **          azelastine 1 spray Each Nare Daily   azithromycin 250 mg Oral Q24H   budesonide 0.5 mg Nebulization BID - RT   ceftriaxone 1 g Intravenous Q24H   cetirizine 10 mg Oral Daily   conjugated estrogens 0.5 g Vaginal Every Other Day   enoxaparin 40 mg Subcutaneous Q24H   gabapentin 100 mg Oral Q8H   guaiFENesin 600 mg Oral Q12H   ipratropium-albuterol 3 mL Nebulization Q6H - RT   ketotifen 1 drop Both Eyes BID   lactobacillus acidophilus 1 capsule Oral Daily   losartan 25 mg Oral Daily   megestrol 400 mg Oral Daily   melatonin 10.5 mg Oral Nightly   methylPREDNISolone sodium succinate 40 mg Intravenous Q8H   metoprolol succinate XL 25 mg Oral Q24H   mirtazapine 30 mg Oral Nightly   montelukast 10 mg Oral Nightly   multivitamin with minerals 1 tablet Oral Daily   nicotine 1 patch Transdermal Daily   pantoprazole  40 mg Oral QAM   cyancobalamin 500 mcg Oral Daily          Medication Review:   Current Facility-Administered Medications   Medication Dose Route Frequency Provider Last Rate Last Dose   • acetaminophen (TYLENOL) tablet 650 mg  650 mg Oral Q4H PRN Maximino Bueno MD       • azelastine (ASTELIN) nasal spray 1 spray  1 spray Each Nare Daily Maximino Bueno MD   1 spray at 06/08/18 0802   • azithromycin (ZITHROMAX) tablet 250 mg  250 mg Oral Q24H Maximino Bueno MD   250 mg at 06/08/18 0804   • benzonatate (TESSALON) capsule 100 mg  100 mg Oral TID PRN Maximino Bueno MD       • budesonide (PULMICORT) nebulizer solution 0.5 mg  0.5 mg Nebulization BID - RT Maximino Bueno MD   0.5 mg at 06/08/18 0647   • cefTRIAXone (ROCEPHIN) IVPB 1 g/50ml dextrose (premix)  1 g Intravenous Q24H Maximino Bueno MD   Stopped at 06/08/18 0200   • cetirizine (zyrTEC) tablet 10 mg  10 mg Oral Daily Maximino Bueno MD   10 mg at 06/08/18 0804   • conjugated estrogens (PREMARIN) vaginal cream 0.5 application  0.5 g Vaginal Every Other Day Maximino Bueno MD       • enoxaparin (LOVENOX) syringe 40 mg  40 mg Subcutaneous Q24H Maximino Bueno MD   40 mg at 06/07/18 2341   • gabapentin (NEURONTIN) capsule 100 mg  100 mg Oral Q8H Maximino Bueno MD   100 mg at 06/08/18 0639   • guaiFENesin (MUCINEX) 12 hr tablet 600 mg  600 mg Oral Q12H Maximino Bueno MD   600 mg at 06/08/18 0804   • HYDROcodone-acetaminophen (NORCO)  MG per tablet 1 tablet  1 tablet Oral Q8H PRN Maximino Bueno MD   1 tablet at 06/08/18 0836   • ipratropium-albuterol (DUO-NEB) nebulizer solution 3 mL  3 mL Nebulization Q4H PRN Maximino Bueno MD       • ipratropium-albuterol (DUO-NEB) nebulizer solution 3 mL  3 mL Nebulization Q6H - RT Maximino Bueno MD   3 mL at 06/08/18 0647   • ketotifen (ZADITOR) 0.025 % ophthalmic solution 1 drop  1 drop Both Eyes BID Maximino Bueno MD   1 drop at 06/08/18 0804   • lactobacillus acidophilus (RISAQUAD) capsule 1 capsule  1 capsule Oral Daily Maximino Bueno MD   1 capsule at  06/08/18 0804   • losartan (COZAAR) tablet 25 mg  25 mg Oral Daily Maximino Bueno MD   25 mg at 06/08/18 0804   • megestrol (MEGACE) 40 MG/ML suspension 400 mg  400 mg Oral Daily Maximino Bueno MD       • melatonin tablet 10.5 mg  10.5 mg Oral Nightly Maximino Bueno MD   10.5 mg at 06/07/18 2338   • methylPREDNISolone sodium succinate (SOLU-Medrol) injection 40 mg  40 mg Intravenous Q8H Maximino Bueno MD   40 mg at 06/08/18 0458   • metoprolol succinate XL (TOPROL-XL) 24 hr tablet 25 mg  25 mg Oral Q24H Maximino Bueno MD   25 mg at 06/08/18 0804   • mirtazapine (REMERON) tablet 30 mg  30 mg Oral Nightly Maximino Bueno MD   30 mg at 06/07/18 2339   • montelukast (SINGULAIR) tablet 10 mg  10 mg Oral Nightly Maximino Bueno MD   10 mg at 06/07/18 2342   • multivitamin with minerals 1 tablet  1 tablet Oral Daily Maximino Bueno MD   1 tablet at 06/08/18 0804   • nicotine (NICODERM CQ) 21 MG/24HR patch 1 patch  1 patch Transdermal Daily Dora Montes MD   1 patch at 06/08/18 0805   • ondansetron (ZOFRAN) injection 4 mg  4 mg Intravenous Q6H PRN Maximino Bueno MD       • pantoprazole (PROTONIX) EC tablet 40 mg  40 mg Oral QAM Maximino Bueno MD   40 mg at 06/08/18 0639   • sodium chloride 0.9 % flush 10 mL  10 mL Intravenous PRN Femi Doherty MD       • vitamin B-12 (CYANOCOBALAMIN) tablet 500 mcg  500 mcg Oral Daily Maximino Bueno MD   500 mcg at 06/08/18 0804     Principal Problem:    Acute on chronic respiratory failure with hypoxia  Active Problems:    Esophageal reflux    Depression    Hyperlipidemia    Insomnia    Osteoarthritis    COPD with acute exacerbation    Moderate malnutrition    Acute exacerbation of chronic obstructive pulmonary disease (COPD)      Assessment/Plan   1.  Acute on chronic hypoxic respiratory failure, present on admission.  2.  Acute COPD exacerbation with acute bronchitis, present on admission.  3.  Chronic active tobacco dependence.  4.  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  5.  Essential  hypertension.  6.  Gastroesophageal reflux disease.  7.  Chronic insomnia.  8.  Chronic back pain.  9. Tobacco abuse  10. Leukocytosis, likely due to IV steroids    Patient is showing mild improvement in her oxygenation and respiratory symptoms. Will continue with IV steroids and IV antibiotics and will titrate it down as needed. Continue with nebulized steroids, mucolytics and bronchodilators.     I had an extensive discussion about smoking cessation with patient. She apparently tried Chantix which she feels did not work well for her but has been using nicotine patches. She is willing to try them while inpatient.     Possible discharge in 1-2 days depending on symptom improvement.     Details were discussed with the patient.   I also discussed the details with the nursing staff.    Rest as ordered.    Dora Montes MD  06/08/18  10:35 AM

## 2018-06-08 NOTE — CONSULTS
Malnutrition Severity Assessment    Patient Name:  Joelle Yee  YOB: 1945  MRN: [id number]  Admit Date:  6/7/2018    Patient meets criteria for : Mild malnutrition    Comments:  Pt qualifies for mild malnutrition based on BMI <17 (16.29), mild muscle wasting and mild fat wasting within clavicle, temporal, scapular and calf regions. RD ordered supplements to help meet increased nutrition needs. RD to follow pt and available PRN.    Malnutrition Type: Chronic Illness Malnutrition     Malnutrition Type (last 8 hours)      Malnutrition Severity Assessment     Row Name 06/08/18 0923       Malnutrition Severity Assessment    Malnutrition Type Chronic Illness Malnutrition    Row Name 06/08/18 0923       Physical Signs of Malnutrition (Chronic)    Muscle Wasting Mild    Fat Loss Mild    Fluid Accumulation None    Secondary Physical Signs None    Row Name 06/08/18 0923       Weight Status (Chronic)    BMI Mod (<17)    %IBW Mild (<90%)    Row Name 06/08/18 0923       Criteria Met (Must meet criteria for severity in at least 2 of these categories: M Wasting, Fat Loss, Fluid, Secondary Signs, Wt. Status, Intake)    Patient meets criteria for  Mild malnutrition          Electronically signed by:  Nicole Marin RD  06/08/18 10:04 AM

## 2018-06-08 NOTE — ED PROVIDER NOTES
Subjective   Patient is 70-year-old female discharged from this emergency department approximately 5 hours ago after being treated for COPD exacerbation.  She states she got home and got much worse.  Currently any exertion makes her symptoms much worse.  She does wear 2 L via nasal cannula 24 hours a day.  Speaking with Main Tee to the care of the patient earlier he reports her chest x-ray showed no evidence of pneumonia and her blood gas was good on discharge.            Review of Systems   All other systems reviewed and are negative.      Past Medical History:   Diagnosis Date   • Abdominal pain    • Acute bronchitis    • Ankle pain    • Anxiety 1980   • Atopic dermatitis    • Backache    • Bipolar disorder    • Bronchiectasis    • Chronic obstructive lung disease 2008   • Colon cancer screening    • COPD (chronic obstructive pulmonary disease)    • Cystocele    • Depressed    • Depression 1980   • Fatigue     Chronic pafigue 2012   • Fibromyalgia 2008   • GERD (gastroesophageal reflux disease)    • Gout    • Heartburn     Chronic historu of epigastric heartburn currently controlled on Prilesec 40 mg every morning along with Zantac 300 Mg daily at bedtime   • High cholesterol 2012   • Hip pain    • Hyperlipidemia    • Hypertension    • Insomnia    • Joint pain    • Kidney infection    • Loss of appetite    • Low back pain 1995   • Melena    • Nausea and vomiting    • On home oxygen therapy    • Osteoarthritis 2010   • Pain in limb    • Palpitations    • Pinched nerve     Pinched nerves 2011   • Recurrent urinary tract infection    • Sciatica [id number]   • Shortness of breath    • Sinus problem    • Sleep apnea 1998   • Upset stomach    • Valvular heart disease    • Vitamin B12 deficiency    • Weight loss, abnormal     Weight loss is stabel. On pund weight gain since 01/2016       Allergies   Allergen Reactions   • Dust Mite Extract Other (See Comments)     Dust  SINUS   • Grass Other (See Comments)      SINUS   • Mold Extract [Trichophyton] Other (See Comments)     SINUS       Past Surgical History:   Procedure Laterality Date   • ABDOMINAL HERNIA REPAIR  2016   • APPENDECTOMY  1965   • BACK SURGERY  2005   • CATARACT EXTRACTION Bilateral 1978   • CHOLECYSTECTOMY  1985   • EYE SURGERY      Put in implants    • GALLBLADDER SURGERY  1985   • KNEE SURGERY Left 1979    Knee Cap   • TUBAL ABDOMINAL LIGATION  1971       Family History   Problem Relation Age of Onset   • Ovarian cancer Mother    • Cancer Mother    • Lung cancer Father    • Heart disease Brother        Social History     Social History   • Marital status: Single     Social History Main Topics   • Smoking status: Current Every Day Smoker     Packs/day: 0.50     Years: 35.00     Types: Cigarettes     Last attempt to quit: 3/5/2017   • Smokeless tobacco: Never Used      Comment: ON CHANTIX   • Alcohol use No   • Drug use: No   • Sexual activity: Defer     Other Topics Concern   • Not on file           Objective   Physical Exam   Nursing note and vitals reviewed.    GEN: Moderate respiratory distress, difficulty speaking due to work of breathing   Head: Normocephalic, atraumatic  Eyes: Pupils equal round reactive to light  ENT: Posterior pharynx normal in appearance, oral mucosa is moist  Chest: Nontender to palpation  Cardiovascular: Regular rate  Lungs: Tachypneic with a rate of 24, coarse breath sounds bilaterally   Abdomen: Soft, nontender, nondistended, no peritoneal signs  Extremities: No edema, normal appearance  Neuro: GCS 15  Psych: Appears tired and frightened    Procedures           ED Course                  MDM  Number of Diagnoses or Management Options  Acute exacerbation of chronic obstructive pulmonary disease (COPD):   Tobacco abuse:   Diagnosis management comments: Treated IV magnesium and steroids.  Did speak Dr. Bueno who accepted patient for hospitalization.       Amount and/or Complexity of Data Reviewed  Clinical lab tests:  reviewed  Decide to obtain previous medical records or to obtain history from someone other than the patient: yes  Obtain history from someone other than the patient: yes  Review and summarize past medical records: yes  Independent visualization of images, tracings, or specimens: yes          Final diagnoses:   Acute exacerbation of chronic obstructive pulmonary disease (COPD)   Tobacco abuse            Femi Doherty MD  06/07/18 8568

## 2018-06-09 ENCOUNTER — APPOINTMENT (OUTPATIENT)
Dept: CT IMAGING | Facility: HOSPITAL | Age: 73
End: 2018-06-09

## 2018-06-09 LAB
DEPRECATED RDW RBC AUTO: 55.1 FL (ref 37–54)
ERYTHROCYTE [DISTWIDTH] IN BLOOD BY AUTOMATED COUNT: 15.9 % (ref 11.5–14.5)
HCT VFR BLD AUTO: 38.5 % (ref 37–47)
HGB BLD-MCNC: 12.4 G/DL (ref 12–16)
MCH RBC QN AUTO: 30.5 PG (ref 27–31)
MCHC RBC AUTO-ENTMCNC: 32.2 G/DL (ref 30–37)
MCV RBC AUTO: 94.6 FL (ref 81–99)
PLATELET # BLD AUTO: 323 10*3/MM3 (ref 130–400)
PMV BLD AUTO: 10.4 FL (ref 6–12)
RBC # BLD AUTO: 4.07 10*6/MM3 (ref 4.2–5.4)
WBC NRBC COR # BLD: 24.53 10*3/MM3 (ref 4.8–10.8)

## 2018-06-09 PROCEDURE — 94799 UNLISTED PULMONARY SVC/PX: CPT

## 2018-06-09 PROCEDURE — 96375 TX/PRO/DX INJ NEW DRUG ADDON: CPT

## 2018-06-09 PROCEDURE — G0378 HOSPITAL OBSERVATION PER HR: HCPCS

## 2018-06-09 PROCEDURE — 25010000002 CEFTRIAXONE PER 250 MG: Performed by: INTERNAL MEDICINE

## 2018-06-09 PROCEDURE — 25010000002 ONDANSETRON PER 1 MG: Performed by: INTERNAL MEDICINE

## 2018-06-09 PROCEDURE — 25010000002 IOPAMIDOL 61 % SOLUTION: Performed by: INTERNAL MEDICINE

## 2018-06-09 PROCEDURE — 96376 TX/PRO/DX INJ SAME DRUG ADON: CPT

## 2018-06-09 PROCEDURE — 99226 PR SBSQ OBSERVATION CARE/DAY 35 MINUTES: CPT | Performed by: INTERNAL MEDICINE

## 2018-06-09 PROCEDURE — 25010000002 ENOXAPARIN PER 10 MG: Performed by: INTERNAL MEDICINE

## 2018-06-09 PROCEDURE — 85027 COMPLETE CBC AUTOMATED: CPT | Performed by: INTERNAL MEDICINE

## 2018-06-09 PROCEDURE — 71275 CT ANGIOGRAPHY CHEST: CPT

## 2018-06-09 PROCEDURE — 96366 THER/PROPH/DIAG IV INF ADDON: CPT

## 2018-06-09 PROCEDURE — 25010000002 METHYLPREDNISOLONE PER 40 MG: Performed by: INTERNAL MEDICINE

## 2018-06-09 PROCEDURE — 96372 THER/PROPH/DIAG INJ SC/IM: CPT

## 2018-06-09 RX ORDER — METHYLPREDNISOLONE SODIUM SUCCINATE 40 MG/ML
40 INJECTION, POWDER, LYOPHILIZED, FOR SOLUTION INTRAMUSCULAR; INTRAVENOUS EVERY 24 HOURS
Status: DISCONTINUED | OUTPATIENT
Start: 2018-06-10 | End: 2018-06-10

## 2018-06-09 RX ORDER — GUAIFENESIN 600 MG/1
1200 TABLET, EXTENDED RELEASE ORAL EVERY 12 HOURS SCHEDULED
Status: DISCONTINUED | OUTPATIENT
Start: 2018-06-09 | End: 2018-06-10 | Stop reason: HOSPADM

## 2018-06-09 RX ORDER — VENLAFAXINE 75 MG/1
75 TABLET ORAL 2 TIMES DAILY WITH MEALS
Status: DISCONTINUED | OUTPATIENT
Start: 2018-06-09 | End: 2018-06-10 | Stop reason: HOSPADM

## 2018-06-09 RX ADMIN — VENLAFAXINE 75 MG: 75 TABLET ORAL at 17:31

## 2018-06-09 RX ADMIN — METOPROLOL SUCCINATE 25 MG: 25 TABLET, EXTENDED RELEASE ORAL at 08:19

## 2018-06-09 RX ADMIN — KETOTIFEN FUMARATE 1 DROP: 0.35 SOLUTION/ DROPS OPHTHALMIC at 20:54

## 2018-06-09 RX ADMIN — MELATONIN TAB 3 MG 10.5 MG: 3 TAB at 20:54

## 2018-06-09 RX ADMIN — HYDROCODONE BITARTRATE AND ACETAMINOPHEN 1 TABLET: 10; 325 TABLET ORAL at 06:05

## 2018-06-09 RX ADMIN — CEFTRIAXONE 1 G: 1 INJECTION, SOLUTION INTRAVENOUS at 22:46

## 2018-06-09 RX ADMIN — KETOTIFEN FUMARATE 1 DROP: 0.35 SOLUTION/ DROPS OPHTHALMIC at 08:28

## 2018-06-09 RX ADMIN — GABAPENTIN 100 MG: 100 CAPSULE ORAL at 21:06

## 2018-06-09 RX ADMIN — ONDANSETRON 4 MG: 2 INJECTION, SOLUTION INTRAMUSCULAR; INTRAVENOUS at 08:38

## 2018-06-09 RX ADMIN — LORAZEPAM 0.5 MG: 0.5 TABLET ORAL at 21:00

## 2018-06-09 RX ADMIN — IPRATROPIUM BROMIDE AND ALBUTEROL SULFATE 3 ML: .5; 3 SOLUTION RESPIRATORY (INHALATION) at 12:48

## 2018-06-09 RX ADMIN — FLUCONAZOLE 100 MG: 100 TABLET ORAL at 17:31

## 2018-06-09 RX ADMIN — NICOTINE 1 PATCH: 21 PATCH TRANSDERMAL at 08:23

## 2018-06-09 RX ADMIN — LOSARTAN POTASSIUM 25 MG: 25 TABLET, FILM COATED ORAL at 08:20

## 2018-06-09 RX ADMIN — IPRATROPIUM BROMIDE AND ALBUTEROL SULFATE 3 ML: .5; 3 SOLUTION RESPIRATORY (INHALATION) at 01:02

## 2018-06-09 RX ADMIN — METHYLPREDNISOLONE SODIUM SUCCINATE 40 MG: 40 INJECTION, POWDER, FOR SOLUTION INTRAMUSCULAR; INTRAVENOUS at 12:00

## 2018-06-09 RX ADMIN — AZELASTINE HYDROCHLORIDE 1 SPRAY: 137 SPRAY, METERED NASAL at 08:28

## 2018-06-09 RX ADMIN — GUAIFENESIN 600 MG: 600 TABLET, EXTENDED RELEASE ORAL at 08:21

## 2018-06-09 RX ADMIN — MIRTAZAPINE 30 MG: 15 TABLET, FILM COATED ORAL at 20:54

## 2018-06-09 RX ADMIN — GUAIFENESIN 1200 MG: 600 TABLET, EXTENDED RELEASE ORAL at 20:54

## 2018-06-09 RX ADMIN — BUDESONIDE 0.5 MG: 0.5 INHALANT RESPIRATORY (INHALATION) at 18:50

## 2018-06-09 RX ADMIN — Medication 10 ML: at 06:00

## 2018-06-09 RX ADMIN — IPRATROPIUM BROMIDE AND ALBUTEROL SULFATE 3 ML: .5; 3 SOLUTION RESPIRATORY (INHALATION) at 18:50

## 2018-06-09 RX ADMIN — MEGESTROL ACETATE 400 MG: 40 SUSPENSION ORAL at 08:22

## 2018-06-09 RX ADMIN — LORAZEPAM 0.5 MG: 0.5 TABLET ORAL at 08:34

## 2018-06-09 RX ADMIN — AZITHROMYCIN MONOHYDRATE 250 MG: 250 TABLET ORAL at 08:21

## 2018-06-09 RX ADMIN — GABAPENTIN 100 MG: 100 CAPSULE ORAL at 06:00

## 2018-06-09 RX ADMIN — METHYLPREDNISOLONE SODIUM SUCCINATE 40 MG: 40 INJECTION, POWDER, FOR SOLUTION INTRAMUSCULAR; INTRAVENOUS at 06:00

## 2018-06-09 RX ADMIN — IOPAMIDOL 80 ML: 612 INJECTION, SOLUTION INTRAVENOUS at 13:15

## 2018-06-09 RX ADMIN — CYANOCOBALAMIN TAB 500 MCG 500 MCG: 500 TAB at 08:21

## 2018-06-09 RX ADMIN — GABAPENTIN 100 MG: 100 CAPSULE ORAL at 14:12

## 2018-06-09 RX ADMIN — Medication 1 CAPSULE: at 08:21

## 2018-06-09 RX ADMIN — PANTOPRAZOLE SODIUM 40 MG: 40 TABLET, DELAYED RELEASE ORAL at 06:00

## 2018-06-09 RX ADMIN — MULTIPLE VITAMINS W/ MINERALS TAB 1 TABLET: TAB at 08:20

## 2018-06-09 RX ADMIN — CETIRIZINE HYDROCHLORIDE 10 MG: 10 TABLET, FILM COATED ORAL at 08:21

## 2018-06-09 RX ADMIN — ENOXAPARIN SODIUM 30 MG: 30 INJECTION SUBCUTANEOUS at 22:46

## 2018-06-09 RX ADMIN — IPRATROPIUM BROMIDE AND ALBUTEROL SULFATE 3 ML: .5; 3 SOLUTION RESPIRATORY (INHALATION) at 06:57

## 2018-06-09 RX ADMIN — MONTELUKAST SODIUM 10 MG: 10 TABLET, FILM COATED ORAL at 20:54

## 2018-06-09 RX ADMIN — HYDROCODONE BITARTRATE AND ACETAMINOPHEN 1 TABLET: 10; 325 TABLET ORAL at 21:05

## 2018-06-09 RX ADMIN — BUDESONIDE 0.5 MG: 0.5 INHALANT RESPIRATORY (INHALATION) at 06:57

## 2018-06-09 NOTE — PLAN OF CARE
Problem: Skin Injury Risk (Adult)  Goal: Skin Health and Integrity  Outcome: Ongoing (interventions implemented as appropriate)   06/09/18 0304   Skin Injury Risk (Adult)   Skin Health and Integrity making progress toward outcome       Problem: Chronic Obstructive Pulmonary Disease (Adult)  Goal: Signs and Symptoms of Listed Potential Problems Will be Absent, Minimized or Managed (Chronic Obstructive Pulmonary Disease)  Outcome: Ongoing (interventions implemented as appropriate)   06/08/18 0530   Goal/Outcome Evaluation   Problems Assessed (Chronic Obstructive Pulmonary Disease (COPD)) all   Problems Present (COPD, Bronch/Emphy) respiratory compromise

## 2018-06-09 NOTE — PLAN OF CARE
Problem: Patient Care Overview  Goal: Plan of Care Review  Outcome: Ongoing (interventions implemented as appropriate)   06/09/18 4733   Coping/Psychosocial   Plan of Care Reviewed With patient   Plan of Care Review   Progress no change   OTHER   Outcome Summary CT PE performed today. Patient ambulated in [place] with PT. Ambulating to bathroom. Alert and oriented.

## 2018-06-09 NOTE — PROGRESS NOTES
[facility]    Name:  Joelle Yee   Age:  72 y.o.  Sex:  female  :  1945  MRN:  [id number]   Visit Number:  [id number]  Admission Date:  2018  Date Of Service:  18  Primary Care Physician:  DONTRELL Regan     LOS: 0 days :  Patient Care Team:  DONTRELL Regan as PCP - General:    History taken from:     patient    Chief Complaint:      Hypoxia    Subjective     Interval History:     Patient seen today.  Reviewed history and physical, x-rays, lab work, and chart.    Patient is a 71-year-old  female with COPD, on chronic home O2 at 2 L, hypertension, GERD, who was admitted on 2018 in the evening for hypoxia.  She had been to her PCP previously in the past week and was given Levaquin and prednisone taper which she took for one week, she felt better but had worsening symptoms.  She continues to smoke a pack cigarettes per day.  In the emergency room she had sinus tachycardia at 125.  She was given IV Solu-Medrol and IV magnesium.  She was also placed on bronchodilators.    She feels better though still mildly hypoxic.  She has not ambulated much.  She does feel weak.  She denies any chest pressure, nausea, vomiting, fever or chills.  He has no leg edema or history of pulmonary embolism.        Review of Systems:     All systems were reviewed and negative except for:  Respiratory: positive for  shortness of air    Objective     Vital Signs:    Temp:  [97.7 °F (36.5 °C)-98.3 °F (36.8 °C)] 98.2 °F (36.8 °C)  Heart Rate:  [] 96  Resp:  [16-20] 18  BP: (114-145)/(69-77) 143/74    Physical Exam:      General Appearance:    Alert, cooperative, in no acute distress   Head:    Normocephalic, without obvious abnormality, atraumatic   Eyes:            Lids and lashes normal, conjunctivae and sclerae normal, no   icterus, no pallor, corneas clear, PERRLA   Ears:    Ears appear intact with no abnormalities noted   Throat:   No  oral lesions, no thrush, oral mucosa moist   Neck:   No adenopathy, supple, trachea midline, no thyromegaly, no     carotid bruit, no JVD   Back:     No kyphosis present, no scoliosis present, no skin lesions,       erythema or scars, no tenderness to percussion or                   palpation,   range of motion normal   Lungs:     Mild expiratory wheezes bilaterally without uses accessory muscles respiration.      Heart:    Regular rhythm and normal rate, normal S1 and S2, no            murmur, no gallop, no rub, no click   Breast Exam:    Deferred   Abdomen:     Normal bowel sounds, no masses, no organomegaly, soft        non-tender, non-distended, no guarding, no rebound                 tenderness   Genitalia:    Deferred   Extremities:   4/5 strength in upper/lower extremities bilaterally without cyanosis, clubbing or edema.     Pulses:   Pulses palpable and equal bilaterally   Skin:   No bleeding, bruising or rash   Lymph nodes:   No palpable adenopathy   Neurologic:   Cranial nerves 2 - 12 grossly intact, sensation intact, DTR        present and equal bilaterally        Results Review:      I reviewed the patient's new clinical results.    Labs:    Lab Results (last 24 hours)     Procedure Component Value Units Date/Time    CBC (No Diff) [[id number]]  (Abnormal) Collected:  06/09/18 0548    Specimen:  Blood Updated:  06/09/18 0617     WBC 24.53 (H) 10*3/mm3      RBC 4.07 (L) 10*6/mm3      Hemoglobin 12.4 g/dL      Hematocrit 38.5 %      MCV 94.6 fL      MCH 30.5 pg      MCHC 32.2 g/dL      RDW 15.9 (H) %      RDW-SD 55.1 (H) fl      MPV 10.4 fL      Platelets 323 10*3/mm3            Radiology:    Imaging Results (last 24 hours)     Procedure Component Value Units Date/Time    CT Chest Pulmonary Embolism With Contrast [[id number]] Updated:  06/09/18 1239          Medication Review:       azelastine 1 spray Each Nare Daily   azithromycin 250 mg Oral Q24H   budesonide 0.5 mg Nebulization BID - RT   ceftriaxone 1 g  Intravenous Q24H   cetirizine 10 mg Oral Daily   conjugated estrogens 0.5 g Vaginal Every Other Day   enoxaparin 30 mg Subcutaneous Q24H   fluconazole 100 mg Oral Q24H   gabapentin 100 mg Oral Q8H   guaiFENesin 600 mg Oral Q12H   ipratropium-albuterol 3 mL Nebulization Q6H - RT   ketotifen 1 drop Both Eyes BID   lactobacillus acidophilus 1 capsule Oral Daily   losartan 25 mg Oral Daily   megestrol 400 mg Oral Daily   melatonin 10.5 mg Oral Nightly   methylPREDNISolone sodium succinate 40 mg Intravenous Q8H   metoprolol succinate XL 25 mg Oral Q24H   mirtazapine 30 mg Oral Nightly   montelukast 10 mg Oral Nightly   multivitamin with minerals 1 tablet Oral Daily   nicotine 1 patch Transdermal Daily   pantoprazole 40 mg Oral QAM   cyancobalamin 500 mcg Oral Daily            Assessment/Plan     Problem List Items Addressed This Visit     Acute exacerbation of chronic obstructive pulmonary disease (COPD) - Primary    Relevant Medications    cetirizine (zyrTEC) 10 MG tablet    benzonatate (TESSALON) 100 MG capsule    albuterol (PROVENTIL) (2.5 MG/3ML) 0.083% nebulizer solution    montelukast (SINGULAIR) 10 MG tablet    azelastine (ASTELIN) 0.1 % nasal spray    budesonide-formoterol (SYMBICORT) 160-4.5 MCG/ACT inhaler    ipratropium-albuterol (COMBIVENT RESPIMAT)  MCG/ACT inhaler    predniSONE (DELTASONE) 10 MG tablet    INCRUSE ELLIPTA 62.5 MCG/INH aerosol powder     PredniSONE (DELTASONE) 10 MG (48) tablet pack      Other Visit Diagnoses     Tobacco abuse              1.  Acute on chronic hypoxic respiratory failure, present on admission.  2.  Acute COPD exacerbation with acute bronchitis, present on admission.  3.  Chronic active tobacco dependence.  4.  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  5.  Essential hypertension.  6.  Gastroesophageal reflux disease.  7.  Chronic insomnia.  8.  Chronic back pain.  9. Tobacco abuse  10. Leukocytosis, likely due to IV steroids    Plan:    Due to continued hypoxia  and tachycardia, will order a CTA of the chest.  Await results on this.  We'll check lab work in the a.m.  Consult PT and OT.  Encouraged patient to quit smoking.  Decrease IV steroids to 40 mg every 24 hours.  Continue with Rocephin and Zithromax.  Anticipate this patient could go home by tomorrow.  Await results of the CT.  Further recommendations will depend on clinical course.    José Antonio Reddy,   06/09/18  1:38 PM

## 2018-06-10 VITALS
TEMPERATURE: 98.3 F | DIASTOLIC BLOOD PRESSURE: 57 MMHG | HEART RATE: 96 BPM | SYSTOLIC BLOOD PRESSURE: 107 MMHG | OXYGEN SATURATION: 97 % | WEIGHT: 88.25 LBS | HEIGHT: 60 IN | RESPIRATION RATE: 18 BRPM | BODY MASS INDEX: 17.33 KG/M2

## 2018-06-10 LAB
ALBUMIN SERPL-MCNC: 3.7 G/DL (ref 3.5–5)
ANION GAP SERPL CALCULATED.3IONS-SCNC: 14.3 MMOL/L (ref 10–20)
BASOPHILS # BLD AUTO: 0.05 10*3/MM3 (ref 0–0.2)
BASOPHILS NFR BLD AUTO: 0.2 % (ref 0–2.5)
BUN BLD-MCNC: 26 MG/DL (ref 7–20)
BUN/CREAT SERPL: 43.3 (ref 7.1–23.5)
CALCIUM SPEC-SCNC: 9.2 MG/DL (ref 8.4–10.2)
CHLORIDE SERPL-SCNC: 101 MMOL/L (ref 98–107)
CO2 SERPL-SCNC: 30 MMOL/L (ref 26–30)
CREAT BLD-MCNC: 0.6 MG/DL (ref 0.6–1.3)
DEPRECATED RDW RBC AUTO: 57.3 FL (ref 37–54)
EOSINOPHIL # BLD AUTO: 0.01 10*3/MM3 (ref 0–0.7)
EOSINOPHIL NFR BLD AUTO: 0 % (ref 0–7)
ERYTHROCYTE [DISTWIDTH] IN BLOOD BY AUTOMATED COUNT: 16.4 % (ref 11.5–14.5)
GFR SERPL CREATININE-BSD FRML MDRD: 98 ML/MIN/1.73
GLUCOSE BLD-MCNC: 85 MG/DL (ref 74–98)
HCT VFR BLD AUTO: 37.2 % (ref 37–47)
HGB BLD-MCNC: 12.2 G/DL (ref 12–16)
IMM GRANULOCYTES # BLD: 0.39 10*3/MM3 (ref 0–0.06)
IMM GRANULOCYTES NFR BLD: 1.7 % (ref 0–0.6)
LYMPHOCYTES # BLD AUTO: 1.99 10*3/MM3 (ref 0.6–3.4)
LYMPHOCYTES NFR BLD AUTO: 8.9 % (ref 10–50)
MAGNESIUM SERPL-MCNC: 2 MG/DL (ref 1.6–2.3)
MCH RBC QN AUTO: 31.4 PG (ref 27–31)
MCHC RBC AUTO-ENTMCNC: 32.8 G/DL (ref 30–37)
MCV RBC AUTO: 95.9 FL (ref 81–99)
MONOCYTES # BLD AUTO: 1.46 10*3/MM3 (ref 0–0.9)
MONOCYTES NFR BLD AUTO: 6.5 % (ref 0–12)
NEUTROPHILS # BLD AUTO: 18.55 10*3/MM3 (ref 2–6.9)
NEUTROPHILS NFR BLD AUTO: 82.7 % (ref 37–80)
NRBC BLD MANUAL-RTO: 0 /100 WBC (ref 0–0)
PHOSPHATE SERPL-MCNC: 4.3 MG/DL (ref 2.5–4.5)
PLATELET # BLD AUTO: 319 10*3/MM3 (ref 130–400)
PMV BLD AUTO: 10.1 FL (ref 6–12)
POTASSIUM BLD-SCNC: 4.3 MMOL/L (ref 3.5–5.1)
RBC # BLD AUTO: 3.88 10*6/MM3 (ref 4.2–5.4)
SODIUM BLD-SCNC: 141 MMOL/L (ref 137–145)
WBC NRBC COR # BLD: 22.45 10*3/MM3 (ref 4.8–10.8)

## 2018-06-10 PROCEDURE — 94799 UNLISTED PULMONARY SVC/PX: CPT

## 2018-06-10 PROCEDURE — 83735 ASSAY OF MAGNESIUM: CPT | Performed by: INTERNAL MEDICINE

## 2018-06-10 PROCEDURE — 85025 COMPLETE CBC W/AUTO DIFF WBC: CPT | Performed by: INTERNAL MEDICINE

## 2018-06-10 PROCEDURE — G0378 HOSPITAL OBSERVATION PER HR: HCPCS

## 2018-06-10 PROCEDURE — 99217 PR OBSERVATION CARE DISCHARGE MANAGEMENT: CPT | Performed by: INTERNAL MEDICINE

## 2018-06-10 PROCEDURE — 80069 RENAL FUNCTION PANEL: CPT | Performed by: INTERNAL MEDICINE

## 2018-06-10 RX ORDER — CEFDINIR 300 MG/1
300 CAPSULE ORAL EVERY 12 HOURS SCHEDULED
Status: DISCONTINUED | OUTPATIENT
Start: 2018-06-10 | End: 2018-06-10 | Stop reason: HOSPADM

## 2018-06-10 RX ORDER — PREDNISONE 10 MG/1
10 TABLET ORAL DAILY
Qty: 30 TABLET | Refills: 0 | Status: ON HOLD | OUTPATIENT
Start: 2018-06-10 | End: 2018-07-10

## 2018-06-10 RX ORDER — CEFDINIR 300 MG/1
300 CAPSULE ORAL EVERY 12 HOURS SCHEDULED
Qty: 8 CAPSULE | Refills: 0 | Status: SHIPPED | OUTPATIENT
Start: 2018-06-10 | End: 2018-06-14

## 2018-06-10 RX ORDER — GUAIFENESIN 600 MG/1
1200 TABLET, EXTENDED RELEASE ORAL EVERY 12 HOURS SCHEDULED
Qty: 40 TABLET | Refills: 0 | Status: SHIPPED | OUTPATIENT
Start: 2018-06-10 | End: 2018-07-05 | Stop reason: HOSPADM

## 2018-06-10 RX ADMIN — GABAPENTIN 100 MG: 100 CAPSULE ORAL at 06:18

## 2018-06-10 RX ADMIN — NICOTINE 1 PATCH: 21 PATCH TRANSDERMAL at 09:32

## 2018-06-10 RX ADMIN — HYDROCODONE BITARTRATE AND ACETAMINOPHEN 1 TABLET: 10; 325 TABLET ORAL at 06:18

## 2018-06-10 RX ADMIN — VENLAFAXINE 75 MG: 75 TABLET ORAL at 09:33

## 2018-06-10 RX ADMIN — LOSARTAN POTASSIUM 25 MG: 25 TABLET, FILM COATED ORAL at 09:33

## 2018-06-10 RX ADMIN — MULTIPLE VITAMINS W/ MINERALS TAB 1 TABLET: TAB at 09:33

## 2018-06-10 RX ADMIN — IPRATROPIUM BROMIDE AND ALBUTEROL SULFATE 3 ML: .5; 3 SOLUTION RESPIRATORY (INHALATION) at 00:43

## 2018-06-10 RX ADMIN — AZELASTINE HYDROCHLORIDE 1 SPRAY: 137 SPRAY, METERED NASAL at 09:42

## 2018-06-10 RX ADMIN — CETIRIZINE HYDROCHLORIDE 10 MG: 10 TABLET, FILM COATED ORAL at 09:33

## 2018-06-10 RX ADMIN — AZITHROMYCIN MONOHYDRATE 250 MG: 250 TABLET ORAL at 09:33

## 2018-06-10 RX ADMIN — PANTOPRAZOLE SODIUM 40 MG: 40 TABLET, DELAYED RELEASE ORAL at 06:18

## 2018-06-10 RX ADMIN — CYANOCOBALAMIN TAB 500 MCG 500 MCG: 500 TAB at 10:17

## 2018-06-10 RX ADMIN — IPRATROPIUM BROMIDE AND ALBUTEROL SULFATE 3 ML: .5; 3 SOLUTION RESPIRATORY (INHALATION) at 07:06

## 2018-06-10 RX ADMIN — Medication 1 CAPSULE: at 09:32

## 2018-06-10 RX ADMIN — GUAIFENESIN 1200 MG: 600 TABLET, EXTENDED RELEASE ORAL at 09:32

## 2018-06-10 RX ADMIN — MEGESTROL ACETATE 400 MG: 40 SUSPENSION ORAL at 09:34

## 2018-06-10 RX ADMIN — BUDESONIDE 0.5 MG: 0.5 INHALANT RESPIRATORY (INHALATION) at 07:06

## 2018-06-10 RX ADMIN — METOPROLOL SUCCINATE 25 MG: 25 TABLET, EXTENDED RELEASE ORAL at 09:33

## 2018-06-10 RX ADMIN — KETOTIFEN FUMARATE 1 DROP: 0.35 SOLUTION/ DROPS OPHTHALMIC at 09:41

## 2018-06-10 NOTE — DISCHARGE SUMMARY
[facility]   DISCHARGE SUMMARY      Name:  Joelle Yee   Age:  72 y.o.  Sex:  female  :  1945  MRN:  [id number]   Visit Number:  [id number]  Primary Care Physician:  DONTRELL Regan  Date of Discharge:  6/10/2018  Admission Date:  2018      Discharge Diagnosis:     1.  Acute on chronic hypoxic respiratory failure, present on admission.  2.  Acute COPD exacerbation with acute bronchitis, present on admission.  3.  Chronic active tobacco dependence.  4.  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  5.  Essential hypertension.  6.  Gastroesophageal reflux disease.  7.  Chronic insomnia.  8.  Chronic back pain.  9. Tobacco abuse  10. Leukocytosis, likely due to IV steroids  11.  Bilateral bronchiectasis    Active Hospital Problems (** Indicates Principal Problem)    Diagnosis Date Noted   • **Acute on chronic respiratory failure with hypoxia [J96.21] 10/16/2017   • Moderate malnutrition [E44.0] 2018   • Acute exacerbation of chronic obstructive pulmonary disease (COPD) [J44.1] 2018   • COPD with acute exacerbation [J44.1] 2017   • Insomnia [G47.00] 2016   • Osteoarthritis [M19.90] 2016   • Hyperlipidemia [E78.5] 2016   • Esophageal reflux [K21.9] 2016   • Depression [F32.9] 2016      Resolved Hospital Problems    Diagnosis Date Noted Date Resolved   No resolved problems to display.         Presenting Problem/History of Present Illness:    Acute exacerbation of chronic obstructive pulmonary disease (COPD) [J44.1]         Hospital Course:    Patient is a 71-year-old  female with past medical history of COPD, chronic hypoxic respiratory failure on 2 L of home O2, hypertension, GERD who continues to smoke a pack cigarettes per day was brought in the emergency room with increasing shortness breath for the past 2 weeks.  She was having shortness of breath on minimal exertion.  She was brought to the emergency  room and her pulse ox was in the 70s.  She had been on Levaquin and a prednisone taper via her primary care provider a couple of weeks ago.  She had improved, however she did worsen prior to admission.  She was given bronchodilator, IV Solu-Medrol, and IV magnesium in the emergency room.  She does follow with Dr. Espazra.    She was admitted to the hospital, placed on Rocephin, Zithromax, IV Solu-Medrol.  She was tachycardic as well.  CT with PE protocol was ordered showing no evidence of PE however there was bronchiectasis bilaterally with mucus impaction of the lower lobe bronchi.  Patient's guaifenesin was increased and she was placed on a flutter valve with her breathing treatments.  She was able to bring up some sputum.  She does have a flutter valve at home, she has been intermittently using this.  She was recommended to use this with breathing treatments at home.    The day she feels much better.  She has been transitioned to oral antibiotics and oral steroids.  She feels she is at her baseline.  She is ambulating in the halls without difficulty.  We will arrange for home health for her for COPD management.  Advised her to quit smoking.  She also will need to follow up with Dr. Esparza regarding the bronchiectasis.  Would continue with Omnicef at home for 5 more days.  She is otherwise stable to be discharged home.    Procedures Performed:           Consults:     Consults     No orders found from 5/9/2018 to 6/8/2018.          Pertinent Test Results:     Lab Results (all)     Procedure Component Value Units Date/Time    Renal Function Panel [[id number]]  (Abnormal) Collected:  06/10/18 0544    Specimen:  Blood Updated:  06/10/18 0639     Glucose 85 mg/dL      BUN 26 (H) mg/dL      Creatinine 0.60 mg/dL      Sodium 141 mmol/L      Potassium 4.3 mmol/L      Chloride 101 mmol/L      CO2 30.0 mmol/L      Calcium 9.2 mg/dL      Albumin 3.70 g/dL      Phosphorus 4.3 mg/dL      Anion Gap 14.3 mmol/L      BUN/Creatinine  Ratio 43.3 (H)     eGFR Non African Amer 98 mL/min/1.73     Narrative:       The MDRD GFR formula is only valid for adults with stable renal function between ages 18 and 70.    Magnesium [[id number]]  (Normal) Collected:  06/10/18 0544    Specimen:  Blood Updated:  06/10/18 0639     Magnesium 2.0 mg/dL     CBC & Differential [[id number]] Collected:  06/10/18 0544    Specimen:  Blood Updated:  06/10/18 0639    Narrative:       The following orders were created for panel order CBC & Differential.  Procedure                               Abnormality         Status                     ---------                               -----------         ------                     CBC Auto Differential[[id number]]        Abnormal            Final result                 Please view results for these tests on the individual orders.    CBC Auto Differential [[id number]]  (Abnormal) Collected:  06/10/18 0544    Specimen:  Blood Updated:  06/10/18 0639     WBC 22.45 (H) 10*3/mm3      RBC 3.88 (L) 10*6/mm3      Hemoglobin 12.2 g/dL      Hematocrit 37.2 %      MCV 95.9 fL      MCH 31.4 (H) pg      MCHC 32.8 g/dL      RDW 16.4 (H) %      RDW-SD 57.3 (H) fl      MPV 10.1 fL      Platelets 319 10*3/mm3      Neutrophil % 82.7 (H) %      Lymphocyte % 8.9 (L) %      Monocyte % 6.5 %      Eosinophil % 0.0 %      Basophil % 0.2 %      Immature Grans % 1.7 (H) %      Neutrophils, Absolute 18.55 (H) 10*3/mm3      Lymphocytes, Absolute 1.99 10*3/mm3      Monocytes, Absolute 1.46 (H) 10*3/mm3      Eosinophils, Absolute 0.01 10*3/mm3      Basophils, Absolute 0.05 10*3/mm3      Immature Grans, Absolute 0.39 (H) 10*3/mm3      nRBC 0.0 /100 WBC     CBC (No Diff) [[id number]]  (Abnormal) Collected:  06/09/18 0548    Specimen:  Blood Updated:  06/09/18 0617     WBC 24.53 (H) 10*3/mm3      RBC 4.07 (L) 10*6/mm3      Hemoglobin 12.4 g/dL      Hematocrit 38.5 %      MCV 94.6 fL      MCH 30.5 pg      MCHC 32.2 g/dL      RDW 15.9 (H) %      RDW-SD 55.1 (H) fl       MPV 10.4 fL      Platelets 323 10*3/mm3     Basic Metabolic Panel [[id number]]  (Abnormal) Collected:  06/08/18 0529    Specimen:  Blood Updated:  06/08/18 0624     Glucose 118 (H) mg/dL      BUN 18 mg/dL      Creatinine 0.70 mg/dL      Sodium 141 mmol/L      Potassium 4.8 mmol/L      Chloride 101 mmol/L      CO2 33.0 (H) mmol/L      Calcium 9.2 mg/dL      eGFR Non African Amer 82 mL/min/1.73      BUN/Creatinine Ratio 25.7 (H)     Anion Gap 11.8 mmol/L     Narrative:       The MDRD GFR formula is only valid for adults with stable renal function between ages 18 and 70.    Magnesium [[id number]]  (Abnormal) Collected:  06/08/18 0529    Specimen:  Blood Updated:  06/08/18 0624     Magnesium 2.6 (H) mg/dL     Troponin [[id number]]  (Normal) Collected:  06/08/18 0529    Specimen:  Blood Updated:  06/08/18 0624     Troponin I <0.012 ng/mL     Narrative:       Normal Patient Upper Reference Limit (URL) (99th Percentile)=0.03 ng/mL   Non-AMI Illness Reference Limit=0.03-0.11 ng/mL   AMI Confirmation=0.12 ng/mL and above    Phosphorus [[id number]]  (Abnormal) Collected:  06/08/18 0529    Specimen:  Blood Updated:  06/08/18 0624     Phosphorus 4.6 (H) mg/dL     CBC Auto Differential [[id number]]  (Abnormal) Collected:  06/08/18 0529    Specimen:  Blood Updated:  06/08/18 0610     WBC 24.86 (H) 10*3/mm3      RBC 4.11 (L) 10*6/mm3      Hemoglobin 13.0 g/dL      Hematocrit 39.8 %      MCV 96.8 fL      MCH 31.6 (H) pg      MCHC 32.7 g/dL      RDW 15.9 (H) %      RDW-SD 55.9 (H) fl      MPV 10.0 fL      Platelets 303 10*3/mm3      Neutrophil % 92.4 (H) %      Lymphocyte % 3.7 (L) %      Monocyte % 2.7 %      Eosinophil % 0.0 %      Basophil % 0.2 %      Immature Grans % 1.0 (H) %      Neutrophils, Absolute 22.96 (H) 10*3/mm3      Lymphocytes, Absolute 0.93 10*3/mm3      Monocytes, Absolute 0.68 10*3/mm3      Eosinophils, Absolute 0.00 10*3/mm3      Basophils, Absolute 0.04 10*3/mm3      Immature Grans, Absolute 0.25 (H) 10*3/mm3       nRBC 0.0 /100 WBC     [facility] Draw [[id number]] Collected:  06/07/18 2017    Specimen:  Blood Updated:  06/07/18 2130    Narrative:       The following orders were created for panel order [facility] Draw.  Procedure                               Abnormality         Status                     ---------                               -----------         ------                     Light Blue Top[[id number]]                                   Final result               Lavender Top[[id number]]                                     Final result               Gold Top - SST[[id number]]                                   Final result               Green Top (No Gel)[[id number]]                               Final result                 Please view results for these tests on the individual orders.    Light Blue Top [[id number]] Collected:  06/07/18 2017    Specimen:  Blood Updated:  06/07/18 2130     Extra Tube hold for add-on     Comment: Auto resulted       Lavender Top [[id number]] Collected:  06/07/18 2017    Specimen:  Blood Updated:  06/07/18 2130     Extra Tube hold for add-on     Comment: Auto resulted       Gold Top - SST [[id number]] Collected:  06/07/18 2017    Specimen:  Blood Updated:  06/07/18 2130     Extra Tube Hold for add-ons.     Comment: Auto resulted.       Green Top (No Gel) [[id number]] Collected:  06/07/18 2017    Specimen:  Blood Updated:  06/07/18 2130     Extra Tube Hold for add-ons.     Comment: Auto resulted.             Imaging Results (all)     Procedure Component Value Units Date/Time    CT Chest Pulmonary Embolism With Contrast [[id number]] Collected:  06/09/18 1339     Updated:  06/09/18 1340    Narrative:       FINAL REPORT    TECHNIQUE:  Postcontrast axial images of the chest were performed in a CTA  protocol. This study was performed with techniques to keep  radiation doses as low as reasonably achievable, (ALARA).  Individualized dose reduction technique using automated exposure  control or adjustment of  "mA and/or kV according to the patient's  size were employed.    CLINICAL HISTORY:  Shortness of breath    FINDINGS:  There are dense LAD coronary artery calcifications.  The heart  is normal in size.  No adenopathy is identified.  No pleural or  pericardial effusion is identified.  The thoracic aorta is  normal in caliper with no focal aneurysm or dissection  identified.  There is no filling defect to suggest pulmonary  embolism.  There is severe emphysematous change.  Diffuse  bronchial thickening is seen with evidence of bronchiectasis and  mucus impaction in the right greater than left lower lobe  bronchi.  There is bibasilar atelectasis.  There is no lobar  consolidation seen.   The images of the upper abdomen  demonstrate postoperative change of cholecystectomy.  There is a  6 cm left renal simple cyst seen.      Impression:       No evidence for PE on this exam.  Bronchiectasis with mucus  impaction of the lower lobe bronchi.    Authenticated by Ousmane Shahid MD on 06/09/2018 01:39:18 PM          Condition on Discharge:      Stable    Vital Signs:    /57 (BP Location: Right arm, Patient Position: Lying)   Pulse 96   Temp 98.3 °F (36.8 °C) (Oral)   Resp 18   Ht 152.4 cm (60\")   Wt 40 kg (88 lb 4 oz)   LMP  (LMP Unknown)   SpO2 97%   BMI 17.24 kg/m²     Physical Exam:      General Appearance:    Alert, cooperative, in no acute distress   Head:    Normocephalic, without obvious abnormality, atraumatic   Eyes:            Lids and lashes normal, conjunctivae and sclerae normal, no   icterus, no pallor, corneas clear, PERRLA   Ears:    Ears appear intact with no abnormalities noted   Throat:   No oral lesions, no thrush, oral mucosa moist   Neck:   No adenopathy, supple, trachea midline, no thyromegaly, no     carotid bruit, no JVD   Back:     No kyphosis present, no scoliosis present, no skin lesions,       erythema or scars, no tenderness to percussion or                   palpation,   range of " motion normal   Lungs:    Occasional expiratory wheeze, no use of accessory muscles or respiration.      Heart:    Regular rhythm and normal rate, normal S1 and S2, no            murmur, no gallop, no rub, no click   Breast Exam:    Deferred   Abdomen:     Normal bowel sounds, no masses, no organomegaly, soft        non-tender, non-distended, no guarding, no rebound                 tenderness   Genitalia:    Deferred   Extremities:   Moves all extremities well, no edema, no cyanosis, no              redness   Pulses:   Pulses palpable and equal bilaterally   Skin:   No bleeding, bruising or rash   Lymph nodes:   No palpable adenopathy   Neurologic:   Cranial nerves 2 - 12 grossly intact, sensation intact, DTR        present and equal bilaterally       Discharge Disposition:    Home-Health Care [facility]    Discharge Medication:     JoselitoJoelle   Home Medication Instructions KWAN:[id number]    Printed on:06/10/18 1050   Medication Information                      acetaminophen (TYLENOL) 325 MG tablet  Take 2 tablets by mouth Every 4 (Four) Hours As Needed for Headache or Fever (temperature greater than 101.5 F).             albuterol (PROVENTIL) (2.5 MG/3ML) 0.083% nebulizer solution  Take 2.5 mg by nebulization 4 (Four) Times a Day.             ammonium lactate (AMLACTIN) 12 % cream  Apply  topically As Needed for Dry Skin.             azelastine (ASTELIN) 0.1 % nasal spray  2 sprays into each nostril 2 (Two) Times a Day. Use in each nostril as directed             benzonatate (TESSALON) 100 MG capsule  Take 1 capsule by mouth 3 (Three) Times a Day As Needed for cough.             budesonide-formoterol (SYMBICORT) 160-4.5 MCG/ACT inhaler  Inhale 2 puffs 2 (Two) Times a Day. Inhale 1 puff twice daily. Rinse mouth after use.             calcium acetate (PHOS BINDER,) 667 MG capsule capsule  Take 2 capsules by mouth 3 (Three) Times a Day With Meals.             cefdinir (OMNICEF) 300 MG capsule  Take 1 capsule by  mouth Every 12 (Twelve) Hours for 8 doses.             cetirizine (zyrTEC) 10 MG tablet  Take 10 mg by mouth Daily.             conjugated estrogens (PREMARIN) vaginal cream  Insert 0.5 g into the vagina Every Other Day.             Cyanocobalamin (VITAMIN B12 PO)  Take 500 mcg by mouth Daily.             Diclofenac Sodium (VOLTAREN TD)  Place  on the skin As Needed. Voltaren GEL              estradiol (ESTRACE VAGINAL) 0.1 MG/GM vaginal cream  Insert 2 g into the vagina Every Other Day.             gabapentin (NEURONTIN) 100 MG capsule  TAKE ONE CAPSULE BY MOUTH THREE TIMES A DAY             gemfibrozil (LOPID) 600 MG tablet  TAKE ONE TABLET WITH SUPPER             guaiFENesin (MUCINEX) 600 MG 12 hr tablet  Take 2 tablets by mouth Every 12 (Twelve) Hours.             HYDROcodone-acetaminophen (NORCO)  MG per tablet  Take  by mouth. Take 1 tablet every 8 hours as needed for pain.             INCRUSE ELLIPTA 62.5 MCG/INH aerosol powder   INHALE 1 PUFF INTO THE LUNGS ONCE DAILY             ipratropium-albuterol (COMBIVENT RESPIMAT)  MCG/ACT inhaler  Inhale 1 puff 4 (Four) Times a Day As Needed for Wheezing.             ketotifen (ZADITOR) 0.025 % ophthalmic solution  USE 1-2 DROPS IN BOTH EYES TWICE DAILY             LORazepam (ATIVAN) 0.5 MG tablet  Take 0.5 mg by mouth Daily As Needed. 1-2 TABLETS DAILY PRN             losartan (COZAAR) 25 MG tablet  Take 25 mg by mouth Daily.             megestrol (MEGACE) 40 MG/ML suspension               melatonin 5 MG tablet tablet  Take 2 tablets by mouth At Night As Needed for sleep             metoprolol succinate XL (TOPROL-XL) 25 MG 24 hr tablet               Mirabegron ER (MYRBETRIQ) 50 MG tablet sustained-release 24 hour  Take 50 mg by mouth Daily.             mirtazapine (REMERON) 30 MG tablet  Take 30 mg by mouth Every Night.             Misc. Devices (ROLLATOR) misc  1 Units As Needed (ambulation assistance).             montelukast (SINGULAIR) 10 MG  tablet  TAKE ONE TABLET BY MOUTH AT BEDTIME             Multiple Vitamins-Minerals (MULTIVITAMIN WITH MINERALS) tablet tablet  Take 1 tablet by mouth Daily.             nicotine (NICODERM CQ) 7 MG/24HR patch  Place 1 patch on the skin Daily. DONOT smoke with patch on.             Nutritional Supplements (ENSURE COMPLETE PO)  Take  by mouth.             nystatin (MYCOSTATIN) [id number] UNIT/ML suspension  Swish and swallow 5 mL 4 (Four) Times a Day.             omeprazole (priLOSEC) 40 MG capsule               ondansetron ODT (ZOFRAN-ODT) 4 MG disintegrating tablet  Take 1 tablet by mouth every 6 (six) hours as needed.             OXYGEN-HELIUM IN  Oxygen; Patient Sig: Oxygen 2 liter as needed; 0; 21-May-2014; Active             potassium bicarbonate (K-LYTE) 25 MEQ disintegrating tablet  Take 25 mEq by mouth Every Night.             predniSONE (DELTASONE) 10 MG tablet  Take 1 tablet by mouth Daily. Take 4 for 3 days, then 3 for 3 days, then 2 for 3 days, then 1 for 3 days, then STOP             Probiotic Product (RISAQUAD-2) capsule capsule  Take 1 capsule by mouth Daily.             Respiratory Therapy Supplies (FLUTTER) device  1 Device as needed (as directed).             traZODone (DESYREL) 50 MG tablet  Take 1 tablet by mouth every night.             Varenicline Tartrate (CHANTIX PO)  Take  by mouth.             venlafaxine (EFFEXOR) 75 MG tablet  Take 1 tablet by mouth 2 (two) times a day.                 Discharge Diet:     Diet Instructions     Diet: Regular       Discharge Diet:  Regular          Activity at Discharge:     Activity Instructions     Activity as Tolerated             Follow-up Appointments:    No future appointments.  Additional Instructions for the Follow-ups that You Need to Schedule     Ambulatory Referral to Home Health    As directed      Face to Face Visit Date:  6/10/2018    Follow-up Provider for Plan of Care?:  I treated the patient in an acute care facility and will not continue  treatment after discharge.    Follow-up Provider:  BLANQUITA REYES [[id number]]    Reason/Clinical Findings:  copd, weakness    Describe mobility limitations that make leaving home difficult:  copd weakness    Nursing/Therapeutic Services Requested:  Skilled Nursing    Skilled nursing orders:  Cardiopulmonary assessments COPD management    Frequency:  1 Week 1         Discharge Follow-up with PCP    As directed      Follow Up Details:  Blanquita JON 1 week         Discharge Follow-up with Specified Provider: Dr Esparza 1 week; 1 Week    As directed      To:  Dr Esparza 1 week    Follow Up:  1 Week               Test Results Pending at Discharge:           José Antonio Reddy DO  06/10/18  10:50 AM

## 2018-06-10 NOTE — DISCHARGE PLACEMENT REQUEST
"Referral for nursing for COPD  Discharged Roney 6/10/18  [phone]    Joelle Yee (72 y.o. Female)     Date of Birth Social Security Number Address Home Phone MRN    1945  [address] [place] [id number] [phone] [id number]    Baptism Marital Status          Hoahaoism Single       Admission Date Admission Type Admitting Provider Attending Provider Department, Room/Bed    6/7/18 Emergency Maximino Bueno MD Majumder, Mosumi, MD [facility] MED SURG  3, 311/1    Discharge Date Discharge Disposition Discharge Destination         Home-Health Care [facility] [place]              Attending Provider:  Dora Montes MD    Allergies:  Dust Mite Extract, Grass, Mold Extract [Trichophyton]    Isolation:  None   Infection:  None   Code Status:  FULL    Ht:  152.4 cm (60\")   Wt:  40 kg (88 lb 4 oz)    Admission Cmt:  None   Principal Problem:  Acute on chronic respiratory failure with hypoxia [J96.21]                 Active Insurance as of 6/7/2018     Primary Coverage     Payor Plan Insurance Group Employer/Plan Group    HUMANA MEDICARE REPLACEMENT HUMANA MEDICARE REPL [id number]     Payor Plan Address Payor Plan Phone Number Effective From Effective To    [address] [address] [phone] 1/1/2018     [facility] [address]       Subscriber Name Subscriber Birth Date Member ID       JOELLE YEE 1945 [id number]           Secondary Coverage     Payor Plan Insurance Group Employer/Plan Group    [place]      Payor Plan Address Payor Plan Phone Number Effective From Effective To    [address] [phone] 3/1/2016     [facility] [id number]       Subscriber Name Subscriber Birth Date Member ID       JOELLE YEE 1945 [id number]                 Emergency Contacts      (Rel.) Home Phone Work Phone Mobile Phone    JoselitoCaeytano cyr (Brother) [phone] -- [phone]    AnayaDoug (Son) [phone] -- [phone]    Adia Rhodes " (Daughter) [phone] -- --    Margaret Julien (Other) [phone] -- [phone]            Insurance Information                HUMANA MEDICARE REPLACEMENT/HUMANA MEDICARE REPL Phone: [phone]    Subscriber: Joelle Yee Subscriber#: [id number]    Group#: [id number] Precert#:         [place] Phone: [phone]    Subscriber: Joelle Yee Subscriber#: [id number]    Group#:  Precert#:              Discharge Summary      José Antonio Reddy DO at 6/10/2018 10:50 AM              [facility]   DISCHARGE SUMMARY      Name:  Joelle Yee   Age:  72 y.o.  Sex:  female  :  1945  MRN:  [id number]   Visit Number:  [id number]  Primary Care Physician:  DONTRELL Regan  Date of Discharge:  6/10/2018  Admission Date:  2018      Discharge Diagnosis:     1.  Acute on chronic hypoxic respiratory failure, present on admission.  2.  Acute COPD exacerbation with acute bronchitis, present on admission.  3.  Chronic active tobacco dependence.  4.  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  5.  Essential hypertension.  6.  Gastroesophageal reflux disease.  7.  Chronic insomnia.  8.  Chronic back pain.  9. Tobacco abuse  10. Leukocytosis, likely due to IV steroids  11.  Bilateral bronchiectasis    Active Hospital Problems (** Indicates Principal Problem)    Diagnosis Date Noted   • **Acute on chronic respiratory failure with hypoxia [J96.21] 10/16/2017   • Moderate malnutrition [E44.0] 2018   • Acute exacerbation of chronic obstructive pulmonary disease (COPD) [J44.1] 2018   • COPD with acute exacerbation [J44.1] 2017   • Insomnia [G47.00] 2016   • Osteoarthritis [M19.90] 2016   • Hyperlipidemia [E78.5] 2016   • Esophageal reflux [K21.9] 2016   • Depression [F32.9] 2016      Resolved Hospital Problems    Diagnosis Date Noted Date Resolved   No resolved problems to display.          Presenting Problem/History of Present Illness:    Acute exacerbation of chronic obstructive pulmonary disease (COPD) [J44.1]         Hospital Course:    Patient is a 71-year-old  female with past medical history of COPD, chronic hypoxic respiratory failure on 2 L of home O2, hypertension, GERD who continues to smoke a pack cigarettes per day was brought in the emergency room with increasing shortness breath for the past 2 weeks.  She was having shortness of breath on minimal exertion.  She was brought to the emergency room and her pulse ox was in the 70s.  She had been on Levaquin and a prednisone taper via her primary care provider a couple of weeks ago.  She had improved, however she did worsen prior to admission.  She was given bronchodilator, IV Solu-Medrol, and IV magnesium in the emergency room.  She does follow with Dr. Esparza.    She was admitted to the hospital, placed on Rocephin, Zithromax, IV Solu-Medrol.  She was tachycardic as well.  CT with PE protocol was ordered showing no evidence of PE however there was bronchiectasis bilaterally with mucus impaction of the lower lobe bronchi.  Patient's guaifenesin was increased and she was placed on a flutter valve with her breathing treatments.  She was able to bring up some sputum.  She does have a flutter valve at home, she has been intermittently using this.  She was recommended to use this with breathing treatments at home.    The day she feels much better.  She has been transitioned to oral antibiotics and oral steroids.  She feels she is at her baseline.  She is ambulating in the halls without difficulty.  We will arrange for home health for her for COPD management.  Advised her to quit smoking.  She also will need to follow up with Dr. Esparza regarding the bronchiectasis.  Would continue with Omnicef at home for 5 more days.  She is otherwise stable to be discharged home.    Procedures Performed:           Consults:     Consults     No  orders found from 5/9/2018 to 6/8/2018.          Pertinent Test Results:     Lab Results (all)     Procedure Component Value Units Date/Time    Renal Function Panel [[id number]]  (Abnormal) Collected:  06/10/18 0544    Specimen:  Blood Updated:  06/10/18 0639     Glucose 85 mg/dL      BUN 26 (H) mg/dL      Creatinine 0.60 mg/dL      Sodium 141 mmol/L      Potassium 4.3 mmol/L      Chloride 101 mmol/L      CO2 30.0 mmol/L      Calcium 9.2 mg/dL      Albumin 3.70 g/dL      Phosphorus 4.3 mg/dL      Anion Gap 14.3 mmol/L      BUN/Creatinine Ratio 43.3 (H)     eGFR Non African Amer 98 mL/min/1.73     Narrative:       The MDRD GFR formula is only valid for adults with stable renal function between ages 18 and 70.    Magnesium [[id number]]  (Normal) Collected:  06/10/18 0544    Specimen:  Blood Updated:  06/10/18 0639     Magnesium 2.0 mg/dL     CBC & Differential [[id number]] Collected:  06/10/18 0544    Specimen:  Blood Updated:  06/10/18 0639    Narrative:       The following orders were created for panel order CBC & Differential.  Procedure                               Abnormality         Status                     ---------                               -----------         ------                     CBC Auto Differential[[id number]]        Abnormal            Final result                 Please view results for these tests on the individual orders.    CBC Auto Differential [[id number]]  (Abnormal) Collected:  06/10/18 0544    Specimen:  Blood Updated:  06/10/18 0639     WBC 22.45 (H) 10*3/mm3      RBC 3.88 (L) 10*6/mm3      Hemoglobin 12.2 g/dL      Hematocrit 37.2 %      MCV 95.9 fL      MCH 31.4 (H) pg      MCHC 32.8 g/dL      RDW 16.4 (H) %      RDW-SD 57.3 (H) fl      MPV 10.1 fL      Platelets 319 10*3/mm3      Neutrophil % 82.7 (H) %      Lymphocyte % 8.9 (L) %      Monocyte % 6.5 %      Eosinophil % 0.0 %      Basophil % 0.2 %      Immature Grans % 1.7 (H) %      Neutrophils, Absolute 18.55 (H) 10*3/mm3       Lymphocytes, Absolute 1.99 10*3/mm3      Monocytes, Absolute 1.46 (H) 10*3/mm3      Eosinophils, Absolute 0.01 10*3/mm3      Basophils, Absolute 0.05 10*3/mm3      Immature Grans, Absolute 0.39 (H) 10*3/mm3      nRBC 0.0 /100 WBC     CBC (No Diff) [[id number]]  (Abnormal) Collected:  06/09/18 0548    Specimen:  Blood Updated:  06/09/18 0617     WBC 24.53 (H) 10*3/mm3      RBC 4.07 (L) 10*6/mm3      Hemoglobin 12.4 g/dL      Hematocrit 38.5 %      MCV 94.6 fL      MCH 30.5 pg      MCHC 32.2 g/dL      RDW 15.9 (H) %      RDW-SD 55.1 (H) fl      MPV 10.4 fL      Platelets 323 10*3/mm3     Basic Metabolic Panel [[id number]]  (Abnormal) Collected:  06/08/18 0529    Specimen:  Blood Updated:  06/08/18 0624     Glucose 118 (H) mg/dL      BUN 18 mg/dL      Creatinine 0.70 mg/dL      Sodium 141 mmol/L      Potassium 4.8 mmol/L      Chloride 101 mmol/L      CO2 33.0 (H) mmol/L      Calcium 9.2 mg/dL      eGFR Non African Amer 82 mL/min/1.73      BUN/Creatinine Ratio 25.7 (H)     Anion Gap 11.8 mmol/L     Narrative:       The MDRD GFR formula is only valid for adults with stable renal function between ages 18 and 70.    Magnesium [[id number]]  (Abnormal) Collected:  06/08/18 0529    Specimen:  Blood Updated:  06/08/18 0624     Magnesium 2.6 (H) mg/dL     Troponin [[id number]]  (Normal) Collected:  06/08/18 0529    Specimen:  Blood Updated:  06/08/18 0624     Troponin I <0.012 ng/mL     Narrative:       Normal Patient Upper Reference Limit (URL) (99th Percentile)=0.03 ng/mL   Non-AMI Illness Reference Limit=0.03-0.11 ng/mL   AMI Confirmation=0.12 ng/mL and above    Phosphorus [[id number]]  (Abnormal) Collected:  06/08/18 0529    Specimen:  Blood Updated:  06/08/18 0624     Phosphorus 4.6 (H) mg/dL     CBC Auto Differential [[id number]]  (Abnormal) Collected:  06/08/18 0529    Specimen:  Blood Updated:  06/08/18 0610     WBC 24.86 (H) 10*3/mm3      RBC 4.11 (L) 10*6/mm3      Hemoglobin 13.0 g/dL      Hematocrit 39.8 %      MCV 96.8 fL       MCH 31.6 (H) pg      MCHC 32.7 g/dL      RDW 15.9 (H) %      RDW-SD 55.9 (H) fl      MPV 10.0 fL      Platelets 303 10*3/mm3      Neutrophil % 92.4 (H) %      Lymphocyte % 3.7 (L) %      Monocyte % 2.7 %      Eosinophil % 0.0 %      Basophil % 0.2 %      Immature Grans % 1.0 (H) %      Neutrophils, Absolute 22.96 (H) 10*3/mm3      Lymphocytes, Absolute 0.93 10*3/mm3      Monocytes, Absolute 0.68 10*3/mm3      Eosinophils, Absolute 0.00 10*3/mm3      Basophils, Absolute 0.04 10*3/mm3      Immature Grans, Absolute 0.25 (H) 10*3/mm3      nRBC 0.0 /100 WBC     [place] Draw [[id number]] Collected:  06/07/18 2017    Specimen:  Blood Updated:  06/07/18 2130    Narrative:       The following orders were created for panel order [place] Draw.  Procedure                               Abnormality         Status                     ---------                               -----------         ------                     Light Blue Top[[id number]]                                   Final result               Lavender Top[[id number]]                                     Final result               Gold Top - SST[[id number]]                                   Final result               Green Top (No Gel)[[id number]]                               Final result                 Please view results for these tests on the individual orders.    Light Blue Top [[id number]] Collected:  06/07/18 2017    Specimen:  Blood Updated:  06/07/18 2130     Extra Tube hold for add-on     Comment: Auto resulted       Lavender Top [[id number]] Collected:  06/07/18 2017    Specimen:  Blood Updated:  06/07/18 2130     Extra Tube hold for add-on     Comment: Auto resulted       Gold Top - SST [[id number]] Collected:  06/07/18 2017    Specimen:  Blood Updated:  06/07/18 2130     Extra Tube Hold for add-ons.     Comment: Auto resulted.       Green Top (No Gel) [[id number]] Collected:  06/07/18 2017    Specimen:  Blood Updated:  06/07/18 2130     Extra Tube Hold for  "add-ons.     Comment: Auto resulted.             Imaging Results (all)     Procedure Component Value Units Date/Time    CT Chest Pulmonary Embolism With Contrast [[id number]] Collected:  06/09/18 1339     Updated:  06/09/18 1340    Narrative:       FINAL REPORT    TECHNIQUE:  Postcontrast axial images of the chest were performed in a CTA  protocol. This study was performed with techniques to keep  radiation doses as low as reasonably achievable, (ALARA).  Individualized dose reduction technique using automated exposure  control or adjustment of mA and/or kV according to the patient's  size were employed.    CLINICAL HISTORY:  Shortness of breath    FINDINGS:  There are dense LAD coronary artery calcifications.  The heart  is normal in size.  No adenopathy is identified.  No pleural or  pericardial effusion is identified.  The thoracic aorta is  normal in caliper with no focal aneurysm or dissection  identified.  There is no filling defect to suggest pulmonary  embolism.  There is severe emphysematous change.  Diffuse  bronchial thickening is seen with evidence of bronchiectasis and  mucus impaction in the right greater than left lower lobe  bronchi.  There is bibasilar atelectasis.  There is no lobar  consolidation seen.   The images of the upper abdomen  demonstrate postoperative change of cholecystectomy.  There is a  6 cm left renal simple cyst seen.      Impression:       No evidence for PE on this exam.  Bronchiectasis with mucus  impaction of the lower lobe bronchi.    Authenticated by Ousmane Shahid MD on 06/09/2018 01:39:18 PM          Condition on Discharge:      Stable    Vital Signs:    /57 (BP Location: Right arm, Patient Position: Lying)   Pulse 96   Temp 98.3 °F (36.8 °C) (Oral)   Resp 18   Ht 152.4 cm (60\")   Wt 40 kg (88 lb 4 oz)   LMP  (LMP Unknown)   SpO2 97%   BMI 17.24 kg/m²      Physical Exam:      General Appearance:    Alert, cooperative, in no acute distress   Head:    " Normocephalic, without obvious abnormality, atraumatic   Eyes:            Lids and lashes normal, conjunctivae and sclerae normal, no   icterus, no pallor, corneas clear, PERRLA   Ears:    Ears appear intact with no abnormalities noted   Throat:   No oral lesions, no thrush, oral mucosa moist   Neck:   No adenopathy, supple, trachea midline, no thyromegaly, no     carotid bruit, no JVD   Back:     No kyphosis present, no scoliosis present, no skin lesions,       erythema or scars, no tenderness to percussion or                   palpation,   range of motion normal   Lungs:    Occasional expiratory wheeze, no use of accessory muscles or respiration.      Heart:    Regular rhythm and normal rate, normal S1 and S2, no            murmur, no gallop, no rub, no click   Breast Exam:    Deferred   Abdomen:     Normal bowel sounds, no masses, no organomegaly, soft        non-tender, non-distended, no guarding, no rebound                 tenderness   Genitalia:    Deferred   Extremities:   Moves all extremities well, no edema, no cyanosis, no              redness   Pulses:   Pulses palpable and equal bilaterally   Skin:   No bleeding, bruising or rash   Lymph nodes:   No palpable adenopathy   Neurologic:   Cranial nerves 2 - 12 grossly intact, sensation intact, DTR        present and equal bilaterally       Discharge Disposition:    Home-Health Care [facility] [place]    Discharge Medication:     Joelle Yee   Home Medication Instructions KWAN:[id number]    Printed on:06/10/18 1050   Medication Information                      acetaminophen (TYLENOL) 325 MG tablet  Take 2 tablets by mouth Every 4 (Four) Hours As Needed for Headache or Fever (temperature greater than 101.5 F).             albuterol (PROVENTIL) (2.5 MG/3ML) 0.083% nebulizer solution  Take 2.5 mg by nebulization 4 (Four) Times a Day.             ammonium lactate (AMLACTIN) 12 % cream  Apply  topically As Needed for Dry Skin.             azelastine (ASTELIN) 0.1 %  nasal spray  2 sprays into each nostril 2 (Two) Times a Day. Use in each nostril as directed             benzonatate (TESSALON) 100 MG capsule  Take 1 capsule by mouth 3 (Three) Times a Day As Needed for cough.             budesonide-formoterol (SYMBICORT) 160-4.5 MCG/ACT inhaler  Inhale 2 puffs 2 (Two) Times a Day. Inhale 1 puff twice daily. Rinse mouth after use.             calcium acetate (PHOS BINDER,) 667 MG capsule capsule  Take 2 capsules by mouth 3 (Three) Times a Day With Meals.             cefdinir (OMNICEF) 300 MG capsule  Take 1 capsule by mouth Every 12 (Twelve) Hours for 8 doses.             cetirizine (zyrTEC) 10 MG tablet  Take 10 mg by mouth Daily.             conjugated estrogens (PREMARIN) vaginal cream  Insert 0.5 g into the vagina Every Other Day.             Cyanocobalamin (VITAMIN B12 PO)  Take 500 mcg by mouth Daily.             Diclofenac Sodium (VOLTAREN TD)  Place  on the skin As Needed. Voltaren GEL              estradiol (ESTRACE VAGINAL) 0.1 MG/GM vaginal cream  Insert 2 g into the vagina Every Other Day.             gabapentin (NEURONTIN) 100 MG capsule  TAKE ONE CAPSULE BY MOUTH THREE TIMES A DAY             gemfibrozil (LOPID) 600 MG tablet  TAKE ONE TABLET WITH SUPPER             guaiFENesin (MUCINEX) 600 MG 12 hr tablet  Take 2 tablets by mouth Every 12 (Twelve) Hours.             HYDROcodone-acetaminophen (NORCO)  MG per tablet  Take  by mouth. Take 1 tablet every 8 hours as needed for pain.             INCRUSE ELLIPTA 62.5 MCG/INH aerosol powder   INHALE 1 PUFF INTO THE LUNGS ONCE DAILY             ipratropium-albuterol (COMBIVENT RESPIMAT)  MCG/ACT inhaler  Inhale 1 puff 4 (Four) Times a Day As Needed for Wheezing.             ketotifen (ZADITOR) 0.025 % ophthalmic solution  USE 1-2 DROPS IN BOTH EYES TWICE DAILY             LORazepam (ATIVAN) 0.5 MG tablet  Take 0.5 mg by mouth Daily As Needed. 1-2 TABLETS DAILY PRN             losartan (COZAAR) 25 MG tablet  Take  25 mg by mouth Daily.             megestrol (MEGACE) 40 MG/ML suspension               melatonin 5 MG tablet tablet  Take 2 tablets by mouth At Night As Needed for sleep             metoprolol succinate XL (TOPROL-XL) 25 MG 24 hr tablet               Mirabegron ER (MYRBETRIQ) 50 MG tablet sustained-release 24 hour  Take 50 mg by mouth Daily.             mirtazapine (REMERON) 30 MG tablet  Take 30 mg by mouth Every Night.             Misc. Devices (ROLLATOR) misc  1 Units As Needed (ambulation assistance).             montelukast (SINGULAIR) 10 MG tablet  TAKE ONE TABLET BY MOUTH AT BEDTIME             Multiple Vitamins-Minerals (MULTIVITAMIN WITH MINERALS) tablet tablet  Take 1 tablet by mouth Daily.             nicotine (NICODERM CQ) 7 MG/24HR patch  Place 1 patch on the skin Daily. DONOT smoke with patch on.             Nutritional Supplements (ENSURE COMPLETE PO)  Take  by mouth.             nystatin (MYCOSTATIN) 670553 UNIT/ML suspension  Swish and swallow 5 mL 4 (Four) Times a Day.             omeprazole (priLOSEC) 40 MG capsule               ondansetron ODT (ZOFRAN-ODT) 4 MG disintegrating tablet  Take 1 tablet by mouth every 6 (six) hours as needed.             OXYGEN-HELIUM IN  Oxygen; Patient Sig: Oxygen 2 liter as needed; 0; 21-May-2014; Active             potassium bicarbonate (K-LYTE) 25 MEQ disintegrating tablet  Take 25 mEq by mouth Every Night.             predniSONE (DELTASONE) 10 MG tablet  Take 1 tablet by mouth Daily. Take 4 for 3 days, then 3 for 3 days, then 2 for 3 days, then 1 for 3 days, then STOP             Probiotic Product (RISAQUAD-2) capsule capsule  Take 1 capsule by mouth Daily.             Respiratory Therapy Supplies (FLUTTER) device  1 Device as needed (as directed).             traZODone (DESYREL) 50 MG tablet  Take 1 tablet by mouth every night.             Varenicline Tartrate (CHANTIX PO)  Take  by mouth.             venlafaxine (EFFEXOR) 75 MG tablet  Take 1 tablet by mouth 2  (two) times a day.                 Discharge Diet:     Diet Instructions     Diet: Regular       Discharge Diet:  Regular          Activity at Discharge:     Activity Instructions     Activity as Tolerated             Follow-up Appointments:    No future appointments.  Additional Instructions for the Follow-ups that You Need to Schedule     Ambulatory Referral to Home Health    As directed      Face to Face Visit Date:  6/10/2018    Follow-up Provider for Plan of Care?:  I treated the patient in an acute care facility and will not continue treatment after discharge.    Follow-up Provider:  HARSHA REYES [[id number]]    Reason/Clinical Findings:  copd, weakness    Describe mobility limitations that make leaving home difficult:  copd weakness    Nursing/Therapeutic Services Requested:  Skilled Nursing    Skilled nursing orders:  Cardiopulmonary assessments COPD management    Frequency:  1 Week 1         Discharge Follow-up with PCP    As directed      Follow Up Details:  Harsha JON 1 week         Discharge Follow-up with Specified Provider: Dr Esparza 1 week; 1 Week    As directed      To:  Dr Esparza 1 week    Follow Up:  1 Week               Test Results Pending at Discharge:           Dave Reddy DO  06/10/18  10:50 AM    [facility] MED SURG  3  [address] [facility] [address]  Phone:  [phone]  Fax:   Date: Rodrigo 10, 2018      Ambulatory Referral to Home Health     Patient:  Joelle Yee MRN:  [id number]   [address] [facility] [address] :  1945  SSN:    Phone: [phone] Sex:  F      INSURANCE PAYOR PLAN GROUP # SUBSCRIBER ID   Primary:  Secondary:    HUMANA MEDICARE REPLACEMENT  [place]ICA[place] [id number]  [id number]    [id number]  [id number]      Referring Provider Information:  DAVE REDDY Phone: [phone] Fax:       Referral Information:   # Visits:  1 Referral Type: Home Health [42]   Urgency:  Routine Referral Reason:  Specialty Services Required   Start Date: Rodrigo 10, 2018 End Date:  To be determined by Insurer   Diagnosis: Chronic bronchitis, unspecified chronic bronchitis type (J42 [ICD-10-CM] 491.9 [ICD-9-CM])      Refer to Dept:   Refer to Provider:   Refer to Facility:       Face to Face Visit Date: 6/10/2018  Follow-up Provider for Plan of Care? I treated the patient in an acute care facility and will not continue treatment after discharge.  Follow-up Provider: HARSHA REYES [[id number]]  Reason/Clinical Findings: copd, weakness  Describe mobility limitations that make leaving home difficult: copd weakness  Nursing/Therapeutic Services Requested: Skilled Nursing  Skilled nursing orders: Cardiopulmonary assessments  Skilled nursing orders: COPD management  Frequency: 1 Week 1     This document serves as a request of services and does not constitute Insurance authorization or approval of services.  To determine eligibility, please contact the members Insurance carrier to verify and review coverage.     If you have medical questions regarding this request for services. Please contact [facility]  3 at [phone] between the hours of 8:00am - 5:00pm (Mon-Fri).             Authorizing Provider:José Antonio Reddy DO  Authorizing Provider's NPI: [id number]  Order Entered By: José Antonio Reddy DO 6/10/2018 10:49 AM     Electronically signed by: José Antonio Reddy DO 6/10/2018 10:49 AM                       Electronically signed by José Antonio Reddy DO at 6/10/2018 10:56 AM

## 2018-06-10 NOTE — PROGRESS NOTES
Case Management Discharge Note         Destination     No service coordination in this encounter.      Durable Medical Equipment     No service coordination in this encounter.      Dialysis/Infusion     No service coordination in this encounter.      Home Medical Care     Service Request Status Selected Specialties Address Phone Number Fax Number    [facility] N[facility]A [address] [facility] [address] [phone]      Social Care     No service coordination in this encounter.        Other: Other (private vehicle)    Final Discharge Disposition Code: 06 - home with home health care

## 2018-06-10 NOTE — PROGRESS NOTES
Discharge Planning Assessment  [facility]     Patient Name: Joelle Yee  MRN: [id number]  Today's Date: 6/10/2018    Admit Date: 6/7/2018          Discharge Needs Assessment    No documentation.             Discharge Plan     Row Name 06/10/18 1102       Plan    Plan [place] health [place] and [place] [facility]  Patient already established with them and will call on Monday to let them know she is home.     Patient/Family in Agreement with Plan yes        Destination     No service coordination in this encounter.      Durable Medical Equipment     No service coordination in this encounter.      Dialysis/Infusion     No service coordination in this encounter.      Home Medical Care     Service Request Status Selected Specialties Address Phone Number Fax Number    [facility] Accepted N/A [address] [address] [phone]      Social Care     No service coordination in this encounter.        Expected Discharge Date and Time     Expected Discharge Date Expected Discharge Time    Rodrigo 10, 2018               Demographic Summary    No documentation.           Functional Status    No documentation.           Psychosocial    No documentation.           Abuse/Neglect    No documentation.           Legal    No documentation.           Substance Abuse    No documentation.           Patient Forms    No documentation.         Antonietta Salcedo

## 2018-06-10 NOTE — SIGNIFICANT NOTE
06/10/18 0955   PT Deferred Reason   PT Deferred Reason Patient/family declined evaluation  (Patient states she is going home and she is does not need a PT evaluation)

## 2018-06-10 NOTE — PLAN OF CARE
Problem: Patient Care Overview  Goal: Plan of Care Review  Outcome: Ongoing (interventions implemented as appropriate)   06/10/18 0511   Coping/Psychosocial   Plan of Care Reviewed With patient   Plan of Care Review   Progress improving   OTHER   Outcome Summary PT AMBULATING IN ROOM, O2 AT 2 LITERS PER N/C, NO C/O SOA, VSS.       Problem: Fall Risk (Adult)  Goal: Identify Related Risk Factors and Signs and Symptoms  Outcome: Outcome(s) achieved Date Met: 06/10/18    Goal: Absence of Fall  Outcome: Ongoing (interventions implemented as appropriate)      Problem: Skin Injury Risk (Adult)  Goal: Identify Related Risk Factors and Signs and Symptoms  Outcome: Outcome(s) achieved Date Met: 06/10/18    Goal: Skin Health and Integrity  Outcome: Ongoing (interventions implemented as appropriate)

## 2018-07-03 ENCOUNTER — PREP FOR SURGERY (OUTPATIENT)
Dept: OTHER | Facility: HOSPITAL | Age: 73
End: 2018-07-03

## 2018-07-03 ENCOUNTER — OFFICE VISIT (OUTPATIENT)
Dept: PULMONOLOGY | Facility: CLINIC | Age: 73
End: 2018-07-03

## 2018-07-03 VITALS
WEIGHT: 85 LBS | BODY MASS INDEX: 16.69 KG/M2 | HEIGHT: 60 IN | SYSTOLIC BLOOD PRESSURE: 126 MMHG | DIASTOLIC BLOOD PRESSURE: 72 MMHG | HEART RATE: 85 BPM | RESPIRATION RATE: 18 BRPM | OXYGEN SATURATION: 94 %

## 2018-07-03 DIAGNOSIS — J47.9 BRONCHIECTASIS WITHOUT COMPLICATION (HCC): Primary | ICD-10-CM

## 2018-07-03 DIAGNOSIS — F17.200 SMOKING: ICD-10-CM

## 2018-07-03 DIAGNOSIS — R09.02 HYPOXIA: ICD-10-CM

## 2018-07-03 DIAGNOSIS — J44.9 CHRONIC OBSTRUCTIVE PULMONARY DISEASE, UNSPECIFIED COPD TYPE (HCC): ICD-10-CM

## 2018-07-03 PROCEDURE — 99214 OFFICE O/P EST MOD 30 MIN: CPT | Performed by: NURSE PRACTITIONER

## 2018-07-03 RX ORDER — SODIUM CHLORIDE 0.9 % (FLUSH) 0.9 %
1-10 SYRINGE (ML) INJECTION AS NEEDED
Status: CANCELLED | OUTPATIENT
Start: 2018-07-03

## 2018-07-03 RX ORDER — SODIUM CHLORIDE 9 MG/ML
42 INJECTION, SOLUTION INTRAVENOUS CONTINUOUS
Status: CANCELLED | OUTPATIENT
Start: 2018-07-03

## 2018-07-03 NOTE — PROGRESS NOTES
"Chief Complaint   Patient presents with   • Follow-up   • Shortness of Breath         Subjective   Joelle Yee is a 72 y.o. female.     History of Present Illness   The patient comes in today for increased shortness of breath. She was discharged from the hospital in June. She felt some better after discharge but continued to have shortness of breath which worsened. She has had a productive cough with thick, green sputum. She denies fever or chills. She finished all the antibiotics that were ordered at discharge. Last week she was feeling worse and saw her PCP and was given an antibiotic injection however she continues to be very short of breath symptom wise even with oxygen.     She has a flutter valve at home which she uses multiple times a day.     She is using the nebulizer 4 times a day and combivent inhaler 1-2 times a day.     She continues to smoke 3-4 cigarettes a day.     The following portions of the patient's history were reviewed and updated as appropriate: allergies, current medications, past family history, past medical history, past social history and past surgical history.    Review of Systems   HENT: Positive for rhinorrhea and sore throat. Negative for sinus pressure and sneezing.    Respiratory: Positive for cough, shortness of breath and wheezing.        Objective   Visit Vitals  /72   Pulse 85   Resp 18   Ht 152.4 cm (60\")   Wt 38.6 kg (85 lb)   LMP  (LMP Unknown)   SpO2 94% Comment: on room air (rest)   BMI 16.60 kg/m²    O2:  97%  on 2lpm  Physical Exam   Constitutional: She is oriented to person, place, and time. She appears well-developed and well-nourished.   HENT:   Head: Normocephalic and atraumatic.   Eyes: EOM are normal.   Neck: Neck supple.   Cardiovascular: Normal rate and regular rhythm.    Pulmonary/Chest: Effort normal. No respiratory distress.   Somewhat decreased A/E with wheezing and rales in bases noted.   Musculoskeletal: She exhibits no edema.   Neurological: " She is alert and oriented to person, place, and time.   Skin: Skin is warm and dry.   Psychiatric: She has a normal mood and affect.   Vitals reviewed.          Assessment/Plan   Joelle was seen today for follow-up and shortness of breath.    Diagnoses and all orders for this visit:    Bronchiectasis without complication (CMS/HCC)    Hypoxia    Chronic obstructive pulmonary disease, unspecified COPD type (CMS/HCC)    Smoking           Return in about 3 weeks (around 7/24/2018) for Recheck, For Me, s/p bronchoscopy.    DISCUSSION (if any):  In reviewing her discharge summary, it mentions acute hypoxic respiratory failure and bronchiectasis.     I reviewed the last CT images with the patient which shows definite bronchiectasis.  She has been on several antibiotics and this failed to improve to her baseline.  She has had several rounds of steroids and continued all of her respiratory medications.  Since she has had no improvement with all of this therapy I feel that she would benefit from a bronchoscopy.  Dr. Esparza reviewed the CT scan and spoke with the patient during this visit as well and explained the risks, benefits, and alternatives to bronchoscopy.  The patient would like to proceed with bronchoscopy.    She should continue to use her oxygen 24/7.  She should continue all of her respiratory medications which are noted in her medication list.  If she has any worsening of shortness of breath between now and the bronchoscopy, I have asked her to return to the emergency room.    She has also used flutter valve therapy and has continued to have issues so I will order a percussion vest.  Since she is so cachectic I feel she will tolerate the Incourage vest better.    Dictated utilizing Dragon dictation.    This document was electronically signed by DWAYNE Maria July 3, 2018  4:17 PM     ADDENDUM:  I reviewed the CT images with the patient and since her symptoms continue to suggest bronchiectasis and given  the CT findings, bronchoscopy would definitely be beneficial.    The risks of bronchoscopy including but not limited to damage to the overlying structures, pneumothorax, bleeding, worsening of respiratory failure, requirement for chest tube placement among others were explained.    Patient understood the risks and benefits and agreed to proceed with the procedure.    The alternatives were also discussed with the patient.    Rebeka Esparza MD  07/05/18  9:24 AM

## 2018-07-05 ENCOUNTER — ANESTHESIA EVENT (OUTPATIENT)
Dept: PERIOP | Facility: HOSPITAL | Age: 73
End: 2018-07-05

## 2018-07-05 ENCOUNTER — HOSPITAL ENCOUNTER (OUTPATIENT)
Facility: HOSPITAL | Age: 73
Setting detail: HOSPITAL OUTPATIENT SURGERY
Discharge: HOME | End: 2018-07-05
Attending: INTERNAL MEDICINE | Admitting: INTERNAL MEDICINE

## 2018-07-05 ENCOUNTER — ANESTHESIA (OUTPATIENT)
Dept: PERIOP | Facility: HOSPITAL | Age: 73
End: 2018-07-05

## 2018-07-05 VITALS
SYSTOLIC BLOOD PRESSURE: 113 MMHG | HEART RATE: 107 BPM | OXYGEN SATURATION: 98 % | TEMPERATURE: 97.9 F | RESPIRATION RATE: 20 BRPM | WEIGHT: 84 LBS | HEIGHT: 60 IN | DIASTOLIC BLOOD PRESSURE: 58 MMHG | BODY MASS INDEX: 16.49 KG/M2

## 2018-07-05 DIAGNOSIS — J47.9 BRONCHIECTASIS WITHOUT COMPLICATION (HCC): ICD-10-CM

## 2018-07-05 LAB
DEPRECATED RDW RBC AUTO: 66.4 FL (ref 37–54)
ERYTHROCYTE [DISTWIDTH] IN BLOOD BY AUTOMATED COUNT: 18.6 % (ref 11.5–14.5)
GRAM STN SPEC: NORMAL
HCT VFR BLD AUTO: 43.1 % (ref 37–47)
HGB BLD-MCNC: 14.1 G/DL (ref 12–16)
INR PPP: 1.21 (ref 0.9–1.1)
MCH RBC QN AUTO: 32.2 PG (ref 27–31)
MCHC RBC AUTO-ENTMCNC: 32.7 G/DL (ref 30–37)
MCV RBC AUTO: 98.4 FL (ref 81–99)
PLATELET # BLD AUTO: 255 10*3/MM3 (ref 130–400)
PMV BLD AUTO: 9.2 FL (ref 6–12)
PROTHROMBIN TIME: 13.5 SECONDS (ref 9.3–12.1)
RBC # BLD AUTO: 4.38 10*6/MM3 (ref 4.2–5.4)
WBC NRBC COR # BLD: 12.94 10*3/MM3 (ref 4.8–10.8)

## 2018-07-05 PROCEDURE — 87070 CULTURE OTHR SPECIMN AEROBIC: CPT | Performed by: INTERNAL MEDICINE

## 2018-07-05 PROCEDURE — 25010000002 HYDROMORPHONE PER 4 MG: Performed by: NURSE ANESTHETIST, CERTIFIED REGISTERED

## 2018-07-05 PROCEDURE — 87205 SMEAR GRAM STAIN: CPT | Performed by: INTERNAL MEDICINE

## 2018-07-05 PROCEDURE — 31624 DX BRONCHOSCOPE/LAVAGE: CPT | Performed by: INTERNAL MEDICINE

## 2018-07-05 PROCEDURE — 25010000002 ONDANSETRON PER 1 MG: Performed by: NURSE ANESTHETIST, CERTIFIED REGISTERED

## 2018-07-05 PROCEDURE — 25010000002 PROPOFOL 10 MG/ML EMULSION: Performed by: NURSE ANESTHETIST, CERTIFIED REGISTERED

## 2018-07-05 PROCEDURE — 87077 CULTURE AEROBIC IDENTIFY: CPT | Performed by: INTERNAL MEDICINE

## 2018-07-05 PROCEDURE — 85610 PROTHROMBIN TIME: CPT | Performed by: INTERNAL MEDICINE

## 2018-07-05 PROCEDURE — 87186 SC STD MICRODIL/AGAR DIL: CPT | Performed by: INTERNAL MEDICINE

## 2018-07-05 PROCEDURE — 87116 MYCOBACTERIA CULTURE: CPT | Performed by: INTERNAL MEDICINE

## 2018-07-05 PROCEDURE — 85027 COMPLETE CBC AUTOMATED: CPT | Performed by: INTERNAL MEDICINE

## 2018-07-05 PROCEDURE — 87206 SMEAR FLUORESCENT/ACID STAI: CPT | Performed by: INTERNAL MEDICINE

## 2018-07-05 PROCEDURE — 31625 BRONCHOSCOPY W/BIOPSY(S): CPT | Performed by: INTERNAL MEDICINE

## 2018-07-05 PROCEDURE — 87102 FUNGUS ISOLATION CULTURE: CPT | Performed by: INTERNAL MEDICINE

## 2018-07-05 RX ORDER — SODIUM CHLORIDE 0.9 % (FLUSH) 0.9 %
1-10 SYRINGE (ML) INJECTION AS NEEDED
Status: DISCONTINUED | OUTPATIENT
Start: 2018-07-05 | End: 2018-07-05 | Stop reason: HOSPADM

## 2018-07-05 RX ORDER — SODIUM CHLORIDE 0.9 % (FLUSH) 0.9 %
3 SYRINGE (ML) INJECTION AS NEEDED
Status: DISCONTINUED | OUTPATIENT
Start: 2018-07-05 | End: 2018-07-05 | Stop reason: HOSPADM

## 2018-07-05 RX ORDER — PROPOFOL 10 MG/ML
VIAL (ML) INTRAVENOUS AS NEEDED
Status: DISCONTINUED | OUTPATIENT
Start: 2018-07-05 | End: 2018-07-05 | Stop reason: SURG

## 2018-07-05 RX ORDER — HYDROMORPHONE HCL 110MG/55ML
0.25 PATIENT CONTROLLED ANALGESIA SYRINGE INTRAVENOUS
Status: DISCONTINUED | OUTPATIENT
Start: 2018-07-05 | End: 2018-07-05 | Stop reason: CLARIF

## 2018-07-05 RX ORDER — SODIUM CHLORIDE 9 MG/ML
42 INJECTION, SOLUTION INTRAVENOUS CONTINUOUS
Status: DISCONTINUED | OUTPATIENT
Start: 2018-07-05 | End: 2018-07-05 | Stop reason: HOSPADM

## 2018-07-05 RX ORDER — HYDROMORPHONE HYDROCHLORIDE 1 MG/ML
0.25 INJECTION, SOLUTION INTRAMUSCULAR; INTRAVENOUS; SUBCUTANEOUS
Status: DISCONTINUED | OUTPATIENT
Start: 2018-07-05 | End: 2018-07-05 | Stop reason: HOSPADM

## 2018-07-05 RX ORDER — ONDANSETRON 2 MG/ML
4 INJECTION INTRAMUSCULAR; INTRAVENOUS ONCE AS NEEDED
Status: COMPLETED | OUTPATIENT
Start: 2018-07-05 | End: 2018-07-05

## 2018-07-05 RX ADMIN — PROPOFOL 50 MG: 10 INJECTION, EMULSION INTRAVENOUS at 12:38

## 2018-07-05 RX ADMIN — LIDOCAINE HYDROCHLORIDE 40 MG: 20 INJECTION, SOLUTION INTRAVENOUS at 12:43

## 2018-07-05 RX ADMIN — PROPOFOL 25 MG: 10 INJECTION, EMULSION INTRAVENOUS at 12:47

## 2018-07-05 RX ADMIN — PROPOFOL 25 MG: 10 INJECTION, EMULSION INTRAVENOUS at 12:43

## 2018-07-05 RX ADMIN — SODIUM CHLORIDE 42 ML/HR: 9 INJECTION, SOLUTION INTRAVENOUS at 11:41

## 2018-07-05 RX ADMIN — ONDANSETRON 4 MG: 2 INJECTION, SOLUTION INTRAMUSCULAR; INTRAVENOUS at 11:59

## 2018-07-05 RX ADMIN — LIDOCAINE HYDROCHLORIDE 40 MG: 20 INJECTION, SOLUTION INTRAVENOUS at 12:38

## 2018-07-05 RX ADMIN — HYDROMORPHONE HYDROCHLORIDE 0.25 MG: 2 INJECTION, SOLUTION INTRAMUSCULAR; INTRAVENOUS; SUBCUTANEOUS at 12:00

## 2018-07-05 RX ADMIN — PROPOFOL 25 MG: 10 INJECTION, EMULSION INTRAVENOUS at 12:52

## 2018-07-05 NOTE — DISCHARGE INSTRUCTIONS
BRONCHOSCOPY AFTER CARE:    WHAT TO EXPECT AFTER THE PROCEDURE  It is normal to have these symptoms 24-48 hours following your procedure.  * increased cough  * low grade fever  * sore throat or hoarse voice  * small streaks of blood in your thick spit(sputum)    HOME CARE INSTRUCTIONS  * Do not eat or drink anything for two hours (or as instructed by your physician) after your procedure.Then for the first 4 hours cool liquids as tolerated. If you try to eat or drink before the medicine wears off, food or drink could go into your lungs or you could burn your self.  After the numbness is gone and your cough and gag reflexes have returned, you may eat soft food and drink room temperature liquids slowly.  * The day after the procedure, you can go back to your normal diet.  * You may resume normal activities.  * You make take tylenol 650 mg for low grade temperature.  * Keep all follow up visits as directed by your health care provider.    SEED IMMEDIATE MEDICAL CARE IF:  * You experience increasing shortness of breath.  * You become light-headed or faint.  * You have chest pain.  * You cough up more than a small amount of blood (a cup in 6 hours)  * The amount of blood you cough up increases.    MAKE SURE YOU:  * Understand these instructions.  * Will watch your condition.  * Will get help right away if you are not doing well (if unable to contact your provider, return to the ED)  * No driving, no alcohol, and no major decisions for 24 hours.  * Avoid cigarettes for at least 24 hours. Plan to stop smoking!    No pushing, pulling, tugging, heavy lifting, or strenuous activity.  No major decision making, driving, or drinking alcoholic beverages for 24 hours. (due to the medications you have received)  Always use good hand hygiene/washing techniques.  NO driving while taking pain medications.    To assist you in voiding:  Drink plenty of fluids  Listen to running water while attempting to void.    If you are unable to  urinate and you have an uncomfortable urge to void or it has been   6 hours since you were discharged, return to the Emergency Room

## 2018-07-05 NOTE — ANESTHESIA POSTPROCEDURE EVALUATION
Patient: Joelle Yee    Procedure Summary     Date:  07/05/18 Room / Location:  [facility] /  [facility] OR    Anesthesia Start:  1234 Anesthesia Stop:  1310    Procedure:  BRONCHOSCOPY w/ WASHINGS/BRUSHINGS with MAC (N/A Bronchus) Diagnosis:       Bronchiectasis without complication (CMS/HCC)      (Bronchiectasis without complication (CMS/HCC) [J47.9])    Surgeon:  Rebeka Esparaz MD Provider:  Josi Carrasco CRNA    Anesthesia Type:  MAC ASA Status:  4          Anesthesia Type: MAC  Last vitals  BP   104/54 (07/05/18 1310)   Temp   97.9 °F (36.6 °C) (07/05/18 1310)   Pulse   111 (07/05/18 1310)   Resp   22 (07/05/18 1310)     SpO2   96 % (07/05/18 1310)     Post Anesthesia Care and Evaluation    Patient location during evaluation: PHASE II  Patient participation: complete - patient participated  Level of consciousness: awake and alert  Pain score: 0  Pain management: satisfactory to patient  Airway patency: patent  Anesthetic complications: No anesthetic complications  PONV Status: none  Cardiovascular status: acceptable and stable  Respiratory status: acceptable  Hydration status: acceptable

## 2018-07-05 NOTE — NURSING NOTE
1405: MD Esparza at bedside to see patient and speak with family regarding results of procedure. Orders given for patient to drink and may be discharged if pt tolerates water.

## 2018-07-05 NOTE — OP NOTE
Date of Procedure: July 5, 2018       Type:   1. Bronchoscopy with BAL    2. Bronchoscopy with Micro [place] Biopsy      Reason for procedure: Bronchiectasis. Abnormal CT.      Consent: Consent was obtained from the Patient after explanation of the risks and benefits of the procedure. Risks including but not limited to damage to the overlying structures, pneumothorax, bleeding, infection, worsening of respiratory status, requirement for chest tube placement among others were explained.    Patient understood the risks and benefits and agreed to proceed with the procedure.    Time Out: Time out was done with the RN & Bronch Technician at the bedside after confirmation of the site of the procedure and name and date of birth with the patient's ID band.    Details of the procedure:   MAC anesthesia was provided by the anesthesia team. Vital signs were monitored by them, as well. Once under MAC, the left nostril was anesthetized with lidocaine gel. The bronchoscope was introduced through the nostril with immediate visualization of the vocal cords. The vocal cords were adducting and abducting to inspiration and expiration, equally & sluggishly.     Lidocaine was then instilled on the vocal cords and surrounding structures. The bronchoscope was then introduced through the vocal cords with immediate visualization of the trachea.     The trachea was central although had thick yellowish secretions noted. The jalil was sharp.      Left side was inspected first. The bronchoscope was introduced into the main stem.  The left lower lobe had thick copious secretions which were suctioned. No endobronchial lesions were identified.    Next, the Right side was evaluated. The bronchoscope was introduced into the main stem.  Right upper lobe had minimal secretions and no endobronchial lesion was seen. The right mainstem had very thick and hard to suction, yellowish, secretions. Multiple passes had to be made to clear the Right lower lobe  from it's secretions. After the secretions were cleared, no endobronchial lesion was identified.     Microbiology brush biopsies were also obtained from the right lower lobe same location.      Bronchial alveolar lavage was collected during the procedure as well, before and after the biopsies were obtained. A total of 140 mls was instilled and return of approximately 40 mls was obtained.     The samples obtained during the procedure will be sent for cytology. Additionally, cultures have been ordered.    The patient will be monitored by anesthesia. The patient will be discharged home once deemed stable by anesthesia team and if there are no post-procedural complications.     Complications:  No immediate complications noted.      Chest X Ray is not needed since no forcep biopsies were taken.       Post Op Impression:  1. Significant secretions in both lungs, Right > Left.   2. Bronchiectasis.   3. Abnormal CT.           This document was electronically signed by Rebeka Esparza MD July 5, 2018  1:05 PM

## 2018-07-07 LAB
BACTERIA SPEC RESP CULT: ABNORMAL
BACTERIA SPEC RESP CULT: ABNORMAL

## 2018-07-07 RX ORDER — LEVOFLOXACIN 500 MG/1
500 TABLET, FILM COATED ORAL DAILY
Qty: 7 TABLET | Refills: 0 | Status: ON HOLD | OUTPATIENT
Start: 2018-07-07 | End: 2018-07-10

## 2018-07-10 ENCOUNTER — HOSPITAL ENCOUNTER (INPATIENT)
Facility: HOSPITAL | Age: 73
LOS: 3 days | Discharge: HOME HEALTH | End: 2018-07-13
Attending: STUDENT IN AN ORGANIZED HEALTH CARE EDUCATION/TRAINING PROGRAM | Admitting: HOSPITALIST

## 2018-07-10 ENCOUNTER — APPOINTMENT (OUTPATIENT)
Dept: GENERAL RADIOLOGY | Facility: HOSPITAL | Age: 73
End: 2018-07-10

## 2018-07-10 DIAGNOSIS — A48.8 INFECTION DUE TO SERRATIA MARCESCENS: Primary | ICD-10-CM

## 2018-07-10 DIAGNOSIS — Z74.09 IMPAIRED FUNCTIONAL MOBILITY, BALANCE, GAIT, AND ENDURANCE: ICD-10-CM

## 2018-07-10 DIAGNOSIS — J15.6 PNEUMONIA DUE TO GRAM-NEGATIVE BACTERIA (HCC): ICD-10-CM

## 2018-07-10 PROBLEM — D72.829 LEUKOCYTOSIS: Status: ACTIVE | Noted: 2018-07-10

## 2018-07-10 PROBLEM — R00.0 SINUS TACHYCARDIA: Status: ACTIVE | Noted: 2018-07-10

## 2018-07-10 LAB
ALBUMIN SERPL-MCNC: 3.9 G/DL (ref 3.5–5)
ALBUMIN/GLOB SERPL: 1.2 G/DL (ref 1–2)
ALP SERPL-CCNC: 129 U/L (ref 38–126)
ALT SERPL W P-5'-P-CCNC: 35 U/L (ref 13–69)
ANION GAP SERPL CALCULATED.3IONS-SCNC: 11 MMOL/L (ref 10–20)
ANISOCYTOSIS BLD QL: NORMAL
ARTERIAL PATENCY WRIST A: ABNORMAL
AST SERPL-CCNC: 42 U/L (ref 15–46)
BASE EXCESS BLDA CALC-SCNC: 5.2 MMOL/L (ref 0–2)
BDY SITE: ABNORMAL
BILIRUB SERPL-MCNC: 0.3 MG/DL (ref 0.2–1.3)
BUN BLD-MCNC: 19 MG/DL (ref 7–20)
BUN/CREAT SERPL: 38 (ref 7.1–23.5)
CALCIUM SPEC-SCNC: 9.4 MG/DL (ref 8.4–10.2)
CHLORIDE SERPL-SCNC: 95 MMOL/L (ref 98–107)
CO2 SERPL-SCNC: 34 MMOL/L (ref 26–30)
COHGB MFR BLD: 2.7 % (ref 0–2)
CREAT BLD-MCNC: 0.5 MG/DL (ref 0.6–1.3)
D-LACTATE SERPL-SCNC: 2.3 MMOL/L (ref 0.5–2)
DEPRECATED RDW RBC AUTO: 64.9 FL (ref 37–54)
ERYTHROCYTE [DISTWIDTH] IN BLOOD BY AUTOMATED COUNT: 18.5 % (ref 11.5–14.5)
GFR SERPL CREATININE-BSD FRML MDRD: 121 ML/MIN/1.73
GLOBULIN UR ELPH-MCNC: 3.2 GM/DL
GLUCOSE BLD-MCNC: 99 MG/DL (ref 74–98)
HCO3 BLDA-SCNC: 29.5 MMOL/L (ref 22–28)
HCT VFR BLD AUTO: 37.4 % (ref 37–47)
HGB BLD-MCNC: 12.2 G/DL (ref 12–16)
HGB BLDA-MCNC: 11.6 G/DL (ref 12–18)
HOLD SPECIMEN: NORMAL
HOLD SPECIMEN: NORMAL
HOROWITZ INDEX BLD+IHG-RTO: 28 %
HYPOCHROMIA BLD QL: NORMAL
LAB AP CASE REPORT: NORMAL
LARGE PLATELETS: NORMAL
LYMPHOCYTES # BLD MANUAL: NORMAL 10*3/MM3 (ref 0.6–3.4)
LYMPHOCYTES NFR BLD MANUAL: NORMAL % (ref 10–50)
MCH RBC QN AUTO: 32.1 PG (ref 27–31)
MCHC RBC AUTO-ENTMCNC: 32.6 G/DL (ref 30–37)
MCV RBC AUTO: 98.4 FL (ref 81–99)
METHGB BLD QL: 0.8 % (ref 0–1.5)
MICROCYTES BLD QL: NORMAL
MODALITY: ABNORMAL
NEUTROPHILS # BLD AUTO: NORMAL 10*3/MM3 (ref 2–6.9)
NEUTROPHILS NFR BLD MANUAL: NORMAL % (ref 37–80)
NT-PROBNP SERPL-MCNC: 327 PG/ML (ref 0–125)
OXYHGB MFR BLDV: 91.3 % (ref 94–99)
PATH REPORT.FINAL DX SPEC: NORMAL
PCO2 BLDA: 44.3 MM HG (ref 35–45)
PCO2 TEMP ADJ BLD: ABNORMAL MM HG (ref 35–45)
PH BLDA: 7.43 PH UNITS (ref 7.3–7.5)
PH, TEMP CORRECTED: ABNORMAL PH UNITS
PLATELET # BLD AUTO: 261 10*3/MM3 (ref 130–400)
PMV BLD AUTO: 9.3 FL (ref 6–12)
PO2 BLDA: 81.9 MM HG (ref 75–100)
PO2 TEMP ADJ BLD: ABNORMAL MM HG (ref 83–108)
POIKILOCYTOSIS BLD QL SMEAR: NORMAL
POTASSIUM BLD-SCNC: 4 MMOL/L (ref 3.5–5.1)
PROT SERPL-MCNC: 7.1 G/DL (ref 6.3–8.2)
RBC # BLD AUTO: 3.8 10*6/MM3 (ref 4.2–5.4)
SAO2 % BLDCOA: 94.6 % (ref 94–100)
SCAN SLIDE: NORMAL
SMALL PLATELETS BLD QL SMEAR: ADEQUATE
SODIUM BLD-SCNC: 136 MMOL/L (ref 137–145)
TOXIC GRANULATION: NORMAL
TROPONIN I SERPL-MCNC: <0.012 NG/ML (ref 0–0.03)
WBC NRBC COR # BLD: 15.07 10*3/MM3 (ref 4.8–10.8)
WHOLE BLOOD HOLD SPECIMEN: NORMAL
WHOLE BLOOD HOLD SPECIMEN: NORMAL

## 2018-07-10 PROCEDURE — 25010000002 METHYLPREDNISOLONE PER 125 MG: Performed by: PHYSICIAN ASSISTANT

## 2018-07-10 PROCEDURE — 82805 BLOOD GASES W/O2 SATURATION: CPT

## 2018-07-10 PROCEDURE — 84484 ASSAY OF TROPONIN QUANT: CPT | Performed by: STUDENT IN AN ORGANIZED HEALTH CARE EDUCATION/TRAINING PROGRAM

## 2018-07-10 PROCEDURE — 94799 UNLISTED PULMONARY SVC/PX: CPT

## 2018-07-10 PROCEDURE — 93005 ELECTROCARDIOGRAM TRACING: CPT | Performed by: STUDENT IN AN ORGANIZED HEALTH CARE EDUCATION/TRAINING PROGRAM

## 2018-07-10 PROCEDURE — 99222 1ST HOSP IP/OBS MODERATE 55: CPT | Performed by: HOSPITALIST

## 2018-07-10 PROCEDURE — 87040 BLOOD CULTURE FOR BACTERIA: CPT | Performed by: HOSPITALIST

## 2018-07-10 PROCEDURE — 82375 ASSAY CARBOXYHB QUANT: CPT

## 2018-07-10 PROCEDURE — 99284 EMERGENCY DEPT VISIT MOD MDM: CPT

## 2018-07-10 PROCEDURE — 36600 WITHDRAWAL OF ARTERIAL BLOOD: CPT

## 2018-07-10 PROCEDURE — 25010000002 HEPARIN (PORCINE) PER 1000 UNITS: Performed by: HOSPITALIST

## 2018-07-10 PROCEDURE — 85007 BL SMEAR W/DIFF WBC COUNT: CPT | Performed by: STUDENT IN AN ORGANIZED HEALTH CARE EDUCATION/TRAINING PROGRAM

## 2018-07-10 PROCEDURE — 94640 AIRWAY INHALATION TREATMENT: CPT

## 2018-07-10 PROCEDURE — 83880 ASSAY OF NATRIURETIC PEPTIDE: CPT | Performed by: STUDENT IN AN ORGANIZED HEALTH CARE EDUCATION/TRAINING PROGRAM

## 2018-07-10 PROCEDURE — 85025 COMPLETE CBC W/AUTO DIFF WBC: CPT | Performed by: STUDENT IN AN ORGANIZED HEALTH CARE EDUCATION/TRAINING PROGRAM

## 2018-07-10 PROCEDURE — 83605 ASSAY OF LACTIC ACID: CPT | Performed by: HOSPITALIST

## 2018-07-10 PROCEDURE — 71046 X-RAY EXAM CHEST 2 VIEWS: CPT

## 2018-07-10 PROCEDURE — 63710000001 PREDNISONE PER 1 MG: Performed by: HOSPITALIST

## 2018-07-10 PROCEDURE — 83050 HGB METHEMOGLOBIN QUAN: CPT

## 2018-07-10 PROCEDURE — 94760 N-INVAS EAR/PLS OXIMETRY 1: CPT

## 2018-07-10 PROCEDURE — 80053 COMPREHEN METABOLIC PANEL: CPT | Performed by: STUDENT IN AN ORGANIZED HEALTH CARE EDUCATION/TRAINING PROGRAM

## 2018-07-10 RX ORDER — LORAZEPAM 0.5 MG/1
0.5 TABLET ORAL 2 TIMES DAILY PRN
Status: DISCONTINUED | OUTPATIENT
Start: 2018-07-10 | End: 2018-07-13 | Stop reason: HOSPADM

## 2018-07-10 RX ORDER — LANOLIN ALCOHOL/MO/W.PET/CERES
10 CREAM (GRAM) TOPICAL NIGHTLY PRN
Status: DISCONTINUED | OUTPATIENT
Start: 2018-07-10 | End: 2018-07-13 | Stop reason: HOSPADM

## 2018-07-10 RX ORDER — SODIUM CHLORIDE 0.9 % (FLUSH) 0.9 %
1-10 SYRINGE (ML) INJECTION AS NEEDED
Status: DISCONTINUED | OUTPATIENT
Start: 2018-07-10 | End: 2018-07-13 | Stop reason: HOSPADM

## 2018-07-10 RX ORDER — CYCLOBENZAPRINE HCL 10 MG
10 TABLET ORAL DAILY
COMMUNITY

## 2018-07-10 RX ORDER — POTASSIUM CHLORIDE 750 MG/1
20 CAPSULE, EXTENDED RELEASE ORAL DAILY
Status: DISCONTINUED | OUTPATIENT
Start: 2018-07-11 | End: 2018-07-13 | Stop reason: HOSPADM

## 2018-07-10 RX ORDER — METOPROLOL SUCCINATE 25 MG/1
25 TABLET, EXTENDED RELEASE ORAL DAILY
Status: DISCONTINUED | OUTPATIENT
Start: 2018-07-10 | End: 2018-07-13 | Stop reason: HOSPADM

## 2018-07-10 RX ORDER — VENLAFAXINE 75 MG/1
75 TABLET ORAL 2 TIMES DAILY WITH MEALS
Status: DISCONTINUED | OUTPATIENT
Start: 2018-07-10 | End: 2018-07-13 | Stop reason: HOSPADM

## 2018-07-10 RX ORDER — MIRTAZAPINE 15 MG/1
30 TABLET, FILM COATED ORAL NIGHTLY
Status: DISCONTINUED | OUTPATIENT
Start: 2018-07-10 | End: 2018-07-13 | Stop reason: HOSPADM

## 2018-07-10 RX ORDER — ONDANSETRON 2 MG/ML
4 INJECTION INTRAMUSCULAR; INTRAVENOUS EVERY 6 HOURS PRN
Status: DISCONTINUED | OUTPATIENT
Start: 2018-07-10 | End: 2018-07-13 | Stop reason: HOSPADM

## 2018-07-10 RX ORDER — METHYLPREDNISOLONE SODIUM SUCCINATE 125 MG/2ML
125 INJECTION, POWDER, LYOPHILIZED, FOR SOLUTION INTRAMUSCULAR; INTRAVENOUS ONCE
Status: COMPLETED | OUTPATIENT
Start: 2018-07-10 | End: 2018-07-10

## 2018-07-10 RX ORDER — CYCLOBENZAPRINE HCL 10 MG
10 TABLET ORAL DAILY
Status: DISCONTINUED | OUTPATIENT
Start: 2018-07-10 | End: 2018-07-13 | Stop reason: HOSPADM

## 2018-07-10 RX ORDER — BUDESONIDE AND FORMOTEROL FUMARATE DIHYDRATE 160; 4.5 UG/1; UG/1
2 AEROSOL RESPIRATORY (INHALATION)
Status: DISCONTINUED | OUTPATIENT
Start: 2018-07-10 | End: 2018-07-13 | Stop reason: HOSPADM

## 2018-07-10 RX ORDER — AZELASTINE 1 MG/ML
2 SPRAY, METERED NASAL DAILY
Status: DISCONTINUED | OUTPATIENT
Start: 2018-07-11 | End: 2018-07-13 | Stop reason: HOSPADM

## 2018-07-10 RX ORDER — DOCUSATE SODIUM 100 MG/1
100 CAPSULE, LIQUID FILLED ORAL DAILY PRN
Status: DISCONTINUED | OUTPATIENT
Start: 2018-07-10 | End: 2018-07-13 | Stop reason: HOSPADM

## 2018-07-10 RX ORDER — GABAPENTIN 100 MG/1
100 CAPSULE ORAL 3 TIMES DAILY
Status: DISCONTINUED | OUTPATIENT
Start: 2018-07-10 | End: 2018-07-13 | Stop reason: HOSPADM

## 2018-07-10 RX ORDER — SODIUM CHLORIDE 0.9 % (FLUSH) 0.9 %
10 SYRINGE (ML) INJECTION AS NEEDED
Status: DISCONTINUED | OUTPATIENT
Start: 2018-07-10 | End: 2018-07-13 | Stop reason: HOSPADM

## 2018-07-10 RX ORDER — MULTIPLE VITAMINS W/ MINERALS TAB 9MG-400MCG
1 TAB ORAL DAILY
Status: DISCONTINUED | OUTPATIENT
Start: 2018-07-10 | End: 2018-07-13 | Stop reason: HOSPADM

## 2018-07-10 RX ORDER — ACETAMINOPHEN 325 MG/1
650 TABLET ORAL EVERY 4 HOURS PRN
Status: DISCONTINUED | OUTPATIENT
Start: 2018-07-10 | End: 2018-07-13 | Stop reason: HOSPADM

## 2018-07-10 RX ORDER — IPRATROPIUM BROMIDE AND ALBUTEROL SULFATE 2.5; .5 MG/3ML; MG/3ML
3 SOLUTION RESPIRATORY (INHALATION)
Status: DISCONTINUED | OUTPATIENT
Start: 2018-07-10 | End: 2018-07-13 | Stop reason: HOSPADM

## 2018-07-10 RX ORDER — IPRATROPIUM BROMIDE AND ALBUTEROL SULFATE 2.5; .5 MG/3ML; MG/3ML
3 SOLUTION RESPIRATORY (INHALATION) ONCE
Status: COMPLETED | OUTPATIENT
Start: 2018-07-10 | End: 2018-07-10

## 2018-07-10 RX ORDER — MONTELUKAST SODIUM 10 MG/1
10 TABLET ORAL DAILY
Status: DISCONTINUED | OUTPATIENT
Start: 2018-07-11 | End: 2018-07-13 | Stop reason: HOSPADM

## 2018-07-10 RX ORDER — KETOTIFEN FUMARATE 0.35 MG/ML
1 SOLUTION/ DROPS OPHTHALMIC 2 TIMES DAILY
Status: DISCONTINUED | OUTPATIENT
Start: 2018-07-10 | End: 2018-07-13 | Stop reason: HOSPADM

## 2018-07-10 RX ORDER — LOSARTAN POTASSIUM 25 MG/1
25 TABLET ORAL DAILY
Status: DISCONTINUED | OUTPATIENT
Start: 2018-07-10 | End: 2018-07-13 | Stop reason: HOSPADM

## 2018-07-10 RX ORDER — PREDNISONE 20 MG/1
40 TABLET ORAL EVERY 12 HOURS SCHEDULED
Status: DISCONTINUED | OUTPATIENT
Start: 2018-07-10 | End: 2018-07-13 | Stop reason: HOSPADM

## 2018-07-10 RX ORDER — L.ACID,PARA/B.BIFIDUM/S.THERM 8B CELL
1 CAPSULE ORAL DAILY
Status: DISCONTINUED | OUTPATIENT
Start: 2018-07-11 | End: 2018-07-13 | Stop reason: HOSPADM

## 2018-07-10 RX ORDER — HEPARIN SODIUM 5000 [USP'U]/ML
5000 INJECTION, SOLUTION INTRAVENOUS; SUBCUTANEOUS EVERY 8 HOURS SCHEDULED
Status: DISCONTINUED | OUTPATIENT
Start: 2018-07-10 | End: 2018-07-13 | Stop reason: HOSPADM

## 2018-07-10 RX ORDER — TRAZODONE HYDROCHLORIDE 50 MG/1
25 TABLET ORAL EVERY 12 HOURS SCHEDULED
Status: DISCONTINUED | OUTPATIENT
Start: 2018-07-10 | End: 2018-07-11

## 2018-07-10 RX ORDER — ALBUTEROL SULFATE 2.5 MG/3ML
2.5 SOLUTION RESPIRATORY (INHALATION)
Status: DISCONTINUED | OUTPATIENT
Start: 2018-07-10 | End: 2018-07-11 | Stop reason: SDUPTHER

## 2018-07-10 RX ORDER — HYDROCODONE BITARTRATE AND ACETAMINOPHEN 10; 325 MG/1; MG/1
1 TABLET ORAL EVERY 8 HOURS PRN
Status: DISCONTINUED | OUTPATIENT
Start: 2018-07-10 | End: 2018-07-13 | Stop reason: HOSPADM

## 2018-07-10 RX ORDER — PANTOPRAZOLE SODIUM 40 MG/1
40 TABLET, DELAYED RELEASE ORAL EVERY MORNING
Status: DISCONTINUED | OUTPATIENT
Start: 2018-07-11 | End: 2018-07-13 | Stop reason: HOSPADM

## 2018-07-10 RX ADMIN — IPRATROPIUM BROMIDE AND ALBUTEROL SULFATE 3 ML: .5; 3 SOLUTION RESPIRATORY (INHALATION) at 20:27

## 2018-07-10 RX ADMIN — METOPROLOL SUCCINATE 25 MG: 25 TABLET, EXTENDED RELEASE ORAL at 20:47

## 2018-07-10 RX ADMIN — KETOTIFEN FUMARATE 1 DROP: 0.35 SOLUTION/ DROPS OPHTHALMIC at 20:49

## 2018-07-10 RX ADMIN — CYCLOBENZAPRINE HYDROCHLORIDE 10 MG: 10 TABLET, FILM COATED ORAL at 20:47

## 2018-07-10 RX ADMIN — BUDESONIDE AND FORMOTEROL FUMARATE DIHYDRATE 2 PUFF: 160; 4.5 AEROSOL RESPIRATORY (INHALATION) at 20:44

## 2018-07-10 RX ADMIN — HEPARIN SODIUM 5000 UNITS: 5000 INJECTION, SOLUTION INTRAVENOUS; SUBCUTANEOUS at 21:08

## 2018-07-10 RX ADMIN — MIRTAZAPINE 30 MG: 15 TABLET, FILM COATED ORAL at 20:48

## 2018-07-10 RX ADMIN — LOSARTAN POTASSIUM 25 MG: 25 TABLET, FILM COATED ORAL at 20:48

## 2018-07-10 RX ADMIN — METHYLPREDNISOLONE SODIUM SUCCINATE 125 MG: 125 INJECTION, POWDER, FOR SOLUTION INTRAMUSCULAR; INTRAVENOUS at 14:38

## 2018-07-10 RX ADMIN — TRAZODONE HYDROCHLORIDE 25 MG: 50 TABLET ORAL at 20:47

## 2018-07-10 RX ADMIN — NYSTATIN 500000 UNITS: 100000 SUSPENSION ORAL at 20:49

## 2018-07-10 RX ADMIN — VENLAFAXINE 75 MG: 75 TABLET ORAL at 20:48

## 2018-07-10 RX ADMIN — PREDNISONE 40 MG: 20 TABLET ORAL at 20:48

## 2018-07-10 RX ADMIN — LORAZEPAM 0.5 MG: 0.5 TABLET ORAL at 20:56

## 2018-07-10 RX ADMIN — AZTREONAM 2 G: 1 INJECTION, POWDER, LYOPHILIZED, FOR SOLUTION INTRAMUSCULAR; INTRAVENOUS at 18:56

## 2018-07-10 RX ADMIN — HYDROCODONE BITARTRATE AND ACETAMINOPHEN 1 TABLET: 10; 325 TABLET ORAL at 20:49

## 2018-07-10 RX ADMIN — IPRATROPIUM BROMIDE AND ALBUTEROL SULFATE 3 ML: .5; 3 SOLUTION RESPIRATORY (INHALATION) at 14:46

## 2018-07-10 RX ADMIN — GABAPENTIN 100 MG: 100 CAPSULE ORAL at 20:48

## 2018-07-10 NOTE — PLAN OF CARE
Problem: Fall Risk (Adult)  Goal: Identify Related Risk Factors and Signs and Symptoms  Outcome: Ongoing (interventions implemented as appropriate)   07/10/18 5007   Fall Risk (Adult)   Related Risk Factors (Fall Risk) fatigue/slow reaction;gait/mobility problems;history of falls   Signs and Symptoms (Fall Risk) presence of risk factors     Goal: Absence of Fall  Outcome: Ongoing (interventions implemented as appropriate)      Problem: Infection, Risk/Actual (Adult)  Goal: Identify Related Risk Factors and Signs and Symptoms  Outcome: Ongoing (interventions implemented as appropriate)    Goal: Infection Prevention/Resolution  Outcome: Ongoing (interventions implemented as appropriate)      Problem: Hospitalized Acutely Ill Older (Adult)  Goal: Signs and Symptoms of Listed Potential Problems Will be Absent, Minimized or Managed (Hospitalized Acutely Ill Older)  Outcome: Ongoing (interventions implemented as appropriate)

## 2018-07-10 NOTE — ED PROVIDER NOTES
"Subjective   72-year-old female referred by her pulmonologist.    The patient has significant COPD and actually underwent bronchoscopy this past Thursday after having an abnormal CT scan.  Unfortunately, she does continue to smoke about 10 or 12 cigarettes a day.  She is able to use 2 L of oxygen at home on an as-needed basis only.  She is currently on a prednisone taper.  She told me that following the recent bronchoscopy she's had increased cough, increased mucus production and increased shortness of air to the point where she is having to use her oxygen 2 L continuously.  She denies any hemoptysis.  Denies fever chills myalgias.  Denies any chest pain/back pain.  She said that the home health nurse checked on her today call the pulmonologist and she was referred here \"to be admitted and get IV antibiotics\".            Review of Systems   All other systems reviewed and are negative.      Past Medical History:   Diagnosis Date   • Abdominal pain    • Acute bronchitis    • Ankle pain    • Anxiety 1980   • Atopic dermatitis    • Bronchiectasis (CMS/HCC)    • Cataract, bilateral    • Chest pain     STATES HAS OCCASSIONALLY.  STATES LAST TIME WAS LAST WEEK.  STATES SEES DR. RICH.  STATES HE HAS DONE NUMEROUS TESTS ON HER AND HAS NOT BEEN ABLE TO FIND OUT CAUSE.     • Chronic obstructive lung disease (CMS/HCC) 2008   • Colon cancer screening    • COPD (chronic obstructive pulmonary disease) (CMS/HCC)    • Cystocele    • Depressed    • Depression 1980   • Fatigue     Chronic pafigue 2012   • Fibromyalgia 2008   • Full dentures    • GERD (gastroesophageal reflux disease)    • Gout    • Heartburn     Chronic historu of epigastric heartburn currently controlled on Prilesec 40 mg every morning along with Zantac 300 Mg daily at bedtime   • High cholesterol 2012   • Hip pain    • Hyperlipidemia    • Hypertension    • Insomnia    • Joint pain    • Kidney infection    • Loss of appetite    • Low back pain 1995   • Melena    • " Nausea and vomiting    • On home oxygen therapy     2L/NC PRN (STATES SHE HAS BEEN USING CONTINUOSLY LATELY)   • Osteoarthritis 2010   • Pain in limb    • Palpitations    • Pinched nerve     Pinched nerves 2011   • Pneumonia    • Problems with swallowing     HAS TO EAT SLOW AND CHEW FOOD WELL   • Recurrent urinary tract infection    • Sciatica [id number]   • Shortness of breath    • Sinus problem    • Sleep apnea 1998    NO CPAP   • Upset stomach    • Valvular heart disease    • Vitamin B12 deficiency    • Wears glasses    • Weight loss, abnormal     Weight loss is stabel. On pund weight gain since 01/2016       Allergies   Allergen Reactions   • Dust Mite Extract Other (See Comments)     Dust  SINUS   • Grass Other (See Comments)     SINUS   • Mold Extract [Trichophyton] Other (See Comments)     SINUS       Past Surgical History:   Procedure Laterality Date   • ABDOMINAL HERNIA REPAIR  2016   • APPENDECTOMY  1965   • BACK SURGERY  2005   • BRONCHOSCOPY N/A 7/5/2018    Procedure: BRONCHOSCOPY w/ WASHINGS/BRUSHINGS with MAC;  Surgeon: Rebeka Esparza MD;  Location: [facility];  Service: Pulmonary   • CATARACT EXTRACTION Bilateral     Put in implants    • CHOLECYSTECTOMY  1985   • COLONOSCOPY     • ENDOSCOPY     • KNEE SURGERY Left 1979    Knee Cap   • TUBAL ABDOMINAL LIGATION  1971       Family History   Problem Relation Age of Onset   • Ovarian cancer Mother    • Cancer Mother    • Lung cancer Father    • Heart disease Brother        Social History     Social History   • Marital status: Single     Social History Main Topics   • Smoking status: Current Every Day Smoker     Packs/day: 1.00     Years: 35.00     Types: Cigarettes     Last attempt to quit: 3/5/2017   • Smokeless tobacco: Never Used      Comment: SMOKES 1-2 CIGARETTES PER DAY NOW   • Alcohol use No   • Drug use: No   • Sexual activity: Defer     Other Topics Concern   • Not on file           Objective   Physical Exam   Constitutional: She is oriented to  person, place, and time. No distress.   She is a very pleasant but somewhat frail appearing 72-year-old  female in no acute respiratory distress.  Her skin is warm and dry.  Speech is normal and nonlabored.  Oxygen currently on 2 L   HENT:   Head: Normocephalic.   Nose: Nose normal.   Mouth/Throat: Oropharynx is clear and moist.   Eyes: Conjunctivae and EOM are normal. Right eye exhibits no discharge. Left eye exhibits no discharge.   Neck: Normal range of motion. No JVD present.   Cardiovascular: Regular rhythm, normal heart sounds and intact distal pulses.  Tachycardia present.  Exam reveals no friction rub.    No murmur heard.  Abdominal: Soft. Bowel sounds are normal. She exhibits no distension. There is no tenderness.   Musculoskeletal: Normal range of motion. She exhibits no edema.   Neurological: She is alert and oriented to person, place, and time. No cranial nerve deficit or sensory deficit. She exhibits normal muscle tone. Coordination normal.   Skin: Skin is warm. Capillary refill takes less than 2 seconds. No rash noted. She is not diaphoretic. No erythema.   Psychiatric: She has a normal mood and affect. Her behavior is normal. Thought content normal.   Vitals reviewed.      Procedures           ED Course  ED Course as of Jul 10 1802   Tue Jul 10, 2018   1437 Patient presenting with the above symptoms.  Her recent bronchoscopy was noted to show bronchiectasis and increased secretions right lung greater than left.  Room air sat here is 90% other presenting vital signs are temperature of 98.6 heart rate 112 respirations 20 blood pressure 145/99.  I'm going to go ahead and give her Solu-Medrol, DuoNeb treatment obtain an ABG on 2 L and plan on calling the pulmonologist.  Two-view chest film will also be obtained.  Basic labs were ordered per nursing protocol  [BW]   1619 As of 1619 awaiting two-view chest film  [BW]   1622 EKG shows sinus tachycardia with a rate of 104.  No significant ST  segments.  Short [place] interval of 118.  Low QRS voltage in chest leads.  Abnormal EKG.  Interpreted by me.  [DT]   1727 I spoke to the patient's pulmonologist about her current symptoms.  He said her recent bronchoscopy showed that she was growing Serratia and has actually failed outpatient therapy therefore she will need IV antibiotics.  [BW]   1730 I am going to call the hospitalist to discuss admission  [BW]      ED Course User Index  [BW] Jose Armando Raymundo PA-C  [DT] Femi Doherty MD                  [facility] [place]      Final diagnoses:   Infection due to Serratia marcescens (CMS/[facility])            Jose Armando Raymundo PA-C  07/10/18 1802       Jose Armando Raymundo PA-C  07/10/18 1803

## 2018-07-10 NOTE — PLAN OF CARE
Problem: Patient Care Overview  Goal: Plan of Care Review  Outcome: Ongoing (interventions implemented as appropriate)   07/10/18 6531   OTHER   Outcome Summary new admit

## 2018-07-10 NOTE — H&P
[facility]    Name: Joelle Yee, 72 y.o. female  MRN: [id number], : 1945   Date of Admission: 7/10/2018   PCP: DONTRELL Regan     Chief Complaint: increasing shortness of breath and cough     History of Present Illness: Joelle Yee is a(n) 72 y.o. year old female with a history of tobacco use (stopped few days ago), COPD with bronchiectasis, GERD, HLD, HTN who presents as admission from home with increasing cough and shortness of breath. Her pulmonologist is Dr. Esparza (ER provider notified Dr. Esparza). She had bronchoscopy on 18 and cultures from RLL lavage: +4 serratia marcescens (resistant to ampicillin, unasyn, cefazolin, cefoxitin, cefuroxime). She was started on outpatient levaquin PO and has taken this x 5 out of 7 days. She denies any nausea, emesis, diarrhea. No edema. Occasionally has chills. No fever. Productive cough, green phlegm with red flecks x 1 month. Occasionally experiences chest pain, center of chest, occurs anytime, 2-3x/month, does not take any meds for this, lasts up to 10-15 minutes. She has not had chest pain in last few days, I encouraged her to notify us if this occurs while she is here so we can evaluate. She lives alone, has rollater and walker at home, but normally does not have to use.     ER course:   VS: /80   Pulse 115   Temp 98.6 °F (37 °C) (Oral)   Resp 20   Ht 152.4 cm (60\")   Wt 37.6 kg (82 lb 12.8 oz)   LMP  (LMP Unknown)   SpO2 97%   BMI 16.17 kg/m²    Meds: duoneb; solumedrol 125  Abnl Labs: AB.43/44/81/29; Glc 99; Na 136; Cl 95; CO2 34; ; WBC 15K   Studies:   CXR: \"Bibasilar atelectasis and scarring. Underlying emphysema. \"  EKG: (my read), compared to EKG from: 18. Rate: 104bpm / Rhythm: sinus tach / Axis: nl / ST/T changes: no / Hypertrophy: no / QTc: 376ms     Patient seen at 653pm on 7/10/2018. History was provided by: chart review, discussion with ER provider " (JERARDO Suárez), and patient.     Recent hospitalization?: yes 6/7-6/10 for acute on chronic resp failure, acute on chronic COPD exacerbation with bronchitis    ==============================================================================================================================================================================================================   History:   ROS: +cough, productive, +chills, +intermittent chest pain, all other systems reviewed and are negative  PMHx: Patient  has a past medical history of Abdominal pain; Acute bronchitis; Ankle pain; Anxiety (1980); Atopic dermatitis; Bronchiectasis (CMS/[facility]); Cataract, bilateral; Chest pain; Chronic obstructive lung disease (CMS/[facility]) (2008); Colon cancer screening; COPD (chronic obstructive pulmonary disease) (CMS/[facility]); Cystocele; Depressed; Depression (1980); Fatigue; Fibromyalgia (2008); Full dentures; GERD (gastroesophageal reflux disease); Gout; Heartburn; High cholesterol (2012); Hip pain; Hyperlipidemia; Hypertension; Insomnia; Joint pain; Kidney infection; Loss of appetite; Low back pain (1995); Melena; Nausea and vomiting; On home oxygen therapy; Osteoarthritis (2010); Pain in limb; Palpitations; Pinched nerve; Pneumonia; Problems with swallowing; Recurrent urinary tract infection; Sciatica (5371-2140); Shortness of breath; Sinus problem; Sleep apnea (1998); Upset stomach; Valvular heart disease; Vitamin B12 deficiency; Wears glasses; and Weight loss, abnormal.   PSHx: Patient  has a past surgical history that includes Appendectomy (1965); Back surgery (2005); Knee surgery (Left, 1979); Tubal ligation (1971); Cholecystectomy (1985); Abdominal hernia repair (2016); Cataract extraction (Bilateral); Esophagogastroduodenoscopy; Colonoscopy; and Bronchoscopy (N/A, 7/5/2018).   FamHx: Patient family history includes Cancer in her mother; Heart disease in her brother; Lung cancer in her father; Ovarian cancer in her mother. stroke -  brother  SocHx: Patient  reports that she has been smoking Cigarettes.  She has a 35.00 pack-year smoking history. She has never used smokeless tobacco. She reports that she does not drink alcohol or use drugs.   Allergies: Patient is allergic to dust mite extract; grass; and mold extract [trichophyton].   Medications:   No current facility-administered medications on file prior to encounter.      Current Outpatient Prescriptions on File Prior to Encounter   Medication Sig Dispense Refill   • acetaminophen (TYLENOL) 325 MG tablet Take 2 tablets by mouth Every 4 (Four) Hours As Needed for Headache or Fever (temperature greater than 101.5 F).     • albuterol (PROVENTIL) (2.5 MG/3ML) 0.083% nebulizer solution Take 2.5 mg by nebulization 4 (Four) Times a Day.     • ammonium lactate (AMLACTIN) 12 % cream Apply  topically As Needed for Dry Skin.     • azelastine (ASTELIN) 0.1 % nasal spray 2 sprays into each nostril 2 (Two) Times a Day. Use in each nostril as directed 1 each 5   • budesonide-formoterol (SYMBICORT) 160-4.5 MCG/ACT inhaler Inhale 2 puffs 2 (Two) Times a Day. Inhale 1 puff twice daily. Rinse mouth after use. 1 inhaler 5   • calcium acetate (PHOS BINDER,) 667 MG capsule capsule Take 2 capsules by mouth 3 (Three) Times a Day With Meals. 180 capsule    • cetirizine (zyrTEC) 10 MG tablet Take 10 mg by mouth Daily.     • Cyanocobalamin (VITAMIN B12 PO) Take 500 mcg by mouth Daily.     • Diclofenac Sodium (VOLTAREN TD) Apply 1 application topically As Needed. Voltaren GEL      • estradiol (ESTRACE VAGINAL) 0.1 MG/GM vaginal cream Insert 2 g into the vagina Every Other Day.     • gabapentin (NEURONTIN) 100 MG capsule TAKE ONE CAPSULE BY MOUTH THREE TIMES A DAY  0   • gemfibrozil (LOPID) 600 MG tablet TAKE ONE TABLET WITH SUPPER  3   • HYDROcodone-acetaminophen (NORCO)  MG per tablet Take  by mouth. Take 1 tablet every 8 hours as needed for pain.     • INCRUSE ELLIPTA 62.5 MCG/INH aerosol powder  INHALE 1 PUFF  INTO THE LUNGS ONCE DAILY 30 each 5   • ipratropium-albuterol (COMBIVENT RESPIMAT)  MCG/ACT inhaler Inhale 1 puff 4 (Four) Times a Day As Needed for Wheezing. 4 g 5   • ketotifen (ZADITOR) 0.025 % ophthalmic solution USE 1-2 DROPS IN BOTH EYES TWICE DAILY  0   • levoFLOXacin (LEVAQUIN) 500 MG tablet Take 1 tablet by mouth Daily for 7 days. 7 tablet 0   • LORazepam (ATIVAN) 0.5 MG tablet Take 0.5 mg by mouth Every 6 (Six) Hours As Needed. 1-2 TABLETS DAILY PRN  0   • losartan (COZAAR) 25 MG tablet Take 25 mg by mouth Daily.     • melatonin 5 MG tablet tablet Take 2 tablets by mouth At Night As Needed for sleep 60 tablet 0   • metoprolol succinate XL (TOPROL-XL) 25 MG 24 hr tablet Take 25 mg by mouth Daily.     • mirtazapine (REMERON) 30 MG tablet Take 30 mg by mouth Every Night.     • Misc. Devices (ROLLATOR) misc 1 Units As Needed (ambulation assistance). 1 each 0   • montelukast (SINGULAIR) 10 MG tablet TAKE ONE TABLET BY MOUTH AT BEDTIME  3   • Multiple Vitamins-Minerals (MULTIVITAMIN WITH MINERALS) tablet tablet Take 1 tablet by mouth Daily.     • Nutritional Supplements (ENSURE COMPLETE PO) Take 1 can by mouth 2 (Two) Times a Day.     • nystatin (MYCOSTATIN) 381018 UNIT/ML suspension Swish and swallow 5 mL 4 (Four) Times a Day. (Patient taking differently: Swish and swallow 500,000 Units 4 (Four) Times a Day As Needed.) 240 mL 0   • omeprazole (priLOSEC) 40 MG capsule Take 40 mg by mouth Daily.     • ondansetron ODT (ZOFRAN-ODT) 4 MG disintegrating tablet Take 1 tablet by mouth every 6 (six) hours as needed.     • OXYGEN-HELIUM IN Oxygen; Patient Sig: Oxygen 2 liter as needed; 0; 21-May-2014; Active     • potassium bicarbonate (K-LYTE) 25 MEQ disintegrating tablet Take 25 mEq by mouth Every Night.     • predniSONE (DELTASONE) 10 MG tablet Take 1 tablet by mouth Daily. Take 4 for 3 days, then 3 for 3 days, then 2 for 3 days, then 1 for 3 days, then STOP 30 tablet 0   • Probiotic Product (RISAQUAD-2) capsule  "capsule Take 1 capsule by mouth Daily.     • Respiratory Therapy Supplies (FLUTTER) device 1 Device as needed (as directed). 1 each 0   • traZODone (DESYREL) 50 MG tablet Take 1 tablet by mouth every night.     • venlafaxine (EFFEXOR) 75 MG tablet Take 1 tablet by mouth 2 (two) times a day.          ==============================================================================================================================================================================================================   Vitals:   /80   Pulse 115   Temp 98.6 °F (37 °C) (Oral)   Resp 20   Ht 152.4 cm (60\")   Wt 37.6 kg (82 lb 12.8 oz)   LMP  (LMP Unknown)   SpO2 97%   BMI 16.17 kg/m²    SpO2: 97 %   No intake or output data in the 24 hours ending 07/10/18 0896     Physical Exam:   General Appearance:  Thin frail elderly female; Alert and cooperative, coughing frequently   Head:  Atraumatic and normocephalic, without obvious abnormality.   Eyes:          PERRL, conjunctivae and sclerae normal, no Icterus. No pallor. Extra-occular movements are within normal limits.   Ears:  Ears appear intact with no abnormalities noted.   Throat: No oral lesions, no thrush, oral mucosa moist. Dentures in place.   Neck: Supple, trachea midline, no thyromegaly, no carotid bruit.   Back:   No kyphoscoliosis present. No tenderness to palpation, range of motion normal.   Lungs:   Chest shape is normal. +coarse rhonchi bilaterally, increased at bases. Faint expiratory wheezing.   Heart:  Normal S1 and S2, tachycardic, no murmur, no gallop, no rub.   Abdomen:   Normal bowel sounds, no masses. Soft, non-tender, non-distended, no guarding, no rebound tenderness.   Genitourinary: deferred   Extremities: Moves all extremities well, no edema, no cyanosis, no clubbing. SCDs in place.   Pulses: Pulses palpable and equal bilaterally.   Skin: No bleeding, bruising or rash.   Lymph nodes: No palpable cervical adenopathy.   Neurologic: Alert and oriented " x 3. Moves all four limbs equally. No tremors. No facial asymetry.       Labs:     Results from last 7 days  Lab Units 07/10/18  1426   SODIUM mmol/L 136*   POTASSIUM mmol/L 4.0   CHLORIDE mmol/L 95*   CO2 mmol/L 34.0*   BUN mg/dL 19   CREATININE mg/dL 0.50*   CALCIUM mg/dL 9.4   BILIRUBIN mg/dL 0.3   ALK PHOS U/L 129*   ALT (SGPT) U/L 35   AST (SGOT) U/L 42   GLUCOSE mg/dL 99*       Results from last 7 days  Lab Units 07/10/18  1426 07/05/18  1124   WBC 10*3/mm3 15.07* 12.94*   HEMOGLOBIN g/dL 12.2 14.1   HEMATOCRIT % 37.4 43.1   PLATELETS 10*3/mm3 261 255       Results from last 7 days  Lab Units 07/05/18  1124   INR  1.21*       Results from last 7 days  Lab Units 07/10/18  1426   TROPONIN I ng/mL <0.012       Results from last 7 days  Lab Units 07/10/18  1426   PROBNP pg/mL 327.0*       Results from last 7 days  Lab Units 07/10/18  1447   PH, ARTERIAL pH units 7.431   PO2 ART mm Hg 81.9   PCO2, ARTERIAL mm Hg 44.3   HCO3 ART mmol/L 29.5*     Microbiology Results (last 10 days)     Procedure Component Value - Date/Time    Fungus Culture - Lavage, Lung, Right Lower Lobe [[id number]] Collected:  07/05/18 1259    Lab Status:  Preliminary result Specimen:  Lavage from Lung, Right Lower Lobe Updated:  07/07/18 2306     Fungus Stain Final report     KOH/Calcofluor preparation Comment     Comment: KOH/Calcofluor preparation:  no fungus observed.       Narrative:       Performed at:  [address] [place] [address]  [id number]  : Sarmad Jaimes PhD, Phone:  [phone]    Gram Stain [[id number]] Collected:  07/05/18 125    Lab Status:  Final result Specimen:  Lavage from Lung, Right Lower Lobe Updated:  07/07/18 1222     Gram Stain Result Many (4+) WBCs seen      Few (2+) Gram negative bacilli    AFB Culture - Lavage, Lung, Right Lower Lobe [[id number]] Collected:  07/05/18 1259    Lab Status:  Preliminary result Specimen:  Lavage from Lung, Right Lower Lobe Updated:  07/07/18 1473     AFB  Specimen Processing Concentration     Acid Fast Smear Negative    Narrative:       Performed at:  01 - [facility]  [id number]  : Sarmad Jaimes PhD, Phone:  [phone]    Respiratory Culture - Lavage, Lung, Right Lower Lobe [[id number]]  (Abnormal)  (Susceptibility) Collected:  07/05/18 1259    Lab Status:  Final result Specimen:  Lavage from Lung, Right Lower Lobe Updated:  07/07/18 1222     Respiratory Culture Heavy growth (4+) Serratia marcescens (A)     Comment:          Susceptibility      Serratia marcescens     MAUREEN     Amikacin <=16 ug/ml Susceptible     Ampicillin >16 ug/ml Resistant     Ampicillin + Sulbactam >16/8 ug/ml Resistant     Aztreonam <=4 ug/ml Susceptible     Cefazolin >16 ug/ml Resistant     Cefepime <=4 ug/ml Susceptible     Cefotaxime <=2 ug/ml Susceptible     Cefoxitin >16 ug/ml Resistant     Ceftazidime <=1 ug/ml Susceptible     Ceftriaxone <=8 ug/ml Susceptible     Cefuroxime sodium >16 ug/ml Resistant     Ciprofloxacin <=1 ug/ml Susceptible     Doripenem <=0.5 ug/ml Susceptible     Ertapenem <=1 ug/ml Susceptible     Gentamicin <=4 ug/ml Susceptible     Levofloxacin <=2 ug/ml Susceptible     Meropenem <=1 ug/ml Susceptible     Nitrofurantoin >64 ug/ml      Piperacillin + Tazobactam <=16 ug/ml Susceptible     Tetracycline 8 ug/ml Intermediate     Tigecycline <=1 ug/ml Susceptible     Tobramycin <=4 ug/ml Susceptible     Trimethoprim + Sulfamethoxazole <=2/38 ug/ml Susceptible                    Fungus Culture - Brushing, Lung, Right Lower Lobe [[id number]] Collected:  07/05/18 1258    Lab Status:  Preliminary result Specimen:  Brushing from Lung, Right Lower Lobe Updated:  07/07/18 2306     Fungus Stain Final report     KOH/Calcofluor preparation Comment     Comment: KOH/Calcofluor preparation:  no fungus observed.       Narrative:       Performed at:  01 - [facility] [address]  [id number]  : Sarmad Jaimes  PhD, Phone:  [phone]    Gram Stain [[id number]] Collected:  07/05/18 1258    Lab Status:  Final result Specimen:  Brushing from Lung, Right Lower Lobe Updated:  07/07/18 1222     Gram Stain Result Rare (1+) WBCs seen      Rare (1+) Gram negative bacilli    AFB Culture - Brushing, Lung, Right Lower Lobe [[id number]] Collected:  07/05/18 1258    Lab Status:  Preliminary result Specimen:  Brushing from Lung, Right Lower Lobe Updated:  07/07/18 1509     AFB Specimen Processing Concentration     Acid Fast Smear Negative    Narrative:       Performed at:  [address] [place] [address]  [id number]  : Sarmad Jaimes PhD, Phone:  [phone]    Respiratory Culture - Brushing, Lung, Right Lower Lobe [[id number]]  (Abnormal)  (Susceptibility) Collected:  07/05/18 1258    Lab Status:  Final result Specimen:  Brushing from Lung, Right Lower Lobe Updated:  07/07/18 1222     Respiratory Culture >100,000 CFU/mL Serratia marcescens (A)     Comment: Identification and MAUREEN to follow.         Susceptibility      Serratia marcescens     MAUREEN     Amikacin <=16 ug/ml Susceptible     Ampicillin >16 ug/ml Resistant     Ampicillin + Sulbactam >16/8 ug/ml Resistant     Aztreonam <=4 ug/ml Susceptible     Cefazolin >16 ug/ml Resistant     Cefepime <=4 ug/ml Susceptible     Cefotaxime <=2 ug/ml Susceptible     Cefoxitin 16 ug/ml Resistant     Ceftazidime <=1 ug/ml Susceptible     Ceftriaxone <=8 ug/ml Susceptible     Cefuroxime sodium >16 ug/ml Resistant     Ciprofloxacin <=1 ug/ml Susceptible     Doripenem <=0.5 ug/ml Susceptible     Ertapenem <=1 ug/ml Susceptible     Gentamicin <=4 ug/ml Susceptible     Levofloxacin <=2 ug/ml Susceptible     Meropenem <=1 ug/ml Susceptible     Nitrofurantoin >64 ug/ml      Piperacillin + Tazobactam <=16 ug/ml Susceptible     Tetracycline 8 ug/ml Intermediate     Tigecycline <=1 ug/ml Susceptible     Tobramycin <=4 ug/ml Susceptible     Trimethoprim + Sulfamethoxazole <=2/38  ug/ml Susceptible                          ==============================================================================================================================================================================================================   Assessment/Plan:   Joelle Yee is a(n) 72 y.o. year old female with:     1. Serratia marcesens pneumonia, s/p bronch on 7/5. S/p 5 days of levaquin as outpatient without much improvement. Reviewed sensitivities, start on azactam here. Patient also recently stopped smoking, so component of COPD exacerbation may be contributing to symptoms. Consult Dr. Esparza, ER provider discussed.  2. Acute on chronic COPD exacerbation. duonebs with symbicort. prednisone. Flutter valve.   3. Sinus tachycardia. Likely due to nebs received in ER. EKG ok. Monitor on tele.   4. Leukocytosis. 2'2 #1, expect improvement with antibiotics. Check lactate and blood cultures x 2    // F/E/N: regular diet   // DVT PPx: SCDs and heparin   // GI PPx: protonix   // Code Status: FULL CODE- per discussion with patient, encouraged to discuss with her daughter who is medical field as well   // NOK: Adia Rhodes (daughter)  // Dispo: admission, inpatient, need for hospitalization: IV antibiotics for pneumonia    Assessment and plan was discussed with the patient, her daughter, and primary nurse My. All questions were answered.     Time in: 653pm Time out: 751pm   Date patient seen: 7/10/2018     Hanna Hinds MD   5:46 PM on 7/10/2018     Addendum:  Lactic acid 2.3. Repeat in AM.    Sepsis due to serratia marcesens pneumonia.    Addendum:  Repeat reflex lactic acid 2.8. Gentle hydration with IVF given patient petite stature.     1:05 AM on 7/11/2018

## 2018-07-11 LAB
ANION GAP SERPL CALCULATED.3IONS-SCNC: 10.3 MMOL/L (ref 10–20)
ANISOCYTOSIS BLD QL: ABNORMAL
BUN BLD-MCNC: 18 MG/DL (ref 7–20)
BUN/CREAT SERPL: 30 (ref 7.1–23.5)
CALCIUM SPEC-SCNC: 9.6 MG/DL (ref 8.4–10.2)
CHLORIDE SERPL-SCNC: 99 MMOL/L (ref 98–107)
CO2 SERPL-SCNC: 35 MMOL/L (ref 26–30)
CREAT BLD-MCNC: 0.6 MG/DL (ref 0.6–1.3)
D-LACTATE SERPL-SCNC: 1.7 MMOL/L (ref 0.5–2)
D-LACTATE SERPL-SCNC: 2.2 MMOL/L (ref 0.5–2)
D-LACTATE SERPL-SCNC: 2.8 MMOL/L (ref 0.5–2)
DEPRECATED RDW RBC AUTO: 65.6 FL (ref 37–54)
ERYTHROCYTE [DISTWIDTH] IN BLOOD BY AUTOMATED COUNT: 18.6 % (ref 11.5–14.5)
GFR SERPL CREATININE-BSD FRML MDRD: 98 ML/MIN/1.73
GLUCOSE BLD-MCNC: 98 MG/DL (ref 74–98)
HCT VFR BLD AUTO: 38.5 % (ref 37–47)
HGB BLD-MCNC: 12.3 G/DL (ref 12–16)
HOLD SPECIMEN: NORMAL
LYMPHOCYTES # BLD MANUAL: 1.83 10*3/MM3 (ref 0.6–3.4)
LYMPHOCYTES NFR BLD MANUAL: 10 % (ref 10–50)
LYMPHOCYTES NFR BLD MANUAL: 2 % (ref 0–12)
MCH RBC QN AUTO: 31.4 PG (ref 27–31)
MCHC RBC AUTO-ENTMCNC: 31.9 G/DL (ref 30–37)
MCV RBC AUTO: 98.2 FL (ref 81–99)
METAMYELOCYTES NFR BLD MANUAL: 3 % (ref 0–0)
MONOCYTES # BLD AUTO: 0.37 10*3/MM3 (ref 0–0.9)
NEUTROPHILS # BLD AUTO: 15.36 10*3/MM3 (ref 2–6.9)
NEUTROPHILS NFR BLD MANUAL: 75 % (ref 37–80)
NEUTS BAND NFR BLD MANUAL: 9 % (ref 0–6)
PLATELET # BLD AUTO: 308 10*3/MM3 (ref 130–400)
PMV BLD AUTO: 9.5 FL (ref 6–12)
POLYCHROMASIA BLD QL SMEAR: ABNORMAL
POTASSIUM BLD-SCNC: 4.3 MMOL/L (ref 3.5–5.1)
RBC # BLD AUTO: 3.92 10*6/MM3 (ref 4.2–5.4)
SCAN SLIDE: NORMAL
SMALL PLATELETS BLD QL SMEAR: ADEQUATE
SODIUM BLD-SCNC: 140 MMOL/L (ref 137–145)
TOXIC GRANULATION: ABNORMAL
VARIANT LYMPHS NFR BLD MANUAL: 1 % (ref 0–0)
WBC NRBC COR # BLD: 18.29 10*3/MM3 (ref 4.8–10.8)

## 2018-07-11 PROCEDURE — 83605 ASSAY OF LACTIC ACID: CPT | Performed by: NURSE PRACTITIONER

## 2018-07-11 PROCEDURE — 80048 BASIC METABOLIC PNL TOTAL CA: CPT | Performed by: HOSPITALIST

## 2018-07-11 PROCEDURE — 25010000002 ERTAPENEM PER 500 MG: Performed by: INTERNAL MEDICINE

## 2018-07-11 PROCEDURE — 97161 PT EVAL LOW COMPLEX 20 MIN: CPT

## 2018-07-11 PROCEDURE — 63710000001 PREDNISONE PER 1 MG: Performed by: HOSPITALIST

## 2018-07-11 PROCEDURE — 83605 ASSAY OF LACTIC ACID: CPT | Performed by: HOSPITALIST

## 2018-07-11 PROCEDURE — 94799 UNLISTED PULMONARY SVC/PX: CPT

## 2018-07-11 PROCEDURE — 25010000002 HEPARIN (PORCINE) PER 1000 UNITS: Performed by: HOSPITALIST

## 2018-07-11 PROCEDURE — 94667 MNPJ CHEST WALL 1ST: CPT

## 2018-07-11 PROCEDURE — 94668 MNPJ CHEST WALL SBSQ: CPT

## 2018-07-11 PROCEDURE — 99232 SBSQ HOSP IP/OBS MODERATE 35: CPT | Performed by: NURSE PRACTITIONER

## 2018-07-11 PROCEDURE — 99223 1ST HOSP IP/OBS HIGH 75: CPT | Performed by: INTERNAL MEDICINE

## 2018-07-11 PROCEDURE — 85025 COMPLETE CBC W/AUTO DIFF WBC: CPT | Performed by: HOSPITALIST

## 2018-07-11 RX ORDER — TRAZODONE HYDROCHLORIDE 50 MG/1
25 TABLET ORAL NIGHTLY
Status: DISCONTINUED | OUTPATIENT
Start: 2018-07-11 | End: 2018-07-13 | Stop reason: HOSPADM

## 2018-07-11 RX ORDER — LIDOCAINE HYDROCHLORIDE 10 MG/ML
3.2 INJECTION, SOLUTION INFILTRATION; PERINEURAL EVERY 24 HOURS
Status: DISCONTINUED | OUTPATIENT
Start: 2018-07-11 | End: 2018-07-11 | Stop reason: ALTCHOICE

## 2018-07-11 RX ORDER — SODIUM CHLORIDE 9 MG/ML
50 INJECTION, SOLUTION INTRAVENOUS CONTINUOUS
Status: DISCONTINUED | OUTPATIENT
Start: 2018-07-11 | End: 2018-07-11

## 2018-07-11 RX ORDER — ERTAPENEM 1 G/1
1 INJECTION, POWDER, LYOPHILIZED, FOR SOLUTION INTRAMUSCULAR; INTRAVENOUS EVERY 24 HOURS
Status: DISCONTINUED | OUTPATIENT
Start: 2018-07-11 | End: 2018-07-11

## 2018-07-11 RX ADMIN — NYSTATIN 500000 UNITS: 100000 SUSPENSION ORAL at 21:15

## 2018-07-11 RX ADMIN — NYSTATIN 500000 UNITS: 100000 SUSPENSION ORAL at 17:03

## 2018-07-11 RX ADMIN — GABAPENTIN 100 MG: 100 CAPSULE ORAL at 08:57

## 2018-07-11 RX ADMIN — TRAZODONE HYDROCHLORIDE 25 MG: 50 TABLET ORAL at 21:14

## 2018-07-11 RX ADMIN — IPRATROPIUM BROMIDE AND ALBUTEROL SULFATE 3 ML: .5; 3 SOLUTION RESPIRATORY (INHALATION) at 20:01

## 2018-07-11 RX ADMIN — NYSTATIN 500000 UNITS: 100000 SUSPENSION ORAL at 13:56

## 2018-07-11 RX ADMIN — CYCLOBENZAPRINE HYDROCHLORIDE 10 MG: 10 TABLET, FILM COATED ORAL at 08:57

## 2018-07-11 RX ADMIN — LORAZEPAM 0.5 MG: 0.5 TABLET ORAL at 09:06

## 2018-07-11 RX ADMIN — LOSARTAN POTASSIUM 25 MG: 25 TABLET, FILM COATED ORAL at 08:57

## 2018-07-11 RX ADMIN — HEPARIN SODIUM 5000 UNITS: 5000 INJECTION, SOLUTION INTRAVENOUS; SUBCUTANEOUS at 15:40

## 2018-07-11 RX ADMIN — MULTIPLE VITAMINS W/ MINERALS TAB 1 TABLET: TAB at 08:57

## 2018-07-11 RX ADMIN — NYSTATIN 500000 UNITS: 100000 SUSPENSION ORAL at 08:57

## 2018-07-11 RX ADMIN — POTASSIUM CHLORIDE 20 MEQ: 750 CAPSULE, EXTENDED RELEASE ORAL at 08:57

## 2018-07-11 RX ADMIN — MONTELUKAST SODIUM 10 MG: 10 TABLET, FILM COATED ORAL at 08:57

## 2018-07-11 RX ADMIN — SODIUM CHLORIDE 1 G: 9 INJECTION, SOLUTION INTRAVENOUS at 11:57

## 2018-07-11 RX ADMIN — LORAZEPAM 0.5 MG: 0.5 TABLET ORAL at 19:24

## 2018-07-11 RX ADMIN — GABAPENTIN 100 MG: 100 CAPSULE ORAL at 17:02

## 2018-07-11 RX ADMIN — PREDNISONE 40 MG: 20 TABLET ORAL at 08:57

## 2018-07-11 RX ADMIN — BUDESONIDE AND FORMOTEROL FUMARATE DIHYDRATE 2 PUFF: 160; 4.5 AEROSOL RESPIRATORY (INHALATION) at 20:17

## 2018-07-11 RX ADMIN — BUDESONIDE AND FORMOTEROL FUMARATE DIHYDRATE 2 PUFF: 160; 4.5 AEROSOL RESPIRATORY (INHALATION) at 07:08

## 2018-07-11 RX ADMIN — MIRTAZAPINE 30 MG: 15 TABLET, FILM COATED ORAL at 21:14

## 2018-07-11 RX ADMIN — KETOTIFEN FUMARATE 1 DROP: 0.35 SOLUTION/ DROPS OPHTHALMIC at 11:58

## 2018-07-11 RX ADMIN — GABAPENTIN 100 MG: 100 CAPSULE ORAL at 21:15

## 2018-07-11 RX ADMIN — SODIUM CHLORIDE 50 ML/HR: 9 INJECTION, SOLUTION INTRAVENOUS at 01:38

## 2018-07-11 RX ADMIN — IPRATROPIUM BROMIDE AND ALBUTEROL SULFATE 3 ML: .5; 3 SOLUTION RESPIRATORY (INHALATION) at 15:21

## 2018-07-11 RX ADMIN — HEPARIN SODIUM 5000 UNITS: 5000 INJECTION, SOLUTION INTRAVENOUS; SUBCUTANEOUS at 06:00

## 2018-07-11 RX ADMIN — VENLAFAXINE 75 MG: 75 TABLET ORAL at 17:03

## 2018-07-11 RX ADMIN — HYDROCODONE BITARTRATE AND ACETAMINOPHEN 1 TABLET: 10; 325 TABLET ORAL at 09:06

## 2018-07-11 RX ADMIN — HEPARIN SODIUM 5000 UNITS: 5000 INJECTION, SOLUTION INTRAVENOUS; SUBCUTANEOUS at 21:15

## 2018-07-11 RX ADMIN — HYDROCODONE BITARTRATE AND ACETAMINOPHEN 1 TABLET: 10; 325 TABLET ORAL at 18:19

## 2018-07-11 RX ADMIN — METOPROLOL SUCCINATE 25 MG: 25 TABLET, EXTENDED RELEASE ORAL at 08:57

## 2018-07-11 RX ADMIN — PANTOPRAZOLE SODIUM 40 MG: 40 TABLET, DELAYED RELEASE ORAL at 06:01

## 2018-07-11 RX ADMIN — MELATONIN TAB 3 MG 10.5 MG: 3 TAB at 21:34

## 2018-07-11 RX ADMIN — Medication 1 CAPSULE: at 08:57

## 2018-07-11 RX ADMIN — AZTREONAM 2 G: 1 INJECTION, POWDER, LYOPHILIZED, FOR SOLUTION INTRAMUSCULAR; INTRAVENOUS at 03:14

## 2018-07-11 RX ADMIN — VENLAFAXINE 75 MG: 75 TABLET ORAL at 08:57

## 2018-07-11 RX ADMIN — PREDNISONE 40 MG: 20 TABLET ORAL at 21:15

## 2018-07-11 RX ADMIN — KETOTIFEN FUMARATE 1 DROP: 0.35 SOLUTION/ DROPS OPHTHALMIC at 21:16

## 2018-07-11 RX ADMIN — IPRATROPIUM BROMIDE AND ALBUTEROL SULFATE 3 ML: .5; 3 SOLUTION RESPIRATORY (INHALATION) at 07:08

## 2018-07-11 NOTE — PLAN OF CARE
Problem: Patient Care Overview  Goal: Plan of Care Review  Outcome: Ongoing (interventions implemented as appropriate)   07/11/18 8665   OTHER   Outcome Summary PT eval completed. Pt presents with deficits in strength, balance and activity tolerance. Pt expected to benefit from PT services to improve strength and overall functional mobility prior to DC.   Coping/Psychosocial   Plan of Care Reviewed With patient

## 2018-07-11 NOTE — CONSULTS
"Date of consultation:   July 11, 2018    Requested by:   Hospitalist Service.     PCP: DONTRELL Regan    Reason:  SOB.  Pneumonia    History of Present Illness:  72 y.o. female who recently underwent bronchoscopy and was found to have Serratia in the cultures.  She was actually taking Levaquin as prescribed for the past 2-3 days but her symptoms continued to worsen and therefore she was sent to the emergency room.    The patient continues to have cough and phlegm production which is extremely difficult to expectorate.  She says that although steroids did help her symptoms to decrease, she never completely \"got over it\".    The patient denies any sick contacts.  She also reports somewhat poor appetite over the past 3-4 days as well.  She denies any fever but does have \"cold chills\".    Review of System: All other review of systems negative except indicated in HPI. Also c/o leg cramps occasionally. No diarrhea. No fever.     Past Medical History:  Past Medical History:   Diagnosis Date   • Abdominal pain    • Acute bronchitis    • Ankle pain    • Anxiety 1980   • Atopic dermatitis    • Bronchiectasis (CMS/HCC)    • Cataract, bilateral    • Chest pain     STATES HAS OCCASSIONALLY.  STATES LAST TIME WAS LAST WEEK.  STATES SEES DR. RICH.  STATES HE HAS DONE NUMEROUS TESTS ON HER AND HAS NOT BEEN ABLE TO FIND OUT CAUSE.     • Chronic obstructive lung disease (CMS/HCC) 2008   • Colon cancer screening    • COPD (chronic obstructive pulmonary disease) (CMS/HCC)    • Cystocele    • Depressed    • Depression 1980   • Fatigue     Chronic pafigue 2012   • Fibromyalgia 2008   • Full dentures    • GERD (gastroesophageal reflux disease)    • Gout    • Heartburn     Chronic historu of epigastric heartburn currently controlled on Prilesec 40 mg every morning along with Zantac 300 Mg daily at bedtime   • High cholesterol 2012   • Hip pain    • Hyperlipidemia    • Hypertension    • Insomnia    • Joint pain    • Kidney infection  "   • Loss of appetite    • Low back pain 1995   • Melena    • Nausea and vomiting    • On home oxygen therapy     2L/NC PRN (STATES SHE HAS BEEN USING CONTINUOSLY LATELY)   • Osteoarthritis 2010   • Pain in limb    • Palpitations    • Pinched nerve     Pinched nerves 2011   • Pneumonia    • Problems with swallowing     HAS TO EAT SLOW AND CHEW FOOD WELL   • Recurrent urinary tract infection    • Sciatica [id number]   • Shortness of breath    • Sinus problem    • Sleep apnea 1998    NO CPAP   • Upset stomach    • Valvular heart disease    • Vitamin B12 deficiency    • Wears glasses    • Weight loss, abnormal     Weight loss is stabel. On pund weight gain since 01/2016         Past Surgical History:  Past Surgical History:   Procedure Laterality Date   • ABDOMINAL HERNIA REPAIR  2016   • APPENDECTOMY  1965   • BACK SURGERY  2005   • BRONCHOSCOPY N/A 7/5/2018    Procedure: BRONCHOSCOPY w/ WASHINGS/BRUSHINGS with MAC;  Surgeon: Rebeka Esparza MD;  Location: [facility];  Service: Pulmonary   • CATARACT EXTRACTION Bilateral     Put in implants    • CHOLECYSTECTOMY  1985   • COLONOSCOPY     • ENDOSCOPY     • KNEE SURGERY Left 1979    Knee Cap   • TUBAL ABDOMINAL LIGATION  1971         Family History:  Family History   Problem Relation Age of Onset   • Ovarian cancer Mother    • Cancer Mother    • Lung cancer Father    • Heart disease Brother          Social History:  Social History     Social History   • Marital status: Single     Social History Main Topics   • Smoking status: Current Every Day Smoker     Packs/day: 1.00     Years: 35.00     Types: Cigarettes     Last attempt to quit: 3/5/2017   • Smokeless tobacco: Never Used      Comment: SMOKES 1-2 CIGARETTES PER DAY NOW   • Alcohol use No   • Drug use: No   • Sexual activity: Defer     Other Topics Concern   • Not on file         Physical Exam:  /94 (BP Location: Left arm, Patient Position: Lying)   Pulse 92   Temp 97.9 °F (36.6 °C) (Oral)   Resp 15   Ht  "152.4 cm (60\")   Wt 38.4 kg (84 lb 9.6 oz)   LMP  (LMP Unknown)   SpO2 93%   BMI 16.52 kg/m²     Constitutional:             General: No significant respiratory distress noted.    Head/Face/Eyes:             No significant facial abnormalities seen.             Extra ocular movement was intact.             Pupils appeared equal    ENT:             Hearing was intact.             Dentures noted.              No nasal erythema noted.              Oropharynx was moist. No lesions noted.     Neck:             Supple. No JVD noted.              Thyroid gland did not seem to be enlarged    Cardiovascular:              S1 + S2. Tachy.    Respiratory:            Respiratory effort was labored.             Hyperresonance to percussion.            Decreased Air Entry Bilaterally with mild to moderate crackles, bilaterally.    Abdomen:            Soft.  Bowel sounds were positive.  No obvious organomegaly.    Extremities:            No edema noted.            No cyanosis noted            No clubbing noted            Gait could not be assessed at this time.    Neurologic/Psychiatric:             Affect appeared fair.             Awake, alert and oriented x 3.             Able to follow simple commands.             Appears anxious and somewhat tremulous.    Skin:             No rash noted.             Warm and dry          Labs:   Reviewed. Pertinent labs were noted.     Lab Results   Component Value Date    WBC 18.29 (H) 07/11/2018    HGB 12.3 07/11/2018    HCT 38.5 07/11/2018    MCV 98.2 07/11/2018     07/11/2018       Lab Results   Component Value Date    GLUCOSE 98 07/11/2018    CALCIUM 9.6 07/11/2018     07/11/2018    K 4.3 07/11/2018    CO2 35.0 (H) 07/11/2018    CL 99 07/11/2018    BUN 18 07/11/2018    CREATININE 0.60 07/11/2018    EGFRIFNONA 98 07/11/2018    BCR 30.0 (H) 07/11/2018    ANIONGAP 10.3 07/11/2018         Imaging Study: Images reviewed personally and discussed with patient.  Imaging Results " (last 24 hours)     Procedure Component Value Units Date/Time    XR Chest 2 View [[id number]] Collected:  07/10/18 1549     Updated:  07/10/18 1717    Narrative:       PROCEDURE: XR CHEST 2 VW-       HISTORY: SOA  triage protocol        COMPARISON: 6/7/2018.     FINDINGS:  Chronic changes are seen of the lung bases. There is mild  atelectasis.       There is no evidence of effusion or other pleural disease.  The  mediastinum has a normal appearance.      The cardiac silhouette is unremarkable.             Impression:       Bibasilar atelectasis and scarring. Underlying emphysema.        This report was finalized on 7/10/2018 3:50 PM by Hansel Fernández MD.              Assessment:  1.  Shortness of breath  2.  Serratia PNA.  3.  Bronchiectasis with acute exacerbation  4.  Chronic hypoxemia  5.  COPD    Discussion/Recommendations:   The patient clearly has failed outpatient therapy for pneumonia which is likely caused by Serratia which was detected on the bronchoscopy specimen.    I will actually discontinue aztreonam and start the patient on Invanz.    It seems that the patient has been tried on beta lactam recently, therefore I would avoid ceftriaxone at this time although it will also be another option that can be considered.    Since the patient has been struggling with recurrent episodes of pneumonia, and the CT scan confirmed bronchiectasis, I have asked our respiratory therapy department to try vest percussion device for the next 24-48 hours.    If she can tolerate the percussion device then she will benefit from consideration of discharge on this vest as well.    She will need to be followed by our clinic within 1 week of her discharge.    The plan was discussed with the patient.  I have also discussed the case with the nursing staff.    Recommendations were also discussed with the referring provider.     I would like to thank you for the opportunity to participate in the care of this patient.      We have  updated the admitting attending and nursing staff, as appropriate, on the patient's current status and plan. I will be going off shift tonight and will be unavailable.       Rebeka Esparza MD  07/11/18  9:24 AM.

## 2018-07-11 NOTE — PROGRESS NOTES
PROGRESS NOTE        Date of Admission: 7/10/2018  Length of Stay: 1  Primary Care Physician: DONTRELL Regan    Subjective   Chief Complaint: Dyspnea, Cough  HPI: This is a 72-year-old female who had undergone a bronchoscopy performed by Dr. Esparza on July 5.  At that time cultures were done which did grow out Serratia marcescens.  She was discharged on oral antibiotics in the form of Levaquin post bronchoscopy.  According to the patient however she continues to have a cough with greenish colored sputum as well as shortness of breath.  She denies any chest pain.  She came into the emergency room last evening and was evaluated for this symptomatology.  Chest x-ray was done in the emergency room which did show some bibasilar atelectasis and scarring. She did have some underlying emphysema as well.  She was noted to have leukocytosis with a white count 18,000 lactic acid of 2.3.  I have reviewed patient's history and it does appear that she had an elevated white count since March 2017 and according to the patient she has not been evaluated by hematology.  This morning on her lab she does have some evidence of bandemia.  Her white count has gone up this admission however she was given 125 mg of Solu-Medrol in the emergency room last evening.  Patient is lying in bed she does not appear to be any acute distress.           Review Of Systems:   Review of Systems   General ROS: Patient denies any fevers, chills or loss of consciousness.    Psychological ROS: Denies any hallucinations and delusions.  Ophthalmic ROS: Denies any diplopia or transient loss of vision.  ENT ROS: Denies sore throat, nasal congestion or ear pain.   Allergy and Immunology ROS: Denies rash or itching.  Hematological and Lymphatic ROS: Denies neck swelling or easy bleeding.  Endocrine ROS: Denies any recent unintentional weight gain or loss.  Breast ROS: Denies any pain or swelling.  Respiratory ROS: cough and shortness of breath.    Cardiovascular ROS: Denies chest pain or palpitations. No history of exertional chest pain.   Gastrointestinal ROS: Denies nausea and vomiting. Denies any abdominal pain. No diarrhea.  Genito-Urinary ROS: Denies dysuria or hematuria.  Musculoskeletal ROS: Denies back pain. No muscle pain. No calf pain.   Neurological ROS: Denies any focal weakness. No loss of consciousness. Denies any numbness. Denies neck pain.   Dermatological ROS: Denies any redness or pruritis.    Objective      Temp:  [97.3 °F (36.3 °C)-98.6 °F (37 °C)] 97.6 °F (36.4 °C)  Heart Rate:  [] 92  Resp:  [15-20] 18  BP: (117-157)/(72-99) 157/90  Physical Exam    General Appearance:  Alert and cooperative, not in any acute distress.   Head:  Atraumatic and normocephalic, without obvious abnormality.   Eyes:          PERRLA, conjunctivae and sclerae normal, no Icterus. No pallor. Extra-occular movements are within normal limits.   Ears:  Ears appear intact with no abnormalities noted.   Throat: No oral lesions, no thrush, oral mucosa moist.   Neck: Supple, trachea midline, no thyromegaly, no carotid bruit.   Back:   No kyphoscoliosis present. No tenderness to palpation,   range of motion normal.   Lungs:   Chest shape is normal. Breath sounds heard bilaterally equally.  Bilateral crackles but no wheezing. No Pleural rub or bronchial breathing.  Decreased bilaterally   Heart:  Normal S1 and S2, no murmur, no gallop, no rub. No JVD   Abdomen:   Normal bowel sounds, no masses, no organomegaly. Soft     non-tender, non-distended, no guarding, no rebound                tenderness   Extremities: Moves all extremities well, no edema, no cyanosis, no clubbing.   Pulses: Pulses palpable and equal bilaterally   Skin: No bleeding, bruising or rash   Lymph nodes: No palpable adenopathy   Neurologic:    Psychiatric/Behavior:     Cranial nerves 2 - 12 grossly intact, sensation intact, Motor power is normal and equal bilaterally.  Mood normal, behavior normal        Results Review:    I have reviewed the labs, radiology results and diagnostic studies.      Results from last 7 days  Lab Units 07/11/18  0626   WBC 10*3/mm3 18.29*   HEMOGLOBIN g/dL 12.3   PLATELETS 10*3/mm3 308       Results from last 7 days  Lab Units 07/11/18  0626   SODIUM mmol/L 140   POTASSIUM mmol/L 4.3   CO2 mmol/L 35.0*   CREATININE mg/dL 0.60   GLUCOSE mg/dL 98       Culture Data:   Blood Culture   Date Value Ref Range Status   07/10/2018 No growth at less than 24 hours  Preliminary   07/10/2018 No growth at less than 24 hours  Preliminary     Respiratory Culture   Date Value Ref Range Status   07/05/2018 Heavy growth (4+) Serratia marcescens (A)  Final     Comment:          07/05/2018 >100,000 CFU/mL Serratia marcescens (A)  Final     Comment:     Identification and MAUREEN to follow.       Radiology Data:   Cardiology Data:    I have reviewed the medications.    Assessment/Plan     Assessment and Plan:  1.  Pneumonia secondary to Serratia Marcescens.  Patient is on IV antibiotics in the form of aztreonam, breathing treatments.  Pulmonology has been consulted.    2.  Chronic hypoxic respiratory failure    3.  COPD with exacerbation-same treatment is #1    4.  Anxiety-Ativan as needed    5.  Dyslipidemia    6.  Chronic essential hypertension-continue home antihypertensive medications.  Blood pressure stable    7.  Leukocytosis-this could be secondary to the acute process however she's had leukocytosis since March 2017.  We will plan to set the patient up with hematology for further evaluation as an outpatient.    8.  Moderate malnutrition with a BMI of 16-dietitian has been consulted.  Patient will benefit from multivitamin as well as dietary supplement.    DVT prophylaxis:  Heparin  Discharge Planning:   DWAYNE Bhatt 07/11/18 1:36 PM

## 2018-07-11 NOTE — PROGRESS NOTES
Adult Nutrition  Assessment/PES    Patient Name:  Joelle Yee  YOB: 1945  MRN: [id number]  Admit Date:  7/10/2018    Assessment Date:  7/11/2018    Comments:  Rec. #1: Consider adding high calorie, high protein to current diet order. Pt. Exhibiting low p.o. Intake ~25% of meals on average per NSG. RD added Ensure Complete and Ensure Pudding per pt. Preference. RD obtained pt. Food and supplement preferences. Rec. #2: Consider appetite stimulant. Rec. #3: Continue with MVI daily. It pt./family agreeable consider nutrition support. RD to follow pt. Consult RD PRN.           Reason for Assessment     Row Name 07/11/18 1244          Reason for Assessment    Reason For Assessment diagnosis/disease state     Diagnosis pulmonary disease;gastrointestinal disease   PNA, COPD, GERD     Identified At Risk by Screening Criteria BMI                 Labs/Tests/Procedures/Meds     Row Name 07/11/18 1246          Labs/Procedures/Meds    Lab Results Reviewed reviewed, pertinent             Physical Findings     Row Name 07/11/18 1247          Physical Findings    Overall Physical Appearance underweight             Estimated/Assessed Needs     Row Name 07/11/18 1247          Calculation Measurements    Weight Used For Calculations 45.9 kg (101 lb 1.7 oz)        Estimated/Assessed Needs    Additional Documentation Fluid Requirements (Group);Cannon-St. Jeor Equation (Group);Protein Requirements (Group);Calorie Requirements (Group)        Calorie Requirements    Weight Used For Calorie Calculations 45.9 kg (101 lb 1.7 oz)     Estimated Calorie Requirement (kcal/day) --   ~1501-4057     Estimated Calorie Need Method Cannon-St Jeor        KCAL/KG    14 Kcal/Kg (kcal) 642.04     15 Kcal/Kg (kcal) 687.9     18 Kcal/Kg (kcal) 825.48     20 Kcal/Kg (kcal) 917.2     25 Kcal/Kg (kcal) 1146.5     30 Kcal/Kg (kcal) 1375.8     35 Kcal/Kg (kcal) 1605.1     40 Kcal/Kg (kcal) 1834.4     45 Kcal/Kg (kcal) 2063.7     50  Kcal/Kg (kcal) 2293        [place]-St. Jeor Equation    RMR ([place]-St. Jeor Equation) 890.1        Protein Requirements    Weight Used For Protein Calculations 45.9 kg (101 lb 1.7 oz)     Est Protein Requirement Amount (gms/kg) 1.5 gm protein   ~55.03-68.79     Estimated Protein Requirements (gms/day) 68.79        Fluid Requirements    Estimated Fluid Requirement Method Luiz-Segar Formula     Luiz-Segar Method (over 20 kg) 2417.2             Nutrition Prescription Ordered     Row Name 07/11/18 1249          Nutrition Prescription PO    Current PO Diet Regular             Evaluation of Received Nutrient/Fluid Intake     Row Name 07/11/18 1249 07/11/18 1247       Calculation Measurements    Weight Used For Calculations  -- 45.9 kg (101 lb 1.7 oz)       PO Evaluation    Number of Days PO Intake Evaluated 1 day  --    Number of Meals 1  --    % PO Intake 25  --            Evaluation of Prescribed Nutrient/Fluid Intake     Row Name 07/11/18 1247          Calculation Measurements    Weight Used For Calculations 45.9 kg (101 lb 1.7 oz)             Malnutrition Severity Assessment     Row Name 07/11/18 1250          Malnutrition Severity Assessment    Malnutrition Type Chronic Illness Malnutrition        Physical Signs of Malnutrition (Chronic)    Muscle Wasting Mild     Fat Loss Mild     Secondary Physical Signs Present (comment)        Weight Status (Chronic)    BMI Mod (<17)     %IBW Mild (<90%)        Energy Intake Status (Chronic)    Energy Intake Mod (<75% / > or equal to 1 mo)        Criteria Met (Must meet criteria for severity in at least 2 of these categories: M Wasting, Fat Loss, Fluid, Secondary Signs, Wt. Status, Intake)    Patient meets criteria for  Moderate malnutrition         Problem/Interventions:                      Electronically signed by:  Deirdre Harrison RD  07/11/18 12:51 PM

## 2018-07-11 NOTE — PROGRESS NOTES
Discharge Planning Assessment  [facility]     Patient Name: Joelle Yee  MRN: [id number]  Today's Date: 7/11/2018    Admit Date: 7/10/2018          Discharge Needs Assessment     Row Name 07/11/18 1515       Living Environment    Lives With alone    Current Living Arrangements home/apartment/condo    Primary Care Provided by homecare agency    Provides Primary Care For no one    Family Caregiver if Needed other (see comments)    Family Caregiver Names [facility] Waiver program    Quality of Family Relationships unable to assess    Able to Return to Prior Arrangements yes       Resource/Environmental Concerns    Resource/Environmental Concerns none       Transition Planning    Patient/Family Anticipates Transition to home with help/services    Patient/Family Anticipated Services at Transition home health care;other (see comments)   Waiver program continued    Transportation Anticipated family or friend will provide       Discharge Needs Assessment    Readmission Within the Last 30 Days no previous admission in last 30 days    Concerns to be Addressed denies needs/concerns at this time    Equipment Currently Used at Home rollator;oxygen;shower chair   2L continuous    Anticipated Changes Related to Illness none    Equipment Needed After Discharge none    Outpatient/Agency/Support Group Needs homecare agency;adult    [facility], [facility]            Discharge Plan     Row Name 07/11/18 1529       Plan    Plan Home with  and [place] waiver services    Patient/Family in Agreement with Plan yes    Plan Comments Spoke with pt in room re [facility]; she is alone.  Pt lives in an apt alone.  She has [facility] for nursing services, [facility] [place] services for housekeeping and bath aide, and [employer] provides ensure.  Pt has a new rollator.  Additionally, she has nebulizer, walker, shower chair, grab bars and raised toilet.  Pt was using oxygen at night, but states she has been using continuously since her last admission at 2L  supplied by [employer].  Address, phone & PCP verified.  Pt has a living will on file.  CM will continue to follow and assist with discharge as needed.        Destination     No service coordination in this encounter.      Durable Medical Equipment     No service coordination in this encounter.      Dialysis/Infusion     No service coordination in this encounter.      Home Medical Care     No service coordination in this encounter.      Social Care     No service coordination in this encounter.        Expected Discharge Date and Time     Expected Discharge Date Expected Discharge Time    Jul 13, 2018               Demographic Summary     Row Name 07/11/18 1514       General Information    Admission Type inpatient    Arrived From emergency department    Referral Source admission list    Reason for Consult discharge planning    Preferred Language English     Used During This Interaction no            Functional Status     Row Name 07/11/18 1515       Functional Status    Usual Activity Tolerance fair       Functional Status, IADL    Medications independent    Meal Preparation assistive person    Housekeeping assistive person    Laundry assistive person    Shopping assistive person       Mental Status    General Appearance WDL WDL       Mental Status Summary    Recent Changes in Mental Status/Cognitive Functioning no changes            Psychosocial    No documentation.           Abuse/Neglect    No documentation.           Legal    No documentation.           Substance Abuse    No documentation.           Patient Forms    No documentation.         Sydney Chino

## 2018-07-11 NOTE — PAYOR COMM NOTE
"TO:HUMANA  FROM:SHORTY PULIDO, RN PHONE [phone]  INPT CLINICALS    Joelle Yee (72 y.o. Female)     Date of Birth Social Security Number Address Home Phone MRN    1945  [address] [id number] [phone] [id number]    [employer] Marital Status          Restoration Single       Admission Date Admission Type Admitting Provider Attending Provider Department, Room/Bed    7/10/18 Emergency Hanna Hinds MD Manivannan, Suganya, MD [facility] MED SURG  3, 321/1    Discharge Date Discharge Disposition Discharge Destination                       Attending Provider:  Hanna Hinds MD    Allergies:  Dust Mite Extract, Grass, Mold Extract [Trichophyton]    Isolation:  None   Infection:  None   Code Status:  CPR    Ht:  152.4 cm (60\")   Wt:  38.4 kg (84 lb 9.6 oz)    Admission Cmt:  None   Principal Problem:  Serratia marcescens infection (CMS/[facility]) [J15.6]                 Active Insurance as of 7/10/2018     Primary Coverage     Payor Plan Insurance Group Employer/Plan Group    HUMANA MEDICARE REPLACEMENT HUMANA MEDICARE REPL [id number]     Payor Plan Address Payor Plan Phone Number Effective From Effective To    [address] [address] [phone] 1/1/2018     [facility] [id number]       Subscriber Name Subscriber Birth Date Member ID       JOELLE YEE 1945 [id number]           Secondary Coverage     Payor Plan Insurance Group Employer/Plan Group    [place]      Payor Plan Address Payor Plan Phone Number Effective From Effective To    [address] [phone] 3/1/2016     [facility] [id number]       Subscriber Name Subscriber Birth Date Member ID       JOELLE YEE 1945 [id number]                 Emergency Contacts      (Rel.) Home Phone Work Phone Mobile Phone    JoselitoCayetano (Brother) [phone] -- [phone]    AnayaDoug (Son) [phone] -- [phone]    Adia Rhodes " "(Daughter) [phone] -- --    Margaret Julien (Other) [phone] -- [phone]               History & Physical      Hanna Hinds MD at 7/10/2018  5:46 PM          [facility]    Name: Joelle Yee, 72 y.o. female  MRN: [id number], : 1945   Date of Admission: 7/10/2018   PCP: DONTRELL Regan     Chief Complaint: increasing shortness of breath and cough     History of Present Illness: Joelle Yee is a(n) 72 y.o. year old female with a history of tobacco use (stopped few days ago), COPD with bronchiectasis, GERD, HLD, HTN who presents as admission from home with increasing cough and shortness of breath. Her pulmonologist is Dr. Esparza (ER provider notified Dr. Esparza). She had bronchoscopy on 18 and cultures from RLL lavage: +4 serratia marcescens (resistant to ampicillin, unasyn, cefazolin, cefoxitin, cefuroxime). She was started on outpatient levaquin PO and has taken this x 5 out of 7 days. She denies any nausea, emesis, diarrhea. No edema. Occasionally has chills. No fever. Productive cough, green phlegm with red flecks x 1 month. Occasionally experiences chest pain, center of chest, occurs anytime, 2-3x/month, does not take any meds for this, lasts up to 10-15 minutes. She has not had chest pain in last few days, I encouraged her to notify us if this occurs while she is here so we can evaluate. She lives alone, has rollater and walker at home, but normally does not have to use.     ER course:   VS: /80   Pulse 115   Temp 98.6 °F (37 °C) (Oral)   Resp 20   Ht 152.4 cm (60\")   Wt 37.6 kg (82 lb 12.8 oz)   LMP  (LMP Unknown)   SpO2 97%   BMI 16.17 kg/m²     Meds: duoneb; solumedrol 125  Abnl Labs: AB.43/44/81/29; Glc 99; Na 136; Cl 95; CO2 34; ; WBC 15K   Studies:   CXR: \"Bibasilar atelectasis and scarring. Underlying emphysema. \"  EKG: (my read), compared to EKG from: 18. Rate: 104bpm / Rhythm: " sinus tach / Axis: nl / ST/T changes: no / Hypertrophy: no / QTc: 376ms     Patient seen at 653pm on 7/10/2018. History was provided by: chart review, discussion with ER provider (JERARDO Suárez), and patient.     Recent hospitalization?: yes 6/7-6/10 for acute on chronic resp failure, acute on chronic COPD exacerbation with bronchitis    ==============================================================================================================================================================================================================   History:   ROS: +cough, productive, +chills, +intermittent chest pain, all other systems reviewed and are negative  PMHx: Patient  has a past medical history of Abdominal pain; Acute bronchitis; Ankle pain; Anxiety (1980); Atopic dermatitis; Bronchiectasis (CMS/[facility]); Cataract, bilateral; Chest pain; Chronic obstructive lung disease (CMS/[facility]) (2008); Colon cancer screening; COPD (chronic obstructive pulmonary disease) (CMS/[facility]); Cystocele; Depressed; Depression (1980); Fatigue; Fibromyalgia (2008); Full dentures; GERD (gastroesophageal reflux disease); Gout; Heartburn; High cholesterol (2012); Hip pain; Hyperlipidemia; Hypertension; Insomnia; Joint pain; Kidney infection; Loss of appetite; Low back pain (1995); Melena; Nausea and vomiting; On home oxygen therapy; Osteoarthritis (2010); Pain in limb; Palpitations; Pinched nerve; Pneumonia; Problems with swallowing; Recurrent urinary tract infection; Sciatica (0671-8321); Shortness of breath; Sinus problem; Sleep apnea (1998); Upset stomach; Valvular heart disease; Vitamin B12 deficiency; Wears glasses; and Weight loss, abnormal.   PSHx: Patient  has a past surgical history that includes Appendectomy (1965); Back surgery (2005); Knee surgery (Left, 1979); Tubal ligation (1971); Cholecystectomy (1985); Abdominal hernia repair (2016); Cataract extraction (Bilateral); Esophagogastroduodenoscopy; Colonoscopy; and Bronchoscopy (N/A,  7/5/2018).   FamHx: Patient family history includes Cancer in her mother; Heart disease in her brother; Lung cancer in her father; Ovarian cancer in her mother. stroke - brother  SocHx: Patient  reports that she has been smoking Cigarettes.  She has a 35.00 pack-year smoking history. She has never used smokeless tobacco. She reports that she does not drink alcohol or use drugs.   Allergies: Patient is allergic to dust mite extract; grass; and mold extract [trichophyton].   Medications:   No current facility-administered medications on file prior to encounter.      Current Outpatient Prescriptions on File Prior to Encounter   Medication Sig Dispense Refill   • acetaminophen (TYLENOL) 325 MG tablet Take 2 tablets by mouth Every 4 (Four) Hours As Needed for Headache or Fever (temperature greater than 101.5 F).     • albuterol (PROVENTIL) (2.5 MG/3ML) 0.083% nebulizer solution Take 2.5 mg by nebulization 4 (Four) Times a Day.     • ammonium lactate (AMLACTIN) 12 % cream Apply  topically As Needed for Dry Skin.     • azelastine (ASTELIN) 0.1 % nasal spray 2 sprays into each nostril 2 (Two) Times a Day. Use in each nostril as directed 1 each 5   • budesonide-formoterol (SYMBICORT) 160-4.5 MCG/ACT inhaler Inhale 2 puffs 2 (Two) Times a Day. Inhale 1 puff twice daily. Rinse mouth after use. 1 inhaler 5   • calcium acetate (PHOS BINDER,) 667 MG capsule capsule Take 2 capsules by mouth 3 (Three) Times a Day With Meals. 180 capsule    • cetirizine (zyrTEC) 10 MG tablet Take 10 mg by mouth Daily.     • Cyanocobalamin (VITAMIN B12 PO) Take 500 mcg by mouth Daily.     • Diclofenac Sodium (VOLTAREN TD) Apply 1 application topically As Needed. Voltaren GEL      • estradiol (ESTRACE VAGINAL) 0.1 MG/GM vaginal cream Insert 2 g into the vagina Every Other Day.     • gabapentin (NEURONTIN) 100 MG capsule TAKE ONE CAPSULE BY MOUTH THREE TIMES A DAY  0   • gemfibrozil (LOPID) 600 MG tablet TAKE ONE TABLET WITH SUPPER  3   •  HYDROcodone-acetaminophen (NORCO)  MG per tablet Take  by mouth. Take 1 tablet every 8 hours as needed for pain.     • INCRUSE ELLIPTA 62.5 MCG/INH aerosol powder  INHALE 1 PUFF INTO THE LUNGS ONCE DAILY 30 each 5   • ipratropium-albuterol (COMBIVENT RESPIMAT)  MCG/ACT inhaler Inhale 1 puff 4 (Four) Times a Day As Needed for Wheezing. 4 g 5   • ketotifen (ZADITOR) 0.025 % ophthalmic solution USE 1-2 DROPS IN BOTH EYES TWICE DAILY  0   • levoFLOXacin (LEVAQUIN) 500 MG tablet Take 1 tablet by mouth Daily for 7 days. 7 tablet 0   • LORazepam (ATIVAN) 0.5 MG tablet Take 0.5 mg by mouth Every 6 (Six) Hours As Needed. 1-2 TABLETS DAILY PRN  0   • losartan (COZAAR) 25 MG tablet Take 25 mg by mouth Daily.     • melatonin 5 MG tablet tablet Take 2 tablets by mouth At Night As Needed for sleep 60 tablet 0   • metoprolol succinate XL (TOPROL-XL) 25 MG 24 hr tablet Take 25 mg by mouth Daily.     • mirtazapine (REMERON) 30 MG tablet Take 30 mg by mouth Every Night.     • Misc. Devices (ROLLATOR) misc 1 Units As Needed (ambulation assistance). 1 each 0   • montelukast (SINGULAIR) 10 MG tablet TAKE ONE TABLET BY MOUTH AT BEDTIME  3   • Multiple Vitamins-Minerals (MULTIVITAMIN WITH MINERALS) tablet tablet Take 1 tablet by mouth Daily.     • Nutritional Supplements (ENSURE COMPLETE PO) Take 1 can by mouth 2 (Two) Times a Day.     • nystatin (MYCOSTATIN) 307936 UNIT/ML suspension Swish and swallow 5 mL 4 (Four) Times a Day. (Patient taking differently: Swish and swallow 500,000 Units 4 (Four) Times a Day As Needed.) 240 mL 0   • omeprazole (priLOSEC) 40 MG capsule Take 40 mg by mouth Daily.     • ondansetron ODT (ZOFRAN-ODT) 4 MG disintegrating tablet Take 1 tablet by mouth every 6 (six) hours as needed.     • OXYGEN-HELIUM IN Oxygen; Patient Sig: Oxygen 2 liter as needed; 0; 21-May-2014; Active     • potassium bicarbonate (K-LYTE) 25 MEQ disintegrating tablet Take 25 mEq by mouth Every Night.     • predniSONE (DELTASONE)  "10 MG tablet Take 1 tablet by mouth Daily. Take 4 for 3 days, then 3 for 3 days, then 2 for 3 days, then 1 for 3 days, then STOP 30 tablet 0   • Probiotic Product (RISAQUAD-2) capsule capsule Take 1 capsule by mouth Daily.     • Respiratory Therapy Supplies (FLUTTER) device 1 Device as needed (as directed). 1 each 0   • traZODone (DESYREL) 50 MG tablet Take 1 tablet by mouth every night.     • venlafaxine (EFFEXOR) 75 MG tablet Take 1 tablet by mouth 2 (two) times a day.          ==============================================================================================================================================================================================================   Vitals:   /80   Pulse 115   Temp 98.6 °F (37 °C) (Oral)   Resp 20   Ht 152.4 cm (60\")   Wt 37.6 kg (82 lb 12.8 oz)   LMP  (LMP Unknown)   SpO2 97%   BMI 16.17 kg/m²     SpO2: 97 %   No intake or output data in the 24 hours ending 07/10/18 1746     Physical Exam:   General Appearance:  Thin frail elderly female; Alert and cooperative, coughing frequently   Head:  Atraumatic and normocephalic, without obvious abnormality.   Eyes:          PERRL, conjunctivae and sclerae normal, no Icterus. No pallor. Extra-occular movements are within normal limits.   Ears:  Ears appear intact with no abnormalities noted.   Throat: No oral lesions, no thrush, oral mucosa moist. Dentures in place.   Neck: Supple, trachea midline, no thyromegaly, no carotid bruit.   Back:   No kyphoscoliosis present. No tenderness to palpation, range of motion normal.   Lungs:   Chest shape is normal. +coarse rhonchi bilaterally, increased at bases. Faint expiratory wheezing.   Heart:  Normal S1 and S2, tachycardic, no murmur, no gallop, no rub.   Abdomen:   Normal bowel sounds, no masses. Soft, non-tender, non-distended, no guarding, no rebound tenderness.   Genitourinary: deferred   Extremities: Moves all extremities well, no edema, no cyanosis, no " clubbing. SCDs in place.   Pulses: Pulses palpable and equal bilaterally.   Skin: No bleeding, bruising or rash.   Lymph nodes: No palpable cervical adenopathy.   Neurologic: Alert and oriented x 3. Moves all four limbs equally. No tremors. No facial asymetry.       Labs:     Results from last 7 days  Lab Units 07/10/18  1426   SODIUM mmol/L 136*   POTASSIUM mmol/L 4.0   CHLORIDE mmol/L 95*   CO2 mmol/L 34.0*   BUN mg/dL 19   CREATININE mg/dL 0.50*   CALCIUM mg/dL 9.4   BILIRUBIN mg/dL 0.3   ALK PHOS U/L 129*   ALT (SGPT) U/L 35   AST (SGOT) U/L 42   GLUCOSE mg/dL 99*       Results from last 7 days  Lab Units 07/10/18  1426 07/05/18  1124   WBC 10*3/mm3 15.07* 12.94*   HEMOGLOBIN g/dL 12.2 14.1   HEMATOCRIT % 37.4 43.1   PLATELETS 10*3/mm3 261 255       Results from last 7 days  Lab Units 07/05/18  1124   INR  1.21*       Results from last 7 days  Lab Units 07/10/18  1426   TROPONIN I ng/mL <0.012       Results from last 7 days  Lab Units 07/10/18  1426   PROBNP pg/mL 327.0*       Results from last 7 days  Lab Units 07/10/18  1447   PH, ARTERIAL pH units 7.431   PO2 ART mm Hg 81.9   PCO2, ARTERIAL mm Hg 44.3   HCO3 ART mmol/L 29.5*     Microbiology Results (last 10 days)     Procedure Component Value - Date/Time    Fungus Culture - Lavage, Lung, Right Lower Lobe [[id number]] Collected:  07/05/18 1259    Lab Status:  Preliminary result Specimen:  Lavage from Lung, Right Lower Lobe Updated:  07/07/18 2306     Fungus Stain Final report     KOH/Calcofluor preparation Comment     Comment: KOH/Calcofluor preparation:  no fungus observed.       Narrative:       Performed at:  [address] [place] [address]  [id number]  : Sarmad Jaimes PhD, Phone:  [phone]    Gram Stain [[id number]] Collected:  07/05/18 1259    Lab Status:  Final result Specimen:  Lavage from Lung, Right Lower Lobe Updated:  07/07/18 1222     Gram Stain Result Many (4+) WBCs seen      Few (2+) Gram negative bacilli    AFB  Culture - Lavage, Lung, Right Lower Lobe [[id number]] Collected:  07/05/18 1259    Lab Status:  Preliminary result Specimen:  Lavage from Lung, Right Lower Lobe Updated:  07/07/18 1509     AFB Specimen Processing Concentration     Acid Fast Smear Negative    Narrative:       Performed at:  [address] [place] [address]  [id number]  : Sarmad Jaimes PhD, Phone:  [phone]    Respiratory Culture - Lavage, Lung, Right Lower Lobe [[id number]]  (Abnormal)  (Susceptibility) Collected:  07/05/18 1259    Lab Status:  Final result Specimen:  Lavage from Lung, Right Lower Lobe Updated:  07/07/18 1222     Respiratory Culture Heavy growth (4+) Serratia marcescens (A)     Comment:          Susceptibility      Serratia marcescens     MAUREEN     Amikacin <=16 ug/ml Susceptible     Ampicillin >16 ug/ml Resistant     Ampicillin + Sulbactam >16/8 ug/ml Resistant     Aztreonam <=4 ug/ml Susceptible     Cefazolin >16 ug/ml Resistant     Cefepime <=4 ug/ml Susceptible     Cefotaxime <=2 ug/ml Susceptible     Cefoxitin >16 ug/ml Resistant     Ceftazidime <=1 ug/ml Susceptible     Ceftriaxone <=8 ug/ml Susceptible     Cefuroxime sodium >16 ug/ml Resistant     Ciprofloxacin <=1 ug/ml Susceptible     Doripenem <=0.5 ug/ml Susceptible     Ertapenem <=1 ug/ml Susceptible     Gentamicin <=4 ug/ml Susceptible     Levofloxacin <=2 ug/ml Susceptible     Meropenem <=1 ug/ml Susceptible     Nitrofurantoin >64 ug/ml      Piperacillin + Tazobactam <=16 ug/ml Susceptible     Tetracycline 8 ug/ml Intermediate     Tigecycline <=1 ug/ml Susceptible     Tobramycin <=4 ug/ml Susceptible     Trimethoprim + Sulfamethoxazole <=2/38 ug/ml Susceptible                    Fungus Culture - Brushing, Lung, Right Lower Lobe [[id number]] Collected:  07/05/18 1258    Lab Status:  Preliminary result Specimen:  Brushing from Lung, Right Lower Lobe Updated:  07/07/18 3288     Fungus Stain Final report     KOH/Calcofluor preparation Comment      Comment: KOH/Calcofluor preparation:  no fungus observed.       Narrative:       Performed at:  01 - [facility]  [id number]  : Sarmad Jaimes PhD, Phone:  [phone]    Gram Stain [[id number]] Collected:  07/05/18 1258    Lab Status:  Final result Specimen:  Brushing from Lung, Right Lower Lobe Updated:  07/07/18 1222     Gram Stain Result Rare (1+) WBCs seen      Rare (1+) Gram negative bacilli    AFB Culture - Brushing, Lung, Right Lower Lobe [[id number]] Collected:  07/05/18 1258    Lab Status:  Preliminary result Specimen:  Brushing from Lung, Right Lower Lobe Updated:  07/07/18 1509     AFB Specimen Processing Concentration     Acid Fast Smear Negative    Narrative:       Performed at:  01 - [facility] [address]  [id number]  : Sarmad Jaimes PhD, Phone:  [phone]    Respiratory Culture - Brushing, Lung, Right Lower Lobe [[id number]]  (Abnormal)  (Susceptibility) Collected:  07/05/18 1258    Lab Status:  Final result Specimen:  Brushing from Lung, Right Lower Lobe Updated:  07/07/18 1222     Respiratory Culture >100,000 CFU/mL Serratia marcescens (A)     Comment: Identification and MAUREEN to follow.         Susceptibility      Serratia marcescens     MAUREEN     Amikacin <=16 ug/ml Susceptible     Ampicillin >16 ug/ml Resistant     Ampicillin + Sulbactam >16/8 ug/ml Resistant     Aztreonam <=4 ug/ml Susceptible     Cefazolin >16 ug/ml Resistant     Cefepime <=4 ug/ml Susceptible     Cefotaxime <=2 ug/ml Susceptible     Cefoxitin 16 ug/ml Resistant     Ceftazidime <=1 ug/ml Susceptible     Ceftriaxone <=8 ug/ml Susceptible     Cefuroxime sodium >16 ug/ml Resistant     Ciprofloxacin <=1 ug/ml Susceptible     Doripenem <=0.5 ug/ml Susceptible     Ertapenem <=1 ug/ml Susceptible     Gentamicin <=4 ug/ml Susceptible     Levofloxacin <=2 ug/ml Susceptible     Meropenem <=1 ug/ml Susceptible     Nitrofurantoin >64 ug/ml      Piperacillin +  Tazobactam <=16 ug/ml Susceptible     Tetracycline 8 ug/ml Intermediate     Tigecycline <=1 ug/ml Susceptible     Tobramycin <=4 ug/ml Susceptible     Trimethoprim + Sulfamethoxazole <=2/38 ug/ml Susceptible                          ==============================================================================================================================================================================================================   Assessment/Plan:   Joelle Yee is a(n) 72 y.o. year old female with:     1. Serratia marcesens pneumonia, s/p bronch on 7/5. S/p 5 days of levaquin as outpatient without much improvement. Reviewed sensitivities, start on azactam here. Patient also recently stopped smoking, so component of COPD exacerbation may be contributing to symptoms. Consult Dr. Esparza, ER provider discussed.  2. Acute on chronic COPD exacerbation. duonebs with symbicort. prednisone. Flutter valve.   3. Sinus tachycardia. Likely due to nebs received in ER. EKG ok. Monitor on tele.   4. Leukocytosis. 2'2 #1, expect improvement with antibiotics. Check lactate and blood cultures x 2    // F/E/N: regular diet   // DVT PPx: SCDs and heparin   // GI PPx: protonix   // Code Status: FULL CODE- per discussion with patient, encouraged to discuss with her daughter who is medical field as well   // NOK: Adia Rhodes (daughter)  // Dispo: admission, inpatient, need for hospitalization: IV antibiotics for pneumonia    Assessment and plan was discussed with the patient, her daughter, and primary nurse My. All questions were answered.     Time in: 653pm Time out: 751pm   Date patient seen: 7/10/2018     Hanna Hinds MD   5:46 PM on 7/10/2018     Addendum:  Lactic acid 2.3. Repeat in AM.    Sepsis due to serratia marcesens pneumonia.    Addendum:  Repeat reflex lactic acid 2.8. Gentle hydration with IVF given patient petite stature.     1:05 AM on 7/11/2018      Electronically signed by Hanna Hinds  "MD at 7/11/2018  1:06 AM          Emergency Department Notes      Jose Armando Raymundo PA-C at 7/10/2018  2:34 PM          Subjective   72-year-old female referred by her pulmonologist.    The patient has significant COPD and actually underwent bronchoscopy this past Thursday after having an abnormal CT scan.  Unfortunately, she does continue to smoke about 10 or 12 cigarettes a day.  She is able to use 2 L of oxygen at home on an as-needed basis only.  She is currently on a prednisone taper.  She told me that following the recent bronchoscopy she's had increased cough, increased mucus production and increased shortness of air to the point where she is having to use her oxygen 2 L continuously.  She denies any hemoptysis.  Denies fever chills myalgias.  Denies any chest pain/back pain.  She said that the home health nurse checked on her today call the pulmonologist and she was referred here \"to be admitted and get IV antibiotics\".            Review of Systems   All other systems reviewed and are negative.      Past Medical History:   Diagnosis Date   • Abdominal pain    • Acute bronchitis    • Ankle pain    • Anxiety 1980   • Atopic dermatitis    • Bronchiectasis (CMS/HCC)    • Cataract, bilateral    • Chest pain     STATES HAS OCCASSIONALLY.  STATES LAST TIME WAS LAST WEEK.  STATES SEES DR. RICH.  STATES HE HAS DONE NUMEROUS TESTS ON HER AND HAS NOT BEEN ABLE TO FIND OUT CAUSE.     • Chronic obstructive lung disease (CMS/HCC) 2008   • Colon cancer screening    • COPD (chronic obstructive pulmonary disease) (CMS/HCC)    • Cystocele    • Depressed    • Depression 1980   • Fatigue     Chronic pafigue 2012   • Fibromyalgia 2008   • Full dentures    • GERD (gastroesophageal reflux disease)    • Gout    • Heartburn     Chronic historu of epigastric heartburn currently controlled on Prilesec 40 mg every morning along with Zantac 300 Mg daily at bedtime   • High cholesterol 2012   • Hip pain    • Hyperlipidemia    • " Hypertension    • Insomnia    • Joint pain    • Kidney infection    • Loss of appetite    • Low back pain 1995   • Melena    • Nausea and vomiting    • On home oxygen therapy     2L/NC PRN (STATES SHE HAS BEEN USING CONTINUOSLY LATELY)   • Osteoarthritis 2010   • Pain in limb    • Palpitations    • Pinched nerve     Pinched nerves 2011   • Pneumonia    • Problems with swallowing     HAS TO EAT SLOW AND CHEW FOOD WELL   • Recurrent urinary tract infection    • Sciatica [id number]   • Shortness of breath    • Sinus problem    • Sleep apnea 1998    NO CPAP   • Upset stomach    • Valvular heart disease    • Vitamin B12 deficiency    • Wears glasses    • Weight loss, abnormal     Weight loss is stabel. On pund weight gain since 01/2016       Allergies   Allergen Reactions   • Dust Mite Extract Other (See Comments)     Dust  SINUS   • Grass Other (See Comments)     SINUS   • Mold Extract [Trichophyton] Other (See Comments)     SINUS       Past Surgical History:   Procedure Laterality Date   • ABDOMINAL HERNIA REPAIR  2016   • APPENDECTOMY  1965   • BACK SURGERY  2005   • BRONCHOSCOPY N/A 7/5/2018    Procedure: BRONCHOSCOPY w/ WASHINGS/BRUSHINGS with MAC;  Surgeon: Rebeka Esparza MD;  Location: [facility];  Service: Pulmonary   • CATARACT EXTRACTION Bilateral     Put in implants    • CHOLECYSTECTOMY  1985   • COLONOSCOPY     • ENDOSCOPY     • KNEE SURGERY Left 1979    Knee Cap   • TUBAL ABDOMINAL LIGATION  1971       Family History   Problem Relation Age of Onset   • Ovarian cancer Mother    • Cancer Mother    • Lung cancer Father    • Heart disease Brother        Social History     Social History   • Marital status: Single     Social History Main Topics   • Smoking status: Current Every Day Smoker     Packs/day: 1.00     Years: 35.00     Types: Cigarettes     Last attempt to quit: 3/5/2017   • Smokeless tobacco: Never Used      Comment: SMOKES 1-2 CIGARETTES PER DAY NOW   • Alcohol use No   • Drug use: No   • Sexual  activity: Defer     Other Topics Concern   • Not on file           Objective   Physical Exam   Constitutional: She is oriented to person, place, and time. No distress.   She is a very pleasant but somewhat frail appearing 72-year-old  female in no acute respiratory distress.  Her skin is warm and dry.  Speech is normal and nonlabored.  Oxygen currently on 2 L   HENT:   Head: Normocephalic.   Nose: Nose normal.   Mouth/Throat: Oropharynx is clear and moist.   Eyes: Conjunctivae and EOM are normal. Right eye exhibits no discharge. Left eye exhibits no discharge.   Neck: Normal range of motion. No JVD present.   Cardiovascular: Regular rhythm, normal heart sounds and intact distal pulses.  Tachycardia present.  Exam reveals no friction rub.    No murmur heard.  Abdominal: Soft. Bowel sounds are normal. She exhibits no distension. There is no tenderness.   Musculoskeletal: Normal range of motion. She exhibits no edema.   Neurological: She is alert and oriented to person, place, and time. No cranial nerve deficit or sensory deficit. She exhibits normal muscle tone. Coordination normal.   Skin: Skin is warm. Capillary refill takes less than 2 seconds. No rash noted. She is not diaphoretic. No erythema.   Psychiatric: She has a normal mood and affect. Her behavior is normal. Thought content normal.   Vitals reviewed.      Procedures          ED Course  ED Course as of Jul 10 1802   Tue Jul 10, 2018   1437 Patient presenting with the above symptoms.  Her recent bronchoscopy was noted to show bronchiectasis and increased secretions right lung greater than left.  Room air sat here is 90% other presenting vital signs are temperature of 98.6 heart rate 112 respirations 20 blood pressure 145/99.  I'm going to go ahead and give her Solu-Medrol, DuoNeb treatment obtain an ABG on 2 L and plan on calling the pulmonologist.  Two-view chest film will also be obtained.  Basic labs were ordered per nursing protocol  [BW]   7877  As of 1619 awaiting two-view chest film  [BW]   1622 EKG shows sinus tachycardia with a rate of 104.  No significant ST segments.  Short [place] interval of 118.  Low QRS voltage in chest leads.  Abnormal EKG.  Interpreted by me.  [DT]   1727 I spoke to the patient's pulmonologist about her current symptoms.  He said her recent bronchoscopy showed that she was growing Serratia and has actually failed outpatient therapy therefore she will need IV antibiotics.  [BW]   1730 I am going to call the hospitalist to discuss admission  [BW]      ED Course User Index  [BW] Jose Armando Raymundo PA-C  [DT] Femi Doherty MD                  [facility]      Final diagnoses:   Infection due to Serratia marcescens (CMS/[facility])            Jose Armando Raymundo PA-C  07/10/18 1802       Jose Armando Raymundo PA-C  07/10/18 1803      Electronically signed by Jose Armando Raymundo PA-C at 7/10/2018  6:03 PM     Juany Vale at 7/10/2018  5:34 PM        Hospitalist Guzman called at 1734. No answer at this time.     Juany Vale  07/10/18 1735      Electronically signed by Juany Vale at 7/10/2018  5:35 PM             ICU Vital Signs     Row Name 07/11/18 0749 07/11/18 0708 07/11/18 0406 07/11/18 0400 07/11/18 0000       Height and Weight    Weight  --  -- 38.4 kg (84 lb 9.6 oz)  --  --    Weight Method  --  -- Bed scale  --  --       Vitals    Temp 97.9 °F (36.6 °C)  -- 97.7 °F (36.5 °C)  --  --    Temp src Oral  -- Oral  --  --    Pulse 92 92 95  --  --    Heart Rate Source Monitor Monitor Monitor  --  --    Resp 15 17 18  --  --    Resp Rate Source Visual Visual Visual  --  --    /94  -- 148/79  --  --    BP Location Left arm  -- Left arm  --  --    BP Method Automatic  -- Automatic  --  --    Patient Position Lying  -- Lying  --  --       Oxygen Therapy    SpO2 93 % 98 % 96 %  --  --    Pulse Oximetry Type  -- Continuous Continuous  --  --    Device (Oxygen Therapy) nasal cannula nasal cannula nasal cannula nasal cannula nasal cannula     "Flow (L/min)  -- 2.5  -- 2 2    Oxygen Concentration (%)  -- 30  --  --  --    Row Name 07/10/18 2356 07/10/18 2324 07/10/18 2027 07/10/18 2000 07/10/18 1955       Vitals    Temp  -- 97.3 °F (36.3 °C)  --  -- 97.6 °F (36.4 °C)    Temp src  -- Oral  --  -- Oral    Pulse 108 116 112  -- 113    Heart Rate Source Monitor Monitor Monitor  -- Monitor    Resp  -- 20 20  -- 20    Resp Rate Source  -- Visual  --  -- Visual    BP  -- 127/77  --  -- 148/72    BP Location  -- Left arm  --  -- Left arm    BP Method  -- Automatic  --  -- Automatic    Patient Position  -- Lying  --  -- Lying       Oxygen Therapy    SpO2  -- 98 % 97 %  -- 97 %    Pulse Oximetry Type  -- Continuous Continuous  -- Continuous    Device (Oxygen Therapy)  -- nasal cannula nasal cannula nasal cannula nasal cannula    Flow (L/min)  --  --  -- 2  --    Row Name 07/10/18 1826 07/10/18 1732 07/10/18 1715 07/10/18 1633 07/10/18 1631       Height and Weight    Height 152.4 cm (60\")  --  --  --  --    Height Method Stated  --  --  --  --    Weight 37.7 kg (83 lb 1.6 oz)  --  --  --  --    Weight Method Bed scale  --  --  --  --    Ideal Body Weight (IBW) (kg) 45.86  --  --  --  --    BSA (Calculated - sq m) 1.29 sq meters  --  --  --  --    BMI (Calculated) 16.2  --  --  --  --    Weight in (lb) to have BMI = 25 127.7  --  --  --  --       Vitals    Temp 98.2 °F (36.8 °C)  --  --  --  --    Temp src Oral  --  --  --  --    Pulse 109 118 115 112 112    Heart Rate Source Monitor  --  --  --  --    Resp 20  --  --  --  --    Resp Rate Source Visual  --  --  --  --    /75 148/75  -- 117/76 125/99    Noninvasive MAP (mmHg)  -- 107  -- 95 103    BP Location Left arm  --  --  --  --    BP Method Automatic  --  --  --  --    Patient Position Lying  --  --  --  --       Oxygen Therapy    SpO2 98 % 96 % 97 % 95 % 96 %    Pulse Oximetry Type Continuous  --  --  --  --    Device (Oxygen Therapy) nasal cannula  --  --  --  --    Flow (L/min) 2  --  --  --  --    Row " "Name 07/10/18 1453 07/10/18 1446 07/10/18 1433 07/10/18 1414 07/10/18 1412       Vitals    Pulse 104 104 107 102  --    Heart Rate Source  -- Monitor  --  --  --    Resp 20 20  --  --  --    Resp Rate Source  -- Visual  --  --  --    BP  --  -- 129/80  -- 124/81    Noninvasive MAP (mmHg)  --  -- 94  -- 100       Oxygen Therapy    SpO2 97 % 96 % 98 % 96 % 96 %    Pulse Oximetry Type  -- Continuous  --  --  --    Device (Oxygen Therapy)  -- nasal cannula  --  --  --    Flow (L/min)  -- 2  --  --  --    Oxygen Concentration (%)  -- 28  --  --  --    Row Name 07/10/18 1355                   Height and Weight    Height 152.4 cm (60\")        Height Method Stated        Weight 37.6 kg (82 lb 12.8 oz)        Weight Method Standing scale        Ideal Body Weight (IBW) (kg) 45.86        BSA (Calculated - sq m) 1.28 sq meters        BMI (Calculated) 16.2        Weight in (lb) to have BMI = 25 127.7           Vitals    Temp 98.6 °F (37 °C)        Temp src Oral        Pulse 112        Heart Rate Source Monitor        Resp 20        Resp Rate Source Visual        /89        BP Location Right arm        BP Method Automatic        Patient Position Sitting           Oxygen Therapy    SpO2 90 %        Device (Oxygen Therapy) nasal cannula        Flow (L/min) 2            Hospital Medications (active)       Dose Frequency Start End    acetaminophen (TYLENOL) tablet 650 mg 650 mg Every 4 Hours PRN 7/10/2018     Sig - Route: Take 2 tablets by mouth Every 4 (Four) Hours As Needed for Headache or Fever (temperature greater than 101.5 F). - Oral    acetaminophen (TYLENOL) tablet 650 mg 650 mg Every 4 Hours PRN 7/10/2018     Sig - Route: Take 2 tablets by mouth Every 4 (Four) Hours As Needed for Mild Pain . - Oral    azelastine (ASTELIN) nasal spray 2 spray 2 spray Daily 7/11/2018     Sig - Route: 2 sprays by Each Nare route Daily. - Each Nare    aztreonam (AZACTAM) 2 g in sodium chloride 0.9 % 100 mL IVPB 2 g Every 8 Hours 7/10/2018 " 7/17/2018    Sig - Route: Infuse 2 g into a venous catheter Every 8 (Eight) Hours. - Intravenous    budesonide-formoterol (SYMBICORT) 160-4.5 MCG/ACT inhaler 2 puff 2 puff 2 Times Daily - RT 7/10/2018     Sig - Route: Inhale 2 puffs 2 (Two) Times a Day. - Inhalation    cyclobenzaprine (FLEXERIL) tablet 10 mg 10 mg Daily 7/10/2018     Sig - Route: Take 1 tablet by mouth Daily. - Oral    diclofenac (VOLTAREN) 1 % gel 2 g 2 g Daily PRN 7/10/2018     Sig - Route: Apply 2 g topically Daily As Needed (lower back, hips, right shoulder). - Topical    docusate sodium (COLACE) capsule 100 mg 100 mg Daily PRN 7/10/2018     Sig - Route: Take 1 capsule by mouth Daily As Needed for Constipation. - Oral    gabapentin (NEURONTIN) capsule 100 mg 100 mg 3 Times Daily 7/10/2018     Sig - Route: Take 1 capsule by mouth 3 (Three) Times a Day. - Oral    heparin (porcine) 5000 UNIT/ML injection 5,000 Units 5,000 Units Every 8 Hours Scheduled 7/10/2018     Sig - Route: Inject 1 mL under the skin Every 8 (Eight) Hours. - Subcutaneous    HYDROcodone-acetaminophen (NORCO)  MG per tablet 1 tablet 1 tablet Every 8 Hours PRN 7/10/2018     Sig - Route: Take 1 tablet by mouth Every 8 (Eight) Hours As Needed for Moderate Pain . - Oral    ipratropium-albuterol (DUO-NEB) nebulizer solution 3 mL 3 mL Once 7/10/2018 7/10/2018    Sig - Route: Take 3 mL by nebulization 1 (One) Time. - Nebulization    Cosign for Ordering: Required by Femi Doherty MD    ipratropium-albuterol (DUO-NEB) nebulizer solution 3 mL 3 mL 4 Times Daily - RT 7/10/2018     Sig - Route: Take 3 mL by nebulization 4 (Four) Times a Day. - Nebulization    ketotifen (ZADITOR) 0.025 % ophthalmic solution 1 drop 1 drop 2 Times Daily 7/10/2018     Sig - Route: Administer 1 drop to both eyes 2 (Two) Times a Day. - Both Eyes    lactobacillus acidophilus (RISAQUAD) capsule 1 capsule 1 capsule Daily 7/11/2018     Sig - Route: Take 1 capsule by mouth Daily. - Oral    LORazepam (ATIVAN)  tablet 0.5 mg 0.5 mg 2 Times Daily PRN 7/10/2018     Sig - Route: Take 1 tablet by mouth 2 (Two) Times a Day As Needed for Anxiety. - Oral    losartan (COZAAR) tablet 25 mg 25 mg Daily 7/10/2018     Sig - Route: Take 1 tablet by mouth Daily. - Oral    melatonin tablet 10.5 mg 10.5 mg Nightly PRN 7/10/2018     Sig - Route: Take 3.5 tablets by mouth At Night As Needed for Sleep. - Oral    methylPREDNISolone sodium succinate (SOLU-Medrol) injection 125 mg 125 mg Once 7/10/2018 7/10/2018    Sig - Route: Infuse 2 mL into a venous catheter 1 (One) Time. - Intravenous    Cosign for Ordering: Required by Femi Doherty MD    metoprolol succinate XL (TOPROL-XL) 24 hr tablet 25 mg 25 mg Daily 7/10/2018     Sig - Route: Take 1 tablet by mouth Daily. - Oral    mirtazapine (REMERON) tablet 30 mg 30 mg Nightly 7/10/2018     Sig - Route: Take 2 tablets by mouth Every Night. - Oral    montelukast (SINGULAIR) tablet 10 mg 10 mg Daily 7/11/2018     Sig - Route: Take 1 tablet by mouth Daily. - Oral    multivitamin with minerals 1 tablet 1 tablet Daily 7/10/2018     Sig - Route: Take 1 tablet by mouth Daily. - Oral    nystatin (MYCOSTATIN) 626835 UNIT/ML suspension 500,000 Units 500,000 Units 4 Times Daily 7/10/2018     Sig - Route: Swish and swallow 5 mL 4 (Four) Times a Day. - Swish & Swallow    ondansetron (ZOFRAN) injection 4 mg 4 mg Every 6 Hours PRN 7/10/2018     Sig - Route: Infuse 2 mL into a venous catheter Every 6 (Six) Hours As Needed for Nausea or Vomiting. - Intravenous    pantoprazole (PROTONIX) EC tablet 40 mg 40 mg Every Morning 7/11/2018     Sig - Route: Take 1 tablet by mouth Every Morning. - Oral    potassium chloride (MICRO-K) CR capsule 20 mEq 20 mEq Daily 7/11/2018     Sig - Route: Take 2 capsules by mouth Daily. - Oral    predniSONE (DELTASONE) tablet 40 mg 40 mg Every 12 Hours Scheduled 7/10/2018     Sig - Route: Take 2 tablets by mouth Every 12 (Twelve) Hours. - Oral    sodium chloride 0.9 % flush 1-10 mL 1-10  mL As Needed 7/10/2018     Sig - Route: Infuse 1-10 mL into a venous catheter As Needed for Line Care. - Intravenous    sodium chloride 0.9 % flush 10 mL 10 mL As Needed 7/10/2018     Sig - Route: Infuse 10 mL into a venous catheter As Needed for Line Care. - Intravenous    Cosign for Ordering: Required by Femi Doherty MD    sodium chloride 0.9 % infusion 50 mL/hr Continuous 7/11/2018     Sig - Route: Infuse 50 mL/hr into a venous catheter Continuous. - Intravenous    traZODone (DESYREL) tablet 25 mg 25 mg Every 12 Hours Scheduled 7/10/2018     Sig - Route: Take 0.5 tablets by mouth Every 12 (Twelve) Hours. - Oral    venlafaxine (EFFEXOR) tablet 75 mg 75 mg 2 Times Daily With Meals 7/10/2018     Sig - Route: Take 1 tablet by mouth 2 (Two) Times a Day With Meals. - Oral    albuterol (PROVENTIL) nebulizer solution 0.083% 2.5 mg/3mL (Discontinued) 2.5 mg 4 Times Daily - RT 7/10/2018 7/11/2018    Sig - Route: Take 2.5 mg by nebulization 4 (Four) Times a Day. - Nebulization    Reason for Discontinue: Duplicate order    ipratropium (ATROVENT) nebulizer solution 0.5 mg (Discontinued) 0.5 mg 4 Times Daily - RT 7/10/2018 7/11/2018    Sig - Route: Take 2.5 mL by nebulization 4 (Four) Times a Day. - Nebulization    Reason for Discontinue: Duplicate order          Blood Administration Record     None        Physician Progress Notes (last 24 hours) (Notes from 7/10/2018  9:12 AM through 7/11/2018  9:12 AM)     No notes of this type exist for this encounter.        Consult Notes (last 24 hours) (Notes from 7/10/2018  9:12 AM through 7/11/2018  9:12 AM)     No notes of this type exist for this encounter.

## 2018-07-11 NOTE — DISCHARGE INSTR - OTHER ORDERS
If you have any questions about your recovery, please call the [facility] Nurse Call Center at [phone]. A registered nurse is available 24 hours a day 7 days a week to assist you.

## 2018-07-11 NOTE — THERAPY EVALUATION
Acute Care - Physical Therapy Initial Evaluation  [facility]     Patient Name: Joelle Yee  : 1945  MRN: [id number]  Today's Date: 2018   Onset of Illness/Injury or Date of Surgery: (P) 07/10/18  Date of Referral to PT: (P) 07/10/18  Referring Physician: (P) Dr. Perkins      Admit Date: 7/10/2018    Visit Dx:     ICD-10-CM ICD-9-CM   1. Infection due to Serratia marcescens (CMS/HCC) J15.6 041.85   2. Impaired functional mobility, balance, gait, and endurance Z74.09 V49.89     Patient Active Problem List   Diagnosis   • Dyspepsia   • Esophageal reflux   • Anxiety   • High cholesterol   • Bipolar disorder (CMS/HCC)   • COPD (chronic obstructive pulmonary disease) (CMS/HCC)   • Depression   • Gout   • Hyperlipidemia   • Insomnia   • Osteoarthritis   • Sciatica   • Sleep apnea   • Vitamin B 12 deficiency   • Pancolitis (CMS/HCC)   • Acute cystitis without hematuria   • C. difficile colitis   • Chronic bronchitis with COPD (chronic obstructive pulmonary disease) (CMS/HCC)   • Pneumonia of right lower lobe due to infectious organism (CMS/HCC)   • COPD exacerbation (CMS/HCC)   • Pneumonia involving right lung   • COPD with acute exacerbation (CMS/HCC)   • Chronic respiratory failure with hypoxia (CMS/HCC)   • Abdominal pain   • Diarrhea   • Heartburn   • Loss of appetite   • Melena   • Nausea and vomiting   • Pseudogout   • Upset stomach   • Abnormal weight loss   • Chronic obstructive pulmonary disease with acute lower respiratory infection (CMS/HCC)   • Right upper lobe pneumonia (CMS/HCC)   • Acute on chronic respiratory failure with hypoxia (CMS/HCC)   • Moderate malnutrition (CMS/HCC)   • Acute exacerbation of chronic obstructive pulmonary disease (COPD) (CMS/HCC)   • Bronchiectasis without complication (CMS/HCC)   • Pneumonia due to gram-negative bacteria (CMS/HCC)   • Sinus tachycardia   • Leukocytosis   • Serratia marcescens infection (CMS/HCC)     Past Medical History:   Diagnosis Date    • Abdominal pain    • Acute bronchitis    • Ankle pain    • Anxiety 1980   • Atopic dermatitis    • Bronchiectasis (CMS/HCC)    • Cataract, bilateral    • Chest pain     STATES HAS OCCASSIONALLY.  STATES LAST TIME WAS LAST WEEK.  STATES SEES DR. RICH.  STATES HE HAS DONE NUMEROUS TESTS ON HER AND HAS NOT BEEN ABLE TO FIND OUT CAUSE.     • Chronic obstructive lung disease (CMS/HCC) 2008   • Colon cancer screening    • COPD (chronic obstructive pulmonary disease) (CMS/HCC)    • Cystocele    • Depressed    • Depression 1980   • Fatigue     Chronic pafigue 2012   • Fibromyalgia 2008   • Full dentures    • GERD (gastroesophageal reflux disease)    • Gout    • Heartburn     Chronic historu of epigastric heartburn currently controlled on Prilesec 40 mg every morning along with Zantac 300 Mg daily at bedtime   • High cholesterol 2012   • Hip pain    • Hyperlipidemia    • Hypertension    • Insomnia    • Joint pain    • Kidney infection    • Loss of appetite    • Low back pain 1995   • Melena    • Nausea and vomiting    • On home oxygen therapy     2L/NC PRN (STATES SHE HAS BEEN USING CONTINUOSLY LATELY)   • Osteoarthritis 2010   • Pain in limb    • Palpitations    • Pinched nerve     Pinched nerves 2011   • Pneumonia    • Problems with swallowing     HAS TO EAT SLOW AND CHEW FOOD WELL   • Recurrent urinary tract infection    • Sciatica [id number]   • Shortness of breath    • Sinus problem    • Sleep apnea 1998    NO CPAP   • Upset stomach    • Valvular heart disease    • Vitamin B12 deficiency    • Wears glasses    • Weight loss, abnormal     Weight loss is stabel. On pund weight gain since 01/2016     Past Surgical History:   Procedure Laterality Date   • ABDOMINAL HERNIA REPAIR  2016   • APPENDECTOMY  1965   • BACK SURGERY  2005   • BRONCHOSCOPY N/A 7/5/2018    Procedure: BRONCHOSCOPY w/ WASHINGS/BRUSHINGS with MAC;  Surgeon: Rebeka Esparza MD;  Location: [facility];  Service: Pulmonary   • CATARACT EXTRACTION  Bilateral     Put in implants    • CHOLECYSTECTOMY  1985   • COLONOSCOPY     • ENDOSCOPY     • KNEE SURGERY Left 1979    Knee Cap   • TUBAL ABDOMINAL LIGATION  1971        PT ASSESSMENT (last 12 hours)      Physical Therapy Evaluation     Row Name 07/11/18 1525          PT Evaluation Time/Intention    Subjective Information (P)  complains of;pain;dyspnea  -KO     Document Type (P)  evaluation  -KO     Mode of Treatment (P)  physical therapy  -KO     Patient Effort (P)  good  -KO     Symptoms Noted During/After Treatment (P)  shortness of breath  -KO     Row Name 07/11/18 1525          General Information    Patient Profile Reviewed? (P)  yes  -KO     Onset of Illness/Injury or Date of Surgery (P)  07/10/18  -KO     Referring Physician (P)  Dr. Perkins  -KO     Patient Observations (P)  alert;cooperative;agree to therapy  -KO     Patient/Family Observations (P)  pt alone in room  -KO     General Observations of Patient (P)  pt received supine with IV intact, 2 LPM O2 via NC  -KO     Prior Level of Function (P)  independent:;all household mobility;ADL's  -KO     Equipment Currently Used at Home (P)  oxygen;rollator;shower chair;grab bar  -KO     Pertinent History of Current Functional Problem (P)  serratia marcescens infection, COPD, fibromyalgia, GERD, gout, HLD, HTN, OA  -KO     Existing Precautions/Restrictions (P)  fall;oxygen therapy device and L/min  -KO     Risks Reviewed (P)  patient:;increased discomfort  -KO     Benefits Reviewed (P)  patient:;improve function;increase independence;increase strength  -KO     Barriers to Rehab (P)  none identified  -KO     Row Name 07/11/18 1525          Relationship/Environment    Lives With (P)  alone  -KO     Row Name 07/11/18 1525          Resource/Environmental Concerns    Current Living Arrangements (P)  home/apartment/condo  -KO     Row Name 07/11/18 1525          Cognitive Assessment/Intervention- PT/OT    Orientation Status (Cognition) (P)  oriented x 4  -KO      Follows Commands (Cognition) (P)  WFL  -KO     Safety Deficit (Cognitive) (P)  awareness of need for assistance  -KO     Row Name 07/11/18 1525          Safety Issues, Functional Mobility    Safety Issues Affecting Function (Mobility) (P)  awareness of need for assistance  -KO     Impairments Affecting Function (Mobility) (P)  strength;balance;shortness of breath;endurance/activity tolerance  -KO     Row Name 07/11/18 1525          Bed Mobility Assessment/Treatment    Bed Mobility Assessment/Treatment (P)  supine-sit  -KO     Supine-Sit [place] (Bed Mobility) (P)  supervision  -KO     Assistive Device (Bed Mobility) (P)  head of bed elevated;bed rails  -KO     Row Name 07/11/18 1525          Transfer Assessment/Treatment    Transfer Assessment/Treatment (P)  sit-stand transfer;stand-sit transfer  -KO     Sit-Stand [place] (Transfers) (P)  stand by assist  -KO     Stand-Sit [place] (Transfers) (P)  stand by assist  -KO     Row Name 07/11/18 1525          Sit-Stand Transfer    Assistive Device (Sit-Stand Transfers) (P)  walker, front-wheeled  -KO     Row Name 07/11/18 1525          Stand-Sit Transfer    Assistive Device (Stand-Sit Transfers) (P)  walker, front-wheeled  -KO     Row Name 07/11/18 1525          Gait/Stairs Assessment/Training    Gait/Stairs Assessment/Training (P)  gait/ambulation assistive device  -KO     [place] Level (Gait) (P)  contact guard  -KO     Assistive Device (Gait) (P)  walker, front-wheeled  -KO     Distance in Feet (Gait) (P)  118'  -KO     Pattern (Gait) (P)  step-through  -KO     Deviations/Abnormal Patterns (Gait) (P)  base of support, narrow;gait speed decreased;stride length decreased  -KO     Row Name 07/11/18 1525          General ROM    GENERAL ROM COMMENTS (P)  grossly WFL  -KO     Row Name 07/11/18 1525          General Assessment (Manual Muscle Testing)    Comment, General Manual Muscle Testing (MMT) Assessment (P)  grossly 4/5  -KO     Row Name 07/11/18 1525           Motor Assessment/Intervention    Additional Documentation (P)  Therapeutic Exercise (Group);Therapeutic Exercise Interventions (Group)  -INGA Curry Name 07/11/18 1525          Pain Assessment    Additional Documentation (P)  Pain Scale: Numbers Pre/Post-Treatment (Group)  -INGA     Row Name 07/11/18 1525          Pain Scale: Numbers Pre/Post-Treatment    Pain Scale: Numbers, Pretreatment (P)  7/10  -KO     Pain Scale: Numbers, Post-Treatment (P)  7/10  -KO     Pain Location (P)  back  -KO     Pain Intervention(s) (P)  Repositioned;Ambulation/increased activity  -INGA     Row Name 07/11/18 1525          Coping    Observed Emotional State (P)  accepting;calm;cooperative  -KO     Verbalized Emotional State (P)  acceptance  -INGA     Row Name 07/11/18 1525          Plan of Care Review    Plan of Care Reviewed With (P)  patient  -INGA     [facility] Name 07/11/18 1525          Physical Therapy Clinical Impression    Date of Referral to PT (P)  07/10/18  -KO     PT Diagnosis (PT Clinical Impression) (P)  muscle weakness, decreased activity tolerance  -KO     Criteria for Skilled Interventions Met (PT Clinical Impression) (P)  yes;treatment indicated  -KO     Pathology/Pathophysiology Noted (Describe Specifically for Each System) (P)  musculoskeletal;cardiovascular;pulmonary  -KO     Impairments Found (describe specific impairments) (P)  aerobic capacity/endurance;gait, locomotion, and balance;muscle performance  -KO     Rehab Potential (PT Clinical Summary) (P)  good, to achieve stated therapy goals  -KO     Care Plan Review (PT) (P)  evaluation/treatment results reviewed;care plan/treatment goals reviewed;risks/benefits reviewed;current/potential barriers reviewed;patient/other agree to care plan  -INGA     [facility] Name 07/11/18 1525          Physical Therapy Goals    Bed Mobility Goal Selection (PT) (P)  bed mobility, PT goal 1  -KO     Transfer Goal Selection (PT) (P)  transfer, PT goal 1  -KO     Gait Training Goal Selection (PT)  (P)  gait training, PT goal 1  -KO     Row Name 07/11/18 1525          Bed Mobility Goal 1 (PT)    Activity/Assistive Device (Bed Mobility Goal 1, PT) (P)  supine to sit;sit to supine  -KO     [place] Level/Cues Needed (Bed Mobility Goal 1, PT) (P)  conditional independence  -KO     Time Frame (Bed Mobility Goal 1, PT) (P)  2 weeks  -KO     Progress/Outcomes (Bed Mobility Goal 1, PT) (P)  goal ongoing  -KO     Row Name 07/11/18 1525          Transfer Goal 1 (PT)    Activity/Assistive Device (Transfer Goal 1, PT) (P)  sit-to-stand/stand-to-sit;walker, rolling  -KO     [place] Level/Cues Needed (Transfer Goal 1, PT) (P)  conditional independence;supervision required  -KO     Time Frame (Transfer Goal 1, PT) (P)  2 weeks  -KO     Progress/Outcome (Transfer Goal 1, PT) (P)  goal ongoing  -KO     Row Name 07/11/18 1525          Gait Training Goal 1 (PT)    Activity/Assistive Device (Gait Training Goal 1, PT) (P)  gait (walking locomotion);assistive device use;decrease fall risk;increase endurance/gait distance;walker, rolling  -KO     [place] Level (Gait Training Goal 1, PT) (P)  supervision required;standby assist  -KO     Distance (Gait Goal 1, PT) (P)  250  -KO     Time Frame (Gait Training Goal 1, PT) (P)  2 weeks  -KO     Progress/Outcome (Gait Training Goal 1, PT) (P)  goal ongoing  -KO     Row Name 07/11/18 1525          Patient Education Goal (PT)    Activity (Patient Education Goal, PT) (P)  perform HEP X 15  -KO     [place]/Cues/Accuracy (Memory Goal 2, PT) (P)  demonstrates adequately  -KO     Time Frame (Patient Education Goal, PT) (P)  2 weeks  -KO     Progress/Outcome (Patient Education Goal, PT) (P)  goal ongoing  -KO     Row Name 07/11/18 1525          Positioning and Restraints    Pre-Treatment Position (P)  in bed  -KO     Post Treatment Position (P)  chair  -KO     In Chair (P)  reclined;call light within reach;encouraged to call for assist;with nsg  -KO       User Key  (r) =  Recorded By, (t) = Taken By, (c) = Cosigned By    Initials Name Provider Type    INGA Agatha Castaneda, PT Student PT Student          Physical Therapy Education     Title: PT OT SLP Therapies (Active)     Topic: Physical Therapy (Active)     Point: Mobility training (Done)    Learning Progress Summary     Learner Status Readiness Method Response Comment Documented by    Patient Done Acceptance E,TB VU Pt instructed on increasing mobility to improve strength and functional mobility, self-limit rest breaks with SOA INGA 07/11/18 5974                      User Key     Initials Effective Dates Name Provider Type Discipline    INGA 06/14/18 -  Agatha Castaneda, PT Student PT Student PT                PT Recommendation and Plan  Anticipated Discharge Disposition (PT): (P) home with home health  Planned Therapy Interventions (PT Eval): (P) gait training, balance training, transfer training, strengthening, home exercise program, patient/family education  Therapy Frequency (PT Clinical Impression): (P) daily  Outcome Summary/Treatment Plan (PT)  Anticipated Discharge Disposition (PT): (P) home with home health  Plan of Care Reviewed With: (P) patient  Outcome Summary: (P) PT eval completed. Pt presents with deficits in strength, balance and activity tolerance. Pt expected to benefit from PT services to improve strength and overall functional mobility prior to DC.          Outcome Measures     Row Name 07/11/18 1525             How much help from another person do you currently need...    Turning from your back to your side while in flat bed without using bedrails? (P)  4  -KO      Moving from lying on back to sitting on the side of a flat bed without bedrails? (P)  4  -KO      Moving to and from a bed to a chair (including a wheelchair)? (P)  3  -KO      Standing up from a chair using your arms (e.g., wheelchair, bedside chair)? (P)  4  -KO      Climbing 3-5 steps with a railing? (P)  3  -KO      To walk in hospital room? (P)  3   -KO      AM-PAC 6 Clicks Score (P)  21  -KO         Functional Assessment    Outcome Measure Options (P)  AM-PAC 6 Clicks Basic Mobility (PT)  -KO        User Key  (r) = Recorded By, (t) = Taken By, (c) = Cosigned By    Initials Name Provider Type    INGA Castaneda, PT Student PT Student           Time Calculation:         PT Charges     Row Name 07/11/18 1626             Time Calculation    Start Time (P)  1525  -KO      PT Received On (P)  07/11/18  -KO      PT Goal Re-Cert Due Date (P)  07/21/18  -INGA        User Key  (r) = Recorded By, (t) = Taken By, (c) = Cosigned By    Initials Name Provider Type    INGA Castaneda, PT Student PT Student        Therapy Suggested Charges     Code   Minutes Charges    None           Therapy Charges for Today     Code Description Service Date Service Provider Modifiers Qty    [id number] HC PT EVAL LOW COMPLEXITY 4 7/11/2018 Agatha Castaneda, PT Student GP 1          PT G-Codes  Outcome Measure Options: (P) AM-PAC 6 Clicks Basic Mobility (PT)      Agatha Castaneda PT Student  7/11/2018

## 2018-07-12 LAB
ALBUMIN SERPL-MCNC: 3.6 G/DL (ref 3.5–5)
ALBUMIN/GLOB SERPL: 1.2 G/DL (ref 1–2)
ALP SERPL-CCNC: 128 U/L (ref 38–126)
ALT SERPL W P-5'-P-CCNC: 32 U/L (ref 13–69)
ANION GAP SERPL CALCULATED.3IONS-SCNC: 11.7 MMOL/L (ref 10–20)
AST SERPL-CCNC: 32 U/L (ref 15–46)
BILIRUB SERPL-MCNC: 0.4 MG/DL (ref 0.2–1.3)
BUN BLD-MCNC: 26 MG/DL (ref 7–20)
BUN/CREAT SERPL: 52 (ref 7.1–23.5)
CALCIUM SPEC-SCNC: 9.1 MG/DL (ref 8.4–10.2)
CHLORIDE SERPL-SCNC: 101 MMOL/L (ref 98–107)
CO2 SERPL-SCNC: 29 MMOL/L (ref 26–30)
CREAT BLD-MCNC: 0.5 MG/DL (ref 0.6–1.3)
DEPRECATED RDW RBC AUTO: 66.7 FL (ref 37–54)
ERYTHROCYTE [DISTWIDTH] IN BLOOD BY AUTOMATED COUNT: 18.5 % (ref 11.5–14.5)
GFR SERPL CREATININE-BSD FRML MDRD: 121 ML/MIN/1.73
GLOBULIN UR ELPH-MCNC: 3 GM/DL
GLUCOSE BLD-MCNC: 118 MG/DL (ref 74–98)
HCT VFR BLD AUTO: 35.9 % (ref 37–47)
HGB BLD-MCNC: 11.6 G/DL (ref 12–16)
LYMPHOCYTES # BLD MANUAL: 0.95 10*3/MM3 (ref 0.6–3.4)
LYMPHOCYTES NFR BLD MANUAL: 2 % (ref 0–12)
LYMPHOCYTES NFR BLD MANUAL: 5 % (ref 10–50)
MCH RBC QN AUTO: 32.1 PG (ref 27–31)
MCHC RBC AUTO-ENTMCNC: 32.3 G/DL (ref 30–37)
MCV RBC AUTO: 99.4 FL (ref 81–99)
METAMYELOCYTES NFR BLD MANUAL: 5 % (ref 0–0)
MONOCYTES # BLD AUTO: 0.38 10*3/MM3 (ref 0–0.9)
MYELOCYTES NFR BLD MANUAL: 2 % (ref 0–0)
NEUTROPHILS # BLD AUTO: 16.09 10*3/MM3 (ref 2–6.9)
NEUTROPHILS NFR BLD MANUAL: 79 % (ref 37–80)
NEUTS BAND NFR BLD MANUAL: 6 % (ref 0–6)
PLATELET # BLD AUTO: 328 10*3/MM3 (ref 130–400)
PMV BLD AUTO: 9.3 FL (ref 6–12)
POTASSIUM BLD-SCNC: 4.7 MMOL/L (ref 3.5–5.1)
PROMYELOCYTES NFR BLD MANUAL: 1 % (ref 0–0)
PROT SERPL-MCNC: 6.6 G/DL (ref 6.3–8.2)
RBC # BLD AUTO: 3.61 10*6/MM3 (ref 4.2–5.4)
RBC MORPH BLD: NORMAL
SCAN SLIDE: NORMAL
SMALL PLATELETS BLD QL SMEAR: ADEQUATE
SODIUM BLD-SCNC: 137 MMOL/L (ref 137–145)
WBC MORPH BLD: NORMAL
WBC NRBC COR # BLD: 18.93 10*3/MM3 (ref 4.8–10.8)

## 2018-07-12 PROCEDURE — 25010000002 ERTAPENEM PER 500 MG: Performed by: INTERNAL MEDICINE

## 2018-07-12 PROCEDURE — 94799 UNLISTED PULMONARY SVC/PX: CPT

## 2018-07-12 PROCEDURE — 80053 COMPREHEN METABOLIC PANEL: CPT | Performed by: NURSE PRACTITIONER

## 2018-07-12 PROCEDURE — 85025 COMPLETE CBC W/AUTO DIFF WBC: CPT | Performed by: NURSE PRACTITIONER

## 2018-07-12 PROCEDURE — 85007 BL SMEAR W/DIFF WBC COUNT: CPT | Performed by: NURSE PRACTITIONER

## 2018-07-12 PROCEDURE — 25010000002 HEPARIN (PORCINE) PER 1000 UNITS: Performed by: HOSPITALIST

## 2018-07-12 PROCEDURE — 63710000001 PREDNISONE PER 1 MG: Performed by: HOSPITALIST

## 2018-07-12 PROCEDURE — 99232 SBSQ HOSP IP/OBS MODERATE 35: CPT | Performed by: NURSE PRACTITIONER

## 2018-07-12 PROCEDURE — 94669 MECHANICAL CHEST WALL OSCILL: CPT

## 2018-07-12 RX ADMIN — IPRATROPIUM BROMIDE AND ALBUTEROL SULFATE 3 ML: .5; 3 SOLUTION RESPIRATORY (INHALATION) at 19:45

## 2018-07-12 RX ADMIN — HEPARIN SODIUM 5000 UNITS: 5000 INJECTION, SOLUTION INTRAVENOUS; SUBCUTANEOUS at 16:59

## 2018-07-12 RX ADMIN — MIRTAZAPINE 30 MG: 15 TABLET, FILM COATED ORAL at 21:14

## 2018-07-12 RX ADMIN — NYSTATIN 500000 UNITS: 100000 SUSPENSION ORAL at 08:49

## 2018-07-12 RX ADMIN — PREDNISONE 40 MG: 20 TABLET ORAL at 08:49

## 2018-07-12 RX ADMIN — PREDNISONE 40 MG: 20 TABLET ORAL at 21:13

## 2018-07-12 RX ADMIN — IPRATROPIUM BROMIDE AND ALBUTEROL SULFATE 3 ML: .5; 3 SOLUTION RESPIRATORY (INHALATION) at 07:14

## 2018-07-12 RX ADMIN — POTASSIUM CHLORIDE 20 MEQ: 750 CAPSULE, EXTENDED RELEASE ORAL at 08:49

## 2018-07-12 RX ADMIN — TRAZODONE HYDROCHLORIDE 25 MG: 50 TABLET ORAL at 21:14

## 2018-07-12 RX ADMIN — IPRATROPIUM BROMIDE AND ALBUTEROL SULFATE 3 ML: .5; 3 SOLUTION RESPIRATORY (INHALATION) at 16:32

## 2018-07-12 RX ADMIN — KETOTIFEN FUMARATE 1 DROP: 0.35 SOLUTION/ DROPS OPHTHALMIC at 21:19

## 2018-07-12 RX ADMIN — MULTIPLE VITAMINS W/ MINERALS TAB 1 TABLET: TAB at 08:48

## 2018-07-12 RX ADMIN — VENLAFAXINE 75 MG: 75 TABLET ORAL at 08:49

## 2018-07-12 RX ADMIN — HYDROCODONE BITARTRATE AND ACETAMINOPHEN 1 TABLET: 10; 325 TABLET ORAL at 17:25

## 2018-07-12 RX ADMIN — HEPARIN SODIUM 5000 UNITS: 5000 INJECTION, SOLUTION INTRAVENOUS; SUBCUTANEOUS at 21:14

## 2018-07-12 RX ADMIN — NYSTATIN 500000 UNITS: 100000 SUSPENSION ORAL at 13:15

## 2018-07-12 RX ADMIN — NYSTATIN 500000 UNITS: 100000 SUSPENSION ORAL at 21:14

## 2018-07-12 RX ADMIN — SODIUM CHLORIDE 1 G: 9 INJECTION, SOLUTION INTRAVENOUS at 13:15

## 2018-07-12 RX ADMIN — LOSARTAN POTASSIUM 25 MG: 25 TABLET, FILM COATED ORAL at 08:49

## 2018-07-12 RX ADMIN — LORAZEPAM 0.5 MG: 0.5 TABLET ORAL at 13:15

## 2018-07-12 RX ADMIN — DOCUSATE SODIUM 100 MG: 100 CAPSULE, LIQUID FILLED ORAL at 21:14

## 2018-07-12 RX ADMIN — KETOTIFEN FUMARATE 1 DROP: 0.35 SOLUTION/ DROPS OPHTHALMIC at 08:52

## 2018-07-12 RX ADMIN — GABAPENTIN 100 MG: 100 CAPSULE ORAL at 17:25

## 2018-07-12 RX ADMIN — VENLAFAXINE 75 MG: 75 TABLET ORAL at 19:51

## 2018-07-12 RX ADMIN — HEPARIN SODIUM 5000 UNITS: 5000 INJECTION, SOLUTION INTRAVENOUS; SUBCUTANEOUS at 06:22

## 2018-07-12 RX ADMIN — METOPROLOL SUCCINATE 25 MG: 25 TABLET, EXTENDED RELEASE ORAL at 08:48

## 2018-07-12 RX ADMIN — GABAPENTIN 100 MG: 100 CAPSULE ORAL at 08:48

## 2018-07-12 RX ADMIN — MELATONIN TAB 3 MG 10.5 MG: 3 TAB at 21:14

## 2018-07-12 RX ADMIN — BUDESONIDE AND FORMOTEROL FUMARATE DIHYDRATE 2 PUFF: 160; 4.5 AEROSOL RESPIRATORY (INHALATION) at 07:14

## 2018-07-12 RX ADMIN — PANTOPRAZOLE SODIUM 40 MG: 40 TABLET, DELAYED RELEASE ORAL at 06:22

## 2018-07-12 RX ADMIN — MONTELUKAST SODIUM 10 MG: 10 TABLET, FILM COATED ORAL at 08:49

## 2018-07-12 RX ADMIN — BUDESONIDE AND FORMOTEROL FUMARATE DIHYDRATE 2 PUFF: 160; 4.5 AEROSOL RESPIRATORY (INHALATION) at 19:45

## 2018-07-12 RX ADMIN — CYCLOBENZAPRINE HYDROCHLORIDE 10 MG: 10 TABLET, FILM COATED ORAL at 08:48

## 2018-07-12 RX ADMIN — HYDROCODONE BITARTRATE AND ACETAMINOPHEN 1 TABLET: 10; 325 TABLET ORAL at 08:48

## 2018-07-12 RX ADMIN — GABAPENTIN 100 MG: 100 CAPSULE ORAL at 21:14

## 2018-07-12 RX ADMIN — Medication 1 CAPSULE: at 08:49

## 2018-07-12 RX ADMIN — IPRATROPIUM BROMIDE AND ALBUTEROL SULFATE 3 ML: .5; 3 SOLUTION RESPIRATORY (INHALATION) at 12:47

## 2018-07-12 NOTE — PROGRESS NOTES
PROGRESS NOTE        Date of Admission: 7/10/2018  Length of Stay: 2  Primary Care Physician: DONTRELL Regan    Subjective   Chief Complaint: Dyspnea, Cough  HPI: This is a 72-year-old female who had undergone a bronchoscopy performed by Dr. Esparza on July 5.  At that time cultures were done which did grow out Serratia marcescens.  She was discharged on oral antibiotics in the form of Levaquin post bronchoscopy.  According to the patient however she continues to have a cough with greenish colored sputum as well as shortness of breath.  She denies any chest pain.  She came into the emergency room last evening and was evaluated for this symptomatology.  Chest x-ray was done in the emergency room which did show some bibasilar atelectasis and scarring. She did have some underlying emphysema as well.  She was noted to have leukocytosis with a white count 18,000 lactic acid of 2.3.  I have reviewed patient's history and it does appear that she had an elevated white count since March 2017 and according to the patient she has not been evaluated by hematology.     Patient's lactic acid has normalized.  She does still have evidence of leukocytosis.  I have discussed with pulmonology.  He Dr. Esparza with pulmonary did order the patient a vibratory vest to help with secretion clearance given her bronchiectasis.  Once patient is ready for discharge we will plan to discharge her with Invanz 1 g IM daily for 6 more days.  Case management has been consulted and is looking at arranging this as an outpatient.  We will also schedule her for a long taper his steroids as well.  Patient states that she is having more productive sputum with a vibratory vest.  Again case management and Dr. Esparza are working on getting this at home for the patient.  She denies any chest pain, abdominal pain nausea or vomiting.         Review Of Systems:   Review of Systems   General ROS: Patient denies any fevers, chills or loss of consciousness.     Psychological ROS: Denies any hallucinations and delusions.  Ophthalmic ROS: Denies any diplopia or transient loss of vision.  ENT ROS: Denies sore throat, nasal congestion or ear pain.   Allergy and Immunology ROS: Denies rash or itching.  Hematological and Lymphatic ROS: Denies neck swelling or easy bleeding.  Endocrine ROS: Denies any recent unintentional weight gain or loss.  Breast ROS: Denies any pain or swelling.  Respiratory ROS: cough and shortness of breath. Cough more productive using vest therapy.  Cardiovascular ROS: Denies chest pain or palpitations. No history of exertional chest pain.   Gastrointestinal ROS: Denies nausea and vomiting. Denies any abdominal pain. No diarrhea.  Genito-Urinary ROS: Denies dysuria or hematuria.  Musculoskeletal ROS: Denies back pain. No muscle pain. No calf pain.   Neurological ROS: Denies any focal weakness. No loss of consciousness. Denies any numbness. Denies neck pain.   Dermatological ROS: Denies any redness or pruritis.    Objective      Temp:  [97.6 °F (36.4 °C)-98.3 °F (36.8 °C)] 97.6 °F (36.4 °C)  Heart Rate:  [] 99  Resp:  [16-20] 18  BP: (130-160)/(68-90) 160/86  Physical Exam    General Appearance:  Alert and cooperative, not in any acute distress.   Head:  Atraumatic and normocephalic, without obvious abnormality.   Eyes:          PERRLA, conjunctivae and sclerae normal, no Icterus. No pallor. Extra-occular movements are within normal limits.   Ears:  Ears appear intact with no abnormalities noted.   Throat: No oral lesions, no thrush, oral mucosa moist.   Neck: Supple, trachea midline, no thyromegaly, no carotid bruit.   Back:   No kyphoscoliosis present. No tenderness to palpation,   range of motion normal.   Lungs:   Chest shape is normal. Breath sounds heard bilaterally equally.  Bilateral crackles but no wheezing. No Pleural rub or bronchial breathing.  Decreased bilaterally   Heart:  Normal S1 and S2, no murmur, no gallop, no rub. No JVD    Abdomen:   Normal bowel sounds, no masses, no organomegaly. Soft     non-tender, non-distended, no guarding, no rebound                tenderness   Extremities: Moves all extremities well, no edema, no cyanosis, no clubbing.   Pulses: Pulses palpable and equal bilaterally   Skin: No bleeding, bruising or rash   Lymph nodes: No palpable adenopathy   Neurologic:    Psychiatric/Behavior:     Cranial nerves 2 - 12 grossly intact, sensation intact, Motor power is normal and equal bilaterally.  Mood normal, behavior normal       Results Review:    I have reviewed the labs, radiology results and diagnostic studies.      Results from last 7 days  Lab Units 07/12/18  0635   WBC 10*3/mm3 18.93*   HEMOGLOBIN g/dL 11.6*   PLATELETS 10*3/mm3 328       Results from last 7 days  Lab Units 07/12/18  0635   SODIUM mmol/L 137   POTASSIUM mmol/L 4.7   CO2 mmol/L 29.0   CREATININE mg/dL 0.50*   GLUCOSE mg/dL 118*       Culture Data:   Blood Culture   Date Value Ref Range Status   07/10/2018 No growth at less than 24 hours  Preliminary   07/10/2018 No growth at less than 24 hours  Preliminary     Respiratory Culture   Date Value Ref Range Status   07/05/2018 Heavy growth (4+) Serratia marcescens (A)  Final     Comment:          07/05/2018 >100,000 CFU/mL Serratia marcescens (A)  Final     Comment:     Identification and MAUREEN to follow.       Radiology Data:   Cardiology Data:    I have reviewed the medications.    Assessment/Plan     Assessment and Plan:  1.  Pneumonia secondary to Serratia Marcescens.  Patient is on IV antibiotics in the form of Invanz, breathing treatments.  Pulmonology has been consulted.    Once patient is discharged we will plan for IM Invanz X 5 more days.  We are working to try to get patient to vest therapy as well.  She is tolerating in patient vest therapy and having more productive sputum from her cough    2.  Chronic hypoxic respiratory failure-patient is on oxygen.    3.  COPD with exacerbation-same  treatment is #1    4.  Anxiety-Ativan as needed    5.  Dyslipidemia    6.  Chronic essential hypertension-continue home antihypertensive medications.  Blood pressure stable    7.  Leukocytosis-this could be secondary to the acute process however she's had leukocytosis since March 2017.  We will plan to set the patient up with hematology for further evaluation as an outpatient.    8.  Moderate malnutrition with a BMI of 16-dietitian has been consulted.  Patient will benefit from multivitamin as well as dietary supplement.    DVT prophylaxis:  Heparin  Discharge Planning:   DWAYNE Bhatt 07/12/18 11:39 AM

## 2018-07-12 NOTE — SIGNIFICANT NOTE
07/12/18 1402   Rehab Treatment   Discipline physical therapy assistant   Reason Treatment Not Performed patient/family declined treatment  (Pt reports feeling too tired to participate with therapy this date. Will f/u with pt tomorrow. )

## 2018-07-12 NOTE — PROGRESS NOTES
Continued Stay Note   Christopher     Patient Name: Joelle Yee  MRN: [id number]  Today's Date: 7/12/2018    Admit Date: 7/10/2018          Discharge Plan     Row Name 07/12/18 1604       Plan    Plan Home with abx (IM injections)    Patient/Family in Agreement with Plan yes    Plan Comments Called [facility]; spoke with Nell.  Per Nell, they are able to give pt IM injections daily, but require an EPI kit to be present.      F/U with pt re DCP; she is alone.  Update given re abx treatment.  Pt is in agreement with plan, but has concerns for cost of EPI Kit.  Pt reports that she is having a percussion vest delivered that was initiated by Dr. Esparza's office, but states that her daughter talked to the company.  Pt gives CM permisson to call forrest Garcia).      Received call back from Adia.  Per Adia, percussion vest is supposed to be delivered to pt's home tomorrow and they will train pt and family at that time.  Adia states that the vest company is supposed to call, so if pt is not home, she will have vest delivered to pt's room.   DWAYNE Becerril updated.              Discharge Codes    No documentation.       Expected Discharge Date and Time     Expected Discharge Date Expected Discharge Time    Jul 13, 2018             Sydney Chino

## 2018-07-12 NOTE — PLAN OF CARE
Problem: Fall Risk (Adult)  Goal: Identify Related Risk Factors and Signs and Symptoms  Outcome: Ongoing (interventions implemented as appropriate)   07/12/18 1287   Fall Risk (Adult)   Related Risk Factors (Fall Risk) age-related changes;gait/mobility problems;inadequate lighting;environment unfamiliar;sleep pattern alteration   Signs and Symptoms (Fall Risk) presence of risk factors

## 2018-07-13 VITALS
RESPIRATION RATE: 17 BRPM | HEART RATE: 83 BPM | TEMPERATURE: 97.4 F | SYSTOLIC BLOOD PRESSURE: 137 MMHG | BODY MASS INDEX: 18.2 KG/M2 | HEIGHT: 60 IN | WEIGHT: 92.7 LBS | DIASTOLIC BLOOD PRESSURE: 81 MMHG | OXYGEN SATURATION: 100 %

## 2018-07-13 PROCEDURE — 94799 UNLISTED PULMONARY SVC/PX: CPT

## 2018-07-13 PROCEDURE — 94668 MNPJ CHEST WALL SBSQ: CPT

## 2018-07-13 PROCEDURE — 63710000001 PREDNISONE PER 1 MG: Performed by: HOSPITALIST

## 2018-07-13 PROCEDURE — 25010000002 ERTAPENEM PER 500 MG: Performed by: INTERNAL MEDICINE

## 2018-07-13 PROCEDURE — 94669 MECHANICAL CHEST WALL OSCILL: CPT

## 2018-07-13 PROCEDURE — 25010000002 HEPARIN (PORCINE) PER 1000 UNITS: Performed by: HOSPITALIST

## 2018-07-13 PROCEDURE — 99239 HOSP IP/OBS DSCHRG MGMT >30: CPT | Performed by: NURSE PRACTITIONER

## 2018-07-13 PROCEDURE — 94760 N-INVAS EAR/PLS OXIMETRY 1: CPT

## 2018-07-13 RX ORDER — ERTAPENEM 1 G/1
1 INJECTION, POWDER, LYOPHILIZED, FOR SOLUTION INTRAMUSCULAR; INTRAVENOUS EVERY 24 HOURS
Qty: 5000 MG | Refills: 0 | Status: SHIPPED | OUTPATIENT
Start: 2018-07-13 | End: 2018-07-18

## 2018-07-13 RX ORDER — EPINEPHRINE 0.3 MG/.3ML
0.3 INJECTION SUBCUTANEOUS ONCE
Qty: 2 EACH | Refills: 0 | Status: SHIPPED | OUTPATIENT
Start: 2018-07-13 | End: 2018-07-14

## 2018-07-13 RX ORDER — PREDNISONE 10 MG/1
TABLET ORAL
Qty: 50 TABLET | Refills: 0 | Status: ON HOLD | OUTPATIENT
Start: 2018-07-13 | End: 2018-11-30

## 2018-07-13 RX ADMIN — MONTELUKAST SODIUM 10 MG: 10 TABLET, FILM COATED ORAL at 09:23

## 2018-07-13 RX ADMIN — GABAPENTIN 100 MG: 100 CAPSULE ORAL at 09:23

## 2018-07-13 RX ADMIN — VENLAFAXINE 75 MG: 75 TABLET ORAL at 09:23

## 2018-07-13 RX ADMIN — PREDNISONE 40 MG: 20 TABLET ORAL at 09:23

## 2018-07-13 RX ADMIN — PANTOPRAZOLE SODIUM 40 MG: 40 TABLET, DELAYED RELEASE ORAL at 06:07

## 2018-07-13 RX ADMIN — IPRATROPIUM BROMIDE AND ALBUTEROL SULFATE 3 ML: .5; 3 SOLUTION RESPIRATORY (INHALATION) at 07:03

## 2018-07-13 RX ADMIN — HYDROCODONE BITARTRATE AND ACETAMINOPHEN 1 TABLET: 10; 325 TABLET ORAL at 06:11

## 2018-07-13 RX ADMIN — POTASSIUM CHLORIDE 20 MEQ: 750 CAPSULE, EXTENDED RELEASE ORAL at 09:23

## 2018-07-13 RX ADMIN — SODIUM CHLORIDE 1 G: 9 INJECTION, SOLUTION INTRAVENOUS at 12:09

## 2018-07-13 RX ADMIN — NYSTATIN 500000 UNITS: 100000 SUSPENSION ORAL at 09:23

## 2018-07-13 RX ADMIN — HEPARIN SODIUM 5000 UNITS: 5000 INJECTION, SOLUTION INTRAVENOUS; SUBCUTANEOUS at 06:07

## 2018-07-13 RX ADMIN — KETOTIFEN FUMARATE 1 DROP: 0.35 SOLUTION/ DROPS OPHTHALMIC at 09:24

## 2018-07-13 RX ADMIN — METOPROLOL SUCCINATE 25 MG: 25 TABLET, EXTENDED RELEASE ORAL at 09:23

## 2018-07-13 RX ADMIN — Medication 1 CAPSULE: at 09:23

## 2018-07-13 RX ADMIN — MULTIPLE VITAMINS W/ MINERALS TAB 1 TABLET: TAB at 09:23

## 2018-07-13 RX ADMIN — LORAZEPAM 0.5 MG: 0.5 TABLET ORAL at 09:23

## 2018-07-13 RX ADMIN — LOSARTAN POTASSIUM 25 MG: 25 TABLET, FILM COATED ORAL at 09:23

## 2018-07-13 RX ADMIN — IPRATROPIUM BROMIDE AND ALBUTEROL SULFATE 3 ML: .5; 3 SOLUTION RESPIRATORY (INHALATION) at 12:54

## 2018-07-13 RX ADMIN — CYCLOBENZAPRINE HYDROCHLORIDE 10 MG: 10 TABLET, FILM COATED ORAL at 09:23

## 2018-07-13 RX ADMIN — BUDESONIDE AND FORMOTEROL FUMARATE DIHYDRATE 2 PUFF: 160; 4.5 AEROSOL RESPIRATORY (INHALATION) at 07:03

## 2018-07-13 NOTE — PROGRESS NOTES
Case Management Discharge Note         Destination     No service has been selected for the patient.      Durable Medical Equipment - Selection Complete     Service Request Status Selected Specialties Address Phone Number Fax Number    [facility] Selected DME Services [address] [place] [address] [phone]        Sydney SAAVEDRA Matti 7/11/2018 1543    Supplies oxygen.                 Dialysis/Infusion     No service has been selected for the patient.      Home Medical Care - Selection Complete     Service Request Status Selected Specialties Address Phone Number Fax Number    [facility] Selected Home Health Services [address] [place] [address] [phone]      Social Care     No service has been selected for the patient.        Other: Other (Private vehicle)    Final Discharge Disposition Code: 06 - home with home health care

## 2018-07-13 NOTE — PLAN OF CARE
Problem: Fall Risk (Adult)  Goal: Identify Related Risk Factors and Signs and Symptoms  Outcome: Ongoing (interventions implemented as appropriate)   07/13/18 0412   Fall Risk (Adult)   Related Risk Factors (Fall Risk) age-related changes;inadequate lighting;environment unfamiliar;gait/mobility problems   Signs and Symptoms (Fall Risk) presence of risk factors

## 2018-07-13 NOTE — PROGRESS NOTES
Continued Stay Note   Christopher     Patient Name: Joelle Yee  MRN: [id number]  Today's Date: 7/13/2018    Admit Date: 7/10/2018          Discharge Plan     Row Name 07/13/18 1326       Plan    Plan Home with the resumption of HH    Patient/Family in Agreement with Plan yes    Plan Comments Received resumption HH order.  Called [facility]; spoke with Polly.  Per Polly they will try to see pt today and are able to provide staff for IM injections.  Followed up with pt; update given.  Family will provide transportation.  KALE Loco updated.              Discharge Codes    No documentation.       Expected Discharge Date and Time     Expected Discharge Date Expected Discharge Time    Jul 13, 2018             Sydney Chino

## 2018-07-13 NOTE — DISCHARGE PLACEMENT REQUEST
"ROSANNA Burris RN  Case Management  Phone:  [phone]; Fax:  [phone]    Resumption of care order.  See attached order and discharge summary.    Joelle Hernandes (72 y.o. Female)     Date of Birth Social Security Number Address Home Phone MRN    1945  [address] [place] [address] [phone] [id number]    Catholic Marital Status          Sabianism Single       Admission Date Admission Type Admitting Provider Attending Provider Department, Room/Bed    7/10/18 Emergency Hanna Hinds MD Manivannan, Suganya, MD [facility] SURG  3, 321/1    Discharge Date Discharge Disposition Discharge Destination         Home or Self Care              Attending Provider:  Hanna Hinds MD    Allergies:  Dust Mite Extract, Grass, Mold Extract [Trichophyton]    Isolation:  None   Infection:  None   Code Status:  CPR    Ht:  152.4 cm (60\")   Wt:  42 kg (92 lb 11.2 oz)    Admission Cmt:  None   Principal Problem:  Serratia marcescens infection (CMS/[facility]) [J15.6]                 Active Insurance as of 7/10/2018     Primary Coverage     Payor Plan Insurance Group Employer/Plan Group    HUMANA MEDICARE REPLACEMENT HUMANA MEDICARE REPL [id number]     Payor Plan Address Payor Plan Phone Number Effective From Effective To    [address] [address] [phone] 1/1/2018     [facility] [address]       Subscriber Name Subscriber Birth Date Member ID       JOELLE HERNANDES 1945 [id number]           Secondary Coverage     Payor Plan Insurance Group Employer/Plan Group    [place]      Payor Plan Address Payor Plan Phone Number Effective From Effective To    [address] [phone] 3/1/2016     [facility] [address]       Subscriber Name Subscriber Birth Date Member ID       JOELLE HERNANDES 1945 [id number]                 Emergency Contacts      (Rel.) Home Phone Work Phone Mobile Phone    Cayetano Hernandes (Brother) [phone] -- " [phone]    Doug Julien (Son) [phone] -- [phone]    Adia Rhodes (Daughter) [phone] -- --    Margaret Julien (Other) [phone] -- [phone]          [facility] MED SURG  3  [address] [facility] [address]  Phone:  [phone]  Fax:   Date: 2018      Ambulatory Referral to Home Health     Patient:  Joelle Yee MRN:  [id number]   [address] [facility] [address] :  1945  SSN:    Phone: [phone] Sex:  F      INSURANCE PAYOR PLAN GROUP # SUBSCRIBER ID   Primary:  Secondary:    HUMANA MEDICARE REPLACEMENT  [place] [id number]  [id number]    [id number]  [id number]      Referring Provider Information:  ELIZABETH BROWN Phone: [phone] Fax:       Referral Information:   # Visits:  1 Referral Type: Home Health [42]   Urgency:  Routine Referral Reason: Specialty Services Required   Start Date: 2018 End Date:  To be determined by Insurer   Diagnosis: Impaired functional mobility, balance, gait, and endurance (Z74.09 [ICD-10-CM] V49.89 [ICD-9-CM])  Pneumonia due to gram-negative bacteria (CMS/[facility]) (J15.6 [ICD-10-CM] 482.83 [ICD-9-CM])      Refer to Dept:   Refer to Provider:   Refer to Facility:       Face to Face Visit Date: 2018  Follow-up Provider for Plan of Care? I treated the patient in an acute care facility and will not continue treatment after discharge.  Follow-up Provider: HARSHA REYES [[id number]]  Reason/Clinical Findings: IM injection and strength mobility  Describe mobility limitations that make leaving home difficult: COPD  Nursing/Therapeutic Services Requested: Skilled Nursing  Nursing/Therapeutic Services Requested: Physical Therapy  Nursing/Therapeutic Services Requested: Occupational Therapy  Skilled nursing orders: COPD management (Invanz 1gram IM daily X 5 days)  Skilled nursing orders: O2 instruction  Frequency: Other     This document serves as a request of  services and does not constitute Insurance authorization or approval of services.  To determine eligibility, please contact the members Insurance carrier to verify and review coverage.     If you have medical questions regarding this request for services. Please contact [facility] MED SURG  3 at [phone] between the hours of 8:00am - 5:00pm (Mon-Fri).             Authorizing Provider:DWAYNE Bhatt  Authorizing Provider's NPI: [id number]  Order Entered By: DWAYNE Bhatt 2018 10:48 AM     Electronically signed by: DWAYNE Bhatt 2018 10:48 AM                Discharge Summary      DWAYNE Bhatt at 2018 12:59 PM              [facility] HOSPITALIST   DISCHARGE SUMMARY    Name:  Joelle Yee   Age:  72 y.o.  Sex:  female  :  1945  MRN:  [id number]   Visit Number:  [id number]  Primary Care Physician:  DONTRELL Regan  Date of Discharge:  2018  Admission Date:  7/10/2018      Presenting Problem:    Serratia marcescens infection (CMS/HCC) [J15.6]       Discharge Diagnosis:     Principal Problem:    Serratia marcescens infection (CMS/HCC)  Active Problems:    Pneumonia due to gram-negative bacteria (CMS/HCC)    Sinus tachycardia    Leukocytosis        Past Medical History:  Past Medical History:   Diagnosis Date   • Abdominal pain    • Acute bronchitis    • Ankle pain    • Anxiety    • Atopic dermatitis    • Bronchiectasis (CMS/[facility])    • Cataract, bilateral    • Chest pain     STATES HAS OCCASSIONALLY.  STATES LAST TIME WAS LAST WEEK.  [facility] SEES DR. RICH.  STATES HE HAS DONE NUMEROUS TESTS ON HER AND HAS NOT BEEN ABLE TO FIND OUT CAUSE.     • Chronic obstructive lung disease (CMS/HCC)    • Colon cancer screening    • COPD (chronic obstructive pulmonary disease) (CMS/HCC)    • Cystocele    • Depressed    • Depression    • Fatigue     Chronic pafigue 2012   • Fibromyalgia 2008   • Full  dentures    • GERD (gastroesophageal reflux disease)    • Gout    • Heartburn     Chronic historu of epigastric heartburn currently controlled on Prilesec 40 mg every morning along with Zantac 300 Mg daily at bedtime   • High cholesterol 2012   • Hip pain    • Hyperlipidemia    • Hypertension    • Insomnia    • Joint pain    • Kidney infection    • Loss of appetite    • Low back pain 1995   • Melena    • Nausea and vomiting    • On home oxygen therapy     2L/NC PRN (STATES SHE HAS BEEN USING CONTINUOSLY LATELY)   • Osteoarthritis 2010   • Pain in limb    • Palpitations    • Pinched nerve     Pinched nerves 2011   • Pneumonia    • Problems with swallowing     HAS TO EAT SLOW AND CHEW FOOD WELL   • Recurrent urinary tract infection    • Sciatica [id number]   • Shortness of breath    • Sinus problem    • Sleep apnea 1998    NO CPAP   • Upset stomach    • Valvular heart disease    • Vitamin B12 deficiency    • Wears glasses    • Weight loss, abnormal     Weight loss is stabel. On pund weight gain since 01/2016         Consults:     Consults     Date and Time Order Name Status Description    7/10/2018 1953 Inpatient Pulmonology Consult Completed           Procedures Performed:  None             History of presenting illness:  This is a 72-year-old female with history of tobacco use stopped a week ago, COPD with bronchiectasis, hypertension who presented to Dr. Esparza for evaluation for worsening shortness of breath and congestion.  She had a bronchoscopy done on July 5, 2018 with cultures from right lower lobe lavage which grew plus for Serratia marcescens.  She has complained of productive cough with greenish colored sputum.  She came into the emergency room for further evaluation.  According to the ER report however it says that the patient does continue to smoke 10-12 cigarettes per day.  She is on 2 L of oxygen at home.  She denied any fevers chills or myalgias.  The emergency room did speak with pulmonologist who  recommended admitting and started the patient on IV antibiotics as well as vibratory vest therapy.  She did have an elevated lactic acid which has resolved.    Hospital Course:  Upon admission to the hospitalist service patient was started on IV antibiotics in the form of Invanz.  Dr. Esparza with pulmonology was consulted.  He did see and evaluate the patient.  She was started on vibratory vest in case management has been working to get her when at home.  According to case management and the patient's daughter the best is to be delivered today.  She has been tolerating vibratory vest while inpatient.  Since the patient had failed outpatient therapy for the pneumonia secondary to the Serratia which was detected on the bronchoscopy specimen she was placed on the Invanz.  Dr. Esparza does recommend Invanz to be continued as an outpatient intermuscularly for 5 more days.  She will follow-up with Dr. Esparza in one week.    I have seen and evaluated the patient at bedside this morning.  She states she is feeling better today.  Her vibratory percussion vest will be delivered today.  We will set her up for 5 more days of antibiotics in the form of Invanz intramuscularly as well as a long taper course of steroids as per recommendation of pulmonology.  Patient was also noted to have leukocytosis and looking back she has had a white count ranging anywhere from 12-18,000 since 2017.  Now I'm not sure the reason behind each of these blood draws but she does appear to have some persistent leukocytosis which could be secondary to recurrent episodes of steroids and bronchiectasis however I do feel the patient would benefit from an outpatient hematology evaluation.  We will arrange for to follow-up as an outpatient.    Patient this time does appear to be doing symptomatically better.  We will plan to discharge her home today with Invanz and she will have a vibratory machine at home.  She will need to follow-up with Dr. Esparza in one  week.  She is otherwise stable from the hospitalist standpoint for discharge home with appropriate follow-ups and equipment.      Vital Signs:    Temp:  [97.4 °F (36.3 °C)-98.7 °F (37.1 °C)] 97.4 °F (36.3 °C)  Heart Rate:  [] 83  Resp:  [16-20] 17  BP: (131-141)/(67-81) 137/81    Physical Exam:  General Appearance:    Alert and cooperative, not in any acute distress. Frail appearing   Head:    Atraumatic and normocephalic, without obvious abnormality.   Eyes:            PERRLA, conjunctivae and sclerae normal, no Icterus. No pallor. Extra-occular movements are within normal limits.   Ears:    Ears appear intact with no abnormalities noted.   Throat:   No oral lesions, no thrush, oral mucosa moist.   Neck:   Supple, trachea midline, no thyromegaly, no carotid bruit.       Lungs:     Chest shape is normal. Breath sounds heard bilaterally equally.  No crackles or wheezing. No Pleural rub or bronchial breathing.    Heart:    Normal S1 and S2, no murmur, no gallop, no rub. No JVD   Abdomen:     Normal bowel sounds, no masses, no organomegaly. Soft        non-tender, non-distended, no guarding, no rebound                tenderness   Extremities:   Moves all extremities well, no edema, no cyanosis, no             clubbing   Pulses:   Pulses palpable and equal bilaterally   Skin:   No bleeding, bruising or rash   Lymph nodes:  Psychiatric/Behavior:    No palpable adenopathy    Normal mood, normal behavior   Neurologic:   Cranial nerves 2 - 12 grossly intact, sensation intact, Motor power is normal and equal bilaterally.           Pertinent Lab Results:       Results from last 7 days  Lab Units 07/12/18  0635 07/11/18  0626 07/10/18  1426   SODIUM mmol/L 137 140 136*   POTASSIUM mmol/L 4.7 4.3 4.0   CHLORIDE mmol/L 101 99 95*   CO2 mmol/L 29.0 35.0* 34.0*   BUN mg/dL 26* 18 19   CREATININE mg/dL 0.50* 0.60 0.50*   CALCIUM mg/dL 9.1 9.6 9.4   BILIRUBIN mg/dL 0.4  --  0.3   ALK PHOS U/L 128*  --  129*   ALT (SGPT) U/L 32   --  35   AST (SGOT) U/L 32  --  42   GLUCOSE mg/dL 118* 98 99*       Results from last 7 days  Lab Units 07/12/18  0635 07/11/18  0626 07/10/18  1426   WBC 10*3/mm3 18.93* 18.29* 15.07*   HEMOGLOBIN g/dL 11.6* 12.3 12.2   HEMATOCRIT % 35.9* 38.5 37.4   PLATELETS 10*3/mm3 328 308 261         Blood Culture   Date Value Ref Range Status   07/10/2018 No growth at 2 days  Preliminary   07/10/2018 No growth at 2 days  Preliminary         Pertinent Radiology Results:    Imaging Results (all)     Procedure Component Value Units Date/Time    XR Chest 2 View [[id number]] Collected:  07/10/18 1549     Updated:  07/10/18 1717    Narrative:       PROCEDURE: XR CHEST 2 VW-       HISTORY: SOA  triage protocol        COMPARISON: 6/7/2018.     FINDINGS:  Chronic changes are seen of the lung bases. There is mild  atelectasis.       There is no evidence of effusion or other pleural disease.  The  mediastinum has a normal appearance.      The cardiac silhouette is unremarkable.             Impression:       Bibasilar atelectasis and scarring. Underlying emphysema.        This report was finalized on 7/10/2018 3:50 PM by Hansel Fernández MD.          Condition on Discharge:    Stable        Discharge Disposition:    Home or Self Care    Discharge Medication:       Discharge Medications      New Medications      Instructions Start Date   EPINEPHrine 0.3 MG/0.3ML solution auto-injector injection  Commonly known as:  EPIPEN 2-THANH   0.3 mg, Intramuscular, Once      ertapenem 1 g injection  Commonly known as:  INVanz   1 g, Intramuscular, Every 24 Hours      lidocaine 1 % injection  Commonly known as:  XYLOCAINE   Use 3.2mL to reconstitute each 1g vial of Invanz      predniSONE 10 MG tablet  Commonly known as:  DELTASONE   Take 4 tablets by mouth daily for 5 days, then 3 tabs for 5 days, then 2 tabs for 5 days, then 1 tab for 5 days         Changes to Medications      Instructions Start Date   nystatin 098447 UNIT/ML suspension  Commonly known  as:  MYCOSTATIN  What changed:  · when to take this  · reasons to take this   500,000 Units, Swish & Swallow, 4 Times Daily         Continue These Medications      Instructions Start Date   acetaminophen 325 MG tablet  Commonly known as:  TYLENOL   650 mg, Oral, Every 4 Hours PRN      albuterol (2.5 MG/3ML) 0.083% nebulizer solution  Commonly known as:  PROVENTIL   2.5 mg, Nebulization, 4 Times Daily - RT      azelastine 0.1 % nasal spray  Commonly known as:  ASTELIN   2 sprays, Nasal, 2 Times Daily, Use in each nostril as directed      budesonide-formoterol 160-4.5 MCG/ACT inhaler  Commonly known as:  SYMBICORT   2 puffs, Inhalation, 2 Times Daily, Inhale 1 puff twice daily. Rinse mouth after use.       cyclobenzaprine 10 MG tablet  Commonly known as:  FLEXERIL   10 mg, Oral, Daily      ENSURE COMPLETE PO   1 can, Oral, 2 Times Daily      FLUTTER device   1 Device, Does not apply, As Needed      gabapentin 100 MG capsule  Commonly known as:  NEURONTIN   TAKE ONE CAPSULE BY MOUTH THREE TIMES A DAY      gemfibrozil 600 MG tablet  Commonly known as:  LOPID   TAKE ONE TABLET WITH SUPPER. patient sometimes takes 1/2 tablet (300 mg).      HYDROcodone-acetaminophen  MG per tablet  Commonly known as:  NORCO   Oral, Take 1 tablet every 8 hours as needed for pain.       INCRUSE ELLIPTA 62.5 MCG/INH aerosol powder   Generic drug:  Umeclidinium Bromide   INHALE 1 PUFF INTO THE LUNGS ONCE DAILY      ipratropium-albuterol  MCG/ACT inhaler  Commonly known as:  COMBIVENT RESPIMAT   1 puff, Inhalation, 4 Times Daily PRN      ketotifen 0.025 % ophthalmic solution  Commonly known as:  ZADITOR   USE 1-2 DROPS IN BOTH EYES TWICE DAILY      LORazepam 0.5 MG tablet  Commonly known as:  ATIVAN   0.5 mg, Oral, 2 Times Daily PRN, 1-2 TABLETS DAILY PRN      losartan 25 MG tablet  Commonly known as:  COZAAR   25 mg, Oral, Daily      melatonin 5 MG tablet tablet   10 mg, Oral, Nightly PRN      metoprolol succinate XL 25 MG 24 hr  tablet  Commonly known as:  TOPROL-XL   25 mg, Oral, Daily      mirtazapine 30 MG tablet  Commonly known as:  REMERON   30 mg, Oral, Nightly      montelukast 10 MG tablet  Commonly known as:  SINGULAIR   TAKE ONE TABLET BY MOUTH AT BEDTIME as needed      multivitamin with minerals tablet tablet   1 tablet, Oral, Daily      omeprazole 40 MG capsule  Commonly known as:  priLOSEC   40 mg, Oral, Daily      ondansetron ODT 4 MG disintegrating tablet  Commonly known as:  ZOFRAN-ODT   1 tablet, Oral, Every 6 Hours PRN      OXYGEN-HELIUM IN   Oxygen; Patient Sig: Oxygen 2 liter as needed; 0; 21-May-2014; Active      potassium bicarbonate 25 MEQ disintegrating tablet  Commonly known as:  K-LYTE   25 mEq, Oral, Nightly      Risaquad-2 capsule capsule   1 capsule, Oral, Daily      ROLLATOR misc   1 Units, Does not apply, As Needed      traZODone 50 MG tablet  Commonly known as:  DESYREL   1 tablet, Oral, Nightly      venlafaxine 75 MG tablet  Commonly known as:  EFFEXOR   1 tablet, Oral, 2 Times Daily      VITAMIN B12 PO   500 mcg, Oral, Daily      VOLTAREN TD   1 application, Topical, As Needed, Voltaren GEL             Discharge Diet:     Diet Instructions     Diet: Cardiac; Thin       Discharge Diet:  Cardiac    Fluid Consistency:  Thin          Activity at Discharge:     Activity Instructions     Discharge Activity       As tolerates          Follow-up Appointments:    Future Appointments  Date Time Provider Department Center   7/24/2018 2:30 PM DWAYNE Molina [facility] None     Additional Instructions for the Follow-ups that You Need to Schedule     Ambulatory Referral to Home Health    As directed      Face to Face Visit Date:  7/13/2018    Follow-up Provider for Plan of Care?:  I treated the patient in an acute care facility and will not continue treatment after discharge.    Follow-up Provider:  HARSHA REYES [[id number]]    Reason/Clinical Findings:  IM injection and strength mobility    Describe  mobility limitations that make leaving home difficult:  COPD    Nursing/Therapeutic Services Requested:  Skilled Nursing Physical Therapy Occupational Therapy    Skilled nursing orders:  COPD management (Invanz 1gram IM daily X 5 days) O2 instruction    Frequency:  Other               Test Results Pending at Discharge:     Order Current Status    Blood Culture With YONAS - Blood, Preliminary result    Blood Culture With YONAS - Blood, Preliminary result             DWAYNE Bhatt  07/13/18  1:18 PM    Time: 35   minutes were spent reviewing labs, history, evaluating patient and discharge planning.            Electronically signed by DWAYNE Bhatt at 7/13/2018  1:19 PM

## 2018-07-13 NOTE — DISCHARGE SUMMARY
[facility]    Name:  Joelle Yee   Age:  72 y.o.  Sex:  female  :  1945  MRN:  [id number]   Visit Number:  [id number]  Primary Care Physician:  DONTRELL Regan  Date of Discharge:  2018  Admission Date:  7/10/2018      Presenting Problem:    Serratia marcescens infection (CMS/HCC) [J15.6]       Discharge Diagnosis:     Principal Problem:    Serratia marcescens infection (CMS/HCC)  Active Problems:    Pneumonia due to gram-negative bacteria (CMS/HCC)    Sinus tachycardia    Leukocytosis        Past Medical History:  Past Medical History:   Diagnosis Date   • Abdominal pain    • Acute bronchitis    • Ankle pain    • Anxiety    • Atopic dermatitis    • Bronchiectasis (CMS/HCC)    • Cataract, bilateral    • Chest pain     STATES HAS OCCASSIONALLY.  STATES LAST TIME WAS LAST WEEK.  STATES SEES DR. RICH.  STATES HE HAS DONE NUMEROUS TESTS ON HER AND HAS NOT BEEN ABLE TO FIND OUT CAUSE.     • Chronic obstructive lung disease (CMS/HCC)    • Colon cancer screening    • COPD (chronic obstructive pulmonary disease) (CMS/HCC)    • Cystocele    • Depressed    • Depression    • Fatigue     Chronic pafigue    • Fibromyalgia    • Full dentures    • GERD (gastroesophageal reflux disease)    • Gout    • Heartburn     Chronic historu of epigastric heartburn currently controlled on Prilesec 40 mg every morning along with Zantac 300 Mg daily at bedtime   • High cholesterol    • Hip pain    • Hyperlipidemia    • Hypertension    • Insomnia    • Joint pain    • Kidney infection    • Loss of appetite    • Low back pain    • Melena    • Nausea and vomiting    • On home oxygen therapy     2L/NC PRN (STATES SHE HAS BEEN USING CONTINUOSLY LATELY)   • Osteoarthritis    • Pain in limb    • Palpitations    • Pinched nerve     Pinched nerves    • Pneumonia    • Problems with swallowing     HAS TO EAT SLOW AND CHEW FOOD WELL   • Recurrent  urinary tract infection    • Sciatica [id number]   • Shortness of breath    • Sinus problem    • Sleep apnea 1998    NO CPAP   • Upset stomach    • Valvular heart disease    • Vitamin B12 deficiency    • Wears glasses    • Weight loss, abnormal     Weight loss is stabel. On pund weight gain since 01/2016         Consults:     Consults     Date and Time Order Name Status Description    7/10/2018 1953 Inpatient Pulmonology Consult Completed           Procedures Performed:  None             History of presenting illness:  This is a 72-year-old female with history of tobacco use stopped a week ago, COPD with bronchiectasis, hypertension who presented to Dr. Esparza for evaluation for worsening shortness of breath and congestion.  She had a bronchoscopy done on July 5, 2018 with cultures from right lower lobe lavage which grew plus for Serratia marcescens.  She has complained of productive cough with greenish colored sputum.  She came into the emergency room for further evaluation.  According to the ER report however it says that the patient does continue to smoke 10-12 cigarettes per day.  She is on 2 L of oxygen at home.  She denied any fevers chills or myalgias.  The emergency room did speak with pulmonologist who recommended admitting and started the patient on IV antibiotics as well as vibratory vest therapy.  She did have an elevated lactic acid which has resolved.    Hospital Course:  Upon admission to the hospitalist service patient was started on IV antibiotics in the form of Invanz.  Dr. Esparza with pulmonology was consulted.  He did see and evaluate the patient.  She was started on vibratory vest in case management has been working to get her when at home.  According to case management and the patient's daughter the best is to be delivered today.  She has been tolerating vibratory vest while inpatient.  Since the patient had failed outpatient therapy for the pneumonia secondary to the Serratia which was detected  on the bronchoscopy specimen she was placed on the Invanz.  Dr. Esparza does recommend Invanz to be continued as an outpatient intermuscularly for 5 more days.  She will follow-up with Dr. Esparza in one week.    I have seen and evaluated the patient at bedside this morning.  She states she is feeling better today.  Her vibratory percussion vest will be delivered today.  We will set her up for 5 more days of antibiotics in the form of Invanz intramuscularly as well as a long taper course of steroids as per recommendation of pulmonology.  Patient was also noted to have leukocytosis and looking back she has had a white count ranging anywhere from 12-18,000 since 2017.  Now I'm not sure the reason behind each of these blood draws but she does appear to have some persistent leukocytosis which could be secondary to recurrent episodes of steroids and bronchiectasis however I do feel the patient would benefit from an outpatient hematology evaluation.  We will arrange for to follow-up as an outpatient.    Patient this time does appear to be doing symptomatically better.  We will plan to discharge her home today with Invanz and she will have a vibratory machine at home.  She will need to follow-up with Dr. Esparza in one week.  She is otherwise stable from the hospitalist standpoint for discharge home with appropriate follow-ups and equipment.      Vital Signs:    Temp:  [97.4 °F (36.3 °C)-98.7 °F (37.1 °C)] 97.4 °F (36.3 °C)  Heart Rate:  [] 83  Resp:  [16-20] 17  BP: (131-141)/(67-81) 137/81    Physical Exam:  General Appearance:    Alert and cooperative, not in any acute distress. Frail appearing   Head:    Atraumatic and normocephalic, without obvious abnormality.   Eyes:            PERRLA, conjunctivae and sclerae normal, no Icterus. No pallor. Extra-occular movements are within normal limits.   Ears:    Ears appear intact with no abnormalities noted.   Throat:   No oral lesions, no thrush, oral mucosa moist.   Neck:    Supple, trachea midline, no thyromegaly, no carotid bruit.       Lungs:     Chest shape is normal. Breath sounds heard bilaterally equally.  No crackles or wheezing. No Pleural rub or bronchial breathing.    Heart:    Normal S1 and S2, no murmur, no gallop, no rub. No JVD   Abdomen:     Normal bowel sounds, no masses, no organomegaly. Soft        non-tender, non-distended, no guarding, no rebound                tenderness   Extremities:   Moves all extremities well, no edema, no cyanosis, no             clubbing   Pulses:   Pulses palpable and equal bilaterally   Skin:   No bleeding, bruising or rash   Lymph nodes:  Psychiatric/Behavior:    No palpable adenopathy    Normal mood, normal behavior   Neurologic:   Cranial nerves 2 - 12 grossly intact, sensation intact, Motor power is normal and equal bilaterally.           Pertinent Lab Results:       Results from last 7 days  Lab Units 07/12/18  0635 07/11/18  0626 07/10/18  1426   SODIUM mmol/L 137 140 136*   POTASSIUM mmol/L 4.7 4.3 4.0   CHLORIDE mmol/L 101 99 95*   CO2 mmol/L 29.0 35.0* 34.0*   BUN mg/dL 26* 18 19   CREATININE mg/dL 0.50* 0.60 0.50*   CALCIUM mg/dL 9.1 9.6 9.4   BILIRUBIN mg/dL 0.4  --  0.3   ALK PHOS U/L 128*  --  129*   ALT (SGPT) U/L 32  --  35   AST (SGOT) U/L 32  --  42   GLUCOSE mg/dL 118* 98 99*       Results from last 7 days  Lab Units 07/12/18  0635 07/11/18  0626 07/10/18  1426   WBC 10*3/mm3 18.93* 18.29* 15.07*   HEMOGLOBIN g/dL 11.6* 12.3 12.2   HEMATOCRIT % 35.9* 38.5 37.4   PLATELETS 10*3/mm3 328 308 261         Blood Culture   Date Value Ref Range Status   07/10/2018 No growth at 2 days  Preliminary   07/10/2018 No growth at 2 days  Preliminary         Pertinent Radiology Results:    Imaging Results (all)     Procedure Component Value Units Date/Time    XR Chest 2 View [[id number]] Collected:  07/10/18 1549     Updated:  07/10/18 1717    Narrative:       PROCEDURE: XR CHEST 2 VW-       HISTORY: SOA  triage protocol        COMPARISON:  6/7/2018.     FINDINGS:  Chronic changes are seen of the lung bases. There is mild  atelectasis.       There is no evidence of effusion or other pleural disease.  The  mediastinum has a normal appearance.      The cardiac silhouette is unremarkable.             Impression:       Bibasilar atelectasis and scarring. Underlying emphysema.        This report was finalized on 7/10/2018 3:50 PM by Hansel Fernández MD.          Condition on Discharge:    Stable        Discharge Disposition:    Home or Self Care    Discharge Medication:       Discharge Medications      New Medications      Instructions Start Date   EPINEPHrine 0.3 MG/0.3ML solution auto-injector injection  Commonly known as:  EPIPEN 2-THANH   0.3 mg, Intramuscular, Once      ertapenem 1 g injection  Commonly known as:  INVanz   1 g, Intramuscular, Every 24 Hours      lidocaine 1 % injection  Commonly known as:  XYLOCAINE   Use 3.2mL to reconstitute each 1g vial of Invanz      predniSONE 10 MG tablet  Commonly known as:  DELTASONE   Take 4 tablets by mouth daily for 5 days, then 3 tabs for 5 days, then 2 tabs for 5 days, then 1 tab for 5 days         Changes to Medications      Instructions Start Date   nystatin [id number] UNIT/ML suspension  Commonly known as:  MYCOSTATIN  What changed:  · when to take this  · reasons to take this   500,000 Units, Swish & Swallow, 4 Times Daily         Continue These Medications      Instructions Start Date   acetaminophen 325 MG tablet  Commonly known as:  TYLENOL   650 mg, Oral, Every 4 Hours PRN      albuterol (2.5 MG/3ML) 0.083% nebulizer solution  Commonly known as:  PROVENTIL   2.5 mg, Nebulization, 4 Times Daily - RT      azelastine 0.1 % nasal spray  Commonly known as:  ASTELIN   2 sprays, Nasal, 2 Times Daily, Use in each nostril as directed      budesonide-formoterol 160-4.5 MCG/ACT inhaler  Commonly known as:  SYMBICORT   2 puffs, Inhalation, 2 Times Daily, Inhale 1 puff twice daily. Rinse mouth after use.        cyclobenzaprine 10 MG tablet  Commonly known as:  FLEXERIL   10 mg, Oral, Daily      ENSURE COMPLETE PO   1 can, Oral, 2 Times Daily      FLUTTER device   1 Device, Does not apply, As Needed      gabapentin 100 MG capsule  Commonly known as:  NEURONTIN   TAKE ONE CAPSULE BY MOUTH THREE TIMES A DAY      gemfibrozil 600 MG tablet  Commonly known as:  LOPID   TAKE ONE TABLET WITH SUPPER. patient sometimes takes 1/2 tablet (300 mg).      HYDROcodone-acetaminophen  MG per tablet  Commonly known as:  NORCO   Oral, Take 1 tablet every 8 hours as needed for pain.       INCRUSE ELLIPTA 62.5 MCG/INH aerosol powder   Generic drug:  Umeclidinium Bromide   INHALE 1 PUFF INTO THE LUNGS ONCE DAILY      ipratropium-albuterol  MCG/ACT inhaler  Commonly known as:  COMBIVENT RESPIMAT   1 puff, Inhalation, 4 Times Daily PRN      ketotifen 0.025 % ophthalmic solution  Commonly known as:  ZADITOR   USE 1-2 DROPS IN BOTH EYES TWICE DAILY      LORazepam 0.5 MG tablet  Commonly known as:  ATIVAN   0.5 mg, Oral, 2 Times Daily PRN, 1-2 TABLETS DAILY PRN      losartan 25 MG tablet  Commonly known as:  COZAAR   25 mg, Oral, Daily      melatonin 5 MG tablet tablet   10 mg, Oral, Nightly PRN      metoprolol succinate XL 25 MG 24 hr tablet  Commonly known as:  TOPROL-XL   25 mg, Oral, Daily      mirtazapine 30 MG tablet  Commonly known as:  REMERON   30 mg, Oral, Nightly      montelukast 10 MG tablet  Commonly known as:  SINGULAIR   TAKE ONE TABLET BY MOUTH AT BEDTIME as needed      multivitamin with minerals tablet tablet   1 tablet, Oral, Daily      omeprazole 40 MG capsule  Commonly known as:  priLOSEC   40 mg, Oral, Daily      ondansetron ODT 4 MG disintegrating tablet  Commonly known as:  ZOFRAN-ODT   1 tablet, Oral, Every 6 Hours PRN      OXYGEN-HELIUM IN   Oxygen; Patient Sig: Oxygen 2 liter as needed; 0; 21-May-2014; Active      potassium bicarbonate 25 MEQ disintegrating tablet  Commonly known as:  K-LYTE   25 mEq, Oral,  Nightly      Risaquad-2 capsule capsule   1 capsule, Oral, Daily      ROLLATOR misc   1 Units, Does not apply, As Needed      traZODone 50 MG tablet  Commonly known as:  DESYREL   1 tablet, Oral, Nightly      venlafaxine 75 MG tablet  Commonly known as:  EFFEXOR   1 tablet, Oral, 2 Times Daily      VITAMIN B12 PO   500 mcg, Oral, Daily      VOLTAREN TD   1 application, Topical, As Needed, Voltaren GEL             Discharge Diet:     Diet Instructions     Diet: Cardiac; Thin       Discharge Diet:  Cardiac    Fluid Consistency:  Thin          Activity at Discharge:     Activity Instructions     Discharge Activity       As tolerates          Follow-up Appointments:    Future Appointments  Date Time Provider Department Center   7/24/2018 2:30 PM DWAYNE Molina [facility] None     Additional Instructions for the Follow-ups that You Need to Schedule     Ambulatory Referral to Home Health    As directed      Face to Face Visit Date:  7/13/2018    Follow-up Provider for Plan of Care?:  I treated the patient in an acute care facility and will not continue treatment after discharge.    Follow-up Provider:  HARSHA REYES [[id number]]    Reason/Clinical Findings:  IM injection and strength mobility    Describe mobility limitations that make leaving home difficult:  COPD    Nursing/Therapeutic Services Requested:  Skilled Nursing Physical Therapy Occupational Therapy    Skilled nursing orders:  COPD management (Invanz 1gram IM daily X 5 days) O2 instruction    Frequency:  Other               Test Results Pending at Discharge:     Order Current Status    Blood Culture With YONAS - Blood, Preliminary result    Blood Culture With YONAS - Blood, Preliminary result             DWAYNE Bhatt  07/13/18  1:18 PM    Time: 35   minutes were spent reviewing labs, history, evaluating patient and discharge planning.

## 2018-07-15 LAB
BACTERIA SPEC AEROBE CULT: NORMAL
BACTERIA SPEC AEROBE CULT: NORMAL

## 2018-07-24 ENCOUNTER — OFFICE VISIT (OUTPATIENT)
Dept: PULMONOLOGY | Facility: CLINIC | Age: 73
End: 2018-07-24

## 2018-07-24 VITALS
RESPIRATION RATE: 18 BRPM | HEART RATE: 111 BPM | SYSTOLIC BLOOD PRESSURE: 110 MMHG | BODY MASS INDEX: 17.47 KG/M2 | OXYGEN SATURATION: 92 % | WEIGHT: 89 LBS | DIASTOLIC BLOOD PRESSURE: 50 MMHG | HEIGHT: 60 IN

## 2018-07-24 DIAGNOSIS — J44.9 CHRONIC OBSTRUCTIVE PULMONARY DISEASE, UNSPECIFIED COPD TYPE (HCC): ICD-10-CM

## 2018-07-24 DIAGNOSIS — J47.9 BRONCHIECTASIS WITHOUT COMPLICATION (HCC): Primary | ICD-10-CM

## 2018-07-24 DIAGNOSIS — R09.02 HYPOXIA: ICD-10-CM

## 2018-07-24 DIAGNOSIS — F17.200 SMOKING: ICD-10-CM

## 2018-07-24 PROCEDURE — 99214 OFFICE O/P EST MOD 30 MIN: CPT | Performed by: NURSE PRACTITIONER

## 2018-07-24 RX ORDER — IPRATROPIUM BROMIDE AND ALBUTEROL SULFATE 2.5; .5 MG/3ML; MG/3ML
3 SOLUTION RESPIRATORY (INHALATION) 4 TIMES DAILY PRN
Qty: 120 ML | Refills: 5 | Status: SHIPPED | OUTPATIENT
Start: 2018-07-24 | End: 2019-01-03 | Stop reason: SDUPTHER

## 2018-07-24 RX ORDER — FLUCONAZOLE 200 MG/1
200 TABLET ORAL DAILY
Refills: 0 | COMMUNITY
Start: 2018-07-17 | End: 2018-12-06 | Stop reason: HOSPADM

## 2018-08-04 LAB
BACTERIA SPEC AEROBE CULT: NORMAL
BACTERIA SPEC AEROBE CULT: NORMAL
FUNGUS SPEC CULT: NORMAL
FUNGUS SPEC FUNGUS STN: NORMAL
FUNGUS SPEC FUNGUS STN: NORMAL

## 2018-08-13 LAB
ACID FAST STN SPEC: NEGATIVE
ACID FAST STN SPEC: NEGATIVE
BACTERIA SPEC AEROBE CULT: NEGATIVE
BACTERIA SPEC AEROBE CULT: NEGATIVE
SPECIMEN PREPARATION: NORMAL
SPECIMEN PREPARATION: NORMAL

## 2018-08-28 RX ORDER — AZELASTINE HYDROCHLORIDE 137 UG/1
SPRAY, METERED NASAL
Qty: 30 ML | Refills: 5 | Status: SHIPPED | OUTPATIENT
Start: 2018-08-28 | End: 2019-01-21

## 2018-09-14 ENCOUNTER — HOSPITAL ENCOUNTER (OUTPATIENT)
Dept: GENERAL RADIOLOGY | Facility: HOSPITAL | Age: 73
Discharge: HOME | End: 2018-09-14

## 2018-09-14 ENCOUNTER — HOSPITAL ENCOUNTER (OUTPATIENT)
Dept: GENERAL RADIOLOGY | Facility: HOSPITAL | Age: 73
Discharge: HOME | End: 2018-09-14
Admitting: NURSE PRACTITIONER

## 2018-09-14 ENCOUNTER — TRANSCRIBE ORDERS (OUTPATIENT)
Dept: ADMINISTRATIVE | Facility: HOSPITAL | Age: 73
End: 2018-09-14

## 2018-09-14 DIAGNOSIS — Z74.09 IMMOBILITY: ICD-10-CM

## 2018-09-14 DIAGNOSIS — R52 PAIN: Primary | ICD-10-CM

## 2018-09-14 PROCEDURE — 72100 X-RAY EXAM L-S SPINE 2/3 VWS: CPT

## 2018-09-14 PROCEDURE — 73502 X-RAY EXAM HIP UNI 2-3 VIEWS: CPT

## 2018-10-15 ENCOUNTER — HOSPITAL ENCOUNTER (EMERGENCY)
Facility: HOSPITAL | Age: 73
Discharge: HOME | End: 2018-10-15
Attending: EMERGENCY MEDICINE | Admitting: EMERGENCY MEDICINE

## 2018-10-15 VITALS
BODY MASS INDEX: 17.59 KG/M2 | RESPIRATION RATE: 16 BRPM | DIASTOLIC BLOOD PRESSURE: 70 MMHG | SYSTOLIC BLOOD PRESSURE: 129 MMHG | TEMPERATURE: 97.5 F | WEIGHT: 89.6 LBS | HEART RATE: 88 BPM | HEIGHT: 60 IN | OXYGEN SATURATION: 98 %

## 2018-10-15 DIAGNOSIS — S41.111A SKIN TEAR OF RIGHT UPPER ARM WITHOUT COMPLICATION, INITIAL ENCOUNTER: ICD-10-CM

## 2018-10-15 DIAGNOSIS — S51.011A SKIN TEAR OF RIGHT ELBOW WITHOUT COMPLICATION, INITIAL ENCOUNTER: Primary | ICD-10-CM

## 2018-10-15 PROCEDURE — 25010000002 TDAP 5-2.5-18.5 LF-MCG/0.5 SUSPENSION: Performed by: PHYSICIAN ASSISTANT

## 2018-10-15 PROCEDURE — 90471 IMMUNIZATION ADMIN: CPT | Performed by: PHYSICIAN ASSISTANT

## 2018-10-15 PROCEDURE — 90715 TDAP VACCINE 7 YRS/> IM: CPT | Performed by: PHYSICIAN ASSISTANT

## 2018-10-15 PROCEDURE — 99283 EMERGENCY DEPT VISIT LOW MDM: CPT

## 2018-10-15 RX ADMIN — TETANUS TOXOID, REDUCED DIPHTHERIA TOXOID AND ACELLULAR PERTUSSIS VACCINE, ADSORBED 0.5 ML: 5; 2.5; 8; 8; 2.5 SUSPENSION INTRAMUSCULAR at 15:39

## 2018-10-15 NOTE — ED PROVIDER NOTES
Subjective   This patient states she was reaching behind her couch to reach a power cord and sustained 2 skin tears to her right upper extremity.  She needs her tetanus updated.            Review of Systems   Skin:        2 skin tears to the right upper extremity   All other systems reviewed and are negative.      Past Medical History:   Diagnosis Date   • Abdominal pain    • Acute bronchitis    • Ankle pain    • Anxiety 1980   • Atopic dermatitis    • Bronchiectasis (CMS/HCC)    • Cataract, bilateral    • Chest pain     STATES HAS OCCASSIONALLY.  STATES LAST TIME WAS LAST WEEK.  STATES SEES DR. RICH.  STATES HE HAS DONE NUMEROUS TESTS ON HER AND HAS NOT BEEN ABLE TO FIND OUT CAUSE.     • Chronic obstructive lung disease (CMS/HCC) 2008   • Colon cancer screening    • COPD (chronic obstructive pulmonary disease) (CMS/[facility])    • Cystocele    • Depressed    • Depression 1980   • Fatigue     Chronic pafigue 2012   • Fibromyalgia 2008   • Full dentures    • GERD (gastroesophageal reflux disease)    • Gout    • Heartburn     Chronic historu of epigastric heartburn currently controlled on Prilesec 40 mg every morning along with Zantac 300 Mg daily at bedtime   • High cholesterol 2012   • Hip pain    • Hyperlipidemia    • Hypertension    • Insomnia    • Joint pain    • Kidney infection    • Loss of appetite    • Low back pain 1995   • Melena    • Nausea and vomiting    • On home oxygen therapy     2L/NC PRN (STATES SHE HAS BEEN USING CONTINUOSLY LATELY)   • Osteoarthritis 2010   • Pain in limb    • Palpitations    • Pinched nerve     Pinched nerves 2011   • Pneumonia    • Problems with swallowing     HAS TO EAT SLOW AND CHEW FOOD WELL   • Recurrent urinary tract infection    • Sciatica [id number]   • Shortness of breath    • Sinus problem    • Sleep apnea 1998    NO CPAP   • Upset stomach    • Valvular heart disease    • Vitamin B12 deficiency    • Wears glasses    • Weight loss, abnormal     Weight loss is stabel. On jodi  weight gain since 01/2016       Allergies   Allergen Reactions   • Dust Mite Extract Other (See Comments)     Dust  SINUS   • Grass Other (See Comments)     SINUS   • Mold Extract [Trichophyton] Other (See Comments)     SINUS       Past Surgical History:   Procedure Laterality Date   • ABDOMINAL HERNIA REPAIR  2016   • APPENDECTOMY  1965   • BACK SURGERY  2005   • BRONCHOSCOPY N/A 7/5/2018    Procedure: BRONCHOSCOPY w/ WASHINGS/BRUSHINGS with MAC;  Surgeon: Rebeka Esparza MD;  Location: [facility];  Service: Pulmonary   • CATARACT EXTRACTION Bilateral     Put in implants    • CHOLECYSTECTOMY  1985   • COLONOSCOPY     • ENDOSCOPY     • KNEE SURGERY Left 1979    Knee Cap   • TUBAL ABDOMINAL LIGATION  1971       Family History   Problem Relation Age of Onset   • Ovarian cancer Mother    • Cancer Mother    • Lung cancer Father    • Heart disease Brother        Social History     Social History   • Marital status: Single     Social History Main Topics   • Smoking status: Former Smoker     Packs/day: 1.00     Years: 35.00     Types: Cigarettes     Quit date: 3/5/2017   • Smokeless tobacco: Never Used      Comment: SMOKES 1-2 CIGARETTES PER DAY NOW   • Alcohol use No   • Drug use: No   • Sexual activity: Defer     Other Topics Concern   • Not on file           Objective   Physical Exam   Constitutional: She appears well-developed and well-nourished.   HENT:   Head: Normocephalic and atraumatic.   Neck: Normal range of motion.   Cardiovascular: Normal rate and regular rhythm.    Pulmonary/Chest: Effort normal.   Musculoskeletal: Normal range of motion.   Skin: Capillary refill takes less than 2 seconds.   10 Centimeter crescent shaped skin tear to the medial aspect of the proximal right upper extremity.  8 cm V-shaped skin tear to the lateral aspect of the proximal right forearm with no active bleeding at this time.  Wound edges of the proximal skin tear reapproximate closely.  Wound edges of the more distal skin tear  approximately somewhat more loosely with approximately 0.5 cm of gap.   Psychiatric: She has a normal mood and affect. Her behavior is normal. Judgment and thought content normal.       Laceration Repair  Date/Time: 10/15/2018 3:37 PM  Performed by: RABIA WHITAKER  Authorized by: CLEMENTE BAKER     Consent:     Consent obtained:  Verbal    Consent given by:  Patient    Risks discussed:  Infection, poor cosmetic result and poor wound healing    Alternatives discussed:  No treatment and delayed treatment  Anesthesia (see MAR for exact dosages):     Anesthesia method:  None  Laceration details:     Location:  Shoulder/arm    Shoulder/arm location:  R upper arm    Length (cm):  10  Repair type:     Repair type:  Simple  Exploration:     Wound extent: no foreign bodies/material noted, no muscle damage noted, no nerve damage noted and no tendon damage noted      Contaminated: no    Treatment:     Irrigation solution:  Sterile saline    Irrigation method:  Syringe    Visualized foreign bodies/material removed: no    Skin repair:     Repair method:  Steri-Strips    Number of Steri-Strips:  8  Approximation:     Approximation:  Close    Vermilion border: well-aligned    Post-procedure details:     Dressing:  Non-adherent dressing and tube gauze    Patient tolerance of procedure:  Tolerated well, no immediate complications  Laceration Repair  Date/Time: 10/15/2018 3:39 PM  Performed by: RABIA WHITAKER  Authorized by: CLEMENTE BAKER     Consent:     Consent obtained:  Verbal    Consent given by:  Patient    Risks discussed:  Infection, poor cosmetic result and poor wound healing  Anesthesia (see MAR for exact dosages):     Anesthesia method:  None  Laceration details:     Location:  Shoulder/arm    Shoulder/arm location:  R elbow    Length (cm):  8  Repair type:     Repair type:  Simple  Exploration:     Wound exploration: wound explored through full range of motion      Wound extent: no  foreign bodies/material noted, no muscle damage noted, no nerve damage noted and no tendon damage noted      Contaminated: no    Treatment:     Area cleansed with:  Saline    Amount of cleaning:  Standard    Irrigation solution:  Sterile saline    Irrigation method:  Syringe    Visualized foreign bodies/material removed: no    Skin repair:     Repair method:  Steri-Strips    Number of Steri-Strips:  8  Approximation:     Approximation:  Close (lower 4cm of skin tear with appx 0.5cm gap between wound edges)  Post-procedure details:     Dressing:  Non-adherent dressing    Patient tolerance of procedure:  Tolerated well, no immediate complications               ED Course      This patient has home health was comes to her house tomorrow and she will speak with them discussing daily dressing changes and wound care.            MDM      Final diagnoses:   Skin tear of right elbow without complication, initial encounter   Skin tear of right upper arm without complication, initial encounter            Hill Chang PA-C  10/15/18 1543

## 2018-10-25 ENCOUNTER — LAB REQUISITION (OUTPATIENT)
Dept: LAB | Facility: HOSPITAL | Age: 73
End: 2018-10-25

## 2018-10-25 DIAGNOSIS — L08.9 LOCAL INFECTION OF SKIN AND SUBCUTANEOUS TISSUE: ICD-10-CM

## 2018-10-25 PROCEDURE — 87070 CULTURE OTHR SPECIMN AEROBIC: CPT | Performed by: INTERNAL MEDICINE

## 2018-10-25 PROCEDURE — 87186 SC STD MICRODIL/AGAR DIL: CPT | Performed by: INTERNAL MEDICINE

## 2018-10-25 PROCEDURE — 87147 CULTURE TYPE IMMUNOLOGIC: CPT | Performed by: INTERNAL MEDICINE

## 2018-10-25 PROCEDURE — 87077 CULTURE AEROBIC IDENTIFY: CPT | Performed by: INTERNAL MEDICINE

## 2018-10-27 LAB — BACTERIA SPEC AEROBE CULT: ABNORMAL

## 2018-11-30 ENCOUNTER — HOSPITAL ENCOUNTER (INPATIENT)
Facility: HOSPITAL | Age: 73
LOS: 6 days | Discharge: HOME HEALTH | End: 2018-12-06
Attending: EMERGENCY MEDICINE | Admitting: INTERNAL MEDICINE

## 2018-11-30 ENCOUNTER — APPOINTMENT (OUTPATIENT)
Dept: CT IMAGING | Facility: HOSPITAL | Age: 73
End: 2018-11-30

## 2018-11-30 ENCOUNTER — APPOINTMENT (OUTPATIENT)
Dept: GENERAL RADIOLOGY | Facility: HOSPITAL | Age: 73
End: 2018-11-30

## 2018-11-30 DIAGNOSIS — J47.9 BRONCHIECTASIS WITHOUT COMPLICATION (HCC): ICD-10-CM

## 2018-11-30 DIAGNOSIS — Z74.09 IMPAIRED MOBILITY AND ADLS: ICD-10-CM

## 2018-11-30 DIAGNOSIS — Z74.09 IMPAIRED FUNCTIONAL MOBILITY, BALANCE, GAIT, AND ENDURANCE: ICD-10-CM

## 2018-11-30 DIAGNOSIS — J96.21 ACUTE ON CHRONIC RESPIRATORY FAILURE WITH HYPOXIA (HCC): ICD-10-CM

## 2018-11-30 DIAGNOSIS — J44.1 COPD WITH ACUTE EXACERBATION (HCC): ICD-10-CM

## 2018-11-30 DIAGNOSIS — J18.9 PNEUMONIA OF BOTH LOWER LOBES DUE TO INFECTIOUS ORGANISM: Primary | ICD-10-CM

## 2018-11-30 DIAGNOSIS — J42 CHRONIC BRONCHITIS, UNSPECIFIED CHRONIC BRONCHITIS TYPE (HCC): ICD-10-CM

## 2018-11-30 DIAGNOSIS — Z78.9 IMPAIRED MOBILITY AND ADLS: ICD-10-CM

## 2018-11-30 PROBLEM — N17.9 ACUTE KIDNEY INJURY (HCC): Status: ACTIVE | Noted: 2018-11-30

## 2018-11-30 LAB
ALBUMIN SERPL-MCNC: 4.1 G/DL (ref 3.5–5)
ALBUMIN/GLOB SERPL: 1.2 G/DL (ref 1–2)
ALP SERPL-CCNC: 151 U/L (ref 38–126)
ALT SERPL W P-5'-P-CCNC: 20 U/L (ref 13–69)
ANION GAP SERPL CALCULATED.3IONS-SCNC: 12.3 MMOL/L (ref 10–20)
AST SERPL-CCNC: 23 U/L (ref 15–46)
BILIRUB SERPL-MCNC: 0.6 MG/DL (ref 0.2–1.3)
BUN BLD-MCNC: 27 MG/DL (ref 7–20)
BUN/CREAT SERPL: 20.8 (ref 7.1–23.5)
CALCIUM SPEC-SCNC: 9.3 MG/DL (ref 8.4–10.2)
CHLORIDE SERPL-SCNC: 96 MMOL/L (ref 98–107)
CO2 SERPL-SCNC: 27 MMOL/L (ref 26–30)
CREAT BLD-MCNC: 1.3 MG/DL (ref 0.6–1.3)
D-LACTATE SERPL-SCNC: 1.4 MMOL/L (ref 0.5–2)
DEPRECATED RDW RBC AUTO: 50.5 FL (ref 37–54)
EOSINOPHIL # BLD MANUAL: 0.23 10*3/MM3 (ref 0–0.7)
EOSINOPHIL NFR BLD MANUAL: 2 % (ref 0–7)
ERYTHROCYTE [DISTWIDTH] IN BLOOD BY AUTOMATED COUNT: 14.6 % (ref 11.5–14.5)
GFR SERPL CREATININE-BSD FRML MDRD: 40 ML/MIN/1.73
GLOBULIN UR ELPH-MCNC: 3.4 GM/DL
GLUCOSE BLD-MCNC: 114 MG/DL (ref 74–98)
HCT VFR BLD AUTO: 33 % (ref 37–47)
HGB BLD-MCNC: 10.8 G/DL (ref 12–16)
LYMPHOCYTES # BLD MANUAL: 1.17 10*3/MM3 (ref 0.6–3.4)
LYMPHOCYTES NFR BLD MANUAL: 10 % (ref 10–50)
LYMPHOCYTES NFR BLD MANUAL: 6 % (ref 0–12)
MCH RBC QN AUTO: 30.9 PG (ref 27–31)
MCHC RBC AUTO-ENTMCNC: 32.7 G/DL (ref 30–37)
MCV RBC AUTO: 94.6 FL (ref 81–99)
MONOCYTES # BLD AUTO: 0.7 10*3/MM3 (ref 0–0.9)
NEUTROPHILS # BLD AUTO: 9.56 10*3/MM3 (ref 2–6.9)
NEUTROPHILS NFR BLD MANUAL: 59 % (ref 37–80)
NEUTS BAND NFR BLD MANUAL: 23 % (ref 0–6)
NT-PROBNP SERPL-MCNC: 2090 PG/ML (ref 0–125)
PLAT MORPH BLD: NORMAL
PLATELET # BLD AUTO: 226 10*3/MM3 (ref 130–400)
PMV BLD AUTO: 10.1 FL (ref 6–12)
POTASSIUM BLD-SCNC: 3.3 MMOL/L (ref 3.5–5.1)
PROCALCITONIN SERPL-MCNC: 0.36 NG/ML
PROT SERPL-MCNC: 7.5 G/DL (ref 6.3–8.2)
RBC # BLD AUTO: 3.49 10*6/MM3 (ref 4.2–5.4)
RBC MORPH BLD: NORMAL
SCAN SLIDE: NORMAL
SMALL PLATELETS BLD QL SMEAR: ADEQUATE
SODIUM BLD-SCNC: 132 MMOL/L (ref 137–145)
TOXIC GRANULATION: NORMAL
TROPONIN I SERPL-MCNC: 0.02 NG/ML (ref 0–0.03)
WBC NRBC COR # BLD: 11.66 10*3/MM3 (ref 4.8–10.8)

## 2018-11-30 PROCEDURE — 71250 CT THORAX DX C-: CPT

## 2018-11-30 PROCEDURE — 94799 UNLISTED PULMONARY SVC/PX: CPT

## 2018-11-30 PROCEDURE — 80053 COMPREHEN METABOLIC PANEL: CPT | Performed by: PHYSICIAN ASSISTANT

## 2018-11-30 PROCEDURE — 84145 PROCALCITONIN (PCT): CPT | Performed by: PHYSICIAN ASSISTANT

## 2018-11-30 PROCEDURE — 99284 EMERGENCY DEPT VISIT MOD MDM: CPT

## 2018-11-30 PROCEDURE — 25010000002 METHYLPREDNISOLONE PER 40 MG: Performed by: INTERNAL MEDICINE

## 2018-11-30 PROCEDURE — 85007 BL SMEAR W/DIFF WBC COUNT: CPT | Performed by: PHYSICIAN ASSISTANT

## 2018-11-30 PROCEDURE — 84484 ASSAY OF TROPONIN QUANT: CPT | Performed by: PHYSICIAN ASSISTANT

## 2018-11-30 PROCEDURE — 71045 X-RAY EXAM CHEST 1 VIEW: CPT

## 2018-11-30 PROCEDURE — 99223 1ST HOSP IP/OBS HIGH 75: CPT | Performed by: INTERNAL MEDICINE

## 2018-11-30 PROCEDURE — 25010000002 ENOXAPARIN PER 10 MG: Performed by: INTERNAL MEDICINE

## 2018-11-30 PROCEDURE — 25010000002 AZITHROMYCIN: Performed by: PHYSICIAN ASSISTANT

## 2018-11-30 PROCEDURE — 83605 ASSAY OF LACTIC ACID: CPT | Performed by: PHYSICIAN ASSISTANT

## 2018-11-30 PROCEDURE — 93005 ELECTROCARDIOGRAM TRACING: CPT | Performed by: PHYSICIAN ASSISTANT

## 2018-11-30 PROCEDURE — 25810000003 SODIUM CHLORIDE 0.9 % WITH KCL 20 MEQ 20-0.9 MEQ/L-% SOLUTION: Performed by: INTERNAL MEDICINE

## 2018-11-30 PROCEDURE — 87040 BLOOD CULTURE FOR BACTERIA: CPT | Performed by: PHYSICIAN ASSISTANT

## 2018-11-30 PROCEDURE — 87070 CULTURE OTHR SPECIMN AEROBIC: CPT | Performed by: INTERNAL MEDICINE

## 2018-11-30 PROCEDURE — 87205 SMEAR GRAM STAIN: CPT | Performed by: INTERNAL MEDICINE

## 2018-11-30 PROCEDURE — 25010000002 CEFTRIAXONE SODIUM-DEXTROSE 1-3.74 GM-%(50ML) RECONSTITUTED SOLUTION: Performed by: PHYSICIAN ASSISTANT

## 2018-11-30 PROCEDURE — 85025 COMPLETE CBC W/AUTO DIFF WBC: CPT | Performed by: PHYSICIAN ASSISTANT

## 2018-11-30 PROCEDURE — 83880 ASSAY OF NATRIURETIC PEPTIDE: CPT | Performed by: PHYSICIAN ASSISTANT

## 2018-11-30 RX ORDER — AZELASTINE 1 MG/ML
2 SPRAY, METERED NASAL 2 TIMES DAILY
Status: DISCONTINUED | OUTPATIENT
Start: 2018-11-30 | End: 2018-12-06 | Stop reason: HOSPADM

## 2018-11-30 RX ORDER — PANTOPRAZOLE SODIUM 40 MG/1
40 TABLET, DELAYED RELEASE ORAL EVERY MORNING
Status: DISCONTINUED | OUTPATIENT
Start: 2018-12-01 | End: 2018-12-06 | Stop reason: HOSPADM

## 2018-11-30 RX ORDER — SODIUM CHLORIDE 0.9 % (FLUSH) 0.9 %
3 SYRINGE (ML) INJECTION EVERY 12 HOURS SCHEDULED
Status: DISCONTINUED | OUTPATIENT
Start: 2018-11-30 | End: 2018-12-06 | Stop reason: HOSPADM

## 2018-11-30 RX ORDER — SODIUM CHLORIDE 0.9 % (FLUSH) 0.9 %
10 SYRINGE (ML) INJECTION AS NEEDED
Status: DISCONTINUED | OUTPATIENT
Start: 2018-11-30 | End: 2018-12-06 | Stop reason: HOSPADM

## 2018-11-30 RX ORDER — AZITHROMYCIN 250 MG/1
250 TABLET, FILM COATED ORAL
Status: COMPLETED | OUTPATIENT
Start: 2018-12-01 | End: 2018-12-04

## 2018-11-30 RX ORDER — MONTELUKAST SODIUM 10 MG/1
10 TABLET ORAL NIGHTLY
Status: DISCONTINUED | OUTPATIENT
Start: 2018-11-30 | End: 2018-12-06 | Stop reason: HOSPADM

## 2018-11-30 RX ORDER — GABAPENTIN 100 MG/1
100 CAPSULE ORAL 3 TIMES DAILY
Status: DISCONTINUED | OUTPATIENT
Start: 2018-11-30 | End: 2018-12-03

## 2018-11-30 RX ORDER — GUAIFENESIN 600 MG/1
600 TABLET, EXTENDED RELEASE ORAL EVERY 12 HOURS SCHEDULED
Status: DISCONTINUED | OUTPATIENT
Start: 2018-11-30 | End: 2018-12-06 | Stop reason: HOSPADM

## 2018-11-30 RX ORDER — MULTIPLE VITAMINS W/ MINERALS TAB 9MG-400MCG
1 TAB ORAL DAILY
Status: DISCONTINUED | OUTPATIENT
Start: 2018-12-01 | End: 2018-12-06 | Stop reason: HOSPADM

## 2018-11-30 RX ORDER — LORAZEPAM 0.5 MG/1
0.5 TABLET ORAL 2 TIMES DAILY PRN
Status: DISCONTINUED | OUTPATIENT
Start: 2018-11-30 | End: 2018-12-06 | Stop reason: HOSPADM

## 2018-11-30 RX ORDER — VENLAFAXINE 75 MG/1
75 TABLET ORAL 2 TIMES DAILY WITH MEALS
Status: DISCONTINUED | OUTPATIENT
Start: 2018-11-30 | End: 2018-12-06 | Stop reason: HOSPADM

## 2018-11-30 RX ORDER — HYDROCODONE BITARTRATE AND ACETAMINOPHEN 10; 325 MG/1; MG/1
1 TABLET ORAL EVERY 8 HOURS PRN
Status: DISCONTINUED | OUTPATIENT
Start: 2018-11-30 | End: 2018-12-06 | Stop reason: HOSPADM

## 2018-11-30 RX ORDER — METHYLPREDNISOLONE SODIUM SUCCINATE 40 MG/ML
40 INJECTION, POWDER, LYOPHILIZED, FOR SOLUTION INTRAMUSCULAR; INTRAVENOUS EVERY 12 HOURS
Status: DISCONTINUED | OUTPATIENT
Start: 2018-11-30 | End: 2018-12-05

## 2018-11-30 RX ORDER — CYCLOBENZAPRINE HCL 10 MG
10 TABLET ORAL DAILY
Status: DISCONTINUED | OUTPATIENT
Start: 2018-12-01 | End: 2018-12-06 | Stop reason: HOSPADM

## 2018-11-30 RX ORDER — CHOLECALCIFEROL (VITAMIN D3) 125 MCG
500 CAPSULE ORAL DAILY
Status: DISCONTINUED | OUTPATIENT
Start: 2018-12-01 | End: 2018-12-06 | Stop reason: HOSPADM

## 2018-11-30 RX ORDER — ACETAMINOPHEN 325 MG/1
650 TABLET ORAL EVERY 4 HOURS PRN
Status: DISCONTINUED | OUTPATIENT
Start: 2018-11-30 | End: 2018-12-06 | Stop reason: HOSPADM

## 2018-11-30 RX ORDER — SODIUM CHLORIDE 0.9 % (FLUSH) 0.9 %
3-10 SYRINGE (ML) INJECTION AS NEEDED
Status: DISCONTINUED | OUTPATIENT
Start: 2018-11-30 | End: 2018-12-06 | Stop reason: HOSPADM

## 2018-11-30 RX ORDER — BUDESONIDE 0.5 MG/2ML
0.5 INHALANT ORAL
Status: DISCONTINUED | OUTPATIENT
Start: 2018-11-30 | End: 2018-12-06 | Stop reason: HOSPADM

## 2018-11-30 RX ORDER — MIRTAZAPINE 15 MG/1
30 TABLET, FILM COATED ORAL NIGHTLY
Status: DISCONTINUED | OUTPATIENT
Start: 2018-11-30 | End: 2018-12-06 | Stop reason: HOSPADM

## 2018-11-30 RX ORDER — CEFTRIAXONE 1 G/50ML
1 INJECTION, SOLUTION INTRAVENOUS EVERY 24 HOURS
Status: DISCONTINUED | OUTPATIENT
Start: 2018-12-01 | End: 2018-12-06 | Stop reason: HOSPADM

## 2018-11-30 RX ORDER — ONDANSETRON 2 MG/ML
4 INJECTION INTRAMUSCULAR; INTRAVENOUS EVERY 6 HOURS PRN
Status: DISCONTINUED | OUTPATIENT
Start: 2018-11-30 | End: 2018-12-06 | Stop reason: HOSPADM

## 2018-11-30 RX ORDER — SODIUM CHLORIDE AND POTASSIUM CHLORIDE 150; 900 MG/100ML; MG/100ML
100 INJECTION, SOLUTION INTRAVENOUS CONTINUOUS
Status: DISCONTINUED | OUTPATIENT
Start: 2018-11-30 | End: 2018-12-01

## 2018-11-30 RX ORDER — IPRATROPIUM BROMIDE AND ALBUTEROL SULFATE 2.5; .5 MG/3ML; MG/3ML
3 SOLUTION RESPIRATORY (INHALATION)
Status: DISCONTINUED | OUTPATIENT
Start: 2018-11-30 | End: 2018-12-06 | Stop reason: HOSPADM

## 2018-11-30 RX ORDER — LANOLIN ALCOHOL/MO/W.PET/CERES
3 CREAM (GRAM) TOPICAL NIGHTLY PRN
Status: DISCONTINUED | OUTPATIENT
Start: 2018-11-30 | End: 2018-12-06 | Stop reason: HOSPADM

## 2018-11-30 RX ORDER — L.ACID,PARA/B.BIFIDUM/S.THERM 8B CELL
1 CAPSULE ORAL 2 TIMES DAILY
Status: DISCONTINUED | OUTPATIENT
Start: 2018-11-30 | End: 2018-12-06 | Stop reason: HOSPADM

## 2018-11-30 RX ORDER — CEFTRIAXONE 1 G/50ML
1 INJECTION, SOLUTION INTRAVENOUS ONCE
Status: COMPLETED | OUTPATIENT
Start: 2018-11-30 | End: 2018-11-30

## 2018-11-30 RX ORDER — IPRATROPIUM BROMIDE AND ALBUTEROL SULFATE 2.5; .5 MG/3ML; MG/3ML
3 SOLUTION RESPIRATORY (INHALATION) EVERY 4 HOURS PRN
Status: DISCONTINUED | OUTPATIENT
Start: 2018-11-30 | End: 2018-12-06 | Stop reason: HOSPADM

## 2018-11-30 RX ADMIN — Medication 1 CAPSULE: at 23:28

## 2018-11-30 RX ADMIN — METHYLPREDNISOLONE SODIUM SUCCINATE 40 MG: 40 INJECTION, POWDER, FOR SOLUTION INTRAMUSCULAR; INTRAVENOUS at 20:38

## 2018-11-30 RX ADMIN — VENLAFAXINE 75 MG: 75 TABLET ORAL at 20:22

## 2018-11-30 RX ADMIN — METOPROLOL TARTRATE 25 MG: 25 TABLET ORAL at 20:21

## 2018-11-30 RX ADMIN — IPRATROPIUM BROMIDE AND ALBUTEROL SULFATE 3 ML: .5; 3 SOLUTION RESPIRATORY (INHALATION) at 21:24

## 2018-11-30 RX ADMIN — POTASSIUM CHLORIDE AND SODIUM CHLORIDE 100 ML/HR: 900; 150 INJECTION, SOLUTION INTRAVENOUS at 20:21

## 2018-11-30 RX ADMIN — AZELASTINE HYDROCHLORIDE 2 SPRAY: 137 SPRAY, METERED NASAL at 20:22

## 2018-11-30 RX ADMIN — GABAPENTIN 100 MG: 100 CAPSULE ORAL at 20:21

## 2018-11-30 RX ADMIN — MIRTAZAPINE 30 MG: 15 TABLET, FILM COATED ORAL at 20:21

## 2018-11-30 RX ADMIN — MONTELUKAST SODIUM 10 MG: 10 TABLET, FILM COATED ORAL at 20:21

## 2018-11-30 RX ADMIN — BUDESONIDE 0.5 MG: 0.5 INHALANT RESPIRATORY (INHALATION) at 21:24

## 2018-11-30 RX ADMIN — CEFTRIAXONE 1 G: 1 INJECTION, SOLUTION INTRAVENOUS at 18:09

## 2018-11-30 RX ADMIN — ENOXAPARIN SODIUM 30 MG: 30 INJECTION SUBCUTANEOUS at 20:22

## 2018-11-30 RX ADMIN — AZITHROMYCIN 500 MG: 500 INJECTION, POWDER, LYOPHILIZED, FOR SOLUTION INTRAVENOUS at 18:58

## 2018-11-30 RX ADMIN — HYDROCODONE BITARTRATE AND ACETAMINOPHEN 1 TABLET: 10; 325 TABLET ORAL at 20:21

## 2018-11-30 RX ADMIN — GUAIFENESIN 600 MG: 600 TABLET, EXTENDED RELEASE ORAL at 23:27

## 2018-11-30 RX ADMIN — SODIUM CHLORIDE 1000 ML: 9 INJECTION, SOLUTION INTRAVENOUS at 15:03

## 2018-11-30 RX ADMIN — MELATONIN TAB 3 MG 3 MG: 3 TAB at 20:21

## 2018-12-01 LAB
ANION GAP SERPL CALCULATED.3IONS-SCNC: 9 MMOL/L (ref 10–20)
BASOPHILS # BLD AUTO: 0.02 10*3/MM3 (ref 0–0.2)
BASOPHILS NFR BLD AUTO: 0.2 % (ref 0–2.5)
BUN BLD-MCNC: 13 MG/DL (ref 7–20)
BUN/CREAT SERPL: 18.6 (ref 7.1–23.5)
CALCIUM SPEC-SCNC: 8.6 MG/DL (ref 8.4–10.2)
CHLORIDE SERPL-SCNC: 107 MMOL/L (ref 98–107)
CO2 SERPL-SCNC: 26 MMOL/L (ref 26–30)
CREAT BLD-MCNC: 0.7 MG/DL (ref 0.6–1.3)
DEPRECATED RDW RBC AUTO: 51.8 FL (ref 37–54)
EOSINOPHIL # BLD AUTO: 0 10*3/MM3 (ref 0–0.7)
EOSINOPHIL NFR BLD AUTO: 0 % (ref 0–7)
ERYTHROCYTE [DISTWIDTH] IN BLOOD BY AUTOMATED COUNT: 14.6 % (ref 11.5–14.5)
GFR SERPL CREATININE-BSD FRML MDRD: 82 ML/MIN/1.73
GLUCOSE BLD-MCNC: 128 MG/DL (ref 74–98)
HCT VFR BLD AUTO: 29.9 % (ref 37–47)
HGB BLD-MCNC: 9.5 G/DL (ref 12–16)
IMM GRANULOCYTES # BLD: 0.09 10*3/MM3 (ref 0–0.06)
IMM GRANULOCYTES NFR BLD: 1.1 % (ref 0–0.6)
LYMPHOCYTES # BLD AUTO: 0.4 10*3/MM3 (ref 0.6–3.4)
LYMPHOCYTES NFR BLD AUTO: 4.8 % (ref 10–50)
MAGNESIUM SERPL-MCNC: 2.1 MG/DL (ref 1.6–2.3)
MCH RBC QN AUTO: 30.6 PG (ref 27–31)
MCHC RBC AUTO-ENTMCNC: 31.8 G/DL (ref 30–37)
MCV RBC AUTO: 96.5 FL (ref 81–99)
MONOCYTES # BLD AUTO: 0.27 10*3/MM3 (ref 0–0.9)
MONOCYTES NFR BLD AUTO: 3.2 % (ref 0–12)
NEUTROPHILS # BLD AUTO: 7.56 10*3/MM3 (ref 2–6.9)
NEUTROPHILS NFR BLD AUTO: 90.7 % (ref 37–80)
NRBC BLD MANUAL-RTO: 0 /100 WBC (ref 0–0)
PLATELET # BLD AUTO: 203 10*3/MM3 (ref 130–400)
PMV BLD AUTO: 10.4 FL (ref 6–12)
POTASSIUM BLD-SCNC: 4 MMOL/L (ref 3.5–5.1)
RBC # BLD AUTO: 3.1 10*6/MM3 (ref 4.2–5.4)
SODIUM BLD-SCNC: 138 MMOL/L (ref 137–145)
TROPONIN I SERPL-MCNC: <0.012 NG/ML (ref 0–0.03)
WBC NRBC COR # BLD: 8.34 10*3/MM3 (ref 4.8–10.8)

## 2018-12-01 PROCEDURE — 99232 SBSQ HOSP IP/OBS MODERATE 35: CPT | Performed by: HOSPITALIST

## 2018-12-01 PROCEDURE — 25810000003 SODIUM CHLORIDE 0.9 % WITH KCL 20 MEQ 20-0.9 MEQ/L-% SOLUTION: Performed by: INTERNAL MEDICINE

## 2018-12-01 PROCEDURE — 84484 ASSAY OF TROPONIN QUANT: CPT | Performed by: INTERNAL MEDICINE

## 2018-12-01 PROCEDURE — 83735 ASSAY OF MAGNESIUM: CPT | Performed by: INTERNAL MEDICINE

## 2018-12-01 PROCEDURE — 25010000002 METHYLPREDNISOLONE PER 40 MG: Performed by: INTERNAL MEDICINE

## 2018-12-01 PROCEDURE — 94799 UNLISTED PULMONARY SVC/PX: CPT

## 2018-12-01 PROCEDURE — 97161 PT EVAL LOW COMPLEX 20 MIN: CPT

## 2018-12-01 PROCEDURE — 80048 BASIC METABOLIC PNL TOTAL CA: CPT | Performed by: INTERNAL MEDICINE

## 2018-12-01 PROCEDURE — 85007 BL SMEAR W/DIFF WBC COUNT: CPT | Performed by: INTERNAL MEDICINE

## 2018-12-01 PROCEDURE — 25010000002 ENOXAPARIN PER 10 MG: Performed by: INTERNAL MEDICINE

## 2018-12-01 PROCEDURE — 25010000002 CEFTRIAXONE SODIUM-DEXTROSE 1-3.74 GM-%(50ML) RECONSTITUTED SOLUTION: Performed by: INTERNAL MEDICINE

## 2018-12-01 PROCEDURE — 85025 COMPLETE CBC W/AUTO DIFF WBC: CPT | Performed by: INTERNAL MEDICINE

## 2018-12-01 RX ORDER — ARFORMOTEROL TARTRATE 15 UG/2ML
15 SOLUTION RESPIRATORY (INHALATION)
Status: DISCONTINUED | OUTPATIENT
Start: 2018-12-01 | End: 2018-12-06 | Stop reason: HOSPADM

## 2018-12-01 RX ORDER — SODIUM CHLORIDE 9 MG/ML
75 INJECTION, SOLUTION INTRAVENOUS CONTINUOUS
Status: ACTIVE | OUTPATIENT
Start: 2018-12-01 | End: 2018-12-02

## 2018-12-01 RX ADMIN — METHYLPREDNISOLONE SODIUM SUCCINATE 40 MG: 40 INJECTION, POWDER, FOR SOLUTION INTRAMUSCULAR; INTRAVENOUS at 20:41

## 2018-12-01 RX ADMIN — MELATONIN TAB 3 MG 3 MG: 3 TAB at 20:47

## 2018-12-01 RX ADMIN — BUDESONIDE 0.5 MG: 0.5 INHALANT RESPIRATORY (INHALATION) at 07:03

## 2018-12-01 RX ADMIN — GUAIFENESIN 600 MG: 600 TABLET, EXTENDED RELEASE ORAL at 09:02

## 2018-12-01 RX ADMIN — ENOXAPARIN SODIUM 30 MG: 30 INJECTION SUBCUTANEOUS at 20:40

## 2018-12-01 RX ADMIN — IPRATROPIUM BROMIDE AND ALBUTEROL SULFATE 3 ML: .5; 3 SOLUTION RESPIRATORY (INHALATION) at 12:24

## 2018-12-01 RX ADMIN — AZELASTINE HYDROCHLORIDE 2 SPRAY: 137 SPRAY, METERED NASAL at 20:48

## 2018-12-01 RX ADMIN — LORAZEPAM 0.5 MG: 0.5 TABLET ORAL at 14:28

## 2018-12-01 RX ADMIN — HYDROCODONE BITARTRATE AND ACETAMINOPHEN 1 TABLET: 10; 325 TABLET ORAL at 09:14

## 2018-12-01 RX ADMIN — SODIUM CHLORIDE, PRESERVATIVE FREE 3 ML: 5 INJECTION INTRAVENOUS at 20:48

## 2018-12-01 RX ADMIN — PANTOPRAZOLE SODIUM 40 MG: 40 TABLET, DELAYED RELEASE ORAL at 06:42

## 2018-12-01 RX ADMIN — IPRATROPIUM BROMIDE AND ALBUTEROL SULFATE 3 ML: .5; 3 SOLUTION RESPIRATORY (INHALATION) at 07:03

## 2018-12-01 RX ADMIN — Medication 1 CAPSULE: at 20:42

## 2018-12-01 RX ADMIN — CYANOCOBALAMIN TAB 500 MCG 500 MCG: 500 TAB at 09:03

## 2018-12-01 RX ADMIN — GABAPENTIN 100 MG: 100 CAPSULE ORAL at 20:42

## 2018-12-01 RX ADMIN — GABAPENTIN 100 MG: 100 CAPSULE ORAL at 09:02

## 2018-12-01 RX ADMIN — IPRATROPIUM BROMIDE AND ALBUTEROL SULFATE 3 ML: .5; 3 SOLUTION RESPIRATORY (INHALATION) at 20:12

## 2018-12-01 RX ADMIN — ACETAMINOPHEN 650 MG: 325 TABLET, FILM COATED ORAL at 22:14

## 2018-12-01 RX ADMIN — BUDESONIDE 0.5 MG: 0.5 INHALANT RESPIRATORY (INHALATION) at 20:12

## 2018-12-01 RX ADMIN — AZITHROMYCIN MONOHYDRATE 250 MG: 250 TABLET ORAL at 09:03

## 2018-12-01 RX ADMIN — VENLAFAXINE 75 MG: 75 TABLET ORAL at 09:03

## 2018-12-01 RX ADMIN — Medication 1 CAPSULE: at 09:03

## 2018-12-01 RX ADMIN — MIRTAZAPINE 30 MG: 15 TABLET, FILM COATED ORAL at 20:42

## 2018-12-01 RX ADMIN — AZELASTINE HYDROCHLORIDE 2 SPRAY: 137 SPRAY, METERED NASAL at 09:02

## 2018-12-01 RX ADMIN — METHYLPREDNISOLONE SODIUM SUCCINATE 40 MG: 40 INJECTION, POWDER, FOR SOLUTION INTRAMUSCULAR; INTRAVENOUS at 09:02

## 2018-12-01 RX ADMIN — POTASSIUM CHLORIDE AND SODIUM CHLORIDE 100 ML/HR: 900; 150 INJECTION, SOLUTION INTRAVENOUS at 05:49

## 2018-12-01 RX ADMIN — CEFTRIAXONE 1 G: 1 INJECTION, SOLUTION INTRAVENOUS at 17:47

## 2018-12-01 RX ADMIN — METOPROLOL TARTRATE 25 MG: 25 TABLET ORAL at 09:03

## 2018-12-01 RX ADMIN — VENLAFAXINE 75 MG: 75 TABLET ORAL at 17:47

## 2018-12-01 RX ADMIN — HYDROCODONE BITARTRATE AND ACETAMINOPHEN 1 TABLET: 10; 325 TABLET ORAL at 20:43

## 2018-12-01 RX ADMIN — GUAIFENESIN 600 MG: 600 TABLET, EXTENDED RELEASE ORAL at 20:42

## 2018-12-01 RX ADMIN — METOPROLOL TARTRATE 25 MG: 25 TABLET ORAL at 20:42

## 2018-12-01 RX ADMIN — MONTELUKAST SODIUM 10 MG: 10 TABLET, FILM COATED ORAL at 20:42

## 2018-12-01 RX ADMIN — CYCLOBENZAPRINE HYDROCHLORIDE 10 MG: 10 TABLET, FILM COATED ORAL at 09:03

## 2018-12-01 RX ADMIN — MULTIPLE VITAMINS W/ MINERALS TAB 1 TABLET: TAB at 09:03

## 2018-12-01 RX ADMIN — IPRATROPIUM BROMIDE AND ALBUTEROL SULFATE 3 ML: .5; 3 SOLUTION RESPIRATORY (INHALATION) at 00:40

## 2018-12-01 RX ADMIN — SODIUM CHLORIDE 75 ML/HR: 9 INJECTION, SOLUTION INTRAVENOUS at 22:15

## 2018-12-01 RX ADMIN — GABAPENTIN 100 MG: 100 CAPSULE ORAL at 16:07

## 2018-12-01 NOTE — PAYOR COMM NOTE
"Please review for Inpatient auth  [place] [phone], fax [phone]    ICD 10 code:  J18.1  Joelle Hernandes (72 y.o. Female)     Date of Birth Social Security Number Address Home Phone MRN    1945  [address] [phone] [id number]    Restorationist Marital Status          Orthodox Single       Admission Date Admission Type Admitting Provider Attending Provider Department, Room/Bed    11/30/18 Emergency Maximino Bueno MD Pais, Roshan, MD [facility] MED SURG  4, 406/1    Discharge Date Discharge Disposition Discharge Destination                       Attending Provider:  Maximino Bueno MD    Allergies:  Dust Mite Extract, Grass, Mold Extract [Trichophyton]    Isolation:  None   Infection:  None   Code Status:  CPR    Ht:  152.4 cm (60\")   Wt:  41.3 kg (91 lb)    Admission Cmt:  None   Principal Problem:  Pneumonia of both lower lobes due to infectious organism (CMS/[facility]) [J18.1]                 Active Insurance as of 11/30/2018     Primary Coverage     Payor Plan Insurance Group Employer/Plan Group    HUMANA MEDICARE REPLACEMENT HUMANA MEDICARE REPL [id number]     Payor Plan Address Payor Plan Phone Number Payor Plan Fax Number Effective Dates    [address] [address] [phone]  1/1/2018 - None Entered    [facility] [address]       Subscriber Name Subscriber Birth Date Member ID       JOELLE HERNANDES 1945 [id number]           Secondary Coverage     Payor Plan Insurance Group Employer/Plan Group    [place]      Payor Plan Address Payor Plan Phone Number Payor Plan Fax Number Effective Dates    [address] [phone]  3/1/2016 - None Entered    [facility] [id number]       Subscriber Name Subscriber Birth Date Member ID       JOELLE HERNANDES 1945 [id number]                 Emergency Contacts      (Rel.) Home Phone Work Phone Mobile Phone    Cayetano Hernandes (Brother) [phone] -- [phone]    " Doug Julien (Son) [phone] -- [phone]    Adia Rhodes (Daughter) [phone] -- --    Margaret Julien (Other) [phone] -- [phone]               History & Physical      Maximino Bueno MD at 2018  5:34 PM              [facility]   HISTORY AND PHYSICAL      Name:  Joelle Yee   Age:  72 y.o.  Sex:  female  :  1945  MRN:  [id number]   Visit Number:  [id number]  Admission Date:  2018  Date Of Service:  18  Primary Care Physician:  Blanquita Clements PA   Primary Pulmonologist: Rebeka Esparza MD    Chief Complaint:     Cough and increased shortness of breath since 3 days.    History Of Presenting Illness:      This is a 72-year-old female with history of COPD on as needed home oxygen, bronchiectasis, hypertension was brought to the emergency room by EMS with above complaints.  The history is obtained from the patient.  He states that she has been having worsening shortness of breath for the last 3 days associated with greenish yellow sputum and low-grade subjective fevers.  She denies any chest pain or palpitations.  She does live alone and is usually independent in daily activities.  She does have home oxygen but uses only as needed.  In the last 3 days, she has been using her nebulizations increasingly without any significant benefit and subsequently called EMS.    She was evaluated in the emergency room.  She was afebrile but as sinus tachycardia at 113 and was saturating at 96% on 3 L of nasal cannula oxygen.  Initial chest x-ray showed increased bronchovascular markings.  Basic metabolic panel showed sodium of 132, potassium of 3.3 and a creatinine of 1.3 (baseline 0.6).  Troponin was 0.018 and BNP was 2090.  Pro-calcitonin was 0.36 and lactic acid was 1.4.  CBC was fairly stable.  CT of the chest without contrast done in the emergency room showed bilateral lower lobe and lingular pneumonia.  Patient was given a liter of normal  saline bolus with improvement in her tachycardia.  She was also started on IV antibiotic therapy with Rocephin and azithromycin and is currently being admitted to the medical floor with telemetry.    Patient is currently lying down in the bed and is in mild distress due to her shortness of breath and wheezing.  She denies any chest pain.  She states that she quit smoking a year ago.    Review Of Systems:     General ROS: Patient denies any loss of consciousness. Complains of generalized weakness.  History of subjective fevers.  Psychological ROS: No history of any hallucinations and delusions.  Ophthalmic ROS: No history of any diplopia or transient loss of vision.  ENT ROS: No history of sore throat, nasal congestion or ear pain.   Allergy and Immunology ROS: No history of rash or itching.  Hematological and Lymphatic ROS: No history of neck swelling or easy bleeding.  Endocrine ROS: No history of any recent unintentional weight gain or loss.  Respiratory ROS: As per history of present illness.  Cardiovascular ROS: No history of chest pain or palpitations.  Gastrointestinal ROS: No history of nausea and vomiting. Denies any abdominal pain. No diarrhea.  Genito-Urinary ROS: No history of dysuria or hematuria.  Musculoskeletal ROS: No muscle pain. No calf pain.   Neurological ROS: No history of any focal weakness. No loss of consciousness. Denies any numbness.  Dermatological ROS: No history of any redness or pruritis.     Past Medical History:    Past Medical History:   Diagnosis Date   • Abdominal pain    • Acute bronchitis    • Ankle pain    • Anxiety 1980   • Atopic dermatitis    • Bronchiectasis (CMS/HCC)    • Cataract, bilateral    • Chest pain     STATES HAS OCCASSIONALLY.  STATES LAST TIME WAS LAST WEEK.  STATES SEES DR. RICH.  STATES HE HAS DONE NUMEROUS TESTS ON HER AND HAS NOT BEEN ABLE TO FIND OUT CAUSE.     • Chronic obstructive lung disease (CMS/HCC) 2008   • Colon cancer screening    • COPD (chronic  obstructive pulmonary disease) (CMS/HCC)    • Cystocele    • Depressed    • Depression 1980   • Fatigue     Chronic pafigue 2012   • Fibromyalgia 2008   • Full dentures    • GERD (gastroesophageal reflux disease)    • Gout    • Heartburn     Chronic historu of epigastric heartburn currently controlled on Prilesec 40 mg every morning along with Zantac 300 Mg daily at bedtime   • High cholesterol 2012   • Hip pain    • Hyperlipidemia    • Hypertension    • Insomnia    • Joint pain    • Kidney infection    • Loss of appetite    • Low back pain 1995   • Melena    • Nausea and vomiting    • On home oxygen therapy     2L/NC PRN (STATES SHE HAS BEEN USING CONTINUOSLY LATELY)   • Osteoarthritis 2010   • Pain in limb    • Palpitations    • Pinched nerve     Pinched nerves 2011   • Pneumonia    • Problems with swallowing     HAS TO EAT SLOW AND CHEW FOOD WELL   • Recurrent urinary tract infection    • Sciatica [id number]   • Shortness of breath    • Sinus problem    • Sleep apnea 1998    NO CPAP   • Upset stomach    • Valvular heart disease    • Vitamin B12 deficiency    • Wears glasses    • Weight loss, abnormal     Weight loss is stabel. On pund weight gain since 01/2016     Past Surgical history:    Past Surgical History:   Procedure Laterality Date   • ABDOMINAL HERNIA REPAIR  2016   • APPENDECTOMY  1965   • BACK SURGERY  2005   • CATARACT EXTRACTION Bilateral     Put in implants    • CHOLECYSTECTOMY  1985   • COLONOSCOPY     • ENDOSCOPY     • KNEE SURGERY Left 1979    Knee Cap   • TUBAL ABDOMINAL LIGATION  1971     Social History:    Social History     Socioeconomic History   • Marital status: Single     Spouse name: Not on file   • Number of children: Not on file   • Years of education: Not on file   • Highest education level: Not on file   Social Needs   • Financial resource strain: Not on file   • Food insecurity - worry: Not on file   • Food insecurity - inability: Not on file   • Transportation needs - medical: Not  on file   • Transportation needs - non-medical: Not on file   Occupational History   • Not on file   Tobacco Use   • Smoking status: Former Smoker     Packs/day: 0.00     Years: 35.00     Pack years: 0.00     Last attempt to quit: 2017     Years since quittin.4   • Smokeless tobacco: Never Used   Substance and Sexual Activity   • Alcohol use: No   • Drug use: No   • Sexual activity: Defer   Other Topics Concern   • Not on file   Social History Narrative   • Not on file     Family History:    Family History   Problem Relation Age of Onset   • Ovarian cancer Mother    • Cancer Mother    • Lung cancer Father    • Heart disease Brother      Allergies:      Dust mite extract; Grass; and Mold extract [trichophyton]    Home Medications:    Prior to Admission Medications     Prescriptions Last Dose Informant Patient Reported? Taking?    Azelastine HCl 137 MCG/SPRAY solution   No Yes    USE 2 SPRAYS IN EACH NOSTRIL TWICE DAILY    budesonide-formoterol (SYMBICORT) 160-4.5 MCG/ACT inhaler  Self No Yes    Inhale 2 puffs 2 (Two) Times a Day. Inhale 1 puff twice daily. Rinse mouth after use.    Cyanocobalamin (VITAMIN B12 PO)  Self Yes Yes    Take 500 mcg by mouth Daily.    cyclobenzaprine (FLEXERIL) 10 MG tablet   Yes Yes    Take 10 mg by mouth Daily.    gabapentin (NEURONTIN) 100 MG capsule  Self Yes Yes    TAKE ONE CAPSULE BY MOUTH THREE TIMES A DAY    HYDROcodone-acetaminophen (NORCO)  MG per tablet   Yes Yes    Take  by mouth. Take 1 tablet every 8 hours as needed for pain.    INCRUSE ELLIPTA 62.5 MCG/INH aerosol powder   Self No Yes    INHALE 1 PUFF INTO THE LUNGS ONCE DAILY    ipratropium-albuterol (COMBIVENT RESPIMAT)  MCG/ACT inhaler  Self No Yes    Inhale 1 puff 4 (Four) Times a Day As Needed for Wheezing.    ketotifen (ZADITOR) 0.025 % ophthalmic solution  Self Yes Yes    USE 1-2 DROPS IN BOTH EYES TWICE DAILY    melatonin 5 MG tablet tablet  Self No Yes    Take 2 tablets by mouth At Night As Needed  for sleep    metoprolol tartrate (LOPRESSOR) 25 MG tablet   Yes Yes    Take 25 mg by mouth 2 (Two) Times a Day.    nystatin (MYCOSTATIN) 564304 UNIT/ML suspension  Self No Yes    Swish and swallow 5 mL 4 (Four) Times a Day.    Patient taking differently:  Swish and swallow 500,000 Units 4 (Four) Times a Day As Needed.    omeprazole (priLOSEC) 40 MG capsule  Self Yes Yes    Take 40 mg by mouth Daily.    ondansetron ODT (ZOFRAN-ODT) 4 MG disintegrating tablet  Self Yes Yes    Take 1 tablet by mouth every 6 (six) hours as needed.    OXYGEN-HELIUM IN   Yes Yes    Oxygen; Patient Sig: Oxygen 2 liter as needed; 0; 21-May-2014; Active    Probiotic Product (RISAQUAD-2) capsule capsule  Self Yes Yes    Take 1 capsule by mouth Daily.    Respiratory Therapy Supplies (FLUTTER) device  Self No Yes    1 Device as needed (as directed).    traZODone (DESYREL) 50 MG tablet  Self Yes Yes    Take 1 tablet by mouth every night.    acetaminophen (TYLENOL) 325 MG tablet  Self No No    Take 2 tablets by mouth Every 4 (Four) Hours As Needed for Headache or Fever (temperature greater than 101.5 F).    Acetaminophen 500 MG coapsule   Yes No    Diclofenac Sodium (VOLTAREN TD)  Self Yes No    Apply 1 application topically As Needed. Voltaren GEL     fluconazole (DIFLUCAN) 200 MG tablet   Yes No    Take 200 mg by mouth Daily.    gemfibrozil (LOPID) 600 MG tablet  Self Yes No    TAKE ONE TABLET WITH SUPPER. patient sometimes takes 1/2 tablet (300 mg).    ipratropium-albuterol (DUO-NEB) 0.5-2.5 mg/3 ml nebulizer   No No    Take 3 mL by nebulization 4 (Four) Times a Day As Needed for Wheezing or Shortness of Air.    lidocaine (XYLOCAINE) 1 % injection   No No    Use 3.2mL to reconstitute each 1g vial of Invanz    LORazepam (ATIVAN) 0.5 MG tablet  Self Yes No    Take 0.5 mg by mouth 2 (Two) Times a Day As Needed. 1-2 TABLETS DAILY PRN    losartan (COZAAR) 25 MG tablet  Self Yes No    Take 25 mg by mouth Daily.    metoprolol succinate XL (TOPROL-XL)  25 MG 24 hr tablet  Self Yes No    Take 25 mg by mouth Daily.    mirtazapine (REMERON) 30 MG tablet  Self Yes No    Take 30 mg by mouth Every Night.    Misc. Devices (ROLLATOR) misc  Self No No    1 Units As Needed (ambulation assistance).    montelukast (SINGULAIR) 10 MG tablet  Self Yes No    TAKE ONE TABLET BY MOUTH AT BEDTIME as needed    Multiple Vitamins-Minerals (MULTIVITAMIN WITH MINERALS) tablet tablet  Self Yes No    Take 1 tablet by mouth Daily.    Nutritional Supplements (ENSURE COMPLETE PO)  Self Yes No    Take 1 can by mouth 2 (Two) Times a Day.    potassium bicarbonate (K-LYTE) 25 MEQ disintegrating tablet  Self Yes No    Take 25 mEq by mouth Every Night.    predniSONE (DELTASONE) 10 MG tablet   No No    Take 4 tablets by mouth daily for 5 days, then 3 tabs for 5 days, then 2 tabs for 5 days, then 1 tab for 5 days    venlafaxine (EFFEXOR) 75 MG tablet  Self Yes No    Take 1 tablet by mouth 2 (two) times a day.           ED Medications:    Medications   sodium chloride 0.9 % flush 10 mL (not administered)   cefTRIAXone (ROCEPHIN) IVPB 1 g/50ml dextrose (premix) (not administered)   AZITHROMYCIN 500 MG/250 ML 0.9% NS IVPB (vial-mate) (not administered)   sodium chloride 0.9 % bolus 1,000 mL (1,000 mL Intravenous New Bag 11/30/18 1503)     Vital Signs:    Temp:  [99.1 °F (37.3 °C)] 99.1 °F (37.3 °C)  Heart Rate:  [113] 113  Resp:  [20] 20  BP: (102-107)/(57-60) 107/60        11/30/18  1354   Weight: 41.3 kg (91 lb)     Body mass index is 17.77 kg/m².    Physical Exam:    General Appearance:  Alert and cooperative, in mild distress.   Head:  Atraumatic and normocephalic, without obvious abnormality.   Eyes:          PERRLA, conjunctivae and sclerae normal, no Icterus. No pallor. Extraocular movements are within normal limits.   Ears:  Ears appear intact with no abnormalities noted.   Throat: No oral lesions, no thrush, oral mucosa moist.   Neck: Supple, trachea midline, no thyromegaly, no carotid bruit.    Back:   No kyphoscoliosis present. No tenderness to palpation,   range of motion normal.   Lungs:   Chest shape is normal. Breath sounds heard bilaterally equally.  Bilateral extensive wheezing heard with occasional basal crackles.  Accessory muscle use noted.  No Pleural rub or bronchial breathing.   Heart:  Normal S1 and S2, no murmur, no gallop, no rub. No JVD.   Abdomen:   Normal bowel sounds, no masses, no organomegaly. Soft, non-tender, non-distended, no guarding, no rebound tenderness.   Extremities: Moves all extremities well, no edema, no cyanosis, no clubbing.  Poor muscle mass.   Pulses: Pulses palpable and equal bilaterally.   Skin: No bleeding, bruising or rash.   Neurologic: Alert and oriented x 3. Moves all four limbs equally. No flapping tremors. No facial asymmetry.     Laboratory data:    I have reviewed the labs done in the emergency room.    Results from last 7 days   Lab Units  11/30/18   1450   SODIUM mmol/L  132*   POTASSIUM mmol/L  3.3*   CHLORIDE mmol/L  96*   CO2 mmol/L  27.0   BUN mg/dL  27*   CREATININE mg/dL  1.30   CALCIUM mg/dL  9.3   BILIRUBIN mg/dL  0.6   ALK PHOS U/L  151*   ALT (SGPT) U/L  20   AST (SGOT) U/L  23   GLUCOSE mg/dL  114*     Results from last 7 days   Lab Units  11/30/18   1450   WBC 10*3/mm3  11.66*   HEMOGLOBIN g/dL  10.8*   HEMATOCRIT %  33.0*   PLATELETS 10*3/mm3  226         Results from last 7 days   Lab Units  11/30/18   1450   TROPONIN I ng/mL  0.018     Results from last 7 days   Lab Units  11/30/18   1450   PROBNP pg/mL  2,090.0*     EKG:      EKG done in the emergency room was reviewed by me.  It shows sinus tachycardia at 106 bpm.  Normal axis.  No abnormal Q waves noted.  No significant ST-T changes were noted.    Radiology:    Imaging Results (last 72 hours)     Procedure Component Value Units Date/Time    CT Chest Without Contrast [[id number]] Collected:  11/30/18 1622     Updated:  11/30/18 1646    Narrative:       CT CHEST WITHOUT CONTRAST      HISTORY: Shortness of breath.      COMPARISON: None.      TECHNIQUE: Axial CT without IV contrast administration.         FINDINGS:     Emphysematous changes are present. Increased markings are seen in  bilateral lung bases. These likely involve the alveolar spaces and not  vascular markings. Small focal area of consolidation is seen involving  the lingula. Bronchial wall thickening is present.           No pleural or pericardial effusion is seen. No adenopathy or mass lesion  is present.            Impression:       1. Findings compatible with bronchopneumonia of the lung bases with  small area of consolidation within the lingula.  2. No pulmonary edema or significant pleural effusion.  3. Emphysematous disease.         This study was performed with techniques to keep radiation doses as low  as reasonably achievable (ALARA). Individualized dose reduction  techniques using automated exposure control or adjustment of vA and/or  kV according to the patient size were employed.      This report was finalized on 11/30/2018 4:44 PM by Hansel Fernández MD.    XR Chest 1 View [[id number]] Collected:  11/30/18 1437     Updated:  11/30/18 1521    Narrative:       XR CHEST 1 VW-     HISTORY: SOA         COMPARISON:  06/07/2018.     FINDINGS:  Portable view of the chest demonstrates increased markings in  lung bases greater on left side. Vascular congestion is noted. There is  no evidence of effusion, pneumothorax or other significant pleural  disease. The mediastinum is unremarkable.     The heart size is normal.            Impression:       Prominent vascular markings probably due to mild CHF.      This report was finalized on 11/30/2018 3:19 PM by Hansel Fernández MD.          Pneumonia of both lower lobes due to infectious organism (CMS/[facility])    Assessment:    1.  Bilateral lower lobe bacterial pneumonia, community-acquired, present on admission.  2.  Acute renal failure, present on admission.  3.  Hyponatremia and hypokalemia,  present on admission.  4.  Acute COPD exacerbation, present on admission.  5.  Bronchiectasis.  6.  Essential hypertension.  7.  Dyslipidemia.    Plan:    Ms. Yee is currently being admitted to the medical floor as an inpatient for her bilateral lower lobe pneumonia.  She will need 2-3 days of inpatient hospital stay.  She will be continued on oxygen, bronchodilator nebulizations, budesonide as well as IV Solu-Medrol.  She will be placed on mucolytic agents and lactobacillus supplements.  We will continue her on antibiotic therapy with ceftriaxone and azithromycin.  Blood cultures have been drawn in the emergency room.  We will order sputum cultures.  She will be continued on IV fluids with normal saline at 100 mL per hour and hopefully this should improve her acute kidney injury.  At this time, she does not have sepsis.    She does have elevated BNP levels.  Her last echocardiogram in the chart is from 2011 and at that time showed normal left ventricular ejection fraction of 60% and grade 1 diastolic dysfunction.  We will get a repeat 2-D echocardiogram to assess the current left ventricular ejection fraction.  She will be placed on Lovenox for DVT prophylaxis.  Her home medications will be continued.  Further recommendations depend upon her clinical course.    Maximino Bueno MD  11/30/18  5:34 PM    Dictated utilizing Dragon dictation.    Electronically signed by Maximino Bueno MD at 11/30/2018 10:18 PM          Emergency Department Notes      Hill Chang PA-C at 11/30/2018  2:07 PM     Attestation signed by Christy Domingo MD at 12/1/2018  8:34 AM          For this patient encounter, I reviewed the NP or PA documentation, treatment plan, and medical decision making. Christy Domingo MD 12/1/2018 8:34 AM                  Subjective   This patient has a history of COPD and states that she has had a productive cough with green sputum for 3 days.  No hemoptysis.  She states she has  discomfort in her chest and her back when she coughs.  She is oxygen to use at home as needed but states she has been requiring it more frequently.  She also has a nebulizer machine at home which she has been using.  She denies fever.  She states no recent antibiotics or prednisone for a respiratory infection although she was on antibiotics a month ago for soft tissue infection of her right upper extremity, these antibiotics were a sample from her PCP and she is unsure what these were.  As time she was admitted for pneumonia was back in July of this year.            Review of Systems   Respiratory: Positive for cough and shortness of breath.    All other systems reviewed and are negative.      Past Medical History:   Diagnosis Date   • Abdominal pain    • Acute bronchitis    • Ankle pain    • Anxiety 1980   • Atopic dermatitis    • Bronchiectasis (CMS/[facility])    • Cataract, bilateral    • Chest pain     STATES HAS OCCASSIONALLY.  STATES LAST TIME WAS LAST WEEK.  STATES SEES DR. RICH.  STATES HE HAS DONE NUMEROUS TESTS ON HER AND HAS NOT BEEN ABLE TO FIND OUT CAUSE.     • Chronic obstructive lung disease (CMS/[facility]) 2008   • Colon cancer screening    • COPD (chronic obstructive pulmonary disease) (CMS/[facility])    • Cystocele    • Depressed    • Depression 1980   • Fatigue     Chronic pafigue 2012   • Fibromyalgia 2008   • Full dentures    • GERD (gastroesophageal reflux disease)    • Gout    • Heartburn     Chronic historu of epigastric heartburn currently controlled on Prilesec 40 mg every morning along with Zantac 300 Mg daily at bedtime   • High cholesterol 2012   • Hip pain    • Hyperlipidemia    • Hypertension    • Insomnia    • Joint pain    • Kidney infection    • Loss of appetite    • Low back pain 1995   • Melena    • Nausea and vomiting    • On home oxygen therapy     2L/NC PRN (STATES SHE HAS BEEN USING CONTINUOSLY LATELY)   • Osteoarthritis 2010   • Pain in limb    • Palpitations    • Pinched nerve     Pinched  nerves    • Pneumonia    • Problems with swallowing     HAS TO EAT SLOW AND CHEW FOOD WELL   • Recurrent urinary tract infection    • Sciatica [id number]   • Shortness of breath    • Sinus problem    • Sleep apnea     NO CPAP   • Upset stomach    • Valvular heart disease    • Vitamin B12 deficiency    • Wears glasses    • Weight loss, abnormal     Weight loss is stabel. On pund weight gain since 2016       Allergies   Allergen Reactions   • Dust Mite Extract Other (See Comments)     Dust  SINUS   • Grass Other (See Comments)     SINUS   • Mold Extract [Trichophyton] Other (See Comments)     SINUS       Past Surgical History:   Procedure Laterality Date   • ABDOMINAL HERNIA REPAIR  2016   • APPENDECTOMY  1965   • BACK SURGERY  2005   • CATARACT EXTRACTION Bilateral     Put in implants    • CHOLECYSTECTOMY     • COLONOSCOPY     • ENDOSCOPY     • KNEE SURGERY Left 1979    Knee Cap   • TUBAL ABDOMINAL LIGATION  1971       Family History   Problem Relation Age of Onset   • Ovarian cancer Mother    • Cancer Mother    • Lung cancer Father    • Heart disease Brother        Social History     Socioeconomic History   • Marital status: Single     Spouse name: Not on file   • Number of children: Not on file   • Years of education: Not on file   • Highest education level: Not on file   Tobacco Use   • Smoking status: Former Smoker     Packs/day: 0.00     Years: 35.00     Pack years: 0.00     Last attempt to quit: 2017     Years since quittin.4   • Smokeless tobacco: Never Used   Substance and Sexual Activity   • Alcohol use: No   • Drug use: No   • Sexual activity: Defer           Objective   Physical Exam   Constitutional: She appears well-developed and well-nourished. No distress.   HENT:   Head: Normocephalic and atraumatic.   Neck: Normal range of motion.   Cardiovascular: Tachycardia present.   Pulmonary/Chest: Effort normal. She has rales in the right lower field and the left lower field.    Musculoskeletal: Normal range of motion.   Neurological: She is alert.   Skin: Skin is warm and dry. She is not diaphoretic.   Psychiatric: She has a normal mood and affect. Her behavior is normal. Judgment and thought content normal.       Procedures          ED Course  ED Course as of Nov 30 1722   Fri Nov 30, 2018   1617 EKG interpreted by me reveals sinus tachycardia with rate of 106 bpm.  Some nonspecific findings but no acute ischemic changes.  Atypical appearing EKG.  [TB]      ED Course User Index  [TB] Christy Domingo MD      5:22 PM  Pt denies hx of CHF, no BLE edema, no weight gain per pt. 2011 had normal echocardiogram. Noted at this time BP 98/48. Discussed with Dr. Domingo and reviewed CXR, recommends only 250mL fluid bolus at this time. Kerri HENLEY alerted to this and confirms. Will await labs and plan to CTA chest to rule out PE and add EKG + Troponin.    5:22 PM  Dr. Bueno agrees to admit              MDM      Final diagnoses:   Pneumonia of both lower lobes due to infectious organism (CMS/HCC)            Hill Chang PA-C  11/30/18 1722      Electronically signed by Christy Domingo MD at 12/1/2018  8:34 AM     Maria Alejandra Weiss at 11/30/2018  5:17 PM        Dr. Bueno called per Hill KURTZ and transferred.      Maria Alejandra Weiss  11/30/18 1717      Electronically signed by Maria Alejandra Weiss at 11/30/2018  5:17 PM     Lissette Sanchez at 11/30/2018  5:24 PM        House supervisor adria Mcnamara RN notified.     Lissette Sanchez  11/30/18 1724      Electronically signed by Lissette Sanchez at 11/30/2018  5:24 PM       ICU Vital Signs     Row Name 12/01/18 0818 12/01/18 0703 12/01/18 0500 12/01/18 0352 12/01/18 0050       Height and Weight    Weight  --  --  41.3 kg (91 lb)  --  --    Weight Method  --  --  Bed scale  --  --       Vitals    Temp  98 °F (36.7 °C)  --  --  98.5 °F (36.9 °C)  --    Temp src  Oral  --  --  Oral  --    Pulse  109  102  --  103  110    Heart Rate Source   Monitor  Monitor  --  Monitor  --    Resp  30  (Abnormal)   18  --  25  20    Resp Rate Source  Visual  Visual  --  Visual  --    BP  100/62  --  --  93/51  --    Noninvasive MAP (mmHg)  --  --  --  59  --    BP Location  Left arm  --  --  Right arm  --    BP Method  Automatic  --  --  Automatic  --    Patient Position  Lying  --  --  Lying  --       Oxygen Therapy    SpO2  100 %  100 %  --  100 %  98 %    Pulse Oximetry Type  Intermittent  Intermittent  --  Intermittent  --    Device (Oxygen Therapy)  nasal cannula  nasal cannula  --  nasal cannula  --    Flow (L/min)  --  4  --  --  --    Oxygen Concentration (%)  --  36  --  --  --    Row Name 12/01/18 0040 11/30/18 2300 11/30/18 2145 11/30/18 2124 11/30/18 2015       Vitals    Temp  --  98.3 °F (36.8 °C)  --  --  --    Temp src  --  Oral  --  --  --    Pulse  116  101  106  108  --    Heart Rate Source  Monitor  Monitor  --  Monitor  --    Resp  24  32  (Abnormal)   --  20  --    Resp Rate Source  --  Visual  --  --  --    BP  --  103/47  --  --  --    Noninvasive MAP (mmHg)  --  60  --  --  --    BP Location  --  Left arm  --  --  --    BP Method  --  Automatic  --  --  --    Patient Position  --  Lying  --  --  --       Oxygen Therapy    SpO2  97 %  100 %  96 %  96 %  --    Pulse Oximetry Type  Intermittent  Intermittent  --  Intermittent  --    Device (Oxygen Therapy)  nasal cannula  nasal cannula  --  nasal cannula  nasal cannula    Flow (L/min)  4  --  --  4  4    Row Name 11/30/18 1939 11/30/18 1920 11/30/18 1733 11/30/18 1702 11/30/18 1631       Height and Weight    Weight  41.3 kg (91 lb 0.8 oz)  --  --  --  --       Vitals    Pulse  --  --  112  110  108    BP  --  --  124/76  131/78  125/68    Noninvasive MAP (mmHg)  --  --  95  98  84       Oxygen Therapy    Device (Oxygen Therapy)  --  nasal cannula  --  --  --    Flow (L/min)  --  2  --  --  --    Row Name 11/30/18 1611 11/30/18 1527 11/30/18 1359 11/30/18 1354          Height and Weight    Height   "--  --  --  152.4 cm (60\")     Height Method  --  --  --  Stated     Weight  --  --  --  41.3 kg (91 lb)     Weight Method  --  --  --  Stated     Ideal Body Weight (IBW) (kg)  --  --  --  45.86     BSA (Calculated - sq m)  --  --  --  1.34 sq meters     BMI (Calculated)  --  --  --  17.8     Weight in (lb) to have BMI = 25  --  --  --  127.7        Vitals    Temp  --  --  99.1 °F (37.3 °C)  --     Temp src  --  --  Oral  --     Pulse  105  --  113  --     Heart Rate Source  --  --  Monitor  --     Resp  --  --  20  --     Resp Rate Source  --  --  Visual  --     BP  117/63  107/60  102/57  --     Noninvasive MAP (mmHg)  86  88  --  --     BP Location  --  --  Left arm  --     BP Method  --  --  Automatic  --     Patient Position  --  --  Lying  --        Oxygen Therapy    SpO2  --  --  96 %  --     Device (Oxygen Therapy)  --  --  nasal cannula  --         Hospital Medications (all)       Dose Frequency Start End    acetaminophen (TYLENOL) tablet 650 mg 650 mg Every 4 Hours PRN 11/30/2018     Sig - Route: Take 2 tablets by mouth Every 4 (Four) Hours As Needed for Mild Pain . - Oral    azelastine (ASTELIN) nasal spray 2 spray 2 spray 2 Times Daily 11/30/2018     Sig - Route: 2 sprays by Each Nare route 2 (Two) Times a Day. - Each Nare    azithromycin (ZITHROMAX) tablet 250 mg 250 mg Every 24 Hours Scheduled 12/1/2018 12/5/2018    Sig - Route: Take 1 tablet by mouth Daily. - Oral    AZITHROMYCIN 500 MG/250 ML 0.9% NS IVPB (vial-mate) 500 mg Once 11/30/2018 11/30/2018    Sig - Route: Infuse 500 mg into a venous catheter 1 (One) Time. - Intravenous    Cosign for Ordering: Accepted by Christy Domingo MD on 12/1/2018  8:33 AM    budesonide (PULMICORT) nebulizer solution 0.5 mg 0.5 mg 2 Times Daily - RT 11/30/2018     Sig - Route: Take 2 mL by nebulization 2 (Two) Times a Day. - Nebulization    cefTRIAXone (ROCEPHIN) IVPB 1 g/50ml dextrose (premix) 1 g Once 11/30/2018 11/30/2018    Sig - Route: Infuse 50 mL into a " venous catheter 1 (One) Time. - Intravenous    Cosign for Ordering: Accepted by Christy Domingo MD on 12/1/2018  8:33 AM    cefTRIAXone (ROCEPHIN) IVPB 1 g/50ml dextrose (premix) 1 g Every 24 Hours 12/1/2018 12/6/2018    Sig - Route: Infuse 50 mL into a venous catheter Daily. - Intravenous    cyclobenzaprine (FLEXERIL) tablet 10 mg 10 mg Daily 12/1/2018     Sig - Route: Take 1 tablet by mouth Daily. - Oral    enoxaparin (LOVENOX) syringe 30 mg 30 mg Every 24 Hours 11/30/2018     Sig - Route: Inject 0.3 mL under the skin into the appropriate area as directed Daily. - Subcutaneous    gabapentin (NEURONTIN) capsule 100 mg 100 mg 3 Times Daily 11/30/2018     Sig - Route: Take 1 capsule by mouth 3 (Three) Times a Day. - Oral    guaiFENesin (MUCINEX) 12 hr tablet 600 mg 600 mg Every 12 Hours Scheduled 11/30/2018     Sig - Route: Take 1 tablet by mouth Every 12 (Twelve) Hours. - Oral    HYDROcodone-acetaminophen (NORCO)  MG per tablet 1 tablet 1 tablet Every 8 Hours PRN 11/30/2018     Sig - Route: Take 1 tablet by mouth Every 8 (Eight) Hours As Needed for Moderate Pain . - Oral    ipratropium-albuterol (DUO-NEB) nebulizer solution 3 mL 3 mL Every 4 Hours PRN 11/30/2018     Sig - Route: Take 3 mL by nebulization Every 4 (Four) Hours As Needed for Shortness of Air. - Nebulization    ipratropium-albuterol (DUO-NEB) nebulizer solution 3 mL 3 mL Every 6 Hours - RT 11/30/2018     Sig - Route: Take 3 mL by nebulization Every 6 (Six) Hours. - Nebulization    lactobacillus acidophilus (RISAQUAD) capsule 1 capsule 1 capsule 2 Times Daily 11/30/2018     Sig - Route: Take 1 capsule by mouth 2 (Two) Times a Day. - Oral    LORazepam (ATIVAN) tablet 0.5 mg 0.5 mg 2 Times Daily PRN 11/30/2018     Sig - Route: Take 1 tablet by mouth 2 (Two) Times a Day As Needed for Anxiety. - Oral    melatonin tablet 3 mg 3 mg Nightly PRN 11/30/2018     Sig - Route: Take 1 tablet by mouth At Night As Needed for Sleep. - Oral     "methylPREDNISolone sodium succinate (SOLU-Medrol) injection 40 mg 40 mg Every 12 Hours 11/30/2018     Sig - Route: Infuse 1 mL into a venous catheter Every 12 (Twelve) Hours. - Intravenous    metoprolol tartrate (LOPRESSOR) tablet 25 mg 25 mg Every 12 Hours Scheduled 11/30/2018     Sig - Route: Take 1 tablet by mouth Every 12 (Twelve) Hours. - Oral    mirtazapine (REMERON) tablet 30 mg 30 mg Nightly 11/30/2018     Sig - Route: Take 2 tablets by mouth Every Night. - Oral    montelukast (SINGULAIR) tablet 10 mg 10 mg Nightly 11/30/2018     Sig - Route: Take 1 tablet by mouth Every Night. - Oral    multivitamin with minerals 1 tablet 1 tablet Daily 12/1/2018     Sig - Route: Take 1 tablet by mouth Daily. - Oral    ondansetron (ZOFRAN) injection 4 mg 4 mg Every 6 Hours PRN 11/30/2018     Sig - Route: Infuse 2 mL into a venous catheter Every 6 (Six) Hours As Needed for Nausea or Vomiting. - Intravenous    pantoprazole (PROTONIX) EC tablet 40 mg 40 mg Every Morning 12/1/2018     Sig - Route: Take 1 tablet by mouth Every Morning. - Oral    sodium chloride 0.9 % bolus 1,000 mL 1,000 mL Once 11/30/2018 11/30/2018    Sig - Route: Infuse 1,000 mL into a venous catheter 1 (One) Time. - Intravenous    Cosign for Ordering: Accepted by Christy Domingo MD on 11/30/2018  2:43 PM    sodium chloride 0.9 % flush 10 mL 10 mL As Needed 11/30/2018     Sig - Route: Infuse 10 mL into a venous catheter As Needed for Line Care. - Intravenous    Cosign for Ordering: Accepted by Christy Domingo MD on 11/30/2018  2:43 PM    Linked Group 1:  \"And\" Linked Group Details        sodium chloride 0.9 % flush 3 mL 3 mL Every 12 Hours Scheduled 11/30/2018     Sig - Route: Infuse 3 mL into a venous catheter Every 12 (Twelve) Hours. - Intravenous    sodium chloride 0.9 % flush 3-10 mL 3-10 mL As Needed 11/30/2018     Sig - Route: Infuse 3-10 mL into a venous catheter As Needed for Line Care. - Intravenous    sodium chloride 0.9 % with KCl " 20 mEq/L infusion 100 mL/hr Continuous 11/30/2018     Sig - Route: Infuse 100 mL/hr into a venous catheter Continuous. - Intravenous    venlafaxine (EFFEXOR) tablet 75 mg 75 mg 2 Times Daily With Meals 11/30/2018     Sig - Route: Take 1 tablet by mouth 2 (Two) Times a Day With Meals. - Oral    vitamin B-12 (CYANOCOBALAMIN) tablet 500 mcg 500 mcg Daily 12/1/2018     Sig - Route: Take 1 tablet by mouth Daily. - Oral            Lab Results (last 24 hours)     Procedure Component Value Units Date/Time    CBC Auto Differential [[id number]]  (Abnormal) Collected:  12/01/18 0554    Specimen:  Blood Updated:  12/01/18 0709     WBC 8.34 10*3/mm3      RBC 3.10 10*6/mm3      Hemoglobin 9.5 g/dL      Hematocrit 29.9 %      MCV 96.5 fL      MCH 30.6 pg      MCHC 31.8 g/dL      RDW 14.6 %      RDW-SD 51.8 fl      MPV 10.4 fL      Platelets 203 10*3/mm3      Neutrophil % 90.7 %      Lymphocyte % 4.8 %      Monocyte % 3.2 %      Eosinophil % 0.0 %      Basophil % 0.2 %      Immature Grans % 1.1 %      Neutrophils, Absolute 7.56 10*3/mm3      Lymphocytes, Absolute 0.40 10*3/mm3      Monocytes, Absolute 0.27 10*3/mm3      Eosinophils, Absolute 0.00 10*3/mm3      Basophils, Absolute 0.02 10*3/mm3      Immature Grans, Absolute 0.09 10*3/mm3      nRBC 0.0 /100 WBC     Scan Slide [[id number]] Collected:  12/01/18 0554    Specimen:  Blood Updated:  12/01/18 0709    Basic Metabolic Panel [[id number]]  (Abnormal) Collected:  12/01/18 0554    Specimen:  Blood Updated:  12/01/18 0701     Glucose 128 mg/dL      BUN 13 mg/dL      Creatinine 0.70 mg/dL      Sodium 138 mmol/L      Potassium 4.0 mmol/L      Chloride 107 mmol/L      CO2 26.0 mmol/L      Calcium 8.6 mg/dL      eGFR Non African Amer 82 mL/min/1.73      BUN/Creatinine Ratio 18.6     Anion Gap 9.0 mmol/L     Narrative:       The MDRD GFR formula is only valid for adults with stable renal function between ages 18 and 70.    Troponin [[id number]]  (Normal) Collected:  12/01/18 0554     Specimen:  Blood Updated:  12/01/18 0659     Troponin I <0.012 ng/mL     Narrative:       Normal Patient Upper Reference Limit (URL) (99th Percentile)=0.03 ng/mL   Non-AMI Illness Reference Limit=0.03-0.11 ng/mL   AMI Confirmation=0.12 ng/mL and above    Magnesium [[id number]]  (Normal) Collected:  12/01/18 0554    Specimen:  Blood Updated:  12/01/18 0659     Magnesium 2.1 mg/dL     Blood Culture - Blood, Arm, Left [[id number]] Collected:  11/30/18 1450    Specimen:  Blood from Arm, Left Updated:  12/01/18 0315     Blood Culture No growth at less than 24 hours    Blood Culture - Blood, Arm, Right [[id number]] Collected:  11/30/18 1500    Specimen:  Blood from Arm, Right Updated:  12/01/18 0315     Blood Culture No growth at less than 24 hours    Respiratory Culture - Sputum, Cough [[id number]] Collected:  11/30/18 2148    Specimen:  Sputum from Cough Updated:  11/30/18 2247     Gram Stain Greater than 20 WBCs per low power field      Less than 10 Epithelial cells per low power field      Occasional Yeast      Rare (1+) Gram positive cocci in pairs      Rare (1+) Gram negative bacilli      Few (2+) Gram negative coccobacilli      Rare (1+) Gram positive bacilli    Procalcitonin [[id number]]  (Abnormal) Collected:  11/30/18 1450    Specimen:  Blood from Arm, Left Updated:  11/30/18 1607     Procalcitonin 0.36 ng/mL     Narrative:       As a Marker for Sepsis (Non-Neonates):   1. <0.5 ng/mL represents a low risk of severe sepsis and/or septic shock.  2. >2 ng/mL represents a high risk of severe sepsis and/or septic shock.    As a Marker for Lower Respiratory Tract Infections that require antibiotic therapy:    PCT on Admission     Antibiotic Therapy       6-12 Hrs later  > 0.5                Strongly Recommended             >0.25 - <0.5         Recommended  0.1 - 0.25           Discouraged              Remeasure/reassess PCT  <0.1                 Strongly Discouraged     Remeasure/reassess PCT                     PCT values  of < 0.5 ng/mL do not exclude an infection, because localized infections (without systemic signs) may be associated with such low concentrations, or a systemic infection in its initial stages (< 6 hours). Furthermore, increased PCT can occur without infection. PCT concentrations between 0.5 and 2.0 ng/mL should be interpreted taking into account the patient's history. It is recommended to retest PCT within 6-24 hours if any concentrations < 2 ng/mL are obtained.    Troponin [[id number]]  (Normal) Collected:  11/30/18 1450    Specimen:  Blood from Arm, Left Updated:  11/30/18 1550     Troponin I 0.018 ng/mL     Narrative:       Normal Patient Upper Reference Limit (URL) (99th Percentile)=0.03 ng/mL   Non-AMI Illness Reference Limit=0.03-0.11 ng/mL   AMI Confirmation=0.12 ng/mL and above    CBC & Differential [[id number]] Collected:  11/30/18 1450    Specimen:  Blood Updated:  11/30/18 1538    Narrative:       The following orders were created for panel order CBC & Differential.  Procedure                               Abnormality         Status                     ---------                               -----------         ------                     Manual Differential[[id number]]          Abnormal            Final result               Scan Slide[[id number]]                                       Final result               CBC Auto Differential[[id number]]        Abnormal            Final result                 Please view results for these tests on the individual orders.    CBC Auto Differential [[id number]]  (Abnormal) Collected:  11/30/18 1450    Specimen:  Blood from Arm, Left Updated:  11/30/18 1538     WBC 11.66 10*3/mm3      RBC 3.49 10*6/mm3      Hemoglobin 10.8 g/dL      Hematocrit 33.0 %      MCV 94.6 fL      MCH 30.9 pg      MCHC 32.7 g/dL      RDW 14.6 %      RDW-SD 50.5 fl      MPV 10.1 fL      Platelets 226 10*3/mm3     Scan Slide [[id number]] Collected:  11/30/18 1450    Specimen:  Blood from Arm, Left  Updated:  11/30/18 1538     Toxic Granulation Mod/2+     Platelet Estimate Adequate     Scan Slide --     Comment: See Manual Differential Results       Manual Differential [[id number]]  (Abnormal) Collected:  11/30/18 1450    Specimen:  Blood from Arm, Left Updated:  11/30/18 1538     Neutrophil % 59.0 %      Lymphocyte % 10.0 %      Monocyte % 6.0 %      Eosinophil % 2.0 %      Bands %  23.0 %      Neutrophils Absolute 9.56 10*3/mm3      Lymphocytes Absolute 1.17 10*3/mm3      Monocytes Absolute 0.70 10*3/mm3      Eosinophils Absolute 0.23 10*3/mm3      RBC Morphology Normal     Platelet Morphology Normal    BNP [[id number]]  (Abnormal) Collected:  11/30/18 1450    Specimen:  Blood from Arm, Left Updated:  11/30/18 1529     proBNP 2,090.0 pg/mL     Comprehensive Metabolic Panel [[id number]]  (Abnormal) Collected:  11/30/18 1450    Specimen:  Blood from Arm, Left Updated:  11/30/18 1526     Glucose 114 mg/dL      BUN 27 mg/dL      Creatinine 1.30 mg/dL      Sodium 132 mmol/L      Potassium 3.3 mmol/L      Chloride 96 mmol/L      CO2 27.0 mmol/L      Calcium 9.3 mg/dL      Total Protein 7.5 g/dL      Albumin 4.10 g/dL      ALT (SGPT) 20 U/L      AST (SGOT) 23 U/L      Alkaline Phosphatase 151 U/L      Total Bilirubin 0.6 mg/dL      eGFR Non African Amer 40 mL/min/1.73      Globulin 3.4 gm/dL      A/G Ratio 1.2 g/dL      BUN/Creatinine Ratio 20.8     Anion Gap 12.3 mmol/L     Narrative:       The MDRD GFR formula is only valid for adults with stable renal function between ages 18 and 70.    Lactic Acid, Plasma [[id number]]  (Normal) Collected:  11/30/18 1450    Specimen:  Blood from Arm, Left Updated:  11/30/18 1520     Lactate 1.4 mmol/L         Imaging Results (last 24 hours)     Procedure Component Value Units Date/Time    CT Chest Without Contrast [[id number]] Collected:  11/30/18 1622     Updated:  11/30/18 1646    Narrative:       CT CHEST WITHOUT CONTRAST     HISTORY: Shortness of breath.      COMPARISON: None.       TECHNIQUE: Axial CT without IV contrast administration.         FINDINGS:     Emphysematous changes are present. Increased markings are seen in  bilateral lung bases. These likely involve the alveolar spaces and not  vascular markings. Small focal area of consolidation is seen involving  the lingula. Bronchial wall thickening is present.           No pleural or pericardial effusion is seen. No adenopathy or mass lesion  is present.            Impression:       1. Findings compatible with bronchopneumonia of the lung bases with  small area of consolidation within the lingula.  2. No pulmonary edema or significant pleural effusion.  3. Emphysematous disease.         This study was performed with techniques to keep radiation doses as low  as reasonably achievable (ALARA). Individualized dose reduction  techniques using automated exposure control or adjustment of vA and/or  kV according to the patient size were employed.      This report was finalized on 11/30/2018 4:44 PM by Hansel Fernández MD.    XR Chest 1 View [[id number]] Collected:  11/30/18 1437     Updated:  11/30/18 1521    Narrative:       XR CHEST 1 VW-     HISTORY: SOA         COMPARISON:  06/07/2018.     FINDINGS:  Portable view of the chest demonstrates increased markings in  lung bases greater on left side. Vascular congestion is noted. There is  no evidence of effusion, pneumothorax or other significant pleural  disease. The mediastinum is unremarkable.     The heart size is normal.            Impression:       Prominent vascular markings probably due to mild CHF.      This report was finalized on 11/30/2018 3:19 PM by Hansel Fernández MD.        Physician Progress Notes (last 24 hours) (Notes from 11/30/2018  9:01 AM through 12/1/2018  9:01 AM)     No notes of this type exist for this encounter.        Consult Notes (last 24 hours) (Notes from 11/30/2018  9:01 AM through 12/1/2018  9:01 AM)     No notes of this type exist for this encounter.

## 2018-12-01 NOTE — PROGRESS NOTES
"Discharge Planning Assessment  [facility]     Patient Name: Joelle Yee  MRN: [id number]  Today's Date: 12/1/2018    Admit Date: 11/30/2018    Discharge Needs Assessment     Row Name 12/01/18 1415       Living Environment    Lives With  alone    Current Living Arrangements  home/apartment/condo    Provides Primary Care For  no one    Family Caregiver if Needed  child(cain), adult    Family Caregiver Names  Doug Julien son [phone]    Quality of Family Relationships  supportive       Transition Planning    Transportation Anticipated  family or friend will provide       Discharge Needs Assessment    Readmission Within the Last 30 Days  no previous admission in last 30 days        Discharge Plan     Row Name 12/01/18 1418       Plan    Plan   Verified current address and no POA.  Pt has a son Doug Calderon [phone] for support.  Pt lives alone with concerns she may need help with ADL bc of her declining health issues.     DME nebulizer machine, O2, rolling walker. hospital bed, shower chair and \"encourage\" vest    [facility] covers her O2 needs and HH is [facility] and [facility] Day care services are helping her get dentures.      Will continue to follow up for any future needs         Destination      No service coordination in this encounter.      Durable Medical Equipment      No service coordination in this encounter.      Dialysis/Infusion      No service coordination in this encounter.      Home Medical Care      No service coordination in this encounter.      Community Resources      No service coordination in this encounter.        Expected Discharge Date and Time     Expected Discharge Date Expected Discharge Time    Dec 4, 2018         Demographic Summary     Row Name 12/01/18 1411       General Information    Admission Type  inpatient    Arrived From  emergency department    Referral Source  admission list    Reason for Consult  discharge planning    Preferred Language  English       Contact " Information    Permission Granted to Share Info With      Contact Information Obtained for          Functional Status     Row Name 12/01/18 1412       Functional Status    Usual Activity Tolerance  moderate    Current Activity Tolerance  moderate       Functional Status, IADL    Medications  independent    Meal Preparation  independent    Housekeeping  independent    Laundry  independent    Shopping  independent       Mental Status    General Appearance WDL  WDL       Employment/    Employment Status  retired        Psychosocial    No documentation.       Abuse/Neglect    No documentation.       Legal    No documentation.       Substance Abuse    No documentation.       Patient Forms    No documentation.           Ginny Guevara RN

## 2018-12-01 NOTE — PLAN OF CARE
Problem: Patient Care Overview  Goal: Plan of Care Review  Outcome: Ongoing (interventions implemented as appropriate)   11/30/18 6785   Coping/Psychosocial   Plan of Care Reviewed With patient   Plan of Care Review   Progress no change

## 2018-12-01 NOTE — PLAN OF CARE
Problem: Patient Care Overview  Goal: Plan of Care Review  Outcome: Ongoing (interventions implemented as appropriate)   12/01/18 4813   Coping/Psychosocial   Plan of Care Reviewed With patient   OTHER   Outcome Summary Patient participates well in skilled PT and demonstrates decreased activity tolerance, strength and balance. She is expected to benefit from additional PT while hospitalized and upon discharge to home with home health care

## 2018-12-01 NOTE — PROGRESS NOTES
Adult Nutrition  Assessment/PES    Patient Name:  Joelle Yee  YOB: 1945  MRN: [id number]  Admit Date:  11/30/2018    Assessment Date:  12/1/2018    Comments:  Pt was seen today and may qualify for mild malnutrition based on weight and NFPE (Nutrition Focused Physical Exam). Mild malnutrition related to inadequate protein and nutrient-dense foods evidenced by pt's report of her poor PO intake over the past 3 years r/t stress, anxiety, and nervousness; no recent significant weight loss. She did state that she lost 60 lbs in three years, but has gained 30 lbs back; severe muscle wasted noted on exam with prominent clavicle, squared shoulder, and deep hollowness to temples; moderate fat loss indicated by slightly depressed, hollow orbital region, but maintained under eye fat pad, mild loss of tricep fat, minimal to no muscle definition in calf region, thin line on thigh region. RD ordered nutritional supplements of Ensure Enlive BID to promote PO intake. Rec #1: Continue current diet order. Rec #2: Continue MVI with minerals. Rec #3: Replenish fluid PRN. RD to follow pt. Consult RD PRN.     Reason for Assessment     Row Name 12/01/18 0958          Reason for Assessment    Reason For Assessment  diagnosis/disease state;identified at risk by screening criteria     Diagnosis  cardiac disease;pulmonary disease;renal disease;metabolic state COPD, SOA, HTN, Bilateral Pneum., ARF, Hyponatremia     Identified At Risk by Screening Criteria  BMI           Anthropometrics     Row Name 12/01/18 0500          Anthropometrics    Weight  41.3 kg (91 lb)         Labs/Tests/Procedures/Meds     Row Name 12/01/18 0959          Labs/Procedures/Meds    Lab Results Reviewed  reviewed, pertinent     Lab Results Comments  High: Glu, Procal        Medications    Pertinent Medications Reviewed  reviewed           Estimated/Assessed Needs     Row Name 12/01/18 1000          Calculation Measurements    Weight Used For  Calculations  45.9 kg (101 lb 1.7 oz) IBW        Estimated/Assessed Needs    Additional Documentation  Fluid Requirements (Group);[place]-St. Jeor Equation (Group);Protein Requirements (Group);Calorie Requirements (Group)        Calorie Requirements    Weight Used For Calorie Calculations  -- AF 1.3     Estimated Calorie Need Method  [place]-St Jeor     Estimated Calorie Requirement Comment  ~7011-9074        KCAL/KG    14 Kcal/Kg (kcal)  642.04     15 Kcal/Kg (kcal)  687.9     18 Kcal/Kg (kcal)  825.48     20 Kcal/Kg (kcal)  917.2     25 Kcal/Kg (kcal)  1146.5     30 Kcal/Kg (kcal)  1375.8     35 Kcal/Kg (kcal)  1605.1     40 Kcal/Kg (kcal)  1834.4     45 Kcal/Kg (kcal)  2063.7     50 Kcal/Kg (kcal)  2293        [place]-St. Jeor Equation    RMR ([place]-St. Jeor Equation)  890.1        Protein Requirements    Est Protein Requirement Amount (gms/kg)  1.0 gm protein 37-46 gm     Estimated Protein Requirements (gms/day)  45.86        Fluid Requirements    Estimated Fluid Requirement Method  Luiz-Segar Formula     Luiz-Segar Method (over 20 kg)  2417.2         Nutrition Prescription Ordered     Row Name 12/01/18 1001          Nutrition Prescription PO    Current PO Diet  Regular     Common Modifiers  Cardiac         Evaluation of Received Nutrient/Fluid Intake     Row Name 12/01/18 1000          Calculation Measurements    Weight Used For Calculations  45.9 kg (101 lb 1.7 oz) IBW        PO Evaluation    Number of Days PO Intake Evaluated  1 day     Number of Meals  1     % PO Intake  25         Evaluation of Prescribed Nutrient/Fluid Intake     Row Name 12/01/18 1000          Calculation Measurements    Weight Used For Calculations  45.9 kg (101 lb 1.7 oz) IBW         Malnutrition Severity Assessment     Row Name 12/01/18 1032          Malnutrition Severity Assessment    Malnutrition Type  Chronic Illness Malnutrition        Physical Signs of Malnutrition (Chronic)    Muscle Wasting  Severe     Fat Loss  Mild      Fluid Accumulation  None     Secondary Physical Signs  None        Weight Status (Chronic)    BMI  Mild (<19)     %IBW  -- 90%     %UBW  -- increased BW     Weight Loss  -- Pt has gained 3 lbs in the past 2 months        Energy Intake Status (Chronic)    Energy Intake  -- Same intake as always        Criteria Met (Must meet criteria for severity in at least 2 of these categories: M Wasting, Fat Loss, Fluid, Secondary Signs, Wt. Status, Intake)    Patient meets criteria for   Mild malnutrition           Problem/Interventions:  Problem 1     Row Name 12/01/18 1001          Nutrition Diagnoses Problem 1    Problem 1  Underweight     Etiology (related to)  Factors Affecting Nutrition     Food Habit/Preferences  Small Meals     Signs/Symptoms (evidenced by)  BMI     BMI  17 - 17.9         Problem 2     Row Name 12/01/18 1002          Nutrition Diagnoses Problem 2    Problem 2  Increased Nutrient Needs     Macronutrient  Kcal;Carbohydrate;Protein     Etiology (related to)  Medical Diagnosis     Pulmonary/Critical Care  Pneumonia;COPD     Signs/Symptoms (evidenced by)  Other (comment) pulmonary dysfunction               Intervention Goal     Row Name 12/01/18 1002          Intervention Goal    General  Meet nutritional needs for age/condition     PO  Meet estimated needs;Increase intake;PO intake (%)     PO Intake %  50 %     Weight  Appropriate weight gain         Nutrition Intervention     Row Name 12/01/18 1002          Nutrition Intervention    RD/Tech Action  Follow Tx progress;Care plan reviewd;Encourage intake;Recommend/ordered     Recommended/Ordered  Supplement         Nutrition Prescription     Row Name 12/01/18 1002          Nutrition Prescription PO    PO Prescription  Begin/change supplement     Supplement  Ensure Plus     Supplement Frequency  2 times a day     New PO Prescription Ordered?  Yes        Other Orders    Obtain Weight  Daily     Obtain Weight Ordered?  No, recommended     Supplement  Vitamin  mineral supplement Continue MVI     Supplement Ordered?  No, recommended         Education/Evaluation     Row Name 12/01/18 1003          Education    Education  Will Instruct as appropriate        Monitor/Evaluation    Monitor  Per protocol;I&O;PO intake;Pertinent labs;Weight;Supplement intake           Electronically signed by:  Prema Mooney RD  12/01/18 10:47 AM

## 2018-12-01 NOTE — THERAPY EVALUATION
Acute Care - Physical Therapy Initial Evaluation  [facility]     Patient Name: Joelle Yee  : 1945  MRN: [id number]  Today's Date: 2018   Onset of Illness/Injury or Date of Surgery: 18  Date of Referral to PT: 18  Referring Physician: Maximino Bueno MD      Admit Date: 2018    Visit Dx:     ICD-10-CM ICD-9-CM   1. Pneumonia of both lower lobes due to infectious organism (CMS/[facility]) J18.1 483.8   2. Impaired functional mobility, balance, gait, and endurance Z74.09 V49.89     Patient Active Problem List   Diagnosis   • Dyspepsia   • Esophageal reflux   • Anxiety   • High cholesterol   • Bipolar disorder (CMS/HCC)   • COPD (chronic obstructive pulmonary disease) (CMS/[facility])   • Depression   • Gout   • Hyperlipidemia   • Insomnia   • Osteoarthritis   • Sciatica   • Sleep apnea   • Vitamin B 12 deficiency   • Pancolitis (CMS/[facility])   • Acute cystitis without hematuria   • C. difficile colitis   • Chronic bronchitis with COPD (chronic obstructive pulmonary disease) (CMS/[facility])   • Pneumonia of right lower lobe due to infectious organism (CMS/[facility])   • COPD exacerbation (CMS/[facility])   • Pneumonia involving right lung   • COPD with acute exacerbation (CMS/[facility])   • Chronic respiratory failure with hypoxia (CMS/[facility])   • Abdominal pain   • Diarrhea   • Heartburn   • Loss of appetite   • Melena   • Nausea and vomiting   • Pseudogout   • Upset stomach   • Abnormal weight loss   • Chronic obstructive pulmonary disease with acute lower respiratory infection (CMS/[facility])   • Right upper lobe pneumonia (CMS/[facility])   • Acute on chronic respiratory failure with hypoxia (CMS/[facility])   • Moderate malnutrition (CMS/[facility])   • Acute exacerbation of chronic obstructive pulmonary disease (COPD) (CMS/[facility])   • Bronchiectasis without complication (CMS/[facility])   • Pneumonia due to gram-negative bacteria (CMS/[facility])   • Sinus tachycardia   • Leukocytosis   • Serratia marcescens infection (CMS/HCC)   • Pneumonia of both lower lobes due  to infectious organism (CMS/HCC)   • Acute kidney injury (CMS/HCC)     Past Medical History:   Diagnosis Date   • Abdominal pain    • Acute bronchitis    • Ankle pain    • Anxiety 1980   • Atopic dermatitis    • Bronchiectasis (CMS/HCC)    • Cataract, bilateral    • Chest pain     STATES HAS OCCASSIONALLY.  STATES LAST TIME WAS LAST WEEK.  STATES SEES DR. RICH.  STATES HE HAS DONE NUMEROUS TESTS ON HER AND HAS NOT BEEN ABLE TO FIND OUT CAUSE.     • Chronic obstructive lung disease (CMS/HCC) 2008   • Colon cancer screening    • COPD (chronic obstructive pulmonary disease) (CMS/HCC)    • Cystocele    • Depressed    • Depression 1980   • Fatigue     Chronic pafigue 2012   • Fibromyalgia 2008   • Full dentures    • GERD (gastroesophageal reflux disease)    • Gout    • Heartburn     Chronic historu of epigastric heartburn currently controlled on Prilesec 40 mg every morning along with Zantac 300 Mg daily at bedtime   • High cholesterol 2012   • Hip pain    • Hyperlipidemia    • Hypertension    • Insomnia    • Joint pain    • Kidney infection    • Loss of appetite    • Low back pain 1995   • Melena    • Nausea and vomiting    • On home oxygen therapy     2L/NC PRN (STATES SHE HAS BEEN USING CONTINUOSLY LATELY)   • Osteoarthritis 2010   • Pain in limb    • Palpitations    • Pinched nerve     Pinched nerves 2011   • Pneumonia    • Problems with swallowing     HAS TO EAT SLOW AND CHEW FOOD WELL   • Recurrent urinary tract infection    • Sciatica 5746-1784   • Shortness of breath    • Sinus problem    • Sleep apnea 1998    NO CPAP   • Upset stomach    • Valvular heart disease    • Vitamin B12 deficiency    • Wears glasses    • Weight loss, abnormal     Weight loss is stabel. On pund weight gain since 01/2016     Past Surgical History:   Procedure Laterality Date   • ABDOMINAL HERNIA REPAIR  2016   • APPENDECTOMY  1965   • BACK SURGERY  2005   • CATARACT EXTRACTION Bilateral     Put in implants    • CHOLECYSTECTOMY  1985   •  COLONOSCOPY     • ENDOSCOPY     • KNEE SURGERY Left 1979    Knee Cap   • TUBAL ABDOMINAL LIGATION  1971        PT ASSESSMENT (last 12 hours)      Physical Therapy Evaluation     Row Name 12/01/18 1234          PT Evaluation Time/Intention    Subjective Information  complains of;dizziness;fatigue  -JR     Document Type  evaluation  -JR     Mode of Treatment  physical therapy  -JR     Patient Effort  good  -JR     Symptoms Noted During/After Treatment  shortness of breath  -JR     Comment  Patient c/o being exhausted  -JR     Row Name 12/01/18 1234          General Information    Patient Profile Reviewed?  yes  -JR     Onset of Illness/Injury or Date of Surgery  11/30/18  -JR     Referring Physician  Maximino Bueno MD  -JR     Patient Observations  alert;agree to therapy;cooperative  -JR     Patient/Family Observations  No family present at time of evaluation  -JR     General Observations of Patient  Supine in bed, IV in LUE, 4 LPM of oxygen per nasal cannula   -JR     Prior Level of Function  independent:;all household mobility  -JR     Equipment Currently Used at Home  oxygen;rollator;shower chair;grab bar  -JR     Pertinent History of Current Functional Problem  Increased SOA, BLL pneumonia, COPD, ARF, HTN  -JR     Existing Precautions/Restrictions  oxygen therapy device and L/min;fall 4 LPM  -JR     Risks Reviewed  patient:;change in vital signs;increased discomfort  -JR     Benefits Reviewed  patient:;increase strength;increase balance;increase independence;improve function  -JR     Row Name 12/01/18 1234          Relationship/Environment    Primary Source of Support/Comfort  extended family;nonrelative caregiver  -JR     Name of Support/Comfort Primary Source  [employer] waiver staff perform household tasks  -JR     Lives With  alone  -JR     Row Name 12/01/18 1234          Resource/Environmental Concerns    Current Living Arrangements  home/apartment/condo  -JR     Row Name 12/01/18 1234          Cognitive  Assessment/Intervention- PT/OT    Orientation Status (Cognition)  oriented x 4  -JR     Follows Commands (Cognition)  WFL  -JR     Safety Deficit (Cognitive)  awareness of need for assistance  -     Row Name 12/01/18 1234          Safety Issues, Functional Mobility    Safety Issues Affecting Function (Mobility)  safety precautions follow-through/compliance  -     Impairments Affecting Function (Mobility)  shortness of breath;strength;balance  -     Row Name 12/01/18 1234          Bed Mobility Assessment/Treatment    Bed Mobility Assessment/Treatment  supine-sit  -JR     Supine-Sit [place] (Bed Mobility)  conditional independence  -     Bed Mobility, Safety Issues  decreased use of legs for bridging/pushing;impaired trunk control for bed mobility  -     Assistive Device (Bed Mobility)  head of bed elevated;bed rails  -     Row Name 12/01/18 1234          Transfer Assessment/Treatment    Transfer Assessment/Treatment  sit-stand transfer;stand-sit transfer  -     Sit-Stand [place] (Transfers)  contact guard  -     Stand-Sit [place] (Transfers)  contact guard  -     Row Name 12/01/18 1234          Gait/Stairs Assessment/Training    Gait/Stairs Assessment/Training  gait/ambulation independence  -     [place] Level (Gait)  contact guard  -     Assistive Device (Gait)  -- oxygen per nasal cannula  -     Distance in Feet (Gait)  12  -     Pattern (Gait)  step-to  -JR     Deviations/Abnormal Patterns (Gait)  festinating/shuffling;gait speed decreased;stride length decreased  -JR     Bilateral Gait Deviations  heel strike decreased;forward flexed posture  -     Row Name 12/01/18 1234          General ROM    GENERAL ROM COMMENTS  Grossly WFL  -     Row Name 12/01/18 1234          MMT (Manual Muscle Testing)    General MMT Comments  Grossly 3+/5 throughout  -     Row Name 12/01/18 1234          Pain Assessment    Additional Documentation  Pain Scale: Numbers Pre/Post-Treatment  (Group)  -     Row Name 12/01/18 1234          Pain Scale: Numbers Pre/Post-Treatment    Pain Scale: Numbers, Pretreatment  0/10 - no pain  -     Pain Scale: Numbers, Post-Treatment  0/10 - no pain  -     Row Name 12/01/18 1234          Coping    Observed Emotional State  pleasant;cooperative  -     Verbalized Emotional State  hopefulness  -     Row Name 12/01/18 1234          Plan of Care Review    Plan of Care Reviewed With  patient  -     Row Name 12/01/18 1234          Physical Therapy Clinical Impression    Date of Referral to PT  12/01/18  -JR     PT Diagnosis (PT Clinical Impression)  Weakness, poor balance, decreased activity tolerance  -     Patient/Family Goals Statement (PT Clinical Impression)  Patient wants to get stronger and go home  -JR     Criteria for Skilled Interventions Met (PT Clinical Impression)  yes;treatment indicated  -JR     Pathology/Pathophysiology Noted (Describe Specifically for Each System)  pulmonary;musculoskeletal;neuromuscular  -JR     Impairments Found (describe specific impairments)  aerobic capacity/endurance;gait, locomotion, and balance;muscle performance;posture  -JR     Rehab Potential (PT Clinical Summary)  good, to achieve stated therapy goals  -     Care Plan Review (PT)  evaluation/treatment results reviewed;care plan/treatment goals reviewed;risks/benefits reviewed;current/potential barriers reviewed;patient/other agree to care plan  -     Row Name 12/01/18 1234          Physical Therapy Goals    Bed Mobility Goal Selection (PT)  bed mobility, PT goal 1  -JR     Transfer Goal Selection (PT)  transfer, PT goal 1  -JR     Gait Training Goal Selection (PT)  gait training, PT goal 1  -     Row Name 12/01/18 1234          Bed Mobility Goal 1 (PT)    Activity/Assistive Device (Bed Mobility Goal 1, PT)  bed mobility activities, all  -JR     [place] Level/Cues Needed (Bed Mobility Goal 1, PT)  conditional independence  -     Time Frame (Bed Mobility  Goal 1, PT)  2 weeks  -JR     Progress/Outcomes (Bed Mobility Goal 1, PT)  goal ongoing  -JR     Row Name 12/01/18 1234          Transfer Goal 1 (PT)    Activity/Assistive Device (Transfer Goal 1, PT)  sit-to-stand/stand-to-sit  -JR     [place] Level/Cues Needed (Transfer Goal 1, PT)  conditional independence  -JR     Time Frame (Transfer Goal 1, PT)  2 weeks  -JR     Progress/Outcome (Transfer Goal 1, PT)  goal ongoing  -JR     Row Name 12/01/18 1234          Gait Training Goal 1 (PT)    Activity/Assistive Device (Gait Training Goal 1, PT)  gait (walking locomotion);increase endurance/gait distance  -JR     [place] Level (Gait Training Goal 1, PT)  conditional independence  -JR     Distance (Gait Goal 1, PT)  150  -JR     Time Frame (Gait Training Goal 1, PT)  2 weeks  -JR     Progress/Outcome (Gait Training Goal 1, PT)  goal ongoing  -JR     Row Name 12/01/18 1234          Patient Education Goal (PT)    Activity (Patient Education Goal, PT)  Perform HEP x 15 reps  -JR     [place]/Cues/Accuracy (Memory Goal 2, PT)  demonstrates adequately  -JR     Time Frame (Patient Education Goal, PT)  2 weeks  -JR     Progress/Outcome (Patient Education Goal, PT)  goal ongoing  -     Row Name 12/01/18 1234          Positioning and Restraints    Pre-Treatment Position  in bed  -JR     Post Treatment Position  bed  -JR     In Bed  sitting EOB;call light within reach;encouraged to call for assist  -JR       User Key  (r) = Recorded By, (t) = Taken By, (c) = Cosigned By    Initials Name Provider Type    Adelina Munguia, PT Physical Therapist        Physical Therapy Education     Title: PT OT SLP Therapies (In Progress)     Topic: Physical Therapy (In Progress)     Point: Mobility training (Done)     Learning Progress Summary           Patient Acceptance, E, VU by  at 12/1/2018  2:39 PM    Comment:  Role of PT and importance of mobility to recovery                               User Key     Initials Effective Dates  Name Provider Type Joseph     04/03/18 -  Adelina Jones, PT Physical Therapist PT              PT Recommendation and Plan  Anticipated Discharge Disposition (PT): home with home health  Planned Therapy Interventions (PT Eval): bed mobility training, strengthening, balance training, transfer training, gait training, home exercise program, patient/family education  Therapy Frequency (PT Clinical Impression): daily  Outcome Summary/Treatment Plan (PT)  Anticipated Discharge Disposition (PT): home with home health  Plan of Care Reviewed With: patient  Outcome Summary: Patient participates well in skilled PT and demonstrates decreased activity tolerance, strength and balance.  She is expected to benefit from additional PT while hospitalized and upon discharge to home with home health care  Outcome Measures     Row Name 12/01/18 1234             How much help from another person do you currently need...    Turning from your back to your side while in flat bed without using bedrails?  3  -JR      Moving from lying on back to sitting on the side of a flat bed without bedrails?  3  -JR      Moving to and from a bed to a chair (including a wheelchair)?  3  -JR      Standing up from a chair using your arms (e.g., wheelchair, bedside chair)?  3  -JR      Climbing 3-5 steps with a railing?  3  -JR      To walk in hospital room?  3  -JR      AM-PAC 6 Clicks Score  18  -         Functional Assessment    Outcome Measure Options  AM-PAC 6 Clicks Basic Mobility (PT)  -        User Key  (r) = Recorded By, (t) = Taken By, (c) = Cosigned By    Initials Name Provider Type    Adelina Munguia, PT Physical Therapist         Time Calculation:   PT Charges     Row Name 12/01/18 5747             Time Calculation    Start Time  1234  -JR      PT Received On  12/01/18  -      PT Goal Re-Cert Due Date  12/11/18  -        User Key  (r) = Recorded By, (t) = Taken By, (c) = Cosigned By    Initials Name Provider Type     Adelina Jones,  PT Physical Therapist        Therapy Suggested Charges     Code   Minutes Charges    None           Therapy Charges for Today     Code Description Service Date Service Provider Modifiers Qty    54954617382 HC PT EVAL LOW COMPLEXITY 4 12/1/2018 Adelina Jones, PT GP 1          PT G-Codes  Outcome Measure Options: AM-PAC 6 Clicks Basic Mobility (PT)  AM-PAC 6 Clicks Score: 18      Adelina Jones, PT  12/1/2018

## 2018-12-01 NOTE — PROGRESS NOTES
Malnutrition Severity Assessment    Patient Name:  Joelle Yee  YOB: 1945  MRN: [id number]  Admit Date:  11/30/2018    Patient meets criteria for : Mild malnutrition    Comments:  Pt was seen today and may qualify for mild malnutrition based on weight and NFPE (Nutrition Focused Physical Exam). Mild malnutrition related to inadequate protein and nutrient-dense foods evidenced by pt's report of her poor PO intake over the past 3 years r/t stress, anxiety, and nervousness; no recent significant weight loss. She did state that she lost 60 lbs in three years, but has gained 30 lbs back; severe muscle wasted noted on exam with prominent clavicle, squared shoulder, and deep hollowness to temples; moderate fat loss indicated by slightly depressed, hollow orbital region, but maintained under eye fat pad, mild loss of tricep fat, minimal to no muscle definition in calf region, thin line on thigh region. RD ordered nutritional supplements of Ensure Enlive BID to promote PO intake. Rec #1: Continue current diet order. Rec #2: Continue MVI with minerals. Rec #3: Replenish fluid PRN. RD to follow pt. Consult RD PRN.         Malnutrition Type: Chronic Illness Malnutrition     Malnutrition Type (last 8 hours)      Malnutrition Severity Assessment     Row Name 12/01/18 1032       Malnutrition Severity Assessment    Malnutrition Type  Chronic Illness Malnutrition    Row Name 12/01/18 1032       Physical Signs of Malnutrition (Chronic)    Muscle Wasting  Severe    Fat Loss  Mild    Fluid Accumulation  None    Secondary Physical Signs  None    Row Name 12/01/18 1032       Weight Status (Chronic)    BMI  Mild (<19)    %IBW  -- 90%    %UBW  -- increased BW    Weight Loss  -- Pt has gained 3 lbs in the past 2 months    Row Name 12/01/18 1032       Energy Intake Status (Chronic)    Energy Intake  -- Same intake as always    Row Name 12/01/18 1032       Criteria Met (Must meet criteria for severity in at least 2 of  these categories: M Wasting, Fat Loss, Fluid, Secondary Signs, Wt. Status, Intake)    Patient meets criteria for   Mild malnutrition          Electronically signed by:  Prema Mooney RD  12/01/18 10:42 AM

## 2018-12-02 LAB
ANION GAP SERPL CALCULATED.3IONS-SCNC: 9.3 MMOL/L (ref 10–20)
ANISOCYTOSIS BLD QL: ABNORMAL
BUN BLD-MCNC: 18 MG/DL (ref 7–20)
BUN/CREAT SERPL: 25.7 (ref 7.1–23.5)
CALCIUM SPEC-SCNC: 8.9 MG/DL (ref 8.4–10.2)
CHLORIDE SERPL-SCNC: 108 MMOL/L (ref 98–107)
CO2 SERPL-SCNC: 25 MMOL/L (ref 26–30)
CREAT BLD-MCNC: 0.7 MG/DL (ref 0.6–1.3)
DEPRECATED RDW RBC AUTO: 53.9 FL (ref 37–54)
ERYTHROCYTE [DISTWIDTH] IN BLOOD BY AUTOMATED COUNT: 14.8 % (ref 11.5–14.5)
GFR SERPL CREATININE-BSD FRML MDRD: 82 ML/MIN/1.73
GLUCOSE BLD-MCNC: 138 MG/DL (ref 74–98)
HCT VFR BLD AUTO: 29.1 % (ref 37–47)
HGB BLD-MCNC: 9.2 G/DL (ref 12–16)
LYMPHOCYTES # BLD MANUAL: 1.35 10*3/MM3 (ref 0.6–3.4)
LYMPHOCYTES NFR BLD MANUAL: 14 % (ref 10–50)
LYMPHOCYTES NFR BLD MANUAL: 9 % (ref 0–12)
MCH RBC QN AUTO: 31.3 PG (ref 27–31)
MCHC RBC AUTO-ENTMCNC: 31.6 G/DL (ref 30–37)
MCV RBC AUTO: 99 FL (ref 81–99)
METAMYELOCYTES NFR BLD MANUAL: 3 % (ref 0–0)
MONOCYTES # BLD AUTO: 0.86 10*3/MM3 (ref 0–0.9)
NEUTROPHILS # BLD AUTO: 7.11 10*3/MM3 (ref 2–6.9)
NEUTROPHILS NFR BLD MANUAL: 54 % (ref 37–80)
NEUTS BAND NFR BLD MANUAL: 20 % (ref 0–6)
PLATELET # BLD AUTO: 205 10*3/MM3 (ref 130–400)
PMV BLD AUTO: 10 FL (ref 6–12)
POTASSIUM BLD-SCNC: 4.3 MMOL/L (ref 3.5–5.1)
PROCALCITONIN SERPL-MCNC: 0.14 NG/ML
RBC # BLD AUTO: 2.94 10*6/MM3 (ref 4.2–5.4)
SCAN SLIDE: NORMAL
SMALL PLATELETS BLD QL SMEAR: ADEQUATE
SODIUM BLD-SCNC: 138 MMOL/L (ref 137–145)
WBC MORPH BLD: NORMAL
WBC NRBC COR # BLD: 9.61 10*3/MM3 (ref 4.8–10.8)

## 2018-12-02 PROCEDURE — 84145 PROCALCITONIN (PCT): CPT | Performed by: HOSPITALIST

## 2018-12-02 PROCEDURE — 85025 COMPLETE CBC W/AUTO DIFF WBC: CPT | Performed by: HOSPITALIST

## 2018-12-02 PROCEDURE — 94799 UNLISTED PULMONARY SVC/PX: CPT

## 2018-12-02 PROCEDURE — 25010000002 ENOXAPARIN PER 10 MG: Performed by: INTERNAL MEDICINE

## 2018-12-02 PROCEDURE — 99231 SBSQ HOSP IP/OBS SF/LOW 25: CPT | Performed by: HOSPITALIST

## 2018-12-02 PROCEDURE — 25010000002 METHYLPREDNISOLONE PER 40 MG: Performed by: INTERNAL MEDICINE

## 2018-12-02 PROCEDURE — 97116 GAIT TRAINING THERAPY: CPT

## 2018-12-02 PROCEDURE — 80048 BASIC METABOLIC PNL TOTAL CA: CPT | Performed by: HOSPITALIST

## 2018-12-02 PROCEDURE — 85007 BL SMEAR W/DIFF WBC COUNT: CPT | Performed by: HOSPITALIST

## 2018-12-02 PROCEDURE — 25010000002 CEFTRIAXONE SODIUM-DEXTROSE 1-3.74 GM-%(50ML) RECONSTITUTED SOLUTION: Performed by: INTERNAL MEDICINE

## 2018-12-02 RX ORDER — BENZONATATE 100 MG/1
100 CAPSULE ORAL 3 TIMES DAILY PRN
Status: DISCONTINUED | OUTPATIENT
Start: 2018-12-02 | End: 2018-12-06 | Stop reason: HOSPADM

## 2018-12-02 RX ADMIN — CYANOCOBALAMIN TAB 500 MCG 500 MCG: 500 TAB at 08:43

## 2018-12-02 RX ADMIN — CYCLOBENZAPRINE HYDROCHLORIDE 10 MG: 10 TABLET, FILM COATED ORAL at 08:43

## 2018-12-02 RX ADMIN — Medication 1 CAPSULE: at 21:04

## 2018-12-02 RX ADMIN — BUDESONIDE 0.5 MG: 0.5 INHALANT RESPIRATORY (INHALATION) at 19:23

## 2018-12-02 RX ADMIN — ARFORMOTEROL TARTRATE 15 MCG: 15 SOLUTION RESPIRATORY (INHALATION) at 19:23

## 2018-12-02 RX ADMIN — IPRATROPIUM BROMIDE AND ALBUTEROL SULFATE 3 ML: .5; 3 SOLUTION RESPIRATORY (INHALATION) at 12:18

## 2018-12-02 RX ADMIN — GUAIFENESIN 600 MG: 600 TABLET, EXTENDED RELEASE ORAL at 08:43

## 2018-12-02 RX ADMIN — GABAPENTIN 100 MG: 100 CAPSULE ORAL at 21:03

## 2018-12-02 RX ADMIN — SODIUM CHLORIDE, PRESERVATIVE FREE 3 ML: 5 INJECTION INTRAVENOUS at 21:04

## 2018-12-02 RX ADMIN — MULTIPLE VITAMINS W/ MINERALS TAB 1 TABLET: TAB at 08:43

## 2018-12-02 RX ADMIN — AZITHROMYCIN MONOHYDRATE 250 MG: 250 TABLET ORAL at 08:43

## 2018-12-02 RX ADMIN — METHYLPREDNISOLONE SODIUM SUCCINATE 40 MG: 40 INJECTION, POWDER, FOR SOLUTION INTRAMUSCULAR; INTRAVENOUS at 21:03

## 2018-12-02 RX ADMIN — HYDROCODONE POLISTIREX AND CHLORPHENIRAMINE POLISTIREX 2.5 ML: 10; 8 SUSPENSION, EXTENDED RELEASE ORAL at 04:26

## 2018-12-02 RX ADMIN — METOPROLOL TARTRATE 25 MG: 25 TABLET ORAL at 08:43

## 2018-12-02 RX ADMIN — NYSTATIN 400000 UNITS: 500000 SUSPENSION ORAL at 17:53

## 2018-12-02 RX ADMIN — PANTOPRAZOLE SODIUM 40 MG: 40 TABLET, DELAYED RELEASE ORAL at 06:32

## 2018-12-02 RX ADMIN — IPRATROPIUM BROMIDE AND ALBUTEROL SULFATE 3 ML: .5; 3 SOLUTION RESPIRATORY (INHALATION) at 01:36

## 2018-12-02 RX ADMIN — VENLAFAXINE 75 MG: 75 TABLET ORAL at 08:43

## 2018-12-02 RX ADMIN — Medication 1 CAPSULE: at 08:43

## 2018-12-02 RX ADMIN — HYDROCODONE BITARTRATE AND ACETAMINOPHEN 1 TABLET: 10; 325 TABLET ORAL at 08:43

## 2018-12-02 RX ADMIN — METHYLPREDNISOLONE SODIUM SUCCINATE 40 MG: 40 INJECTION, POWDER, FOR SOLUTION INTRAMUSCULAR; INTRAVENOUS at 08:43

## 2018-12-02 RX ADMIN — BENZONATATE 100 MG: 100 CAPSULE, LIQUID FILLED ORAL at 15:35

## 2018-12-02 RX ADMIN — NYSTATIN 400000 UNITS: 500000 SUSPENSION ORAL at 21:03

## 2018-12-02 RX ADMIN — MONTELUKAST SODIUM 10 MG: 10 TABLET, FILM COATED ORAL at 21:04

## 2018-12-02 RX ADMIN — IPRATROPIUM BROMIDE AND ALBUTEROL SULFATE 3 ML: .5; 3 SOLUTION RESPIRATORY (INHALATION) at 06:47

## 2018-12-02 RX ADMIN — IPRATROPIUM BROMIDE AND ALBUTEROL SULFATE 3 ML: .5; 3 SOLUTION RESPIRATORY (INHALATION) at 19:23

## 2018-12-02 RX ADMIN — HYDROCODONE BITARTRATE AND ACETAMINOPHEN 1 TABLET: 10; 325 TABLET ORAL at 15:35

## 2018-12-02 RX ADMIN — ENOXAPARIN SODIUM 30 MG: 30 INJECTION SUBCUTANEOUS at 21:04

## 2018-12-02 RX ADMIN — BENZONATATE 100 MG: 100 CAPSULE, LIQUID FILLED ORAL at 08:42

## 2018-12-02 RX ADMIN — LORAZEPAM 0.5 MG: 0.5 TABLET ORAL at 15:35

## 2018-12-02 RX ADMIN — CEFTRIAXONE 1 G: 1 INJECTION, SOLUTION INTRAVENOUS at 17:53

## 2018-12-02 RX ADMIN — GABAPENTIN 100 MG: 100 CAPSULE ORAL at 15:34

## 2018-12-02 RX ADMIN — NYSTATIN 400000 UNITS: 500000 SUSPENSION ORAL at 12:14

## 2018-12-02 RX ADMIN — BUDESONIDE 0.5 MG: 0.5 INHALANT RESPIRATORY (INHALATION) at 06:47

## 2018-12-02 RX ADMIN — BENZONATATE 100 MG: 100 CAPSULE, LIQUID FILLED ORAL at 00:18

## 2018-12-02 RX ADMIN — ARFORMOTEROL TARTRATE 15 MCG: 15 SOLUTION RESPIRATORY (INHALATION) at 06:47

## 2018-12-02 RX ADMIN — VENLAFAXINE 75 MG: 75 TABLET ORAL at 17:52

## 2018-12-02 RX ADMIN — GABAPENTIN 100 MG: 100 CAPSULE ORAL at 08:43

## 2018-12-02 RX ADMIN — HYDROCODONE POLISTIREX AND CHLORPHENIRAMINE POLISTIREX 2.5 ML: 10; 8 SUSPENSION, EXTENDED RELEASE ORAL at 15:35

## 2018-12-02 RX ADMIN — METOPROLOL TARTRATE 25 MG: 25 TABLET ORAL at 21:04

## 2018-12-02 RX ADMIN — GUAIFENESIN 600 MG: 600 TABLET, EXTENDED RELEASE ORAL at 21:03

## 2018-12-02 RX ADMIN — MELATONIN TAB 3 MG 3 MG: 3 TAB at 21:04

## 2018-12-02 RX ADMIN — MIRTAZAPINE 30 MG: 15 TABLET, FILM COATED ORAL at 21:04

## 2018-12-02 NOTE — PROGRESS NOTES
"[facility]    Name: Joelle Yee, 72 y.o. female  MRN: [id number], : 1945   Date of Admission: 2018   Date of Service: 18   PCP: Blanquita Clements PA     Hospital Course:   Ms. Yee is a 73 yo F with h/o bronchiectasis, COPD (see's Dr. Esparza) on supplemental oxygen as needed, depression, fibromyalgia, GERD, gout, HTN, HLD, sleep apnea who was admitted on 18 for bilateral lower lobe bacterial community acquired pneumonia, acute renal insufficiency, and acute exacerbation of COPD.    Vitals on admission notable for , /57. Labs on admission notable for hgb 10.8; WBC 11.6; bandemia; BNP 2000; procal 0.36; BCx x 2 sent; lactate wnl; K 3.3; Aphos 151; Na 131; BUN/Cr 27/1.3. Radiographs on admission notable for CXR: \"Prominent vascular markings probably due to mild CHF.\" CT chest: \"1. Findings compatible with bronchopneumonia of the lung bases with small area of consolidation within the lingula. 2. No pulmonary edema or significant pleural effusion. 3. Emphysematous disease.\". Patient was given rocephin, azithromycin in ER.     She was continued on steroids, nebs, azithromycin, rocephin. Renal insufficiency resolved with IV fluid hydration. Still with productive cough, shortness of breath, requiring 4LPM NC, mildly improved with current therapies.    Consultants: PT/OT; SW/CM  Procedures: none    Antibiotics:   Rocephin d3  Azithromycin d3  Micro:    BCx: ngtd x 2 sets   sputum cx: 1+ GPC pairs, 1+ GNR, 2+ GNCB, 1+ GPB  -----------------------------------------------------------------------------------------------------------------------------------------------------------------------------------------------------------------------------------------------------  Subjective   Chief Complaint: f/u pneumonia     Subjective:   Patient seen and examined this mornin.  Tele reviewed. Events from last night noted. Discussed with RN " Alexandria. Patient still coughing green thick sputum on 4LPM NC. Tongue feels raw, similar to prior issues with thrush.    Review of Systems:   Gen-no fevers, no chills  CV-no chest pain, no palpitations  GI-no N/V/D, no abd pain    Objective   Objective:     Intake/Output Summary (Last 24 hours) at 12/2/2018 1127  Last data filed at 12/2/2018 0900  Gross per 24 hour   Intake 1410 ml   Output 300 ml   Net 1110 ml      SpO2: 100 %     Physical Examination:   Vitals:    12/02/18 0546 12/02/18 0647 12/02/18 0650 12/02/18 0814   BP:    110/60   BP Location:    Right arm   Patient Position:    Sitting   Pulse:  92 88    Resp:  20 20 20   Temp:    98.2 °F (36.8 °C)   TempSrc:    Oral   SpO2:  96%  100%   Weight: 41.4 kg (91 lb 6 oz)      Height:          General:  Thin elderly female; coughing frequently; on NC; cough improved  Heart:   RRR, S1 S2 normal, no m/r/g  Lungs:   Rhonchi bilaterally without wheeze  Abdomen:  soft, NT, ND, +BS  Extremities: no edema/cyanosis/clubbing  Neuro:  A&Ox3, no focal deficits  Psych:  appropriate mood  Skin:  No bleeding, bruising, or rash    Pertinent laboratory and radiology data were reviewed:  Microbiology Results (last 10 days)     Procedure Component Value - Date/Time    Respiratory Culture - Sputum, Cough [[id number]] Collected:  11/30/18 2148    Lab Status:  Preliminary result Specimen:  Sputum from Cough Updated:  11/30/18 2247     Gram Stain Greater than 20 WBCs per low power field      Less than 10 Epithelial cells per low power field      Occasional Yeast      Rare (1+) Gram positive cocci in pairs      Rare (1+) Gram negative bacilli      Few (2+) Gram negative coccobacilli      Rare (1+) Gram positive bacilli    Blood Culture - Blood, Arm, Right [[id number]] Collected:  11/30/18 1500    Lab Status:  Preliminary result Specimen:  Blood from Arm, Right Updated:  12/01/18 1515     Blood Culture No growth at 24 hours    Blood Culture - Blood, Arm, Left [[id number]] Collected:   11/30/18 1450    Lab Status:  Preliminary result Specimen:  Blood from Arm, Left Updated:  12/01/18 1515     Blood Culture No growth at 24 hours          Medications Reviewed:     arformoterol 15 mcg Nebulization BID - RT   azelastine 2 spray Each Nare BID   azithromycin 250 mg Oral Q24H   budesonide 0.5 mg Nebulization BID - RT   ceftriaxone 1 g Intravenous Q24H   cyclobenzaprine 10 mg Oral Daily   enoxaparin 30 mg Subcutaneous Q24H   gabapentin 100 mg Oral TID   guaiFENesin 600 mg Oral Q12H   ipratropium-albuterol 3 mL Nebulization Q6H - RT   lactobacillus acidophilus 1 capsule Oral BID   methylPREDNISolone sodium succinate 40 mg Intravenous Q12H   metoprolol tartrate 25 mg Oral Q12H   mirtazapine 30 mg Oral Nightly   montelukast 10 mg Oral Nightly   multivitamin with minerals 1 tablet Oral Daily   pantoprazole 40 mg Oral QAM   sodium chloride 3 mL Intravenous Q12H   venlafaxine 75 mg Oral BID With Meals   cyancobalamin 500 mcg Oral Daily        Assessment /Plan   Assessment/Plan:   1. Bilateral community acquired pneumonia, suspect bacterial  · Rocephin, azithromycin  · F/u sputum culture  · procal improved    2. Acute exacerbation of COPD  · Steroids; nebs; pulmicort; brovana    3. Thrush. Start nystatin    4. Acute renal insufficiency. Resolved with fluids.    5. Electrolyte abnormalities. Normalized with repletion and fluids.    6. HTN. Controlled.    7. BMI less than 19 in adult. RD consulted, appreciate input.    FEN: cardiac  DVT Prophylaxis: lovenox  PUD Prophylaxis: protonix     Reason for continued hospitalization: above  Disposition: pending clinical improvement; PT recommends HH with PT when ready  Discussed with: patient and RN Alexandria Hinds MD   11:27 AM on 12/2/2018

## 2018-12-02 NOTE — PLAN OF CARE
Problem: Patient Care Overview  Goal: Plan of Care Review  Outcome: Ongoing (interventions implemented as appropriate)   12/02/18 0216   Coping/Psychosocial   Plan of Care Reviewed With patient   Plan of Care Review   Progress no change   OTHER   Outcome Summary Pt continues to receive iv fluids, antibiotics and steroids. Has a productive cough. Anticipating going home in 1-2 days.      Goal: Individualization and Mutuality  Outcome: Ongoing (interventions implemented as appropriate)    Goal: Discharge Needs Assessment  Outcome: Ongoing (interventions implemented as appropriate)      Problem: Pneumonia (Adult)  Goal: Signs and Symptoms of Listed Potential Problems Will be Absent, Minimized or Managed (Pneumonia)  Outcome: Ongoing (interventions implemented as appropriate)      Problem: Pain, Chronic (Adult)  Goal: Identify Related Risk Factors and Signs and Symptoms  Outcome: Outcome(s) achieved Date Met: 12/02/18    Goal: Acceptable Pain/Comfort Level and Functional Ability  Outcome: Ongoing (interventions implemented as appropriate)

## 2018-12-02 NOTE — PLAN OF CARE
Problem: Patient Care Overview  Goal: Plan of Care Review  Outcome: Ongoing (interventions implemented as appropriate)   12/02/18 1210   Coping/Psychosocial   Plan of Care Reviewed With patient   Plan of Care Review   Progress improving   OTHER   Outcome Summary Pt amb in room without assist and no path deviations or LOB noted and performed transfers I

## 2018-12-02 NOTE — THERAPY TREATMENT NOTE
Acute Care - Physical Therapy Treatment Note  [facility]     Patient Name: Joelle Yee  : 1945  MRN: [id number]  Today's Date: 2018  Onset of Illness/Injury or Date of Surgery: 18  Date of Referral to PT: 18  Referring Physician: Maximino Bueno MD    Admit Date: 2018    Visit Dx:    ICD-10-CM ICD-9-CM   1. Pneumonia of both lower lobes due to infectious organism (CMS/[facility]) J18.1 483.8   2. Impaired functional mobility, balance, gait, and endurance Z74.09 V49.89     Patient Active Problem List   Diagnosis   • Dyspepsia   • Esophageal reflux   • Anxiety   • High cholesterol   • Bipolar disorder (CMS/[facility])   • COPD (chronic obstructive pulmonary disease) (CMS/[facility])   • Depression   • Gout   • Hyperlipidemia   • Insomnia   • Osteoarthritis   • Sciatica   • Sleep apnea   • Vitamin B 12 deficiency   • Pancolitis (CMS/[facility])   • Acute cystitis without hematuria   • C. difficile colitis   • Chronic bronchitis with COPD (chronic obstructive pulmonary disease) (CMS/[facility])   • Pneumonia of right lower lobe due to infectious organism (CMS/[facility])   • COPD exacerbation (CMS/[facility])   • Pneumonia involving right lung   • COPD with acute exacerbation (CMS/[facility])   • Chronic respiratory failure with hypoxia (CMS/[facility])   • Abdominal pain   • Diarrhea   • Heartburn   • Loss of appetite   • Melena   • Nausea and vomiting   • Pseudogout   • Upset stomach   • Abnormal weight loss   • Chronic obstructive pulmonary disease with acute lower respiratory infection (CMS/[facility])   • Right upper lobe pneumonia (CMS/[facility])   • Acute on chronic respiratory failure with hypoxia (CMS/[facility])   • Moderate malnutrition (CMS/[facility])   • Acute exacerbation of chronic obstructive pulmonary disease (COPD) (CMS/[facility])   • Bronchiectasis without complication (CMS/[facility])   • Pneumonia due to gram-negative bacteria (CMS/[facility])   • Sinus tachycardia   • Leukocytosis   • Serratia marcescens infection (CMS/[facility])   • Pneumonia of both lower lobes due to  infectious organism (CMS/[facility])   • Acute kidney injury (CMS/[facility])   • BMI less than 19,adult       Therapy Treatment    Rehabilitation Treatment Summary     Row Name 12/02/18 1210             Treatment Time/Intention    Discipline  physical therapy assistant  -CC      Document Type  therapy note (daily note)  -CC      Subjective Information  no complaints  -CC      Mode of Treatment  individual therapy  -CC      Care Plan Review  care plan/treatment goals reviewed  -CC      Patient Effort  good  -CC      Existing Precautions/Restrictions  oxygen therapy device and L/min;fall  -CC      Recorded by [CC] Callie Major, PTA 12/02/18 1650      Row Name 12/02/18 1210             Bed Mobility Assessment/Treatment    Bed Mobility Assessment/Treatment  supine-sit-supine  -CC      Supine-Sit [place] (Bed Mobility)  conditional independence;independent  -CC      Assistive Device (Bed Mobility)  head of bed elevated  -CC      Recorded by [CC] Callie Major, PTA 12/02/18 1650      Row Name 12/02/18 1210             Transfer Assessment/Treatment    Transfer Assessment/Treatment  sit-stand transfer;stand-sit transfer  -CC      Recorded by [CC] Callie Major, PTA 12/02/18 1650      Row Name 12/02/18 1210             Sit-Stand Transfer    Sit-Stand [place] (Transfers)  independent  -CC      Recorded by [CC] Callie Major, PTA 12/02/18 1650      Row Name 12/02/18 1210             Stand-Sit Transfer    Stand-Sit [place] (Transfers)  independent  -CC      Recorded by [CC] Callie Major, PTA 12/02/18 1650      Row Name 12/02/18 1210             Gait/Stairs Assessment/Training    82525 - Gait Training Minutes   23  -CC      Gait/Stairs Assessment/Training  gait/ambulation independence  -CC      [place] Level (Gait)  set up;independent  -CC      Distance in Feet (Gait)  150  -CC      Pattern (Gait)  swing-through  -CC      Recorded by [CC] Callie Major, PTA 12/02/18 1650      Row Name 12/02/18  1210             Positioning and Restraints    Pre-Treatment Position  in bed  -CC      Post Treatment Position  bed  -CC      In Bed  sitting EOB;call light within reach  -CC      Recorded by [CC] Callie Major PTA 12/02/18 1650      Row Name 12/02/18 1210             Pain Scale: Numbers Pre/Post-Treatment    Pain Scale: Numbers, Pretreatment  0/10 - no pain  -CC      Pain Scale: Numbers, Post-Treatment  0/10 - no pain  -CC      Recorded by [CC] Callie Major PTA 12/02/18 1650      Row Name 12/02/18 1210             Outcome Summary/Treatment Plan (PT)    Daily Summary of Progress (PT)  progress toward functional goals is good  -      Plan for Continued Treatment (PT)  Con't with PT POC and progress as tolerated  -      Recorded by [CC] Callie Major PTA 12/02/18 165        User Key  (r) = Recorded By, (t) = Taken By, (c) = Cosigned By    Initials Name Effective Dates Discipline     Callie Major PTA 03/07/18 -  PT                   Physical Therapy Education     Title: PT OT SLP Therapies (In Progress)     Topic: Physical Therapy (In Progress)     Point: Mobility training (Done)     Learning Progress Summary           Patient Acceptance, E,TB, VU by  at 12/2/2018  4:50 PM    Comment:  Increase mobility daily    Acceptance, E, VU by  at 12/1/2018  2:39 PM    Comment:  Role of PT and importance of mobility to recovery                               User Key     Initials Effective Dates Name Provider Type Discipline     04/03/18 -  Adelina Jones, PT Physical Therapist PT     03/07/18 -  Callie Major [facility] Physical Therapy Assistant PT                PT Recommendation and Plan     Outcome Summary/Treatment Plan (PT)  Daily Summary of Progress (PT): progress toward functional goals is good  Plan for Continued Treatment (PT): Con't with PT POC and progress as tolerated  Plan of Care Reviewed With: patient  Progress: improving  Outcome Summary: Pt amb in room without assist and no path  deviations or LOB noted and performed transfers I  Outcome Measures     Row Name 12/02/18 1210 12/01/18 1234          How much help from another person do you currently need...    Turning from your back to your side while in flat bed without using bedrails?  4  -CC  3  -JR     Moving from lying on back to sitting on the side of a flat bed without bedrails?  4  -CC  3  -JR     Moving to and from a bed to a chair (including a wheelchair)?  3  -CC  3  -JR     Standing up from a chair using your arms (e.g., wheelchair, bedside chair)?  4  -CC  3  -JR     Climbing 3-5 steps with a railing?  3  -CC  3  -JR     To walk in hospital room?  4  -CC  3  -JR     AM-PAC 6 Clicks Score  22  -CC  18  -JR        Functional Assessment    Outcome Measure Options  AM-PAC 6 Clicks Basic Mobility (PT)  -CC  AM-PAC 6 Clicks Basic Mobility (PT)  -JR       User Key  (r) = Recorded By, (t) = Taken By, (c) = Cosigned By    Initials Name Provider Type    Adelina Munguia, PT Physical Therapist    CC Callie Major PTA Physical Therapy Assistant         Time Calculation:   PT Charges     Row Name 12/02/18 1210             Time Calculation    Start Time  1210  -CC      PT Received On  12/02/18  -      PT Goal Re-Cert Due Date  12/11/18  -         Time Calculation- PT    Total Timed Code Minutes- PT  23 minute(s)  -CC         Timed Charges    65466 - Gait Training Minutes   23  -CC        User Key  (r) = Recorded By, (t) = Taken By, (c) = Cosigned By    Initials Name Provider Type    Callie Jacques PTA Physical Therapy Assistant        Therapy Suggested Charges     Code   Minutes Charges    47379 (CPT®) Hc Pt Neuromusc Re Education Ea 15 Min      54415 (CPT®) Hc Pt Ther Proc Ea 15 Min      32703 (CPT®) Hc Gait Training Ea 15 Min 23 2    50262 (CPT®) Hc Pt Therapeutic Act Ea 15 Min      10100 (CPT®) Hc Pt Manual Therapy Ea 15 Min      43569 (CPT®) Hc Pt Iontophoresis Ea 15 Min      03143 (CPT®) Hc Pt Elec Stim Ea-Per 15 Min       12859 (CPT®) Hc Pt Ultrasound Ea 15 Min      25899 (CPT®) Hc Pt Self Care/Mgmt/Train Ea 15 Min      71728 (CPT®) Hc Pt Prosthetic (S) Train Initial Encounter, Each 15 Min      45552 (CPT®) Hc Pt Orthotic(S)/Prosthetic(S) Encounter, Each 15 Min      37071 (CPT®) Hc Orthotic(S) Mgmt/Train Initial Encounter, Each 15min      Total  23 2        Therapy Charges for Today     Code Description Service Date Service Provider Modifiers Qty    [id number] HC GAIT TRAINING EA 15 MIN 12/2/2018 Callie Major, PTA GP 2          PT G-Codes  Outcome Measure Options: AM-PAC 6 Clicks Basic Mobility (PT)  AM-PAC 6 Clicks Score: 22    Callie Major PTA  12/2/2018

## 2018-12-02 NOTE — PROGRESS NOTES
[facility]    Name: Joelle Yee, 72 y.o. female  MRN: [id number], : 1945   Date of Admission: 2018   Date of Service: 18   PCP: Blanquita Clements PA     Hospital Course:   Ms. Yee is a 73 yo F with h/o bronchiectasis, COPD (see's Dr. Esparza) on supplemental oxygen as needed, depression, fibromyalgia, GERD, gout, HTN, HLD, sleep apnea who was admitted on 18 for bilateral lower lobe bacterial community acquired pneumonia, acute renal insufficiency, and acute exacerbation of COPD.    Vitals on admission notable for , /57. Labs on admission notable for hgb 10.8; WBC 11.6; bandemia; BNP 2000; procal 0.36; BCx x 2 sent; lactate wnl; K 3.3; Aphos 151; Na 131; BUN/Cr 27/1.3. Radiographs on admission notable for CXR: \"Prominent vascular markings probably due to mild CHF.\" CT chest: \"1. Findings compatible with bronchopneumonia of the lung bases with small area of consolidation within the lingula. 2. No pulmonary edema or significant pleural effusion.  3. Emphysematous disease.\". Patient was given rocephin, azithromycin in ER.     She was continued on steroids, nebs, azithromycin, rocephin. Renal insufficiency resolved with IV fluid hydration.    Consultants: PT/OT; SW/CM  Procedures: none    Antibiotics:   Rocephin d2  Azithromycin d2   Micro:    BCx: ngtd x 2 sets   sputum cx: 1+ GPC pairs, 1+ GNR, 2+ GNCB, 1+ GPB  -----------------------------------------------------------------------------------------------------------------------------------------------------------------------------------------------------------------------------------------------------  Subjective   Chief Complaint: f/u pneumonia     Subjective:   Patient seen and examined this mornin.  Tele reviewed . Events from last night noted. Discussed with KALE Matias. Patient still coughing up phlegm, dyspneic, no wheezing. States she had Serratia pneumonia last " hospitalization. States she had poor appetite in the last week.    Review of Systems:   Gen-no fevers, no chills  CV-no chest pain, no palpitations  GI-no N/V/D, no abd pain    Objective   Objective:     Intake/Output Summary (Last 24 hours) at 12/1/2018 2116  Last data filed at 12/1/2018 1747  Gross per 24 hour   Intake 1050 ml   Output --   Net 1050 ml      SpO2: 100 %     Physical Examination:   Vitals:    12/01/18 1243 12/01/18 1701 12/01/18 2012 12/01/18 2021   BP: 90/60 97/60  110/50   BP Location: Right arm Left arm  Right arm   Patient Position: Lying Lying  Lying   Pulse: 74 96 104 106   Resp: 20 20 18 22   Temp: 98.2 °F (36.8 °C) 98.6 °F (37 °C)  97.5 °F (36.4 °C)   TempSrc: Oral Oral  Oral   SpO2: 99% 99% 99% 100%   Weight:       Height:          General:  Thin elderly female; sleeping on arrival; awakes to voice; coughing frequently  Heart:   RRR, S1 S2 normal, no m/r/g  Lungs:   Rhonchi bilaterally without wheeze  Abdomen:  soft, NT, ND, +BS  Extremities: no edema/cyanosis/clubbing  Neuro:  A&Ox3, no focal deficits  Psych:  appropriate mood  Skin:  No bleeding, bruising, or rash    Pertinent laboratory and radiology data were reviewed:  Microbiology Results (last 10 days)     Procedure Component Value - Date/Time    Respiratory Culture - Sputum, Cough [[id number]] Collected:  11/30/18 2146    Lab Status:  Preliminary result Specimen:  Sputum from Cough Updated:  11/30/18 2247     Gram Stain Greater than 20 WBCs per low power field      Less than 10 Epithelial cells per low power field      Occasional Yeast      Rare (1+) Gram positive cocci in pairs      Rare (1+) Gram negative bacilli      Few (2+) Gram negative coccobacilli      Rare (1+) Gram positive bacilli    Blood Culture - Blood, Arm, Right [[id number]] Collected:  11/30/18 1500    Lab Status:  Preliminary result Specimen:  Blood from Arm, Right Updated:  12/01/18 1515     Blood Culture No growth at 24 hours    Blood Culture - Blood, Arm, Left  [[id number]] Collected:  11/30/18 1450    Lab Status:  Preliminary result Specimen:  Blood from Arm, Left Updated:  12/01/18 1515     Blood Culture No growth at 24 hours          Medications Reviewed:     azelastine 2 spray Each Nare BID   azithromycin 250 mg Oral Q24H   budesonide 0.5 mg Nebulization BID - RT   ceftriaxone 1 g Intravenous Q24H   cyclobenzaprine 10 mg Oral Daily   enoxaparin 30 mg Subcutaneous Q24H   gabapentin 100 mg Oral TID   guaiFENesin 600 mg Oral Q12H   ipratropium-albuterol 3 mL Nebulization Q6H - RT   lactobacillus acidophilus 1 capsule Oral BID   methylPREDNISolone sodium succinate 40 mg Intravenous Q12H   metoprolol tartrate 25 mg Oral Q12H   mirtazapine 30 mg Oral Nightly   montelukast 10 mg Oral Nightly   multivitamin with minerals 1 tablet Oral Daily   pantoprazole 40 mg Oral QAM   sodium chloride 3 mL Intravenous Q12H   venlafaxine 75 mg Oral BID With Meals   cyancobalamin 500 mcg Oral Daily        Assessment /Plan   Assessment/Plan:   1. Bilateral community acquired pneumonia, suspect bacterial  · Rocephin, azithromycin  · F/u sputum culture  · procal in AM    2. Acute exacerbation of COPD  · Steroids; nebs. Will try to wean steroids tomorrow if clinically improving  · Add brovana    3. Acute renal insufficiency. Resolved with fluids. Suspect pre-renal with poor PO intake in last 1 week.    4. Electrolyte abnormalities. Normalized with repletion and fluids.    5. HTN. Controlled with metoprolol. On losartan at home but this was not re-ordered on admission, will monitor BP, if needed will resume losartan.    6. BMI less than 19 in adult. RD consulted, appreciate input.    FEN: cardiac; dc fluids with KCl and start NS  DVT Prophylaxis: lovenox  PUD Prophylaxis: protonix     Reason for continued hospitalization: above  Disposition: pending clinical improvement  Discussed with: patient and RN Polly Hinds MD   9:16 PM on 12/1/2018

## 2018-12-03 ENCOUNTER — APPOINTMENT (OUTPATIENT)
Dept: CARDIOLOGY | Facility: HOSPITAL | Age: 73
End: 2018-12-03
Attending: INTERNAL MEDICINE

## 2018-12-03 LAB
MAXIMAL PREDICTED HEART RATE: 148 BPM
STRESS TARGET HR: 126 BPM

## 2018-12-03 PROCEDURE — 99232 SBSQ HOSP IP/OBS MODERATE 35: CPT | Performed by: NURSE PRACTITIONER

## 2018-12-03 PROCEDURE — 94799 UNLISTED PULMONARY SVC/PX: CPT

## 2018-12-03 PROCEDURE — 25010000002 CEFTRIAXONE SODIUM-DEXTROSE 1-3.74 GM-%(50ML) RECONSTITUTED SOLUTION: Performed by: INTERNAL MEDICINE

## 2018-12-03 PROCEDURE — 97165 OT EVAL LOW COMPLEX 30 MIN: CPT

## 2018-12-03 PROCEDURE — 97116 GAIT TRAINING THERAPY: CPT

## 2018-12-03 PROCEDURE — 25010000002 ENOXAPARIN PER 10 MG: Performed by: INTERNAL MEDICINE

## 2018-12-03 PROCEDURE — 93306 TTE W/DOPPLER COMPLETE: CPT

## 2018-12-03 PROCEDURE — 25010000002 METHYLPREDNISOLONE PER 40 MG: Performed by: INTERNAL MEDICINE

## 2018-12-03 RX ORDER — ALBUTEROL SULFATE 2.5 MG/3ML
2.5 SOLUTION RESPIRATORY (INHALATION) EVERY 4 HOURS PRN
COMMUNITY
End: 2019-01-21

## 2018-12-03 RX ORDER — GUAIFENESIN 400 MG/1
600 TABLET ORAL EVERY 12 HOURS
COMMUNITY
End: 2018-12-06 | Stop reason: HOSPADM

## 2018-12-03 RX ADMIN — HYDROCODONE POLISTIREX AND CHLORPHENIRAMINE POLISTIREX 2.5 ML: 10; 8 SUSPENSION, EXTENDED RELEASE ORAL at 17:14

## 2018-12-03 RX ADMIN — METOPROLOL TARTRATE 25 MG: 25 TABLET ORAL at 20:35

## 2018-12-03 RX ADMIN — CYCLOBENZAPRINE HYDROCHLORIDE 10 MG: 10 TABLET, FILM COATED ORAL at 09:11

## 2018-12-03 RX ADMIN — GABAPENTIN 100 MG: 100 CAPSULE ORAL at 15:14

## 2018-12-03 RX ADMIN — VENLAFAXINE 75 MG: 75 TABLET ORAL at 17:15

## 2018-12-03 RX ADMIN — BENZONATATE 100 MG: 100 CAPSULE, LIQUID FILLED ORAL at 15:13

## 2018-12-03 RX ADMIN — SODIUM CHLORIDE, PRESERVATIVE FREE 3 ML: 5 INJECTION INTRAVENOUS at 20:34

## 2018-12-03 RX ADMIN — HYDROCODONE BITARTRATE AND ACETAMINOPHEN 1 TABLET: 10; 325 TABLET ORAL at 00:52

## 2018-12-03 RX ADMIN — NYSTATIN 400000 UNITS: 500000 SUSPENSION ORAL at 17:15

## 2018-12-03 RX ADMIN — GUAIFENESIN 600 MG: 600 TABLET, EXTENDED RELEASE ORAL at 09:10

## 2018-12-03 RX ADMIN — IPRATROPIUM BROMIDE AND ALBUTEROL SULFATE 3 ML: .5; 3 SOLUTION RESPIRATORY (INHALATION) at 19:04

## 2018-12-03 RX ADMIN — GABAPENTIN 100 MG: 100 CAPSULE ORAL at 09:11

## 2018-12-03 RX ADMIN — METHYLPREDNISOLONE SODIUM SUCCINATE 40 MG: 40 INJECTION, POWDER, FOR SOLUTION INTRAMUSCULAR; INTRAVENOUS at 20:34

## 2018-12-03 RX ADMIN — LORAZEPAM 0.5 MG: 0.5 TABLET ORAL at 04:34

## 2018-12-03 RX ADMIN — ENOXAPARIN SODIUM 30 MG: 30 INJECTION SUBCUTANEOUS at 20:35

## 2018-12-03 RX ADMIN — MIRTAZAPINE 30 MG: 15 TABLET, FILM COATED ORAL at 20:35

## 2018-12-03 RX ADMIN — METOPROLOL TARTRATE 25 MG: 25 TABLET ORAL at 09:11

## 2018-12-03 RX ADMIN — MONTELUKAST SODIUM 10 MG: 10 TABLET, FILM COATED ORAL at 20:35

## 2018-12-03 RX ADMIN — NYSTATIN 400000 UNITS: 500000 SUSPENSION ORAL at 20:34

## 2018-12-03 RX ADMIN — VENLAFAXINE 75 MG: 75 TABLET ORAL at 09:10

## 2018-12-03 RX ADMIN — HYDROCODONE POLISTIREX AND CHLORPHENIRAMINE POLISTIREX 2.5 ML: 10; 8 SUSPENSION, EXTENDED RELEASE ORAL at 04:34

## 2018-12-03 RX ADMIN — IPRATROPIUM BROMIDE AND ALBUTEROL SULFATE 3 ML: .5; 3 SOLUTION RESPIRATORY (INHALATION) at 12:46

## 2018-12-03 RX ADMIN — SODIUM CHLORIDE, PRESERVATIVE FREE 3 ML: 5 INJECTION INTRAVENOUS at 09:11

## 2018-12-03 RX ADMIN — GUAIFENESIN 600 MG: 600 TABLET, EXTENDED RELEASE ORAL at 20:35

## 2018-12-03 RX ADMIN — MELATONIN TAB 3 MG 3 MG: 3 TAB at 20:35

## 2018-12-03 RX ADMIN — IPRATROPIUM BROMIDE AND ALBUTEROL SULFATE 3 ML: .5; 3 SOLUTION RESPIRATORY (INHALATION) at 00:49

## 2018-12-03 RX ADMIN — CEFTRIAXONE 1 G: 1 INJECTION, SOLUTION INTRAVENOUS at 17:16

## 2018-12-03 RX ADMIN — MULTIPLE VITAMINS W/ MINERALS TAB 1 TABLET: TAB at 09:11

## 2018-12-03 RX ADMIN — Medication 1 CAPSULE: at 20:35

## 2018-12-03 RX ADMIN — BUDESONIDE 0.5 MG: 0.5 INHALANT RESPIRATORY (INHALATION) at 19:04

## 2018-12-03 RX ADMIN — HYDROCODONE BITARTRATE AND ACETAMINOPHEN 1 TABLET: 10; 325 TABLET ORAL at 09:11

## 2018-12-03 RX ADMIN — AZITHROMYCIN MONOHYDRATE 250 MG: 250 TABLET ORAL at 09:10

## 2018-12-03 RX ADMIN — HYDROCODONE BITARTRATE AND ACETAMINOPHEN 1 TABLET: 10; 325 TABLET ORAL at 17:15

## 2018-12-03 RX ADMIN — ARFORMOTEROL TARTRATE 15 MCG: 15 SOLUTION RESPIRATORY (INHALATION) at 06:49

## 2018-12-03 RX ADMIN — LORAZEPAM 0.5 MG: 0.5 TABLET ORAL at 15:14

## 2018-12-03 RX ADMIN — NYSTATIN 400000 UNITS: 500000 SUSPENSION ORAL at 09:10

## 2018-12-03 RX ADMIN — PANTOPRAZOLE SODIUM 40 MG: 40 TABLET, DELAYED RELEASE ORAL at 06:07

## 2018-12-03 RX ADMIN — CYANOCOBALAMIN TAB 500 MCG 500 MCG: 500 TAB at 09:10

## 2018-12-03 RX ADMIN — METHYLPREDNISOLONE SODIUM SUCCINATE 40 MG: 40 INJECTION, POWDER, FOR SOLUTION INTRAMUSCULAR; INTRAVENOUS at 09:10

## 2018-12-03 RX ADMIN — BUDESONIDE 0.5 MG: 0.5 INHALANT RESPIRATORY (INHALATION) at 06:49

## 2018-12-03 RX ADMIN — BENZONATATE 100 MG: 100 CAPSULE, LIQUID FILLED ORAL at 09:10

## 2018-12-03 RX ADMIN — ARFORMOTEROL TARTRATE 15 MCG: 15 SOLUTION RESPIRATORY (INHALATION) at 19:04

## 2018-12-03 RX ADMIN — BENZONATATE 100 MG: 100 CAPSULE, LIQUID FILLED ORAL at 00:52

## 2018-12-03 RX ADMIN — Medication 1 CAPSULE: at 09:10

## 2018-12-03 NOTE — PLAN OF CARE
Problem: Patient Care Overview  Goal: Plan of Care Review  Outcome: Ongoing (interventions implemented as appropriate)   12/03/18 0429   Coping/Psychosocial   Plan of Care Reviewed With patient   Plan of Care Review   Progress no change   OTHER   Outcome Summary No acute events overnight. continue to monitor.      Goal: Discharge Needs Assessment  Outcome: Ongoing (interventions implemented as appropriate)      Problem: Pneumonia (Adult)  Goal: Signs and Symptoms of Listed Potential Problems Will be Absent, Minimized or Managed (Pneumonia)  Outcome: Ongoing (interventions implemented as appropriate)      Problem: Pain, Chronic (Adult)  Goal: Acceptable Pain/Comfort Level and Functional Ability  Outcome: Ongoing (interventions implemented as appropriate)

## 2018-12-03 NOTE — PLAN OF CARE
Problem: Patient Care Overview  Goal: Plan of Care Review  Outcome: Ongoing (interventions implemented as appropriate)   12/03/18 1308   Coping/Psychosocial   Plan of Care Reviewed With patient   Plan of Care Review   Progress improving   OTHER   Outcome Summary Pt presents with significant advancement of gait distance to 412'. See flowsheet for details.

## 2018-12-03 NOTE — THERAPY TREATMENT NOTE
Acute Care - Physical Therapy Treatment Note  [facility]     Patient Name: Joelle Yee  : 1945  MRN: [id number]  Today's Date: 12/3/2018  Onset of Illness/Injury or Date of Surgery: 18  Date of Referral to PT: 18  Referring Physician: Maximino Bueno MD    Admit Date: 2018    Visit Dx:    ICD-10-CM ICD-9-CM   1. Pneumonia of both lower lobes due to infectious organism (CMS/[facility]) J18.1 483.8   2. Impaired functional mobility, balance, gait, and endurance Z74.09 V49.89     Patient Active Problem List   Diagnosis   • Dyspepsia   • Esophageal reflux   • Anxiety   • High cholesterol   • Bipolar disorder (CMS/[facility])   • COPD (chronic obstructive pulmonary disease) (CMS/[facility])   • Depression   • Gout   • Hyperlipidemia   • Insomnia   • Osteoarthritis   • Sciatica   • Sleep apnea   • Vitamin B 12 deficiency   • Pancolitis (CMS/[facility])   • Acute cystitis without hematuria   • C. difficile colitis   • Chronic bronchitis with COPD (chronic obstructive pulmonary disease) (CMS/[facility])   • Pneumonia of right lower lobe due to infectious organism (CMS/[facility])   • COPD exacerbation (CMS/[facility])   • Pneumonia involving right lung   • COPD with acute exacerbation (CMS/[facility])   • Chronic respiratory failure with hypoxia (CMS/[facility])   • Abdominal pain   • Diarrhea   • Heartburn   • Loss of appetite   • Melena   • Nausea and vomiting   • Pseudogout   • Upset stomach   • Abnormal weight loss   • Chronic obstructive pulmonary disease with acute lower respiratory infection (CMS/[facility])   • Right upper lobe pneumonia (CMS/[facility])   • Acute on chronic respiratory failure with hypoxia (CMS/[facility])   • Moderate malnutrition (CMS/[facility])   • Acute exacerbation of chronic obstructive pulmonary disease (COPD) (CMS/[facility])   • Bronchiectasis without complication (CMS/[facility])   • Pneumonia due to gram-negative bacteria (CMS/[facility])   • Sinus tachycardia   • Leukocytosis   • Serratia marcescens infection (CMS/[facility])   • Pneumonia of both lower lobes due to  infectious organism (CMS/[facility])   • Acute kidney injury (CMS/[facility])   • BMI less than 19,adult       Therapy Treatment    Rehabilitation Treatment Summary     Row Name 12/03/18 1001             Treatment Time/Intention    Discipline  physical therapy assistant  -RM      Document Type  therapy note (daily note)  -RM      Subjective Information  no complaints  -RM      Mode of Treatment  physical therapy  -RM      Care Plan Review  care plan/treatment goals reviewed  -RM      Patient Effort  good  -RM      Existing Precautions/Restrictions  oxygen therapy device and L/min  -RM      Treatment Considerations/Comments  2 lpm   -RM      Recorded by [] Alphonso Main, PTA 12/03/18 1307      Row Name 12/03/18 1001             Vital Signs    Pre SpO2 (%)  100  -RM      O2 Delivery Pre Treatment  supplemental O2  -RM      Post SpO2 (%)  93  -RM      O2 Delivery Post Treatment  supplemental O2  -RM      Recorded by [] Alphonso Main, PTA 12/03/18 1307      Row Name 12/03/18 1200             Bed Mobility Assessment/Treatment    Supine-Sit Isabella (Bed Mobility)  conditional independence  -RM      Assistive Device (Bed Mobility)  head of bed elevated;bed rails  -RM      Recorded by [] Alphonso Main, PTA 12/03/18 1307      Row Name 12/03/18 1200             Sit-Stand Transfer    Sit-Stand [place] (Transfers)  independent  -RM      Recorded by [] Alphonso Main, PTA 12/03/18 1307      Row Name 12/03/18 1200             Stand-Sit Transfer    Stand-Sit [place] (Transfers)  independent  -RM      Recorded by [] Alphonso Main, PTA 12/03/18 1307      Row Name 12/03/18 1200             Gait/Stairs Assessment/Training    [place] Level (Gait)  supervision;independent  -RM      Assistive Device (Gait)  -- HHA   -RM      Distance in Feet (Gait)  412  -RM      Pattern (Gait)  step-through  -RM      Deviations/Abnormal Patterns (Gait)  stride length decreased  -RM      Recorded by [RM] Alphonso Main  JOSÉ, [facility] 12/03/18 1307      Row Name 12/03/18 1200             Positioning and Restraints    Pre-Treatment Position  in bed  -RM      Post Treatment Position  chair  -RM      In Chair  reclined;call light within reach;encouraged to call for assist  -RM      Recorded by [] Alphonso Main, [facility] 12/03/18 1307      Row Name 12/03/18 1200             Pain Scale: Numbers Pre/Post-Treatment    Pain Scale: Numbers, Pretreatment  0/10 - no pain  -RM      Pain Scale: Numbers, Post-Treatment  0/10 - no pain  -RM      Recorded by [] Alphonso Main, [facility] 12/03/18 1307      Row Name 12/03/18 1200             Outcome Summary/Treatment Plan (PT)    Daily Summary of Progress (PT)  progress toward functional goals is good  -      Plan for Continued Treatment (PT)  Cont per POC.   -RM      Recorded by [] Alphonso Main, [facility] 12/03/18 1307        User Key  (r) = Recorded By, (t) = Taken By, (c) = Cosigned By    Initials Name Effective Dates Discipline     Alphonso Main, [facility] 03/07/18 -  PT                   Physical Therapy Education     Title: PT OT SLP Therapies (In Progress)     Topic: Physical Therapy (In Progress)     Point: Mobility training (Done)     Learning Progress Summary           Patient Acceptance, E,TB,D, VU by  at 12/3/2018  1:08 PM    Acceptance, E,TB, VU by  at 12/2/2018  4:50 PM    Comment:  Increase mobility daily    Acceptance, E, VU by  at 12/1/2018  2:39 PM    Comment:  Role of PT and importance of mobility to recovery                               User Key     Initials Effective Dates Name Provider Type Discipline     04/03/18 -  Adelina Jones, PT Physical Therapist PT    CC 03/07/18 -  Callie Major PTA Physical Therapy Assistant PT     03/07/18 -  Alphonso Main, [facility] Physical Therapy Assistant PT                PT Recommendation and Plan     Outcome Summary/Treatment Plan (PT)  Daily Summary of Progress (PT): progress toward functional goals is good  Plan for Continued  Treatment (PT): Cont per POC.   Plan of Care Reviewed With: patient  Progress: improving  Outcome Summary: Pt presents with significant advancement of gait distance to 412'.  See flowsheet for details.   Outcome Measures     Row Name 12/03/18 1001 12/02/18 1210 12/01/18 1234       How much help from another person do you currently need...    Turning from your back to your side while in flat bed without using bedrails?  4  -RM  4  -CC  3  -JR    Moving from lying on back to sitting on the side of a flat bed without bedrails?  4  -RM  4  -CC  3  -JR    Moving to and from a bed to a chair (including a wheelchair)?  4  -RM  3  -CC  3  -JR    Standing up from a chair using your arms (e.g., wheelchair, bedside chair)?  4  -RM  4  -CC  3  -JR    Climbing 3-5 steps with a railing?  3  -RM  3  -CC  3  -JR    To walk in hospital room?  3  -RM  4  -CC  3  -JR    AM-PAC 6 Clicks Score  22  -RM  22  -CC  18  -JR       Functional Assessment    Outcome Measure Options  AM-PAC 6 Clicks Basic Mobility (PT)  -RM  AM-PAC 6 Clicks Basic Mobility (PT)  -CC  AM-PAC 6 Clicks Basic Mobility (PT)  -JR      User Key  (r) = Recorded By, (t) = Taken By, (c) = Cosigned By    Initials Name Provider Type    Adelina Munguia, PT Physical Therapist    CC Callie Major, PTA Physical Therapy Assistant    Alphonso Collins, PTA Physical Therapy Assistant         Time Calculation:   PT Charges     Row Name 12/03/18 1310             Time Calculation    Start Time  1001  -RM      PT Received On  12/03/18  -RM      PT Goal Re-Cert Due Date  12/11/18  -RM         Time Calculation- PT    Total Timed Code Minutes- PT  23 minute(s)  -RM         Timed Charges    50190 - Gait Training Minutes   12  -RM        User Key  (r) = Recorded By, (t) = Taken By, (c) = Cosigned By    Initials Name Provider Type    Alphonso Collins, PTA Physical Therapy Assistant        Therapy Suggested Charges     Code   Minutes Charges    84894 (CPT®) Hc Pt Neuromusc Re  Education Ea 15 Min      21854 (CPT®) Hc Pt Ther Proc Ea 15 Min      06443 (CPT®) Hc Gait Training Ea 15 Min 12 1    06975 (CPT®) Hc Pt Therapeutic Act Ea 15 Min      64589 (CPT®) Hc Pt Manual Therapy Ea 15 Min      51976 (CPT®) Hc Pt Iontophoresis Ea 15 Min      21731 (CPT®) Hc Pt Elec Stim Ea-Per 15 Min      68956 (CPT®) Hc Pt Ultrasound Ea 15 Min      50704 (CPT®) Hc Pt Self Care/Mgmt/Train Ea 15 Min      39535 (CPT®) Hc Pt Prosthetic (S) Train Initial Encounter, Each 15 Min      29157 (CPT®) Hc Pt Orthotic(S)/Prosthetic(S) Encounter, Each 15 Min      99011 (CPT®) Hc Orthotic(S) Mgmt/Train Initial Encounter, Each 15min      Total  12 1        Therapy Charges for Today     Code Description Service Date Service Provider Modifiers Qty    [id number] HC GAIT TRAINING EA 15 MIN 12/3/2018 Alphonso Main, PTA GP 1          PT G-Codes  Outcome Measure Options: AM-PAC 6 Clicks Basic Mobility (PT)  AM-PAC 6 Clicks Score: 22    Alphonso Main, GABRIEL  12/3/2018

## 2018-12-03 NOTE — THERAPY DISCHARGE NOTE
Acute Care - Occupational Therapy Initial Eval/Discharge   [facility]     Patient Name: Joelle Yee  : 1945  MRN: [id number]  Today's Date: 12/3/2018  Onset of Illness/Injury or Date of Surgery: 18  Date of Referral to OT: 18  Referring Physician: Dr. Hinds      Admit Date: 2018       ICD-10-CM ICD-9-CM   1. Pneumonia of both lower lobes due to infectious organism (CMS/[facility]) J18.1 483.8   2. Impaired functional mobility, balance, gait, and endurance Z74.09 V49.89   3. Impaired mobility and ADLs Z74.09 799.89     Patient Active Problem List   Diagnosis   • Dyspepsia   • Esophageal reflux   • Anxiety   • High cholesterol   • Bipolar disorder (CMS/HCC)   • COPD (chronic obstructive pulmonary disease) (CMS/[facility])   • Depression   • Gout   • Hyperlipidemia   • Insomnia   • Osteoarthritis   • Sciatica   • Sleep apnea   • Vitamin B 12 deficiency   • Pancolitis (CMS/[facility])   • Acute cystitis without hematuria   • C. difficile colitis   • Chronic bronchitis with COPD (chronic obstructive pulmonary disease) (CMS/[facility])   • Pneumonia of right lower lobe due to infectious organism (CMS/[facility])   • COPD exacerbation (CMS/[facility])   • Pneumonia involving right lung   • COPD with acute exacerbation (CMS/[facility])   • Chronic respiratory failure with hypoxia (CMS/[facility])   • Abdominal pain   • Diarrhea   • Heartburn   • Loss of appetite   • Melena   • Nausea and vomiting   • Pseudogout   • Upset stomach   • Abnormal weight loss   • Chronic obstructive pulmonary disease with acute lower respiratory infection (CMS/[facility])   • Right upper lobe pneumonia (CMS/[facility])   • Acute on chronic respiratory failure with hypoxia (CMS/[facility])   • Moderate malnutrition (CMS/HCC)   • Acute exacerbation of chronic obstructive pulmonary disease (COPD) (CMS/[facility])   • Bronchiectasis without complication (CMS/[facility])   • Pneumonia due to gram-negative bacteria (CMS/[facility])   • Sinus tachycardia   • Leukocytosis   • Serratia marcescens infection  (CMS/HCC)   • Pneumonia of both lower lobes due to infectious organism (CMS/HCC)   • Acute kidney injury (CMS/HCC)   • BMI less than 19,adult     Past Medical History:   Diagnosis Date   • Abdominal pain    • Acute bronchitis    • Ankle pain    • Anxiety 1980   • Atopic dermatitis    • Bronchiectasis (CMS/HCC)    • Cataract, bilateral    • Chest pain     STATES HAS OCCASSIONALLY.  STATES LAST TIME WAS LAST WEEK.  STATES SEES DR. RICH.  STATES HE HAS DONE NUMEROUS TESTS ON HER AND HAS NOT BEEN ABLE TO FIND OUT CAUSE.     • Chronic obstructive lung disease (CMS/HCC) 2008   • Colon cancer screening    • COPD (chronic obstructive pulmonary disease) (CMS/[facility])    • Cystocele    • Depressed    • Depression 1980   • Fatigue     Chronic pafigue 2012   • Fibromyalgia 2008   • Full dentures    • GERD (gastroesophageal reflux disease)    • Gout    • Heartburn     Chronic historu of epigastric heartburn currently controlled on Prilesec 40 mg every morning along with Zantac 300 Mg daily at bedtime   • High cholesterol 2012   • Hip pain    • Hyperlipidemia    • Hypertension    • Insomnia    • Joint pain    • Kidney infection    • Loss of appetite    • Low back pain 1995   • Melena    • Nausea and vomiting    • On home oxygen therapy     2L/NC PRN (STATES SHE HAS BEEN USING CONTINUOSLY LATELY)   • Osteoarthritis 2010   • Pain in limb    • Palpitations    • Pinched nerve     Pinched nerves 2011   • Pneumonia    • Problems with swallowing     HAS TO EAT SLOW AND CHEW FOOD WELL   • Recurrent urinary tract infection    • Sciatica [id number]   • Shortness of breath    • Sinus problem    • Sleep apnea 1998    NO CPAP   • Upset stomach    • Valvular heart disease    • Vitamin B12 deficiency    • Wears glasses    • Weight loss, abnormal     Weight loss is stabel. On pund weight gain since 01/2016     Past Surgical History:   Procedure Laterality Date   • ABDOMINAL HERNIA REPAIR  2016   • APPENDECTOMY  1965   • BACK SURGERY  2005   •  CATARACT EXTRACTION Bilateral     Put in implants    • CHOLECYSTECTOMY  1985   • COLONOSCOPY     • ENDOSCOPY     • KNEE SURGERY Left 1979    Knee Cap   • TUBAL ABDOMINAL LIGATION  1971          OT ASSESSMENT FLOWSHEET (last 72 hours)      Occupational Therapy Evaluation     Row Name 12/03/18 0958                   OT Evaluation Time/Intention    Subjective Information  no complaints  -        Document Type  discharge evaluation/summary  -        Mode of Treatment  occupational therapy  -        Patient Effort  good  -           General Information    Patient Profile Reviewed?  yes  -        Onset of Illness/Injury or Date of Surgery  11/30/18  [facility]        Referring Physician  Dr. Hinds  -        Patient Observations  alert;cooperative;agree to therapy  -        Patient/Family Observations  pt alone  -        General Observations of Patient  Pt received sitting at eob  -        Prior Level of Function  independent:;all household mobility;ADL's  -        Equipment Currently Used at Home  rollator;oxygen;walker, standard;shower chair;hospital bed  -        Pertinent History of Current Functional Problem  BLL pneumonia, COPD, ARF, HTN  -        Existing Precautions/Restrictions  oxygen therapy device and L/min  -        Risks Reviewed  patient:;increased discomfort  -        Benefits Reviewed  patient:;improve function;increase independence;increase strength  -           Relationship/Environment    Lives With  alone  -           Resource/Environmental Concerns    Current Living Arrangements  home/apartment/condo  -           Cognitive Assessment/Intervention- PT/OT    Orientation Status (Cognition)  oriented x 4  -        Follows Commands (Cognition)  WFL  -           Bed Mobility Assessment/Treatment    Comment (Bed Mobility)  Pt sitting eob upon arrival  [facility]           Functional Mobility    Functional Mobility- Ind. Level  other (see comments) standby assist  -         Functional Mobility-Distance (Feet)  412  -        Functional Mobility- Safety Issues  supplemental O2  -           Transfer Assessment/Treatment    Transfer Assessment/Treatment  sit-stand transfer;stand-sit transfer  -           Sit-Stand Transfer    Sit-Stand [place] (Transfers)  independent  -           Stand-Sit Transfer    Stand-Sit [place] (Transfers)  independent  -           ADL Assessment/Intervention    BADL Assessment/Intervention  bathing;upper body dressing;lower body dressing;grooming;feeding;toileting  -           Bathing Assessment/Intervention    Bathing [place] Level  set up  -           Upper Body Dressing Assessment/Training    Upper Body Dressing [place] Level  independent  -           Lower Body Dressing Assessment/Training    Lower Body Dressing [place] Level  set up  -           Grooming Assessment/Training    [place] Level (Grooming)  independent  -           Self-Feeding Assessment/Training    [place] Level (Feeding)  independent  -           Toileting Assessment/Training    [place] Level (Toileting)  independent  -           General ROM    GENERAL ROM COMMENTS  E WFL  -           MMT (Manual Muscle Testing)    General MMT Comments  BUEWFL  -           Positioning and Restraints    Pre-Treatment Position  in bed  -        Post Treatment Position  chair  -        In Chair  reclined;call light within reach;encouraged to call for assist  -           Pain Scale: Numbers Pre/Post-Treatment    Pain Scale: Numbers, Pretreatment  0/10 - no pain  -        Pain Scale: Numbers, Post-Treatment  0/10 - no pain  -           Coping    Observed Emotional State  accepting;cooperative  -        Verbalized Emotional State  acceptance  -           Plan of Care Review    Plan of Care Reviewed With  patient  -           Clinical Impression (OT)    Date of Referral to OT  12/01/18  -        Patient/Family Goals Statement (OT  Eval)  Pt wants to d/c home  -        Therapy Frequency (OT Eval)  evaluation only  -        Care Plan Review (OT)  evaluation/treatment results reviewed;patient/other agree to care plan  -        Anticipated Discharge Disposition (OT)  home  -           Vital Signs    Pre SpO2 (%)  100  -        O2 Delivery Pre Treatment  supplemental O2 2L  -AH        Post SpO2 (%)  93  -AH        O2 Delivery Post Treatment  supplemental O2 2L  -AH          User Key  (r) = Recorded By, (t) = Taken By, (c) = Cosigned By    Initials Name Effective Dates    Jonna Oneill 03/07/18 -           Occupational Therapy Education     Title: PT OT SLP Therapies (In Progress)     Topic: Occupational Therapy (Resolved)     Point: ADL training (Resolved)     Description: Instruct learner(s) on proper safety adaptation and remediation techniques during self care or transfers.   Instruct in proper use of assistive devices.    Learning Progress Summary           Patient Acceptance, E, VU by  at 12/3/2018  1:36 PM    Comment:  Role of OT                               User Key     Initials Effective Dates Name Provider Type Discipline     03/07/18 -  Jonna Callahan Occupational Therapist OT                OT Recommendation and Plan  Outcome Summary/Treatment Plan (OT)  Anticipated Discharge Disposition (OT): home  Therapy Frequency (OT Eval): evaluation only  Plan of Care Review  Plan of Care Reviewed With: patient  Plan of Care Reviewed With: patient         Outcome Measures     Row Name 12/03/18 1001 12/03/18 0958 12/02/18 1210       How much help from another person do you currently need...    Turning from your back to your side while in flat bed without using bedrails?  4  -RM  --  4  -CC    Moving from lying on back to sitting on the side of a flat bed without bedrails?  4  -RM  --  4  -CC    Moving to and from a bed to a chair (including a wheelchair)?  4  -RM  --  3  -CC    Standing up from a chair using your arms (e.g.,  wheelchair, bedside chair)?  4  -RM  --  4  -CC    Climbing 3-5 steps with a railing?  3  -RM  --  3  -CC    To walk in hospital room?  3  -RM  --  4  -CC    AM-PAC 6 Clicks Score  22  -RM  --  22  -CC       How much help from another is currently needed...    Putting on and taking off regular lower body clothing?  --  4  -AH  --    Bathing (including washing, rinsing, and drying)  --  4  -AH  --    Toileting (which includes using toilet bed pan or urinal)  --  4  -AH  --    Putting on and taking off regular upper body clothing  --  4  -AH  --    Taking care of personal grooming (such as brushing teeth)  --  4  -AH  --    Eating meals  --  4  -AH  --    Score  --  24  -AH  --       Functional Assessment    Outcome Measure Options  AM-PAC 6 Clicks Basic Mobility (PT)  -  AM-PAC 6 Clicks Daily Activity (OT)  -  AM-PAC 6 Clicks Basic Mobility (PT)  -CC    Row Name 12/01/18 1234             How much help from another person do you currently need...    Turning from your back to your side while in flat bed without using bedrails?  3  -JR      Moving from lying on back to sitting on the side of a flat bed without bedrails?  3  -JR      Moving to and from a bed to a chair (including a wheelchair)?  3  -JR      Standing up from a chair using your arms (e.g., wheelchair, bedside chair)?  3  -JR      Climbing 3-5 steps with a railing?  3  -JR      To walk in hospital room?  3  -JR      AM-PAC 6 Clicks Score  18  -JR         Functional Assessment    Outcome Measure Options  AM-[facility] 6 Clicks Basic Mobility (PT)  -JR        User Key  (r) = Recorded By, (t) = Taken By, (c) = Cosigned By    Initials Name Provider Type    Jonna Oneill Occupational Therapist    Adelina Munguia, PT Physical Therapist    CC Callie Major, PTA Physical Therapy Assistant    RM Alphonso Main, PTA Physical Therapy Assistant          Time Calculation:   Time Calculation- OT     Row Name 12/03/18 1338 12/03/18 1310          Time Calculation-  OT    OT Start Time  0958 -  --     OT Received On  12/03/18  -  --        Timed Charges    73974 - Gait Training Minutes   --  12  -RM       User Key  (r) = Recorded By, (t) = Taken By, (c) = Cosigned By    Initials Name Provider Type    Jonna Oneill Occupational Therapist    Alphonso Collins, PTA Physical Therapy Assistant        Therapy Suggested Charges     Code   Minutes Charges    None           Therapy Charges for Today     Code Description Service Date Service Provider Modifiers Qty    [id number]  OT EVAL LOW COMPLEXITY 4 12/3/2018 Jonna Callahan GO 1               OT Discharge Summary  Anticipated Discharge Disposition (OT): home    Jonna Callahan  12/3/2018

## 2018-12-03 NOTE — PROGRESS NOTES
Discharge Planning Assessment  [facility]     Patient Name: Joelle Yee  MRN: [id number]  Today's Date: 12/3/2018    Admit Date: 11/30/2018    Discharge Needs Assessment    No documentation.       Discharge Plan     Row Name 12/03/18 1012       Plan    Plan Comments  Pt is on oxygen 2 liters continously   [facility] plans on returning home with HH        Destination      No service coordination in this encounter.      Durable Medical Equipment      No service coordination in this encounter.      Dialysis/Infusion      No service coordination in this encounter.      Home Medical Care      No service coordination in this encounter.      Community Resources      No service coordination in this encounter.        Expected Discharge Date and Time     Expected Discharge Date Expected Discharge Time    Dec 4, 2018         Demographic Summary    No documentation.       Functional Status    No documentation.       Psychosocial    No documentation.       Abuse/Neglect    No documentation.       Legal    No documentation.       Substance Abuse    No documentation.       Patient Forms    No documentation.           Sydney Kendrick RN

## 2018-12-03 NOTE — PLAN OF CARE
Problem: Patient Care Overview  Goal: Plan of Care Review  Outcome: Ongoing (interventions implemented as appropriate)   12/03/18 8713   Coping/Psychosocial   Plan of Care Reviewed With patient   Plan of Care Review   Progress no change   OTHER   Outcome Summary OT evaluation completed. Pt has no skilled OT needs at this time.

## 2018-12-03 NOTE — PROGRESS NOTES
PROGRESS NOTE        Date of Admission: 11/30/2018  Length of Stay: 3  Primary Care Physician: Blanquita Clements PA    Subjective   Chief Complaint: Follow up pneumonia  HPI: This is a 72-year-old female with history of bronchiectasis and COPD who is on oxygen 2 L at home.  She was admitted for COPD exacerbation and was noted on CT scan of the chest to have evidence of bronchopneumonia in the lung bases with small area consolidation within the lingula.  She also stated have emphysematous disease as well.  She has been getting Rocephin and azithromycin.  She has been slow to improve and she was admitted on 11/30/18.  She is feeling some better but continues to have cough and congestion.  I have ordered a flutter valve for the patient.  She denies any chest pain, abdominal pain nausea or vomiting.           Review Of Systems:   Review of Systems   General ROS: Patient denies any fevers, chills or loss of consciousness.    Psychological ROS: Denies any hallucinations and delusions.  Ophthalmic ROS: Denies any diplopia or transient loss of vision.  ENT ROS: Denies sore throat, nasal congestion or ear pain.   Hematological and Lymphatic ROS: Denies neck swelling or easy bleeding.  Endocrine ROS: Denies any recent unintentional weight gain or loss.  Respiratory ROS   cough and shortness of breath.   Cardiovascular ROS: Denies chest pain or palpitations. No history of exertional chest pain.   Gastrointestinal ROS: Denies nausea and vomiting. Denies any abdominal pain. No diarrhea.  Genito-Urinary ROS: Denies dysuria or hematuria.  Musculoskeletal ROS: Denies back pain. No muscle pain. No calf pain.   Neurological ROS: Denies any focal weakness. No loss of consciousness. Denies any numbness. Denies neck pain.   Dermatological ROS: Denies any redness or pruritis.    Objective      Temp:  [97 °F (36.1 °C)-98.4 °F (36.9 °C)] 98 °F (36.7 °C)  Heart Rate:  [] 94  Resp:  [18-20] 18  BP: (108-138)/(53-70) 128/70  Physical  Exam    General Appearance:  Alert and cooperative, not in any acute distress.   Head:  Atraumatic and normocephalic, without obvious abnormality.   Eyes:          PERRLA, conjunctivae and sclerae normal, no Icterus. No pallor. Extraocular movements are within normal limits.   Ears:  Ears appear intact with no abnormalities noted.   Throat: No oral lesions, no thrush, oral mucosa moist.   Neck: Supple, trachea midline, no thyromegaly, no carotid bruit.       Lungs:   Chest shape is normal. Breath sounds heard bilaterally equally. Bilateral crackles no wheezing. No Pleural rub or bronchial breathing.   Heart:  Normal S1 and S2, no murmur, no gallop, no rub. No JVD   Abdomen:   Normal bowel sounds, no masses, no organomegaly. Soft    non-tender, non-distended, no guarding, no rebound             tenderness   Extremities: Moves all extremities well, no edema, no cyanosis, no clubbing.   Pulses: Pulses palpable and equal bilaterally   Skin: No bleeding, bruising or rash       Neurologic:    Psychiatric/Behavior:     Cranial nerves 2 - 12 grossly intact, sensation intact, Motor power is normal and equal bilaterally.  Mood normal, behavior normal       Results Review:    I have reviewed the labs, radiology results and diagnostic studies.    Results from last 7 days   Lab Units  12/02/18   0534   WBC 10*3/mm3  9.61   HEMOGLOBIN g/dL  9.2*   PLATELETS 10*3/mm3  205     Results from last 7 days   Lab Units  12/02/18   0534   SODIUM mmol/L  138   POTASSIUM mmol/L  4.3   CO2 mmol/L  25.0*   CREATININE mg/dL  0.70   GLUCOSE mg/dL  138*       Culture Data:   Blood Culture   Date Value Ref Range Status   11/30/2018 No growth at 2 days  Preliminary   11/30/2018 No growth at 2 days  Preliminary     Radiology Data:   Cardiology Data:    I have reviewed the medications.    Assessment/Plan     Assessment and Plan:  1.  Bilateral community acquired pneumonia-patient's on antibiotics in the form Rocephin and azithromycin.  I have added  a flutter valve.  She is getting bronchodilators, mucolytics, nebulized steroids as well as IV steroids.  Sputum culture as well as blood cultures have been ordered which are pending.    2.  COPD with exacerbation-same treatment is #1    3.  Thrush-patient is on nystatin-    4.  Acute renal failure likely secondary to dehydration-  Resolved    5.  Malnutrition with a BMI less than 19-nutrition consult.    6.  Chronic essential hypertension-blood pressure stable continue to monitor          DVT prophylaxis:  Lovenox  Discharge Planning:   DWAYNE Bhatt 12/03/18 3:10 PM

## 2018-12-04 LAB
ALBUMIN SERPL-MCNC: 3.5 G/DL (ref 3.5–5)
ALBUMIN/GLOB SERPL: 1.2 G/DL (ref 1–2)
ALP SERPL-CCNC: 138 U/L (ref 38–126)
ALT SERPL W P-5'-P-CCNC: 45 U/L (ref 13–69)
ANION GAP SERPL CALCULATED.3IONS-SCNC: 9.7 MMOL/L (ref 10–20)
AST SERPL-CCNC: 38 U/L (ref 15–46)
BACTERIA SPEC RESP CULT: NORMAL
BASOPHILS # BLD AUTO: 0.05 10*3/MM3 (ref 0–0.2)
BASOPHILS NFR BLD AUTO: 0.7 % (ref 0–2.5)
BILIRUB SERPL-MCNC: 0.3 MG/DL (ref 0.2–1.3)
BUN BLD-MCNC: 22 MG/DL (ref 7–20)
BUN/CREAT SERPL: 31.4 (ref 7.1–23.5)
CALCIUM SPEC-SCNC: 9.2 MG/DL (ref 8.4–10.2)
CHLORIDE SERPL-SCNC: 98 MMOL/L (ref 98–107)
CO2 SERPL-SCNC: 34 MMOL/L (ref 26–30)
CREAT BLD-MCNC: 0.7 MG/DL (ref 0.6–1.3)
DEPRECATED RDW RBC AUTO: 53 FL (ref 37–54)
EOSINOPHIL # BLD AUTO: 0 10*3/MM3 (ref 0–0.7)
EOSINOPHIL NFR BLD AUTO: 0 % (ref 0–7)
ERYTHROCYTE [DISTWIDTH] IN BLOOD BY AUTOMATED COUNT: 14.7 % (ref 11.5–14.5)
GFR SERPL CREATININE-BSD FRML MDRD: 82 ML/MIN/1.73
GLOBULIN UR ELPH-MCNC: 3 GM/DL
GLUCOSE BLD-MCNC: 126 MG/DL (ref 74–98)
GRAM STN SPEC: NORMAL
HCT VFR BLD AUTO: 32.1 % (ref 37–47)
HGB BLD-MCNC: 10.1 G/DL (ref 12–16)
IMM GRANULOCYTES # BLD: 0.59 10*3/MM3 (ref 0–0.06)
IMM GRANULOCYTES NFR BLD: 7.7 % (ref 0–0.6)
LYMPHOCYTES # BLD AUTO: 0.94 10*3/MM3 (ref 0.6–3.4)
LYMPHOCYTES NFR BLD AUTO: 12.3 % (ref 10–50)
MCH RBC QN AUTO: 30.6 PG (ref 27–31)
MCHC RBC AUTO-ENTMCNC: 31.5 G/DL (ref 30–37)
MCV RBC AUTO: 97.3 FL (ref 81–99)
MONOCYTES # BLD AUTO: 0.53 10*3/MM3 (ref 0–0.9)
MONOCYTES NFR BLD AUTO: 6.9 % (ref 0–12)
NEUTROPHILS # BLD AUTO: 5.53 10*3/MM3 (ref 2–6.9)
NEUTROPHILS NFR BLD AUTO: 72.4 % (ref 37–80)
NRBC BLD MANUAL-RTO: 0 /100 WBC (ref 0–0)
PLATELET # BLD AUTO: 269 10*3/MM3 (ref 130–400)
PMV BLD AUTO: 9.8 FL (ref 6–12)
POTASSIUM BLD-SCNC: 4.7 MMOL/L (ref 3.5–5.1)
PROT SERPL-MCNC: 6.5 G/DL (ref 6.3–8.2)
RBC # BLD AUTO: 3.3 10*6/MM3 (ref 4.2–5.4)
SODIUM BLD-SCNC: 137 MMOL/L (ref 137–145)
WBC NRBC COR # BLD: 7.64 10*3/MM3 (ref 4.8–10.8)

## 2018-12-04 PROCEDURE — 94799 UNLISTED PULMONARY SVC/PX: CPT

## 2018-12-04 PROCEDURE — 80053 COMPREHEN METABOLIC PANEL: CPT | Performed by: NURSE PRACTITIONER

## 2018-12-04 PROCEDURE — 85007 BL SMEAR W/DIFF WBC COUNT: CPT | Performed by: NURSE PRACTITIONER

## 2018-12-04 PROCEDURE — 99232 SBSQ HOSP IP/OBS MODERATE 35: CPT | Performed by: NURSE PRACTITIONER

## 2018-12-04 PROCEDURE — 25010000002 CEFTRIAXONE SODIUM-DEXTROSE 1-3.74 GM-%(50ML) RECONSTITUTED SOLUTION: Performed by: NURSE PRACTITIONER

## 2018-12-04 PROCEDURE — 85025 COMPLETE CBC W/AUTO DIFF WBC: CPT | Performed by: NURSE PRACTITIONER

## 2018-12-04 PROCEDURE — 25010000002 METHYLPREDNISOLONE PER 40 MG: Performed by: INTERNAL MEDICINE

## 2018-12-04 PROCEDURE — 25010000002 ENOXAPARIN PER 10 MG: Performed by: INTERNAL MEDICINE

## 2018-12-04 RX ADMIN — NYSTATIN 400000 UNITS: 500000 SUSPENSION ORAL at 08:40

## 2018-12-04 RX ADMIN — HYDROCODONE BITARTRATE AND ACETAMINOPHEN 1 TABLET: 10; 325 TABLET ORAL at 17:22

## 2018-12-04 RX ADMIN — MONTELUKAST SODIUM 10 MG: 10 TABLET, FILM COATED ORAL at 21:09

## 2018-12-04 RX ADMIN — GUAIFENESIN 600 MG: 600 TABLET, EXTENDED RELEASE ORAL at 08:40

## 2018-12-04 RX ADMIN — METHYLPREDNISOLONE SODIUM SUCCINATE 40 MG: 40 INJECTION, POWDER, FOR SOLUTION INTRAMUSCULAR; INTRAVENOUS at 08:40

## 2018-12-04 RX ADMIN — CYCLOBENZAPRINE HYDROCHLORIDE 10 MG: 10 TABLET, FILM COATED ORAL at 08:41

## 2018-12-04 RX ADMIN — IPRATROPIUM BROMIDE AND ALBUTEROL SULFATE 3 ML: .5; 3 SOLUTION RESPIRATORY (INHALATION) at 00:42

## 2018-12-04 RX ADMIN — CYANOCOBALAMIN TAB 500 MCG 500 MCG: 500 TAB at 08:41

## 2018-12-04 RX ADMIN — MELATONIN TAB 3 MG 3 MG: 3 TAB at 21:08

## 2018-12-04 RX ADMIN — ARFORMOTEROL TARTRATE 15 MCG: 15 SOLUTION RESPIRATORY (INHALATION) at 19:36

## 2018-12-04 RX ADMIN — SODIUM CHLORIDE, PRESERVATIVE FREE 3 ML: 5 INJECTION INTRAVENOUS at 08:45

## 2018-12-04 RX ADMIN — IPRATROPIUM BROMIDE AND ALBUTEROL SULFATE 3 ML: .5; 3 SOLUTION RESPIRATORY (INHALATION) at 07:11

## 2018-12-04 RX ADMIN — METOPROLOL TARTRATE 25 MG: 25 TABLET ORAL at 21:08

## 2018-12-04 RX ADMIN — VENLAFAXINE 75 MG: 75 TABLET ORAL at 08:41

## 2018-12-04 RX ADMIN — AZITHROMYCIN MONOHYDRATE 250 MG: 250 TABLET ORAL at 08:40

## 2018-12-04 RX ADMIN — CEFTRIAXONE 1 G: 1 INJECTION, SOLUTION INTRAVENOUS at 17:22

## 2018-12-04 RX ADMIN — SODIUM CHLORIDE, PRESERVATIVE FREE 3 ML: 5 INJECTION INTRAVENOUS at 21:09

## 2018-12-04 RX ADMIN — PANTOPRAZOLE SODIUM 40 MG: 40 TABLET, DELAYED RELEASE ORAL at 06:41

## 2018-12-04 RX ADMIN — Medication 1 CAPSULE: at 08:41

## 2018-12-04 RX ADMIN — BUDESONIDE 0.5 MG: 0.5 INHALANT RESPIRATORY (INHALATION) at 07:11

## 2018-12-04 RX ADMIN — MULTIPLE VITAMINS W/ MINERALS TAB 1 TABLET: TAB at 08:40

## 2018-12-04 RX ADMIN — NYSTATIN 400000 UNITS: 500000 SUSPENSION ORAL at 11:44

## 2018-12-04 RX ADMIN — NYSTATIN 400000 UNITS: 500000 SUSPENSION ORAL at 17:22

## 2018-12-04 RX ADMIN — BENZONATATE 100 MG: 100 CAPSULE, LIQUID FILLED ORAL at 17:22

## 2018-12-04 RX ADMIN — Medication 1 CAPSULE: at 21:10

## 2018-12-04 RX ADMIN — LORAZEPAM 0.5 MG: 0.5 TABLET ORAL at 15:31

## 2018-12-04 RX ADMIN — ENOXAPARIN SODIUM 30 MG: 30 INJECTION SUBCUTANEOUS at 21:10

## 2018-12-04 RX ADMIN — GUAIFENESIN 600 MG: 600 TABLET, EXTENDED RELEASE ORAL at 21:09

## 2018-12-04 RX ADMIN — ARFORMOTEROL TARTRATE 15 MCG: 15 SOLUTION RESPIRATORY (INHALATION) at 07:11

## 2018-12-04 RX ADMIN — BENZONATATE 100 MG: 100 CAPSULE, LIQUID FILLED ORAL at 08:40

## 2018-12-04 RX ADMIN — MIRTAZAPINE 30 MG: 15 TABLET, FILM COATED ORAL at 21:08

## 2018-12-04 RX ADMIN — NYSTATIN 400000 UNITS: 500000 SUSPENSION ORAL at 21:09

## 2018-12-04 RX ADMIN — VENLAFAXINE 75 MG: 75 TABLET ORAL at 17:22

## 2018-12-04 RX ADMIN — HYDROCODONE BITARTRATE AND ACETAMINOPHEN 1 TABLET: 10; 325 TABLET ORAL at 08:40

## 2018-12-04 RX ADMIN — IPRATROPIUM BROMIDE AND ALBUTEROL SULFATE 3 ML: .5; 3 SOLUTION RESPIRATORY (INHALATION) at 12:49

## 2018-12-04 RX ADMIN — BUDESONIDE 0.5 MG: 0.5 INHALANT RESPIRATORY (INHALATION) at 19:35

## 2018-12-04 RX ADMIN — IPRATROPIUM BROMIDE AND ALBUTEROL SULFATE 3 ML: .5; 3 SOLUTION RESPIRATORY (INHALATION) at 19:35

## 2018-12-04 RX ADMIN — METOPROLOL TARTRATE 25 MG: 25 TABLET ORAL at 08:40

## 2018-12-04 RX ADMIN — METHYLPREDNISOLONE SODIUM SUCCINATE 40 MG: 40 INJECTION, POWDER, FOR SOLUTION INTRAMUSCULAR; INTRAVENOUS at 21:09

## 2018-12-04 NOTE — PLAN OF CARE
Problem: Patient Care Overview  Goal: Plan of Care Review  Outcome: Ongoing (interventions implemented as appropriate)   12/04/18 0412   Coping/Psychosocial   Plan of Care Reviewed With patient   Plan of Care Review   Progress no change   OTHER   Outcome Summary No acute events overnight. Patient's breathing has improved some. Continue to monitor.      Goal: Discharge Needs Assessment  Outcome: Ongoing (interventions implemented as appropriate)      Problem: Pneumonia (Adult)  Goal: Signs and Symptoms of Listed Potential Problems Will be Absent, Minimized or Managed (Pneumonia)  Outcome: Ongoing (interventions implemented as appropriate)      Problem: Pain, Chronic (Adult)  Goal: Acceptable Pain/Comfort Level and Functional Ability  Outcome: Ongoing (interventions implemented as appropriate)      Problem: Skin Injury Risk (Adult)  Goal: Identify Related Risk Factors and Signs and Symptoms  Outcome: Outcome(s) achieved Date Met: 12/04/18    Goal: Skin Health and Integrity  Outcome: Ongoing (interventions implemented as appropriate)

## 2018-12-04 NOTE — SIGNIFICANT NOTE
12/04/18 1342   Rehab Treatment   Discipline physical therapy assistant   Reason Treatment Not Performed unavailable for treatment  (Pt bathing . Will f/u with pt at a later time. )

## 2018-12-04 NOTE — PROGRESS NOTES
PROGRESS NOTE        Date of Admission: 11/30/2018  Length of Stay: 4  Primary Care Physician: Blanquita Clements PA    Subjective   Chief Complaint: Follow up pneumonia  HPI: This is a 72-year-old female with history of bronchiectasis and COPD who is on oxygen 2 L at home.  She was admitted for COPD exacerbation and was noted on CT scan of the chest to have evidence of bronchopneumonia in the lung bases with small area consolidation within the lingula.  She also stated have emphysematous disease as well.  She has been getting Rocephin and azithromycin.  She has been slow to improve and she was admitted on 11/30/18.  She is feeling some better but continues to have cough and congestion. Patient does have a flutter valve at bedside.  She does complains of episodes of worsening SOA which she decribes as panic attacks.  She is anxious about discharging home stating that she is worried that she will worsen.  She does not qualify for rehab as she is able to walk 412 feet with therapy.  She has life alert, landline phone and cellular phone which she keeps with her at home.  I have tried to reassure the patient and discuss breathing techniques to help her through these panic attacks.  She denies any chest pain, abdominal pain nausea or vomiting.           Review Of Systems:   Review of Systems   General ROS: Patient denies any fevers, chills or loss of consciousness.    Psychological ROS: Denies any hallucinations and delusions.  Ophthalmic ROS: Denies any diplopia or transient loss of vision.  ENT ROS: Denies sore throat, nasal congestion or ear pain.   Hematological and Lymphatic ROS: Denies neck swelling or easy bleeding.  Endocrine ROS: Denies any recent unintentional weight gain or loss.  Respiratory ROS   cough and shortness of breath.   Cardiovascular ROS: Denies chest pain or palpitations. No history of exertional chest pain.   Gastrointestinal ROS: Denies nausea and vomiting. Denies any abdominal pain. No  diarrhea.  Genito-Urinary ROS: Denies dysuria or hematuria.  Musculoskeletal ROS: Denies back pain. No muscle pain. No calf pain.   Neurological ROS: Denies any focal weakness. No loss of consciousness. Denies any numbness. Denies neck pain.   Dermatological ROS: Denies any redness or pruritis.    Objective      Temp:  [97.6 °F (36.4 °C)-98.1 °F (36.7 °C)] 97.6 °F (36.4 °C)  Heart Rate:  [] 81  Resp:  [16-18] 18  BP: (110-140)/(52-74) 110/63  Physical Exam    General Appearance:  Alert and cooperative, not in any acute distress.   Head:  Atraumatic and normocephalic, without obvious abnormality.   Eyes:          PERRLA, conjunctivae and sclerae normal, no Icterus. No pallor. Extraocular movements are within normal limits.   Ears:  Ears appear intact with no abnormalities noted.   Throat: No oral lesions, no thrush, oral mucosa moist.   Neck: Supple, trachea midline, no thyromegaly, no carotid bruit.       Lungs:   Chest shape is normal. Breath sounds heard bilaterally equally. Bilateral rhonchi and wheezing. No Pleural rub or bronchial breathing.   Heart:  Normal S1 and S2, no murmur, no gallop, no rub. No JVD   Abdomen:   Normal bowel sounds, no masses, no organomegaly. Soft    non-tender, non-distended, no guarding, no rebound             tenderness   Extremities: Moves all extremities well, no edema, no cyanosis, no clubbing.   Pulses: Pulses palpable and equal bilaterally   Skin: No bleeding, bruising or rash       Neurologic:    Psychiatric/Behavior:     Cranial nerves 2 - 12 grossly intact, sensation intact, Motor power is normal and equal bilaterally.  Mood normal, behavior normal       Results Review:    I have reviewed the labs, radiology results and diagnostic studies.    Results from last 7 days   Lab Units  12/04/18   0951   WBC 10*3/mm3  7.64   HEMOGLOBIN g/dL  10.1*   PLATELETS 10*3/mm3  269     Results from last 7 days   Lab Units  12/04/18   0951   SODIUM mmol/L  137   POTASSIUM mmol/L  4.7    CO2 mmol/L  34.0*   CREATININE mg/dL  0.70   GLUCOSE mg/dL  126*       Culture Data:   Blood Culture   Date Value Ref Range Status   11/30/2018 No growth at 2 days  Preliminary   11/30/2018 No growth at 2 days  Preliminary     Radiology Data:   Cardiology Data:    I have reviewed the medications.    Assessment/Plan     Assessment and Plan:  1.  Bilateral community acquired pneumonia-patient's on antibiotics in the form Rocephin and azithromycin.  I have added a flutter valve.  She is getting bronchodilators, mucolytics, nebulized steroids as well as IV steroids.  Sputum culture grew normal bing and her blood cultures have been ordered which are pending.    2.  COPD with exacerbation-same treatment is #1    3.  Thrush-patient is on nystatin-    4.  Acute renal failure likely secondary to dehydration-  Resolved    5.  Malnutrition with a BMI less than 19-nutrition consult.    6.  Chronic essential hypertension-blood pressure stable continue to monitor    7.  Anxiety-  Patient does take Lorazepam at home.  I have discussed breathing techniques as well for when she has these episodes.          DVT prophylaxis:  Lovenox  Discharge Planning:   DWAYNE Bhatt 12/04/18 1:35 PM

## 2018-12-04 NOTE — PLAN OF CARE
Problem: Patient Care Overview  Goal: Plan of Care Review  Outcome: Ongoing (interventions implemented as appropriate)   12/04/18 3375   Coping/Psychosocial   Plan of Care Reviewed With patient   Plan of Care Review   Progress improving       Problem: Pneumonia (Adult)  Goal: Signs and Symptoms of Listed Potential Problems Will be Absent, Minimized or Managed (Pneumonia)  Outcome: Ongoing (interventions implemented as appropriate)      Problem: Pain, Chronic (Adult)  Goal: Acceptable Pain/Comfort Level and Functional Ability  Outcome: Ongoing (interventions implemented as appropriate)      Problem: Skin Injury Risk (Adult)  Goal: Skin Health and Integrity  Outcome: Ongoing (interventions implemented as appropriate)

## 2018-12-05 LAB
ANION GAP SERPL CALCULATED.3IONS-SCNC: 9.5 MMOL/L (ref 10–20)
BACTERIA SPEC AEROBE CULT: NORMAL
BACTERIA SPEC AEROBE CULT: NORMAL
BUN BLD-MCNC: 23 MG/DL (ref 7–20)
BUN/CREAT SERPL: 32.9 (ref 7.1–23.5)
CALCIUM SPEC-SCNC: 8.9 MG/DL (ref 8.4–10.2)
CHLORIDE SERPL-SCNC: 97 MMOL/L (ref 98–107)
CO2 SERPL-SCNC: 35 MMOL/L (ref 26–30)
CREAT BLD-MCNC: 0.7 MG/DL (ref 0.6–1.3)
DEPRECATED RDW RBC AUTO: 49.5 FL (ref 37–54)
ERYTHROCYTE [DISTWIDTH] IN BLOOD BY AUTOMATED COUNT: 14.6 % (ref 11.5–14.5)
GFR SERPL CREATININE-BSD FRML MDRD: 82 ML/MIN/1.73
GLUCOSE BLD-MCNC: 119 MG/DL (ref 74–98)
HCT VFR BLD AUTO: 29.4 % (ref 37–47)
HGB BLD-MCNC: 9.5 G/DL (ref 12–16)
LYMPHOCYTES # BLD MANUAL: 1.37 10*3/MM3 (ref 0.6–3.4)
LYMPHOCYTES NFR BLD MANUAL: 16 % (ref 10–50)
LYMPHOCYTES NFR BLD MANUAL: 3 % (ref 0–12)
MCH RBC QN AUTO: 30.5 PG (ref 27–31)
MCHC RBC AUTO-ENTMCNC: 32.3 G/DL (ref 30–37)
MCV RBC AUTO: 94.5 FL (ref 81–99)
METAMYELOCYTES NFR BLD MANUAL: 2 % (ref 0–0)
MONOCYTES # BLD AUTO: 0.26 10*3/MM3 (ref 0–0.9)
MYELOCYTES NFR BLD MANUAL: 4 % (ref 0–0)
NEUTROPHILS # BLD AUTO: 6.41 10*3/MM3 (ref 2–6.9)
NEUTROPHILS NFR BLD MANUAL: 74 % (ref 37–80)
NEUTS BAND NFR BLD MANUAL: 1 % (ref 0–6)
PLATELET # BLD AUTO: 324 10*3/MM3 (ref 130–400)
PMV BLD AUTO: 10 FL (ref 6–12)
POTASSIUM BLD-SCNC: 4.5 MMOL/L (ref 3.5–5.1)
RBC # BLD AUTO: 3.11 10*6/MM3 (ref 4.2–5.4)
RBC MORPH BLD: NORMAL
SCAN SLIDE: NORMAL
SMALL PLATELETS BLD QL SMEAR: ADEQUATE
SODIUM BLD-SCNC: 137 MMOL/L (ref 137–145)
WBC MORPH BLD: NORMAL
WBC NRBC COR # BLD: 8.54 10*3/MM3 (ref 4.8–10.8)

## 2018-12-05 PROCEDURE — 83540 ASSAY OF IRON: CPT | Performed by: NURSE PRACTITIONER

## 2018-12-05 PROCEDURE — 97110 THERAPEUTIC EXERCISES: CPT

## 2018-12-05 PROCEDURE — 94668 MNPJ CHEST WALL SBSQ: CPT

## 2018-12-05 PROCEDURE — 63710000001 PREDNISONE PER 1 MG: Performed by: INTERNAL MEDICINE

## 2018-12-05 PROCEDURE — 85025 COMPLETE CBC W/AUTO DIFF WBC: CPT | Performed by: NURSE PRACTITIONER

## 2018-12-05 PROCEDURE — 82728 ASSAY OF FERRITIN: CPT | Performed by: NURSE PRACTITIONER

## 2018-12-05 PROCEDURE — 82607 VITAMIN B-12: CPT | Performed by: NURSE PRACTITIONER

## 2018-12-05 PROCEDURE — 83550 IRON BINDING TEST: CPT | Performed by: NURSE PRACTITIONER

## 2018-12-05 PROCEDURE — 85007 BL SMEAR W/DIFF WBC COUNT: CPT | Performed by: NURSE PRACTITIONER

## 2018-12-05 PROCEDURE — 82746 ASSAY OF FOLIC ACID SERUM: CPT | Performed by: NURSE PRACTITIONER

## 2018-12-05 PROCEDURE — 25010000002 CEFTRIAXONE SODIUM-DEXTROSE 1-3.74 GM-%(50ML) RECONSTITUTED SOLUTION: Performed by: NURSE PRACTITIONER

## 2018-12-05 PROCEDURE — 99223 1ST HOSP IP/OBS HIGH 75: CPT | Performed by: INTERNAL MEDICINE

## 2018-12-05 PROCEDURE — 94799 UNLISTED PULMONARY SVC/PX: CPT

## 2018-12-05 PROCEDURE — 94760 N-INVAS EAR/PLS OXIMETRY 1: CPT

## 2018-12-05 PROCEDURE — 80048 BASIC METABOLIC PNL TOTAL CA: CPT | Performed by: NURSE PRACTITIONER

## 2018-12-05 PROCEDURE — 94667 MNPJ CHEST WALL 1ST: CPT

## 2018-12-05 PROCEDURE — 99232 SBSQ HOSP IP/OBS MODERATE 35: CPT | Performed by: NURSE PRACTITIONER

## 2018-12-05 RX ORDER — PREDNISONE 20 MG/1
40 TABLET ORAL
Status: DISCONTINUED | OUTPATIENT
Start: 2018-12-05 | End: 2018-12-06 | Stop reason: HOSPADM

## 2018-12-05 RX ADMIN — MONTELUKAST SODIUM 10 MG: 10 TABLET, FILM COATED ORAL at 21:06

## 2018-12-05 RX ADMIN — Medication 1 CAPSULE: at 21:06

## 2018-12-05 RX ADMIN — NYSTATIN 400000 UNITS: 500000 SUSPENSION ORAL at 21:05

## 2018-12-05 RX ADMIN — IPRATROPIUM BROMIDE AND ALBUTEROL SULFATE 3 ML: .5; 3 SOLUTION RESPIRATORY (INHALATION) at 19:47

## 2018-12-05 RX ADMIN — VENLAFAXINE 75 MG: 75 TABLET ORAL at 18:46

## 2018-12-05 RX ADMIN — IPRATROPIUM BROMIDE AND ALBUTEROL SULFATE 3 ML: .5; 3 SOLUTION RESPIRATORY (INHALATION) at 13:38

## 2018-12-05 RX ADMIN — DICLOFENAC 2 G: 10 GEL TOPICAL at 21:09

## 2018-12-05 RX ADMIN — LORAZEPAM 0.5 MG: 0.5 TABLET ORAL at 13:28

## 2018-12-05 RX ADMIN — VENLAFAXINE 75 MG: 75 TABLET ORAL at 09:24

## 2018-12-05 RX ADMIN — GUAIFENESIN 600 MG: 600 TABLET, EXTENDED RELEASE ORAL at 09:24

## 2018-12-05 RX ADMIN — BUDESONIDE 0.5 MG: 0.5 INHALANT RESPIRATORY (INHALATION) at 19:47

## 2018-12-05 RX ADMIN — NYSTATIN 400000 UNITS: 500000 SUSPENSION ORAL at 09:24

## 2018-12-05 RX ADMIN — CEFTRIAXONE 1 G: 1 INJECTION, SOLUTION INTRAVENOUS at 18:46

## 2018-12-05 RX ADMIN — NYSTATIN 400000 UNITS: 500000 SUSPENSION ORAL at 13:00

## 2018-12-05 RX ADMIN — SODIUM CHLORIDE, PRESERVATIVE FREE 3 ML: 5 INJECTION INTRAVENOUS at 09:26

## 2018-12-05 RX ADMIN — Medication 1 CAPSULE: at 09:23

## 2018-12-05 RX ADMIN — ARFORMOTEROL TARTRATE 15 MCG: 15 SOLUTION RESPIRATORY (INHALATION) at 07:22

## 2018-12-05 RX ADMIN — GUAIFENESIN 600 MG: 600 TABLET, EXTENDED RELEASE ORAL at 21:05

## 2018-12-05 RX ADMIN — BENZONATATE 100 MG: 100 CAPSULE, LIQUID FILLED ORAL at 04:24

## 2018-12-05 RX ADMIN — MULTIPLE VITAMINS W/ MINERALS TAB 1 TABLET: TAB at 09:24

## 2018-12-05 RX ADMIN — MIRTAZAPINE 30 MG: 15 TABLET, FILM COATED ORAL at 21:05

## 2018-12-05 RX ADMIN — SODIUM CHLORIDE, PRESERVATIVE FREE 3 ML: 5 INJECTION INTRAVENOUS at 21:08

## 2018-12-05 RX ADMIN — CYANOCOBALAMIN TAB 500 MCG 500 MCG: 500 TAB at 09:23

## 2018-12-05 RX ADMIN — MELATONIN TAB 3 MG 3 MG: 3 TAB at 21:05

## 2018-12-05 RX ADMIN — BUDESONIDE 0.5 MG: 0.5 INHALANT RESPIRATORY (INHALATION) at 07:22

## 2018-12-05 RX ADMIN — IPRATROPIUM BROMIDE AND ALBUTEROL SULFATE 3 ML: .5; 3 SOLUTION RESPIRATORY (INHALATION) at 07:22

## 2018-12-05 RX ADMIN — PANTOPRAZOLE SODIUM 40 MG: 40 TABLET, DELAYED RELEASE ORAL at 06:24

## 2018-12-05 RX ADMIN — CYCLOBENZAPRINE HYDROCHLORIDE 10 MG: 10 TABLET, FILM COATED ORAL at 09:23

## 2018-12-05 RX ADMIN — IPRATROPIUM BROMIDE AND ALBUTEROL SULFATE 3 ML: .5; 3 SOLUTION RESPIRATORY (INHALATION) at 00:40

## 2018-12-05 RX ADMIN — ARFORMOTEROL TARTRATE 15 MCG: 15 SOLUTION RESPIRATORY (INHALATION) at 19:47

## 2018-12-05 RX ADMIN — DICLOFENAC 2 G: 10 GEL TOPICAL at 18:46

## 2018-12-05 RX ADMIN — NYSTATIN 400000 UNITS: 500000 SUSPENSION ORAL at 18:46

## 2018-12-05 RX ADMIN — PREDNISONE 40 MG: 20 TABLET ORAL at 09:24

## 2018-12-05 RX ADMIN — METOPROLOL TARTRATE 25 MG: 25 TABLET ORAL at 21:05

## 2018-12-05 RX ADMIN — BENZONATATE 100 MG: 100 CAPSULE, LIQUID FILLED ORAL at 21:08

## 2018-12-05 RX ADMIN — METOPROLOL TARTRATE 25 MG: 25 TABLET ORAL at 09:23

## 2018-12-05 RX ADMIN — HYDROCODONE BITARTRATE AND ACETAMINOPHEN 1 TABLET: 10; 325 TABLET ORAL at 04:23

## 2018-12-05 NOTE — PLAN OF CARE
Problem: Patient Care Overview  Goal: Plan of Care Review  Outcome: Ongoing (interventions implemented as appropriate)   12/05/18 6238   Coping/Psychosocial   Plan of Care Reviewed With patient   Plan of Care Review   Progress no change   OTHER   Outcome Summary Pt was unable to ambulate as far of a distance however pt reports having a panic attack and was limited by that. Pt was able to ambulate 284' this treatment. See flowsheet for details.

## 2018-12-05 NOTE — CONSULTS
"Date of consultation:   December 5, 2018    Requested by:   Hospitalist Service.     PCP: Blanquita Clements PA    Reason:  SOB.  Pneumonia    History of Present Illness:  72 y.o. female who was at baseline and went to her grand son's house for thanksgiving and since then has been slowly getting worse again.     The patient continues to have cough and phlegm production which is somewhat difficult to expectorate.  She says that she has been doing fair since the last admission to this hospital and actually denies any sick contacts.  She also reports somewhat poor appetite over the past 3-4 days as well.  She denies any fever but does have \"cold chills\".     She was using all her medications as prescribed.  She denies any noncompliance with her vest therapy as well.    Review of System: All other review of systems negative except indicated in HPI. Also leg cramps occasionally. No diarrhea. No fever.     Past Medical History:  Past Medical History:   Diagnosis Date   • Abdominal pain    • Acute bronchitis    • Ankle pain    • Anxiety 1980   • Atopic dermatitis    • Bronchiectasis (CMS/HCC)    • Cataract, bilateral    • Chest pain     STATES HAS OCCASSIONALLY.  STATES LAST TIME WAS LAST WEEK.  STATES SEES DR. RICH.  STATES HE HAS DONE NUMEROUS TESTS ON HER AND HAS NOT BEEN ABLE TO FIND OUT CAUSE.     • Chronic obstructive lung disease (CMS/HCC) 2008   • Colon cancer screening    • COPD (chronic obstructive pulmonary disease) (CMS/HCC)    • Cystocele    • Depressed    • Depression 1980   • Fatigue     Chronic pafigue 2012   • Fibromyalgia 2008   • Full dentures    • GERD (gastroesophageal reflux disease)    • Gout    • Heartburn     Chronic historu of epigastric heartburn currently controlled on Prilesec 40 mg every morning along with Zantac 300 Mg daily at bedtime   • High cholesterol 2012   • Hip pain    • Hyperlipidemia    • Hypertension    • Insomnia    • Joint pain    • Kidney infection    • Loss of appetite    • " "Low back pain    • Melena    • Nausea and vomiting    • On home oxygen therapy     2L/NC PRN (STATES SHE HAS BEEN USING CONTINUOSLY LATELY)   • Osteoarthritis    • Pain in limb    • Palpitations    • Pinched nerve     Pinched nerves    • Pneumonia    • Problems with swallowing     HAS TO EAT SLOW AND CHEW FOOD WELL   • Recurrent urinary tract infection    • Sciatica [id number]   • Shortness of breath    • Sinus problem    • Sleep apnea     NO CPAP   • Upset stomach    • Valvular heart disease    • Vitamin B12 deficiency    • Wears glasses    • Weight loss, abnormal     Weight loss is stabel. On pund weight gain since 2016         Past Surgical History:  Past Surgical History:   Procedure Laterality Date   • ABDOMINAL HERNIA REPAIR     • APPENDECTOMY  1965   • BACK SURGERY     • CATARACT EXTRACTION Bilateral     Put in implants    • CHOLECYSTECTOMY     • COLONOSCOPY     • ENDOSCOPY     • KNEE SURGERY Left     Knee Cap   • TUBAL ABDOMINAL LIGATION           Family History:  Family History   Problem Relation Age of Onset   • Ovarian cancer Mother    • Cancer Mother    • Lung cancer Father    • Heart disease Brother          Social History:  Social History     Socioeconomic History   • Marital status: Single     Spouse name: Not on file   • Number of children: Not on file   • Years of education: Not on file   • Highest education level: Not on file   Tobacco Use   • Smoking status: Former Smoker     Packs/day: 0.00     Years: 35.00     Pack years: 0.00     Last attempt to quit: 2017     Years since quittin.4   • Smokeless tobacco: Never Used   Substance and Sexual Activity   • Alcohol use: No   • Drug use: No   • Sexual activity: Defer         Physical Exam:  /63 (BP Location: Left arm, Patient Position: Lying)   Pulse 98   Temp 97.9 °F (36.6 °C) (Axillary)   Resp 18   Ht 152.4 cm (60\")   Wt 44.9 kg (98 lb 15.8 oz)   LMP  (LMP Unknown)   SpO2 97%   BMI 19.33 " kg/m²     Constitutional:             General: No significant respiratory distress noted.    Head/Face/Eyes:             No significant facial abnormalities seen.             Extra ocular movement was intact.             Pupils appeared equal    ENT:             Hearing was intact.             Dentures noted.              No nasal erythema noted.              Oropharynx was moist. No lesions noted.     Neck:             Supple. No JVD noted.              Thyroid gland did not seem to be enlarged    Cardiovascular:              S1 + S2. Regular.    Respiratory:            Respiratory effort was labored.             Hyperresonance to percussion.            Decreased Air Entry Bilaterally with mild to moderate crackles, bilaterally.    Abdomen:            Soft.  Bowel sounds were positive.  No obvious organomegaly.    Extremities:            No edema noted.            No cyanosis noted            No clubbing noted            Gait could not be assessed at this time.    Neurologic/Psychiatric:             Affect appeared fair.             Awake, alert and oriented x 3.             Able to follow simple commands.             Appears anxious and somewhat tremulous.    Skin:             No rash noted.             Warm and dry          Labs:   Reviewed. Pertinent labs were noted.     Lab Results   Component Value Date    WBC 8.54 12/05/2018    HGB 9.5 (L) 12/05/2018    HCT 29.4 (L) 12/05/2018    MCV 94.5 12/05/2018     12/05/2018       Lab Results   Component Value Date    GLUCOSE 119 (H) 12/05/2018    CALCIUM 8.9 12/05/2018     12/05/2018    K 4.5 12/05/2018    CO2 35.0 (H) 12/05/2018    CL 97 (L) 12/05/2018    BUN 23 (H) 12/05/2018    CREATININE 0.70 12/05/2018    EGFRIFNONA 82 12/05/2018    BCR 32.9 (H) 12/05/2018    ANIONGAP 9.5 (L) 12/05/2018         Assessment:  1.  Shortness of breath   2.  Basal PNA. Serratia in July 2018.   3.  Bronchiectasis with acute exacerbation  4.  Chronic hypoxemia  5.  COPD.  6.   Recent prednisone (1 month ago) for ? URI?    Discussion/Recommendations:   Will start vest therapy.    Continue flutter valve.     Will change to PO Steroids.     Her initial pro-calcitonin level was only minimally elevated and although her CT scan did show findings possibly suggestive of pneumonia in the lung bases, it is possible that the patient is currently having exacerbation of underlying bibasilar bronchiectasis which may have given the appearance of pneumonia on the CT scan.    I do agree with antibiotics at this time especially given history of Serratia in July 2018.  I would suggest completing a course of about 5 days and since the last pro calcitonin is within normal range, the antibiotic can be discontinued after total of 5-7 days of therapy.    The plan was discussed with the patient.  I have also discussed the case with the nursing staff.    Recommendations were also discussed with the referring provider.     I would like to thank you for the opportunity to participate in the care of this patient.      We have updated the admitting attending and nursing staff, as appropriate, on the patient's current status and plan. I will be going off shift tonight and will be unavailable.       Rebeka Esparza MD  12/05/18  8:24 AM.

## 2018-12-05 NOTE — PROGRESS NOTES
PROGRESS NOTE        Date of Admission: 11/30/2018  Length of Stay: 5  Primary Care Physician: Blanquita Clements PA    Subjective   Chief Complaint: Follow up pneumonia  HPI: This is a 72-year-old female with history of bronchiectasis and COPD who is on oxygen 2 L at home.  She was admitted for COPD exacerbation and was noted on CT scan of the chest to have evidence of bronchopneumonia in the lung bases with small area consolidation within the lingula.  She also stated have emphysematous disease as well.  She had been to her sons for Thanksgiving and started feeling worse after this.     She has been getting Rocephin and azithromycin.  She has been slow to improve and she was admitted on 11/30/18.  She is feeling some better but continues to have cough and congestion. Patient does have a flutter valve at bedside.  She does complains of episodes of worsening SOA which she decribes as panic attacks.  She is anxious about discharging home stating that she is worried that she will worsen.  She does not qualify for rehab as she is able to walk 412 feet with therapy.  She has life alert, landline phone and cellular phone which she keeps with her at home.  I have tried to reassure the patient and discuss breathing techniques to help her through these panic attacks.  She denies any chest pain, abdominal pain nausea or vomiting.    Dr. Esparza with pulmonology has seen and evaluated patient this morning.  He has started patient on Vest therapy as she does have bronchiectasis.  He is also changing her to oral steroids.  Will continue flutter valve.             Review Of Systems:   Review of Systems   General ROS: Patient denies any fevers, chills or loss of consciousness.    Psychological ROS: Denies any hallucinations and delusions.  Ophthalmic ROS: Denies any diplopia or transient loss of vision.  ENT ROS: Denies sore throat, nasal congestion or ear pain.   Hematological and Lymphatic ROS: Denies neck swelling or easy  bleeding.  Endocrine ROS: Denies any recent unintentional weight gain or loss.  Respiratory ROS   cough and shortness of breath.   Cardiovascular ROS: Denies chest pain or palpitations. No history of exertional chest pain.   Gastrointestinal ROS: Denies nausea and vomiting. Denies any abdominal pain. No diarrhea.  Genito-Urinary ROS: Denies dysuria or hematuria.  Musculoskeletal ROS: Denies back pain. No muscle pain. No calf pain.   Neurological ROS: Denies any focal weakness. No loss of consciousness. Denies any numbness. Denies neck pain.   Dermatological ROS: Denies any redness or pruritis.    Objective      Temp:  [97.5 °F (36.4 °C)-98.3 °F (36.8 °C)] 97.9 °F (36.6 °C)  Heart Rate:  [] 98  Resp:  [16-20] 18  BP: (110-157)/(61-70) 132/63  Physical Exam    General Appearance:  Alert and cooperative, not in any acute distress.   Head:  Atraumatic and normocephalic, without obvious abnormality.   Eyes:          PERRLA, conjunctivae and sclerae normal, no Icterus. No pallor. Extraocular movements are within normal limits.   Ears:  Ears appear intact with no abnormalities noted.   Throat: No oral lesions, no thrush, oral mucosa moist.   Neck: Supple, trachea midline, no thyromegaly, no carotid bruit.       Lungs:   Chest shape is normal. Breath sounds heard bilaterally equally. Bilateral crackles and no wheezing today. No Pleural rub or bronchial breathing.  Decreased bilaterally.                                                                                                                                                                                                                                       Heart:  Normal S1 and S2, no murmur, no gallop, no rub. No JVD   Abdomen:   Normal bowel sounds, no masses, no organomegaly. Soft    non-tender, non-distended, no guarding, no rebound             tenderness   Extremities: Moves all extremities well, no edema, no cyanosis, no clubbing.   Pulses: Pulses palpable  and equal bilaterally   Skin: No bleeding, bruising or rash       Neurologic:    Psychiatric/Behavior:     Cranial nerves 2 - 12 grossly intact, sensation intact, Motor power is normal and equal bilaterally.  Mood normal, behavior normal       Results Review:    I have reviewed the labs, radiology results and diagnostic studies.    Results from last 7 days   Lab Units  12/05/18   0534   WBC 10*3/mm3  8.54   HEMOGLOBIN g/dL  9.5*   PLATELETS 10*3/mm3  324     Results from last 7 days   Lab Units  12/05/18   0534   SODIUM mmol/L  137   POTASSIUM mmol/L  4.5   CO2 mmol/L  35.0*   CREATININE mg/dL  0.70   GLUCOSE mg/dL  119*       Culture Data:   Blood Culture   Date Value Ref Range Status   11/30/2018 No growth at 2 days  Preliminary   11/30/2018 No growth at 2 days  Preliminary     Radiology Data:   Cardiology Data:    I have reviewed the medications.    Assessment/Plan     Assessment and Plan:  1.  Bilateral community acquired pneumonia-patient's on antibiotics in the form Rocephin and azithromycin.  I have added a flutter valve.  She is getting bronchodilators, mucolytics, nebulized steroids as well as oral steroids.  Sputum culture grew normal bing and her blood cultures have been ordered which are pending.  Dr. Esparza has been consulted and changed her to oral steroids.  She will be started on vest therapy.  Likely discharge home tomorrow.     2.  COPD with exacerbation-same treatment is #1    3.  Thrush-patient is on nystatin-    4.  Acute renal failure likely secondary to dehydration-  Resolved    5.  Malnutrition with a BMI less than 19- nutrition consult.  Patient has been started on Remeron which will help with anxiety and appetite    6.  Chronic essential hypertension-blood pressure stable continue to monitor    7.  Anxiety-  Patient does take Lorazepam at home.  I have discussed breathing techniques as well for when she has these episodes.          DVT prophylaxis:  Lovenox  Discharge Planning:   Jerrica  DWAYNE Alvarez 12/05/18 10:27 AM

## 2018-12-05 NOTE — THERAPY TREATMENT NOTE
Acute Care - Physical Therapy Treatment Note  [facility]     Patient Name: Joelle Yee  : 1945  MRN: [id number]  Today's Date: 2018  Onset of Illness/Injury or Date of Surgery: 18  Date of Referral to PT: 18  Referring Physician: Dr. Hinds    Admit Date: 2018    Visit Dx:    ICD-10-CM ICD-9-CM   1. Pneumonia of both lower lobes due to infectious organism (CMS/[facility]) J18.1 483.8   2. Impaired functional mobility, balance, gait, and endurance Z74.09 V49.89   3. Impaired mobility and ADLs Z74.09 799.89     Patient Active Problem List   Diagnosis   • Dyspepsia   • Esophageal reflux   • Anxiety   • High cholesterol   • Bipolar disorder (CMS/HCC)   • COPD (chronic obstructive pulmonary disease) (CMS/[facility])   • Depression   • Gout   • Hyperlipidemia   • Insomnia   • Osteoarthritis   • Sciatica   • Sleep apnea   • Vitamin B 12 deficiency   • Pancolitis (CMS/[facility])   • Acute cystitis without hematuria   • C. difficile colitis   • Chronic bronchitis with COPD (chronic obstructive pulmonary disease) (CMS/[facility])   • Pneumonia of right lower lobe due to infectious organism (CMS/[facility])   • COPD exacerbation (CMS/[facility])   • Pneumonia involving right lung   • COPD with acute exacerbation (CMS/[facility])   • Chronic respiratory failure with hypoxia (CMS/[facility])   • Abdominal pain   • Diarrhea   • Heartburn   • Loss of appetite   • Melena   • Nausea and vomiting   • Pseudogout   • Upset stomach   • Abnormal weight loss   • Chronic obstructive pulmonary disease with acute lower respiratory infection (CMS/[facility])   • Right upper lobe pneumonia (CMS/[facility])   • Acute on chronic respiratory failure with hypoxia (CMS/[facility])   • Moderate malnutrition (CMS/[facility])   • Acute exacerbation of chronic obstructive pulmonary disease (COPD) (CMS/[facility])   • Bronchiectasis without complication (CMS/[facility])   • Pneumonia due to gram-negative bacteria (CMS/[facility])   • Sinus tachycardia   • Leukocytosis   • Serratia marcescens infection (CMS/[facility])    • Pneumonia of both lower lobes due to infectious organism (CMS/HCC)   • Acute kidney injury (CMS/HCC)   • BMI less than 19,adult       Therapy Treatment    Rehabilitation Treatment Summary     Row Name 12/05/18 1235             Treatment Time/Intention    Discipline  physical therapy assistant  -RM      Document Type  therapy note (daily note)  -RM      Subjective Information  complains of;pain  -RM      Mode of Treatment  physical therapy  -RM      Care Plan Review  care plan/treatment goals reviewed  -RM      Existing Precautions/Restrictions  oxygen therapy device and L/min  -RM      Treatment Considerations/Comments  2 lpm   -RM      Recorded by [RM] Alphonso Main, PTA 12/05/18 1334      Row Name 12/05/18 1235             Bed Mobility Assessment/Treatment    Supine-Sit [place] (Bed Mobility)  independent  -RM      Assistive Device (Bed Mobility)  head of bed elevated  -RM      Recorded by [RM] Alphonso Main, PTA 12/05/18 1334      Row Name 12/05/18 1235             Sit-Stand Transfer    Sit-Stand [place] (Transfers)  independent  -RM      Recorded by [RM] Alphonso Main, PTA 12/05/18 1334      Row Name 12/05/18 1235             Stand-Sit Transfer    Stand-Sit [place] (Transfers)  independent  -RM      Recorded by [RM] Alphonso Main, PTA 12/05/18 1334      Row Name 12/05/18 1235             Gait/Stairs Assessment/Training    [place] Level (Gait)  supervision  -RM      Distance in Feet (Gait)  284  -RM      Pattern (Gait)  step-through  -RM      Deviations/Abnormal Patterns (Gait)  victorino decreased  -RM      Recorded by [RM] Alphonso Main, PTA 12/05/18 1334      Row Name                Wound 12/04/18 2000 Right upper arm skin tear    Wound - Properties Group Date first assessed: 12/04/18 [TN] Time first assessed: 2000 [TN] Present On Admission : yes [TN] Side: Right [TN] Orientation: upper [TN] Location: arm [TN] Type: skin tear [TN] Recorded by:  [TN] Cally Lopez,  RN 12/04/18 2300    Row Name 12/05/18 1230             Outcome Summary/Treatment Plan (PT)    Daily Summary of Progress (PT)  progress toward functional goals as expected  -      Plan for Continued Treatment (PT)  Cont per POC.   -      Recorded by [] Alphonso Main, PTA 12/05/18 9374        User Key  (r) = Recorded By, (t) = Taken By, (c) = Cosigned By    Initials Name Effective Dates Discipline    Cally Stacy RN 10/26/16 -  Nurse     Alphonso Main, PTA 03/07/18 -  PT          Wound 12/04/18 2000 Right upper arm skin tear (Active)   Dressing Appearance no drainage;dry;intact 12/5/2018  8:00 AM   Closure SAHIL;Adhesive bandage 12/5/2018  8:00 AM   Dressing Care, Wound dressing applied;other (see comments) 12/4/2018  8:06 PM           Physical Therapy Education     Title: PT OT SLP Therapies (In Progress)     Topic: Physical Therapy (In Progress)     Point: Mobility training (Done)     Learning Progress Summary           Patient Acceptance, E,TB, VU,Bed IU,DU by  at 12/5/2018  1:35 PM    Acceptance, E,TB,D, VU by  at 12/3/2018  1:08 PM    Acceptance, E,TB, VU by  at 12/2/2018  4:50 PM    Comment:  Increase mobility daily    Acceptance, E, VU by  at 12/1/2018  2:39 PM    Comment:  Role of PT and importance of mobility to recovery                               User Key     Initials Effective Dates Name Provider Type Discipline     04/03/18 -  Adelina Jones, PT Physical Therapist PT    CC 03/07/18 -  Callie Major PTA Physical Therapy Assistant PT     03/07/18 -  Alphonso Main, PTA Physical Therapy Assistant PT                PT Recommendation and Plan     Outcome Summary/Treatment Plan (PT)  Daily Summary of Progress (PT): progress toward functional goals as expected  Plan for Continued Treatment (PT): Cont per POC.   Plan of Care Reviewed With: patient  Progress: no change  Outcome Summary: Pt was unable to ambulate as far of a distance however pt reports having a panic  attack and was limited by that. Pt was able to ambulate 284' this treatment. See flowsheet for details.   Outcome Measures     Row Name 12/05/18 1235 12/03/18 1001 12/03/18 0958       How much help from another person do you currently need...    Turning from your back to your side while in flat bed without using bedrails?  4  -RM  4  -RM  --    Moving from lying on back to sitting on the side of a flat bed without bedrails?  4  -RM  4  -RM  --    Moving to and from a bed to a chair (including a wheelchair)?  4  -RM  4  -RM  --    Standing up from a chair using your arms (e.g., wheelchair, bedside chair)?  4  -RM  4  -RM  --    Climbing 3-5 steps with a railing?  3  -RM  3  -RM  --    To walk in hospital room?  4  -RM  3  -RM  --    AM-PAC 6 Clicks Score  23  -RM  22  -RM  --       How much help from another is currently needed...    Putting on and taking off regular lower body clothing?  --  --  4  -AH    Bathing (including washing, rinsing, and drying)  --  --  4  -AH    Toileting (which includes using toilet bed pan or urinal)  --  --  4  -AH    Putting on and taking off regular upper body clothing  --  --  4  -AH    Taking care of personal grooming (such as brushing teeth)  --  --  4  -AH    Eating meals  --  --  4  -AH    Score  --  --  24  -AH       Functional Assessment    Outcome Measure Options  AM-PAC 6 Clicks Basic Mobility (PT)  -  AM-PAC 6 Clicks Basic Mobility (PT)  -  AM-PAC 6 Clicks Daily Activity (OT)  -      User Key  (r) = Recorded By, (t) = Taken By, (c) = Cosigned By    Initials Name Provider Type    Jonna Oneill Occupational Therapist    Alphonso Collins, PTA Physical Therapy Assistant         Time Calculation:   PT Charges     Row Name 12/05/18 1343             Time Calculation    Start Time  1235  -RM      PT Received On  12/05/18  -RM      PT Goal Re-Cert Due Date  12/11/18  -RM         Time Calculation- PT    Total Timed Code Minutes- PT  14 minute(s)  -RM         Timed  Charges    77426 - PT Therapeutic Exercise Minutes  14  -RM      07688 - Gait Training Minutes   --  -RM        User Key  (r) = Recorded By, (t) = Taken By, (c) = Cosigned By    Initials Name Provider Type    Alphonso Collins, PTA Physical Therapy Assistant        Therapy Suggested Charges     Code   Minutes Charges    65984 (CPT®) Hc Pt Neuromusc Re Education Ea 15 Min      02833 (CPT®) Hc Pt Ther Proc Ea 15 Min 14 1    79638 (CPT®) Hc Gait Training Ea 15 Min      69575 (CPT®) Hc Pt Therapeutic Act Ea 15 Min      45605 (CPT®) Hc Pt Manual Therapy Ea 15 Min      59062 (CPT®) Hc Pt Iontophoresis Ea 15 Min      48156 (CPT®) Hc Pt Elec Stim Ea-Per 15 Min      77279 (CPT®) Hc Pt Ultrasound Ea 15 Min      35277 (CPT®) Hc Pt Self Care/Mgmt/Train Ea 15 Min      71539 (CPT®) Hc Pt Prosthetic (S) Train Initial Encounter, Each 15 Min      69319 (CPT®) Hc Pt Orthotic(S)/Prosthetic(S) Encounter, Each 15 Min      22541 (CPT®) Hc Orthotic(S) Mgmt/Train Initial Encounter, Each 15min      Total  14 1        Therapy Charges for Today     Code Description Service Date Service Provider Modifiers Qty    54207156351 HC PT THER PROC EA 15 MIN 12/5/2018 Alphonso Main, PTA GP 1          PT G-Codes  Outcome Measure Options: AM-PAC 6 Clicks Basic Mobility (PT)  AM-PAC 6 Clicks Score: 23  Score: 24    Alphonso Main PTA  12/5/2018

## 2018-12-05 NOTE — PLAN OF CARE
Problem: Pneumonia (Adult)  Goal: Signs and Symptoms of Listed Potential Problems Will be Absent, Minimized or Managed (Pneumonia)  Outcome: Ongoing (interventions implemented as appropriate)   12/04/18 1055   Goal/Outcome Evaluation   Problems Assessed (Pneumonia) all   Problems Present (Pneumonia) respiratory compromise       Problem: Pain, Chronic (Adult)  Goal: Acceptable Pain/Comfort Level and Functional Ability  Outcome: Ongoing (interventions implemented as appropriate)   12/05/18 0403   Pain, Chronic (Adult)   Acceptable Pain/Comfort Level and Functional Ability making progress toward outcome

## 2018-12-06 VITALS
RESPIRATION RATE: 16 BRPM | SYSTOLIC BLOOD PRESSURE: 138 MMHG | WEIGHT: 98.5 LBS | TEMPERATURE: 97.6 F | OXYGEN SATURATION: 98 % | HEIGHT: 60 IN | DIASTOLIC BLOOD PRESSURE: 69 MMHG | BODY MASS INDEX: 19.34 KG/M2 | HEART RATE: 79 BPM

## 2018-12-06 PROBLEM — D64.9 ANEMIA: Status: ACTIVE | Noted: 2018-12-06

## 2018-12-06 LAB
FERRITIN SERPL-MCNC: 27 NG/ML (ref 11.1–264)
FOLATE SERPL-MCNC: 11 NG/ML
IRON 24H UR-MRATE: 42 MCG/DL (ref 37–181)
IRON SATN MFR SERPL: 13 % (ref 11–46)
TIBC SERPL-MCNC: 315 MCG/DL (ref 261–497)
VIT B12 BLD-MCNC: 925 PG/ML (ref 239–931)

## 2018-12-06 PROCEDURE — 99239 HOSP IP/OBS DSCHRG MGMT >30: CPT | Performed by: NURSE PRACTITIONER

## 2018-12-06 PROCEDURE — 94799 UNLISTED PULMONARY SVC/PX: CPT

## 2018-12-06 PROCEDURE — 99232 SBSQ HOSP IP/OBS MODERATE 35: CPT | Performed by: NURSE PRACTITIONER

## 2018-12-06 PROCEDURE — 94668 MNPJ CHEST WALL SBSQ: CPT

## 2018-12-06 PROCEDURE — 63710000001 PREDNISONE PER 1 MG: Performed by: INTERNAL MEDICINE

## 2018-12-06 RX ORDER — GUAIFENESIN 600 MG/1
600 TABLET, EXTENDED RELEASE ORAL EVERY 12 HOURS SCHEDULED
Qty: 20 TABLET | Refills: 0 | Status: SHIPPED | OUTPATIENT
Start: 2018-12-06 | End: 2019-06-18 | Stop reason: HOSPADM

## 2018-12-06 RX ORDER — BENZONATATE 100 MG/1
100 CAPSULE ORAL 3 TIMES DAILY PRN
Qty: 20 CAPSULE | Refills: 0 | Status: SHIPPED | OUTPATIENT
Start: 2018-12-06 | End: 2019-01-21

## 2018-12-06 RX ORDER — PREDNISONE 20 MG/1
40 TABLET ORAL
Qty: 6 TABLET | Refills: 0 | Status: SHIPPED | OUTPATIENT
Start: 2018-12-07 | End: 2018-12-10

## 2018-12-06 RX ADMIN — BUDESONIDE 0.5 MG: 0.5 INHALANT RESPIRATORY (INHALATION) at 07:27

## 2018-12-06 RX ADMIN — AZELASTINE HYDROCHLORIDE 2 SPRAY: 137 SPRAY, METERED NASAL at 08:21

## 2018-12-06 RX ADMIN — Medication 1 CAPSULE: at 08:22

## 2018-12-06 RX ADMIN — CYCLOBENZAPRINE HYDROCHLORIDE 10 MG: 10 TABLET, FILM COATED ORAL at 08:22

## 2018-12-06 RX ADMIN — CYANOCOBALAMIN TAB 500 MCG 500 MCG: 500 TAB at 08:22

## 2018-12-06 RX ADMIN — NYSTATIN 400000 UNITS: 500000 SUSPENSION ORAL at 08:22

## 2018-12-06 RX ADMIN — HYDROCODONE BITARTRATE AND ACETAMINOPHEN 1 TABLET: 10; 325 TABLET ORAL at 08:35

## 2018-12-06 RX ADMIN — NYSTATIN 400000 UNITS: 500000 SUSPENSION ORAL at 12:14

## 2018-12-06 RX ADMIN — IPRATROPIUM BROMIDE AND ALBUTEROL SULFATE 3 ML: .5; 3 SOLUTION RESPIRATORY (INHALATION) at 07:27

## 2018-12-06 RX ADMIN — IPRATROPIUM BROMIDE AND ALBUTEROL SULFATE 3 ML: .5; 3 SOLUTION RESPIRATORY (INHALATION) at 00:34

## 2018-12-06 RX ADMIN — ARFORMOTEROL TARTRATE 15 MCG: 15 SOLUTION RESPIRATORY (INHALATION) at 07:27

## 2018-12-06 RX ADMIN — BENZONATATE 100 MG: 100 CAPSULE, LIQUID FILLED ORAL at 08:35

## 2018-12-06 RX ADMIN — SODIUM CHLORIDE, PRESERVATIVE FREE 3 ML: 5 INJECTION INTRAVENOUS at 08:24

## 2018-12-06 RX ADMIN — VENLAFAXINE 75 MG: 75 TABLET ORAL at 08:22

## 2018-12-06 RX ADMIN — PREDNISONE 40 MG: 20 TABLET ORAL at 08:22

## 2018-12-06 RX ADMIN — DICLOFENAC 2 G: 10 GEL TOPICAL at 08:24

## 2018-12-06 RX ADMIN — PANTOPRAZOLE SODIUM 40 MG: 40 TABLET, DELAYED RELEASE ORAL at 06:08

## 2018-12-06 RX ADMIN — METOPROLOL TARTRATE 25 MG: 25 TABLET ORAL at 08:21

## 2018-12-06 RX ADMIN — GUAIFENESIN 600 MG: 600 TABLET, EXTENDED RELEASE ORAL at 08:22

## 2018-12-06 RX ADMIN — MULTIPLE VITAMINS W/ MINERALS TAB 1 TABLET: TAB at 08:23

## 2018-12-06 NOTE — DISCHARGE PLACEMENT REQUEST
"Discharging today.  New order for skilled nursing, PT/OT, COPD management    Joelle Hernandes (72 y.o. Female)     Date of Birth Social Security Number Address Home Phone MRN    1945  [address] [facility] [phone] [id number]    Hoahaoism Marital Status          Sikh Single       Admission Date Admission Type Admitting Provider Attending Provider Department, Room/Bed    11/30/18 Emergency Maximino Bueno MD Pais, Roshan, MD [facility] MED SURG  4, 406/1    Discharge Date Discharge Disposition Discharge Destination         Home-Health Care [facility] [place]              Attending Provider:  Maximino Bueno MD    Allergies:  Dust Mite Extract, Grass, Mold Extract [Trichophyton]    Isolation:  None   Infection:  None   Code Status:  CPR    Ht:  152.4 cm (60\")   Wt:  44.7 kg (98 lb 8 oz)    Admission Cmt:  None   Principal Problem:  Pneumonia of both lower lobes due to infectious organism (CMS/[facility]) [J18.1]                 Active Insurance as of 11/30/2018     Primary Coverage     Payor Plan Insurance Group Employer/Plan Group    HUMANA MEDICARE REPLACEMENT HUMANA MEDICARE REPL [id number]     Payor Plan Address Payor Plan Phone Number Payor Plan Fax Number Effective Dates    [address] [address] [phone]  1/1/2018 - None Entered    [facility] [address]       Subscriber Name Subscriber Birth Date Member ID       JOELLE HERNANDES 1945 [id number]           Secondary Coverage     Payor Plan Insurance Group Employer/Plan Group    [place]      Payor Plan Address Payor Plan Phone Number Payor Plan Fax Number Effective Dates    [address] [phone]  3/1/2016 - None Entered    [facility] [address]       Subscriber Name Subscriber Birth Date Member ID       JOELLE HERNANDES 1945 [id number]                 Emergency Contacts      (Rel.) Home Phone Work Phone Mobile Phone    Cayetano Hernandes (Brother) [phone] -- [phone]    " Doug Julien (Son) [phone] -- [phone]    Adia Rhodes (Daughter) [phone] -- --    Margaret Julien (Other) [phone] -- [phone]            Insurance Information                HUMANA MEDICARE REPLACEMENT/HUMANA MEDICARE REPL Phone: [phone]    Subscriber: Joselito Joellekatia Bean Subscriber#: [id number]    Group#: [id number] Precert#:         [place] Phone: [phone]    Subscriber: Joelle Yee Subscriber#: [id number]    Group#:  Precert#:              Discharge Summary      Jerrica Alvarez APRN at 2018  7:56 AM              [facility]   [facility] SUMMARY    Name:  Joelle Yee   Age:  72 y.o.  Sex:  female  :  1945  MRN:  [id number]   Visit Number:  [id number]  Primary Care Physician:  Blanquita Clements PA  Date of Discharge:  2018  Admission Date:  2018      Presenting Problem:    Pneumonia of both lower lobes due to infectious organism (CMS/HCC) [J18.1]       Discharge Diagnosis:       Pneumonia of both lower lobes due to infectious organism (CMS/HCC)    COPD with acute exacerbation (CMS/HCC)    Hyperlipidemia    Acute on chronic respiratory failure with hypoxia (CMS/HCC)    Moderate malnutrition (CMS/HCC)    Bronchiectasis without complication (CMS/HCC)    Acute kidney injury (CMS/HCC)    BMI less than 19,adult    Anemia        Past Medical History:  Past Medical History:   Diagnosis Date   • Abdominal pain    • Acute bronchitis    • Ankle pain    • Anxiety    • Atopic dermatitis    • Bronchiectasis (CMS/HCC)    • Cataract, bilateral    • Chest pain     STATES HAS OCCASSIONALLY.  STATES LAST TIME WAS LAST WEEK.  STATES SEES DR. RICH.  STATES HE HAS DONE NUMEROUS TESTS ON HER AND HAS NOT BEEN ABLE TO FIND OUT CAUSE.     • Chronic obstructive lung disease (CMS/HCC)    • Colon cancer screening    • COPD (chronic obstructive pulmonary disease) (CMS/HCC)    • Cystocele    •  Depressed    • Depression 1980   • Fatigue     Chronic pafigue 2012   • Fibromyalgia 2008   • Full dentures    • GERD (gastroesophageal reflux disease)    • Gout    • Heartburn     Chronic historu of epigastric heartburn currently controlled on Prilesec 40 mg every morning along with Zantac 300 Mg daily at bedtime   • High cholesterol 2012   • Hip pain    • Hyperlipidemia    • Hypertension    • Insomnia    • Joint pain    • Kidney infection    • Loss of appetite    • Low back pain 1995   • Melena    • Nausea and vomiting    • On home oxygen therapy     2L/NC PRN (STATES SHE HAS BEEN USING CONTINUOSLY LATELY)   • Osteoarthritis 2010   • Pain in limb    • Palpitations    • Pinched nerve     Pinched nerves 2011   • Pneumonia    • Problems with swallowing     HAS TO EAT SLOW AND CHEW FOOD WELL   • Recurrent urinary tract infection    • Sciatica [id number]   • Shortness of breath    • Sinus problem    • Sleep apnea 1998    NO CPAP   • Upset stomach    • Valvular heart disease    • Vitamin B12 deficiency    • Wears glasses    • Weight loss, abnormal     Weight loss is stabel. On pund weight gain since 01/2016         Consults:     Consults     Date and Time Order Name Status Description    12/4/2018 1455 Inpatient Pulmonology Consult Completed           Procedures Performed:  none             History of presenting illness/Hospital Course:  This is a 72-year-old female with history of bronchiectasis and COPD who is on oxygen 2 L at home.  She was admitted for COPD exacerbation and was noted on CT scan of the chest to have evidence of bronchopneumonia in the lung bases with small area consolidation within the lingula.  She also was noted to have emphysematous disease as well.  She had been to her sons for Thanksgiving and started feeling worse after this.      Upon admission to the hospitalist she was started on IV antibiotics in the form of Rocephin and azithromycin.  Patient has continued to show gradual improvement since  admission.  She was continued to bronchodilators, mucolytics, steroids and flutter valve.  Dr. Esparza was consulted and did start the patient on vest therapy as well to improve her pulmonary toilet.    She was noted on admission to have leukocytosis and mildly elevated procalcitonin which have both normalized.    I did discuss rehab placement however she wants to go home stating that she has many grandchildren and family who can help her.  She has been working with therapy and was able to walk 412 feet with therapy.  She has life alert, landline phone and cellular phone which she keeps with her at home.  I have tried to reassure the patient and discuss breathing techniques to help her through these panic attacks.  She denies any chest pain, abdominal pain nausea or vomiting.     Patient is doing better at this point.  I did discuss with Pulmonology who has cleared patient for discharge from their standpoint.  She is still some weak but was able to get up this morning for a bedside bath and hygiene care.  Pulmonology has switched her to oral steroids for which we will continue to taper upon discharge.  We will arrange for her to follow up with Dr. Esparza as an outpatient.  Her labs and vital signs are stable.      She does have some anemia for which I have recommended outpatient anemia work up.  He hgb has been steadily dropping since June 2018.  I have ordered an iron panel.  She has seen Dr. Ramirez in the past with the most recent visit Feb. 2018.  Her last colonoscopy was over years ago.  Will arrange for her to follow up with Dr. Ramirez.  She does have a BMI of 19.  She was started on Remeron for her appetite which may also help some with her anxiety/depression.      Vital Signs:    Temp:  [97.6 °F (36.4 °C)-98.7 °F (37.1 °C)] 97.6 °F (36.4 °C)  Heart Rate:  [69-90] 79  Resp:  [16-22] 16  BP: (136-153)/(58-73) 138/69    Physical Exam:  General Appearance:    Alert and cooperative, not in any acute distress.   Chronically ill and frail appearing   Head:    Atraumatic and normocephalic, without obvious abnormality.   Eyes:            PERRLA, conjunctivae and sclerae normal, no Icterus. No pallor. Extra-occular movements are within normal limits.   Ears:    Ears appear intact with no abnormalities noted.   Throat:   No oral lesions, no thrush, oral mucosa moist.   Neck:   Supple, trachea midline, no thyromegaly, no carotid bruit.   Back:     No kyphoscoliosis present. No tenderness to palpation,   range of motion normal.   Lungs:     Chest shape is normal. Breath sounds heard bilaterally equally.  fine crackles and few wheezes. No Pleural rub or bronchial breathing.  Decreased in bases    Heart:    Normal S1 and S2, no murmur, no gallop, no rub. No JVD   Abdomen:     Normal bowel sounds, no masses, no organomegaly. Soft        non-tender, non-distended, no guarding, no rebound                tenderness   Extremities:   Moves all extremities well, no edema, no cyanosis, no             clubbing   Pulses:   Pulses palpable and equal bilaterally   Skin:   No bleeding, bruising or rash   Lymph nodes:  Psychiatric/Behavior:    No palpable adenopathy    Normal mood, normal behavior   Neurologic:   Cranial nerves 2 - 12 grossly intact, sensation intact, Motor power is normal and equal bilaterally.           Pertinent Lab Results:     Results from last 7 days   Lab Units  12/05/18   0534  12/04/18   0951  12/02/18   0534   11/30/18   1450   SODIUM mmol/L  137  137  138   < >  132*   POTASSIUM mmol/L  4.5  4.7  4.3   < >  3.3*   CHLORIDE mmol/L  97*  98  108*   < >  96*   CO2 mmol/L  35.0*  34.0*  25.0*   < >  27.0   BUN mg/dL  23*  22*  18   < >  27*   CREATININE mg/dL  0.70  0.70  0.70   < >  1.30   CALCIUM mg/dL  8.9  9.2  8.9   < >  9.3   BILIRUBIN mg/dL   --   0.3   --    --   0.6   ALK PHOS U/L   --   138*   --    --   151*   ALT (SGPT) U/L   --   45   --    --   20   AST (SGOT) U/L   --   38   --    --   23   GLUCOSE mg/dL   119*  126*  138*   < >  114*    < > = values in this interval not displayed.     Results from last 7 days   Lab Units  12/05/18   0534  12/04/18   0951  12/02/18   0534   WBC 10*3/mm3  8.54  7.64  9.61   HEMOGLOBIN g/dL  9.5*  10.1*  9.2*   HEMATOCRIT %  29.4*  32.1*  29.1*   PLATELETS 10*3/mm3  324  269  205         Blood Culture   Date Value Ref Range Status   11/30/2018 No growth at 5 days  Final   11/30/2018 No growth at 5 days  Final         Pertinent Radiology Results:    Imaging Results (all)     Procedure Component Value Units Date/Time    CT Chest Without Contrast [[id number]] Collected:  11/30/18 1622     Updated:  11/30/18 1646    Narrative:       CT CHEST WITHOUT CONTRAST     HISTORY: Shortness of breath.      COMPARISON: None.      TECHNIQUE: Axial CT without IV contrast administration.         FINDINGS:     Emphysematous changes are present. Increased markings are seen in  bilateral lung bases. These likely involve the alveolar spaces and not  vascular markings. Small focal area of consolidation is seen involving  the lingula. Bronchial wall thickening is present.           No pleural or pericardial effusion is seen. No adenopathy or mass lesion  is present.            Impression:       1. Findings compatible with bronchopneumonia of the lung bases with  small area of consolidation within the lingula.  2. No pulmonary edema or significant pleural effusion.  3. Emphysematous disease.         This study was performed with techniques to keep radiation doses as low  as reasonably achievable (ALARA). Individualized dose reduction  techniques using automated exposure control or adjustment of vA and/or  kV according to the patient size were employed.      This report was finalized on 11/30/2018 4:44 PM by Hansel Fernández MD.    XR Chest 1 View [[id number]] Collected:  11/30/18 1437     Updated:  11/30/18 1521    Narrative:       XR CHEST 1 VW-     HISTORY: SOA         COMPARISON:  06/07/2018.     FINDINGS:   Portable view of the chest demonstrates increased markings in  lung bases greater on left side. Vascular congestion is noted. There is  no evidence of effusion, pneumothorax or other significant pleural  disease. The mediastinum is unremarkable.     The heart size is normal.            Impression:       Prominent vascular markings probably due to mild CHF.      This report was finalized on 11/30/2018 3:19 PM by Hansel Fernández MD.          Condition on Discharge:    Stable        Discharge Disposition:    Home-Health Care [facility] [place]    Discharge Medication:       Discharge Medications      New Medications      Instructions Start Date   benzonatate 100 MG capsule  Commonly known as:  TESSALON   100 mg, Oral, 3 Times Daily PRN      guaiFENesin 600 MG 12 hr tablet  Commonly known as:  MUCINEX  Replaces:  MUCOSA 400 MG tablet   600 mg, Oral, Every 12 Hours Scheduled      predniSONE 20 MG tablet  Commonly known as:  DELTASONE   40 mg, Oral, Daily With Breakfast         Changes to Medications      Instructions Start Date   Azelastine HCl 137 MCG/SPRAY solution  What changed:    · how much to take  · how to take this  · when to take this   USE 2 SPRAYS IN EACH NOSTRIL TWICE DAILY      metoprolol tartrate 25 MG tablet  Commonly known as:  LOPRESSOR  What changed:  when to take this   25 mg, Oral, Every 12 Hours Scheduled      nystatin 246821 UNIT/ML suspension  Commonly known as:  MYCOSTATIN  What changed:    · when to take this  · reasons to take this   500,000 Units, Swish & Swallow, 4 Times Daily         Continue These Medications      Instructions Start Date   acetaminophen 325 MG tablet  Commonly known as:  TYLENOL   650 mg, Oral, Every 4 Hours PRN      albuterol (2.5 MG/3ML) 0.083% nebulizer solution  Commonly known as:  PROVENTIL   2.5 mg, Nebulization, Every 4 Hours PRN      budesonide-formoterol 160-4.5 MCG/ACT inhaler  Commonly known as:  SYMBICORT   2 puffs, Inhalation, 2 Times Daily, Inhale 1 puff twice daily. Rinse  mouth after use.       cyclobenzaprine 10 MG tablet  Commonly known as:  FLEXERIL   10 mg, Oral, Daily      ENSURE COMPLETE PO   1 can, Oral, 2 Times Daily      FLUTTER device   1 Device, Does not apply, As Needed      gemfibrozil 600 MG tablet  Commonly known as:  LOPID   TAKE ONE TABLET WITH SUPPER. Patient sometimes takes 1/2 tablet (300 mg).      HYDROcodone-acetaminophen  MG per tablet  Commonly known as:  NORCO   1 tablet, Oral, Every 12 Hours PRN      INCRUSE ELLIPTA 62.5 MCG/INH aerosol powder   Generic drug:  Umeclidinium Bromide   INHALE 1 PUFF INTO THE LUNGS ONCE DAILY      ipratropium-albuterol  MCG/ACT inhaler  Commonly known as:  COMBIVENT RESPIMAT   1 puff, Inhalation, 4 Times Daily PRN      ipratropium-albuterol 0.5-2.5 mg/3 ml nebulizer  Commonly known as:  DUO-NEB   3 mL, Nebulization, 4 Times Daily PRN      ketotifen 0.025 % ophthalmic solution  Commonly known as:  ZADITOR   USE 1-2 DROPS IN BOTH EYES TWICE DAILY AS NEEDED      LORazepam 0.5 MG tablet  Commonly known as:  ATIVAN   0.5 mg, Oral, 2 Times Daily PRN, 1-2 TABLETS DAILY PRN      melatonin 5 MG tablet tablet   10 mg, Oral, Nightly PRN      mirtazapine 30 MG tablet  Commonly known as:  REMERON   30 mg, Oral, Nightly      montelukast 10 MG tablet  Commonly known as:  SINGULAIR   TAKE ONE TABLET BY MOUTH AT BEDTIME as needed      multivitamin with minerals tablet tablet   1 tablet, Oral, Daily      omeprazole 40 MG capsule  Commonly known as:  priLOSEC   40 mg, Oral, Daily      ondansetron ODT 4 MG disintegrating tablet  Commonly known as:  ZOFRAN-ODT   1 tablet, Oral, Every 6 Hours PRN      OXYGEN-HELIUM IN   Oxygen; Patient Sig: Oxygen 2 liter as needed; 0; 21-May-2014; Active      potassium bicarbonate 25 MEQ disintegrating tablet  Commonly known as:  K-LYTE   25 mEq, Oral, Nightly      Risaquad-2 capsule capsule   1 capsule, Oral, Daily      ROLLATOR misc   1 Units, Does not apply, As Needed      venlafaxine 75 MG  tablet  Commonly known as:  EFFEXOR   1 tablet, Oral, 2 Times Daily      VITAMIN B12 PO   500 mcg, Oral, Daily      VOLTAREN TD   2 g, Topical, 2 Times Daily PRN, Voltaren GEL         Stop These Medications    fluconazole 200 MG tablet  Commonly known as:  DIFLUCAN     gabapentin 100 MG capsule  Commonly known as:  NEURONTIN     lidocaine 1 % injection  Commonly known as:  XYLOCAINE     losartan 25 MG tablet  Commonly known as:  COZAAR     metoprolol succinate XL 25 MG 24 hr tablet  Commonly known as:  TOPROL-XL     MUCOSA 400 MG tablet  Generic drug:  guaiFENesin  Replaced by:  guaiFENesin 600 MG 12 hr tablet     traZODone 50 MG tablet  Commonly known as:  DESYREL            Discharge Diet:     Diet Instructions     Diet: Cardiac; Thin      Discharge Diet:  Cardiac    Fluid Consistency:  Thin          Activity at Discharge:     Activity Instructions     Discharge Activity      As tolerates          Follow-up Appointments:  Name Relationship Specialty Phone Fax Address Order            Iglesia Ramirez MD   Gastroenterology [phone] [address]  [place] [address]    Next Steps: Follow up in 1 month(s)      Rebeka Esparza MD   Pulmonary Disease  Sleep Medicine [phone] [address]  [place] [address]    Next Steps: Follow up in 1 month(s)      Blanquita Clements PA  PCP - General Family Medicine [phone] [facility]  [address]  [place] [address]              Future Appointments   Date Time Provider Department Center   12/10/2018  1:30 PM Kaelyn Dixon APRN [facility] None     Additional Instructions for the Follow-ups that You Need to Schedule     Ambulatory Referral to Home Health   As directed      Face to Face Visit Date:  12/6/2018    Follow-up Provider for Plan of Care?:  I treated the patient in an acute care facility and will not continue treatment after discharge.    Follow-up Provider:   HARSHA REYES [[id number]]    Reason/Clinical Findings:  strength mobility    Describe mobility limitations that make leaving home difficult:  Severe COPD    Nursing/Therapeutic Services Requested:  Skilled Nursing (nurse aide) Physical Therapy Occupational Therapy Other    Skilled nursing orders:  O2 instruction COPD management Cardiopulmonary assessments    PT orders:  Strengthening Home safety assessment    Occupational orders:  Activities of daily living Energy conservation Strengthening    Frequency:  1 Week 1               Test Results Pending at Discharge:     Order Current Status    Ferritin In process    Folate In process    Iron Profile In process    Vitamin B12 In process        Discharge Instructions:  Oxygen 2 liters nasal canula  Outpatient anemia work up          DWAYNE Bhatt  18  12:31 PM    Time: 40 minutes were spent reviewing labs, history, evaluating patient and discharge planning.            Electronically signed by Jerrica Alvarez APRN at 2018 12:33 PM       Discharge Order (From admission, onward)    Start     Ordered    18 1224  Discharge patient  Once     Expected Discharge Date:  18    Discharge Disposition:  Home-Health Care [facility] [place]    Physician of Record for Attribution - Please select from Treatment Team:  DAVID STEELE [[id number]]    Review needed by CMO to determine Physician of Record:  No       Question Answer Comment   Physician of Record for Attribution - Please select from Treatment Team DAVID STEELE    Review needed by CMO to determine Physician of Record No        18 1231        [facility] MED SURG  4  [address] [facility] [address]  Phone:  [phone]  Fax:   Date: Dec 6, 2018      Ambulatory Referral to Home Health     Patient:  Joelle Yee MRN:  [id number]   [address] [place] [address] :  1945  SSN:    Phone: [phone] Sex:  F      INSURANCE PAYOR PLAN GROUP #  SUBSCRIBER ID   Primary:  Secondary:    HUMANA MEDICARE REPLACEMENT  [place] [id number]  [id number]    [id number]  [id number]      Referring Provider Information:  OSMELDOREENGenevieveELIZABETH RIVAS Phone: [phone] Fax:       Referral Information:   # Visits:  1 Referral Type: Home Health [42]   Urgency:  Routine Referral Reason: Specialty Services Required   Start Date: Dec 6, 2018 End Date:  To be determined by Insurer   Diagnosis: COPD with acute exacerbation (CMS/HCC) (J44.1 [ICD-10-CM] 491.21 [ICD-9-CM])  Acute on chronic respiratory failure with hypoxia (CMS/HCC) (J96.21 [ICD-10-CM] 518.84,799.02 [ICD-9-CM])  Bronchiectasis without complication (CMS/HCC) (J47.9 [ICD-10-CM] 494.0 [ICD-9-CM])  Chronic bronchitis, unspecified chronic bronchitis type (CMS/HCC) (J42 [ICD-10-CM] 491.9 [ICD-9-CM])      Refer to Dept:   Refer to Provider:   Refer to Facility:       Face to Face Visit Date: 12/6/2018  Follow-up Provider for Plan of Care? I treated the patient in an acute care facility and will not continue treatment after discharge.  Follow-up Provider: HARSHA REYES [[id number]]  Reason/Clinical Findings: strength mobility  Describe mobility limitations that make leaving home difficult: Severe COPD  Nursing/Therapeutic Services Requested: Skilled Nursing (nurse aide)  Nursing/Therapeutic Services Requested: Physical Therapy  Nursing/Therapeutic Services Requested: Occupational Therapy  Nursing/Therapeutic Services Requested: Other  Skilled nursing orders: O2 instruction  Skilled nursing orders: COPD management  Skilled nursing orders: Cardiopulmonary assessments  PT orders: Strengthening  PT orders: Home safety assessment  Occupational orders: Activities of daily living  Occupational orders: Energy conservation  Occupational orders: Strengthening  Frequency: 1 Week 1     This document serves as a request of services and does not constitute Insurance authorization or approval of services.  To determine eligibility,  please contact the members Insurance carrier to verify and review coverage.     If you have medical questions regarding this request for services. Please contact [facility]  4 at [phone] between the hours of 8:00am - 5:00pm (Mon-Fri).       Authorizing Provider:Jerrica Alvarez APRN  Authorizing Provider's NPI: [id number]  Order Entered By: Jerrica Alvarez APRN 12/6/2018 12:31 PM     Electronically signed by: Jerrica Alvarez APRN 12/6/2018 12:31 PM

## 2018-12-06 NOTE — PROGRESS NOTES
"Brief summary: The patient has been experiencing increased shortness of breath and cough for the last 3 days and was brought to the ER by EMS.  She has oxygen at home and uses as needed.  She has a history of COPD and bronchiectasis.  She has been using her nebulized treatments more often the last 3 days without any relief in symptoms.  She reports some increased phlegm production which she is having trouble expectorating.  She endorses some chills but no fever.  She has been using her respiratory medications as directed.  She states she is also been compliant with vest therapy.  She has been slow to improve during her hospital stay and pulmonary consultation was requested.      CC: Shortness of breath    S: She feels better this morning. She states she is breathing better, but continues to feel weak.     ROS: Positive for cough, shortness of breath, mild anxiety, headache. Denies chest pain, diarrhea or fever.    O: /73 (BP Location: Left arm, Patient Position: Sitting)   Pulse 88   Temp 97.6 °F (36.4 °C) (Oral)   Resp 16   Ht 152.4 cm (60\")   Wt 44.7 kg (98 lb 8 oz)   LMP  (LMP Unknown)   SpO2 98%   BMI 19.24 kg/m²     General: No respiratory distress noted.  Neck: No JVD   Cardiovascular: S1 + S2. Regular.   Respiratory: Scattered wheezing heard. Few crackles noted  Extremities: No edema noted.  Neurologic:  AAOx3. Was able to follow commands     Labs: Reviewed.     Results from last 7 days   Lab Units  12/05/18   0534  12/04/18   0951  12/02/18   0534   11/30/18   1450   SODIUM mmol/L  137  137  138   < >  132*   POTASSIUM mmol/L  4.5  4.7  4.3   < >  3.3*   CHLORIDE mmol/L  97*  98  108*   < >  96*   CO2 mmol/L  35.0*  34.0*  25.0*   < >  27.0   BUN mg/dL  23*  22*  18   < >  27*   CREATININE mg/dL  0.70  0.70  0.70   < >  1.30   CALCIUM mg/dL  8.9  9.2  8.9   < >  9.3   BILIRUBIN mg/dL   --   0.3   --    --   0.6   ALK PHOS U/L   --   138*   --    --   151*   ALT (SGPT) U/L   --   45   --    --   " 20   AST (SGOT) U/L   --   38   --    --   23   GLUCOSE mg/dL  119*  126*  138*   < >  114*    < > = values in this interval not displayed.       Results from last 7 days   Lab Units  12/01/18   0554   MAGNESIUM mg/dL  2.1         Results from last 7 days   Lab Units  12/05/18   0534  12/04/18   0951  12/02/18   0534  12/01/18   0554  11/30/18   1450   WBC 10*3/mm3  8.54  7.64  9.61  8.34  11.66*   HEMOGLOBIN g/dL  9.5*  10.1*  9.2*  9.5*  10.8*   PLATELETS 10*3/mm3  324  269  205  203  226              CXRay: reviewed last    Assessment & Recommendations/Plan:   1.  Shortness of breath   Continue nebulized treatments and steroids.    2.  Basal PNA. Serratia in July 2018.   Continue antibiotics.    3.  Bronchiectasis with acute exacerbation  Continue steroids and nebulized treatments.     4.  Chronic hypoxemia  Continue using oxygen. She is on her home dose of 2 LPM.    5.  COPD  Continue nebulized medications.    6.  Recent prednisone (1 month ago) for ? URI?      Discussed the possibility of going to rehab with the patient and she seems willing. She lives alone and seems anxious about returning home alone. She has a brother that lives in town.     We have reviewed patient's current orders and changes, if any, have been suggested to primary care team. Plan was also discussed with nursing staff, as necessary.       Kaelyn Dixon, DWAYNE  12/06/18  10:46 AM    Dictated utilizing Dragon dictation.

## 2018-12-06 NOTE — PLAN OF CARE
Problem: Pneumonia (Adult)  Goal: Signs and Symptoms of Listed Potential Problems Will be Absent, Minimized or Managed (Pneumonia)  Outcome: Ongoing (interventions implemented as appropriate)   12/04/18 1055   Goal/Outcome Evaluation   Problems Assessed (Pneumonia) all   Problems Present (Pneumonia) respiratory compromise       Problem: Skin Injury Risk (Adult)  Goal: Skin Health and Integrity  Outcome: Ongoing (interventions implemented as appropriate)   12/06/18 0435   Skin Injury Risk (Adult)   Skin Health and Integrity making progress toward outcome

## 2018-12-06 NOTE — DISCHARGE SUMMARY
[facility]    Name:  Joelle Yee   Age:  72 y.o.  Sex:  female  :  1945  MRN:  [id number]   Visit Number:  [id number]  Primary Care Physician:  Blanquita Clements PA  Date of Discharge:  2018  Admission Date:  2018      Presenting Problem:    Pneumonia of both lower lobes due to infectious organism (CMS/HCC) [J18.1]       Discharge Diagnosis:       Pneumonia of both lower lobes due to infectious organism (CMS/HCC)    COPD with acute exacerbation (CMS/HCC)    Hyperlipidemia    Acute on chronic respiratory failure with hypoxia (CMS/HCC)    Moderate malnutrition (CMS/HCC)    Bronchiectasis without complication (CMS/HCC)    Acute kidney injury (CMS/HCC)    BMI less than 19,adult    Anemia        Past Medical History:  Past Medical History:   Diagnosis Date   • Abdominal pain    • Acute bronchitis    • Ankle pain    • Anxiety    • Atopic dermatitis    • Bronchiectasis (CMS/HCC)    • Cataract, bilateral    • Chest pain     STATES HAS OCCASSIONALLY.  STATES LAST TIME WAS LAST WEEK.  STATES SEES DR. RICH.  STATES HE HAS DONE NUMEROUS TESTS ON HER AND HAS NOT BEEN ABLE TO FIND OUT CAUSE.     • Chronic obstructive lung disease (CMS/HCC)    • Colon cancer screening    • COPD (chronic obstructive pulmonary disease) (CMS/HCC)    • Cystocele    • Depressed    • Depression    • Fatigue     Chronic pafigue    • Fibromyalgia    • Full dentures    • GERD (gastroesophageal reflux disease)    • Gout    • Heartburn     Chronic historu of epigastric heartburn currently controlled on Prilesec 40 mg every morning along with Zantac 300 Mg daily at bedtime   • High cholesterol    • Hip pain    • Hyperlipidemia    • Hypertension    • Insomnia    • Joint pain    • Kidney infection    • Loss of appetite    • Low back pain    • Melena    • Nausea and vomiting    • On home oxygen therapy     2L/NC PRN (STATES SHE HAS BEEN USING CONTINUOSLY  LATELY)   • Osteoarthritis 2010   • Pain in limb    • Palpitations    • Pinched nerve     Pinched nerves 2011   • Pneumonia    • Problems with swallowing     HAS TO EAT SLOW AND CHEW FOOD WELL   • Recurrent urinary tract infection    • Sciatica [id number]   • Shortness of breath    • Sinus problem    • Sleep apnea 1998    NO CPAP   • Upset stomach    • Valvular heart disease    • Vitamin B12 deficiency    • Wears glasses    • Weight loss, abnormal     Weight loss is stabel. On pund weight gain since 01/2016         Consults:     Consults     Date and Time Order Name Status Description    12/4/2018 1455 Inpatient Pulmonology Consult Completed           Procedures Performed:  none             History of presenting illness/Hospital Course:  This is a 72-year-old female with history of bronchiectasis and COPD who is on oxygen 2 L at home.  She was admitted for COPD exacerbation and was noted on CT scan of the chest to have evidence of bronchopneumonia in the lung bases with small area consolidation within the lingula.  She also was noted to have emphysematous disease as well.  She had been to her sons for Thanksgiving and started feeling worse after this.      Upon admission to the hospitalist she was started on IV antibiotics in the form of Rocephin and azithromycin.  Patient has continued to show gradual improvement since admission.  She was continued to bronchodilators, mucolytics, steroids and flutter valve.  Dr. Esparza was consulted and did start the patient on vest therapy as well to improve her pulmonary toilet.    She was noted on admission to have leukocytosis and mildly elevated procalcitonin which have both normalized.    I did discuss rehab placement however she wants to go home stating that she has many grandchildren and family who can help her.  She has been working with therapy and was able to walk 412 feet with therapy.  She has life alert, landline phone and cellular phone which she keeps with her at  home.  I have tried to reassure the patient and discuss breathing techniques to help her through these panic attacks.  She denies any chest pain, abdominal pain nausea or vomiting.     Patient is doing better at this point.  I did discuss with Pulmonology who has cleared patient for discharge from their standpoint.  She is still some weak but was able to get up this morning for a bedside bath and hygiene care.  Pulmonology has switched her to oral steroids for which we will continue to taper upon discharge.  We will arrange for her to follow up with Dr. Esparza as an outpatient.  Her labs and vital signs are stable.      She does have some anemia for which I have recommended outpatient anemia work up.  He hgb has been steadily dropping since June 2018.  I have ordered an iron panel.  She has seen Dr. Ramirez in the past with the most recent visit Feb. 2018.  Her last colonoscopy was over years ago.  Will arrange for her to follow up with Dr. Ramirez.  She does have a BMI of 19.  She was started on Remeron for her appetite which may also help some with her anxiety/depression.      Vital Signs:    Temp:  [97.6 °F (36.4 °C)-98.7 °F (37.1 °C)] 97.6 °F (36.4 °C)  Heart Rate:  [69-90] 79  Resp:  [16-22] 16  BP: (136-153)/(58-73) 138/69    Physical Exam:  General Appearance:    Alert and cooperative, not in any acute distress.  Chronically ill and frail appearing   Head:    Atraumatic and normocephalic, without obvious abnormality.   Eyes:            PERRLA, conjunctivae and sclerae normal, no Icterus. No pallor. Extra-occular movements are within normal limits.   Ears:    Ears appear intact with no abnormalities noted.   Throat:   No oral lesions, no thrush, oral mucosa moist.   Neck:   Supple, trachea midline, no thyromegaly, no carotid bruit.   Back:     No kyphoscoliosis present. No tenderness to palpation,   range of motion normal.   Lungs:     Chest shape is normal. Breath sounds heard bilaterally equally.  fine crackles and  few wheezes. No Pleural rub or bronchial breathing.  Decreased in bases    Heart:    Normal S1 and S2, no murmur, no gallop, no rub. No JVD   Abdomen:     Normal bowel sounds, no masses, no organomegaly. Soft        non-tender, non-distended, no guarding, no rebound                tenderness   Extremities:   Moves all extremities well, no edema, no cyanosis, no             clubbing   Pulses:   Pulses palpable and equal bilaterally   Skin:   No bleeding, bruising or rash   Lymph nodes:  Psychiatric/Behavior:    No palpable adenopathy    Normal mood, normal behavior   Neurologic:   Cranial nerves 2 - 12 grossly intact, sensation intact, Motor power is normal and equal bilaterally.           Pertinent Lab Results:     Results from last 7 days   Lab Units  12/05/18   0534  12/04/18   0951  12/02/18   0534   11/30/18   1450   SODIUM mmol/L  137  137  138   < >  132*   POTASSIUM mmol/L  4.5  4.7  4.3   < >  3.3*   CHLORIDE mmol/L  97*  98  108*   < >  96*   CO2 mmol/L  35.0*  34.0*  25.0*   < >  27.0   BUN mg/dL  23*  22*  18   < >  27*   CREATININE mg/dL  0.70  0.70  0.70   < >  1.30   CALCIUM mg/dL  8.9  9.2  8.9   < >  9.3   BILIRUBIN mg/dL   --   0.3   --    --   0.6   ALK PHOS U/L   --   138*   --    --   151*   ALT (SGPT) U/L   --   45   --    --   20   AST (SGOT) U/L   --   38   --    --   23   GLUCOSE mg/dL  119*  126*  138*   < >  114*    < > = values in this interval not displayed.     Results from last 7 days   Lab Units  12/05/18   0534  12/04/18   0951  12/02/18 0534   WBC 10*3/mm3  8.54  7.64  9.61   HEMOGLOBIN g/dL  9.5*  10.1*  9.2*   HEMATOCRIT %  29.4*  32.1*  29.1*   PLATELETS 10*3/mm3  324  269  205         Blood Culture   Date Value Ref Range Status   11/30/2018 No growth at 5 days  Final   11/30/2018 No growth at 5 days  Final         Pertinent Radiology Results:    Imaging Results (all)     Procedure Component Value Units Date/Time    CT Chest Without Contrast [[id number]] Collected:  11/30/18 0684      Updated:  11/30/18 1646    Narrative:       CT CHEST WITHOUT CONTRAST     HISTORY: Shortness of breath.      COMPARISON: None.      TECHNIQUE: Axial CT without IV contrast administration.         FINDINGS:     Emphysematous changes are present. Increased markings are seen in  bilateral lung bases. These likely involve the alveolar spaces and not  vascular markings. Small focal area of consolidation is seen involving  the lingula. Bronchial wall thickening is present.           No pleural or pericardial effusion is seen. No adenopathy or mass lesion  is present.            Impression:       1. Findings compatible with bronchopneumonia of the lung bases with  small area of consolidation within the lingula.  2. No pulmonary edema or significant pleural effusion.  3. Emphysematous disease.         This study was performed with techniques to keep radiation doses as low  as reasonably achievable (ALARA). Individualized dose reduction  techniques using automated exposure control or adjustment of vA and/or  kV according to the patient size were employed.      This report was finalized on 11/30/2018 4:44 PM by Hansel Fernández MD.    XR Chest 1 View [[id number]] Collected:  11/30/18 1437     Updated:  11/30/18 1521    Narrative:       XR CHEST 1 VW-     HISTORY: SOA         COMPARISON:  06/07/2018.     FINDINGS:  Portable view of the chest demonstrates increased markings in  lung bases greater on left side. Vascular congestion is noted. There is  no evidence of effusion, pneumothorax or other significant pleural  disease. The mediastinum is unremarkable.     The heart size is normal.            Impression:       Prominent vascular markings probably due to mild CHF.      This report was finalized on 11/30/2018 3:19 PM by Hansel Fernández MD.          Condition on Discharge:    Stable        Discharge Disposition:    Home-Health Care [facility]    Discharge Medication:       Discharge Medications      New Medications      Instructions  Start Date   benzonatate 100 MG capsule  Commonly known as:  TESSALON   100 mg, Oral, 3 Times Daily PRN      guaiFENesin 600 MG 12 hr tablet  Commonly known as:  MUCINEX  Replaces:  MUCOSA 400 MG tablet   600 mg, Oral, Every 12 Hours Scheduled      predniSONE 20 MG tablet  Commonly known as:  DELTASONE   40 mg, Oral, Daily With Breakfast         Changes to Medications      Instructions Start Date   Azelastine HCl 137 MCG/SPRAY solution  What changed:    · how much to take  · how to take this  · when to take this   USE 2 SPRAYS IN EACH NOSTRIL TWICE DAILY      metoprolol tartrate 25 MG tablet  Commonly known as:  LOPRESSOR  What changed:  when to take this   25 mg, Oral, Every 12 Hours Scheduled      nystatin 258464 UNIT/ML suspension  Commonly known as:  MYCOSTATIN  What changed:    · when to take this  · reasons to take this   500,000 Units, Swish & Swallow, 4 Times Daily         Continue These Medications      Instructions Start Date   acetaminophen 325 MG tablet  Commonly known as:  TYLENOL   650 mg, Oral, Every 4 Hours PRN      albuterol (2.5 MG/3ML) 0.083% nebulizer solution  Commonly known as:  PROVENTIL   2.5 mg, Nebulization, Every 4 Hours PRN      budesonide-formoterol 160-4.5 MCG/ACT inhaler  Commonly known as:  SYMBICORT   2 puffs, Inhalation, 2 Times Daily, Inhale 1 puff twice daily. Rinse mouth after use.       cyclobenzaprine 10 MG tablet  Commonly known as:  FLEXERIL   10 mg, Oral, Daily      ENSURE COMPLETE PO   1 can, Oral, 2 Times Daily      FLUTTER device   1 Device, Does not apply, As Needed      gemfibrozil 600 MG tablet  Commonly known as:  LOPID   TAKE ONE TABLET WITH SUPPER. Patient sometimes takes 1/2 tablet (300 mg).      HYDROcodone-acetaminophen  MG per tablet  Commonly known as:  NORCO   1 tablet, Oral, Every 12 Hours PRN      INCRUSE ELLIPTA 62.5 MCG/INH aerosol powder   Generic drug:  Umeclidinium Bromide   INHALE 1 PUFF INTO THE LUNGS ONCE DAILY      ipratropium-albuterol   MCG/ACT inhaler  Commonly known as:  COMBIVENT RESPIMAT   1 puff, Inhalation, 4 Times Daily PRN      ipratropium-albuterol 0.5-2.5 mg/3 ml nebulizer  Commonly known as:  DUO-NEB   3 mL, Nebulization, 4 Times Daily PRN      ketotifen 0.025 % ophthalmic solution  Commonly known as:  ZADITOR   USE 1-2 DROPS IN BOTH EYES TWICE DAILY AS NEEDED      LORazepam 0.5 MG tablet  Commonly known as:  ATIVAN   0.5 mg, Oral, 2 Times Daily PRN, 1-2 TABLETS DAILY PRN      melatonin 5 MG tablet tablet   10 mg, Oral, Nightly PRN      mirtazapine 30 MG tablet  Commonly known as:  REMERON   30 mg, Oral, Nightly      montelukast 10 MG tablet  Commonly known as:  SINGULAIR   TAKE ONE TABLET BY MOUTH AT BEDTIME as needed      multivitamin with minerals tablet tablet   1 tablet, Oral, Daily      omeprazole 40 MG capsule  Commonly known as:  priLOSEC   40 mg, Oral, Daily      ondansetron ODT 4 MG disintegrating tablet  Commonly known as:  ZOFRAN-ODT   1 tablet, Oral, Every 6 Hours PRN      OXYGEN-HELIUM IN   Oxygen; Patient Sig: Oxygen 2 liter as needed; 0; 21-May-2014; Active      potassium bicarbonate 25 MEQ disintegrating tablet  Commonly known as:  K-LYTE   25 mEq, Oral, Nightly      Risaquad-2 capsule capsule   1 capsule, Oral, Daily      ROLLATOR misc   1 Units, Does not apply, As Needed      venlafaxine 75 MG tablet  Commonly known as:  EFFEXOR   1 tablet, Oral, 2 Times Daily      VITAMIN B12 PO   500 mcg, Oral, Daily      VOLTAREN TD   2 g, Topical, 2 Times Daily PRN, Voltaren GEL         Stop These Medications    fluconazole 200 MG tablet  Commonly known as:  DIFLUCAN     gabapentin 100 MG capsule  Commonly known as:  NEURONTIN     lidocaine 1 % injection  Commonly known as:  XYLOCAINE     losartan 25 MG tablet  Commonly known as:  COZAAR     metoprolol succinate XL 25 MG 24 hr tablet  Commonly known as:  TOPROL-XL     MUCOSA 400 MG tablet  Generic drug:  guaiFENesin  Replaced by:  guaiFENesin 600 MG 12 hr tablet     traZODone 50 MG  tablet  Commonly known as:  DESYREL            Discharge Diet:     Diet Instructions     Diet: Cardiac; Thin      Discharge Diet:  Cardiac    Fluid Consistency:  Thin          Activity at Discharge:     Activity Instructions     Discharge Activity      As tolerates          Follow-up Appointments:  Name Relationship Specialty Phone Fax Address Order              Iglesia Ramirez MD   Gastroenterology [phone]  [place] [address]     Next Steps: Follow up in 1 month(s)      Rebeka Esparza MD   Pulmonary Disease  Sleep Medicine [phone] [address]  [place] [address]     Next Steps: Follow up in 1 month(s)      Harsha Reyes, PA  PCP - General Family Medicine [phone] [address] [facility]  [address]  [place] [address]               Future Appointments   Date Time Provider Department Center   12/10/2018  1:30 PM Kaelyn Dixon APRN [facility] None     Additional Instructions for the Follow-ups that You Need to Schedule     Ambulatory Referral to Home Health   As directed      Face to Face Visit Date:  12/6/2018    Follow-up Provider for Plan of Care?:  I treated the patient in an acute care facility and will not continue treatment after discharge.    Follow-up Provider:  HARSHA REYES [[id number]]    Reason/Clinical Findings:  strength mobility    Describe mobility limitations that make leaving home difficult:  Severe COPD    Nursing/Therapeutic Services Requested:  Skilled Nursing (nurse aide) Physical Therapy Occupational Therapy Other    Skilled nursing orders:  O2 instruction COPD management Cardiopulmonary assessments    PT orders:  Strengthening Home safety assessment    Occupational orders:  Activities of daily living Energy conservation Strengthening    Frequency:  1 Week 1               Test Results Pending at Discharge:     Order Current Status    Ferritin In process    Folate In process     Iron Profile In process    Vitamin B12 In process        Discharge Instructions:  Oxygen 2 liters nasal canula  Outpatient anemia work up          DWAYNE Bhatt  12/06/18  12:31 PM    Time: 40 minutes were spent reviewing labs, history, evaluating patient and discharge planning.

## 2018-12-06 NOTE — PROGRESS NOTES
Case Management Discharge Note         Destination      No service has been selected for the patient.      Durable Medical Equipment      No service has been selected for the patient.      Dialysis/Infusion      No service has been selected for the patient.      Home Medical Care      No service has been selected for the patient.      Community Resources      No service has been selected for the patient.        Other: Other(car)    Final Discharge Disposition Code: 06 - home with home health care

## 2018-12-06 NOTE — PROGRESS NOTES
Discharge Planning Assessment  [facility]     Patient Name: Joelle Yee  MRN: [id number]  Today's Date: 12/6/2018    Admit Date: 11/30/2018    Discharge Needs Assessment    No documentation.       Discharge Plan     Row Name 12/06/18 0959       Plan    Plan Followed up with patient for DCP.  She had thought she might want to go to rehab but feels she has plenty of help with her children and grandchildren as well as her lifeline.  She is agreeable to discharging today with [place] [facility]  Her daughter in law Margaret will transport her home.  Call[place] and spoke to Karla [place] [facility] and advised of [place]'s discharge today.  New order and DC summary faxed.     Patient/Family in Agreement with Plan  yes        Destination      No service coordination in this encounter.      Durable Medical Equipment      No service coordination in this encounter.      Dialysis/Infusion      No service coordination in this encounter.      Home Medical Care      Service Provider Request Status Selected Services Address Phone Number Fax Number    [facility] Accepted N/A [address] [facility] [address] [phone]      [facility]      No service coordination in this encounter.        Expected Discharge Date and Time     Expected Discharge Date Expected Discharge Time    Dec 6, 2018         Demographic Summary    No documentation.       Functional Status    No documentation.       Psychosocial    No documentation.       Abuse/Neglect    No documentation.       Legal    No documentation.       Substance Abuse    No documentation.       Patient Forms    No documentation.           Antonietta Salcedo

## 2018-12-07 ENCOUNTER — READMISSION MANAGEMENT (OUTPATIENT)
Dept: CALL CENTER | Facility: HOSPITAL | Age: 73
End: 2018-12-07

## 2018-12-07 NOTE — OUTREACH NOTE
Prep Survey      Responses   Facility patient discharged from?  [facility]   Is patient eligible?  Yes   Discharge diagnosis    COPD with acute exacerbation,  Pneumonia of both lower lobes    Does the patient have one of the following disease processes/diagnoses(primary or secondary)?  COPD/Pneumonia   Does the patient have Home health ordered?  No   Is there a DME ordered?  No   Prep survey completed?  Yes          Jaydon Ley RN

## 2018-12-08 ENCOUNTER — READMISSION MANAGEMENT (OUTPATIENT)
Dept: CALL CENTER | Facility: HOSPITAL | Age: 73
End: 2018-12-08

## 2018-12-08 NOTE — OUTREACH NOTE
COPD/PN Week 1 Survey      Responses   Facility patient discharged from?  [facility]   Does the patient have one of the following disease processes/diagnoses(primary or secondary)?  COPD/Pneumonia   Is there a successful TCM telephone encounter documented?  No   Was the primary reason for admission:  Pneumonia   Week 1 attempt successful?  Yes   Call start time  1623   Call end time  1629   Discharge diagnosis    COPD with acute exacerbation,  Pneumonia of both lower lobes    Is patient permission given to speak with other caregiver?  Yes   List who call center can speak with  can speak to anyone   Meds reviewed with patient/caregiver?  Yes   Is the patient having any side effects they believe may be caused by any medication additions or changes?  No   Does the patient have all medications ordered at discharge?  Yes   Is the patient taking all medications as directed (includes completed medication regime)?  Yes   Does the patient have a primary care provider?   Yes   Does the patient have an appointment with their PCP or pulmonologist within 7 days of discharge?  Yes   Comments regarding PCP  Blanquita JON   Has the patient kept scheduled appointments due by today?  N/A   What is the Home health agency?   [facility]   Has home health visited the patient within 72 hours of discharge?  Yes   Home health interventions  Called Home Health agency   Psychosocial issues?  No   Did the patient receive a copy of their discharge instructions?  Yes   Nursing interventions  Reviewed instructions with patient   What is the patient's perception of their health status since discharge?  Improving   Nursing Interventions  Nurse provided patient education   Are the patient's immunizations up to date?   No   Nursing interventions  Educated on importance of maintaining up to date immunizations as advised by provider, Advised patient to discuss with provider at next visit [had flu shot but not pneumonia]   If the patient is a current  smoker, are they able to teach back resources for cessation?  -- [non-smoker]   Is the patient/caregiver able to teach back the hierarchy of who to call/visit for symptoms/problems? PCP, Specialist, Home health nurse, Urgent Care, ED, 911  Yes   Is the patient able to teach back COPD zones?  Yes   Nursing interventions  Education provided on various zones   Patient reports what zone on this call?  Green Zone   Green Zone  Reports doing well, Breathing without shortness of breath, Usual activity and exercise level, Usual amount of phlegm/mucus without difficulty coughing up, Sleeping well, Appetite is good   Green Zone interventions:  Take daily medications, Do not smoke, Use oxygen as prescribed   Week 1 call completed?  Yes          Lili Thompson RN

## 2018-12-11 ENCOUNTER — TELEPHONE (OUTPATIENT)
Dept: GASTROENTEROLOGY | Facility: CLINIC | Age: 73
End: 2018-12-11

## 2018-12-11 NOTE — TELEPHONE ENCOUNTER
CALLED PT 12-11-18 AND LM FOR PT TO CALL ME BACK TO MAKE A FOLLOW UP APPT FROM WHERE SHE WAS IN THE HOSPITAL.

## 2018-12-13 RX ORDER — PREDNISONE 10 MG/1
10 TABLET ORAL DAILY
Qty: 50 TABLET | Refills: 0 | Status: SHIPPED | OUTPATIENT
Start: 2018-12-13 | End: 2019-01-21

## 2018-12-15 ENCOUNTER — READMISSION MANAGEMENT (OUTPATIENT)
Dept: CALL CENTER | Facility: HOSPITAL | Age: 73
End: 2018-12-15

## 2018-12-15 NOTE — OUTREACH NOTE
COPD/PN Week 2 Survey      Responses   Facility patient discharged from?  [facility]   Does the patient have one of the following disease processes/diagnoses(primary or secondary)?  COPD/Pneumonia   Was the primary reason for admission:  Pneumonia   Week 2 attempt successful?  No   Unsuccessful attempts  Attempt 1          Stephanie Morgan RN

## 2018-12-17 ENCOUNTER — READMISSION MANAGEMENT (OUTPATIENT)
Dept: CALL CENTER | Facility: HOSPITAL | Age: 73
End: 2018-12-17

## 2018-12-17 RX ORDER — BUDESONIDE AND FORMOTEROL FUMARATE DIHYDRATE 160; 4.5 UG/1; UG/1
2 AEROSOL RESPIRATORY (INHALATION)
Qty: 1 INHALER | Refills: 5 | Status: SHIPPED | OUTPATIENT
Start: 2018-12-17 | End: 2019-04-29

## 2018-12-17 NOTE — OUTREACH NOTE
COPD/PN Week 2 Survey      Responses   Facility patient discharged from?  [facility]   Does the patient have one of the following disease processes/diagnoses(primary or secondary)?  COPD/Pneumonia   Was the primary reason for admission:  Pneumonia   Week 2 attempt successful?  Yes   Call start time  1038   Call end time  1041   Discharge diagnosis    COPD with acute exacerbation,  Pneumonia of both lower lobes    Is patient permission given to speak with other caregiver?  Yes   List who call center can speak with  can speak to anyone   Meds reviewed with patient/caregiver?  Yes   Is the patient having any side effects they believe may be caused by any medication additions or changes?  No   Does the patient have all medications ordered at discharge?  Yes   Is the patient taking all medications as directed (includes completed medication regime)?  Yes   Does the patient have a primary care provider?   Yes   Does the patient have an appointment with their PCP or pulmonologist within 7 days of discharge?  Yes   Comments regarding PCP  Blanquita JON   Has the patient kept scheduled appointments due by today?  N/A   Did the patient receive a copy of their discharge instructions?  Yes   Nursing interventions  Reviewed instructions with patient   What is the patient's perception of their health status since discharge?  Improving   Nursing Interventions  Nurse provided patient education   Are the patient's immunizations up to date?   No   Nursing interventions  Educated on importance of maintaining up to date immunizations as advised by provider, Advised patient to discuss with provider at next visit   Is the patient/caregiver able to teach back the hierarchy of who to call/visit for symptoms/problems? PCP, Specialist, Home health nurse, Urgent Care, ED, 911  Yes   Is the patient able to teach back COPD zones?  Yes   Nursing interventions  Education provided on various zones   Patient reports what zone on this call?  Yellow  Zone [She has a PCP appt. today. She states she does not feel well. ]   Yellow Zone  Increased shortness of air, Increased or thicker phlegm/mucus, Using quick relief inhaler/nebulizer more often, Medication is not helping relieve symptoms, Poor sleep and symptoms work patient up, Poor Appetite, Unable to complete daily activities   Yellow interventions  Call provider immediatly if symptoms do not improve, Get plenty of rest, Use other meds such as steroids or antibiotics as ordered, Use oxygen as ordered, Use quick relief inhaler as ordered, Continue to use daily medications, Do not smoke [She has a appt. today.]   Week 2 call completed?  Yes          Jonna Graham RN

## 2018-12-28 ENCOUNTER — READMISSION MANAGEMENT (OUTPATIENT)
Dept: CALL CENTER | Facility: HOSPITAL | Age: 73
End: 2018-12-28

## 2018-12-28 NOTE — OUTREACH NOTE
COPD/PN Week 3 Survey      Responses   Facility patient discharged from?  [facility]   Does the patient have one of the following disease processes/diagnoses(primary or secondary)?  COPD/Pneumonia   Was the primary reason for admission:  Pneumonia   Week 3 attempt successful?  Yes   Call start time  1019   Call end time  1023   Meds reviewed with patient/caregiver?  Yes   Is the patient taking all medications as directed (includes completed medication regime)?  Yes   Medication comments  Put back on prednisone, has two days left to finish it   Has the patient kept scheduled appointments due by today?  Yes   Comments  Saw PCP   What is the patient's perception of their health status since discharge?  Same   Is the patient able to teach back COPD zones?  Yes   Patient reports what zone on this call?  Yellow Zone   Green Zone  Reports doing well, Breathing without shortness of breath, Usual activity and exercise level, Usual amount of phlegm/mucus without difficulty coughing up, Sleeping well   Yellow Zone  Unable to complete daily activities, Increased shortness of air, Using quick relief inhaler/nebulizer more often   Week 3 call completed?  Yes   Wrap up additional comments  PT and OT still seeing her at home, due to come out today. Is unsure can participate due to fatigue today.           Stephanie Morgan, RN

## 2019-01-04 ENCOUNTER — READMISSION MANAGEMENT (OUTPATIENT)
Dept: CALL CENTER | Facility: HOSPITAL | Age: 74
End: 2019-01-04

## 2019-01-04 RX ORDER — IPRATROPIUM BROMIDE AND ALBUTEROL SULFATE 2.5; .5 MG/3ML; MG/3ML
3 SOLUTION RESPIRATORY (INHALATION) 4 TIMES DAILY PRN
Qty: 360 ML | Refills: 5 | Status: SHIPPED | OUTPATIENT
Start: 2019-01-04 | End: 2019-06-28 | Stop reason: SDUPTHER

## 2019-01-04 NOTE — OUTREACH NOTE
COPD/PN Week 4 Survey      Responses   Facility patient discharged from?  [facility]   Does the patient have one of the following disease processes/diagnoses(primary or secondary)?  COPD/Pneumonia   Was the primary reason for admission:  Pneumonia   Week 4 attempt successful?  Yes   Call start time  1458   Call end time  1506   Discharge diagnosis    COPD with acute exacerbation,  Pneumonia of both lower lobes    Meds reviewed with patient/caregiver?  Yes   Is the patient taking all medications as directed (includes completed medication regime)?  Yes   Has the patient kept scheduled appointments due by today?  Yes   Psychosocial issues?  No   What is the patient's perception of their health status since discharge?  Same   Nursing Interventions  Nurse provided patient education   Is the patient/caregiver able to teach back the hierarchy of who to call/visit for symptoms/problems? PCP, Specialist, Home health nurse, Urgent Care, ED, 911  Yes   Additional teach back comments  Pt not feeling any better. Still having productive cough--yellow soutum and very tired. She has 2 appts next week and I enc. her to go to UC or ED if she has issues this weekend. Takes Boost supplement and enc good fluid intake and rest.   Is the patient/caregiver able to teach back signs and symptoms of worsening condition:  Fever/chills, Shortness of breath, Chest pain   Is the patient/caregiver able to teach back importance of completing antibiotic course of treatment?  Yes   Week 4 call completed?  Yes   Would the patient like one additional call?  Yes   Wrap up additional comments  One more f/u call as pt not to baseline just yet.          Betzy Ball RN

## 2019-01-11 ENCOUNTER — READMISSION MANAGEMENT (OUTPATIENT)
Dept: CALL CENTER | Facility: HOSPITAL | Age: 74
End: 2019-01-11

## 2019-01-18 ENCOUNTER — TELEPHONE (OUTPATIENT)
Dept: PULMONOLOGY | Facility: CLINIC | Age: 74
End: 2019-01-18

## 2019-01-18 NOTE — TELEPHONE ENCOUNTER
1/17/19 Patient called and asked if she could change her apt that was scheduled for 1/21/19 to 1/23/19, after looking at the schedule there was no openings to move her, it was explained to her that there was no available appointments. The patient stated that her daughter needed to bring her and it needed to be made on Wed 1/23/19. I asked her if she had no one that could bring her or could she call a cab, she stated no and that she would try and make it.   1/18/19 Patient called and asked again to have her appointment moved, I again explained that there were no openings available. The patient stated that she is sick and needed to be seen and Wed is the only day that she could come and that she was told that she could be worked in anytime, I explained again the situation of the schedules being so full and that we had nothing on Wed and if she was sick she should try and keep the appointment on Monday and if she got worse she should see her primary care or go to the ED. The patient stated that she has an appointment to see her PCP next week, but again asked about Wed. I told her that if she needed to be seen for a sick visit that Monday would be the soonest we would be able to get her in and to please try and keep the appointment. She stated that she would try and keep it.   After the conversation I looked to make sure that the patient still had an appointment on Monday, the patient canceled the appointment vis [employer], a letter was sent to her.

## 2019-01-21 ENCOUNTER — OFFICE VISIT (OUTPATIENT)
Dept: PULMONOLOGY | Facility: CLINIC | Age: 74
End: 2019-01-21

## 2019-01-21 VITALS
HEART RATE: 108 BPM | DIASTOLIC BLOOD PRESSURE: 70 MMHG | OXYGEN SATURATION: 94 % | SYSTOLIC BLOOD PRESSURE: 140 MMHG | HEIGHT: 60 IN | BODY MASS INDEX: 18.3 KG/M2 | WEIGHT: 93.2 LBS

## 2019-01-21 DIAGNOSIS — J44.9 CHRONIC OBSTRUCTIVE PULMONARY DISEASE, UNSPECIFIED COPD TYPE (HCC): ICD-10-CM

## 2019-01-21 DIAGNOSIS — J30.89 OTHER ALLERGIC RHINITIS: ICD-10-CM

## 2019-01-21 DIAGNOSIS — R09.02 HYPOXIA: ICD-10-CM

## 2019-01-21 DIAGNOSIS — J47.9 BRONCHIECTASIS WITHOUT COMPLICATION (HCC): Primary | ICD-10-CM

## 2019-01-21 PROCEDURE — 99214 OFFICE O/P EST MOD 30 MIN: CPT | Performed by: NURSE PRACTITIONER

## 2019-01-21 RX ORDER — DIPHENHYDRAMINE HYDROCHLORIDE 12.5 MG/5ML
LIQUID ORAL
Refills: 0 | COMMUNITY
Start: 2018-12-18 | End: 2019-06-18 | Stop reason: HOSPADM

## 2019-01-21 RX ORDER — FLUCONAZOLE 150 MG/1
TABLET ORAL
Refills: 0 | Status: ON HOLD | COMMUNITY
Start: 2018-12-18 | End: 2019-06-16

## 2019-01-21 RX ORDER — PREDNISOLONE 15 MG/5ML
SOLUTION ORAL
Refills: 0 | Status: ON HOLD | COMMUNITY
Start: 2018-12-18 | End: 2019-06-16

## 2019-01-21 RX ORDER — LOSARTAN POTASSIUM 25 MG/1
25 TABLET ORAL NIGHTLY
Refills: 3 | COMMUNITY
Start: 2018-12-07

## 2019-01-21 NOTE — PROGRESS NOTES
"Chief Complaint   Patient presents with   • Follow-up   • Recurrent Pneumonia   • COPD         Subjective   Joelle Yee is a 73 y.o. female.     History of Present Illness   The patient comes in today for follow up of COPD and bronchiectasis.    She reports breathing better.  She continues to feel weak since her hospitalization but feels like she is improving.  She did have an episode of some increased shortness of breath and cough and she saw her primary care provider for follow-up who gave her Levaquin.    She is using oxygen most of the time.    She continues using Symbicort and Incruse as directed.  She uses albuterol in her nebulizer 3 times a day.    She uses her best 2-3 times a day depending on if she has to run any errands and she uses her flutter valve multiple times a day.    She has not smoked since July 2018.    The following portions of the patient's history were reviewed and updated as appropriate: allergies, current medications, past family history, past medical history, past social history and past surgical history.    Review of Systems   Constitutional: Positive for chills. Negative for fever.   HENT: Negative for sinus pressure, sneezing and sore throat.    Respiratory: Positive for cough, shortness of breath and wheezing.        Objective   Visit Vitals  /70 (BP Location: Right arm, Patient Position: Sitting, Cuff Size: Adult)   Pulse 108   Ht 152.4 cm (60\")   Wt 42.3 kg (93 lb 3.2 oz)   LMP  (LMP Unknown)   SpO2 94%   BMI 18.20 kg/m²     Physical Exam   Constitutional: She is oriented to person, place, and time. She appears well-developed and well-nourished.   HENT:   Head: Normocephalic and atraumatic.   Crowded oropharynx. Dentures   Eyes: EOM are normal.   Neck: Neck supple.   Cardiovascular: Normal rate and regular rhythm.   Pulmonary/Chest: Effort normal. No respiratory distress.   Somewhat hyperresonant to percussion  Somewhat decreased A/E with scattered wheezes and rales " noted.   Musculoskeletal: She exhibits no edema.   Gait slow but normal.   Neurological: She is alert and oriented to person, place, and time.   Skin: Skin is warm and dry.   Psychiatric: She has a normal mood and affect.   Vitals reviewed.            Assessment/Plan   Joelle was seen today for follow-up, recurrent pneumonia and copd.    Diagnoses and all orders for this visit:    Bronchiectasis without complication (CMS/[facility])    Hypoxia    Chronic obstructive pulmonary disease, unspecified COPD type (CMS/[facility])    Other allergic rhinitis           Return in about 14 weeks (around 4/29/2019) for Recheck, For Dr. Esparza.    DISCUSSION (if any):  I have asked her to continue using her medications as directed.    She definitely needs to continue using oxygen at night as well as with activity.    I have stressed the importance of using the vest at least twice a day and 3 times a day if possible as well as continuing to use the flutter valve multiple times a day.    I reviewed her last discharge summary which mentioned pneumonia of both lungs as well as COPD exacerbation and acute on chronic respiratory failure.    I will ask her to call the office if she has any worsening symptoms prior to her next appointment.      Dictated utilizing Dragon dictation.    This document was electronically signed by DWAYNE Maria January 21, 2019  3:43 PM

## 2019-04-25 ENCOUNTER — OFFICE VISIT (OUTPATIENT)
Dept: ORTHOPEDIC SURGERY | Facility: CLINIC | Age: 74
End: 2019-04-25

## 2019-04-25 VITALS — HEIGHT: 60 IN | BODY MASS INDEX: 18.26 KG/M2 | RESPIRATION RATE: 18 BRPM | WEIGHT: 93 LBS

## 2019-04-25 DIAGNOSIS — L85.1 PLANTAR POROKERATOSIS, ACQUIRED: Primary | ICD-10-CM

## 2019-04-25 DIAGNOSIS — L84 CORNS: ICD-10-CM

## 2019-04-25 PROCEDURE — 99213 OFFICE O/P EST LOW 20 MIN: CPT | Performed by: PODIATRIST

## 2019-04-25 PROCEDURE — 11057 PARNG/CUTG B9 HYPRKR LES >4: CPT | Performed by: PODIATRIST

## 2019-04-25 RX ORDER — UREA 40 %
CREAM (GRAM) TOPICAL
Qty: 85 G | Refills: 3 | Status: SHIPPED | OUTPATIENT
Start: 2019-04-25 | End: 2021-01-12 | Stop reason: HOSPADM

## 2019-04-25 NOTE — PROGRESS NOTES
Subjective   Patient ID: Joelle Yee is a 73 y.o. female   Pain of the Right Foot and Pain of the Left Foot    She comes back in for follow-up for multiple painful lesions on both feet after last being seen close to 3 years ago.  History of Present Illness                                                   Review of Systems   Constitutional: Negative for diaphoresis, fever and unexpected weight change.   HENT: Negative for dental problem and sore throat.    Eyes: Negative for visual disturbance.   Respiratory: Negative for shortness of breath.    Cardiovascular: Negative for chest pain.   Gastrointestinal: Negative for abdominal pain, constipation, diarrhea, nausea and vomiting.   Genitourinary: Negative for difficulty urinating and frequency.   Musculoskeletal: Positive for arthralgias.   Neurological: Negative for headaches.   Hematological: Does not bruise/bleed easily.   All other systems reviewed and are negative.      Past Medical History:   Diagnosis Date   • Abdominal pain    • Acute bronchitis    • Ankle pain    • Anxiety 1980   • Atopic dermatitis    • Bronchiectasis (CMS/HCC)    • Cataract, bilateral    • Chest pain     STATES HAS OCCASSIONALLY.  STATES LAST TIME WAS LAST WEEK.  STATES SEES DR. RICH.  STATES HE HAS DONE NUMEROUS TESTS ON HER AND HAS NOT BEEN ABLE TO FIND OUT CAUSE.     • Chronic obstructive lung disease (CMS/HCC) 2008   • Colon cancer screening    • COPD (chronic obstructive pulmonary disease) (CMS/HCC)    • Cystocele    • Depressed    • Depression 1980   • Fatigue     Chronic pafigue 2012   • Fibromyalgia 2008   • Full dentures    • GERD (gastroesophageal reflux disease)    • Gout    • Heartburn     Chronic historu of epigastric heartburn currently controlled on Prilesec 40 mg every morning along with Zantac 300 Mg daily at bedtime   • High cholesterol 2012   • Hip pain    • Hyperlipidemia    • Hypertension    • Insomnia    • Joint pain    • Kidney infection    • Loss of appetite     • Low back pain 1995   • Melena    • Nausea and vomiting    • On home oxygen therapy     2L/NC PRN (STATES SHE HAS BEEN USING CONTINUOSLY LATELY)   • Osteoarthritis 2010   • Pain in limb    • Palpitations    • Pinched nerve     Pinched nerves 2011   • Pneumonia    • Problems with swallowing     HAS TO EAT SLOW AND CHEW FOOD WELL   • Recurrent urinary tract infection    • Sciatica [id number]   • Shortness of breath    • Sinus problem    • Sleep apnea 1998    NO CPAP   • Upset stomach    • Valvular heart disease    • Vitamin B12 deficiency    • Wears glasses    • Weight loss, abnormal     Weight loss is stabel. On pund weight gain since 01/2016        Past Surgical History:   Procedure Laterality Date   • ABDOMINAL HERNIA REPAIR  2016   • APPENDECTOMY  1965   • BACK SURGERY  2005   • BRONCHOSCOPY N/A 7/5/2018    Procedure: BRONCHOSCOPY w/ WASHINGS/BRUSHINGS with MAC;  Surgeon: Rebeka Esparza MD;  Location: [facility];  Service: Pulmonary   • CATARACT EXTRACTION Bilateral     Put in implants    • CHOLECYSTECTOMY  1985   • COLONOSCOPY     • ENDOSCOPY     • KNEE SURGERY Left 1979    Knee Cap   • TUBAL ABDOMINAL LIGATION  1971       Allergies   Allergen Reactions   • Dust Mite Extract Other (See Comments)     Dust  SINUS   • Grass Other (See Comments)     SINUS   • Mold Extract [Trichophyton] Other (See Comments)     SINUS         Current Outpatient Medications:   •  acetaminophen (TYLENOL) 325 MG tablet, Take 2 tablets by mouth Every 4 (Four) Hours As Needed for Headache or Fever (temperature greater than 101.5 F)., Disp: , Rfl:   •  budesonide-formoterol (SYMBICORT) 160-4.5 MCG/ACT inhaler, Inhale 2 puffs 2 (Two) Times a Day. Inhale 1 puff twice daily. Rinse mouth after use., Disp: 1 inhaler, Rfl: 5  •  Camphor-Eucalyptus-Menthol (MEDICATED CHEST RUB EX), , Disp: , Rfl:   •  Cyanocobalamin (VITAMIN B12 PO), Take 500 mcg by mouth Daily., Disp: , Rfl:   •  cyclobenzaprine (FLEXERIL) 10 MG tablet, Take 10 mg by mouth  Daily., Disp: , Rfl:   •  Diclofenac Sodium (VOLTAREN TD), Apply 2 g topically to the appropriate area as directed 2 (Two) Times a Day As Needed. Voltaren GEL , Disp: , Rfl:   •  DIPHENHIST 12.5 MG/5ML liquid, MOUTH WASH, Disp: , Rfl: 0  •  FERROUS SULFATE PO, , Disp: , Rfl:   •  fluconazole (DIFLUCAN) 150 MG tablet, TAKE ONE TABLET BY MOUTH ON DAY ONE THEN 1 TAB IN 3 DAYS, Disp: , Rfl: 0  •  guaiFENesin (MUCINEX) 600 MG 12 hr tablet, Take 1 tablet by mouth Every 12 (Twelve) Hours., Disp: 20 tablet, Rfl: 0  •  HYDROcodone-acetaminophen (NORCO)  MG per tablet, Take 1 tablet by mouth Every 12 (Twelve) Hours As Needed (Patient will take 3 times daily occasionally)., Disp: , Rfl:   •  INCRUSE ELLIPTA 62.5 MCG/INH aerosol powder , INHALE ONE PUFF INTO THE LUNGS EVERY DAY, Disp: 30 each, Rfl: 5  •  ipratropium-albuterol (DUO-NEB) 0.5-2.5 mg/3 ml nebulizer, TAKE 3 ML BY NEBULIZATION 4 (FOUR) TIMES A DAY AS NEEDED FOR WHEEZING OR SHORTNESS OF AIR., Disp: 360 mL, Rfl: 5  •  lidocaine viscous (XYLOCAINE) 2 % solution, TAKE 2 TSP GARGLE SWISH AND SPIT EVERY 2 TO 3 HOURS, Disp: , Rfl: 0  •  LORazepam (ATIVAN) 0.5 MG tablet, Take 0.5 mg by mouth 2 (Two) Times a Day As Needed. 1-2 TABLETS DAILY PRN, Disp: , Rfl: 0  •  losartan (COZAAR) 25 MG tablet, Take 25 mg by mouth Daily., Disp: , Rfl: 3  •  MAGNESIUM PO, , Disp: , Rfl:   •  melatonin 5 MG tablet tablet, Take 2 tablets by mouth At Night As Needed for sleep, Disp: 60 tablet, Rfl: 0  •  MELATONIN PO, , Disp: , Rfl:   •  metoprolol tartrate (LOPRESSOR) 25 MG tablet, Take 1 tablet by mouth Every 12 (Twelve) Hours., Disp: 60 tablet, Rfl: 0  •  mirtazapine (REMERON) 30 MG tablet, Take 30 mg by mouth Every Night., Disp: , Rfl:   •  Misc. Devices (ROLLATOR) misc, 1 Units As Needed (ambulation assistance)., Disp: 1 each, Rfl: 0  •  Multiple Vitamins-Minerals (MULTIVITAMIN WITH MINERALS) tablet tablet, Take 1 tablet by mouth Daily., Disp: , Rfl:   •  NON FORMULARY, , Disp: , Rfl:    •  NON FORMULARY, , Disp: , Rfl:   •  NON FORMULARY, , Disp: , Rfl:   •  NON FORMULARY, , Disp: , Rfl:   •  NON FORMULARY, , Disp: , Rfl:   •  NON FORMULARY, , Disp: , Rfl:   •  NON FORMULARY, , Disp: , Rfl:   •  NON FORMULARY, , Disp: , Rfl:   •  NON FORMULARY, , Disp: , Rfl:   •  Nutritional Supplements (ENSURE COMPLETE PO), Take 1 can by mouth 2 (Two) Times a Day., Disp: , Rfl:   •  nystatin (MYCOSTATIN) 098403 UNIT/ML suspension, Swish and swallow 5 mL 4 (Four) Times a Day. (Patient taking differently: Swish and swallow 500,000 Units 4 (Four) Times a Day As Needed.), Disp: 240 mL, Rfl: 0  •  Omega-3 Fatty Acids (FISH OIL PO), , Disp: , Rfl:   •  omeprazole (priLOSEC) 40 MG capsule, Take 40 mg by mouth Daily., Disp: , Rfl:   •  ondansetron ODT (ZOFRAN-ODT) 4 MG disintegrating tablet, Take 1 tablet by mouth every 6 (six) hours as needed., Disp: , Rfl:   •  OXYGEN-HELIUM IN, Oxygen; Patient Sig: Oxygen 2 liter as needed; 0; 21-May-2014; Active, Disp: , Rfl:   •  potassium bicarbonate (K-LYTE) 25 MEQ disintegrating tablet, Take 25 mEq by mouth Every Night., Disp: , Rfl:   •  prednisoLONE (PRELONE) 15 MG/5ML solution oral solution, MOUTH WASH, Disp: , Rfl: 0  •  Probiotic Product (RISAQUAD-2) capsule capsule, Take 1 capsule by mouth Daily., Disp: , Rfl:   •  Respiratory Therapy Supplies (FLUTTER) device, 1 Device as needed (as directed)., Disp: 1 each, Rfl: 0  •  venlafaxine (EFFEXOR) 75 MG tablet, Take 1 tablet by mouth 2 (two) times a day., Disp: , Rfl:     Family History   Problem Relation Age of Onset   • Ovarian cancer Mother    • Cancer Mother    • Lung cancer Father    • Heart disease Brother        Social History     Socioeconomic History   • Marital status: Single     Spouse name: Not on file   • Number of children: Not on file   • Years of education: Not on file   • Highest education level: Not on file   Tobacco Use   • Smoking status: Former Smoker     Packs/day: 0.00     Years: 35.00     Pack years:  0.00     Last attempt to quit: 2017     Years since quittin.8   • Smokeless tobacco: Never Used   Substance and Sexual Activity   • Alcohol use: No   • Drug use: No   • Sexual activity: Defer       Counseling given: No       I have reviewed all of the above social hx, family hx, surgical hx, medications, allergies & ROS and confirm that it is accurate.  Objective   Physical Exam   Constitutional: She is oriented to person, place, and time. She appears well-developed and well-nourished.   HENT:   Head: Normocephalic and atraumatic.   Eyes: EOM are normal. Pupils are equal, round, and reactive to light.   Neck: Normal range of motion.   Cardiovascular: Normal rate.   Pulmonary/Chest: Effort normal.   Musculoskeletal: Normal range of motion.   Neurological: She is alert and oriented to person, place, and time. She has normal reflexes.   Skin: Skin is warm.   Psychiatric: She has a normal mood and affect. Her behavior is normal. Judgment and thought content normal.   Nursing note and vitals reviewed.    Ortho Exam  [unfilled]bilateral  Lower extremity exam:  Vascular: No pedal hair noted.  Skin is thin and shiny and waxy.  Pulses faintly palpable.  No edema noted.  Extensive varicose veins noted.  Neuro: Gross sensation intact.  Protective sensation seems to be decreased.  Derm: She has extensively dry scaly flaky skin to both feet.  Normal turgor and temperature.  She has multiple large punctate and sometimes multinucleated very deep lesions.  There is 4 on the right foot and 3 on the left foot.  There is a lesion to the center of both heels and sub-met one and 5 bilaterally and one roughly sub-met 2/3 on the right.  MSK: There is flexible dorsal contractures of digits 2 through 5 bilaterally.    Assessment/Plan Multiple bilateral feet poor keratoses, dry skin, PAD, COPD  Independent Review of Radiographic Studies:      Laboratory and Other Studies:     Medical Decision Making:        Procedures  All lesions  on both feet 4 on the right hand and 3 on the left were sharply debrided and excised with a 15 blade and treated with Cantharone.  Joelle was seen today for pain and pain.    Diagnoses and all orders for this visit:    Plantar porokeratosis, acquired    Corns          Recommendations/Plan:  I will prescribe her compounded urea cream and may consider some salicylic acid as well and will refill her prescription urea to use twice daily.  Return in about 2 weeks (around 5/9/2019).  Patient agreeable to call or return sooner for any concerns.

## 2019-04-29 ENCOUNTER — OFFICE VISIT (OUTPATIENT)
Dept: PULMONOLOGY | Facility: CLINIC | Age: 74
End: 2019-04-29

## 2019-04-29 VITALS
HEART RATE: 78 BPM | BODY MASS INDEX: 18.65 KG/M2 | OXYGEN SATURATION: 80 % | WEIGHT: 95 LBS | RESPIRATION RATE: 18 BRPM | SYSTOLIC BLOOD PRESSURE: 100 MMHG | DIASTOLIC BLOOD PRESSURE: 60 MMHG | HEIGHT: 60 IN

## 2019-04-29 DIAGNOSIS — R09.02 HYPOXIA: ICD-10-CM

## 2019-04-29 DIAGNOSIS — R06.02 SOB (SHORTNESS OF BREATH): Primary | ICD-10-CM

## 2019-04-29 DIAGNOSIS — R06.02 SHORTNESS OF BREATH: Primary | ICD-10-CM

## 2019-04-29 DIAGNOSIS — J47.9 BRONCHIECTASIS WITHOUT COMPLICATION (HCC): ICD-10-CM

## 2019-04-29 DIAGNOSIS — J44.9 CHRONIC OBSTRUCTIVE PULMONARY DISEASE, UNSPECIFIED COPD TYPE (HCC): ICD-10-CM

## 2019-04-29 PROCEDURE — G0009 ADMIN PNEUMOCOCCAL VACCINE: HCPCS | Performed by: INTERNAL MEDICINE

## 2019-04-29 PROCEDURE — 94060 EVALUATION OF WHEEZING: CPT | Performed by: INTERNAL MEDICINE

## 2019-04-29 PROCEDURE — 99214 OFFICE O/P EST MOD 30 MIN: CPT | Performed by: INTERNAL MEDICINE

## 2019-04-29 PROCEDURE — 94729 DIFFUSING CAPACITY: CPT | Performed by: INTERNAL MEDICINE

## 2019-04-29 PROCEDURE — 94726 PLETHYSMOGRAPHY LUNG VOLUMES: CPT | Performed by: INTERNAL MEDICINE

## 2019-04-29 PROCEDURE — 90670 PCV13 VACCINE IM: CPT | Performed by: INTERNAL MEDICINE

## 2019-04-29 RX ORDER — TRAZODONE HYDROCHLORIDE 50 MG/1
25 TABLET ORAL NIGHTLY
COMMUNITY
End: 2020-07-01

## 2019-04-29 NOTE — PROGRESS NOTES
"Chief Complaint   Patient presents with   • Follow-up   • Shortness of Breath         Subjective   Joelle Yee is a 73 y.o. female.     History of Present Illness   Patient comes today for follow up of shortness of breath and COPD.     Patient says that her symptoms have been stable since the last clinic visit. she reports 1-2 recent exacerbations including URI and \"bronchitis\".     Patient is using medications, as prescribed. Exercise tolerance has also remained stable.     She is now smoking E-Cigarettes but not regular cigarettes at all.    The patient says that she is using oxygen as prescribed and feels that it appears to be helping some of her symptoms.    She is using her Flutter valve and her percussion device for her bronchiectasis and feels that she continues to need it due to increased sputum.       The following portions of the patient's history were reviewed and updated as appropriate: allergies, current medications, past family history, past medical history, past social history and past surgical history.    Review of Systems   Constitutional: Positive for chills, fatigue and fever.   HENT: Positive for rhinorrhea and sinus pressure.    Respiratory: Positive for cough, shortness of breath and wheezing.    Psychiatric/Behavioral: Positive for sleep disturbance.       Objective   Visit Vitals  /60   Pulse 78   Resp 18   Ht 152.4 cm (60\")   Wt 43.1 kg (95 lb)   LMP  (LMP Unknown)   SpO2 (!) 80% Comment: RESTING ON ROOM AIR   BMI 18.55 kg/m²   SPO2 94% ON 3 LPM, at rest.      Physical Exam   Constitutional: She is oriented to person, place, and time. She appears well-developed and well-nourished.   HENT:   Head: Atraumatic.   Dentures noted.    Eyes: EOM are normal.   Neck: Neck supple.   Cardiovascular: Normal rate and regular rhythm.   Pulmonary/Chest: Effort normal. No respiratory distress.   Somewhat hyperresonant to percussion  Somewhat decreased A/E with out  wheezing noted. "   Musculoskeletal: She exhibits no edema.   Neurological: She is alert and oriented to person, place, and time.   Skin: Skin is warm and dry.   Psychiatric: She has a normal mood and affect.   Vitals reviewed.          Assessment/Plan   Joelle was seen today for follow-up and shortness of breath.    Diagnoses and all orders for this visit:    Shortness of breath  -     Pulmonary Function Test    Chronic obstructive pulmonary disease, unspecified COPD type (CMS/HCC)  -     Pulmonary Function Test    Bronchiectasis without complication (CMS/HCC)    Hypoxia    Other orders  -     Pneumococcal Conjugate Vaccine 13-Valent All  -     Fluticasone-Umeclidin-Vilant (TRELEGY ELLIPTA) 100-62.5-25 MCG/INH aerosol powder ; Inhale 1 each Daily. Rinse mouth with water after use.           Return in about 3 months (around 7/29/2019) for Recheck, For Kaelyn.    DISCUSSION (if any):  Last CT scan was reviewed in great detail with the patient.  Results for orders placed during the hospital encounter of 11/30/18   CT Chest Without Contrast    Narrative CT CHEST WITHOUT CONTRAST     HISTORY: Shortness of breath.      COMPARISON: None.      TECHNIQUE: Axial CT without IV contrast administration.         FINDINGS:     Emphysematous changes are present. Increased markings are seen in  bilateral lung bases. These likely involve the alveolar spaces and not  vascular markings. Small focal area of consolidation is seen involving  the lingula. Bronchial wall thickening is present.           No pleural or pericardial effusion is seen. No adenopathy or mass lesion  is present.            Impression 1. Findings compatible with bronchopneumonia of the lung bases with  small area of consolidation within the lingula.  2. No pulmonary edema or significant pleural effusion.  3. Emphysematous disease.         This study was performed with techniques to keep radiation doses as low  as reasonably achievable (ALARA). Individualized dose  reduction  techniques using automated exposure control or adjustment of vA and/or  kV according to the patient size were employed.      This report was finalized on 11/30/2018 4:44 PM by Hansel Fernández MD.       We have reviewed her pulmonary medications in great detail.    Any needed adjustments to her pulmonary medications, either for clinical or insurance coverage reasons, have been made and are reflected in the orders.    Compliance with medications stressed.     Side effects of prescribed medications discussed with the patient    PFTs reviewed. Severe COPD with FEV1 of 34%     The patient was advised to continue oxygen 24/7, since she has noticed improvement in some symptoms since using it as prescribed.    For her bronchiectasis, she will definitely need to continue flutter valve on a regular basis.    I also went over other methods of mucosal clearance including postural drainage.    She is up-to-date with Pneumovax.  Prevnar will be given today.        Dictated utilizing Dragon dictation.    This document was electronically signed by Rebeka Esparza MD on 04/29/19 at 4:25 PM

## 2019-05-03 RX ORDER — UREA 40 %
CREAM (GRAM) TOPICAL EVERY 12 HOURS
Qty: 85 G | Refills: 1 | Status: SHIPPED | OUTPATIENT
Start: 2019-05-03 | End: 2021-02-12

## 2019-05-03 RX ORDER — AMMONIUM LACTATE 12 G/100G
CREAM TOPICAL 2 TIMES DAILY
Qty: 1 EACH | Refills: 0 | Status: SHIPPED | OUTPATIENT
Start: 2019-05-03

## 2019-06-16 ENCOUNTER — HOSPITAL ENCOUNTER (INPATIENT)
Facility: HOSPITAL | Age: 74
LOS: 2 days | Discharge: HOME HEALTH | End: 2019-06-18
Attending: STUDENT IN AN ORGANIZED HEALTH CARE EDUCATION/TRAINING PROGRAM | Admitting: HOSPITALIST

## 2019-06-16 ENCOUNTER — APPOINTMENT (OUTPATIENT)
Dept: GENERAL RADIOLOGY | Facility: HOSPITAL | Age: 74
End: 2019-06-16

## 2019-06-16 ENCOUNTER — APPOINTMENT (OUTPATIENT)
Dept: CT IMAGING | Facility: HOSPITAL | Age: 74
End: 2019-06-16

## 2019-06-16 DIAGNOSIS — J18.9 PNEUMONIA OF BOTH LUNGS DUE TO INFECTIOUS ORGANISM, UNSPECIFIED PART OF LUNG: Primary | ICD-10-CM

## 2019-06-16 DIAGNOSIS — Z78.9 IMPAIRED MOBILITY AND ADLS: ICD-10-CM

## 2019-06-16 DIAGNOSIS — J47.9 BRONCHIECTASIS WITHOUT COMPLICATION (HCC): ICD-10-CM

## 2019-06-16 DIAGNOSIS — Z74.09 IMPAIRED FUNCTIONAL MOBILITY, BALANCE, GAIT, AND ENDURANCE: ICD-10-CM

## 2019-06-16 DIAGNOSIS — Z74.09 IMPAIRED MOBILITY AND ADLS: ICD-10-CM

## 2019-06-16 DIAGNOSIS — J18.9 PNEUMONIA OF BOTH UPPER LOBES DUE TO INFECTIOUS ORGANISM: ICD-10-CM

## 2019-06-16 DIAGNOSIS — J44.9 CHRONIC OBSTRUCTIVE PULMONARY DISEASE, UNSPECIFIED COPD TYPE (HCC): ICD-10-CM

## 2019-06-16 PROBLEM — J44.1 COPD WITH ACUTE EXACERBATION (HCC): Status: ACTIVE | Noted: 2019-06-16

## 2019-06-16 LAB
ALBUMIN SERPL-MCNC: 4 G/DL (ref 3.5–5)
ALBUMIN/GLOB SERPL: 1.1 G/DL (ref 1–2)
ALP SERPL-CCNC: 113 U/L (ref 38–126)
ALT SERPL W P-5'-P-CCNC: 18 U/L (ref 13–69)
ANION GAP SERPL CALCULATED.3IONS-SCNC: 13.3 MMOL/L (ref 10–20)
AST SERPL-CCNC: 26 U/L (ref 15–46)
BASOPHILS # BLD AUTO: 0.09 10*3/MM3 (ref 0–0.2)
BASOPHILS NFR BLD AUTO: 0.7 % (ref 0–1.5)
BILIRUB SERPL-MCNC: 0.3 MG/DL (ref 0.2–1.3)
BUN BLD-MCNC: 12 MG/DL (ref 7–20)
BUN/CREAT SERPL: 20 (ref 7.1–23.5)
CALCIUM SPEC-SCNC: 9 MG/DL (ref 8.4–10.2)
CHLORIDE SERPL-SCNC: 97 MMOL/L (ref 98–107)
CO2 SERPL-SCNC: 29 MMOL/L (ref 26–30)
CREAT BLD-MCNC: 0.6 MG/DL (ref 0.6–1.3)
DEPRECATED RDW RBC AUTO: 56.3 FL (ref 37–54)
EOSINOPHIL # BLD AUTO: 0.19 10*3/MM3 (ref 0–0.4)
EOSINOPHIL NFR BLD AUTO: 1.5 % (ref 0.3–6.2)
ERYTHROCYTE [DISTWIDTH] IN BLOOD BY AUTOMATED COUNT: 15.9 % (ref 12.3–15.4)
GFR SERPL CREATININE-BSD FRML MDRD: 98 ML/MIN/1.73
GLOBULIN UR ELPH-MCNC: 3.6 GM/DL
GLUCOSE BLD-MCNC: 100 MG/DL (ref 74–98)
HCT VFR BLD AUTO: 42.1 % (ref 34–46.6)
HGB BLD-MCNC: 13.6 G/DL (ref 12–15.9)
HOLD SPECIMEN: NORMAL
IMM GRANULOCYTES # BLD AUTO: 0.52 10*3/MM3 (ref 0–0.05)
IMM GRANULOCYTES NFR BLD AUTO: 4.2 % (ref 0–0.5)
LYMPHOCYTES # BLD AUTO: 2.94 10*3/MM3 (ref 0.7–3.1)
LYMPHOCYTES NFR BLD AUTO: 23.9 % (ref 19.6–45.3)
MCH RBC QN AUTO: 31 PG (ref 26.6–33)
MCHC RBC AUTO-ENTMCNC: 32.3 G/DL (ref 31.5–35.7)
MCV RBC AUTO: 95.9 FL (ref 79–97)
MONOCYTES # BLD AUTO: 1.32 10*3/MM3 (ref 0.1–0.9)
MONOCYTES NFR BLD AUTO: 10.7 % (ref 5–12)
NEUTROPHILS # BLD AUTO: 7.23 10*3/MM3 (ref 1.7–7)
NEUTROPHILS NFR BLD AUTO: 59 % (ref 42.7–76)
NRBC BLD AUTO-RTO: 0 /100 WBC (ref 0–0.2)
NT-PROBNP SERPL-MCNC: 178 PG/ML (ref 0–125)
PLATELET # BLD AUTO: 356 10*3/MM3 (ref 140–450)
PMV BLD AUTO: 9.4 FL (ref 6–12)
POTASSIUM BLD-SCNC: 4.3 MMOL/L (ref 3.5–5.1)
PROCALCITONIN SERPL-MCNC: <0.05 NG/ML
PROT SERPL-MCNC: 7.6 G/DL (ref 6.3–8.2)
RBC # BLD AUTO: 4.39 10*6/MM3 (ref 3.77–5.28)
SODIUM BLD-SCNC: 135 MMOL/L (ref 137–145)
TROPONIN I SERPL-MCNC: <0.012 NG/ML (ref 0–0.03)
WBC NRBC COR # BLD: 12.29 10*3/MM3 (ref 3.4–10.8)
WHOLE BLOOD HOLD SPECIMEN: NORMAL
WHOLE BLOOD HOLD SPECIMEN: NORMAL

## 2019-06-16 PROCEDURE — 25010000002 DEXAMETHASONE PER 1 MG: Performed by: STUDENT IN AN ORGANIZED HEALTH CARE EDUCATION/TRAINING PROGRAM

## 2019-06-16 PROCEDURE — 94799 UNLISTED PULMONARY SVC/PX: CPT

## 2019-06-16 PROCEDURE — 94640 AIRWAY INHALATION TREATMENT: CPT

## 2019-06-16 PROCEDURE — 84484 ASSAY OF TROPONIN QUANT: CPT

## 2019-06-16 PROCEDURE — 99223 1ST HOSP IP/OBS HIGH 75: CPT | Performed by: HOSPITALIST

## 2019-06-16 PROCEDURE — 71046 X-RAY EXAM CHEST 2 VIEWS: CPT

## 2019-06-16 PROCEDURE — 25010000002 CEFTRIAXONE SODIUM-DEXTROSE 1-3.74 GM-%(50ML) RECONSTITUTED SOLUTION: Performed by: STUDENT IN AN ORGANIZED HEALTH CARE EDUCATION/TRAINING PROGRAM

## 2019-06-16 PROCEDURE — 25010000002 MAGNESIUM SULFATE 2 GM/50ML SOLUTION: Performed by: STUDENT IN AN ORGANIZED HEALTH CARE EDUCATION/TRAINING PROGRAM

## 2019-06-16 PROCEDURE — 85025 COMPLETE CBC W/AUTO DIFF WBC: CPT

## 2019-06-16 PROCEDURE — 87581 M.PNEUMON DNA AMP PROBE: CPT | Performed by: HOSPITALIST

## 2019-06-16 PROCEDURE — 25010000002 IOPAMIDOL 61 % SOLUTION: Performed by: STUDENT IN AN ORGANIZED HEALTH CARE EDUCATION/TRAINING PROGRAM

## 2019-06-16 PROCEDURE — 87633 RESP VIRUS 12-25 TARGETS: CPT | Performed by: HOSPITALIST

## 2019-06-16 PROCEDURE — 84145 PROCALCITONIN (PCT): CPT | Performed by: STUDENT IN AN ORGANIZED HEALTH CARE EDUCATION/TRAINING PROGRAM

## 2019-06-16 PROCEDURE — 25010000002 ENOXAPARIN PER 10 MG: Performed by: HOSPITALIST

## 2019-06-16 PROCEDURE — 87040 BLOOD CULTURE FOR BACTERIA: CPT | Performed by: STUDENT IN AN ORGANIZED HEALTH CARE EDUCATION/TRAINING PROGRAM

## 2019-06-16 PROCEDURE — 83880 ASSAY OF NATRIURETIC PEPTIDE: CPT

## 2019-06-16 PROCEDURE — 99285 EMERGENCY DEPT VISIT HI MDM: CPT

## 2019-06-16 PROCEDURE — 87798 DETECT AGENT NOS DNA AMP: CPT | Performed by: HOSPITALIST

## 2019-06-16 PROCEDURE — 80053 COMPREHEN METABOLIC PANEL: CPT

## 2019-06-16 PROCEDURE — 87486 CHLMYD PNEUM DNA AMP PROBE: CPT | Performed by: HOSPITALIST

## 2019-06-16 PROCEDURE — 25010000002 METHYLPREDNISOLONE PER 40 MG: Performed by: HOSPITALIST

## 2019-06-16 PROCEDURE — 71260 CT THORAX DX C+: CPT

## 2019-06-16 PROCEDURE — 93005 ELECTROCARDIOGRAM TRACING: CPT | Performed by: STUDENT IN AN ORGANIZED HEALTH CARE EDUCATION/TRAINING PROGRAM

## 2019-06-16 RX ORDER — IPRATROPIUM BROMIDE AND ALBUTEROL SULFATE 2.5; .5 MG/3ML; MG/3ML
3 SOLUTION RESPIRATORY (INHALATION) 4 TIMES DAILY PRN
Status: DISCONTINUED | OUTPATIENT
Start: 2019-06-16 | End: 2019-06-18 | Stop reason: HOSPADM

## 2019-06-16 RX ORDER — SODIUM CHLORIDE 0.9 % (FLUSH) 0.9 %
10 SYRINGE (ML) INJECTION AS NEEDED
Status: DISCONTINUED | OUTPATIENT
Start: 2019-06-16 | End: 2019-06-18 | Stop reason: HOSPADM

## 2019-06-16 RX ORDER — CYCLOBENZAPRINE HCL 10 MG
10 TABLET ORAL DAILY
Status: DISCONTINUED | OUTPATIENT
Start: 2019-06-16 | End: 2019-06-18 | Stop reason: HOSPADM

## 2019-06-16 RX ORDER — PANTOPRAZOLE SODIUM 40 MG/1
40 TABLET, DELAYED RELEASE ORAL EVERY MORNING
Status: DISCONTINUED | OUTPATIENT
Start: 2019-06-17 | End: 2019-06-18 | Stop reason: HOSPADM

## 2019-06-16 RX ORDER — METOPROLOL SUCCINATE 25 MG/1
25 TABLET, EXTENDED RELEASE ORAL EVERY 24 HOURS
Status: DISCONTINUED | OUTPATIENT
Start: 2019-06-16 | End: 2019-06-18 | Stop reason: HOSPADM

## 2019-06-16 RX ORDER — GABAPENTIN 100 MG/1
100 CAPSULE ORAL 3 TIMES DAILY
Status: DISCONTINUED | OUTPATIENT
Start: 2019-06-16 | End: 2019-06-18 | Stop reason: HOSPADM

## 2019-06-16 RX ORDER — SODIUM CHLORIDE FOR INHALATION 3 %
4 VIAL, NEBULIZER (ML) INHALATION ONCE
Status: DISCONTINUED | OUTPATIENT
Start: 2019-06-16 | End: 2019-06-16

## 2019-06-16 RX ORDER — DOCUSATE SODIUM 100 MG/1
100 CAPSULE, LIQUID FILLED ORAL DAILY PRN
Status: DISCONTINUED | OUTPATIENT
Start: 2019-06-16 | End: 2019-06-18 | Stop reason: HOSPADM

## 2019-06-16 RX ORDER — METHYLPREDNISOLONE SODIUM SUCCINATE 40 MG/ML
40 INJECTION, POWDER, LYOPHILIZED, FOR SOLUTION INTRAMUSCULAR; INTRAVENOUS EVERY 8 HOURS
Status: DISCONTINUED | OUTPATIENT
Start: 2019-06-16 | End: 2019-06-18 | Stop reason: HOSPADM

## 2019-06-16 RX ORDER — ONDANSETRON 2 MG/ML
4 INJECTION INTRAMUSCULAR; INTRAVENOUS EVERY 6 HOURS PRN
Status: DISCONTINUED | OUTPATIENT
Start: 2019-06-16 | End: 2019-06-18 | Stop reason: HOSPADM

## 2019-06-16 RX ORDER — CEFTRIAXONE 1 G/50ML
1 INJECTION, SOLUTION INTRAVENOUS EVERY 24 HOURS
Status: DISCONTINUED | OUTPATIENT
Start: 2019-06-17 | End: 2019-06-18 | Stop reason: HOSPADM

## 2019-06-16 RX ORDER — IPRATROPIUM BROMIDE AND ALBUTEROL SULFATE 2.5; .5 MG/3ML; MG/3ML
3 SOLUTION RESPIRATORY (INHALATION) ONCE
Status: COMPLETED | OUTPATIENT
Start: 2019-06-16 | End: 2019-06-16

## 2019-06-16 RX ORDER — KETOTIFEN FUMARATE 0.35 MG/ML
2 SOLUTION/ DROPS OPHTHALMIC 2 TIMES DAILY
COMMUNITY

## 2019-06-16 RX ORDER — LOSARTAN POTASSIUM 25 MG/1
25 TABLET ORAL DAILY
Status: DISCONTINUED | OUTPATIENT
Start: 2019-06-16 | End: 2019-06-18 | Stop reason: HOSPADM

## 2019-06-16 RX ORDER — DEXAMETHASONE SODIUM PHOSPHATE 10 MG/ML
10 INJECTION INTRAMUSCULAR; INTRAVENOUS ONCE
Status: COMPLETED | OUTPATIENT
Start: 2019-06-16 | End: 2019-06-16

## 2019-06-16 RX ORDER — VENLAFAXINE 75 MG/1
75 TABLET ORAL 2 TIMES DAILY WITH MEALS
Status: DISCONTINUED | OUTPATIENT
Start: 2019-06-16 | End: 2019-06-18 | Stop reason: HOSPADM

## 2019-06-16 RX ORDER — IPRATROPIUM BROMIDE AND ALBUTEROL SULFATE 2.5; .5 MG/3ML; MG/3ML
3 SOLUTION RESPIRATORY (INHALATION)
Status: DISCONTINUED | OUTPATIENT
Start: 2019-06-16 | End: 2019-06-18 | Stop reason: HOSPADM

## 2019-06-16 RX ORDER — LORAZEPAM 0.5 MG/1
0.5 TABLET ORAL 2 TIMES DAILY PRN
Status: DISCONTINUED | OUTPATIENT
Start: 2019-06-16 | End: 2019-06-18 | Stop reason: HOSPADM

## 2019-06-16 RX ORDER — HYDROCODONE BITARTRATE AND ACETAMINOPHEN 10; 325 MG/1; MG/1
1 TABLET ORAL EVERY 8 HOURS PRN
Status: DISCONTINUED | OUTPATIENT
Start: 2019-06-16 | End: 2019-06-18 | Stop reason: HOSPADM

## 2019-06-16 RX ORDER — SODIUM CHLORIDE FOR INHALATION 3 %
4 VIAL, NEBULIZER (ML) INHALATION ONCE
Status: COMPLETED | OUTPATIENT
Start: 2019-06-16 | End: 2019-06-16

## 2019-06-16 RX ORDER — ACETAMINOPHEN 325 MG/1
650 TABLET ORAL EVERY 4 HOURS PRN
Status: DISCONTINUED | OUTPATIENT
Start: 2019-06-16 | End: 2019-06-18 | Stop reason: HOSPADM

## 2019-06-16 RX ORDER — SODIUM CHLORIDE FOR INHALATION 3 %
4 VIAL, NEBULIZER (ML) INHALATION ONCE
Status: DISCONTINUED | OUTPATIENT
Start: 2019-06-17 | End: 2019-06-18 | Stop reason: HOSPADM

## 2019-06-16 RX ORDER — AZITHROMYCIN 250 MG/1
500 TABLET, FILM COATED ORAL ONCE
Status: COMPLETED | OUTPATIENT
Start: 2019-06-16 | End: 2019-06-16

## 2019-06-16 RX ORDER — MIRTAZAPINE 15 MG/1
30 TABLET, FILM COATED ORAL NIGHTLY
Status: DISCONTINUED | OUTPATIENT
Start: 2019-06-16 | End: 2019-06-18 | Stop reason: HOSPADM

## 2019-06-16 RX ORDER — SODIUM CHLORIDE 0.9 % (FLUSH) 0.9 %
1-10 SYRINGE (ML) INJECTION AS NEEDED
Status: DISCONTINUED | OUTPATIENT
Start: 2019-06-16 | End: 2019-06-18 | Stop reason: HOSPADM

## 2019-06-16 RX ORDER — GUAIFENESIN 600 MG/1
600 TABLET, EXTENDED RELEASE ORAL EVERY 12 HOURS SCHEDULED
Status: DISCONTINUED | OUTPATIENT
Start: 2019-06-16 | End: 2019-06-18 | Stop reason: HOSPADM

## 2019-06-16 RX ORDER — AMMONIUM LACTATE 12 G/100G
CREAM TOPICAL 2 TIMES DAILY
Status: DISCONTINUED | OUTPATIENT
Start: 2019-06-16 | End: 2019-06-18 | Stop reason: HOSPADM

## 2019-06-16 RX ORDER — CEFTRIAXONE 1 G/50ML
1 INJECTION, SOLUTION INTRAVENOUS ONCE
Status: COMPLETED | OUTPATIENT
Start: 2019-06-16 | End: 2019-06-16

## 2019-06-16 RX ORDER — BUDESONIDE 0.5 MG/2ML
0.5 INHALANT ORAL
Status: DISCONTINUED | OUTPATIENT
Start: 2019-06-16 | End: 2019-06-18 | Stop reason: HOSPADM

## 2019-06-16 RX ORDER — GABAPENTIN 300 MG/1
100 CAPSULE ORAL 3 TIMES DAILY
Status: ON HOLD | COMMUNITY
End: 2021-01-04

## 2019-06-16 RX ORDER — MULTIPLE VITAMINS W/ MINERALS TAB 9MG-400MCG
1 TAB ORAL DAILY
Status: DISCONTINUED | OUTPATIENT
Start: 2019-06-17 | End: 2019-06-18 | Stop reason: HOSPADM

## 2019-06-16 RX ORDER — CHOLECALCIFEROL (VITAMIN D3) 125 MCG
10 CAPSULE ORAL NIGHTLY PRN
Status: DISCONTINUED | OUTPATIENT
Start: 2019-06-16 | End: 2019-06-18 | Stop reason: HOSPADM

## 2019-06-16 RX ORDER — SODIUM CHLORIDE 0.9 % (FLUSH) 0.9 %
3 SYRINGE (ML) INJECTION EVERY 12 HOURS SCHEDULED
Status: DISCONTINUED | OUTPATIENT
Start: 2019-06-16 | End: 2019-06-18 | Stop reason: HOSPADM

## 2019-06-16 RX ORDER — MAGNESIUM SULFATE HEPTAHYDRATE 40 MG/ML
2 INJECTION, SOLUTION INTRAVENOUS ONCE
Status: COMPLETED | OUTPATIENT
Start: 2019-06-16 | End: 2019-06-16

## 2019-06-16 RX ORDER — TRAZODONE HYDROCHLORIDE 50 MG/1
25 TABLET ORAL NIGHTLY
Status: DISCONTINUED | OUTPATIENT
Start: 2019-06-16 | End: 2019-06-18 | Stop reason: HOSPADM

## 2019-06-16 RX ORDER — CHOLECALCIFEROL (VITAMIN D3) 125 MCG
500 CAPSULE ORAL DAILY
Status: DISCONTINUED | OUTPATIENT
Start: 2019-06-17 | End: 2019-06-18 | Stop reason: HOSPADM

## 2019-06-16 RX ADMIN — SODIUM CHLORIDE, PRESERVATIVE FREE 10 ML: 5 INJECTION INTRAVENOUS at 14:18

## 2019-06-16 RX ADMIN — MIRTAZAPINE 30 MG: 15 TABLET, FILM COATED ORAL at 20:54

## 2019-06-16 RX ADMIN — IOPAMIDOL 75 ML: 612 INJECTION, SOLUTION INTRAVENOUS at 16:09

## 2019-06-16 RX ADMIN — SODIUM CHLORIDE SOLN NEBU 3% 4 ML: 3 NEBU SOLN at 19:06

## 2019-06-16 RX ADMIN — LORAZEPAM 0.5 MG: 0.5 TABLET ORAL at 21:38

## 2019-06-16 RX ADMIN — MELATONIN TAB 5 MG 10 MG: 5 TAB at 21:38

## 2019-06-16 RX ADMIN — Medication 1 APPLICATION: at 20:53

## 2019-06-16 RX ADMIN — DEXAMETHASONE SODIUM PHOSPHATE 10 MG: 10 INJECTION INTRAMUSCULAR; INTRAVENOUS at 14:18

## 2019-06-16 RX ADMIN — METHYLPREDNISOLONE SODIUM SUCCINATE 40 MG: 40 INJECTION, POWDER, FOR SOLUTION INTRAMUSCULAR; INTRAVENOUS at 18:21

## 2019-06-16 RX ADMIN — IPRATROPIUM BROMIDE AND ALBUTEROL SULFATE 3 ML: .5; 3 SOLUTION RESPIRATORY (INHALATION) at 13:39

## 2019-06-16 RX ADMIN — SODIUM CHLORIDE, PRESERVATIVE FREE 3 ML: 5 INJECTION INTRAVENOUS at 20:55

## 2019-06-16 RX ADMIN — GABAPENTIN 100 MG: 100 CAPSULE ORAL at 20:54

## 2019-06-16 RX ADMIN — CEFTRIAXONE 1 G: 1 INJECTION, SOLUTION INTRAVENOUS at 17:03

## 2019-06-16 RX ADMIN — AZITHROMYCIN 500 MG: 250 TABLET, FILM COATED ORAL at 17:03

## 2019-06-16 RX ADMIN — ENOXAPARIN SODIUM 30 MG: 30 INJECTION SUBCUTANEOUS at 20:54

## 2019-06-16 RX ADMIN — METOPROLOL SUCCINATE 25 MG: 25 TABLET, EXTENDED RELEASE ORAL at 18:21

## 2019-06-16 RX ADMIN — TRAZODONE HYDROCHLORIDE 25 MG: 50 TABLET ORAL at 20:53

## 2019-06-16 RX ADMIN — HYDROCODONE BITARTRATE AND ACETAMINOPHEN 1 TABLET: 10; 325 TABLET ORAL at 18:21

## 2019-06-16 RX ADMIN — MAGNESIUM SULFATE HEPTAHYDRATE 2 G: 40 INJECTION, SOLUTION INTRAVENOUS at 14:19

## 2019-06-16 RX ADMIN — IPRATROPIUM BROMIDE AND ALBUTEROL SULFATE 3 ML: .5; 3 SOLUTION RESPIRATORY (INHALATION) at 19:06

## 2019-06-16 RX ADMIN — LOSARTAN POTASSIUM 25 MG: 25 TABLET, FILM COATED ORAL at 20:54

## 2019-06-16 RX ADMIN — CYCLOBENZAPRINE HYDROCHLORIDE 10 MG: 10 TABLET, FILM COATED ORAL at 20:53

## 2019-06-16 RX ADMIN — BUDESONIDE 0.5 MG: 0.5 INHALANT RESPIRATORY (INHALATION) at 19:06

## 2019-06-16 RX ADMIN — VENLAFAXINE 75 MG: 75 TABLET ORAL at 18:21

## 2019-06-16 NOTE — PLAN OF CARE
Problem: Patient Care Overview  Goal: Plan of Care Review  Outcome: Ongoing (interventions implemented as appropriate)   06/16/19 5975   Coping/Psychosocial   Plan of Care Reviewed With patient   Plan of Care Review   Progress improving   OTHER   Outcome Summary NEW ADMISSION. IV ANTIBIOTICS & STEROIDS INITIATED. STATES IMPROVEMENT IN SOA.

## 2019-06-16 NOTE — H&P
[facility]    Name: Joelle Yee, 73 y.o. female  MRN: [id number], : 1945   Date of Admission: 2019   PCP: Blanquita Clements PA     Chief Complaint: cough     History of Present Illness:  Joelle Yee is a(n) 73 y.o. year old female with a history of bronchiectasis, COPD, on 2LPM NC continuous, depression, HTN, HLD, gout, GERD, OA who was admitted on 2019 from home to [facility] with cough and shortness of breath x 2 weeks.  Completed course of amoxicillin and steroids x 7 days without improvement, now on 7 day course of levaquin.  Has completed 6 out of 7 days.  Has occasional chills, wheezing, productive cough \"all colors\", using inhaler 4x/day, compliant with medications.  She is using her flutter valve and home percussive therapy without improvement.  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    Vitals on admission notable for: temp 97.9F; ; RR 20; /64; O2 sat 92% on NC.  Labs on admission notable for: WBC 12K; procal neg x 1; ; trop neg x 1; Glc 100.  Patient was given decadron, duoneb, magnesium sulfate, azithromycin, rocephin in ER.  Studies on admission notable for: CXR: \"1. Stable bilateral interstitial disease with no new area of consolidation. 2. New 16 mm right suprahilar opacity, recommend follow-up two-view chest x-ray in 1-2 weeks to document resolution.\".  CT chest: \"Bilateral pneumonia.  Recommend follow-up until resolution  COPD Gastric wall thickening suggesting gastritis\"  EKG: (my read), compared to EKG from: 18. Rate: 95bpm / Rhythm: NSR / Axis: nl / ST/T changes: no / Hypertrophy: no / QTc: 374ms     Patient seen at 424pm on 2019. History was provided by: chart review, discussion with ER provider (Dr. Doherty), and patient.     Recent hospitalization?:  Last hospitalization 2018 for COPD exacerbation    =========================================================================  History: "   ROS:  Chronic intermittent diarrhea; all other systems reviewed and are negative  PMHx: Patient  has a past medical history of Abdominal pain, Acute bronchitis, Ankle pain, Anxiety (1980), Atopic dermatitis, Bronchiectasis (CMS/[facility]), Cataract, bilateral, Chest pain, Chronic obstructive lung disease (CMS/HCC) (2008), Colon cancer screening, COPD (chronic obstructive pulmonary disease) (CMS/[facility]), Cystocele, Depressed, Depression (1980), Fatigue, Fibromyalgia (2008), Full dentures, GERD (gastroesophageal reflux disease), Gout, Heartburn, High cholesterol (2012), Hip pain, Hyperlipidemia, Hypertension, Insomnia, Joint pain, Kidney infection, Loss of appetite, Low back pain (1995), Melena, Nausea and vomiting, On home oxygen therapy, Osteoarthritis (2010), Pain in limb, Palpitations, Pinched nerve, Pneumonia, Problems with swallowing, Recurrent urinary tract infection, Sciatica ([id number]), Shortness of breath, Sinus problem, Sleep apnea (1998), Upset stomach, Valvular heart disease, Vitamin B12 deficiency, Wears glasses, and Weight loss, abnormal.   PSHx: Patient  has a past surgical history that includes Appendectomy (1965); Back surgery (2005); Knee surgery (Left, 1979); Tubal ligation (1971); Cholecystectomy (1985); Abdominal hernia repair (2016); Cataract extraction (Bilateral); Esophagogastroduodenoscopy; Colonoscopy; and Bronchoscopy (N/A, 7/5/2018).   FamHx: Patient family history includes Cancer in her mother; Heart disease in her brother; Lung cancer in her father; Ovarian cancer in her mother.   SocHx: Patient  reports that she quit smoking about 1 years ago. She smoked 0.00 packs per day for 35.00 years. She has never used smokeless tobacco. She reports that she does not drink alcohol or use drugs.   Allergies: Patient is allergic to dust mite extract; grass; and mold extract [trichophyton].   Medications:   No current facility-administered medications on file prior to encounter.      Current Outpatient  Medications on File Prior to Encounter   Medication Sig Dispense Refill   • gabapentin (NEURONTIN) 300 MG capsule Take 300 mg by mouth 3 (Three) Times a Day.     • acetaminophen (TYLENOL) 325 MG tablet Take 2 tablets by mouth Every 4 (Four) Hours As Needed for Headache or Fever (temperature greater than 101.5 F).     • ammonium lactate (LAC-HYDRIN) 12 % cream Apply  topically to the appropriate area as directed 2 (Two) Times a Day. 1 each 0   • Camphor-Eucalyptus-Menthol (MEDICATED CHEST RUB EX)      • Cyanocobalamin (VITAMIN B12 PO) Take 500 mcg by mouth Daily.     • cyclobenzaprine (FLEXERIL) 10 MG tablet Take 10 mg by mouth Daily.     • Diclofenac Sodium (VOLTAREN TD) Apply 2 g topically to the appropriate area as directed 2 (Two) Times a Day As Needed. Voltaren GEL      • DIPHENHIST 12.5 MG/5ML liquid MOUTH WASH  0   • FERROUS SULFATE PO      • fluconazole (DIFLUCAN) 150 MG tablet TAKE ONE TABLET BY MOUTH ON DAY ONE THEN 1 TAB IN 3 DAYS  0   • Fluticasone-Umeclidin-Vilant (TRELEGY ELLIPTA) 100-62.5-25 MCG/INH aerosol powder  Inhale 1 each Daily. Rinse mouth with water after use. 1 each 5   • guaiFENesin (MUCINEX) 600 MG 12 hr tablet Take 1 tablet by mouth Every 12 (Twelve) Hours. 20 tablet 0   • HYDROcodone-acetaminophen (NORCO)  MG per tablet Take 1 tablet by mouth Every 12 (Twelve) Hours As Needed (Patient will take 3 times daily occasionally).     • ipratropium-albuterol (DUO-NEB) 0.5-2.5 mg/3 ml nebulizer TAKE 3 ML BY NEBULIZATION 4 (FOUR) TIMES A DAY AS NEEDED FOR WHEEZING OR SHORTNESS OF AIR. 360 mL 5   • lidocaine viscous (XYLOCAINE) 2 % solution TAKE 2 TSP GARGLE SWISH AND SPIT EVERY 2 TO 3 HOURS  0   • LORazepam (ATIVAN) 0.5 MG tablet Take 0.5 mg by mouth 2 (Two) Times a Day As Needed. 1-2 TABLETS DAILY PRN  0   • losartan (COZAAR) 25 MG tablet Take 25 mg by mouth Daily.  3   • MAGNESIUM PO      • melatonin 5 MG tablet tablet Take 2 tablets by mouth At Night As Needed for sleep 60 tablet 0   •  MELATONIN PO      • metoprolol tartrate (LOPRESSOR) 25 MG tablet Take 1 tablet by mouth Every 12 (Twelve) Hours. 60 tablet 0   • mirtazapine (REMERON) 30 MG tablet Take 30 mg by mouth Every Night.     • Misc. Devices (ROLLATOR) misc 1 Units As Needed (ambulation assistance). 1 each 0   • Multiple Vitamins-Minerals (MULTIVITAMIN WITH MINERALS) tablet tablet Take 1 tablet by mouth Daily.     • NON FORMULARY      • NON FORMULARY      • NON FORMULARY      • NON FORMULARY      • NON FORMULARY      • NON FORMULARY      • NON FORMULARY      • NON FORMULARY      • NON FORMULARY      • Nutritional Supplements (ENSURE COMPLETE PO) Take 1 can by mouth 2 (Two) Times a Day.     • nystatin (MYCOSTATIN) 121238 UNIT/ML suspension Swish and swallow 5 mL 4 (Four) Times a Day. (Patient taking differently: Swish and swallow 500,000 Units 4 (Four) Times a Day As Needed.) 240 mL 0   • Omega-3 Fatty Acids (FISH OIL PO)      • omeprazole (priLOSEC) 40 MG capsule Take 40 mg by mouth Daily.     • ondansetron ODT (ZOFRAN-ODT) 4 MG disintegrating tablet Take 1 tablet by mouth every 6 (six) hours as needed.     • OXYGEN-HELIUM IN Oxygen; Patient Sig: Oxygen 2 liter as needed; 0; 21-May-2014; Active     • potassium bicarbonate (K-LYTE) 25 MEQ disintegrating tablet Take 25 mEq by mouth Every Night.     • prednisoLONE (PRELONE) 15 MG/5ML solution oral solution MOUTH WASH  0   • Probiotic Product (RISAQUAD-2) capsule capsule Take 1 capsule by mouth Daily.     • Respiratory Therapy Supplies (FLUTTER) device 1 Device as needed (as directed). 1 each 0   • traZODone (DESYREL) 25 MG half tablet Take 25 mg by mouth Every Night.     • urea (CARMOL) 40 % cream Apply topically to the appropriate area as directed Daily. May substitute as needed for insurance coverage 85 g 3   • urea (CARMOL) 40 % cream Apply  topically to the appropriate area as directed Every 12 (Twelve) Hours. Apply topically every 12 hours. 85 g 1   • venlafaxine (EFFEXOR) 75 MG tablet  "Take 1 tablet by mouth 2 (two) times a day.          =========================================================================    Vitals:   /61   Pulse 93   Temp 97.8 °F (36.6 °C) (Oral)   Resp 20   Ht 152.4 cm (60\")   Wt 42.3 kg (93 lb 3.2 oz)   LMP  (LMP Unknown)   SpO2 93%   BMI 18.20 kg/m²    SpO2: 93 %     Intake/Output Summary (Last 24 hours) at 6/16/2019 1624  Last data filed at 6/16/2019 1521  Gross per 24 hour   Intake 50 ml   Output --   Net 50 ml        Physical Exam:   General Appearance:  Thin elderly female; Alert and cooperative, not in any acute distress. Coughs occasionally   Head:  Atraumatic and normocephalic, without obvious abnormality.   Eyes:          PERRL, conjunctivae and sclerae normal, no Icterus. No pallor. Extraocular movements are within normal limits.   Ears:  Ears appear intact with no abnormalities noted.   Throat: No oral lesions, oral mucosa moist. Upper and lower dentures in place; mild erythema of hard palate ?early thrush   Neck: Supple, trachea midline   Back:   No kyphoscoliosis present. range of motion normal.   Lungs:   Bilateral rhonchi with prolonged expiratory phase wheezing   Heart:  Normal S1 and S2, no murmur, no gallop, no rub.   Abdomen:   Normal bowel sounds, no masses. Soft, non-tender, non-distended, no guarding, no rebound tenderness.   Genitourinary: deferred   Extremities: Moves all extremities well, no edema, no cyanosis, no clubbing.   Pulses: Pulses palpable and equal bilaterally.   Skin: No bleeding or rash.  Bruising of shins; thin skin   Lymph nodes: No palpable adenopathy.   Neurologic: Alert and oriented x 3. Moves all four limbs equally. No tremors. No facial asymmetry.       Labs:   Results from last 7 days   Lab Units 06/16/19  1256   SODIUM mmol/L 135*   POTASSIUM mmol/L 4.3   CHLORIDE mmol/L 97*   CO2 mmol/L 29.0   BUN mg/dL 12   CREATININE mg/dL 0.60   CALCIUM mg/dL 9.0   BILIRUBIN mg/dL 0.3   ALK PHOS U/L 113   ALT (SGPT) U/L 18 "   AST (SGOT) U/L 26   GLUCOSE mg/dL 100*     Results from last 7 days   Lab Units 06/16/19  1256   WBC 10*3/mm3 12.29*   HEMOGLOBIN g/dL 13.6   HEMATOCRIT % 42.1   PLATELETS 10*3/mm3 356         Results from last 7 days   Lab Units 06/16/19  1256   TROPONIN I ng/mL <0.012     Results from last 7 days   Lab Units 06/16/19  1256   PROBNP pg/mL 178.0*     =========================================================================  Assessment/Plan:   Joelle Yee is a(n) 73 y.o. year old female with:     1. Bilateral community acquired bacterial pneumonia, POA  2. Acute exacerbation of COPD, POA  3. Depression  4. HTN  5. HLD  6. Gout  7. GERD  8. OA      Admit to med/surge.  Continue rocephin and azithromycin.  Check sputum culture.  Start duonebs, IV steroids, mucolytics.  Will consult Dr. Esparza in AM.  Repeat pro-dino in AM.  Check resp panel.  Home medications reviewed with patient.  Further recommendations pending clinical course.        F/E/N: regular  DVT PPx: SCDs  GI PPx: not indicated    Code Status: FULL CODE - per patient  NOK: daughter   Dispo: admission, inpatient, need for hospitalization: above    Assessment and plan was discussed with the patient in ER. All questions were answered.     Time in: 424pm Time out: 517pm  Date patient seen: 6/16/2019     Hanna Hinds MD   4:24 PM on 6/16/2019

## 2019-06-16 NOTE — ED PROVIDER NOTES
Subjective   73-year-old female that presents with concerns for pneumonia.  Patient states she had a diagnosis of pneumonia earlier this year that required hospitalization.  She has stage III COPD and requires supplemental oxygen via nasal cannula 24 hours a day.  Patient reports that she is about to finish up her second round of antibiotics.  She completed a 10-day course of amoxicillin and then was started on Levaquin.  She is on day 6 currently.  Patient states she is not getting any better.  Shortness of breath is worse with exertion and better with rest.  She denies fevers at this time.  Symptoms have progressively worsened over the past 2 to 3 weeks.            Review of Systems   All other systems reviewed and are negative.      Past Medical History:   Diagnosis Date   • Abdominal pain    • Acute bronchitis    • Ankle pain    • Anxiety 1980   • Atopic dermatitis    • Bronchiectasis (CMS/[facility])    • Cataract, bilateral    • Chest pain     STATES HAS OCCASSIONALLY.  STATES LAST TIME WAS LAST WEEK.  STATES SEES DR. RICH.  STATES HE HAS DONE NUMEROUS TESTS ON HER AND HAS NOT BEEN ABLE TO FIND OUT CAUSE.     • Chronic obstructive lung disease (CMS/HCC) 2008   • Colon cancer screening    • COPD (chronic obstructive pulmonary disease) (CMS/HCC)    • Cystocele    • Depressed    • Depression 1980   • Fatigue     Chronic pafigue 2012   • Fibromyalgia 2008   • Full dentures    • GERD (gastroesophageal reflux disease)    • Gout    • Heartburn     Chronic historu of epigastric heartburn currently controlled on Prilesec 40 mg every morning along with Zantac 300 Mg daily at bedtime   • High cholesterol 2012   • Hip pain    • Hyperlipidemia    • Hypertension    • Insomnia    • Joint pain    • Kidney infection    • Loss of appetite    • Low back pain 1995   • Melena    • Nausea and vomiting    • On home oxygen therapy     2L/NC PRN (STATES SHE HAS BEEN USING CONTINUOSLY LATELY)   • Osteoarthritis 2010   • Pain in limb    •  Palpitations    • Pinched nerve     Pinched nerves    • Pneumonia    • Problems with swallowing     HAS TO EAT SLOW AND CHEW FOOD WELL   • Recurrent urinary tract infection    • Sciatica [id number]   • Shortness of breath    • Sinus problem    • Sleep apnea     NO CPAP   • Upset stomach    • Valvular heart disease    • Vitamin B12 deficiency    • Wears glasses    • Weight loss, abnormal     Weight loss is stabel. On pund weight gain since 2016       Allergies   Allergen Reactions   • Dust Mite Extract Other (See Comments)     Dust  SINUS   • Grass Other (See Comments)     SINUS   • Mold Extract [Trichophyton] Other (See Comments)     SINUS       Past Surgical History:   Procedure Laterality Date   • ABDOMINAL HERNIA REPAIR     • APPENDECTOMY  1965   • BACK SURGERY     • BRONCHOSCOPY N/A 2018    Procedure: BRONCHOSCOPY w/ WASHINGS/BRUSHINGS with MAC;  Surgeon: Rebeka Esparza MD;  Location: [facility];  Service: Pulmonary   • CATARACT EXTRACTION Bilateral     Put in implants    • CHOLECYSTECTOMY     • COLONOSCOPY     • ENDOSCOPY     • KNEE SURGERY Left     Knee Cap   • TUBAL ABDOMINAL LIGATION         Family History   Problem Relation Age of Onset   • Ovarian cancer Mother    • Cancer Mother    • Lung cancer Father    • Heart disease Brother        Social History     Socioeconomic History   • Marital status: Single     Spouse name: Not on file   • Number of children: Not on file   • Years of education: Not on file   • Highest education level: Not on file   Tobacco Use   • Smoking status: Former Smoker     Packs/day: 0.00     Years: 35.00     Pack years: 0.00     Last attempt to quit: 2017     Years since quittin.9   • Smokeless tobacco: Never Used   Substance and Sexual Activity   • Alcohol use: No   • Drug use: No   • Sexual activity: Defer           Objective   Physical Exam   Nursing note and vitals reviewed.    GEN: No acute distress  Head: Normocephalic,  atraumatic  Eyes: Pupils equal round reactive to light  ENT: Posterior pharynx normal in appearance, oral mucosa is moist  Chest: Nontender to palpation  Cardiovascular: Regular rate  Lungs: Coarse breath sounds with inspiration and expiration, audible wheezes bilaterally  Abdomen: Soft, nontender, nondistended, no peritoneal signs  Extremities: No edema, normal appearance  Neuro: GCS 15  Psych: Mood and affect are appropriate      Procedures           ED Course  ED Course as of Jun 16 1654   Sun Jun 16, 2019   1600 EKG shows a sinus rhythm with a rate of 95.  Partial left bundle branch block.  No significant ST segments.  Atypical EKG.  Interpreted by me.  [DT]      ED Course User Index  [DT] Femi Doherty MD                  MDM  Number of Diagnoses or Management Options  Chronic obstructive pulmonary disease, unspecified COPD type (CMS/HCC):   Pneumonia of both lungs due to infectious organism, unspecified part of lung:   Diagnosis management comments: Differential diagnosis for this patient would include COPD, asthma, bronchitis, pneumonia, congestive heart failure, pulmonary embolus, acute coronary syndrome, anxiety, or other concerns.       CT Chest With Contrast (Final result)   Result time 06/16/19 16:19:46   Final result by Sheridan Salcedo MD (06/16/19 16:19:46)             Impression:     Authenticated by Sheridan Salcedo MD on 06/16/2019 04:19:46 PM        Narrative:     FINAL REPORT    CLINICAL HISTORY:  new perihilar mass on xr    FINDINGS:  Multiple contiguous transaxial slices through the chest were  obtained after the intravenous ministration of contrast with  coronal reformatted images.  There is diffuse centrilobular  paraseptal emphysema with coronal reformatted images.  There is  diffuse centrilobular and paraseptal emphysema with fibrotic  scarring.  There is patchy airspace disease bilaterally,  greatest in the right middle lobe.  There is no discrete mass or  effusion.  There is no  adenopathy.  There are degenerative  changes of the spine.  Gastric wall thickening suggesting  gastritis.  There is fluid adjacent the spleen, nonspecific.  There is a large left renal cyst.  There are  Impression:  Bilateral pneumonia.  Recommend follow-up until resolution  COPD  Gastric wall thickening suggesting gastritis              XR Chest 2 View (Final result)   Result time 06/16/19 14:42:44   Final result by Antonietta Boss MD (06/16/19 14:42:44)             Impression:     .IMPRESSION:   1. Stable bilateral interstitial disease with no new area of  consolidation.  2. New 16 mm right suprahilar opacity, recommend follow-up two-view  chest x-ray in 1-2 weeks to document resolution.     This report was finalized on 6/16/2019 2:42 PM by Antonietta Boss MD.        Narrative:     XR CHEST 2 VIEWS-   06/16/2019 1:12 PM     HISTORY: Shortness of air triage protocol.     COMPARISON:  11/20/2018     FINDINGS: The cardiac silhouette is proper size. The aortic contours are  normal. The mediastinal and hilar structures are unremarkable. Bilateral  interstitial disease with minimal bibasilar scarring is stable from  prior. There is a new 16 mm opacity right suprahilar region only  identified on the PA view, possible small area of pneumonia with mass  not excluded. Follow-up 2 view chest x-ray in one to 2 weeks recommended  to document resolution.          Initially I did not draw all cultures are initiate antibiotics as I was concerned the patient may have a mass after findings on chest x-ray.    CT chest did confirm pneumonia.  Cultures and antibiotics initiated.  Did discuss patient with Dr. Hinds who did graciously except for hospitalization.       Amount and/or Complexity of Data Reviewed  Clinical lab tests: reviewed  Discussion of test results with the performing providers: yes  Decide to obtain previous medical records or to obtain history from someone other than the patient: yes  Obtain history from someone  other than the patient: yes  Review and summarize past medical records: yes  Discuss the patient with other providers: yes  Independent visualization of images, tracings, or specimens: yes          Final diagnoses:   Pneumonia of both lungs due to infectious organism, unspecified part of lung   Chronic obstructive pulmonary disease, unspecified COPD type (CMS/[facility])            Femi Doherty MD  06/16/19 9475

## 2019-06-17 LAB
ANION GAP SERPL CALCULATED.3IONS-SCNC: 12.4 MMOL/L (ref 10–20)
BACTERIA SPEC RESP CULT: NORMAL
BASOPHILS # BLD AUTO: 0.03 10*3/MM3 (ref 0–0.2)
BASOPHILS NFR BLD AUTO: 0.5 % (ref 0–1.5)
BUN BLD-MCNC: 17 MG/DL (ref 7–20)
BUN/CREAT SERPL: 28.3 (ref 7.1–23.5)
CALCIUM SPEC-SCNC: 8.8 MG/DL (ref 8.4–10.2)
CHLORIDE SERPL-SCNC: 103 MMOL/L (ref 98–107)
CO2 SERPL-SCNC: 26 MMOL/L (ref 26–30)
CREAT BLD-MCNC: 0.6 MG/DL (ref 0.6–1.3)
DEPRECATED RDW RBC AUTO: 56.1 FL (ref 37–54)
EOSINOPHIL # BLD AUTO: 0 10*3/MM3 (ref 0–0.4)
EOSINOPHIL NFR BLD AUTO: 0 % (ref 0.3–6.2)
ERYTHROCYTE [DISTWIDTH] IN BLOOD BY AUTOMATED COUNT: 15.9 % (ref 12.3–15.4)
GFR SERPL CREATININE-BSD FRML MDRD: 98 ML/MIN/1.73
GLUCOSE BLD-MCNC: 114 MG/DL (ref 74–98)
HCT VFR BLD AUTO: 36.6 % (ref 34–46.6)
HGB BLD-MCNC: 11.8 G/DL (ref 12–15.9)
IMM GRANULOCYTES # BLD AUTO: 0.2 10*3/MM3 (ref 0–0.05)
IMM GRANULOCYTES NFR BLD AUTO: 3.2 % (ref 0–0.5)
LYMPHOCYTES # BLD AUTO: 0.93 10*3/MM3 (ref 0.7–3.1)
LYMPHOCYTES NFR BLD AUTO: 15 % (ref 19.6–45.3)
MCH RBC QN AUTO: 31.1 PG (ref 26.6–33)
MCHC RBC AUTO-ENTMCNC: 32.2 G/DL (ref 31.5–35.7)
MCV RBC AUTO: 96.6 FL (ref 79–97)
MONOCYTES # BLD AUTO: 0.18 10*3/MM3 (ref 0.1–0.9)
MONOCYTES NFR BLD AUTO: 2.9 % (ref 5–12)
NEUTROPHILS # BLD AUTO: 4.84 10*3/MM3 (ref 1.7–7)
NEUTROPHILS NFR BLD AUTO: 78.4 % (ref 42.7–76)
NRBC BLD AUTO-RTO: 0 /100 WBC (ref 0–0.2)
PLATELET # BLD AUTO: 310 10*3/MM3 (ref 140–450)
PMV BLD AUTO: 9.9 FL (ref 6–12)
POTASSIUM BLD-SCNC: 4.4 MMOL/L (ref 3.5–5.1)
PROCALCITONIN SERPL-MCNC: <0.05 NG/ML
RBC # BLD AUTO: 3.79 10*6/MM3 (ref 3.77–5.28)
SODIUM BLD-SCNC: 137 MMOL/L (ref 137–145)
WBC NRBC COR # BLD: 6.18 10*3/MM3 (ref 3.4–10.8)

## 2019-06-17 PROCEDURE — 25010000002 ENOXAPARIN PER 10 MG: Performed by: HOSPITALIST

## 2019-06-17 PROCEDURE — 97165 OT EVAL LOW COMPLEX 30 MIN: CPT

## 2019-06-17 PROCEDURE — 25010000002 METHYLPREDNISOLONE PER 40 MG: Performed by: HOSPITALIST

## 2019-06-17 PROCEDURE — 94799 UNLISTED PULMONARY SVC/PX: CPT

## 2019-06-17 PROCEDURE — 80048 BASIC METABOLIC PNL TOTAL CA: CPT | Performed by: HOSPITALIST

## 2019-06-17 PROCEDURE — 87070 CULTURE OTHR SPECIMN AEROBIC: CPT | Performed by: HOSPITALIST

## 2019-06-17 PROCEDURE — 99223 1ST HOSP IP/OBS HIGH 75: CPT | Performed by: INTERNAL MEDICINE

## 2019-06-17 PROCEDURE — 94669 MECHANICAL CHEST WALL OSCILL: CPT

## 2019-06-17 PROCEDURE — 84145 PROCALCITONIN (PCT): CPT | Performed by: NURSE PRACTITIONER

## 2019-06-17 PROCEDURE — 97161 PT EVAL LOW COMPLEX 20 MIN: CPT

## 2019-06-17 PROCEDURE — 25010000002 CEFTRIAXONE SODIUM-DEXTROSE 1-3.74 GM-%(50ML) RECONSTITUTED SOLUTION: Performed by: HOSPITALIST

## 2019-06-17 PROCEDURE — 25010000002 AZITHROMYCIN: Performed by: HOSPITALIST

## 2019-06-17 PROCEDURE — 85025 COMPLETE CBC W/AUTO DIFF WBC: CPT | Performed by: HOSPITALIST

## 2019-06-17 PROCEDURE — 87205 SMEAR GRAM STAIN: CPT | Performed by: HOSPITALIST

## 2019-06-17 PROCEDURE — 87106 FUNGI IDENTIFICATION YEAST: CPT | Performed by: HOSPITALIST

## 2019-06-17 PROCEDURE — 99232 SBSQ HOSP IP/OBS MODERATE 35: CPT | Performed by: NURSE PRACTITIONER

## 2019-06-17 PROCEDURE — 94668 MNPJ CHEST WALL SBSQ: CPT

## 2019-06-17 RX ORDER — L.ACID,PARA/B.BIFIDUM/S.THERM 8B CELL
1 CAPSULE ORAL DAILY
Status: DISCONTINUED | OUTPATIENT
Start: 2019-06-17 | End: 2019-06-18 | Stop reason: HOSPADM

## 2019-06-17 RX ORDER — METOPROLOL SUCCINATE 25 MG/1
25 TABLET, EXTENDED RELEASE ORAL NIGHTLY
COMMUNITY

## 2019-06-17 RX ORDER — LORATADINE 10 MG/1
10 TABLET ORAL DAILY PRN
COMMUNITY

## 2019-06-17 RX ORDER — BENZONATATE 100 MG/1
100 CAPSULE ORAL 3 TIMES DAILY PRN
Status: ON HOLD | COMMUNITY
End: 2021-01-04

## 2019-06-17 RX ORDER — FLUCONAZOLE 100 MG/1
100 TABLET ORAL EVERY 24 HOURS
Status: DISCONTINUED | OUTPATIENT
Start: 2019-06-17 | End: 2019-06-18 | Stop reason: HOSPADM

## 2019-06-17 RX ORDER — ARFORMOTEROL TARTRATE 15 UG/2ML
15 SOLUTION RESPIRATORY (INHALATION)
Status: DISCONTINUED | OUTPATIENT
Start: 2019-06-17 | End: 2019-06-18 | Stop reason: HOSPADM

## 2019-06-17 RX ADMIN — ARFORMOTEROL TARTRATE 15 MCG: 15 SOLUTION RESPIRATORY (INHALATION) at 12:50

## 2019-06-17 RX ADMIN — AZITHROMYCIN 500 MG: 500 INJECTION, POWDER, LYOPHILIZED, FOR SOLUTION INTRAVENOUS at 18:16

## 2019-06-17 RX ADMIN — PANTOPRAZOLE SODIUM 40 MG: 40 TABLET, DELAYED RELEASE ORAL at 06:03

## 2019-06-17 RX ADMIN — CEFTRIAXONE 1 G: 1 INJECTION, SOLUTION INTRAVENOUS at 16:50

## 2019-06-17 RX ADMIN — LORAZEPAM 0.5 MG: 0.5 TABLET ORAL at 09:47

## 2019-06-17 RX ADMIN — ENOXAPARIN SODIUM 30 MG: 30 INJECTION SUBCUTANEOUS at 20:28

## 2019-06-17 RX ADMIN — HYDROCODONE BITARTRATE AND ACETAMINOPHEN 1 TABLET: 10; 325 TABLET ORAL at 16:50

## 2019-06-17 RX ADMIN — FLUCONAZOLE 100 MG: 100 TABLET ORAL at 18:17

## 2019-06-17 RX ADMIN — GABAPENTIN 100 MG: 100 CAPSULE ORAL at 09:41

## 2019-06-17 RX ADMIN — METOPROLOL SUCCINATE 25 MG: 25 TABLET, EXTENDED RELEASE ORAL at 18:17

## 2019-06-17 RX ADMIN — LORAZEPAM 0.5 MG: 0.5 TABLET ORAL at 20:28

## 2019-06-17 RX ADMIN — GABAPENTIN 100 MG: 100 CAPSULE ORAL at 17:00

## 2019-06-17 RX ADMIN — IPRATROPIUM BROMIDE AND ALBUTEROL SULFATE 3 ML: .5; 3 SOLUTION RESPIRATORY (INHALATION) at 23:51

## 2019-06-17 RX ADMIN — Medication: at 20:31

## 2019-06-17 RX ADMIN — TRAZODONE HYDROCHLORIDE 25 MG: 50 TABLET ORAL at 20:28

## 2019-06-17 RX ADMIN — Medication: at 09:42

## 2019-06-17 RX ADMIN — MIRTAZAPINE 30 MG: 15 TABLET, FILM COATED ORAL at 20:29

## 2019-06-17 RX ADMIN — METHYLPREDNISOLONE SODIUM SUCCINATE 40 MG: 40 INJECTION, POWDER, FOR SOLUTION INTRAMUSCULAR; INTRAVENOUS at 18:16

## 2019-06-17 RX ADMIN — IPRATROPIUM BROMIDE AND ALBUTEROL SULFATE 3 ML: .5; 3 SOLUTION RESPIRATORY (INHALATION) at 19:08

## 2019-06-17 RX ADMIN — GUAIFENESIN 600 MG: 600 TABLET, EXTENDED RELEASE ORAL at 20:28

## 2019-06-17 RX ADMIN — IPRATROPIUM BROMIDE AND ALBUTEROL SULFATE 3 ML: .5; 3 SOLUTION RESPIRATORY (INHALATION) at 12:51

## 2019-06-17 RX ADMIN — CYCLOBENZAPRINE HYDROCHLORIDE 10 MG: 10 TABLET, FILM COATED ORAL at 20:28

## 2019-06-17 RX ADMIN — LOSARTAN POTASSIUM 25 MG: 25 TABLET, FILM COATED ORAL at 20:29

## 2019-06-17 RX ADMIN — BUDESONIDE 0.5 MG: 0.5 INHALANT RESPIRATORY (INHALATION) at 07:04

## 2019-06-17 RX ADMIN — IPRATROPIUM BROMIDE AND ALBUTEROL SULFATE 3 ML: .5; 3 SOLUTION RESPIRATORY (INHALATION) at 16:23

## 2019-06-17 RX ADMIN — SODIUM CHLORIDE, PRESERVATIVE FREE 3 ML: 5 INJECTION INTRAVENOUS at 09:42

## 2019-06-17 RX ADMIN — VENLAFAXINE 75 MG: 75 TABLET ORAL at 18:17

## 2019-06-17 RX ADMIN — BUDESONIDE 0.5 MG: 0.5 INHALANT RESPIRATORY (INHALATION) at 19:08

## 2019-06-17 RX ADMIN — METHYLPREDNISOLONE SODIUM SUCCINATE 40 MG: 40 INJECTION, POWDER, FOR SOLUTION INTRAMUSCULAR; INTRAVENOUS at 13:10

## 2019-06-17 RX ADMIN — IPRATROPIUM BROMIDE AND ALBUTEROL SULFATE 3 ML: .5; 3 SOLUTION RESPIRATORY (INHALATION) at 07:04

## 2019-06-17 RX ADMIN — Medication 1 CAPSULE: at 18:17

## 2019-06-17 RX ADMIN — METHYLPREDNISOLONE SODIUM SUCCINATE 40 MG: 40 INJECTION, POWDER, FOR SOLUTION INTRAMUSCULAR; INTRAVENOUS at 02:38

## 2019-06-17 RX ADMIN — CYANOCOBALAMIN TAB 500 MCG 500 MCG: 500 TAB at 09:42

## 2019-06-17 RX ADMIN — MELATONIN TAB 5 MG 10 MG: 5 TAB at 20:28

## 2019-06-17 RX ADMIN — SODIUM CHLORIDE, PRESERVATIVE FREE 3 ML: 5 INJECTION INTRAVENOUS at 20:29

## 2019-06-17 RX ADMIN — VENLAFAXINE 75 MG: 75 TABLET ORAL at 09:41

## 2019-06-17 RX ADMIN — ARFORMOTEROL TARTRATE 15 MCG: 15 SOLUTION RESPIRATORY (INHALATION) at 19:08

## 2019-06-17 RX ADMIN — MULTIPLE VITAMINS W/ MINERALS TAB 1 TABLET: TAB at 09:41

## 2019-06-17 RX ADMIN — GABAPENTIN 100 MG: 100 CAPSULE ORAL at 20:33

## 2019-06-17 RX ADMIN — GUAIFENESIN 600 MG: 600 TABLET, EXTENDED RELEASE ORAL at 09:41

## 2019-06-17 RX ADMIN — HYDROCODONE BITARTRATE AND ACETAMINOPHEN 1 TABLET: 10; 325 TABLET ORAL at 06:11

## 2019-06-17 NOTE — PLAN OF CARE
Problem: Patient Care Overview  Goal: Plan of Care Review  Outcome: Ongoing (interventions implemented as appropriate)   06/17/19 1540   Coping/Psychosocial   Plan of Care Reviewed With patient   Plan of Care Review   Progress no change   OTHER   Outcome Summary OT evaluation completed today. Pt presents with weakness and decreased endurance for self care and funcitonal mobility tasks. Pt is expected to benefit from skilled OT to improve her strength and endurance to functional tasks.

## 2019-06-17 NOTE — CONSULTS
Date of consultation:   June 17, 2019    Requested by:   Hospitalist Service.     PCP: Blanquita Clements PA    Reason:  SOB.  Pneumonia    History of Present Illness:  73 y.o. female complains of increased shortness of breath and productive cough for the last 2 weeks.  She says that initially she felt that her symptoms started out with sinus drainage and foul-smelling nasal discharge.  She was actually treated with amoxicillin without any significant improvement and actually felt that she worsened.  She returned to her primary care provider who then started her on Levaquin.  She finished 6 out of 7 days before coming to the emergency room due to no improvement of her symptoms.  Although she initially had a productive cough which was dark greenish in color, lately her cough is less productive although she continues to have greenish sputum.  She feels that the sputum is extremely thick and difficult to expectorate.  She denies any fever but endorses chills.  She denies any sick contacts that she is aware of however she states she has been to the doctor's office a few times.      She was using all her medications as prescribed.  She denies any noncompliance with her vest therapy as well.  She was also using her flutter valve as directed on a regular basis.    Review of System: All other review of systems negative except indicated in HPI. Leg cramps occasionally. No diarrhea. No fever.     Past Medical History:  Past Medical History:   Diagnosis Date   • Abdominal pain    • Acute bronchitis    • Ankle pain    • Anxiety 1980   • Atopic dermatitis    • Bronchiectasis (CMS/HCC)    • Cataract, bilateral    • Chest pain     STATES HAS OCCASSIONALLY.  STATES LAST TIME WAS LAST WEEK.  STATES SEES DR. RICH.  STATES HE HAS DONE NUMEROUS TESTS ON HER AND HAS NOT BEEN ABLE TO FIND OUT CAUSE.     • Chronic obstructive lung disease (CMS/HCC) 2008   • Colon cancer screening    • COPD (chronic obstructive pulmonary disease)  (CMS/HCC)    • Cystocele    • Depressed    • Depression 1980   • Fatigue     Chronic pafigue 2012   • Fibromyalgia 2008   • Full dentures    • GERD (gastroesophageal reflux disease)    • Gout    • Heartburn     Chronic historu of epigastric heartburn currently controlled on Prilesec 40 mg every morning along with Zantac 300 Mg daily at bedtime   • High cholesterol 2012   • Hip pain    • Hyperlipidemia    • Hypertension    • Insomnia    • Joint pain    • Kidney infection    • Loss of appetite    • Low back pain 1995   • Melena    • Nausea and vomiting    • On home oxygen therapy     2L/NC PRN (STATES SHE HAS BEEN USING CONTINUOSLY LATELY)   • Osteoarthritis 2010   • Pain in limb    • Palpitations    • Pinched nerve     Pinched nerves 2011   • Pneumonia    • Problems with swallowing     HAS TO EAT SLOW AND CHEW FOOD WELL   • Recurrent urinary tract infection    • Sciatica [id number]   • Shortness of breath    • Sinus problem    • Sleep apnea 1998    NO CPAP   • Upset stomach    • Valvular heart disease    • Vitamin B12 deficiency    • Wears glasses    • Weight loss, abnormal     Weight loss is stabel. On pund weight gain since 01/2016         Past Surgical History:  Past Surgical History:   Procedure Laterality Date   • ABDOMINAL HERNIA REPAIR  2016   • APPENDECTOMY  1965   • BACK SURGERY  2005   • BRONCHOSCOPY N/A 7/5/2018    Procedure: BRONCHOSCOPY w/ WASHINGS/BRUSHINGS with MAC;  Surgeon: Rebeka Esparza MD;  Location: [facility];  Service: Pulmonary   • CATARACT EXTRACTION Bilateral     Put in implants    • CHOLECYSTECTOMY  1985   • COLONOSCOPY     • ENDOSCOPY     • KNEE SURGERY Left 1979    Knee Cap   • TUBAL ABDOMINAL LIGATION  1971         Family History:  Family History   Problem Relation Age of Onset   • Ovarian cancer Mother    • Cancer Mother    • Lung cancer Father    • Heart disease Brother          Social History:  Social History     Socioeconomic History   • Marital status: Single     Spouse name: Not  "on file   • Number of children: Not on file   • Years of education: Not on file   • Highest education level: Not on file   Tobacco Use   • Smoking status: Former Smoker     Packs/day: 0.00     Years: 35.00     Pack years: 0.00     Last attempt to quit: 2017     Years since quittin.9   • Smokeless tobacco: Never Used   Substance and Sexual Activity   • Alcohol use: No   • Drug use: No   • Sexual activity: Defer         Physical Exam:  /56 (BP Location: Right arm, Patient Position: Lying)   Pulse 94   Temp 97.8 °F (36.6 °C) (Oral)   Resp 18   Ht 152.4 cm (60\")   Wt 42.3 kg (93 lb 4.1 oz)   LMP  (LMP Unknown)   SpO2 98%   BMI 18.21 kg/m² 2 lpm    Constitutional:             General: No significant respiratory distress noted.    Head/Face/Eyes:             No significant facial abnormalities seen.             Extra ocular movement was intact.             Pupils appeared equal    ENT:             Hearing was intact.             Dentures noted.              No nasal erythema noted.              Oropharynx was moist. No lesions noted.     Neck:             Supple. No JVD noted.              Thyroid gland did not seem to be enlarged    Cardiovascular:              S1 + S2. Regular.    Respiratory:            Respiratory effort was mildly labored.             Decreased Air Entry Bilaterally with scattered wheezes and crackles bilaterally.    Abdomen:            Soft.  Bowel sounds were positive.  No obvious organomegaly.    Extremities:            No edema noted.            No cyanosis noted            No clubbing noted            Gait could not be assessed at this time.    Neurologic/Psychiatric:             Affect appeared fair.             Awake, alert and oriented x 3.             Able to follow simple commands.             Appears anxious and somewhat tremulous.    Skin:             No rash noted.             Warm and dry          Labs: Reviewed. Pertinent labs were noted.     Lab Results "   Component Value Date    WBC 6.18 06/17/2019    HGB 11.8 (L) 06/17/2019    HCT 36.6 06/17/2019    MCV 96.6 06/17/2019     06/17/2019       Lab Results   Component Value Date    GLUCOSE 114 (H) 06/17/2019    CALCIUM 8.8 06/17/2019     06/17/2019    K 4.4 06/17/2019    CO2 26.0 06/17/2019     06/17/2019    BUN 17 06/17/2019    CREATININE 0.60 06/17/2019    EGFRIFNONA 98 06/17/2019    BCR 28.3 (H) 06/17/2019    ANIONGAP 12.4 06/17/2019       Lab Results   Component Value Date    PROBNP 178.0 (C) 06/16/2019    PROBNP 2,090.0 (C) 11/30/2018    PROBNP 327.0 (C) 07/10/2018       Lab Results   Component Value Date    INR 1.21 (H) 07/05/2018       Lab Results   Component Value Date    PROCALCITO <0.05 06/16/2019    PROCALCITO 0.14 12/02/2018    PROCALCITO 0.36 (H) 11/30/2018     Micro: As of June 17, 2019   Lab Results   Component Value Date    RESPCX Rejected 06/17/2019    RESPCX Light growth (2+) Normal Respiratory Ashly 11/30/2018    RESPCX Heavy growth (4+) Serratia marcescens (A) 07/05/2018            Imaging Study: Latest imaging studies was reviewed personally.       Imaging Results (last 72 hours)     Procedure Component Value Units Date/Time    CT Chest With Contrast [[id number]] Collected:  06/16/19 1619     Updated:  06/16/19 1620    Narrative:       FINAL REPORT    CLINICAL HISTORY:  new perihilar mass on xr    FINDINGS:  Multiple contiguous transaxial slices through the chest were  obtained after the intravenous ministration of contrast with  coronal reformatted images.  There is diffuse centrilobular  paraseptal emphysema with coronal reformatted images.  There is  diffuse centrilobular and paraseptal emphysema with fibrotic  scarring.  There is patchy airspace disease bilaterally,  greatest in the right middle lobe.  There is no discrete mass or  effusion.  There is no adenopathy.  There are degenerative  changes of the spine.  Gastric wall thickening suggesting  gastritis.  There is fluid  adjacent the spleen, nonspecific.  There is a large left renal cyst.  There are  Impression:  Bilateral pneumonia.  Recommend follow-up until resolution  COPD  Gastric wall thickening suggesting gastritis      Impression:       Authenticated by Sheridan Salcedo MD on 06/16/2019 04:19:46 PM    XR Chest 2 View [[id number]] Collected:  06/16/19 1435     Updated:  06/16/19 1503    Narrative:       XR CHEST 2 VIEWS-   06/16/2019 1:12 PM     HISTORY: Shortness of air triage protocol.     COMPARISON:  11/20/2018     FINDINGS: The cardiac silhouette is proper size. The aortic contours are  normal. The mediastinal and hilar structures are unremarkable. Bilateral  interstitial disease with minimal bibasilar scarring is stable from  prior. There is a new 16 mm opacity right suprahilar region only  identified on the PA view, possible small area of pneumonia with mass  not excluded. Follow-up 2 view chest x-ray in one to 2 weeks recommended  to document resolution.       Impression:       .IMPRESSION:   1. Stable bilateral interstitial disease with no new area of  consolidation.  2. New 16 mm right suprahilar opacity, recommend follow-up two-view  chest x-ray in 1-2 weeks to document resolution.     This report was finalized on 6/16/2019 2:42 PM by Antonietta Boss MD.            ECHO:  Results for orders placed during the hospital encounter of 11/30/18   Adult Transthoracic Echo Complete W/ Cont if Necessary Per Protocol    Narrative Technically difficult study   1) Normal LV systolic function ( EF is over 55%)  2) Normal left atrial size with normal LVedp  3) Aortic leaflets with mild to moderate AI   4) Mild TR with RVsp of 50 mm of hg   5) Borderline RV size with normal RV function          Assessment:  1.  Possible Pneumonia  2.  Bronchiectasis with acute exacerbation  3.  Chronic hypoxemia  4.  Severe COPD      Discussion/Recommendations:   In reviewing her symptoms, it appears that the patient has likely right-sided pneumonia  versus acute exacerbation of bronchiectasis.      We will start vest therapy.    I have advised the patient to continue flutter valve.     Continue IV steroids.  Nebulized treatments have been adjusted and Brovana was added.     We will follow her closely and recommend continuation of oxygen therapy for now.  It should be remembered that the patient is already on 2 L/min at home.    Her initial pro-calcitonin level was not elevated even though the airspace disease in the right base seems slightly worse compared to the CT scan from November 2018.    I do agree with antibiotics at this time especially given history of Serratia in July 2018.     She may need a bronchoscopy if there are no improvement in her symptoms over the next 24-48 hours.      The plan was discussed with the patient.  I have also discussed the case with the nursing staff.    Recommendations were also discussed with the referring provider.     I would like to thank you for the opportunity to participate in the care of this patient.        This document was electronically signed by Rebeka Esparza MD on 06/17/19 at 9:09 AM

## 2019-06-17 NOTE — PLAN OF CARE
Problem: Patient Care Overview  Goal: Plan of Care Review  Outcome: Ongoing (interventions implemented as appropriate)   06/17/19 0334   Coping/Psychosocial   Plan of Care Reviewed With patient   Plan of Care Review   Progress improving   OTHER   Outcome Summary rested well, VSS, continue solumedrol, nebs, and abx's.      Goal: Individualization and Mutuality  Outcome: Ongoing (interventions implemented as appropriate)    Goal: Discharge Needs Assessment  Outcome: Ongoing (interventions implemented as appropriate)      Problem: Pain, Chronic (Adult)  Goal: Identify Related Risk Factors and Signs and Symptoms  Outcome: Outcome(s) achieved Date Met: 06/17/19    Goal: Acceptable Pain/Comfort Level and Functional Ability  Outcome: Ongoing (interventions implemented as appropriate)      Problem: Pneumonia (Adult)  Goal: Signs and Symptoms of Listed Potential Problems Will be Absent, Minimized or Managed (Pneumonia)  Outcome: Ongoing (interventions implemented as appropriate)      Problem: Hospitalized Acutely Ill Older (Adult)  Goal: Signs and Symptoms of Listed Potential Problems Will be Absent, Minimized or Managed (Hospitalized Acutely Ill Older)  Outcome: Ongoing (interventions implemented as appropriate)

## 2019-06-17 NOTE — PROGRESS NOTES
Malnutrition Severity Assessment    Patient Name:  Joelle Yee  YOB: 1945  MRN: [id number]  Admit Date:  6/16/2019    Patient meets criteria for : (mild)    Comments:  Rec. #1: Consider adding high calorie, high protein to current diet along with low purine. RD obtained pt. Food/supplement preferences. RD added Boost Plus and Boost Pudding per pt. Request BID. Rec. #2: Continue with MVI. Rec. #3: Consider adding appetite stimulant. RD to follow pt. Consult RD PRN.     Malnutrition Severity Assessment  Malnutrition Type: Chronic Disease - Related Malnutrition     Malnutrition Type (last 8 hours)      Malnutrition Severity Assessment     Row Name 06/17/19 1418       Malnutrition Severity Assessment    Malnutrition Type  Chronic Disease - Related Malnutrition    Row Name 06/17/19 1418       Muscle Loss    Loss of Muscle Mass Findings  Mild    [place] Region  -- mild    Clavicle Bone Region  -- mild    Patellar Region  -- mild    Anterior Thigh Region  -- mild    Posterior Calf Region  -- mild    Row Name 06/17/19 1418       Fat Loss    Subcutaneous Fat Loss Findings  Mild    Orbital Region   -- mild    Upper Arm Region  -- mild    Row Name 06/17/19 1418       Criteria Met (Must meet criteria for severity in at least 2 of these categories: M Wasting, Fat Loss, Fluid, Secondary Signs, Wt. Status, Intake)    Patient meets criteria for   -- mild          Electronically signed by:  Deirdre Harrison RD  06/17/19 2:22 PM

## 2019-06-17 NOTE — THERAPY EVALUATION
Acute Care - Occupational Therapy Initial Evaluation  Psychiatric     Patient Name: Joelle Yee  : 1945  MRN: [id number]  Today's Date: 2019  Onset of Illness/Injury or Date of Surgery: 19  Date of Referral to OT: 19  Referring Physician: Dr. Hinds    Admit Date: 2019       ICD-10-CM ICD-9-CM   1. Pneumonia of both lungs due to infectious organism, unspecified part of lung J18.9 483.8   2. Chronic obstructive pulmonary disease, unspecified COPD type (CMS/HCC) J44.9 496   3. Impaired functional mobility, balance, gait, and endurance Z74.09 V49.89   4. Impaired mobility and ADLs Z74.09 799.89     Patient Active Problem List   Diagnosis   • Dyspepsia   • Esophageal reflux   • Anxiety   • High cholesterol   • Bipolar disorder (CMS/[facility])   • COPD (chronic obstructive pulmonary disease) (CMS/[facility])   • Depression   • Gout   • Hyperlipidemia   • Insomnia   • Osteoarthritis   • Sciatica   • Sleep apnea   • Vitamin B 12 deficiency   • Pancolitis (CMS/[facility])   • Acute cystitis without hematuria   • C. difficile colitis   • Chronic bronchitis with COPD (chronic obstructive pulmonary disease) (CMS/[facility])   • Pneumonia of right lower lobe due to infectious organism (CMS/[facility])   • COPD exacerbation (CMS/[facility])   • Pneumonia involving right lung   • COPD with acute exacerbation (CMS/[facility])   • Chronic respiratory failure with hypoxia (CMS/[facility])   • Abdominal pain   • Diarrhea   • Heartburn   • Loss of appetite   • Melena   • Nausea and vomiting   • Pseudogout   • Upset stomach   • Abnormal weight loss   • Chronic obstructive pulmonary disease with acute lower respiratory infection (CMS/[facility])   • Right upper lobe pneumonia (CMS/[facility])   • Acute on chronic respiratory failure with hypoxia (CMS/[facility])   • Moderate malnutrition (CMS/[facility])   • Acute exacerbation of chronic obstructive pulmonary disease (COPD) (CMS/[facility])   • Bronchiectasis without complication (CMS/[facility])   • Pneumonia due to gram-negative bacteria  (CMS/HCC)   • Sinus tachycardia   • Leukocytosis   • Serratia marcescens infection (CMS/HCC)   • Pneumonia of both lower lobes due to infectious organism (CMS/HCC)   • Acute kidney injury (CMS/HCC)   • BMI less than 19,adult   • Anemia   • Pneumonia of both lungs due to infectious organism   • COPD with acute exacerbation (CMS/HCC)     Past Medical History:   Diagnosis Date   • Abdominal pain    • Acute bronchitis    • Ankle pain    • Anxiety 1980   • Atopic dermatitis    • Bronchiectasis (CMS/[facility])    • Cataract, bilateral    • Chest pain     STATES HAS OCCASSIONALLY.  STATES LAST TIME WAS LAST WEEK.  STATES SEES DR. RICH.  STATES HE HAS DONE NUMEROUS TESTS ON HER AND HAS NOT BEEN ABLE TO FIND OUT CAUSE.     • Chronic obstructive lung disease (CMS/HCC) 2008   • Colon cancer screening    • COPD (chronic obstructive pulmonary disease) (CMS/[facility])    • Cystocele    • Depressed    • Depression 1980   • Fatigue     Chronic pafigue 2012   • Fibromyalgia 2008   • Full dentures    • GERD (gastroesophageal reflux disease)    • Gout    • Heartburn     Chronic historu of epigastric heartburn currently controlled on Prilesec 40 mg every morning along with Zantac 300 Mg daily at bedtime   • High cholesterol 2012   • Hip pain    • Hyperlipidemia    • Hypertension    • Insomnia    • Joint pain    • Kidney infection    • Loss of appetite    • Low back pain 1995   • Melena    • Nausea and vomiting    • On home oxygen therapy     2L/NC PRN (STATES SHE HAS BEEN USING CONTINUOSLY LATELY)   • Osteoarthritis 2010   • Pain in limb    • Palpitations    • Pinched nerve     Pinched nerves 2011   • Pneumonia    • Problems with swallowing     HAS TO EAT SLOW AND CHEW FOOD WELL   • Recurrent urinary tract infection    • Sciatica [id number]   • Shortness of breath    • Sinus problem    • Sleep apnea 1998    NO CPAP   • Upset stomach    • Valvular heart disease    • Vitamin B12 deficiency    • Wears glasses    • Weight loss, abnormal     Weight  loss is stabel. On pund weight gain since 01/2016     Past Surgical History:   Procedure Laterality Date   • ABDOMINAL HERNIA REPAIR  2016   • APPENDECTOMY  1965   • BACK SURGERY  2005   • BRONCHOSCOPY N/A 7/5/2018    Procedure: BRONCHOSCOPY w/ WASHINGS/BRUSHINGS with MAC;  Surgeon: Rebeka Esparza MD;  Location: [facility] OR;  Service: Pulmonary   • CATARACT EXTRACTION Bilateral     Put in implants    • CHOLECYSTECTOMY  1985   • COLONOSCOPY     • ENDOSCOPY     • KNEE SURGERY Left 1979    Knee Cap   • TUBAL ABDOMINAL LIGATION  1971          OT ASSESSMENT FLOWSHEET (last 12 hours)      Occupational Therapy Evaluation     Row Name 06/17/19 1023                   OT Evaluation Time/Intention    Subjective Information  complains of;pain  -        Document Type  evaluation  -        Mode of Treatment  occupational therapy  -        Patient Effort  good  -        Symptoms Noted During/After Treatment  shortness of breath  -        Comment  Pt on 2L O2 via nc during session  -           General Information    Patient Profile Reviewed?  yes  -        Onset of Illness/Injury or Date of Surgery  06/16/19  -        Referring Physician  Dr. Hinds  -        Patient Observations  alert;cooperative;agree to therapy  -        Patient/Family Observations  Pt alone  -        General Observations of Patient  Pt received sitting at eob on 2L O2 via nc  -        Prior Level of Function  independent:;all household mobility;dressing;min assist:;bathing  -        Equipment Currently Used at Home  rollator;oxygen;shower chair;walker, standard;hospital bed;grab bar  -        Pertinent History of Current Functional Problem  B pneumonia, bronchiectasis, COPd on 2L O2, depression, HTN, HLD, gout, GERD, OA  -        Existing Precautions/Restrictions  fall;oxygen therapy device and L/min  -        Risks Reviewed  patient:;increased discomfort  -        Benefits Reviewed  patient:;improve function;increase  independence;increase strength  -           Relationship/Environment    Primary Source of Support/Comfort  child(cain);extended family  -        Lives With  alone  -           Resource/Environmental Concerns    Current Living Arrangements  home/apartment/condo  -           Home Main Entrance    Number of Stairs, Main Entrance  one  -        Stairs Comment, Main Entrance  at back door, pt doesn't normally go that way  -           Cognitive Assessment/Intervention- PT/OT    Affect/Mental Status (Cognitive)  [facility]  -        Orientation Status (Cognition)  oriented x 4  -        Follows Commands (Cognition)  WFL  -        Cognitive Function (Cognitive)  [facility]  -        Safety Deficit (Cognitive)  awareness of need for assistance  -        Personal Safety Interventions  fall prevention program maintained;gait belt;nonskid shoes/slippers when out of bed  -           Safety Issues, Functional Mobility    Impairments Affecting Function (Mobility)  endurance/activity tolerance;shortness of breath  -           Bed Mobility Assessment/Treatment    Bed Mobility Assessment/Treatment  bed mobility (all) activities  -        [place] Level (Bed Mobility)  conditional independence  -        Assistive Device (Bed Mobility)  head of bed elevated  -           Functional Mobility    Functional Mobility- Ind. Level  contact guard assist  -        Functional Mobility-Distance (Feet)  3  -        Functional Mobility- Safety Issues  supplemental O2  -           Transfer Assessment/Treatment    Transfer Assessment/Treatment  sit-stand transfer;stand-sit transfer  -           Sit-Stand Transfer    Sit-Stand [place] (Transfers)  supervision  -           Stand-Sit Transfer    Stand-Sit [place] (Transfers)  supervision  -           ADL Assessment/Intervention    BADL Assessment/Intervention  bathing;upper body dressing;lower body dressing;grooming;feeding;toileting  -           Bathing  Assessment/Intervention    Bathing [place] Level  minimum assist (75% patient effort)  -           Upper Body Dressing Assessment/Training    Upper Body Dressing [place] Level  set up  -           Lower Body Dressing Assessment/Training    Lower Body Dressing [place] Level  set up  -           Grooming Assessment/Training    [place] Level (Grooming)  set up  -           Self-Feeding Assessment/Training    [place] Level (Feeding)  set up  -           Toileting Assessment/Training    [place] Level (Toileting)  supervision  -           BADL Safety/Performance    Impairments, BADL Safety/Performance  endurance/activity tolerance;shortness of breath;strength  -           General ROM    GENERAL ROM COMMENTS  [facility] WFL  -           MMT (Manual Muscle Testing)    General MMT Comments  [facility] 4-/5  -           Positioning and Restraints    Pre-Treatment Position  in bed  -        Post Treatment Position  chair  -        In Chair  sitting;call light within reach;encouraged to call for assist  -           Pain Assessment    Additional Documentation  Pain Scale: Numbers Pre/Post-Treatment (Group)  -           Pain Scale: Numbers Pre/Post-Treatment    Pain Scale: Numbers, Pretreatment  7/10  -        Pain Scale: Numbers, Post-Treatment  7/10  -        Pain Location - Orientation  generalized  -        Pre/Post Treatment Pain Comment  Pt reports always having pain ~7/10  -AH        Pain Intervention(s)  Repositioned;Ambulation/increased activity  -           Coping    Observed Emotional State  accepting;cooperative  -        Verbalized Emotional State  acceptance  -           Plan of Care Review    Plan of Care Reviewed With  patient  -           Clinical Impression (OT)    Date of Referral to OT  06/16/19  -        OT Diagnosis  generalized weakness  -        Patient/Family Goals Statement (OT Eval)  Pt wants to d/c home  -        Criteria for Skilled  Therapeutic Interventions Met (OT Eval)  yes;treatment indicated  -        Rehab Potential (OT Eval)  good, to achieve stated therapy goals  -        Therapy Frequency (OT Eval)  3 times/wk  -        Care Plan Review (OT)  evaluation/treatment results reviewed;patient/other agree to care plan  -        Anticipated Discharge Disposition (OT)  home with home health  -           Vital Signs    Pre SpO2 (%)  95  -AH        O2 Delivery Pre Treatment  supplemental O2  -AH        Intra SpO2 (%)  91  -AH        O2 Delivery Intra Treatment  supplemental O2  -AH        Post SpO2 (%)  95  -AH        O2 Delivery Post Treatment  supplemental O2  -AH           OT Goals    Transfer Goal Selection (OT)  transfer, OT goal 1  -        Dressing Goal Selection (OT)  dressing, OT goal 1  -AH        Toileting Goal Selection (OT)  toileting, OT goal 1  -AH        Strength Goal Selection (OT)  strength, OT goal 1  -        Functional Mobility Goal Selection (OT)  functional mobility, OT goal 1  -        Additional Documentation  Strength Goal Selection (OT) (Row);Functional Mobility Selection (OT) (Row)  -           Transfer Goal 1 (OT)    Activity/Assistive Device (Transfer Goal 1, OT)  sit-to-stand/stand-to-sit  -        [place] Level/Cues Needed (Transfer Goal 1, OT)  independent  -        Time Frame (Transfer Goal 1, OT)  by discharge  -        Progress/Outcome (Transfer Goal 1, OT)  goal ongoing  -           Dressing Goal 1 (OT)    Activity/Assistive Device (Dressing Goal 1, OT)  lower body dressing  -        [place]/Cues Needed (Dressing Goal 1, OT)  supervision required  -        Time Frame (Dressing Goal 1, OT)  by discharge  -        Progress/Outcome (Dressing Goal 1, OT)  goal ongoing  -           Toileting Goal 1 (OT)    Activity/Device (Toileting Goal 1, OT)  adjust/manage clothing;perform perineal hygiene  -        [place] Level/Cues Needed (Toileting Goal 1, OT)  conditional  independence  -        Time Frame (Toileting Goal 1, OT)  by discharge  -        Progress/Outcome (Toileting Goal 1, OT)  goal ongoing  -           Strength Goal 1 (OT)    Strength Goal 1 (OT)  Ptwill perform UB strengthening ex using theraband for resistance  -        Time Frame (Strength Goal 1, OT)  by discharge  -        Progress/Outcome (Strength Goal 1, OT)  goal ongoing  -           Functional Mobility Goal 1 (OT)    [place] Level/Cues Needed (Functional Mobility Goal 1, OT)  supervision required  -        Distance Goal 1 (Functional Mobility, OT)  50  -        Time Frame (Functional Mobility Goal 1, OT)  by discharge  -        Progress/Outcome (Functional Mobility Goal 1, OT)  goal ongoing  -           Living Environment    Home Accessibility  stairs to enter home  -          User Key  (r) = Recorded By, (t) = Taken By, (c) = Cosigned By    Initials Name Effective Dates    Jonna Oneill 03/07/18 -          Occupational Therapy Education     Title: PT OT SLP Therapies (In Progress)     Topic: Occupational Therapy (In Progress)     Point: ADL training (Done)     Description: Instruct learner(s) on proper safety adaptation and remediation techniques during self care or transfers.   Instruct in proper use of assistive devices.    Learning Progress Summary           Patient Acceptance, E,TB, VU by  at 6/17/2019  1:31 PM    Comment:  Role of OT/POC                               User Key     Initials Effective Dates Name Provider Type Discipline     03/07/18 -  Jonna Callahan Occupational Therapist OT                  OT Recommendation and Plan  Outcome Summary/Treatment Plan (OT)  Anticipated Discharge Disposition (OT): home with home health  Therapy Frequency (OT Eval): 3 times/wk  Plan of Care Review  Plan of Care Reviewed With: patient  Plan of Care Reviewed With: patient  Outcome Summary: OT evaluation completed today.  Pt presents with weakness and decreased endurance for  self care and funcitonal mobility tasks.  Pt is expected to benefit from skilled OT to improve her strength and endurance to functional tasks.    Outcome Measures     Row Name 06/17/19 1023             How much help from another is currently needed...    Putting on and taking off regular lower body clothing?  3  -AH      Bathing (including washing, rinsing, and drying)  3  -AH      Toileting (which includes using toilet bed pan or urinal)  3  -AH      Putting on and taking off regular upper body clothing  4  -AH      Taking care of personal grooming (such as brushing teeth)  4  -AH      Eating meals  4  -      Score  21  -         Functional Assessment    Outcome Measure Options  AM-PAC 6 Clicks Daily Activity (OT)  -        User Key  (r) = Recorded By, (t) = Taken By, (c) = Cosigned By    Initials Name Provider Type    Jonna Oneill Occupational Therapist          Time Calculation:   Time Calculation- OT     Row Name 06/17/19 1332             Time Calculation- OT    OT Start Time  1023  -      OT Received On  06/17/19  -      OT Goal Re-Cert Due Date  06/27/19  -        User Key  (r) = Recorded By, (t) = Taken By, (c) = Cosigned By    Initials Name Provider Type    Jonna Oneill Occupational Therapist        Therapy Charges for Today     Code Description Service Date Service Provider Modifiers Qty    [id number]  OT EVAL LOW COMPLEXITY 3 6/17/2019 Jonna Callahan GO 1               Jonna Callahan  6/17/2019

## 2019-06-17 NOTE — PROGRESS NOTES
PROGRESS NOTE        Date of Admission: 6/16/2019  Length of Stay: 1  Primary Care Physician: Blanquita Clements PA    Subjective   Chief Complaint: Follow up pneumonia    HPI:   This is a 73-year-old female with history of bronchiectasis, COPD on 2 L nasal cannula, chronic hypoxic respiratory failure, depression, hypertension who had recently completed a course of amoxicillin and steroids without any improvement.  She did follow that up with a 7-day course of Levaquin.  She came into the emergency room with cough and wheeze.  According to the patient she did have productive sputum of all colors.  She has been using a flutter valve at home progressive therapy without any improvement.  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  He did have a chest x-ray for which she was noted to have bilateral interstitial disease with no new areas of consolidation.  She does have a new 16 mm right suprahilar opacity correct they recommend a follow-up chest x-ray in 1 to 2 weeks.  CT scan of the chest did show bilateral pneumonia however.  She also was noted to have some gastric wall thickening suggestive of gastritis.  Dr. Esparza with pulmonology has been consulted.  I did see and evaluate patient at bedside.  She is still complaining of cough and congestion.  Shortness of breath is stable.  She denies any abdominal pain, nausea or vomiting.        Review Of Systems:   Review of Systems  General ROS: Patient denies any fevers, chills or loss of consciousness.    ENT ROS: Denies sore throat, nasal congestion or ear pain.   Hematological and Lymphatic ROS: Denies neck swelling or easy bleeding.  Endocrine ROS: Denies any recent unintentional weight gain or loss.  Respiratory ROS: Denies cough or shortness of breath.   Cardiovascular ROS: Denies chest pain or palpitations. No history of exertional chest pain.   Gastrointestinal ROS: Denies nausea and vomiting. Denies any abdominal pain. No diarrhea.  Genito-Urinary  ROS: Denies dysuria or hematuria.  Musculoskeletal ROS: Denies back pain. No muscle pain. No calf pain.   Neurological ROS: Denies any focal weakness. No loss of consciousness. Denies any numbness. Denies neck pain.   Dermatological ROS: Denies any redness or pruritis.    Objective      Temp:  [97.8 °F (36.6 °C)-98 °F (36.7 °C)] 97.8 °F (36.6 °C)  Heart Rate:  [] 94  Resp:  [16-20] 18  BP: ()/(53-73) 110/56  Physical Exam    General Appearance:  Alert and cooperative, not in any acute distress.   Head:  Atraumatic and normocephalic, without obvious abnormality.   Eyes:          PERRLA, conjunctivae and sclerae normal, no Icterus. No pallor. Extraocular movements are within normal limits.   Ears:  Ears appear intact with no abnormalities noted.   Throat: No oral lesions, no thrush, oral mucosa moist.   Neck: Supple, trachea midline, no thyromegaly, no carotid bruit.       Lungs:   Chest shape is normal. Breath sounds heard bilaterally equally.  Bilateral crackles or wheezing. No Pleural rub or bronchial breathing.   Heart:  Normal S1 and S2, no murmur, no gallop, no rub. No JVD   Abdomen:   Normal bowel sounds, no masses, no organomegaly. Soft    non-tender, non-distended, no guarding, no rebound             tenderness   Extremities: Moves all extremities well, no edema, no cyanosis, no clubbing.   Pulses: Pulses palpable and equal bilaterally   Skin: No bleeding, bruising or rash       Neurologic:    Psychiatric/Behavior:     Cranial nerves 2 - 12 grossly intact, sensation intact, Motor power is normal and equal bilaterally.  Mood normal, behavior normal       Results Review:    I have reviewed the labs, radiology results and diagnostic studies.    Results from last 7 days   Lab Units 06/17/19  0544   WBC 10*3/mm3 6.18   HEMOGLOBIN g/dL 11.8*   PLATELETS 10*3/mm3 310     Results from last 7 days   Lab Units 06/17/19  0544   SODIUM mmol/L 137   POTASSIUM mmol/L 4.4   CO2 mmol/L 26.0   CREATININE mg/dL  0.60   GLUCOSE mg/dL 114*       Culture Data:   Blood Culture   Date Value Ref Range Status   06/16/2019 No growth at less than 24 hours  Preliminary   06/16/2019 No growth at less than 24 hours  Preliminary     Respiratory Culture   Date Value Ref Range Status   06/17/2019 Rejected  Final     Radiology Data:   Cardiology Data:    I have reviewed the medications.    Assessment/Plan     Assessment and Plan:  1.  Bilateral community-acquired pneumonia present on admission-patient is on IV antibiotics in the form of Rocephin and azithromycin.  She is also on bronchodilators IV steroids and mucolytic's.  Dr. Esparza with pulmonology has been consulted.  Sputum culture was rejected due to microscopic exam of Gram stain..  Procalcitonin is normal yesterday however repeat has been ordered for today..    2.  COPD with exacerbation present on admission    3.  Depression    4.  Chronic essential hypertension-blood pressure stable continue to monitor    5.  Dyslipidemia    6.  Gout    7.  Gastroesophageal reflux        DVT prophylaxis:  lovenox  Discharge Planning:   DWAYNE Bhatt 06/17/19 10:52 AM

## 2019-06-17 NOTE — CONSULTS
Discharge Planning Assessment  [facility]     Patient Name: Joelle Yee  MRN: [id number]  Today's Date: 6/17/2019    Admit Date: 6/16/2019    Discharge Needs Assessment     Row Name 06/17/19 7408       Living Environment    Lives With  alone    Current Living Arrangements  home/apartment/condo    Primary Care Provided by  self;homecare agency [facility]    Provides Primary Care For  no one    Family Caregiver if Needed  child(cain), adult;grandchild(cain), adult;sibling(s)    Quality of Family Relationships  helpful;involved    Able to Return to Prior Arrangements  yes       Resource/Environmental Concerns    Resource/Environmental Concerns  none       Transition Planning    Patient/Family Anticipates Transition to  home with help/services    Patient/Family Anticipated Services at Transition  none    Transportation Anticipated  health plan transportation;family or friend will provide       Discharge Needs Assessment    Readmission Within the Last 30 Days  no previous admission in last 30 days    Concerns to be Addressed  no discharge needs identified;denies needs/concerns at this time    Equipment Currently Used at Home  grab bar;hospital bed;nebulizer;oxygen;walker, standard;respiratory supplies;hospital bed    Anticipated Changes Related to Illness  none    Equipment Needed After Discharge  none    Outpatient/Agency/Support Group Needs  homecare agency (Uses [facility] currently)         Discharge Plan     Row Name 06/17/19 6616       Plan    Plan Comments  SW met with pt in room for discharge planning. Pt lives home alone, has brother who lives close by, grandchildren that check on her often. Pt states [facility] comes 3x a week for bathing and 2x a week for house work/cleaning. She also has a  through [employer], that see's her at home. Pt has O2 2L NC Cont. at home provided by [employer], verified with Kelle at office. Has multiple grab bars throughout home (shower & toilet), also has percussion machine,  walker (if needed), rails and a hospital bed. PCP verified, no issues with transportation to appointments. Plans to return home after discharge. CM/SW will continue to follow and assist as needed, no needs expressed at this time.         Destination      No service coordination in this encounter.      Durable Medical Equipment      No service coordination in this encounter.      Dialysis/Infusion      No service coordination in this encounter.      Home Medical Care      No service coordination in this encounter.      Therapy      No service coordination in this encounter.      Community Resources      No service coordination in this encounter.          Demographic Summary     Row Name 06/17/19 1406       General Information    Admission Type  inpatient    Arrived From  emergency department;home    Referral Source  admission list    Reason for Consult  discharge planning    Preferred Language  English        Functional Status     Row Name 06/17/19 1405       Functional Status    Usual Activity Tolerance  good    Current Activity Tolerance  moderate       Functional Status, IADL    Medications  independent    Meal Preparation  independent    Housekeeping  assistive person    Laundry  assistive person    Shopping  assistive person        Psychosocial     Row Name 06/17/19 1400       Coping/Stress    Major Change/Loss/Stressor  illness    Patient Personal Strengths  expressive of needs;resourceful;strong support system    Sources of Support  adult child(cain);sibling(s);friend(s)    Reaction to Health Status  adjusting;realistic       Developmental Stage (Eriksson's)    Developmental Stage  Stage 8 (65 years-death/Late Adulthood) Integrity vs. Despair       C-SSRS (Recent)    Wish to be Dead  no    Suicidal Thoughts  no    Suicide Behavior  no       Violence Risk    Feels Like Hurting Others  no    Previous Attempt to Harm Others  no        Abuse/Neglect     Row Name 06/17/19 1401       Personal Safety    Feels Unsafe  at Home or Work/School  no    Feels Threatened by Someone  no    Does Anyone Try to Keep You From Having Contact with Others or Doing Things Outside Your Home?  no    Physical Signs of Abuse Present  no        Legal     Row Name 06/17/19 1405       Financial/Legal    Financial/Environmental Concerns  -- none        Substance Abuse    No documentation.       Patient Forms    No documentation.           JONATHAN William  2:16 PM  06/17/19

## 2019-06-17 NOTE — PROGRESS NOTES
Adult Nutrition  Assessment/PES    Patient Name:  Joelle Yee  YOB: 1945  MRN: [id number]  Admit Date:  6/16/2019    Assessment Date:  6/17/2019    Comments:  Rec. #1: Consider adding high calorie, high protein to current diet along with low purine. RD obtained pt. Food/supplement preferences. RD added Boost Plus and Boost Pudding per pt. Request BID. Rec. #2: Continue with MVI. Rec. #3: Consider adding appetite stimulant. RD to follow pt. Consult RD PRN.            Reason for Assessment     Row Name 06/17/19 1412          Reason for Assessment    Reason For Assessment  diagnosis/disease state     Diagnosis  pulmonary disease;cardiac disease;other (see comments);gastrointestinal disease PNA, Chronic Bronchitis, COPD, HTN, Gout, GERD     Identified At Risk by Screening Criteria  BMI             Labs/Tests/Procedures/Meds     Row Name 06/17/19 1414          Labs/Procedures/Meds    Lab Results Reviewed  reviewed, pertinent     Lab Results Comments  High: Gluc        Medications    Pertinent Medications Reviewed  reviewed, pertinent     Pertinent Medications Comments  MVI         Physical Findings     Row Name 06/17/19 1414          Physical Findings    Overall Physical Appearance  underweight         Estimated/Assessed Needs     Row Name 06/17/19 1417 06/17/19 1414       Calculation Measurements    Weight Used For Calculations  45.9 kg (101 lb 1.7 oz)  45.9 kg (101 lb 1.7 oz)       Estimated/Assessed Needs    Additional Documentation  Gray-St. Jeor Equation (Group);Calorie Requirements (Group);Fluid Requirements (Group);Protein Requirements (Group)  Gray-St. Jeor Equation (Group);Calorie Requirements (Group);Fluid Requirements (Group);Protein Requirements (Group)       Calorie Requirements    Estimated Calorie Requirement Comment  ~8425-4040  --       Gray-St. Jeor Equation    RMR (Gray-St. Jeor Equation)  885.1  885.1       Protein Requirements    Est Protein Requirement Amount  (gms/kg)  1.5 gm protein ~55-68 gm/day  --    Estimated Protein Requirements (gms/day)  68.79  --       Fluid Requirements    Estimated Fluid Requirement Method  Luiz-Sharon Formula  --    Luiz-Segar Method (over 20 kg)  2417.2  2417.2        Nutrition Prescription Ordered     Row Name 06/17/19 1418          Nutrition Prescription PO    Current PO Diet  Regular         Evaluation of Received Nutrient/Fluid Intake     Row Name 06/17/19 1418 06/17/19 1417       Calculation Measurements    Weight Used For Calculations  --  45.9 kg (101 lb 1.7 oz)       PO Evaluation    Number of Days PO Intake Evaluated  Insufficient Data  --    Row Name 06/17/19 1414          Calculation Measurements    Weight Used For Calculations  45.9 kg (101 lb 1.7 oz)         Evaluation of Prescribed Nutrient/Fluid Intake     Row Name 06/17/19 1417 06/17/19 1414       Calculation Measurements    Weight Used For Calculations  45.9 kg (101 lb 1.7 oz)  45.9 kg (101 lb 1.7 oz)        Malnutrition Severity Assessment     Row Name 06/17/19 1418          Malnutrition Severity Assessment    Malnutrition Type  Chronic Disease - Related Malnutrition        Muscle Loss    Loss of Muscle Mass Findings  Mild     [place] Region  -- mild     Clavicle Bone Region  -- mild     Patellar Region  -- mild     Anterior Thigh Region  -- mild     Posterior Calf Region  -- mild        Fat Loss    Subcutaneous Fat Loss Findings  Mild     Orbital Region   -- mild     Upper Arm Region  -- mild        Criteria Met (Must meet criteria for severity in at least 2 of these categories: M Wasting, Fat Loss, Fluid, Secondary Signs, Wt. Status, Intake)    Patient meets criteria for   -- mild           Problem/Interventions:  Problem 1     Row Name 06/17/19 1419          Nutrition Diagnoses Problem 1    Problem 1  Underweight     Etiology (related to)  Factors Affecting Nutrition     Appetite  Other suspect poor PTA     Signs/Symptoms (evidenced by)  BMI;% IBW     BMI  18 -  18.9     Percent (%) IBW  92.18 %         Problem 2     Row Name 06/17/19 1420          Nutrition Diagnoses Problem 2    Problem 2  Increased Nutrient Needs     Macronutrient  Kcal;Protein     Etiology (related to)  Medical Diagnosis     Pulmonary/Critical Care  Pneumonia;COPD     Signs/Symptoms (evidenced by)  Other (comment) pulmonary dysfunction               Intervention Goal     Row Name 06/17/19 1420          Intervention Goal    General  Meet nutritional needs for age/condition     PO  PO intake (%)     PO Intake %  -- 50-75     Weight  Appropriate weight gain         Nutrition Intervention     Row Name 06/17/19 1420          Nutrition Intervention    RD/Tech Action  Follow Tx progress;Encourage intake;Interview for preference;Advise available snack;Advise alternate selection;Recommend/ordered;Supplement provided     Recommended/Ordered  Supplement         Nutrition Prescription     Row Name 06/17/19 1420          Nutrition Prescription PO    PO Prescription  Begin/change diet;Begin/change supplement     Begin/Change Diet to  Regular     Supplement  Boost Plus;Boost Pudding     Supplement Frequency  2 times a day     Other Modifiers  Low purine;High protein/high calorie     New PO Prescription Ordered?  No, recommended        Other Orders    Other  consider adding appetite stimulant, continue MVI         Education/Evaluation     Row Name 06/17/19 1421          Education    Education  Will Instruct as appropriate        Monitor/Evaluation    Monitor  Per protocol;PO intake;Supplement intake;Pertinent labs;Weight           Electronically signed by:  Deirdre Harrison RD  06/17/19 2:23 PM

## 2019-06-18 VITALS
SYSTOLIC BLOOD PRESSURE: 130 MMHG | TEMPERATURE: 98.3 F | HEIGHT: 60 IN | BODY MASS INDEX: 18.31 KG/M2 | OXYGEN SATURATION: 97 % | HEART RATE: 82 BPM | RESPIRATION RATE: 18 BRPM | DIASTOLIC BLOOD PRESSURE: 70 MMHG | WEIGHT: 93.25 LBS

## 2019-06-18 PROBLEM — R91.1 LUNG NODULE: Status: ACTIVE | Noted: 2019-06-18

## 2019-06-18 LAB
ANION GAP SERPL CALCULATED.3IONS-SCNC: 14.4 MMOL/L (ref 10–20)
BUN BLD-MCNC: 17 MG/DL (ref 7–20)
BUN/CREAT SERPL: 28.3 (ref 7.1–23.5)
CALCIUM SPEC-SCNC: 9 MG/DL (ref 8.4–10.2)
CHLORIDE SERPL-SCNC: 103 MMOL/L (ref 98–107)
CO2 SERPL-SCNC: 26 MMOL/L (ref 26–30)
CREAT BLD-MCNC: 0.6 MG/DL (ref 0.6–1.3)
DEPRECATED RDW RBC AUTO: 56.6 FL (ref 37–54)
ERYTHROCYTE [DISTWIDTH] IN BLOOD BY AUTOMATED COUNT: 16.3 % (ref 12.3–15.4)
GFR SERPL CREATININE-BSD FRML MDRD: 98 ML/MIN/1.73
GLUCOSE BLD-MCNC: 131 MG/DL (ref 74–98)
HCT VFR BLD AUTO: 36.8 % (ref 34–46.6)
HGB BLD-MCNC: 12 G/DL (ref 12–15.9)
MCH RBC QN AUTO: 31.3 PG (ref 26.6–33)
MCHC RBC AUTO-ENTMCNC: 32.6 G/DL (ref 31.5–35.7)
MCV RBC AUTO: 95.8 FL (ref 79–97)
PLATELET # BLD AUTO: 348 10*3/MM3 (ref 140–450)
PMV BLD AUTO: 10 FL (ref 6–12)
POTASSIUM BLD-SCNC: 4.4 MMOL/L (ref 3.5–5.1)
RBC # BLD AUTO: 3.84 10*6/MM3 (ref 3.77–5.28)
SODIUM BLD-SCNC: 139 MMOL/L (ref 137–145)
WBC NRBC COR # BLD: 19.81 10*3/MM3 (ref 3.4–10.8)

## 2019-06-18 PROCEDURE — 97535 SELF CARE MNGMENT TRAINING: CPT

## 2019-06-18 PROCEDURE — 97110 THERAPEUTIC EXERCISES: CPT

## 2019-06-18 PROCEDURE — 85027 COMPLETE CBC AUTOMATED: CPT | Performed by: NURSE PRACTITIONER

## 2019-06-18 PROCEDURE — 99232 SBSQ HOSP IP/OBS MODERATE 35: CPT | Performed by: NURSE PRACTITIONER

## 2019-06-18 PROCEDURE — 80048 BASIC METABOLIC PNL TOTAL CA: CPT | Performed by: NURSE PRACTITIONER

## 2019-06-18 PROCEDURE — 99238 HOSP IP/OBS DSCHRG MGMT 30/<: CPT | Performed by: NURSE PRACTITIONER

## 2019-06-18 PROCEDURE — 94799 UNLISTED PULMONARY SVC/PX: CPT

## 2019-06-18 PROCEDURE — 25010000002 METHYLPREDNISOLONE PER 40 MG: Performed by: HOSPITALIST

## 2019-06-18 RX ORDER — FLUCONAZOLE 100 MG/1
100 TABLET ORAL EVERY 24 HOURS
Qty: 2 TABLET | Refills: 0 | Status: SHIPPED | OUTPATIENT
Start: 2019-06-18 | End: 2019-06-20

## 2019-06-18 RX ORDER — L.ACID,PARA/B.BIFIDUM/S.THERM 8B CELL
1 CAPSULE ORAL DAILY
Qty: 5 CAPSULE | Refills: 0 | Status: SHIPPED | OUTPATIENT
Start: 2019-06-19 | End: 2019-06-24

## 2019-06-18 RX ORDER — PREDNISONE 10 MG/1
10 TABLET ORAL DAILY
Qty: 50 TABLET | Refills: 0 | Status: SHIPPED | OUTPATIENT
Start: 2019-06-18 | End: 2019-09-16

## 2019-06-18 RX ORDER — GUAIFENESIN 600 MG/1
600 TABLET, EXTENDED RELEASE ORAL EVERY 12 HOURS SCHEDULED
Qty: 10 TABLET | Refills: 0 | Status: SHIPPED | OUTPATIENT
Start: 2019-06-18 | End: 2021-02-12

## 2019-06-18 RX ADMIN — ARFORMOTEROL TARTRATE 15 MCG: 15 SOLUTION RESPIRATORY (INHALATION) at 07:12

## 2019-06-18 RX ADMIN — PANTOPRAZOLE SODIUM 40 MG: 40 TABLET, DELAYED RELEASE ORAL at 06:00

## 2019-06-18 RX ADMIN — GUAIFENESIN 600 MG: 600 TABLET, EXTENDED RELEASE ORAL at 08:31

## 2019-06-18 RX ADMIN — METHYLPREDNISOLONE SODIUM SUCCINATE 40 MG: 40 INJECTION, POWDER, FOR SOLUTION INTRAMUSCULAR; INTRAVENOUS at 02:19

## 2019-06-18 RX ADMIN — IPRATROPIUM BROMIDE AND ALBUTEROL SULFATE 3 ML: .5; 3 SOLUTION RESPIRATORY (INHALATION) at 07:11

## 2019-06-18 RX ADMIN — BUDESONIDE 0.5 MG: 0.5 INHALANT RESPIRATORY (INHALATION) at 07:11

## 2019-06-18 RX ADMIN — ACETAMINOPHEN 650 MG: 325 TABLET, FILM COATED ORAL at 08:30

## 2019-06-18 RX ADMIN — HYDROCODONE BITARTRATE AND ACETAMINOPHEN 1 TABLET: 10; 325 TABLET ORAL at 10:41

## 2019-06-18 RX ADMIN — METHYLPREDNISOLONE SODIUM SUCCINATE 40 MG: 40 INJECTION, POWDER, FOR SOLUTION INTRAMUSCULAR; INTRAVENOUS at 10:41

## 2019-06-18 RX ADMIN — HYDROCODONE BITARTRATE AND ACETAMINOPHEN 1 TABLET: 10; 325 TABLET ORAL at 02:18

## 2019-06-18 RX ADMIN — MULTIPLE VITAMINS W/ MINERALS TAB 1 TABLET: TAB at 08:31

## 2019-06-18 RX ADMIN — CYANOCOBALAMIN TAB 500 MCG 500 MCG: 500 TAB at 08:31

## 2019-06-18 RX ADMIN — GABAPENTIN 100 MG: 100 CAPSULE ORAL at 08:31

## 2019-06-18 RX ADMIN — Medication 1 CAPSULE: at 08:31

## 2019-06-18 RX ADMIN — Medication 1 APPLICATION: at 08:32

## 2019-06-18 RX ADMIN — VENLAFAXINE 75 MG: 75 TABLET ORAL at 08:31

## 2019-06-18 RX ADMIN — SODIUM CHLORIDE, PRESERVATIVE FREE 3 ML: 5 INJECTION INTRAVENOUS at 08:31

## 2019-06-18 NOTE — DISCHARGE SUMMARY
[facility]    Name:  Joelle Yee   Age:  73 y.o.  Sex:  female  :  1945  MRN:  [id number]   Visit Number:  [id number]  Primary Care Physician:  Blanquita Clements PA  Date of Discharge:  2019  Admission Date:  2019      Presenting Problem:    Pneumonia of both lungs due to infectious organism, unspecified part of lung [J18.9]       Discharge Diagnosis:       Pneumonia of both lungs due to infectious organism    COPD exacerbation (CMS/[facility])    Bronchiectasis without complication (CMS/[facility])    Anxiety    Lung nodule        Past Medical History:  Past Medical History:   Diagnosis Date   • Abdominal pain    • Acute bronchitis    • Ankle pain    • Anxiety    • Atopic dermatitis    • Bronchiectasis (CMS/[facility])    • Cataract, bilateral    • Chest pain     STATES HAS OCCASSIONALLY.  STATES LAST TIME WAS LAST WEEK.  STATES SEES DR. RICH.  STATES HE HAS DONE NUMEROUS TESTS ON HER AND HAS NOT BEEN ABLE TO FIND OUT CAUSE.     • Chronic obstructive lung disease (CMS/[facility])    • Colon cancer screening    • COPD (chronic obstructive pulmonary disease) (CMS/[facility])    • Cystocele    • Depressed    • Depression    • Fatigue     Chronic pafigue    • Fibromyalgia    • Full dentures    • GERD (gastroesophageal reflux disease)    • Gout    • Heartburn     Chronic historu of epigastric heartburn currently controlled on Prilesec 40 mg every morning along with Zantac 300 Mg daily at bedtime   • High cholesterol    • Hip pain    • Hyperlipidemia    • Hypertension    • Insomnia    • Joint pain    • Kidney infection    • Loss of appetite    • Low back pain    • Melena    • Nausea and vomiting    • On home oxygen therapy     2L/NC PRN (STATES SHE HAS BEEN USING CONTINUOSLY LATELY)   • Osteoarthritis    • Pain in limb    • Palpitations    • Pinched nerve     Pinched nerves    • Pneumonia    • Problems with swallowing     HAS TO EAT SLOW AND  CHEW FOOD WELL   • Recurrent urinary tract infection    • Sciatica [id number]   • Shortness of breath    • Sinus problem    • Sleep apnea 1998    NO CPAP   • Upset stomach    • Valvular heart disease    • Vitamin B12 deficiency    • Wears glasses    • Weight loss, abnormal     Weight loss is stabel. On pund weight gain since 01/2016         Consults:     Consults     No orders found from 5/18/2019 to 6/17/2019.          Procedures Performed:  None             History of presenting illness/Hospital Course:  This is a 73-year-old female with history of bronchiectasis, COPD on 2 L nasal cannula, chronic hypoxic respiratory failure, depression, hypertension who had recently completed a course of amoxicillin and steroids without any improvement.  She did follow that up with a 7-day course of Levaquin.  She came into the emergency room with cough and wheeze.  According to the patient she did have productive sputum of all colors.  She has been using a flutter valve at home progressive therapy without any improvement.  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  He did have a chest x-ray for which she was noted to have bilateral interstitial disease with no new areas of consolidation.  She does have a new 16 mm right suprahilar opacity correct they recommend a follow-up chest x-ray in 1 to 2 weeks.  CT scan of the chest did show bilateral pneumonia however.  She also was noted to have some gastric wall thickening suggestive of gastritis.      Nurse practitioner with pulmonology did see and evaluate the patient.  I have also seen and evaluated her at bedside today.  She does appear to be doing much better.  He is asking about going home today.  I have discussed with pulmonology who feels that she is stable as well.  She does have a new 16 mm right suprahilar opacity for which they do recommend a follow-up x-ray in a few weeks.  Have discussed with pulmonology and they will arrange this as an outpatient.  Patient  will be placed on a long taper of oral steroids upon discharge and I have instructed the patient to finish the Levaquin that she has at home given that she does have a history of growing Serratia Marcescens.  She does have some leukocytosis which is likely secondary to her steroids.  Her procalcitonin was normal.  I do not suspect that this is a true pneumonia but an exacerbation of her bronchiectasis.  We will let her finish the dose course of Levaquin that she does have at home.  She is otherwise symptomatically improved and hemodynamically stable from the hospitalist standpoint to discharge home with appropriate follow-up for repeat chest x-ray to follow-up on suprahilar opacity and a follow-up with pulmonology which will be on the same day.              Vital Signs:    Temp:  [97.7 °F (36.5 °C)-98.3 °F (36.8 °C)] 98.3 °F (36.8 °C)  Heart Rate:  [] 82  Resp:  [18] 18  BP: (121-132)/(57-70) 130/70    Physical Exam:  General Appearance:    Alert and cooperative, not in any acute distress.   Head:    Atraumatic and normocephalic, without obvious abnormality.   Eyes:            PERRLA, conjunctivae and sclerae normal, no Icterus. No pallor. Extra-occular movements are within normal limits.   Ears:    Ears appear intact with no abnormalities noted.   Throat:   No oral lesions, no thrush, oral mucosa moist.   Neck:   Supple, trachea midline, no thyromegaly, no carotid bruit.   Back:     No kyphoscoliosis present. No tenderness to palpation,   range of motion normal.   Lungs:     Chest shape is normal. Breath sounds heard bilaterally equally.  crackles bilaterally wheezing. No Pleural rub or bronchial breathing.    Heart:    Normal S1 and S2, no murmur, no gallop, no rub. No JVD   Abdomen:     Normal bowel sounds, no masses, no organomegaly. Soft        non-tender, non-distended, no guarding, no rebound                tenderness   Extremities:   Moves all extremities well, no edema, no cyanosis, no              clubbing   Pulses:   Pulses palpable and equal bilaterally   Skin:   No bleeding, bruising or rash     Psychiatric/Behavior:        Normal mood, normal behavior   Neurologic:   Cranial nerves 2 - 12 grossly intact, sensation intact, Motor power is normal and equal bilaterally.           Pertinent Lab Results:     Results from last 7 days   Lab Units 06/18/19  0541 06/17/19  0544 06/16/19  1256   SODIUM mmol/L 139 137 135*   POTASSIUM mmol/L 4.4 4.4 4.3   CHLORIDE mmol/L 103 103 97*   CO2 mmol/L 26.0 26.0 29.0   BUN mg/dL 17 17 12   CREATININE mg/dL 0.60 0.60 0.60   CALCIUM mg/dL 9.0 8.8 9.0   BILIRUBIN mg/dL  --   --  0.3   ALK PHOS U/L  --   --  113   ALT (SGPT) U/L  --   --  18   AST (SGOT) U/L  --   --  26   GLUCOSE mg/dL 131* 114* 100*     Results from last 7 days   Lab Units 06/18/19  0541 06/17/19  0544 06/16/19  1256   WBC 10*3/mm3 19.81* 6.18 12.29*   HEMOGLOBIN g/dL 12.0 11.8* 13.6   HEMATOCRIT % 36.8 36.6 42.1   PLATELETS 10*3/mm3 348 310 356         Blood Culture   Date Value Ref Range Status   06/16/2019 No growth at 24 hours  Preliminary   06/16/2019 No growth at 24 hours  Preliminary         Pertinent Radiology Results:    Imaging Results (all)     Procedure Component Value Units Date/Time    CT Chest With Contrast [[id number]] Collected:  06/16/19 1619     Updated:  06/16/19 1620    Narrative:       FINAL REPORT    CLINICAL HISTORY:  new perihilar mass on xr    FINDINGS:  Multiple contiguous transaxial slices through the chest were  obtained after the intravenous ministration of contrast with  coronal reformatted images.  There is diffuse centrilobular  paraseptal emphysema with coronal reformatted images.  There is  diffuse centrilobular and paraseptal emphysema with fibrotic  scarring.  There is patchy airspace disease bilaterally,  greatest in the right middle lobe.  There is no discrete mass or  effusion.  There is no adenopathy.  There are degenerative  changes of the spine.  Gastric wall  thickening suggesting  gastritis.  There is fluid adjacent the spleen, nonspecific.  There is a large left renal cyst.  There are  Impression:  Bilateral pneumonia.  Recommend follow-up until resolution  COPD  Gastric wall thickening suggesting gastritis      Impression:       Authenticated by Sheridan Salcedo MD on 06/16/2019 04:19:46 PM    XR Chest 2 View [[id number]] Collected:  06/16/19 1435     Updated:  06/16/19 1503    Narrative:       XR CHEST 2 VIEWS-   06/16/2019 1:12 PM     HISTORY: Shortness of air triage protocol.     COMPARISON:  11/20/2018     FINDINGS: The cardiac silhouette is proper size. The aortic contours are  normal. The mediastinal and hilar structures are unremarkable. Bilateral  interstitial disease with minimal bibasilar scarring is stable from  prior. There is a new 16 mm opacity right suprahilar region only  identified on the PA view, possible small area of pneumonia with mass  not excluded. Follow-up 2 view chest x-ray in one to 2 weeks recommended  to document resolution.       Impression:       .IMPRESSION:   1. Stable bilateral interstitial disease with no new area of  consolidation.  2. New 16 mm right suprahilar opacity, recommend follow-up two-view  chest x-ray in 1-2 weeks to document resolution.     This report was finalized on 6/16/2019 2:42 PM by Antonietta Boss MD.          Condition on Discharge:    Stable        Discharge Disposition:    Home-Health Care [facility] [place]    Discharge Medication:       Discharge Medications      New Medications      Instructions Start Date   fluconazole 100 MG tablet  Commonly known as:  DIFLUCAN   100 mg, Oral, Every 24 Hours      predniSONE 10 MG tablet  Commonly known as:  DELTASONE   10 mg, Oral, Daily         Continue These Medications      Instructions Start Date   acetaminophen 325 MG tablet  Commonly known as:  TYLENOL   650 mg, Oral, Every 4 Hours PRN      ammonium lactate 12 % cream  Commonly known as:  LAC-HYDRIN   Topical, 2 Times Daily       benzonatate 100 MG capsule  Commonly known as:  TESSALON   100 mg, Oral, 3 Times Daily PRN      cyclobenzaprine 10 MG tablet  Commonly known as:  FLEXERIL   10 mg, Oral, Daily      ENSURE COMPLETE PO   1 can, Oral, 2 Times Daily      FLUTTER device   1 Device, Does not apply, As Needed      gabapentin 300 MG capsule  Commonly known as:  NEURONTIN   100 mg, Oral, 3 Times Daily      guaiFENesin 600 MG 12 hr tablet  Commonly known as:  MUCINEX   600 mg, Oral, Every 12 Hours Scheduled      HYDROcodone-acetaminophen  MG per tablet  Commonly known as:  NORCO   1 tablet, Oral, Every 12 Hours PRN      ipratropium-albuterol 0.5-2.5 mg/3 ml nebulizer  Commonly known as:  DUO-NEB   3 mL, Nebulization, 4 Times Daily PRN      ketotifen 0.025 % ophthalmic solution  Commonly known as:  ZADITOR   1-2 drops, Both Eyes, 2 Times Daily      lactobacillus acidophilus capsule capsule   1 capsule, Oral, Daily      lidocaine viscous 2 % solution  Commonly known as:  XYLOCAINE   TAKE 2 TSP GARGLE SWISH AND SPIT EVERY 2 TO 3 HOURS      loratadine 10 MG tablet  Commonly known as:  CLARITIN   10 mg, Oral, Daily PRN      LORazepam 0.5 MG tablet  Commonly known as:  ATIVAN   0.5 mg, Oral, 2 Times Daily PRN, 1-2 TABLETS DAILY PRN      losartan 25 MG tablet  Commonly known as:  COZAAR   25 mg, Oral, Nightly      MEDICATED CHEST RUB EX   No dose, route, or frequency recorded.      melatonin 5 MG tablet tablet   10 mg, Oral, Nightly PRN      metoprolol succinate XL 25 MG 24 hr tablet  Commonly known as:  TOPROL-XL   25 mg, Oral, Nightly      mirtazapine 30 MG tablet  Commonly known as:  REMERON   30 mg, Oral, Nightly      multivitamin with minerals tablet tablet   1 tablet, Oral, Daily      omeprazole 40 MG capsule  Commonly known as:  priLOSEC   40 mg, Oral, Daily      ondansetron ODT 4 MG disintegrating tablet  Commonly known as:  ZOFRAN-ODT   1 tablet, Oral, Every 6 Hours PRN      OXYGEN-HELIUM IN   Oxygen; Patient Sig: Oxygen 2 liter as  needed; 0; 21-May-2014; Active      ROLLATOR misc   1 Units, Does not apply, As Needed      traZODone 25 MG half tablet  Commonly known as:  DESYREL   25 mg, Oral, Nightly      urea 40 % cream  Commonly known as:  CARMOL   Topical, Every 24 Hours Scheduled      urea 40 % cream  Commonly known as:  CARMOL   Topical, Every 12 Hours, Apply topically every 12 hours.      venlafaxine 75 MG tablet  Commonly known as:  EFFEXOR   1 tablet, Oral, 2 Times Daily      VITAMIN B12 PO   500 mcg, Oral, Daily      VOLTAREN TD   2 g, Topical, 2 Times Daily PRN, Voltaren GEL         Stop These Medications    DIPHENHIST 12.5 MG/5ML liquid  Generic drug:  diphenhydrAMINE     potassium bicarbonate 25 MEQ disintegrating tablet  Commonly known as:  K-LYTE            Discharge Diet:     Diet Instructions     Diet: Regular; Thin      Discharge Diet:  Regular    Fluid Consistency:  Thin          Activity at Discharge:     Activity Instructions     Discharge Activity      As tolerates          Follow-up Appointments:    Future Appointments   Date Time Provider Department Center   7/1/2019  9:30 AM Kaelyn Dixon APRN [facility] None     Additional Instructions for the Follow-ups that You Need to Schedule     Ambulatory Referral to Home Health   As directed      Face to Face Visit Date:  6/18/2019    Follow-up Provider for Plan of Care?:  I treated the patient in an acute care facility and will not continue treatment after discharge.    Follow-up Provider:  HARSHA REYES [[id number]]    Reason/Clinical Findings:  strength mobility COPD    Describe mobility limitations that make leaving home difficult:  COPD    Nursing/Therapeutic Services Requested:  Skilled Nursing Physical Therapy Occupational Therapy    Skilled nursing orders:  Cardiopulmonary assessments    PT orders:  Strengthening    Occupational orders:  Strengthening Energy conservation Activities of daily living    Frequency:  1 Week 1         Discharge Follow-up with  Specified Provider: Rebeka Esparza OR Kaelyn Khan.; 1 Month   As directed      To:  Rebeka Esparza OR Kaelyn Khan.    Follow Up:  1 Month    Follow Up Details:  If being discharged on a weekend, please call our office on the next working day to schedule this appointment.         XR Chest PA & Lateral   Jul 02, 2019      Chest xray to be completed 7/2/19. Can be done on day of follow up appt.    Exam reason:  pneumonia               Test Results Pending at Discharge:     Order Current Status    Respiratory Panel, PCR - Swab, Nasopharynx In process    Blood Culture With YONAS - Blood, Arm, Left Preliminary result    Blood Culture With YONAS - Blood, Arm, Right Preliminary result    Respiratory Culture - Sputum, Cough Preliminary result        Discharge Instructions:  Oxygen 2 liters nasal canula  Finish Levaquin at home.  Patient will need repeat chest xray when following up with Dr. Esparza.  They have placed order in computer so patient can get this done on same day of appointment.       DWAYNE Bhatt  06/19/19  3:23 AM    Time: 25   minutes were spent reviewing labs, history, evaluating patient and discharge planning.

## 2019-06-18 NOTE — DISCHARGE INSTRUCTIONS
Oxygen 2 liters nasal canula  Finish Levaquin at home.  Patient will need repeat chest xray when following up with Dr. Esparza.  They have placed order in computer so patient can get this done on same day of appointment.

## 2019-06-18 NOTE — PROGRESS NOTES
"  CC: Shortness of breath, pneumonia    S: She is feeling much improved today. She is walking with therapy. She is on her baseline oxygen of 2 LPM.    ROS: Positive for cough, shortness of breath, mild anxiety. Denies chest pain, diarrhea or fever.    O: /57 (BP Location: Left arm, Patient Position: Sitting)   Pulse 106   Temp 97.7 °F (36.5 °C) (Oral)   Resp 18   Ht 152.4 cm (60\")   Wt 42.3 kg (93 lb 4.1 oz)   LMP  (LMP Unknown)   SpO2 96%   BMI 18.21 kg/m²     General: No respiratory distress noted.  Neck: No JVD   Cardiovascular: S1 + S2. Regular.   Respiratory: Scattered wheezing heard. Minimal crackles noted. Improved from yesterday.  Extremities: No edema noted.  Neurologic: AAOx3. Was able to follow commands     Labs: Reviewed.     Results from last 7 days   Lab Units 06/18/19  0541 06/17/19  0544 06/16/19  1256   SODIUM mmol/L 139 137 135*   POTASSIUM mmol/L 4.4 4.4 4.3   CHLORIDE mmol/L 103 103 97*   CO2 mmol/L 26.0 26.0 29.0   BUN mg/dL 17 17 12   CREATININE mg/dL 0.60 0.60 0.60   CALCIUM mg/dL 9.0 8.8 9.0   GLUCOSE mg/dL 131* 114* 100*             Results from last 7 days   Lab Units 06/18/19  0541 06/17/19  0544 06/16/19  1256   WBC 10*3/mm3 19.81* 6.18 12.29*   NEUTROPHIL % %  --  78.4* 59.0   HEMOGLOBIN g/dL 12.0 11.8* 13.6   HEMATOCRIT % 36.8 36.6 42.1   PLATELETS 10*3/mm3 348 310 356             Lab Results   Component Value Date    PROCALCITO <0.05 06/17/2019    PROCALCITO <0.05 06/16/2019    PROCALCITO 0.14 12/02/2018       Lab Results   Component Value Date    PROBNP 178.0 (C) 06/16/2019    PROBNP 2,090.0 (C) 11/30/2018    PROBNP 327.0 (C) 07/10/2018       No results found for: CRP             CXRay: The chest x-ray from June 16 showed stable bilateral interstitial disease with no new area of consolidation.  New 16 mm right suprahilar opacity.  Recommend follow-up two-view in 1 to 2 weeks.      Assessment & Recommendations/Plan:   1.  Possible Pneumonia  Continue antibiotics.    2.  " Bronchiectasis with acute exacerbation  Continue nebulizer treatments, flutter valve use twice a day, and percussion vest. Long steroid taper.    3.  Chronic hypoxemia  She is on her baseline oxygen of 2 L/min with oxygen saturation of 90% or greater.    4.  Severe COPD  Continue inhalers as well as nebulized treatments.    The patient with be discharged with a long steroid taper. She will follow up in the clinic in 2 weeks with a chest x-ray ordered the same day as follow-up.    We have reviewed patient's current orders and changes, if any, have been suggested to primary care team. Plan was also discussed with nursing staff, as necessary.         This document was electronically signed by DWAYNE Molina on 06/18/19 at 9:49 AM      Dictated utilizing Dragon dictation.

## 2019-06-18 NOTE — THERAPY TREATMENT NOTE
Acute Care - Occupational Therapy Treatment Note  [facility]     Patient Name: Joelle Yee  : 1945  MRN: [id number]  Today's Date: 2019  Onset of Illness/Injury or Date of Surgery: 19  Date of Referral to OT: 19  Referring Physician: Dr. Hinds    Admit Date: 2019       ICD-10-CM ICD-9-CM   1. Pneumonia of both lungs due to infectious organism, unspecified part of lung J18.9 483.8   2. Chronic obstructive pulmonary disease, unspecified COPD type (CMS/[facility]ly [facility]) J44.9 496   3. Impaired functional mobility, balance, gait, and endurance Z74.09 V49.89   4. Impaired mobility and ADLs Z74.09 799.89   5. Bronchiectasis without complication (CMS/[facility]ly [facility]) J47.9 494.0   6. Pneumonia of both upper lobes due to infectious organism (CMS/[facility]) J18.1 483.8     Patient Active Problem List   Diagnosis   • Dyspepsia   • Esophageal reflux   • Anxiety   • High cholesterol   • Bipolar disorder (CMS/[facility])   • COPD (chronic obstructive pulmonary disease) (CMS/[facility])   • Depression   • Gout   • Hyperlipidemia   • Insomnia   • Osteoarthritis   • Sciatica   • Sleep apnea   • Vitamin B 12 deficiency   • Pancolitis (CMS/[facility])   • Acute cystitis without hematuria   • C. difficile colitis   • Chronic bronchitis with COPD (chronic obstructive pulmonary disease) (CMS/[facility])   • Pneumonia of right lower lobe due to infectious organism (CMS/[facility])   • COPD exacerbation (CMS/[facility])   • Pneumonia involving right lung   • COPD with acute exacerbation (CMS/[facility])   • Chronic respiratory failure with hypoxia (CMS/[facility])   • Abdominal pain   • Diarrhea   • Heartburn   • Loss of appetite   • Melena   • Nausea and vomiting   • Pseudogout   • Upset stomach   • Abnormal weight loss   • Chronic obstructive pulmonary disease with acute lower respiratory infection (CMS/[facility])   • Right upper lobe pneumonia (CMS/[facility])   • Acute on chronic respiratory failure with hypoxia (CMS/[facility])   • Moderate malnutrition (CMS/[facility])   • Acute exacerbation of  chronic obstructive pulmonary disease (COPD) (CMS/[facility])   • Bronchiectasis without complication (CMS/HCC)   • Pneumonia due to gram-negative bacteria (CMS/HCC)   • Sinus tachycardia   • Leukocytosis   • Serratia marcescens infection (CMS/HCC)   • Pneumonia of both lower lobes due to infectious organism (CMS/HCC)   • Acute kidney injury (CMS/HCC)   • BMI less than 19,adult   • Anemia   • Pneumonia of both lungs due to infectious organism   • Lung nodule     Past Medical History:   Diagnosis Date   • Abdominal pain    • Acute bronchitis    • Ankle pain    • Anxiety 1980   • Atopic dermatitis    • Bronchiectasis (CMS/[facility])    • Cataract, bilateral    • Chest pain     STATES HAS OCCASSIONALLY.  STATES LAST TIME WAS LAST WEEK.  STATES SEES DR. RICH.  STATES HE HAS DONE NUMEROUS TESTS ON HER AND HAS NOT BEEN ABLE TO FIND OUT CAUSE.     • Chronic obstructive lung disease (CMS/HCC) 2008   • Colon cancer screening    • COPD (chronic obstructive pulmonary disease) (CMS/[facility])    • Cystocele    • Depressed    • Depression 1980   • Fatigue     Chronic pafigue 2012   • Fibromyalgia 2008   • Full dentures    • GERD (gastroesophageal reflux disease)    • Gout    • Heartburn     Chronic historu of epigastric heartburn currently controlled on Prilesec 40 mg every morning along with Zantac 300 Mg daily at bedtime   • High cholesterol 2012   • Hip pain    • Hyperlipidemia    • Hypertension    • Insomnia    • Joint pain    • Kidney infection    • Loss of appetite    • Low back pain 1995   • Melena    • Nausea and vomiting    • On home oxygen therapy     2L/NC PRN (STATES SHE HAS BEEN USING CONTINUOSLY LATELY)   • Osteoarthritis 2010   • Pain in limb    • Palpitations    • Pinched nerve     Pinched nerves 2011   • Pneumonia    • Problems with swallowing     HAS TO EAT SLOW AND CHEW FOOD WELL   • Recurrent urinary tract infection    • Sciatica 5817-1588   • Shortness of breath    • Sinus problem    • Sleep apnea 1998    NO CPAP   • Upset  stomach    • Valvular heart disease    • Vitamin B12 deficiency    • Wears glasses    • Weight loss, abnormal     Weight loss is stabel. On pund weight gain since 01/2016     Past Surgical History:   Procedure Laterality Date   • ABDOMINAL HERNIA REPAIR  2016   • APPENDECTOMY  1965   • BACK SURGERY  2005   • BRONCHOSCOPY N/A 7/5/2018    Procedure: BRONCHOSCOPY w/ WASHINGS/BRUSHINGS with MAC;  Surgeon: Rebeka Esparza MD;  Location: [facility];  Service: Pulmonary   • CATARACT EXTRACTION Bilateral     Put in implants    • CHOLECYSTECTOMY  1985   • COLONOSCOPY     • ENDOSCOPY     • KNEE SURGERY Left 1979    Knee Cap   • TUBAL ABDOMINAL LIGATION  1971       Therapy Treatment    Rehabilitation Treatment Summary     Row Name 06/18/19 0905             Treatment Time/Intention    Discipline  occupational therapist  -      Document Type  therapy note (daily note)  -      Subjective Information  no complaints  -      Mode of Treatment  occupational therapy  -      Patient/Family Observations  Pt alone  -      Care Plan Review  care plan/treatment goals reviewed;patient/other agree to care plan  -      Patient Effort  good  -      Comment  Pt on 2L O2 via nc  -      Existing Precautions/Restrictions  fall;oxygen therapy device and L/min  -      Recorded by [] Jonna Callahan 06/18/19 1303      Row Name 06/18/19 0905             Vital Signs    Pre SpO2 (%)  95  -AH      O2 Delivery Pre Treatment  supplemental O2  -      Intra SpO2 (%)  91  -AH      O2 Delivery Intra Treatment  supplemental O2  -      Post SpO2 (%)  94  -AH      O2 Delivery Post Treatment  supplemental O2  -      Recorded by [] Jonna Callahan 06/18/19 1303      Row Name 06/18/19 0905             Bed Mobility Assessment/Treatment    Bed Mobility Assessment/Treatment  supine-sit  -      Gillespie Level (Bed Mobility)  independent  -      Recorded by [] Jonna Callahan 06/18/19 1303      Row Name 06/18/19 0905              Functional Mobility    Functional Mobility- Ind. Level  standby assist  -      Functional Mobility-Distance (Feet)  240  -AH      Functional Mobility- Safety Issues  supplemental O2  -AH      Recorded by [] Jonna Callahan 06/18/19 1303      Row Name 06/18/19 0905             Transfer Assessment/Treatment    Transfer Assessment/Treatment  sit-stand transfer;stand-sit transfer  -AH      Recorded by [] Jonna Callahan 06/18/19 1303      Row Name 06/18/19 0905             Sit-Stand Transfer    Sit-Stand Deep Water (Transfers)  independent  -AH      Recorded by [] Jonna Callahan 06/18/19 1303      Row Name 06/18/19 0905             Stand-Sit Transfer    Stand-Sit Deep Water (Transfers)  independent  -AH      Recorded by [] Jonna Callahan 06/18/19 1303      Row Name 06/18/19 0905             Bathing Assessment/Intervention    Bathing Deep Water Level  set up per pt report  -AH      Recorded by [] Jonna Callahan 06/18/19 1303      Row Name 06/18/19 0905             Upper Body Dressing Assessment/Training    Upper Body Dressing Deep Water Level  independent  -AH      Recorded by [] Jonna Callahan 06/18/19 1303      Row Name 06/18/19 0905             Lower Body Dressing Assessment/Training    Lower Body Dressing Deep Water Level  independent;don;shoes/slippers  -AH      Recorded by [] Jonna Callahan 06/18/19 1303      Row Name 06/18/19 0905             Motor Skills Assessment/Interventions    Additional Documentation  Therapeutic Exercise (Group)  -      Recorded by [] Jonna Callahan 06/18/19 1303      Row Name 06/18/19 0905             Therapeutic Exercise    Upper Extremity Range of Motion (Therapeutic Exercise)  shoulder horizontal abduction/adduction, bilateral;elbow flexion/extension, bilateral  -      Weight/Resistance (Therapeutic Exercise)  yellow  -      Position (Therapeutic Exercise)  seated  -      Sets/Reps (Therapeutic Exercise)  1 x 10  -      Equipment (Therapeutic  Exercise)  resistive bands  -      Recorded by [Jonna Perez 06/18/19 1303      Row Name 06/18/19 0905             Positioning and Restraints    Pre-Treatment Position  in bed  -      Post Treatment Position  bed  -      In Bed  sitting EOB;call light within reach  -      Recorded by [Jonna Perez 06/18/19 1303      Row Name 06/18/19 0905             Pain Assessment    Additional Documentation  Pain Scale: Numbers Pre/Post-Treatment (Group)  -      Recorded by [Jonna Perez 06/18/19 1303      Row Name 06/18/19 0905             Pain Scale: Numbers Pre/Post-Treatment    Pain Scale: Numbers, Pretreatment  0/10 - no pain  -      Pain Scale: Numbers, Post-Treatment  0/10 - no pain  -      Recorded by [Jonna Perez 06/18/19 1303      Row Name                [REMOVED] Wound 12/04/18 2000 Right upper arm skin tear    Wound - Properties Group Date first assessed: 12/04/18 [TN] Time first assessed: 2000 [TN] Present On Admission : yes [TN] Side: Right [TN] Orientation: upper [TN] Location: arm [TN] Type: skin tear [TN] Resolution Date: 06/18/19 [CN] Resolution Time: 1118 [CN] Recorded by:  [CN] Radha Ortiz RN 06/18/19 1118 [TN] Cally Lopez RN 12/04/18 2305    Row Name 06/18/19 0905             Coping    Observed Emotional State  accepting;cooperative  -      Verbalized Emotional State  acceptance  -      Recorded by [Jonna Perez 06/18/19 1303      Row Name 06/18/19 0905             Plan of Care Review    Plan of Care Reviewed With  patient  -      Recorded by [Jonna Perez 06/18/19 1303      Row Name 06/18/19 0905             Outcome Summary/Treatment Plan (OT)    Daily Summary of Progress (OT)  progress toward functional goals as expected  -      Anticipated Discharge Disposition (OT)  home with home health  -      Recorded by [Jonna Perez 06/18/19 1303        User Key  (r) = Recorded By, (t) = Taken By, (c) = Cosigned By    Initials Name  Effective Dates Discipline    Jonna Oneill 03/07/18 -  OT    Radha Goodson RN 10/26/16 -  Nurse    Cally Stacy RN 10/26/16 -  Nurse           Rehab Goal Summary     Row Name 06/18/19 0905             Transfer Goal 1 (OT)    Progress/Outcome (Transfer Goal 1, OT)  goal met  -AH         Dressing Goal 1 (OT)    Progress/Outcome (Dressing Goal 1, OT)  goal met  -AH         Strength Goal 1 (OT)    Progress/Outcome (Strength Goal 1, OT)  goal met  -AH         Functional Mobility Goal 1 (OT)    Progress/Outcome (Functional Mobility Goal 1, OT)  goal met  -AH        User Key  (r) = Recorded By, (t) = Taken By, (c) = Cosigned By    Initials Name Provider Type Discipline    Jonna Oneill Occupational Therapist OT        Occupational Therapy Education     Title: PT OT SLP Therapies (Resolved)     Topic: Occupational Therapy (Resolved)     Point: ADL training (Resolved)     Description: Instruct learner(s) on proper safety adaptation and remediation techniques during self care or transfers.   Instruct in proper use of assistive devices.    Learning Progress Summary           Patient Acceptance, E,TB, VU by  at 6/17/2019  1:31 PM    Comment:  Role of OT/POC                               User Key     Initials Effective Dates Name Provider Type Discipline     03/07/18 -  Jonna Callahan Occupational Therapist OT                OT Recommendation and Plan  Outcome Summary/Treatment Plan (OT)  Daily Summary of Progress (OT): progress toward functional goals as expected  Anticipated Discharge Disposition (OT): home with home health  Therapy Frequency (OT Eval): 3 times/wk  Daily Summary of Progress (OT): progress toward functional goals as expected  Plan of Care Review  Plan of Care Reviewed With: patient  Plan of Care Reviewed With: patient  Outcome Summary: Pt walked 240' with sba, independent with bed and transfers, independent with dressing tasks. Pt is being d/c home  Outcome Measures     Row Name  06/18/19 0905 06/17/19 1023 06/17/19 1022       How much help from another person do you currently need...    Turning from your back to your side while in flat bed without using bedrails?  --  --  4  -JR    Moving from lying on back to sitting on the side of a flat bed without bedrails?  --  --  4  -JR    Moving to and from a bed to a chair (including a wheelchair)?  --  --  3  -JR    Standing up from a chair using your arms (e.g., wheelchair, bedside chair)?  --  --  3  -JR    Climbing 3-5 steps with a railing?  --  --  3  -JR    To walk in hospital room?  --  --  3  -JR    [facility] 6 Clicks Score  --  --  20  -JR       How much help from another is currently needed...    Putting on and taking off regular lower body clothing?  4  -  3  -  --    Bathing (including washing, rinsing, and drying)  3  -  3  -  --    Toileting (which includes using toilet bed pan or urinal)  4  -  3  -AH  --    Putting on and taking off regular upper body clothing  4  -  4  -  --    Taking care of personal grooming (such as brushing teeth)  4  -  4  -  --    Eating meals  4  -  4  -  --    Score  23  -  21  -  --       Functional Assessment    Outcome Measure Options  [facility] 6 Clicks Daily Activity (OT)  -  [facility] 6 Clicks Daily Activity (OT)  -  [facility] 6 Clicks Basic Mobility (PT)  -      User Key  (r) = Recorded By, (t) = Taken By, (c) = Cosigned By    Initials Name Provider Type     Jonna Callahan Occupational Therapist    Adelina Munguia, PT Physical Therapist           Time Calculation:   Time Calculation- OT     Row Name 06/18/19 1307 06/18/19 1306          Time Calculation- OT    OT Start Time  --  0905  -     OT Stop Time  --  0928  -     OT Time Calculation (min)  --  23 min  [facility]     Total Timed Code Minutes- OT  --  23 minute(s)  -     OT Received On  --  06/18/19  -     OT Goal Re-Cert Due Date  --  06/27/19  -        Timed Charges    29018 - OT Therapeutic Exercise Minutes  --  10   -     00953 - OT Therapeutic Activity Minutes  --  7  -     07467 - OT Self Care/Mgmt Minutes  6  -  5  -       User Key  (r) = Recorded By, (t) = Taken By, (c) = Cosigned By    Initials Name Provider Type    Jonna Oneill Occupational Therapist        Therapy Charges for Today     Code Description Service Date Service Provider Modifiers Qty    [id number]  OT EVAL LOW COMPLEXITY 3 6/17/2019 Jonna Callahan 1    [id number]  OT THER PROC EA 15 MIN 6/18/2019 Jonna Callahan 1    [id number]  OT SELF CARE/MGMT/TRAIN EA 15 MIN 6/18/2019 Jonna Callahan 1               Jonna Callahan  6/18/2019

## 2019-06-18 NOTE — PROGRESS NOTES
Continued Stay Note  ARLENE White     Patient Name: Joelle Yee  MRN: [id number]  Today's Date: 6/18/2019    Admit Date: 6/16/2019    Discharge Plan     Row Name 06/18/19 1243       Plan    Plan Comments  Received resumption of care [place] health [place] with [facility]. [place] [facility], Gene called from their office stating they need discharge summary cosigned, will send uncosigned to them for records and fax completed when able. [facility] plans to see pt on Thursday once she discharges home today. No other needs at this time.         Discharge Codes    No documentation.       Expected Discharge Date and Time     Expected Discharge Date Expected Discharge Time    Jun 18, 2019             JONATHAN William  12:45 PM  06/18/19

## 2019-06-18 NOTE — THERAPY EVALUATION
Acute Care - Physical Therapy Initial Evaluation  [facility]     Patient Name: Joelle Yee  : 1945  MRN: [id number]  Today's Date: 2019   Onset of Illness/Injury or Date of Surgery: 19  Date of Referral to PT: 19  Referring Physician: Dr. Hinds      Admit Date: 2019    Visit Dx:     ICD-10-CM ICD-9-CM   1. Pneumonia of both lungs due to infectious organism, unspecified part of lung J18.9 483.8   2. Chronic obstructive pulmonary disease, unspecified COPD type (CMS/HCC) J44.9 496   3. Impaired functional mobility, balance, gait, and endurance Z74.09 V49.89   4. Impaired mobility and ADLs Z74.09 799.89     Patient Active Problem List   Diagnosis   • Dyspepsia   • Esophageal reflux   • Anxiety   • High cholesterol   • Bipolar disorder (CMS/[facility])   • COPD (chronic obstructive pulmonary disease) (CMS/[facility])   • Depression   • Gout   • Hyperlipidemia   • Insomnia   • Osteoarthritis   • Sciatica   • Sleep apnea   • Vitamin B 12 deficiency   • Pancolitis (CMS/[facility])   • Acute cystitis without hematuria   • C. difficile colitis   • Chronic bronchitis with COPD (chronic obstructive pulmonary disease) (CMS/[facility])   • Pneumonia of right lower lobe due to infectious organism (CMS/[facility])   • COPD exacerbation (CMS/[facility])   • Pneumonia involving right lung   • COPD with acute exacerbation (CMS/[facility])   • Chronic respiratory failure with hypoxia (CMS/[facility])   • Abdominal pain   • Diarrhea   • Heartburn   • Loss of appetite   • Melena   • Nausea and vomiting   • Pseudogout   • Upset stomach   • Abnormal weight loss   • Chronic obstructive pulmonary disease with acute lower respiratory infection (CMS/[facility])   • Right upper lobe pneumonia (CMS/[facility])   • Acute on chronic respiratory failure with hypoxia (CMS/[facility])   • Moderate malnutrition (CMS/[facility])   • Acute exacerbation of chronic obstructive pulmonary disease (COPD) (CMS/[facility])   • Bronchiectasis without complication (CMS/[facility])   • Pneumonia due to gram-negative  bacteria (CMS/HCC)   • Sinus tachycardia   • Leukocytosis   • Serratia marcescens infection (CMS/HCC)   • Pneumonia of both lower lobes due to infectious organism (CMS/HCC)   • Acute kidney injury (CMS/HCC)   • BMI less than 19,adult   • Anemia   • Pneumonia of both lungs due to infectious organism   • COPD with acute exacerbation (CMS/HCC)     Past Medical History:   Diagnosis Date   • Abdominal pain    • Acute bronchitis    • Ankle pain    • Anxiety 1980   • Atopic dermatitis    • Bronchiectasis (CMS/[facility])    • Cataract, bilateral    • Chest pain     STATES HAS OCCASSIONALLY.  STATES LAST TIME WAS LAST WEEK.  STATES SEES DR. RICH.  STATES HE HAS DONE NUMEROUS TESTS ON HER AND HAS NOT BEEN ABLE TO FIND OUT CAUSE.     • Chronic obstructive lung disease (CMS/HCC) 2008   • Colon cancer screening    • COPD (chronic obstructive pulmonary disease) (CMS/[facility])    • Cystocele    • Depressed    • Depression 1980   • Fatigue     Chronic pafigue 2012   • Fibromyalgia 2008   • Full dentures    • GERD (gastroesophageal reflux disease)    • Gout    • Heartburn     Chronic historu of epigastric heartburn currently controlled on Prilesec 40 mg every morning along with Zantac 300 Mg daily at bedtime   • High cholesterol 2012   • Hip pain    • Hyperlipidemia    • Hypertension    • Insomnia    • Joint pain    • Kidney infection    • Loss of appetite    • Low back pain 1995   • Melena    • Nausea and vomiting    • On home oxygen therapy     2L/NC PRN (STATES SHE HAS BEEN USING CONTINUOSLY LATELY)   • Osteoarthritis 2010   • Pain in limb    • Palpitations    • Pinched nerve     Pinched nerves 2011   • Pneumonia    • Problems with swallowing     HAS TO EAT SLOW AND CHEW FOOD WELL   • Recurrent urinary tract infection    • Sciatica [id number]   • Shortness of breath    • Sinus problem    • Sleep apnea 1998    NO CPAP   • Upset stomach    • Valvular heart disease    • Vitamin B12 deficiency    • Wears glasses    • Weight loss, abnormal      Weight loss is stabel. On pund weight gain since 01/2016     Past Surgical History:   Procedure Laterality Date   • ABDOMINAL HERNIA REPAIR  2016   • APPENDECTOMY  1965   • BACK SURGERY  2005   • BRONCHOSCOPY N/A 7/5/2018    Procedure: BRONCHOSCOPY w/ WASHINGS/BRUSHINGS with MAC;  Surgeon: Rebeka Esparza MD;  Location: [facility] OR;  Service: Pulmonary   • CATARACT EXTRACTION Bilateral     Put in implants    • CHOLECYSTECTOMY  1985   • COLONOSCOPY     • ENDOSCOPY     • KNEE SURGERY Left 1979    Knee Cap   • TUBAL ABDOMINAL LIGATION  1971        PT ASSESSMENT (last 12 hours)      Physical Therapy Evaluation     Row Name 06/17/19 1022          PT Evaluation Time/Intention    Subjective Information  complains of;pain  -JR     Document Type  evaluation  -JR     Mode of Treatment  physical therapy  -JR     Patient Effort  good  -JR     Symptoms Noted During/After Treatment  shortness of breath  -JR     Comment  2 LPM per nasal cannula  -JR     Row Name 06/17/19 1022          General Information    Patient Profile Reviewed?  yes  -JR     Onset of Illness/Injury or Date of Surgery  06/16/19  -JR     Referring Physician  Dr. Hinds  -JR     Patient Observations  alert;agree to therapy;cooperative  -JR     Patient/Family Observations  No family present  -JR     General Observations of Patient  Sitting on the edge of the bed  -JR     Prior Level of Function  independent:;all household mobility  -JR     Equipment Currently Used at Home  rollator;oxygen;walker, standard;shower chair;hospital bed;grab bar  -JR     Pertinent History of Current Functional Problem  Diagnoses as above  -JR     Existing Precautions/Restrictions  oxygen therapy device and L/min;fall  -JR     Risks Reviewed  patient:;increased discomfort;change in vital signs  -JR     Benefits Reviewed  patient:;increase balance;increase strength;increase independence;improve function  -JR     Row Name 06/17/19 1022          Relationship/Environment    Primary  Source of Support/Comfort  child(cain);extended family;nonrelative caregiver  -     Name of Support/Comfort Primary Source  [employer] waiver program  -JR     Lives With  alone  -     Row Name 06/17/19 1022          Resource/Environmental Concerns    Current Living Arrangements  home/apartment/condo  -JR     Row Name 06/17/19 1022          Home Main Entrance    Number of Stairs, Main Entrance  one  -JR     Stairs Comment, Main Entrance  out back door, doesn't usually go that way  -JR     Row Name 06/17/19 1022          Cognitive Assessment/Interventions    Additional Documentation  Cognitive Assessment/Intervention (Group)  -JR     Row Name 06/17/19 1022          Cognitive Assessment/Intervention- PT/OT    Affect/Mental Status (Cognitive)  WFL  -     Orientation Status (Cognition)  oriented x 4  -JR     Follows Commands (Cognition)  WFL  -JR     Cognitive Function (Cognitive)  WFL  -JR     Safety Deficit (Cognitive)  awareness of need for assistance  -     Personal Safety Interventions  fall prevention program maintained;gait belt;nonskid shoes/slippers when out of bed  -     Row Name 06/17/19 1022          Safety Issues, Functional Mobility    Safety Issues Affecting Function (Mobility)  insight into deficits/self awareness  -     Impairments Affecting Function (Mobility)  shortness of breath;endurance/activity tolerance;strength  -     Row Name 06/17/19 1022          Bed Mobility Assessment/Treatment    Bed Mobility Assessment/Treatment  bed mobility (all) activities  -     [place] Level (Bed Mobility)  conditional independence  -     Assistive Device (Bed Mobility)  head of bed elevated  -     Row Name 06/17/19 1022          Transfer Assessment/Treatment    Transfer Assessment/Treatment  sit-stand transfer;stand-sit transfer  -     Sit-Stand [place] (Transfers)  supervision  -     Stand-Sit [place] (Transfers)  supervision  -     Row Name 06/17/19 1022          Gait/Stairs  Assessment/Training    Gait/Stairs Assessment/Training  gait/ambulation assistive device  -     [place] Level (Gait)  contact guard  -     Distance in Feet (Gait)  3  -JR     Pattern (Gait)  step-through  -JR     Deviations/Abnormal Patterns (Gait)  stride length decreased;gait speed decreased;victorino decreased  -JR     Bilateral Gait Deviations  heel strike decreased;forward flexed posture  -[facility] Name 06/17/19 1022          General ROM    GENERAL ROM COMMENTS  BLE = WFL  -[facility] Name 06/17/19 1022          MMT (Manual Muscle Testing)    General MMT Comments  BLE = 3+/5  -JR     [facility] Name 06/17/19 1022          Pain Assessment    Additional Documentation  Pain Scale: Numbers Pre/Post-Treatment (Group)  -[facility] Name 06/17/19 1022          Pain Scale: Numbers Pre/Post-Treatment    Pain Scale: Numbers, Pretreatment  7/10  -     Pain Scale: Numbers, Post-Treatment  7/10  -     Pain Location - Orientation  generalized  -     Pre/Post Treatment Pain Comment  normal pain level per patient report  -     Pain Intervention(s)  Repositioned;Ambulation/increased activity  -[facility] Name 06/17/19 1022          Coping    Observed Emotional State  pleasant;cooperative  -[facility] Name 06/17/19 1022          Plan of Care Review    Plan of Care Reviewed With  patient  -[facility] Name 06/17/19 1022          Physical Therapy Clinical Impression    Date of Referral to PT  06/17/19  -     Patient/Family Goals Statement (PT Clinical Impression)  Patient wants to get stronger and go home  -     Criteria for Skilled Interventions Met (PT Clinical Impression)  yes;treatment indicated  -     Rehab Potential (PT Clinical Summary)  good, to achieve stated therapy goals  -     Care Plan Review (PT)  evaluation/treatment results reviewed;care plan/treatment goals reviewed;risks/benefits reviewed;current/potential barriers reviewed;patient/other agree to care plan  -[facility] Name 06/17/19 1022           Vital Signs    Pre SpO2 (%)  95  -JR     O2 Delivery Pre Treatment  supplemental O2 2LPM  -JR     Intra SpO2 (%)  91  -JR     O2 Delivery Intra Treatment  supplemental O2 2 LPM  -JR     O2 Delivery Post Treatment  supplemental O2 2 LPM  -JR     Row Name 06/17/19 1022          Physical Therapy Goals    Transfer Goal Selection (PT)  transfer, PT goal 1  -JR     Gait Training Goal Selection (PT)  gait training, PT goal 1  -JR     Row Name 06/17/19 1022          Transfer Goal 1 (PT)    Activity/Assistive Device (Transfer Goal 1, PT)  sit-to-stand/stand-to-sit  -JR     [place] Level/Cues Needed (Transfer Goal 1, PT)  conditional independence  -JR     Time Frame (Transfer Goal 1, PT)  2 weeks  -JR     Progress/Outcome (Transfer Goal 1, PT)  goal ongoing  -JR     Row Name 06/17/19 1022          Gait Training Goal 1 (PT)    Activity/Assistive Device (Gait Training Goal 1, PT)  gait (walking locomotion);increase endurance/gait distance  -JR     [place] Level (Gait Training Goal 1, PT)  conditional independence  -JR     Distance (Gait Goal 1, PT)  400  -JR     Time Frame (Gait Training Goal 1, PT)  2 weeks  -JR     Progress/Outcome (Gait Training Goal 1, PT)  goal ongoing  -JR     Row Name 06/17/19 1022          Patient Education Goal (PT)    Activity (Patient Education Goal, PT)  Perform HEP x 15  -JR     [place]/Cues/Accuracy (Memory Goal 2, PT)  demonstrates adequately  -JR     Time Frame (Patient Education Goal, PT)  2 weeks  -JR     Progress/Outcome (Patient Education Goal, PT)  goal ongoing  -JR     Row Name 06/17/19 1022          Positioning and Restraints    Pre-Treatment Position  in bed  -JR     Post Treatment Position  chair  -JR     In Chair  sitting;call light within reach;encouraged to call for assist  -JR     Row Name 06/17/19 1022          Living Environment    Home Accessibility  stairs to enter home  -JR       User Key  (r) = Recorded By, (t) = Taken By, (c) = Cosigned By    Initials Name  Provider Type    Adelina Munguia, PT Physical Therapist        Physical Therapy Education     Title: PT OT SLP Therapies (In Progress)     Topic: Physical Therapy (In Progress)     Point: Mobility training (Done)     Learning Progress Summary           Patient Acceptance, E,TB, VU by  at 6/17/2019  8:56 PM    Comment:  Role of PT                               User Key     Initials Effective Dates Name Provider Type Discipline     04/03/18 -  Adelina Jones, PT Physical Therapist PT              PT Recommendation and Plan  Anticipated Discharge Disposition (PT): home with home health  Planned Therapy Interventions (PT Eval): balance training, strengthening, bed mobility training, transfer training, gait training, home exercise program, patient/family education  Therapy Frequency (PT Clinical Impression): daily  Outcome Summary/Treatment Plan (PT)  Anticipated Discharge Disposition (PT): home with home health  Plan of Care Reviewed With: patient  Outcome Summary: Patient participates well in PT evaluation and demonstrates decreased activity tolerance, strength, transfers, balance and gait.  She is expected to benefit from continued PT services while hospitalized.  Outcome Measures     Row Name 06/17/19 1023 06/17/19 1022          How much help from another person do you currently need...    Turning from your back to your side while in flat bed without using bedrails?  --  4  -JR     Moving from lying on back to sitting on the side of a flat bed without bedrails?  --  4  -JR     Moving to and from a bed to a chair (including a wheelchair)?  --  3  -JR     Standing up from a chair using your arms (e.g., wheelchair, bedside chair)?  --  3  -JR     Climbing 3-5 steps with a railing?  --  3  -JR     To walk in hospital room?  --  3  -JR     AM-PAC 6 Clicks Score  --  20  -JR        How much help from another is currently needed...    Putting on and taking off regular lower body clothing?  3  -AH  --     Bathing  (including washing, rinsing, and drying)  3  -AH  --     Toileting (which includes using toilet bed pan or urinal)  3  -AH  --     Putting on and taking off regular upper body clothing  4  -AH  --     Taking care of personal grooming (such as brushing teeth)  4  -AH  --     Eating meals  4  -  --     Score  21  -  --        Functional Assessment    Outcome Measure Options  AM-PAC 6 Clicks Daily Activity (OT)  -  AM-PAC 6 Clicks Basic Mobility (PT)  -       User Key  (r) = Recorded By, (t) = Taken By, (c) = Cosigned By    Initials Name Provider Type    Jonna Oneill Occupational Therapist    Adelina Munguia, PT Physical Therapist         Time Calculation:   PT Charges     Row Name 06/17/19 2102             Time Calculation    Start Time  1022  -      PT Received On  06/17/19  -      PT Goal Re-Cert Due Date  06/27/19  -        User Key  (r) = Recorded By, (t) = Taken By, (c) = Cosigned By    Initials Name Provider Type    Adelina Munguia, PT Physical Therapist        Therapy Charges for Today     Code Description Service Date Service Provider Modifiers Qty    [id number]  PT EVAL LOW COMPLEXITY 3 6/17/2019 Adelina Jones, PT GP 1          PT G-Codes  Outcome Measure Options: AM-PAC 6 Clicks Daily Activity (OT)  AM-PAC 6 Clicks Score: 20  Score: 21      Adelina Jones, PT  6/17/2019

## 2019-06-18 NOTE — PLAN OF CARE
Problem: Patient Care Overview  Goal: Plan of Care Review  Outcome: Ongoing (interventions implemented as appropriate)   06/18/19 9470   Coping/Psychosocial   Plan of Care Reviewed With patient   Plan of Care Review   Progress improving   OTHER   Outcome Summary Pt walked 240' with sba, independent with bed and transfers, independent with dressing tasks. Pt is being d/c home

## 2019-06-18 NOTE — PLAN OF CARE
Problem: Patient Care Overview  Goal: Plan of Care Review  Outcome: Ongoing (interventions implemented as appropriate)   06/17/19 2056   Coping/Psychosocial   Plan of Care Reviewed With patient   OTHER   Outcome Summary Patient participates well in PT evaluation and demonstrates decreased activity tolerance, strength, transfers, balance and gait. She is expected to benefit from continued PT services while hospitalized.

## 2019-06-18 NOTE — PLAN OF CARE
Problem: Patient Care Overview  Goal: Plan of Care Review  Outcome: Ongoing (interventions implemented as appropriate)   06/18/19 0208   Coping/Psychosocial   Plan of Care Reviewed With patient   Plan of Care Review   Progress improving   OTHER   Outcome Summary Pt independent with most adl's, requireing SBA with transfers and ambulation. Pt motivated to return home with home health. Has c/o chronic pain, reports med management sufficient. Cont to assist pt in prep for dc       Problem: Pain, Chronic (Adult)  Goal: Acceptable Pain/Comfort Level and Functional Ability  Outcome: Ongoing (interventions implemented as appropriate)      Problem: Pneumonia (Adult)  Goal: Signs and Symptoms of Listed Potential Problems Will be Absent, Minimized or Managed (Pneumonia)  Outcome: Ongoing (interventions implemented as appropriate)      Problem: Hospitalized Acutely Ill Older (Adult)  Goal: Signs and Symptoms of Listed Potential Problems Will be Absent, Minimized or Managed (Hospitalized Acutely Ill Older)  Outcome: Ongoing (interventions implemented as appropriate)

## 2019-06-18 NOTE — DISCHARGE PLACEMENT REQUEST
"Ms. Hernandes is a current client of [facility]. Discharging home today. Please call with any questions, resumption order for home health and therapy notes attached. Thank you!    JONATHAN Santiago  Phone: [phone]      Joelle Hernandes (73 y.o. Female)     Date of Birth Social Security Number Address Home Phone MRN    1945  [address] [id number] [phone] [id number]    Confucianist Marital Status          Confucianism Single       Admission Date Admission Type Admitting Provider Attending Provider Department, Room/Bed    6/16/19 Emergency Hanna Hinds MD Manivannan, Suganya, MD [facility] SURG  4, 407/1    Discharge Date Discharge Disposition Discharge Destination         [place] [facility]              Attending Provider:  Hanna Hinds MD    Allergies:  Dust Mite Extract, Grass, Mold Extract [Trichophyton]    Isolation:  None   Infection:  None   Code Status:  CPR    Ht:  152.4 cm (60\")   Wt:  42.3 kg (93 lb 4.1 oz)    Admission Cmt:  None   Principal Problem:  Pneumonia of both lungs due to infectious organism [J18.9]                 Active Insurance as of 6/16/2019     Primary Coverage     Payor Plan Insurance Group Employer/Plan Group    HUMANA MEDICARE REPLACEMENT HUMANA MEDICARE REPL [id number]     Payor Plan Address Payor Plan Phone Number Payor Plan Fax Number Effective Dates    [address] [address] [phone]  1/1/2018 - None Entered    [facility] [id number]       Subscriber Name Subscriber Birth Date Member ID       JOELLE HERNANDES 1945 [id number]           Secondary Coverage     Payor Plan Insurance Group Employer/Plan Group    [place]      Payor Plan Address Payor Plan Phone Number Payor Plan Fax Number Effective Dates    [address] [phone]  3/1/2016 - None Entered    [facility] [id number]       Subscriber Name Subscriber Birth Date Member ID       JOELLE HERNANDES 1945 [id number]           " "      Emergency Contacts      (Rel.) Home Phone Work Phone Mobile Phone    Cayetano Yee (Brother) [phone] -- [phone]    Doug Julien (Son) [phone] -- [phone]    Adia Rhodes (Daughter) [phone] -- --    Margaret Julien (Other) [phone] -- [phone]               History & Physical      Hanna Hinds MD at 2019  4:24 PM          [facility] HISTORY & PHYSICAL    Name: Joelle Yee, 73 y.o. female  MRN: [id number], : 1945   Date of Admission: 2019   PCP: Blanquita Clements PA     Chief Complaint: cough     History of Present Illness:  Joelle Yee is a(n) 73 y.o. year old female with a history of bronchiectasis, COPD, on 2LPM NC continuous, depression, HTN, HLD, gout, GERD, OA who was admitted on 2019 from home to [facility] ER with cough and shortness of breath x 2 weeks.  Completed course of amoxicillin and steroids x 7 days without improvement, now on 7 day course of levaquin.  Has completed 6 out of 7 days.  Has occasional chills, wheezing, productive cough \"all colors\", using inhaler 4x/day, compliant with medications.  She is using her flutter valve and home percussive therapy without improvement.  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    Vitals on admission notable for: temp 97.9F; ; RR 20; /64; O2 sat 92% on NC.  Labs on admission notable for: WBC 12K; procal neg x 1; ; trop neg x 1; Glc 100.  Patient was given decadron, duoneb, magnesium sulfate, azithromycin, rocephin in ER.  Studies on admission notable for: CXR: \"1. Stable bilateral interstitial disease with no new area of consolidation. 2. New 16 mm right suprahilar opacity, recommend follow-up two-view chest x-ray in 1-2 weeks to document resolution.\".  CT chest: \"Bilateral pneumonia.  Recommend follow-up until resolution  COPD Gastric wall thickening suggesting gastritis\"  EKG: (my read), compared to EKG from: " 11/30/18. Rate: 95bpm / Rhythm: NSR / Axis: nl / ST/T changes: no / Hypertrophy: no / QTc: 374ms     Patient seen at 424pm on 6/16/2019. History was provided by: chart review, discussion with ER provider (Dr. Doherty), and patient.     Recent hospitalization?:  Last hospitalization 11/2018 for COPD exacerbation    =========================================================================  History:   ROS:  Chronic intermittent diarrhea; all other systems reviewed and are negative  PMHx: Patient  has a past medical history of Abdominal pain, Acute bronchitis, Ankle pain, Anxiety (1980), Atopic dermatitis, Bronchiectasis (CMS/[facility]), Cataract, bilateral, Chest pain, Chronic obstructive lung disease (CMS/HCC) (2008), Colon cancer screening, COPD (chronic obstructive pulmonary disease) (CMS/HCC), Cystocele, Depressed, Depression (1980), Fatigue, Fibromyalgia (2008), Full dentures, GERD (gastroesophageal reflux disease), Gout, Heartburn, High cholesterol (2012), Hip pain, Hyperlipidemia, Hypertension, Insomnia, Joint pain, Kidney infection, Loss of appetite, Low back pain (1995), Melena, Nausea and vomiting, On home oxygen therapy, Osteoarthritis (2010), Pain in limb, Palpitations, Pinched nerve, Pneumonia, Problems with swallowing, Recurrent urinary tract infection, Sciatica (1995-5435), Shortness of breath, Sinus problem, Sleep apnea (1998), Upset stomach, Valvular heart disease, Vitamin B12 deficiency, Wears glasses, and Weight loss, abnormal.   PSHx: Patient  has a past surgical history that includes Appendectomy (1965); Back surgery (2005); Knee surgery (Left, 1979); Tubal ligation (1971); Cholecystectomy (1985); Abdominal hernia repair (2016); Cataract extraction (Bilateral); Esophagogastroduodenoscopy; Colonoscopy; and Bronchoscopy (N/A, 7/5/2018).   FamHx: Patient family history includes Cancer in her mother; Heart disease in her brother; Lung cancer in her father; Ovarian cancer in her mother.   SocHx: Patient   reports that she quit smoking about 1 years ago. She smoked 0.00 packs per day for 35.00 years. She has never used smokeless tobacco. She reports that she does not drink alcohol or use drugs.   Allergies: Patient is allergic to dust mite extract; grass; and mold extract [trichophyton].   Medications:   No current facility-administered medications on file prior to encounter.      Current Outpatient Medications on File Prior to Encounter   Medication Sig Dispense Refill   • gabapentin (NEURONTIN) 300 MG capsule Take 300 mg by mouth 3 (Three) Times a Day.     • acetaminophen (TYLENOL) 325 MG tablet Take 2 tablets by mouth Every 4 (Four) Hours As Needed for Headache or Fever (temperature greater than 101.5 F).     • ammonium lactate (LAC-HYDRIN) 12 % cream Apply  topically to the appropriate area as directed 2 (Two) Times a Day. 1 each 0   • Camphor-Eucalyptus-Menthol (MEDICATED CHEST RUB EX)      • Cyanocobalamin (VITAMIN B12 PO) Take 500 mcg by mouth Daily.     • cyclobenzaprine (FLEXERIL) 10 MG tablet Take 10 mg by mouth Daily.     • Diclofenac Sodium (VOLTAREN TD) Apply 2 g topically to the appropriate area as directed 2 (Two) Times a Day As Needed. Voltaren GEL      • DIPHENHIST 12.5 MG/5ML liquid MOUTH WASH  0   • FERROUS SULFATE PO      • fluconazole (DIFLUCAN) 150 MG tablet TAKE ONE TABLET BY MOUTH ON DAY ONE THEN 1 TAB IN 3 DAYS  0   • Fluticasone-Umeclidin-Vilant (TRELEGY ELLIPTA) 100-62.5-25 MCG/INH aerosol powder  Inhale 1 each Daily. Rinse mouth with water after use. 1 each 5   • guaiFENesin (MUCINEX) 600 MG 12 hr tablet Take 1 tablet by mouth Every 12 (Twelve) Hours. 20 tablet 0   • HYDROcodone-acetaminophen (NORCO)  MG per tablet Take 1 tablet by mouth Every 12 (Twelve) Hours As Needed (Patient will take 3 times daily occasionally).     • ipratropium-albuterol (DUO-NEB) 0.5-2.5 mg/3 ml nebulizer TAKE 3 ML BY NEBULIZATION 4 (FOUR) TIMES A DAY AS NEEDED FOR WHEEZING OR SHORTNESS OF AIR. 360 mL 5   •  lidocaine viscous (XYLOCAINE) 2 % solution TAKE 2 TSP GARGLE SWISH AND SPIT EVERY 2 TO 3 HOURS  0   • LORazepam (ATIVAN) 0.5 MG tablet Take 0.5 mg by mouth 2 (Two) Times a Day As Needed. 1-2 TABLETS DAILY PRN  0   • losartan (COZAAR) 25 MG tablet Take 25 mg by mouth Daily.  3   • MAGNESIUM PO      • melatonin 5 MG tablet tablet Take 2 tablets by mouth At Night As Needed for sleep 60 tablet 0   • MELATONIN PO      • metoprolol tartrate (LOPRESSOR) 25 MG tablet Take 1 tablet by mouth Every 12 (Twelve) Hours. 60 tablet 0   • mirtazapine (REMERON) 30 MG tablet Take 30 mg by mouth Every Night.     • Misc. Devices (ROLLATOR) misc 1 Units As Needed (ambulation assistance). 1 each 0   • Multiple Vitamins-Minerals (MULTIVITAMIN WITH MINERALS) tablet tablet Take 1 tablet by mouth Daily.     • NON FORMULARY      • NON FORMULARY      • NON FORMULARY      • NON FORMULARY      • NON FORMULARY      • NON FORMULARY      • NON FORMULARY      • NON FORMULARY      • NON FORMULARY      • Nutritional Supplements (ENSURE COMPLETE PO) Take 1 can by mouth 2 (Two) Times a Day.     • nystatin (MYCOSTATIN) 530255 UNIT/ML suspension Swish and swallow 5 mL 4 (Four) Times a Day. (Patient taking differently: Swish and swallow 500,000 Units 4 (Four) Times a Day As Needed.) 240 mL 0   • Omega-3 Fatty Acids (FISH OIL PO)      • omeprazole (priLOSEC) 40 MG capsule Take 40 mg by mouth Daily.     • ondansetron ODT (ZOFRAN-ODT) 4 MG disintegrating tablet Take 1 tablet by mouth every 6 (six) hours as needed.     • OXYGEN-HELIUM IN Oxygen; Patient Sig: Oxygen 2 liter as needed; 0; 21-May-2014; Active     • potassium bicarbonate (K-LYTE) 25 MEQ disintegrating tablet Take 25 mEq by mouth Every Night.     • prednisoLONE (PRELONE) 15 MG/5ML solution oral solution MOUTH WASH  0   • Probiotic Product (RISAQUAD-2) capsule capsule Take 1 capsule by mouth Daily.     • Respiratory Therapy Supplies (FLUTTER) device 1 Device as needed (as directed). 1 each 0   •  "traZODone (DESYREL) 25 MG half tablet Take 25 mg by mouth Every Night.     • urea (CARMOL) 40 % cream Apply topically to the appropriate area as directed Daily. May substitute as needed for insurance coverage 85 g 3   • urea (CARMOL) 40 % cream Apply  topically to the appropriate area as directed Every 12 (Twelve) Hours. Apply topically every 12 hours. 85 g 1   • venlafaxine (EFFEXOR) 75 MG tablet Take 1 tablet by mouth 2 (two) times a day.          =========================================================================    Vitals:   /61   Pulse 93   Temp 97.8 °F (36.6 °C) (Oral)   Resp 20   Ht 152.4 cm (60\")   Wt 42.3 kg (93 lb 3.2 oz)   LMP  (LMP Unknown)   SpO2 93%   BMI 18.20 kg/m²     SpO2: 93 %     Intake/Output Summary (Last 24 hours) at 6/16/2019 1624  Last data filed at 6/16/2019 1521  Gross per 24 hour   Intake 50 ml   Output --   Net 50 ml        Physical Exam:   General Appearance:  Thin elderly female; Alert and cooperative, not in any acute distress. Coughs occasionally   Head:  Atraumatic and normocephalic, without obvious abnormality.   Eyes:          PERRL, conjunctivae and sclerae normal, no Icterus. No pallor. Extraocular movements are within normal limits.   Ears:  Ears appear intact with no abnormalities noted.   Throat: No oral lesions, oral mucosa moist. Upper and lower dentures in place; mild erythema of hard palate ?early thrush   Neck: Supple, trachea midline   Back:   No kyphoscoliosis present. range of motion normal.   Lungs:   Bilateral rhonchi with prolonged expiratory phase wheezing   Heart:  Normal S1 and S2, no murmur, no gallop, no rub.   Abdomen:   Normal bowel sounds, no masses. Soft, non-tender, non-distended, no guarding, no rebound tenderness.   Genitourinary: deferred   Extremities: Moves all extremities well, no edema, no cyanosis, no clubbing.   Pulses: Pulses palpable and equal bilaterally.   Skin: No bleeding or rash.  Bruising of shins; thin skin   Lymph " nodes: No palpable adenopathy.   Neurologic: Alert and oriented x 3. Moves all four limbs equally. No tremors. No facial asymmetry.       Labs:   Results from last 7 days   Lab Units 06/16/19  1256   SODIUM mmol/L 135*   POTASSIUM mmol/L 4.3   CHLORIDE mmol/L 97*   CO2 mmol/L 29.0   BUN mg/dL 12   CREATININE mg/dL 0.60   CALCIUM mg/dL 9.0   BILIRUBIN mg/dL 0.3   ALK PHOS U/L 113   ALT (SGPT) U/L 18   AST (SGOT) U/L 26   GLUCOSE mg/dL 100*     Results from last 7 days   Lab Units 06/16/19  1256   WBC 10*3/mm3 12.29*   HEMOGLOBIN g/dL 13.6   HEMATOCRIT % 42.1   PLATELETS 10*3/mm3 356         Results from last 7 days   Lab Units 06/16/19  1256   TROPONIN I ng/mL <0.012     Results from last 7 days   Lab Units 06/16/19  1256   PROBNP pg/mL 178.0*     =========================================================================  Assessment/Plan:   Joelle Yee is a(n) 73 y.o. year old female with:     1. Bilateral community acquired bacterial pneumonia, POA  2. Acute exacerbation of COPD, POA  3. Depression  4. HTN  5. HLD  6. Gout  7. GERD  8. OA      Admit to med/surge.  Continue rocephin and azithromycin.  Check sputum culture.  Start duonebs, IV steroids, mucolytics.  Will consult Dr. Esparza in AM.  Repeat pro-dino in AM.  Check resp panel.  Home medications reviewed with patient.  Further recommendations pending clinical course.        F/E/N: regular  DVT PPx: SCDs  GI PPx: not indicated    Code Status: FULL CODE - per patient  NOK: daughter   Dispo: admission, inpatient, need for hospitalization: above    Assessment and plan was discussed with the patient in ER. All questions were answered.     Time in: 424pm Time out: 517pm  Date patient seen: 6/16/2019     Hanna Hinds MD   4:24 PM on 6/16/2019    Electronically signed by Hanna Hinds MD at 6/16/2019  5:21 PM       Physician Progress Notes (last 24 hours) (Notes from 6/17/2019 10:56 AM through 6/18/2019 10:56 AM)     No notes of this type exist for  this encounter.           Physical Therapy Notes (last 24 hours) (Notes from 2019 10:56 AM through 2019 10:56 AM)      Adelina Jones, PT at 2019  9:02 PM  Version 1 of 1         Problem: Patient Care Overview  Goal: Plan of Care Review  Outcome: Ongoing (interventions implemented as appropriate)   19   Coping/Psychosocial   Plan of Care Reviewed With patient   OTHER   Outcome Summary Patient participates well in PT evaluation and demonstrates decreased activity tolerance, strength, transfers, balance and gait. She is expected to benefit from continued PT services while hospitalized.           Electronically signed by Adelina Jones, PT at 2019  9:02 PM     Adelina Jones, PT at 2019  9:03 PM  Version 1 of 1         Acute Care - Physical Therapy Initial Evaluation   White     Patient Name: Joelle Yee  : 1945  MRN: [id number]  Today's Date: 2019   Onset of Illness/Injury or Date of Surgery: 19  Date of Referral to PT: 19  Referring Physician: Dr. Hinds      Admit Date: 2019    Visit Dx:     ICD-10-CM ICD-9-CM   1. Pneumonia of both lungs due to infectious organism, unspecified part of lung J18.9 483.8   2. Chronic obstructive pulmonary disease, unspecified COPD type (CMS/[facility]) J44.9 496   3. Impaired functional mobility, balance, gait, and endurance Z74.09 V49.89   4. Impaired mobility and ADLs Z74.09 799.89     Patient Active Problem List   Diagnosis   • Dyspepsia   • Esophageal reflux   • Anxiety   • High cholesterol   • Bipolar disorder (CMS/[facility])   • COPD (chronic obstructive pulmonary disease) (CMS/[facility])   • Depression   • Gout   • Hyperlipidemia   • Insomnia   • Osteoarthritis   • Sciatica   • Sleep apnea   • Vitamin B 12 deficiency   • Pancolitis (CMS/[facility])   • Acute cystitis without hematuria   • C. difficile colitis   • Chronic bronchitis with COPD (chronic obstructive pulmonary disease) (CMS/[facility])   • Pneumonia of right lower lobe due  to infectious organism (CMS/HCC)   • COPD exacerbation (CMS/HCC)   • Pneumonia involving right lung   • COPD with acute exacerbation (CMS/HCC)   • Chronic respiratory failure with hypoxia (CMS/HCC)   • Abdominal pain   • Diarrhea   • Heartburn   • Loss of appetite   • Melena   • Nausea and vomiting   • Pseudogout   • Upset stomach   • Abnormal weight loss   • Chronic obstructive pulmonary disease with acute lower respiratory infection (CMS/HCC)   • Right upper lobe pneumonia (CMS/HCC)   • Acute on chronic respiratory failure with hypoxia (CMS/HCC)   • Moderate malnutrition (CMS/HCC)   • Acute exacerbation of chronic obstructive pulmonary disease (COPD) (CMS/HCC)   • Bronchiectasis without complication (CMS/HCC)   • Pneumonia due to gram-negative bacteria (CMS/HCC)   • Sinus tachycardia   • Leukocytosis   • Serratia marcescens infection (CMS/HCC)   • Pneumonia of both lower lobes due to infectious organism (CMS/HCC)   • Acute kidney injury (CMS/HCC)   • BMI less than 19,adult   • Anemia   • Pneumonia of both lungs due to infectious organism   • COPD with acute exacerbation (CMS/[facility])     Past Medical History:   Diagnosis Date   • Abdominal pain    • Acute bronchitis    • Ankle pain    • Anxiety 1980   • Atopic dermatitis    • Bronchiectasis (CMS/HCC)    • Cataract, bilateral    • Chest pain     STATES HAS OCCASSIONALLY.  STATES LAST TIME WAS LAST WEEK.  STATES SEES DR. RICH.  STATES HE HAS DONE NUMEROUS TESTS ON HER AND HAS NOT BEEN ABLE TO FIND OUT CAUSE.     • Chronic obstructive lung disease (CMS/HCC) 2008   • Colon cancer screening    • COPD (chronic obstructive pulmonary disease) (CMS/[facility])    • Cystocele    • Depressed    • Depression 1980   • Fatigue     Chronic pafigue 2012   • Fibromyalgia 2008   • Full dentures    • GERD (gastroesophageal reflux disease)    • Gout    • Heartburn     Chronic historu of epigastric heartburn currently controlled on Prilesec 40 mg every morning along with Zantac 300 Mg daily at  bedtime   • High cholesterol 2012   • Hip pain    • Hyperlipidemia    • Hypertension    • Insomnia    • Joint pain    • Kidney infection    • Loss of appetite    • Low back pain 1995   • Melena    • Nausea and vomiting    • On home oxygen therapy     2L/NC PRN (STATES SHE HAS BEEN USING CONTINUOSLY LATELY)   • Osteoarthritis 2010   • Pain in limb    • Palpitations    • Pinched nerve     Pinched nerves 2011   • Pneumonia    • Problems with swallowing     HAS TO EAT SLOW AND CHEW FOOD WELL   • Recurrent urinary tract infection    • Sciatica 7301-3279   • Shortness of breath    • Sinus problem    • Sleep apnea 1998    NO CPAP   • Upset stomach    • Valvular heart disease    • Vitamin B12 deficiency    • Wears glasses    • Weight loss, abnormal     Weight loss is stabel. On pund weight gain since 01/2016     Past Surgical History:   Procedure Laterality Date   • ABDOMINAL HERNIA REPAIR  2016   • APPENDECTOMY  1965   • BACK SURGERY  2005   • BRONCHOSCOPY N/A 7/5/2018    Procedure: BRONCHOSCOPY w/ WASHINGS/BRUSHINGS with MAC;  Surgeon: Rebeka Esparza MD;  Location: [facility];  Service: Pulmonary   • CATARACT EXTRACTION Bilateral     Put in implants    • CHOLECYSTECTOMY  1985   • COLONOSCOPY     • ENDOSCOPY     • KNEE SURGERY Left 1979    Knee Cap   • TUBAL ABDOMINAL LIGATION  1971        PT ASSESSMENT (last 12 hours)      Physical Therapy Evaluation     Row Name 06/17/19 1022          PT Evaluation Time/Intention    Subjective Information  complains of;pain  -JR     Document Type  evaluation  -JR     Mode of Treatment  physical therapy  -JR     Patient Effort  good  -JR     Symptoms Noted During/After Treatment  shortness of breath  -JR     Comment  2 LPM per nasal cannula  -JR     Row Name 06/17/19 1022          General Information    Patient Profile Reviewed?  yes  -JR     Onset of Illness/Injury or Date of Surgery  06/16/19  -JR     Referring Physician  Dr. Hinds  -JR     Patient Observations  alert;agree to  therapy;cooperative  -JR     Patient/Family Observations  No family present  -JR     General Observations of Patient  Sitting on the edge of the bed  -JR     Prior Level of Function  independent:;all household mobility  -JR     Equipment Currently Used at Home  rollator;oxygen;walker, standard;shower chair;hospital bed;grab bar  -JR     Pertinent History of Current Functional Problem  Diagnoses as above  -JR     Existing Precautions/Restrictions  oxygen therapy device and L/min;fall  -JR     Risks Reviewed  patient:;increased discomfort;change in vital signs  -JR     Benefits Reviewed  patient:;increase balance;increase strength;increase independence;improve function  -JR     Row Name 06/17/19 1022          Relationship/Environment    Primary Source of Support/Comfort  child(cain);extended family;nonrelative caregiver  -JR     Name of Support/Comfort Primary Source  [employer] waiver program  -JR     Lives With  alone  -     Row Name 06/17/19 1022          Resource/Environmental Concerns    Current Living Arrangements  home/apartment/condo  -     Row Name 06/17/19 1022          Home Main Entrance    Number of Stairs, Main Entrance  one  -JR     Stairs Comment, Main Entrance  out back door, doesn't usually go that way  -JR     Row Name 06/17/19 1022          Cognitive Assessment/Interventions    Additional Documentation  Cognitive Assessment/Intervention (Group)  -     Row Name 06/17/19 1022          Cognitive Assessment/Intervention- PT/OT    Affect/Mental Status (Cognitive)  WFL  -JR     Orientation Status (Cognition)  oriented x 4  -JR     Follows Commands (Cognition)  WFL  -JR     Cognitive Function (Cognitive)  WFL  -JR     Safety Deficit (Cognitive)  awareness of need for assistance  -JR     Personal Safety Interventions  fall prevention program maintained;gait belt;nonskid shoes/slippers when out of bed  -JR     Row Name 06/17/19 1022          Safety Issues, Functional Mobility    Safety Issues Affecting  Function (Mobility)  insight into deficits/self awareness  -     Impairments Affecting Function (Mobility)  shortness of breath;endurance/activity tolerance;strength  -JR     Row Name 06/17/19 1022          Bed Mobility Assessment/Treatment    Bed Mobility Assessment/Treatment  bed mobility (all) activities  -     [place]oca Level (Bed Mobility)  conditional independence  -     Assistive Device (Bed Mobility)  head of bed elevated  -JR     Row Name 06/17/19 1022          Transfer Assessment/Treatment    Transfer Assessment/Treatment  sit-stand transfer;stand-sit transfer  -     Sit-Stand Moca (Transfers)  supervision  -     Stand-Sit Moca (Transfers)  supervision  -JR     Row Name 06/17/19 1022          Gait/Stairs Assessment/Training    Gait/Stairs Assessment/Training  gait/ambulation assistive device  -     [place]oca Level (Gait)  contact guard  -     Distance in Feet (Gait)  3  -     Pattern (Gait)  step-through  -     Deviations/Abnormal Patterns (Gait)  stride length decreased;gait speed decreased;victorino decreased  -     Bilateral Gait Deviations  heel strike decreased;forward flexed posture  -JR     Row Name 06/17/19 1022          General ROM    GENERAL ROM COMMENTS  BLE = WFL  -JR     Row Name 06/17/19 1022          MMT (Manual Muscle Testing)    General MMT Comments  BLE = 3+/5  -JR     Row Name 06/17/19 1022          Pain Assessment    Additional Documentation  Pain Scale: Numbers Pre/Post-Treatment (Group)  -JR     Row Name 06/17/19 1022          Pain Scale: Numbers Pre/Post-Treatment    Pain Scale: Numbers, Pretreatment  7/10  -     Pain Scale: Numbers, Post-Treatment  7/10  -     Pain Location - Orientation  generalized  -     Pre/Post Treatment Pain Comment  normal pain level per patient report  -     Pain Intervention(s)  Repositioned;Ambulation/increased activity  -JR     Row Name 06/17/19 1022          Coping    Observed Emotional State   pleasant;cooperative  -     Row Name 06/17/19 1022          Plan of Care Review    Plan of Care Reviewed With  patient  -[facility] Name 06/17/19 1022          Physical Therapy Clinical Impression    Date of Referral to PT  06/17/19  -     Patient/Family Goals Statement (PT Clinical Impression)  Patient wants to get stronger and go home  -     Criteria for Skilled Interventions Met (PT Clinical Impression)  yes;treatment indicated  -     Rehab Potential (PT Clinical Summary)  good, to achieve stated therapy goals  -     Care Plan Review (PT)  evaluation/treatment results reviewed;care plan/treatment goals reviewed;risks/benefits reviewed;current/potential barriers reviewed;patient/other agree to care plan  -[facility] Name 06/17/19 1022          Vital Signs    Pre SpO2 (%)  95  -JR     O2 Delivery Pre Treatment  supplemental O2 2LPM  -JR     Intra SpO2 (%)  91  -JR     O2 Delivery Intra Treatment  supplemental O2 2 LPM  -JR     O2 Delivery Post Treatment  supplemental O2 2 LPM  -[facility] Name 06/17/19 1022          Physical Therapy Goals    Transfer Goal Selection (PT)  transfer, PT goal 1  -     Gait Training Goal Selection (PT)  gait training, PT goal 1  -[facility] Name 06/17/19 1022          Transfer Goal 1 (PT)    Activity/Assistive Device (Transfer Goal 1, PT)  sit-to-stand/stand-to-sit  -JR     [place] Level/Cues Needed (Transfer Goal 1, PT)  conditional independence  -JR     Time Frame (Transfer Goal 1, PT)  2 weeks  -JR     Progress/Outcome (Transfer Goal 1, PT)  goal ongoing  -[facility] Name 06/17/19 1022          Gait Training Goal 1 (PT)    Activity/Assistive Device (Gait Training Goal 1, PT)  gait (walking locomotion);increase endurance/gait distance  -JR     [place] Level (Gait Training Goal 1, PT)  conditional independence  -JR     Distance (Gait Goal 1, PT)  400  -JR     Time Frame (Gait Training Goal 1, PT)  2 weeks  -JR     Progress/Outcome (Gait Training Goal 1, PT)  goal  ongoing  -JR     Row Name 06/17/19 1022          Patient Education Goal (PT)    Activity (Patient Education Goal, PT)  Perform HEP x 15  -JR     [place]/Cues/Accuracy (Memory Goal 2, PT)  demonstrates adequately  -JR     Time Frame (Patient Education Goal, PT)  2 weeks  -JR     Progress/Outcome (Patient Education Goal, PT)  goal ongoing  -JR     Row Name 06/17/19 1022          Positioning and Restraints    Pre-Treatment Position  in bed  -JR     Post Treatment Position  chair  -JR     In Chair  sitting;call light within reach;encouraged to call for assist  -JR     Row Name 06/17/19 1022          Living Environment    Home Accessibility  stairs to enter home  -JR       User Key  (r) = Recorded By, (t) = Taken By, (c) = Cosigned By    Initials Name Provider Type    Adelina Munguia, MARIEL Physical Therapist        Physical Therapy Education     Title: PT OT SLP Therapies (In Progress)     Topic: Physical Therapy (In Progress)     Point: Mobility training (Done)     Learning Progress Summary           Patient Acceptance, E,TB, VU by  at 6/17/2019  8:56 PM    Comment:  Role of PT                               User Key     Initials Effective Dates Name Provider Type Discipline     04/03/18 -  Adelina Jones, MARIEL Physical Therapist PT              PT Recommendation and Plan  Anticipated Discharge Disposition (PT): home with home health  Planned Therapy Interventions (PT Eval): balance training, strengthening, bed mobility training, transfer training, gait training, home exercise program, patient/family education  Therapy Frequency (PT Clinical Impression): daily  Outcome Summary/Treatment Plan (PT)  Anticipated Discharge Disposition (PT): home with home health  Plan of Care Reviewed With: patient  Outcome Summary: Patient participates well in PT evaluation and demonstrates decreased activity tolerance, strength, transfers, balance and gait.  She is expected to benefit from continued PT services while  hospitalized.  Outcome Measures     Row Name 06/17/19 1023 06/17/19 1022          How much help from another person do you currently need...    Turning from your back to your side while in flat bed without using bedrails?  --  4  -JR     Moving from lying on back to sitting on the side of a flat bed without bedrails?  --  4  -JR     Moving to and from a bed to a chair (including a wheelchair)?  --  3  -JR     Standing up from a chair using your arms (e.g., wheelchair, bedside chair)?  --  3  -JR     Climbing 3-5 steps with a railing?  --  3  -JR     To walk in hospital room?  --  3  -JR     AM-PAC 6 Clicks Score  --  20  -JR        How much help from another is currently needed...    Putting on and taking off regular lower body clothing?  3  -AH  --     Bathing (including washing, rinsing, and drying)  3  -AH  --     Toileting (which includes using toilet bed pan or urinal)  3  -AH  --     Putting on and taking off regular upper body clothing  4  -AH  --     Taking care of personal grooming (such as brushing teeth)  4  -AH  --     Eating meals  4  -  --     Score  21  -AH  --        Functional Assessment    Outcome Measure Options  AM-PAC 6 Clicks Daily Activity (OT)  -  AM-[facility] 6 Clicks Basic Mobility (PT)  -       User Key  (r) = Recorded By, (t) = Taken By, (c) = Cosigned By    Initials Name Provider Type    Jonna Oneill Occupational Therapist    Adelina Munguia, PT Physical Therapist         Time Calculation:   PT Charges     Row Name 06/17/19 2102             Time Calculation    Start Time  1022  -JR      PT Received On  06/17/19  -      PT Goal Re-Cert Due Date  06/27/19  -        User Key  (r) = Recorded By, (t) = Taken By, (c) = Cosigned By    Initials Name Provider Type    Adelina Munguia PT Physical Therapist        Therapy Charges for Today     Code Description Service Date Service Provider Modifiers Qty    [id number]  PT EVAL LOW COMPLEXITY 3 6/17/2019 Adelina Jones, PT GP 1          PT  G-Codes  Outcome Measure Options: AM-PAC 6 Clicks Daily Activity (OT)  AM-PAC 6 Clicks Score: 20  Score: 21      Adelina Jones, PT  2019         Electronically signed by Adelina Jones, PT at 2019  9:03 PM          Occupational Therapy Notes (last 24 hours) (Notes from 2019 10:56 AM through 2019 10:56 AM)      Jonna Callahan at 2019  1:33 PM          Acute Care - Occupational Therapy Initial Evaluation   White     Patient Name: Joelle Yee  : 1945  MRN: [id number]  Today's Date: 2019  Onset of Illness/Injury or Date of Surgery: 19  Date of Referral to OT: 19  Referring Physician: Dr. Hinds    Admit Date: 2019       ICD-10-CM ICD-9-CM   1. Pneumonia of both lungs due to infectious organism, unspecified part of lung J18.9 483.8   2. Chronic obstructive pulmonary disease, unspecified COPD type (CMS/[facility]) J44.9 496   3. Impaired functional mobility, balance, gait, and endurance Z74.09 V49.89   4. Impaired mobility and ADLs Z74.09 799.89     Patient Active Problem List   Diagnosis   • Dyspepsia   • Esophageal reflux   • Anxiety   • High cholesterol   • Bipolar disorder (CMS/[facility])   • COPD (chronic obstructive pulmonary disease) (CMS/[facility])   • Depression   • Gout   • Hyperlipidemia   • Insomnia   • Osteoarthritis   • Sciatica   • Sleep apnea   • Vitamin B 12 deficiency   • Pancolitis (CMS/[facility])   • Acute cystitis without hematuria   • C. difficile colitis   • Chronic bronchitis with COPD (chronic obstructive pulmonary disease) (CMS/[facility])   • Pneumonia of right lower lobe due to infectious organism (CMS/[facility])   • COPD exacerbation (CMS/[facility])   • Pneumonia involving right lung   • COPD with acute exacerbation (CMS/[facility])   • Chronic respiratory failure with hypoxia (CMS/[facility])   • Abdominal pain   • Diarrhea   • Heartburn   • Loss of appetite   • Melena   • Nausea and vomiting   • Pseudogout   • Upset stomach   • Abnormal weight loss   • Chronic obstructive  pulmonary disease with acute lower respiratory infection (CMS/HCC)   • Right upper lobe pneumonia (CMS/HCC)   • Acute on chronic respiratory failure with hypoxia (CMS/HCC)   • Moderate malnutrition (CMS/HCC)   • Acute exacerbation of chronic obstructive pulmonary disease (COPD) (CMS/HCC)   • Bronchiectasis without complication (CMS/HCC)   • Pneumonia due to gram-negative bacteria (CMS/HCC)   • Sinus tachycardia   • Leukocytosis   • Serratia marcescens infection (CMS/HCC)   • Pneumonia of both lower lobes due to infectious organism (CMS/HCC)   • Acute kidney injury (CMS/HCC)   • BMI less than 19,adult   • Anemia   • Pneumonia of both lungs due to infectious organism   • COPD with acute exacerbation (CMS/HCC)     Past Medical History:   Diagnosis Date   • Abdominal pain    • Acute bronchitis    • Ankle pain    • Anxiety 1980   • Atopic dermatitis    • Bronchiectasis (CMS/HCC)    • Cataract, bilateral    • Chest pain     STATES HAS OCCASSIONALLY.  STATES LAST TIME WAS LAST WEEK.  STATES SEES DR. RICH.  STATES HE HAS DONE NUMEROUS TESTS ON HER AND HAS NOT BEEN ABLE TO FIND OUT CAUSE.     • Chronic obstructive lung disease (CMS/HCC) 2008   • Colon cancer screening    • COPD (chronic obstructive pulmonary disease) (CMS/HCC)    • Cystocele    • Depressed    • Depression 1980   • Fatigue     Chronic pafigue 2012   • Fibromyalgia 2008   • Full dentures    • GERD (gastroesophageal reflux disease)    • Gout    • Heartburn     Chronic historu of epigastric heartburn currently controlled on Prilesec 40 mg every morning along with Zantac 300 Mg daily at bedtime   • High cholesterol 2012   • Hip pain    • Hyperlipidemia    • Hypertension    • Insomnia    • Joint pain    • Kidney infection    • Loss of appetite    • Low back pain 1995   • Melena    • Nausea and vomiting    • On home oxygen therapy     2L/NC PRN (STATES SHE HAS BEEN USING CONTINUOSLY LATELY)   • Osteoarthritis 2010   • Pain in limb    • Palpitations    • Pinched  nerve     Pinched nerves 2011   • Pneumonia    • Problems with swallowing     HAS TO EAT SLOW AND CHEW FOOD WELL   • Recurrent urinary tract infection    • Sciatica [id number]   • Shortness of breath    • Sinus problem    • Sleep apnea 1998    NO CPAP   • Upset stomach    • Valvular heart disease    • Vitamin B12 deficiency    • Wears glasses    • Weight loss, abnormal     Weight loss is stabel. On pund weight gain since 01/2016     Past Surgical History:   Procedure Laterality Date   • ABDOMINAL HERNIA REPAIR  2016   • APPENDECTOMY  1965   • BACK SURGERY  2005   • BRONCHOSCOPY N/A 7/5/2018    Procedure: BRONCHOSCOPY w/ WASHINGS/BRUSHINGS with MAC;  Surgeon: Rebeka Esparza MD;  Location: [facility] OR;  Service: Pulmonary   • CATARACT EXTRACTION Bilateral     Put in implants    • CHOLECYSTECTOMY  1985   • COLONOSCOPY     • ENDOSCOPY     • KNEE SURGERY Left 1979    Knee Cap   • TUBAL ABDOMINAL LIGATION  1971          OT ASSESSMENT FLOWSHEET (last 12 hours)      Occupational Therapy Evaluation     Row Name 06/17/19 1023                   OT Evaluation Time/Intention    Subjective Information  complains of;pain  -        Document Type  evaluation  -        Mode of Treatment  occupational therapy  -        Patient Effort  good  -        Symptoms Noted During/After Treatment  shortness of breath  -        Comment  Pt on 2L O2 via nc during session  -           General Information    Patient Profile Reviewed?  yes  -        Onset of Illness/Injury or Date of Surgery  06/16/19  -        Referring Physician  Dr. Hinds  -        Patient Observations  alert;cooperative;agree to therapy  -        Patient/Family Observations  Pt alone  -        General Observations of Patient  Pt received sitting at eob on 2L O2 via nc  -        Prior Level of Function  independent:;all household mobility;dressing;min assist:;bathing  -        Equipment Currently Used at Home  rollator;oxygen;shower chair;walker,  standard;hospital bed;grab bar  -        Pertinent History of Current Functional Problem  B pneumonia, bronchiectasis, COPd on 2L O2, depression, HTN, HLD, gout, GERD, OA  -        Existing Precautions/Restrictions  fall;oxygen therapy device and L/min  -        Risks Reviewed  patient:;increased discomfort  -        Benefits Reviewed  patient:;improve function;increase independence;increase strength  -           Relationship/Environment    Primary Source of Support/Comfort  child(cain);extended family  -        Lives With  alone  -           Resource/Environmental Concerns    Current Living Arrangements  home/apartment/condo  -           Home Main Entrance    Number of Stairs, Main Entrance  one  -        Stairs Comment, Main Entrance  at back door, pt doesn't normally go that way  -           Cognitive Assessment/Intervention- PT/OT    Affect/Mental Status (Cognitive)  [facility]  -        Orientation Status (Cognition)  oriented x 4  -        Follows Commands (Cognition)  [facility]  -        Cognitive Function (Cognitive)  [facility]  -        Safety Deficit (Cognitive)  awareness of need for assistance  -        Personal Safety Interventions  fall prevention program maintained;gait belt;nonskid shoes/slippers when out of bed  -           Safety Issues, Functional Mobility    Impairments Affecting Function (Mobility)  endurance/activity tolerance;shortness of breath  -           Bed Mobility Assessment/Treatment    Bed Mobility Assessment/Treatment  bed mobility (all) activities  -        [place] Level (Bed Mobility)  conditional independence  -        Assistive Device (Bed Mobility)  head of bed elevated  -           Functional Mobility    Functional Mobility- Ind. Level  contact guard assist  -        Functional Mobility-Distance (Feet)  3  -        Functional Mobility- Safety Issues  supplemental O2  -           Transfer Assessment/Treatment    Transfer Assessment/Treatment   sit-stand transfer;stand-sit transfer  -           Sit-Stand Transfer    Sit-Stand [place] (Transfers)  supervision  -           Stand-Sit Transfer    Stand-Sit [place] (Transfers)  supervision  -           ADL Assessment/Intervention    BADL Assessment/Intervention  bathing;upper body dressing;lower body dressing;grooming;feeding;toileting  -           Bathing Assessment/Intervention    Bathing [place] Level  minimum assist (75% patient effort)  -           Upper Body Dressing Assessment/Training    Upper Body Dressing [place] Level  set up  -           Lower Body Dressing Assessment/Training    Lower Body Dressing [place] Level  set up  -           Grooming Assessment/Training    [place] Level (Grooming)  set up  -           Self-Feeding Assessment/Training    [place] Level (Feeding)  set up  -           Toileting Assessment/Training    [place] Level (Toileting)  supervision  -           BADL Safety/Performance    Impairments, BADL Safety/Performance  endurance/activity tolerance;shortness of breath;strength  -           General ROM    GENERAL ROM COMMENTS  BUE WFL  -           MMT (Manual Muscle Testing)    General MMT Comments  BUE 4-/5  -           Positioning and Restraints    Pre-Treatment Position  in bed  -        Post Treatment Position  chair  -        In Chair  sitting;call light within reach;encouraged to call for assist  -           Pain Assessment    Additional Documentation  Pain Scale: Numbers Pre/Post-Treatment (Group)  -           Pain Scale: Numbers Pre/Post-Treatment    Pain Scale: Numbers, Pretreatment  7/10  -        Pain Scale: Numbers, Post-Treatment  7/10  -        Pain Location - Orientation  generalized  -        Pre/Post Treatment Pain Comment  Pt reports always having pain ~7/10  -        Pain Intervention(s)  Repositioned;Ambulation/increased activity  -           Coping    Observed Emotional State   accepting;cooperative  -        Verbalized Emotional State  acceptance  -           Plan of Care Review    Plan of Care Reviewed With  patient  -           Clinical Impression (OT)    Date of Referral to OT  06/16/19  -        OT Diagnosis  generalized weakness  -        Patient/Family Goals Statement (OT Eval)  Pt wants to d/c home  -        Criteria for Skilled Therapeutic Interventions Met (OT Eval)  yes;treatment indicated  -        Rehab Potential (OT Eval)  good, to achieve stated therapy goals  -        Therapy Frequency (OT Eval)  3 times/wk  -        Care Plan Review (OT)  evaluation/treatment results reviewed;patient/other agree to care plan  -        Anticipated Discharge Disposition (OT)  home with home health  -           Vital Signs    Pre SpO2 (%)  95  -        O2 Delivery Pre Treatment  supplemental O2  -        Intra SpO2 (%)  91  -        O2 Delivery Intra Treatment  supplemental O2  -        Post SpO2 (%)  95  -        O2 Delivery Post Treatment  supplemental O2  -           OT Goals    Transfer Goal Selection (OT)  transfer, OT goal 1  -        Dressing Goal Selection (OT)  dressing, OT goal 1  -        Toileting Goal Selection (OT)  toileting, OT goal 1  -        Strength Goal Selection (OT)  strength, OT goal 1  -        Functional Mobility Goal Selection (OT)  functional mobility, OT goal 1  -        Additional Documentation  Strength Goal Selection (OT) (Row);Functional Mobility Selection (OT) (Row)  -           Transfer Goal 1 (OT)    Activity/Assistive Device (Transfer Goal 1, OT)  sit-to-stand/stand-to-sit  -        [place] Level/Cues Needed (Transfer Goal 1, OT)  independent  -        Time Frame (Transfer Goal 1, OT)  by discharge  -        Progress/Outcome (Transfer Goal 1, OT)  goal ongoing  -           Dressing Goal 1 (OT)    Activity/Assistive Device (Dressing Goal 1, OT)  lower body dressing  -        [place]/Cues  Needed (Dressing Goal 1, OT)  supervision required  -        Time Frame (Dressing Goal 1, OT)  by discharge  -        Progress/Outcome (Dressing Goal 1, OT)  goal ongoing  -           Toileting Goal 1 (OT)    Activity/Device (Toileting Goal 1, OT)  adjust/manage clothing;perform perineal hygiene  -        [place] Level/Cues Needed (Toileting Goal 1, OT)  conditional independence  -        Time Frame (Toileting Goal 1, OT)  by discharge  -        Progress/Outcome (Toileting Goal 1, OT)  goal ongoing  -           Strength Goal 1 (OT)    Strength Goal 1 (OT)  Ptwill perform UB strengthening ex using theraband for resistance  -        Time Frame (Strength Goal 1, OT)  by discharge  -        Progress/Outcome (Strength Goal 1, OT)  goal ongoing  -           Functional Mobility Goal 1 (OT)    [place] Level/Cues Needed (Functional Mobility Goal 1, OT)  supervision required  -        Distance Goal 1 (Functional Mobility, OT)  50  -AH        Time Frame (Functional Mobility Goal 1, OT)  by discharge  -        Progress/Outcome (Functional Mobility Goal 1, OT)  goal ongoing  -           Living Environment    Home Accessibility  stairs to enter home  -          User Key  (r) = Recorded By, (t) = Taken By, (c) = Cosigned By    Initials Name Effective Dates    Jonna Oneill 03/07/18 -          Occupational Therapy Education     Title: PT OT SLP Therapies (In Progress)     Topic: Occupational Therapy (In Progress)     Point: ADL training (Done)     Description: Instruct learner(s) on proper safety adaptation and remediation techniques during self care or transfers.   Instruct in proper use of assistive devices.    Learning Progress Summary           Patient Acceptance, E,TB, VU by  at 6/17/2019  1:31 PM    Comment:  Role of OT/POC                               User Key     Initials Effective Dates Name Provider Type Discipline     03/07/18 -  Jonna Callahan Occupational Therapist OT                   OT Recommendation and Plan  Outcome Summary/Treatment Plan (OT)  Anticipated Discharge Disposition (OT): home with home health  Therapy Frequency (OT Eval): 3 times/wk  Plan of Care Review  Plan of Care Reviewed With: patient  Plan of Care Reviewed With: patient  Outcome Summary: OT evaluation completed today.  Pt presents with weakness and decreased endurance for self care and funcitonal mobility tasks.  Pt is expected to benefit from skilled OT to improve her strength and endurance to functional tasks.    Outcome Measures     Row Name 06/17/19 1023             How much help from another is currently needed...    Putting on and taking off regular lower body clothing?  3  -AH      Bathing (including washing, rinsing, and drying)  3  -AH      Toileting (which includes using toilet bed pan or urinal)  3  -AH      Putting on and taking off regular upper body clothing  4  -AH      Taking care of personal grooming (such as brushing teeth)  4  -AH      Eating meals  4  -      Score  21  -         Functional Assessment    Outcome Measure Options  AM-PAC 6 Clicks Daily Activity (OT)  -        User Key  (r) = Recorded By, (t) = Taken By, (c) = Cosigned By    Initials Name Provider Type    Jonna Oneill Occupational Therapist          Time Calculation:   Time Calculation- OT     Row Name 06/17/19 1332             Time Calculation- OT    OT Start Time  1023  -      OT Received On  06/17/19  -      OT Goal Re-Cert Due Date  06/27/19  -        User Key  (r) = Recorded By, (t) = Taken By, (c) = Cosigned By    Initials Name Provider Type    Jonna Oneill Occupational Therapist        Therapy Charges for Today     Code Description Service Date Service Provider Modifiers Qty    [id number]  OT EVAL LOW COMPLEXITY 3 6/17/2019 Jonna Callahan GO 1               Jonna Callahan  6/17/2019    Electronically signed by Jonna Callahan at 6/17/2019  1:33 PM     Jonna Callahan at 6/17/2019  1:32 PM           Problem: Patient Care Overview  Goal: Plan of Care Review  Outcome: Ongoing (interventions implemented as appropriate)   19 1331   Coping/Psychosocial   Plan of Care Reviewed With patient   Plan of Care Review   Progress no change   OTHER   Outcome Summary OT evaluation completed today. Pt presents with weakness and decreased endurance for self care and funcitonal mobility tasks. Pt is expected to benefit from skilled OT to improve her strength and endurance to functional tasks.           Electronically signed by Jonna Callahan at 2019  1:32 PM       [facility] MED SURG  4  [address] [address]  Phone:  [phone]  Fax:   Date: 2019      Ambulatory Referral to Home Health     Patient:  Joelle Yee MRN:  [id number]   [address] [address] :  1945  SSN:    Phone: [phone] Sex:  F      INSURANCE PAYOR PLAN GROUP # SUBSCRIBER ID   Primary:  Secondary:    [employer] MEDICARE REPLACEMENT  [place] MEDICAID [id number]  [id number]    [id number]  [id number]      Referring Provider Information:  ELIZABETH BROWN Phone: [phone] Fax:       Referral Information:   # Visits:  1 Referral Type: Home Health [42]   Urgency:  Routine Referral Reason: Specialty Services Required   Start Date: 2019 End Date:  To be determined by Insurer   Diagnosis: Impaired functional mobility, balance, gait, and endurance (Z74.09 [ICD-10-CM] V49.89 [ICD-9-CM])  Impaired mobility and ADLs (Z74.09 [ICD-10-CM] 799.89 [ICD-9-CM])  Bronchiectasis without complication (CMS/HCC) (J47.9 [ICD-10-CM] 494.0 [ICD-9-CM])      Refer to Dept:   Refer to Provider:   Refer to Facility:       Face to Face Visit Date: 2019  Follow-up Provider for Plan of Care? I treated the patient in an acute care facility and will not continue treatment after discharge.  Follow-up Provider: HARSHA REYES [[id number]]  Reason/Clinical Findings: strength  mobility COPD  Describe mobility limitations that make leaving home difficult: COPD  Nursing/Therapeutic Services Requested: Skilled Nursing  Nursing/Therapeutic Services Requested: Physical Therapy  Nursing/Therapeutic Services Requested: Occupational Therapy  Skilled nursing orders: Cardiopulmonary assessments  PT orders: Strengthening  Occupational orders: Strengthening  Occupational orders: Energy conservation  Occupational orders: Activities of daily living  Frequency: 1 Week 1     This document serves as a request of services and does not constitute Insurance authorization or approval of services.  To determine eligibility, please contact the members Insurance carrier to verify and review coverage.     If you have medical questions regarding this request for services. Please contact [facility] MED [facility]  4 at [phone] between the hours of 8:00am - 5:00pm (Mon-Fri).       Authorizing Provider:Jerrica Alvarez APRN  Authorizing Provider's NPI: [id number]  Order Entered By: Jerrica Alvarez APRN 6/18/2019 10:41 AM     Electronically signed by: Jerrica Alvarez APRN 6/18/2019 10:41 AM

## 2019-06-19 ENCOUNTER — READMISSION MANAGEMENT (OUTPATIENT)
Dept: CALL CENTER | Facility: HOSPITAL | Age: 74
End: 2019-06-19

## 2019-06-19 LAB
B PERT.PT PRMT NPH QL NAA+NON-PROBE: NOT DETECTED
C PNEUM DNA NPH QL NAA+NON-PROBE: NOT DETECTED
FLUAV H1 RNA NPH QL NAA+NON-PROBE: NOT DETECTED
FLUAV H3 RNA NPH QL NAA+NON-PROBE: NOT DETECTED
FLUAV RNA SPEC QL NAA+PROBE: NOT DETECTED
FLUBV RNA SPEC QL NAA+PROBE: NOT DETECTED
HADV DNA SPEC QL NAA+PROBE: NOT DETECTED
HCOV 229E RNA NPH QL NAA+NON-PROBE: NOT DETECTED
HCOV HKU1 RNA NPH QL NAA+NON-PROBE: NOT DETECTED
HCOV NL63 RNA NPH QL NAA+NON-PROBE: NOT DETECTED
HCOV OC43 RNA NPH QL NAA+NON-PROBE: NOT DETECTED
HMPV RNA SPEC QL NAA+PROBE: NOT DETECTED
HPIV1 RNA SPEC QL NAA+PROBE: NOT DETECTED
HPIV2 SPEC QL CULT: NOT DETECTED
HPIV3 SPEC QL CULT: NOT DETECTED
HPIV4 RNA NPH QL NAA+NON-PROBE: NOT DETECTED
INR PPP: NOT DETECTED
M PNEUMO DNA SPEC QL NAA+PROBE: NOT DETECTED
RHINOVIRUS RNA SPEC QL NAA+PROBE: NOT DETECTED
RSV AG SPEC QL: NOT DETECTED

## 2019-06-19 NOTE — PROGRESS NOTES
Case Management Discharge Note         Destination      No service has been selected for the patient.      Durable Medical Equipment      No service has been selected for the patient.      Dialysis/Infusion      No service has been selected for the patient.      Home Medical Care - Selection Complete      Service Provider Request Status Selected Services Address Phone Number Fax Number    [facility] Selected Home Health Services [address] [address] [phone]      Therapy      No service has been selected for the patient.      Community Resources      No service has been selected for the patient.        Transportation Services  Private: Car    Final Discharge Disposition Code: 06 - home with home health care

## 2019-06-19 NOTE — OUTREACH NOTE
Prep Survey      Responses   Facility patient discharged from?  [facility]   Is patient eligible?  Yes   Discharge diagnosis  Pneumonia   Does the patient have one of the following disease processes/diagnoses(primary or secondary)?  COPD/Pneumonia   Does the patient have Home health ordered?  Yes   What is the Home health agency?   [facility]   Is there a DME ordered?  No   Prep survey completed?  Yes          Liliana Abel RN

## 2019-06-20 ENCOUNTER — READMISSION MANAGEMENT (OUTPATIENT)
Dept: CALL CENTER | Facility: HOSPITAL | Age: 74
End: 2019-06-20

## 2019-06-20 LAB
BACTERIA SPEC RESP CULT: ABNORMAL
GRAM STN SPEC: ABNORMAL

## 2019-06-20 NOTE — OUTREACH NOTE
COPD/PN Week 1 Survey      Responses   Facility patient discharged from?  [facility]   Does the patient have one of the following disease processes/diagnoses(primary or secondary)?  COPD/Pneumonia   Is there a successful TCM telephone encounter documented?  No   Was the primary reason for admission:  Pneumonia   Week 1 attempt successful?  Yes   Call start time  1147   Call end time  1149   Discharge diagnosis  Pneumonia   Is patient permission given to speak with other caregiver?  Yes   List who call center can speak with  Anyone   Meds reviewed with patient/caregiver?  Yes   Is the patient having any side effects they believe may be caused by any medication additions or changes?  No   Does the patient have all medications ordered at discharge?  Yes   Is the patient taking all medications as directed (includes completed medication regime)?  Yes   Does the patient have a primary care provider?   Yes   Does the patient have an appointment with their PCP or pulmonologist within 7 days of discharge?  Yes   Has the patient kept scheduled appointments due by today?  N/A   Has home health visited the patient within 72 hours of discharge?  Yes   Psychosocial issues?  No   Did the patient receive a copy of their discharge instructions?  Yes   Nursing interventions  Reviewed instructions with patient   What is the patient's perception of their health status since discharge?  Improving   Nursing Interventions  Nurse provided patient education   Is the patient/caregiver able to teach back the hierarchy of who to call/visit for symptoms/problems? PCP, Specialist, Home health nurse, Urgent Care, ED, 911  Yes   Is the patient/caregiver able to teach back signs and symptoms of worsening condition:  Fever/chills, Shortness of breath, Chest pain   Is the patient/caregiver able to teach back importance of completing antibiotic course of treatment?  Yes   Week 1 call completed?  Yes          Henrietta Thompson RN

## 2019-06-21 LAB
BACTERIA SPEC AEROBE CULT: NORMAL
BACTERIA SPEC AEROBE CULT: NORMAL

## 2019-06-28 ENCOUNTER — READMISSION MANAGEMENT (OUTPATIENT)
Dept: CALL CENTER | Facility: HOSPITAL | Age: 74
End: 2019-06-28

## 2019-06-28 RX ORDER — IPRATROPIUM BROMIDE AND ALBUTEROL SULFATE 2.5; .5 MG/3ML; MG/3ML
3 SOLUTION RESPIRATORY (INHALATION) 4 TIMES DAILY PRN
Qty: 360 ML | Refills: 5 | Status: SHIPPED | OUTPATIENT
Start: 2019-06-28 | End: 2020-04-29

## 2019-06-28 NOTE — OUTREACH NOTE
COPD/PN Week 2 Survey      Responses   Facility patient discharged from?  [facility]   Does the patient have one of the following disease processes/diagnoses(primary or secondary)?  COPD/Pneumonia   Was the primary reason for admission:  Pneumonia   Week 2 attempt successful?  Yes   Call start time  1301   Call end time  1308   Discharge diagnosis  Pneumonia   Meds reviewed with patient/caregiver?  Yes   Is the patient having any side effects they believe may be caused by any medication additions or changes?  No   Does the patient have all medications ordered at discharge?  Yes   Is the patient taking all medications as directed (includes completed medication regime)?  Yes   Does the patient have a primary care provider?   Yes   Does the patient have an appointment with their PCP or pulmonologist within 7 days of discharge?  Yes   Comments regarding PCP  Blanquita JON   Has the patient kept scheduled appointments due by today?  N/A   What is the Home health agency?   [facility]   Has home health visited the patient within 72 hours of discharge?  Yes   Psychosocial issues?  No   Did the patient receive a copy of their discharge instructions?  Yes   Nursing interventions  Reviewed instructions with patient   What is the patient's perception of their health status since discharge?  Same   Nursing Interventions  Nurse provided patient education   Are the patient's immunizations up to date?   No   Additional teach back comments  Patient reports she is not feeling that good today. She has shortness of breath. Encouraged her with worsening of symptoms to see MD. She verbalized understanding.    Is the patient able to teach back COPD zones?  Yes   Nursing interventions  Education provided on various zones   Patient reports what zone on this call?  Yellow Zone   Yellow Zone  Increased shortness of air, Unable to complete daily activities, Increased or thicker phlegm/mucus, Using quick relief inhaler/nebulizer more  often, Medication is not helping relieve symptoms, Poor Appetite   Yellow interventions  Continue to use daily medications, Use oxygen as ordered, Call provider immediatly if symptoms do not improve, Use other meds such as steroids or antibiotics as ordered, Get plenty of rest   Is the patient/caregiver able to teach back signs and symptoms of worsening condition:  Fever/chills, Shortness of breath, Chest pain   Is the patient/caregiver able to teach back importance of completing antibiotic course of treatment?  Yes   Week 2 call completed?  Yes          Goldy Cueva RN

## 2019-07-01 ENCOUNTER — OFFICE VISIT (OUTPATIENT)
Dept: PULMONOLOGY | Facility: CLINIC | Age: 74
End: 2019-07-01

## 2019-07-01 ENCOUNTER — HOSPITAL ENCOUNTER (OUTPATIENT)
Dept: GENERAL RADIOLOGY | Facility: HOSPITAL | Age: 74
Discharge: HOME | End: 2019-07-01
Admitting: NURSE PRACTITIONER

## 2019-07-01 VITALS
HEART RATE: 119 BPM | RESPIRATION RATE: 16 BRPM | SYSTOLIC BLOOD PRESSURE: 110 MMHG | WEIGHT: 92 LBS | BODY MASS INDEX: 18.06 KG/M2 | DIASTOLIC BLOOD PRESSURE: 78 MMHG | OXYGEN SATURATION: 97 % | HEIGHT: 60 IN

## 2019-07-01 DIAGNOSIS — J18.9 PNEUMONIA OF BOTH UPPER LOBES DUE TO INFECTIOUS ORGANISM: ICD-10-CM

## 2019-07-01 DIAGNOSIS — J44.9 CHRONIC OBSTRUCTIVE PULMONARY DISEASE, UNSPECIFIED COPD TYPE (HCC): Primary | ICD-10-CM

## 2019-07-01 DIAGNOSIS — J47.9 BRONCHIECTASIS WITHOUT COMPLICATION (HCC): ICD-10-CM

## 2019-07-01 DIAGNOSIS — R09.02 HYPOXIA: ICD-10-CM

## 2019-07-01 DIAGNOSIS — R06.02 SHORTNESS OF BREATH: ICD-10-CM

## 2019-07-01 PROCEDURE — 99214 OFFICE O/P EST MOD 30 MIN: CPT | Performed by: NURSE PRACTITIONER

## 2019-07-01 PROCEDURE — 96372 THER/PROPH/DIAG INJ SC/IM: CPT | Performed by: NURSE PRACTITIONER

## 2019-07-01 PROCEDURE — 71046 X-RAY EXAM CHEST 2 VIEWS: CPT

## 2019-07-01 RX ORDER — AZITHROMYCIN 250 MG/1
TABLET, FILM COATED ORAL
Qty: 16 TABLET | Refills: 2 | Status: SHIPPED | OUTPATIENT
Start: 2019-07-01 | End: 2019-09-16 | Stop reason: SDUPTHER

## 2019-07-01 RX ORDER — AZITHROMYCIN 250 MG/1
TABLET, FILM COATED ORAL
Qty: 15 TABLET | Refills: 2 | Status: SHIPPED | OUTPATIENT
Start: 2019-07-01 | End: 2019-07-01

## 2019-07-01 RX ORDER — METHYLPREDNISOLONE ACETATE 80 MG/ML
80 INJECTION, SUSPENSION INTRA-ARTICULAR; INTRALESIONAL; INTRAMUSCULAR; SOFT TISSUE ONCE
Status: COMPLETED | OUTPATIENT
Start: 2019-07-01 | End: 2019-07-01

## 2019-07-01 RX ADMIN — METHYLPREDNISOLONE ACETATE 80 MG: 80 INJECTION, SUSPENSION INTRA-ARTICULAR; INTRALESIONAL; INTRAMUSCULAR; SOFT TISSUE at 10:55

## 2019-07-01 NOTE — PROGRESS NOTES
"Chief Complaint   Patient presents with   • Follow-up   • Breathing Problem         Subjective   Joelle Yee is a 73 y.o. female.     History of Present Illness   The patient comes in today for follow-up of recent hospitalization.    She had been feeling fairly well since discharge from the hospital however she has had increased shortness of breath since yesterday.  She began wheezing yesterday as well.  She has been using her nebulized treatments.    She saw her PCP last week and she was wheezing then so she was given an antibiotic and some type of shot because of the wheezing.    She is still on a tapering steroid dose which she was discharged on from the hospital.    She is using Trelegy on a regular basis.    She uses oxygen continuously.    She is using the flutter valve and percussion vest as directed.    The following portions of the patient's history were reviewed and updated as appropriate: allergies, current medications, past family history, past medical history, past social history and past surgical history.    Review of Systems   HENT: Positive for sinus pressure. Negative for sneezing and sore throat.    Respiratory: Positive for cough, shortness of breath and wheezing.        Objective   Visit Vitals  Pulse 119   Resp 16   Ht 152.4 cm (60\")   Wt 41.7 kg (92 lb)   LMP  (LMP Unknown)   SpO2 97%   BMI 17.97 kg/m²   90 % on room air    Physical Exam   Constitutional: She is oriented to person, place, and time. She appears well-developed and well-nourished.   HENT:   Head: Normocephalic and atraumatic.   Eyes: EOM are normal.   Neck: Neck supple.   Cardiovascular: Normal rate and regular rhythm.   Pulmonary/Chest: Effort normal. No respiratory distress.   Somewhat hyperresonant to percussion  Somewhat decreased A/E with diffuse wheezing noted.   Musculoskeletal: She exhibits no edema.   Neurological: She is alert and oriented to person, place, and time.   Skin: Skin is warm and dry.   Psychiatric: " She has a normal mood and affect.   Vitals reviewed.          Assessment/Plan   Joelle was seen today for follow-up and breathing problem.    Diagnoses and all orders for this visit:    Chronic obstructive pulmonary disease, unspecified COPD type (CMS/HCC)    Bronchiectasis without complication (CMS/HCC)  -     methylPREDNISolone acetate (DEPO-medrol) injection 80 mg    Shortness of breath    Hypoxia    Other orders  -     Discontinue: azithromycin (ZITHROMAX) 250 MG tablet; Take 2 by mouth today then 1 daily for 4 days  -     azithromycin (ZITHROMAX) 250 MG tablet; Take 1 tablet by mouth every other day.           Return in about 10 weeks (around 9/9/2019) for Recheck, For Dr. Esparza, [place].    DISCUSSION (if any):  She definitely continues to have increased shortness of breath and symptoms of bronchiectasis exacerbation.  I have asked her to finish the steroid taper.  She will also be given a steroid injection and started on azithromycin 250 mg every other day until she follows up again.    The chest x-ray is improved from 2 weeks ago.  There is some improvement in aeration as well.  The previously seen 16mm suprahilar area appears somewhat smaller on chest x-ray today and is an area of presumed scarring.  I reviewed these images myself as well as with Dr. Esparza before reviewing them with the patient.    She will need to continue the percussion vest and flutter valve as directed.    She should continue using Trelegy and nebulized treatments as directed    I reviewed her discharge summary which mentions possible pneumonia and bronchiectasis with exacerbation.  Her chest x-ray was completed today and results are as above.    Dictated utilizing Dragon dictation.    This document was electronically signed by DWAYNE Maria July 1, 2019  10:22 AM

## 2019-07-06 ENCOUNTER — READMISSION MANAGEMENT (OUTPATIENT)
Dept: CALL CENTER | Facility: HOSPITAL | Age: 74
End: 2019-07-06

## 2019-07-06 NOTE — OUTREACH NOTE
COPD/PN Week 3 Survey      Responses   Facility patient discharged from?  [facility]   Does the patient have one of the following disease processes/diagnoses(primary or secondary)?  COPD/Pneumonia   Was the primary reason for admission:  Pneumonia   Week 3 attempt successful?  Yes   Call start time  0936   Call end time  0938   Meds reviewed with patient/caregiver?  Yes   Is the patient taking all medications as directed (includes completed medication regime)?  Yes   Has the patient kept scheduled appointments due by today?  Yes   What is the patient's perception of their health status since discharge?  Improving   Additional teach back comments  Doing better, cough is finally productive.     Is the patient able to teach back COPD zones?  Yes   Patient reports what zone on this call?  Green Zone   Week 3 call completed?  Yes          Debbi Gutierrez, RN

## 2019-07-13 ENCOUNTER — READMISSION MANAGEMENT (OUTPATIENT)
Dept: CALL CENTER | Facility: HOSPITAL | Age: 74
End: 2019-07-13

## 2019-07-13 NOTE — OUTREACH NOTE
COPD/PN Week 4 Survey      Responses   Facility patient discharged from?  [facility]   Does the patient have one of the following disease processes/diagnoses(primary or secondary)?  COPD/Pneumonia   Was the primary reason for admission:  Pneumonia   Week 4 attempt successful?  Yes   Call start time  0852   Call end time  0855   Discharge diagnosis  Pneumonia   Meds reviewed with patient/caregiver?  Yes   Is the patient taking all medications as directed (includes completed medication regime)?  Yes   Medication comments  Med given for yeast infection   Has the patient kept scheduled appointments due by today?  Yes   Is the patient still receiving Home Health Services?  Yes   Home health comments  [facility]--come in 3-4 days per week   Psychosocial issues?  No   What is the patient's perception of their health status since discharge?  Improving   Nursing Interventions  Nurse provided patient education   Additional teach back comments  Still coughing but it is alot better. Drinking 2 cans of Ensure per day   Is the patient/caregiver able to teach back signs and symptoms of worsening condition:  Fever/chills, Shortness of breath, Chest pain   Is the patient/caregiver able to teach back importance of completing antibiotic course of treatment?  Yes   Week 4 call completed?  Yes   Would the patient like one additional call?  No   Graduated  Yes   Did the patient feel the follow up calls were helpful during their recovery period?  Yes   Was the number of calls appropriate?  Yes   Wrap up additional comments  Pt states she is doing well and feels like she is improving          Betzy Ball RN

## 2019-08-24 ENCOUNTER — APPOINTMENT (OUTPATIENT)
Dept: GENERAL RADIOLOGY | Facility: HOSPITAL | Age: 74
End: 2019-08-24

## 2019-08-24 ENCOUNTER — APPOINTMENT (OUTPATIENT)
Dept: CT IMAGING | Facility: HOSPITAL | Age: 74
End: 2019-08-24

## 2019-08-24 ENCOUNTER — HOSPITAL ENCOUNTER (EMERGENCY)
Facility: HOSPITAL | Age: 74
Discharge: HOME | End: 2019-08-24
Attending: EMERGENCY MEDICINE | Admitting: EMERGENCY MEDICINE

## 2019-08-24 VITALS
WEIGHT: 94 LBS | OXYGEN SATURATION: 95 % | DIASTOLIC BLOOD PRESSURE: 81 MMHG | TEMPERATURE: 98.3 F | HEIGHT: 60 IN | BODY MASS INDEX: 18.46 KG/M2 | RESPIRATION RATE: 18 BRPM | HEART RATE: 91 BPM | SYSTOLIC BLOOD PRESSURE: 137 MMHG

## 2019-08-24 DIAGNOSIS — R51.9 ACUTE NONINTRACTABLE HEADACHE, UNSPECIFIED HEADACHE TYPE: ICD-10-CM

## 2019-08-24 DIAGNOSIS — M54.2 NECK PAIN: Primary | ICD-10-CM

## 2019-08-24 PROCEDURE — 96375 TX/PRO/DX INJ NEW DRUG ADDON: CPT

## 2019-08-24 PROCEDURE — 96374 THER/PROPH/DIAG INJ IV PUSH: CPT

## 2019-08-24 PROCEDURE — 25010000002 METHYLPREDNISOLONE PER 40 MG: Performed by: EMERGENCY MEDICINE

## 2019-08-24 PROCEDURE — 70450 CT HEAD/BRAIN W/O DYE: CPT

## 2019-08-24 PROCEDURE — 71045 X-RAY EXAM CHEST 1 VIEW: CPT

## 2019-08-24 PROCEDURE — 25010000002 KETOROLAC TROMETHAMINE PER 15 MG: Performed by: EMERGENCY MEDICINE

## 2019-08-24 PROCEDURE — 94640 AIRWAY INHALATION TREATMENT: CPT

## 2019-08-24 PROCEDURE — 99284 EMERGENCY DEPT VISIT MOD MDM: CPT

## 2019-08-24 RX ORDER — KETOROLAC TROMETHAMINE 30 MG/ML
15 INJECTION, SOLUTION INTRAMUSCULAR; INTRAVENOUS ONCE
Status: COMPLETED | OUTPATIENT
Start: 2019-08-24 | End: 2019-08-24

## 2019-08-24 RX ORDER — HYDROCODONE BITARTRATE AND ACETAMINOPHEN 5; 325 MG/1; MG/1
1 TABLET ORAL ONCE
Status: COMPLETED | OUTPATIENT
Start: 2019-08-24 | End: 2019-08-24

## 2019-08-24 RX ORDER — IPRATROPIUM BROMIDE AND ALBUTEROL SULFATE 2.5; .5 MG/3ML; MG/3ML
3 SOLUTION RESPIRATORY (INHALATION) ONCE
Status: COMPLETED | OUTPATIENT
Start: 2019-08-24 | End: 2019-08-24

## 2019-08-24 RX ORDER — HYDROCODONE BITARTRATE AND ACETAMINOPHEN 5; 325 MG/1; MG/1
1 TABLET ORAL EVERY 6 HOURS PRN
Qty: 8 TABLET | Refills: 0 | Status: SHIPPED | OUTPATIENT
Start: 2019-08-24 | End: 2020-01-16 | Stop reason: HOSPADM

## 2019-08-24 RX ORDER — DIPHENHYDRAMINE HCL 12.5MG/5ML
25 LIQUID (ML) ORAL ONCE
Status: COMPLETED | OUTPATIENT
Start: 2019-08-24 | End: 2019-08-24

## 2019-08-24 RX ORDER — SODIUM CHLORIDE 0.9 % (FLUSH) 0.9 %
10 SYRINGE (ML) INJECTION AS NEEDED
Status: DISCONTINUED | OUTPATIENT
Start: 2019-08-24 | End: 2019-08-24 | Stop reason: HOSPADM

## 2019-08-24 RX ORDER — KETOROLAC TROMETHAMINE 30 MG/ML
15 INJECTION, SOLUTION INTRAMUSCULAR; INTRAVENOUS ONCE
Status: DISCONTINUED | OUTPATIENT
Start: 2019-08-24 | End: 2019-08-24

## 2019-08-24 RX ORDER — METHYLPREDNISOLONE SODIUM SUCCINATE 40 MG/ML
80 INJECTION, POWDER, LYOPHILIZED, FOR SOLUTION INTRAMUSCULAR; INTRAVENOUS ONCE
Status: COMPLETED | OUTPATIENT
Start: 2019-08-24 | End: 2019-08-24

## 2019-08-24 RX ADMIN — KETOROLAC TROMETHAMINE 15 MG: 30 INJECTION, SOLUTION INTRAMUSCULAR at 08:57

## 2019-08-24 RX ADMIN — HYDROCODONE BITARTRATE AND ACETAMINOPHEN 1 TABLET: 5; 325 TABLET ORAL at 06:54

## 2019-08-24 RX ADMIN — DIPHENHYDRAMINE HYDROCHLORIDE 25 MG: 12.5 SOLUTION ORAL at 09:01

## 2019-08-24 RX ADMIN — IPRATROPIUM BROMIDE AND ALBUTEROL SULFATE 3 ML: .5; 3 SOLUTION RESPIRATORY (INHALATION) at 07:03

## 2019-08-24 RX ADMIN — METHYLPREDNISOLONE SODIUM SUCCINATE 80 MG: 40 INJECTION, POWDER, FOR SOLUTION INTRAMUSCULAR; INTRAVENOUS at 07:10

## 2019-09-16 ENCOUNTER — RESULTS ENCOUNTER (OUTPATIENT)
Dept: PULMONOLOGY | Facility: CLINIC | Age: 74
End: 2019-09-16

## 2019-09-16 ENCOUNTER — OFFICE VISIT (OUTPATIENT)
Dept: PULMONOLOGY | Facility: CLINIC | Age: 74
End: 2019-09-16

## 2019-09-16 ENCOUNTER — LAB REQUISITION (OUTPATIENT)
Dept: LAB | Facility: HOSPITAL | Age: 74
End: 2019-09-16

## 2019-09-16 VITALS
SYSTOLIC BLOOD PRESSURE: 118 MMHG | HEART RATE: 81 BPM | OXYGEN SATURATION: 85 % | BODY MASS INDEX: 18.06 KG/M2 | WEIGHT: 92 LBS | HEIGHT: 60 IN | DIASTOLIC BLOOD PRESSURE: 68 MMHG | RESPIRATION RATE: 18 BRPM

## 2019-09-16 DIAGNOSIS — J44.1 ACUTE EXACERBATION OF CHRONIC OBSTRUCTIVE PULMONARY DISEASE (COPD) (HCC): Primary | ICD-10-CM

## 2019-09-16 DIAGNOSIS — R05.9 COUGH: ICD-10-CM

## 2019-09-16 DIAGNOSIS — R06.02 SHORTNESS OF BREATH: ICD-10-CM

## 2019-09-16 DIAGNOSIS — Z87.891 PERSONAL HISTORY OF TOBACCO USE, PRESENTING HAZARDS TO HEALTH: ICD-10-CM

## 2019-09-16 DIAGNOSIS — R09.02 HYPOXIA: ICD-10-CM

## 2019-09-16 DIAGNOSIS — J47.9 BRONCHIECTASIS WITHOUT COMPLICATION (HCC): ICD-10-CM

## 2019-09-16 PROCEDURE — 87205 SMEAR GRAM STAIN: CPT | Performed by: INTERNAL MEDICINE

## 2019-09-16 PROCEDURE — 87186 SC STD MICRODIL/AGAR DIL: CPT | Performed by: INTERNAL MEDICINE

## 2019-09-16 PROCEDURE — 99214 OFFICE O/P EST MOD 30 MIN: CPT | Performed by: INTERNAL MEDICINE

## 2019-09-16 PROCEDURE — 96372 THER/PROPH/DIAG INJ SC/IM: CPT | Performed by: INTERNAL MEDICINE

## 2019-09-16 PROCEDURE — 87077 CULTURE AEROBIC IDENTIFY: CPT | Performed by: INTERNAL MEDICINE

## 2019-09-16 PROCEDURE — 87070 CULTURE OTHR SPECIMN AEROBIC: CPT | Performed by: INTERNAL MEDICINE

## 2019-09-16 RX ORDER — PREDNISONE 10 MG/1
10 TABLET ORAL EVERY OTHER DAY
Qty: 40 TABLET | Refills: 0 | Status: SHIPPED | OUTPATIENT
Start: 2019-09-16 | End: 2019-12-04

## 2019-09-16 RX ORDER — METHYLPREDNISOLONE ACETATE 80 MG/ML
80 INJECTION, SUSPENSION INTRA-ARTICULAR; INTRALESIONAL; INTRAMUSCULAR; SOFT TISSUE ONCE
Status: COMPLETED | OUTPATIENT
Start: 2019-09-16 | End: 2019-09-16

## 2019-09-16 RX ORDER — BUDESONIDE 0.5 MG/2ML
0.5 INHALANT ORAL 2 TIMES DAILY
Qty: 120 ML | Refills: 5 | Status: SHIPPED | OUTPATIENT
Start: 2019-09-16 | End: 2020-03-03 | Stop reason: SDUPTHER

## 2019-09-16 RX ORDER — AZITHROMYCIN 250 MG/1
250 TABLET, FILM COATED ORAL EVERY OTHER DAY
Qty: 60 TABLET | Refills: 1 | Status: SHIPPED | OUTPATIENT
Start: 2019-09-16 | End: 2020-02-04 | Stop reason: SDUPTHER

## 2019-09-16 RX ORDER — ARFORMOTEROL TARTRATE 15 UG/2ML
15 SOLUTION RESPIRATORY (INHALATION)
Qty: 120 ML | Refills: 5 | Status: SHIPPED | OUTPATIENT
Start: 2019-09-16 | End: 2020-03-03 | Stop reason: SDUPTHER

## 2019-09-16 RX ADMIN — METHYLPREDNISOLONE ACETATE 80 MG: 80 INJECTION, SUSPENSION INTRA-ARTICULAR; INTRALESIONAL; INTRAMUSCULAR; SOFT TISSUE at 12:08

## 2019-09-16 NOTE — PROGRESS NOTES
"Chief Complaint   Patient presents with   • Follow-up   • Shortness of Breath         Subjective   Joelle Yee is a 73 y.o. female.     History of Present Illness   Patient comes in today complaining of shortness of breath, cough and phlegm production which has been worse for the past few days.    she is not complaining of subjective chills.  The patient also denies fever.    The patient says that her sputum is mostly productive of thick yellowish to greenish sputum.     Patient is using rescue inhaler/nebulizer more than usual with some relief.    The patient says that she is using oxygen as prescribed and feels that it appears to be helping some of her symptoms.    She quit smoking 1 year ago or so.     She is using flutter valve and the vest device.     The following portions of the patient's history were reviewed and updated as appropriate: allergies, current medications, past family history, past medical history, past social history and past surgical history.    Review of Systems   HENT: Negative for sinus pressure, sneezing and sore throat.    Respiratory: Positive for cough, shortness of breath and wheezing.        Objective   Visit Vitals  /68   Pulse 81   Resp 18   Ht 152.4 cm (60\")   Wt 41.7 kg (92 lb)   LMP  (LMP Unknown)   SpO2 (!) 85% Comment: on room air (REST)   BMI 17.97 kg/m²    O2:  97 % on  2LPM    Physical Exam   Constitutional: She is oriented to person, place, and time. She appears well-developed and well-nourished.   Eyes: EOM are normal.   Neck: Normal range of motion. No JVD present. No thyromegaly present.   Cardiovascular: Normal rate.   Pulmonary/Chest: She is in respiratory distress. She has wheezes. She has rales (Right basal).   Musculoskeletal: Normal range of motion.   Gait was normal.   Neurological: She is alert and oriented to person, place, and time.   Skin: Skin is warm and dry.   Psychiatric: She has a normal mood and affect. Her behavior is normal.   Vitals " reviewed.        Assessment/Plan   Joelle was seen today for follow-up and shortness of breath.    Diagnoses and all orders for this visit:    Acute exacerbation of chronic obstructive pulmonary disease (COPD) (CMS/[facility])  -     methylPREDNISolone acetate (DEPO-medrol) injection 80 mg    Bronchiectasis without complication (CMS/[facility])  -     Gram Stain With / Sputum Cult Rflx ([facility]) - Sputum, Cough; Future  -     methylPREDNISolone acetate (DEPO-medrol) injection 80 mg    Hypoxia    Personal history of tobacco use, presenting hazards to health    Shortness of breath    Other orders  -     predniSONE (DELTASONE) 10 MG tablet; Take 1 tablet by mouth Every Other Day.  -     azithromycin (ZITHROMAX) 250 MG tablet; Take 1 tablet by mouth Every Other Day. Take 1 tablet by mouth every other day.  -     arformoterol (BROVANA) 15 MCG/2ML nebulizer solution; Take 2 mL by nebulization 2 (Two) Times a Day.  -     budesonide (PULMICORT) 0.5 MG/2ML nebulizer solution; Take 2 mL by nebulization 2 (Two) Times a Day.         Return in about 10 weeks (around 11/25/2019) for Recheck, For Kaelyn.    DISCUSSION (if any):  For the symptoms of acute exacerbation of COPD/Bronchiectasis, she was prescribed intramuscular and PO steroids.    Patient will be prescribed Azithromycin 250 mg PO QOD for at least the next 6 months.     We will also keep her on Prednisone 10 mg QOD for the next 75-80 days.     We plan to decrease it to 5 mg QOD upon follow up.    Side effects have been discussed in detail.    Patient was asked to report to the emergency room if symptoms do not improve.    All other medications will remain the same.    We will also try to get her Pulmicort nebulized and Brovana Nebulized through her nebulizer device.     Continue Flutter valve and vest percussion.     The patient says that she is up-to-date with influenza and pneumonia vaccinations.       Dictated utilizing Dragon dictation.    This document was electronically signed  by Rebeka Esparza MD on 09/16/19 at 11:22 AM

## 2019-09-18 LAB
BACTERIA SPEC RESP CULT: ABNORMAL
BACTERIA SPEC RESP CULT: ABNORMAL
GRAM STN SPEC: ABNORMAL

## 2019-12-04 ENCOUNTER — TRANSCRIBE ORDERS (OUTPATIENT)
Dept: ADMINISTRATIVE | Facility: HOSPITAL | Age: 74
End: 2019-12-04

## 2019-12-04 ENCOUNTER — OFFICE VISIT (OUTPATIENT)
Dept: PULMONOLOGY | Facility: CLINIC | Age: 74
End: 2019-12-04

## 2019-12-04 VITALS
WEIGHT: 87 LBS | OXYGEN SATURATION: 94 % | DIASTOLIC BLOOD PRESSURE: 60 MMHG | SYSTOLIC BLOOD PRESSURE: 122 MMHG | HEIGHT: 60 IN | RESPIRATION RATE: 18 BRPM | BODY MASS INDEX: 17.08 KG/M2 | HEART RATE: 94 BPM

## 2019-12-04 DIAGNOSIS — Z13.820 SCREENING FOR OSTEOPOROSIS: Primary | ICD-10-CM

## 2019-12-04 DIAGNOSIS — J47.9 BRONCHIECTASIS WITHOUT COMPLICATION (HCC): Primary | ICD-10-CM

## 2019-12-04 DIAGNOSIS — R09.02 HYPOXIA: ICD-10-CM

## 2019-12-04 DIAGNOSIS — J44.9 CHRONIC OBSTRUCTIVE PULMONARY DISEASE, UNSPECIFIED COPD TYPE (HCC): ICD-10-CM

## 2019-12-04 PROCEDURE — 99214 OFFICE O/P EST MOD 30 MIN: CPT | Performed by: NURSE PRACTITIONER

## 2019-12-04 RX ORDER — PREDNISONE 1 MG/1
TABLET ORAL
Qty: 60 TABLET | Refills: 0 | Status: SHIPPED | OUTPATIENT
Start: 2019-12-04 | End: 2020-03-03

## 2019-12-04 NOTE — PROGRESS NOTES
"Chief Complaint   Patient presents with   • Follow-up   • Shortness of Breath         Subjective   Joelle Yee is a 73 y.o. female.     History of Present Illness   Patient comes today for follow up of shortness of breath and COPD.     Symptoms have been stable since the last clinic visit. Patient reports no recent exacerbations.     Patient is using Brovana and Pulmicort in the nebulizer.  She has been taking azithromycin every other day.  She just finished prednisone 10 mg which she had been taking every other day.    She is using the flutter valve multiple times a day in the vest at least once a day but many days twice a day.    She uses DuoNeb's in the nebulizer twice a day.    She has continued using Trelegy daily as well.    Exercise tolerance has also remained stable.     Quit smoking in 2017.    The following portions of the patient's history were reviewed and updated as appropriate: allergies, current medications, past family history, past medical history, past social history and past surgical history.    Review of Systems   Constitutional: Negative for chills and fever.   HENT: Negative for rhinorrhea, sinus pressure and sore throat.    Respiratory: Positive for shortness of breath. Negative for cough, chest tightness and wheezing.    Psychiatric/Behavioral: Positive for sleep disturbance.       Objective   Visit Vitals  /60   Pulse 94   Resp 18   Ht 152.4 cm (60\")   Wt 39.5 kg (87 lb)   LMP  (LMP Unknown)   SpO2 94%   BMI 16.99 kg/m²     Physical Exam   Constitutional: She is oriented to person, place, and time.   HENT:   Head: Normocephalic and atraumatic.   Crowded oropharynx. Dentures.   Eyes: EOM are normal.   Neck: Neck supple.   Cardiovascular: Normal rate and regular rhythm.   Pulmonary/Chest: Effort normal. No respiratory distress.   Somewhat decreased A/E without wheezing noted. Few rales noted.   Musculoskeletal: She exhibits no edema.   Neurological: She is alert and oriented to " person, place, and time.   Psychiatric: She has a normal mood and affect.   Vitals reviewed.          Assessment/Plan   Joelle was seen today for follow-up and shortness of breath.    Diagnoses and all orders for this visit:    Bronchiectasis without complication (CMS/HCC)    Hypoxia    Chronic obstructive pulmonary disease, unspecified COPD type (CMS/HCC)    Other orders  -     predniSONE (DELTASONE) 5 MG tablet; Take 1 tablet by mouth every other day.           Return in about 3 months (around 3/4/2020) for For Me, Also 3 months with Dr. Esparza..    DISCUSSION (if any):  No change to the current medications has been made with the exception of decreasing the prednisone to 5 mg every other day until her follow-up visit.  She should continue using Brovana and Pulmicort in the nebulizer as well as duo nebs as directed.    She will definitely need to continue using the flutter valve and percussion vest.    Compliance with medications stressed.     Side effects of prescribed medications discussed with the patient.    I reviewed her respiratory culture which shows light growth of pseudomonas aeruginosa.  She is not displaying any acute illness signs at this time and states her breathing has returned to baseline.  It is possible she is colonized and since she is not displaying acute respiratory symptoms at this time I will not change her antibiotic.  She should continue using azithromycin every other day.    The patient belongs to the risk group for which lung cancer screening has been recommended.  She will be due an annual CT in June 2020 however if she has further breathing issues/exacerbations when may be completed prior to that.    She states she has not had a bone mineral density in some time but her PCP mention doing the test with her yesterday and it looks like it has been ordered to be completed at [facility].  I have told the patient it is important for her to keep up with this testing since she has been on chronic  steroids.    She will need to continue using oxygen 24/7.    Dictated utilizing Dragon dictation.    This document was electronically signed by DWAYNE Maria December 4, 2019  10:27 AM

## 2019-12-11 ENCOUNTER — APPOINTMENT (OUTPATIENT)
Dept: BONE DENSITY | Facility: HOSPITAL | Age: 74
End: 2019-12-11

## 2019-12-14 ENCOUNTER — TRANSCRIBE ORDERS (OUTPATIENT)
Dept: ADMINISTRATIVE | Facility: HOSPITAL | Age: 74
End: 2019-12-14

## 2019-12-14 ENCOUNTER — HOSPITAL ENCOUNTER (OUTPATIENT)
Dept: GENERAL RADIOLOGY | Facility: HOSPITAL | Age: 74
Discharge: HOME | End: 2019-12-14
Admitting: PHYSICIAN ASSISTANT

## 2019-12-14 DIAGNOSIS — R06.02 SHORTNESS OF BREATH: Primary | ICD-10-CM

## 2019-12-14 DIAGNOSIS — R05.9 COUGH: ICD-10-CM

## 2019-12-14 PROCEDURE — 71046 X-RAY EXAM CHEST 2 VIEWS: CPT

## 2019-12-19 ENCOUNTER — TRANSCRIBE ORDERS (OUTPATIENT)
Dept: ADMINISTRATIVE | Facility: HOSPITAL | Age: 74
End: 2019-12-19

## 2019-12-19 DIAGNOSIS — J44.1 COPD WITH ACUTE EXACERBATION (HCC): Primary | ICD-10-CM

## 2020-01-13 ENCOUNTER — APPOINTMENT (OUTPATIENT)
Dept: GENERAL RADIOLOGY | Facility: HOSPITAL | Age: 75
End: 2020-01-13

## 2020-01-13 ENCOUNTER — HOSPITAL ENCOUNTER (EMERGENCY)
Facility: HOSPITAL | Age: 75
Discharge: HOME | End: 2020-01-13
Attending: EMERGENCY MEDICINE | Admitting: EMERGENCY MEDICINE

## 2020-01-13 VITALS
HEART RATE: 93 BPM | TEMPERATURE: 97.7 F | OXYGEN SATURATION: 92 % | WEIGHT: 86 LBS | HEIGHT: 60 IN | RESPIRATION RATE: 20 BRPM | BODY MASS INDEX: 16.88 KG/M2 | DIASTOLIC BLOOD PRESSURE: 72 MMHG | SYSTOLIC BLOOD PRESSURE: 101 MMHG

## 2020-01-13 DIAGNOSIS — J44.1 COPD EXACERBATION (HCC): Primary | ICD-10-CM

## 2020-01-13 LAB
A-A DO2: 112.8 MMHG
ALBUMIN SERPL-MCNC: 4 G/DL (ref 3.5–5.2)
ALBUMIN/GLOB SERPL: 1.3 G/DL
ALP SERPL-CCNC: 105 U/L (ref 39–117)
ALT SERPL W P-5'-P-CCNC: 12 U/L (ref 1–33)
ANION GAP SERPL CALCULATED.3IONS-SCNC: 12.3 MMOL/L (ref 5–15)
ARTERIAL PATENCY WRIST A: POSITIVE
AST SERPL-CCNC: 22 U/L (ref 1–32)
ATMOSPHERIC PRESS: 742 MMHG
BASE EXCESS BLDA CALC-SCNC: 5.1 MMOL/L (ref 0–2)
BASOPHILS # BLD AUTO: 0.1 10*3/MM3 (ref 0–0.2)
BASOPHILS NFR BLD AUTO: 0.8 % (ref 0–1.5)
BDY SITE: ABNORMAL
BILIRUB SERPL-MCNC: 0.2 MG/DL (ref 0.2–1.2)
BUN BLD-MCNC: 13 MG/DL (ref 8–23)
BUN/CREAT SERPL: 18.6 (ref 7–25)
CALCIUM SPEC-SCNC: 9.2 MG/DL (ref 8.6–10.5)
CHLORIDE SERPL-SCNC: 94 MMOL/L (ref 98–107)
CO2 SERPL-SCNC: 28.7 MMOL/L (ref 22–29)
COHGB MFR BLD: 3.1 % (ref 0–2)
CREAT BLD-MCNC: 0.7 MG/DL (ref 0.57–1)
DEPRECATED RDW RBC AUTO: 56.8 FL (ref 37–54)
EOSINOPHIL # BLD AUTO: 0.25 10*3/MM3 (ref 0–0.4)
EOSINOPHIL NFR BLD AUTO: 1.9 % (ref 0.3–6.2)
ERYTHROCYTE [DISTWIDTH] IN BLOOD BY AUTOMATED COUNT: 15.3 % (ref 12.3–15.4)
GAS FLOW AIRWAY: 3 LPM
GFR SERPL CREATININE-BSD FRML MDRD: 82 ML/MIN/1.73
GLOBULIN UR ELPH-MCNC: 3 GM/DL
GLUCOSE BLD-MCNC: 102 MG/DL (ref 65–99)
HCO3 BLDA-SCNC: 30.2 MMOL/L (ref 22–28)
HCT VFR BLD AUTO: 46 % (ref 34–46.6)
HCT VFR BLD CALC: 43.8 %
HGB BLD-MCNC: 14.7 G/DL (ref 12–15.9)
HGB BLDA-MCNC: 14.3 G/DL (ref 12–18)
HOLD SPECIMEN: NORMAL
HOLD SPECIMEN: NORMAL
HOROWITZ INDEX BLD+IHG-RTO: 32 %
IMM GRANULOCYTES # BLD AUTO: 0.16 10*3/MM3 (ref 0–0.05)
IMM GRANULOCYTES NFR BLD AUTO: 1.2 % (ref 0–0.5)
LYMPHOCYTES # BLD AUTO: 3.48 10*3/MM3 (ref 0.7–3.1)
LYMPHOCYTES NFR BLD AUTO: 26.5 % (ref 19.6–45.3)
MCH RBC QN AUTO: 32 PG (ref 26.6–33)
MCHC RBC AUTO-ENTMCNC: 32 G/DL (ref 31.5–35.7)
MCV RBC AUTO: 100.2 FL (ref 79–97)
METHGB BLD QL: 0.8 % (ref 0–1.5)
MODALITY: ABNORMAL
MONOCYTES # BLD AUTO: 1.21 10*3/MM3 (ref 0.1–0.9)
MONOCYTES NFR BLD AUTO: 9.2 % (ref 5–12)
NEUTROPHILS # BLD AUTO: 7.95 10*3/MM3 (ref 1.7–7)
NEUTROPHILS NFR BLD AUTO: 60.4 % (ref 42.7–76)
NOTE: ABNORMAL
NRBC BLD AUTO-RTO: 0 /100 WBC (ref 0–0.2)
NT-PROBNP SERPL-MCNC: 25.4 PG/ML (ref 5–900)
OXYHGB MFR BLDV: 88.2 % (ref 94–99)
PCO2 BLDA: 45 MM HG (ref 35–45)
PCO2 TEMP ADJ BLD: ABNORMAL MM[HG]
PH BLDA: 7.43 PH UNITS (ref 7.3–7.5)
PH, TEMP CORRECTED: ABNORMAL
PLATELET # BLD AUTO: 302 10*3/MM3 (ref 140–450)
PMV BLD AUTO: 9.9 FL (ref 6–12)
PO2 BLDA: 59.5 MM HG (ref 75–100)
PO2 TEMP ADJ BLD: ABNORMAL MM[HG]
POTASSIUM BLD-SCNC: 4.1 MMOL/L (ref 3.5–5.2)
PROT SERPL-MCNC: 7 G/DL (ref 6–8.5)
RBC # BLD AUTO: 4.59 10*6/MM3 (ref 3.77–5.28)
SAO2 % BLDCOA: 91.8 % (ref 94–100)
SODIUM BLD-SCNC: 135 MMOL/L (ref 136–145)
TROPONIN T SERPL-MCNC: <0.01 NG/ML (ref 0–0.03)
VENTILATOR MODE: ABNORMAL
WBC NRBC COR # BLD: 13.15 10*3/MM3 (ref 3.4–10.8)
WHOLE BLOOD HOLD SPECIMEN: NORMAL
WHOLE BLOOD HOLD SPECIMEN: NORMAL

## 2020-01-13 PROCEDURE — 85025 COMPLETE CBC W/AUTO DIFF WBC: CPT | Performed by: PHYSICIAN ASSISTANT

## 2020-01-13 PROCEDURE — 83880 ASSAY OF NATRIURETIC PEPTIDE: CPT | Performed by: PHYSICIAN ASSISTANT

## 2020-01-13 PROCEDURE — 80053 COMPREHEN METABOLIC PANEL: CPT | Performed by: PHYSICIAN ASSISTANT

## 2020-01-13 PROCEDURE — 36600 WITHDRAWAL OF ARTERIAL BLOOD: CPT

## 2020-01-13 PROCEDURE — 82375 ASSAY CARBOXYHB QUANT: CPT

## 2020-01-13 PROCEDURE — 83050 HGB METHEMOGLOBIN QUAN: CPT

## 2020-01-13 PROCEDURE — 84484 ASSAY OF TROPONIN QUANT: CPT | Performed by: PHYSICIAN ASSISTANT

## 2020-01-13 PROCEDURE — 82805 BLOOD GASES W/O2 SATURATION: CPT

## 2020-01-13 PROCEDURE — 71046 X-RAY EXAM CHEST 2 VIEWS: CPT

## 2020-01-13 PROCEDURE — 93005 ELECTROCARDIOGRAM TRACING: CPT | Performed by: PHYSICIAN ASSISTANT

## 2020-01-13 PROCEDURE — 94640 AIRWAY INHALATION TREATMENT: CPT

## 2020-01-13 PROCEDURE — 94799 UNLISTED PULMONARY SVC/PX: CPT

## 2020-01-13 PROCEDURE — 99284 EMERGENCY DEPT VISIT MOD MDM: CPT

## 2020-01-13 RX ORDER — SODIUM CHLORIDE 0.9 % (FLUSH) 0.9 %
10 SYRINGE (ML) INJECTION AS NEEDED
Status: DISCONTINUED | OUTPATIENT
Start: 2020-01-13 | End: 2020-01-13 | Stop reason: HOSPADM

## 2020-01-13 RX ORDER — PREDNISONE 10 MG/1
TABLET ORAL
Qty: 48 TABLET | Refills: 0 | Status: SHIPPED | OUTPATIENT
Start: 2020-01-13 | End: 2020-01-16 | Stop reason: HOSPADM

## 2020-01-13 RX ORDER — AZITHROMYCIN 250 MG/1
250 TABLET, FILM COATED ORAL DAILY
Qty: 6 TABLET | Refills: 0 | Status: SHIPPED | OUTPATIENT
Start: 2020-01-13 | End: 2020-01-16 | Stop reason: HOSPADM

## 2020-01-13 RX ORDER — IPRATROPIUM BROMIDE AND ALBUTEROL SULFATE 2.5; .5 MG/3ML; MG/3ML
3 SOLUTION RESPIRATORY (INHALATION) ONCE
Status: COMPLETED | OUTPATIENT
Start: 2020-01-13 | End: 2020-01-13

## 2020-01-13 RX ADMIN — IPRATROPIUM BROMIDE AND ALBUTEROL SULFATE 3 ML: .5; 3 SOLUTION RESPIRATORY (INHALATION) at 14:03

## 2020-01-13 NOTE — ED PROVIDER NOTES
Subjective   74-year-old female presents with cough and shortness of breath, she has had a productive cough with green-yellow sputum.  She does have a history of COPD, she is on 3 L oxygen at night, but she is had to wear it during the day because of increasingly worsening cough and shortness of breath.  Symptoms are worse especially at night, or with any exertion.  She has been using her neb treatments at home with no relief.  She is on steroids as well daily.      History provided by:  Patient   used: No        Review of Systems   Respiratory: Positive for cough and shortness of breath.    All other systems reviewed and are negative.      Past Medical History:   Diagnosis Date   • Abdominal pain    • Acute bronchitis    • Ankle pain    • Anxiety 1980   • Atopic dermatitis    • Bronchiectasis (CMS/HCC)    • Cataract, bilateral    • Chest pain     STATES HAS OCCASSIONALLY.  STATES LAST TIME WAS LAST WEEK.  STATES SEES DR. RICH.  STATES HE HAS DONE NUMEROUS TESTS ON HER AND HAS NOT BEEN ABLE TO FIND OUT CAUSE.     • Chronic obstructive lung disease (CMS/HCC) 2008   • Colon cancer screening    • COPD (chronic obstructive pulmonary disease) (CMS/HCC)    • Cystocele    • Depressed    • Depression 1980   • Fatigue     Chronic pafigue 2012   • Fibromyalgia 2008   • Full dentures    • GERD (gastroesophageal reflux disease)    • Gout    • Heartburn     Chronic historu of epigastric heartburn currently controlled on Prilesec 40 mg every morning along with Zantac 300 Mg daily at bedtime   • High cholesterol 2012   • Hip pain    • Hyperlipidemia    • Hypertension    • Insomnia    • Joint pain    • Kidney infection    • Loss of appetite    • Low back pain 1995   • Melena    • Nausea and vomiting    • On home oxygen therapy     2L/NC PRN (STATES SHE HAS BEEN USING CONTINUOSLY LATELY)   • Osteoarthritis 2010   • Pain in limb    • Palpitations    • Pinched nerve     Pinched nerves 2011   • Pneumonia    •  Problems with swallowing     HAS TO EAT SLOW AND CHEW FOOD WELL   • Recurrent urinary tract infection    • Sciatica [id number]   • Shortness of breath    • Sinus problem    • Sleep apnea     NO CPAP   • Upset stomach    • Valvular heart disease    • Vitamin B12 deficiency    • Wears glasses    • Weight loss, abnormal     Weight loss is stabel. On pund weight gain since 2016       Allergies   Allergen Reactions   • Dust Mite Extract Other (See Comments)     Dust  SINUS   • Grass Other (See Comments)     SINUS   • Mold Extract [Trichophyton] Other (See Comments)     SINUS       Past Surgical History:   Procedure Laterality Date   • ABDOMINAL HERNIA REPAIR     • APPENDECTOMY  1965   • BACK SURGERY  2005   • BRONCHOSCOPY N/A 2018    Procedure: BRONCHOSCOPY w/ WASHINGS/BRUSHINGS with MAC;  Surgeon: Rebeka Esparza MD;  Location: [facility];  Service: Pulmonary   • CATARACT EXTRACTION Bilateral     Put in implants    • CHOLECYSTECTOMY     • COLONOSCOPY     • ENDOSCOPY     • KNEE SURGERY Left 1979    Knee Cap   • TUBAL ABDOMINAL LIGATION         Family History   Problem Relation Age of Onset   • Ovarian cancer Mother    • Cancer Mother    • Lung cancer Father    • Heart disease Brother        Social History     Socioeconomic History   • Marital status: Single     Spouse name: Not on file   • Number of children: Not on file   • Years of education: Not on file   • Highest education level: Not on file   Tobacco Use   • Smoking status: Former Smoker     Packs/day: 0.00     Years: 35.00     Pack years: 0.00     Last attempt to quit: 2017     Years since quittin.5   • Smokeless tobacco: Never Used   Substance and Sexual Activity   • Alcohol use: No   • Drug use: No   • Sexual activity: Defer           Objective   Physical Exam   Constitutional: She is oriented to person, place, and time. She appears well-developed and well-nourished.   HENT:   Head: Normocephalic and atraumatic.   Eyes: EOM are  normal.   Neck: Normal range of motion. Neck supple.   Cardiovascular: Normal rate and regular rhythm.   Pulmonary/Chest: Effort normal. She has no wheezes.   Abdominal: Soft. Bowel sounds are normal.   Musculoskeletal: Normal range of motion.   Neurological: She is alert and oriented to person, place, and time. She has normal reflexes.   Skin: Skin is warm and dry.   Psychiatric: She has a normal mood and affect. Her behavior is normal.   Nursing note and vitals reviewed.      Procedures           ED Course  ED Course as of Jan 13 1527   Mon Jan 13, 2020   1408 EKG interpreted by me: Sinus rhythm, normal rate, no acute ST/T changes, low-voltage QRS complexes, this is an abnormal EKG    [MP]   1422 Shortness of breath, productive cough, no relief with home meds, suspect COPD exacerbation possible pneumonia.    [CS]   1524 Improvement with neb treatment, patient has home O2, checks x-ray clear for pneumonia.  Will discharge home on antibiotics and tapered steroids.    [CS]      ED Course User Index  [CS] Main Tee Jr., PA-C  [MP] Hussein Oconnor MD                                               MDM  Number of Diagnoses or Management Options  COPD exacerbation (CMS/HCC): new and requires workup     Amount and/or Complexity of Data Reviewed  Clinical lab tests: reviewed  Tests in the radiology section of CPT®: reviewed  Discuss the patient with other providers: yes    Risk of Complications, Morbidity, and/or Mortality  Presenting problems: low  Diagnostic procedures: low  Management options: low    Patient Progress  Patient progress: stable      Final diagnoses:   COPD exacerbation (CMS/HCC)            Main Tee Jr., PA-C  01/13/20 1528

## 2020-01-14 ENCOUNTER — APPOINTMENT (OUTPATIENT)
Dept: CARDIOLOGY | Facility: HOSPITAL | Age: 75
End: 2020-01-14

## 2020-01-14 ENCOUNTER — APPOINTMENT (OUTPATIENT)
Dept: GENERAL RADIOLOGY | Facility: HOSPITAL | Age: 75
End: 2020-01-14

## 2020-01-14 ENCOUNTER — HOSPITAL ENCOUNTER (OUTPATIENT)
Facility: HOSPITAL | Age: 75
Setting detail: OBSERVATION
Discharge: HOME HEALTH | End: 2020-01-16
Attending: EMERGENCY MEDICINE | Admitting: FAMILY MEDICINE

## 2020-01-14 ENCOUNTER — APPOINTMENT (OUTPATIENT)
Dept: CT IMAGING | Facility: HOSPITAL | Age: 75
End: 2020-01-14

## 2020-01-14 DIAGNOSIS — M15.9 PRIMARY OSTEOARTHRITIS INVOLVING MULTIPLE JOINTS: ICD-10-CM

## 2020-01-14 DIAGNOSIS — J44.1 COPD EXACERBATION (HCC): Primary | ICD-10-CM

## 2020-01-14 DIAGNOSIS — Z78.9 IMPAIRED MOBILITY AND ADLS: ICD-10-CM

## 2020-01-14 DIAGNOSIS — Z74.09 IMPAIRED MOBILITY AND ADLS: ICD-10-CM

## 2020-01-14 LAB
ALBUMIN SERPL-MCNC: 4.8 G/DL (ref 3.5–5.2)
ALBUMIN/GLOB SERPL: 1.2 G/DL
ALP SERPL-CCNC: 125 U/L (ref 39–117)
ALT SERPL W P-5'-P-CCNC: 15 U/L (ref 1–33)
ANION GAP SERPL CALCULATED.3IONS-SCNC: 16.3 MMOL/L (ref 5–15)
AST SERPL-CCNC: 30 U/L (ref 1–32)
BASOPHILS # BLD AUTO: 0.06 10*3/MM3 (ref 0–0.2)
BASOPHILS NFR BLD AUTO: 0.4 % (ref 0–1.5)
BILIRUB SERPL-MCNC: 0.3 MG/DL (ref 0.2–1.2)
BUN BLD-MCNC: 16 MG/DL (ref 8–23)
BUN/CREAT SERPL: 18.4 (ref 7–25)
CALCIUM SPEC-SCNC: 10.3 MG/DL (ref 8.6–10.5)
CHLORIDE SERPL-SCNC: 94 MMOL/L (ref 98–107)
CO2 SERPL-SCNC: 27.7 MMOL/L (ref 22–29)
CREAT BLD-MCNC: 0.87 MG/DL (ref 0.57–1)
DEPRECATED RDW RBC AUTO: 55.7 FL (ref 37–54)
EOSINOPHIL # BLD AUTO: 0.01 10*3/MM3 (ref 0–0.4)
EOSINOPHIL NFR BLD AUTO: 0.1 % (ref 0.3–6.2)
ERYTHROCYTE [DISTWIDTH] IN BLOOD BY AUTOMATED COUNT: 15.1 % (ref 12.3–15.4)
GFR SERPL CREATININE-BSD FRML MDRD: 64 ML/MIN/1.73
GLOBULIN UR ELPH-MCNC: 4 GM/DL
GLUCOSE BLD-MCNC: 193 MG/DL (ref 65–99)
HCT VFR BLD AUTO: 46.8 % (ref 34–46.6)
HGB BLD-MCNC: 15.2 G/DL (ref 12–15.9)
HOLD SPECIMEN: NORMAL
HOLD SPECIMEN: NORMAL
IMM GRANULOCYTES # BLD AUTO: 0.15 10*3/MM3 (ref 0–0.05)
IMM GRANULOCYTES NFR BLD AUTO: 1.1 % (ref 0–0.5)
LYMPHOCYTES # BLD AUTO: 1.69 10*3/MM3 (ref 0.7–3.1)
LYMPHOCYTES NFR BLD AUTO: 12.3 % (ref 19.6–45.3)
MCH RBC QN AUTO: 32 PG (ref 26.6–33)
MCHC RBC AUTO-ENTMCNC: 32.5 G/DL (ref 31.5–35.7)
MCV RBC AUTO: 98.5 FL (ref 79–97)
MONOCYTES # BLD AUTO: 0.34 10*3/MM3 (ref 0.1–0.9)
MONOCYTES NFR BLD AUTO: 2.5 % (ref 5–12)
NEUTROPHILS # BLD AUTO: 11.5 10*3/MM3 (ref 1.7–7)
NEUTROPHILS NFR BLD AUTO: 83.6 % (ref 42.7–76)
NRBC BLD AUTO-RTO: 0 /100 WBC (ref 0–0.2)
NT-PROBNP SERPL-MCNC: 120.4 PG/ML (ref 5–900)
PLATELET # BLD AUTO: 357 10*3/MM3 (ref 140–450)
PMV BLD AUTO: 10 FL (ref 6–12)
POTASSIUM BLD-SCNC: 4.4 MMOL/L (ref 3.5–5.2)
PROT SERPL-MCNC: 8.8 G/DL (ref 6–8.5)
RBC # BLD AUTO: 4.75 10*6/MM3 (ref 3.77–5.28)
SODIUM BLD-SCNC: 138 MMOL/L (ref 136–145)
TROPONIN T SERPL-MCNC: <0.01 NG/ML (ref 0–0.03)
WBC NRBC COR # BLD: 13.75 10*3/MM3 (ref 3.4–10.8)
WHOLE BLOOD HOLD SPECIMEN: NORMAL
WHOLE BLOOD HOLD SPECIMEN: NORMAL

## 2020-01-14 PROCEDURE — 83880 ASSAY OF NATRIURETIC PEPTIDE: CPT | Performed by: PHYSICIAN ASSISTANT

## 2020-01-14 PROCEDURE — G0378 HOSPITAL OBSERVATION PER HR: HCPCS

## 2020-01-14 PROCEDURE — 25010000002 CEFTRIAXONE SODIUM-DEXTROSE 1-3.74 GM-%(50ML) RECONSTITUTED SOLUTION: Performed by: PHYSICIAN ASSISTANT

## 2020-01-14 PROCEDURE — 25010000002 ENOXAPARIN PER 10 MG: Performed by: FAMILY MEDICINE

## 2020-01-14 PROCEDURE — 25010000002 LEVOFLOXACIN PER 250 MG: Performed by: FAMILY MEDICINE

## 2020-01-14 PROCEDURE — 93306 TTE W/DOPPLER COMPLETE: CPT

## 2020-01-14 PROCEDURE — 94799 UNLISTED PULMONARY SVC/PX: CPT

## 2020-01-14 PROCEDURE — 96367 TX/PROPH/DG ADDL SEQ IV INF: CPT

## 2020-01-14 PROCEDURE — 99219 PR INITIAL OBSERVATION CARE/DAY 50 MINUTES: CPT | Performed by: FAMILY MEDICINE

## 2020-01-14 PROCEDURE — 25010000002 METHYLPREDNISOLONE PER 125 MG: Performed by: FAMILY MEDICINE

## 2020-01-14 PROCEDURE — 80053 COMPREHEN METABOLIC PANEL: CPT | Performed by: PHYSICIAN ASSISTANT

## 2020-01-14 PROCEDURE — 93005 ELECTROCARDIOGRAM TRACING: CPT | Performed by: PHYSICIAN ASSISTANT

## 2020-01-14 PROCEDURE — 96365 THER/PROPH/DIAG IV INF INIT: CPT

## 2020-01-14 PROCEDURE — 99284 EMERGENCY DEPT VISIT MOD MDM: CPT

## 2020-01-14 PROCEDURE — 25010000002 AZITHROMYCIN: Performed by: PHYSICIAN ASSISTANT

## 2020-01-14 PROCEDURE — 96376 TX/PRO/DX INJ SAME DRUG ADON: CPT

## 2020-01-14 PROCEDURE — 87040 BLOOD CULTURE FOR BACTERIA: CPT | Performed by: FAMILY MEDICINE

## 2020-01-14 PROCEDURE — 94640 AIRWAY INHALATION TREATMENT: CPT

## 2020-01-14 PROCEDURE — 71250 CT THORAX DX C-: CPT

## 2020-01-14 PROCEDURE — 25010000002 METHYLPREDNISOLONE PER 125 MG: Performed by: PHYSICIAN ASSISTANT

## 2020-01-14 PROCEDURE — 85025 COMPLETE CBC W/AUTO DIFF WBC: CPT | Performed by: PHYSICIAN ASSISTANT

## 2020-01-14 PROCEDURE — 96375 TX/PRO/DX INJ NEW DRUG ADDON: CPT

## 2020-01-14 PROCEDURE — 96372 THER/PROPH/DIAG INJ SC/IM: CPT

## 2020-01-14 PROCEDURE — 84484 ASSAY OF TROPONIN QUANT: CPT | Performed by: PHYSICIAN ASSISTANT

## 2020-01-14 RX ORDER — LEVOFLOXACIN 5 MG/ML
500 INJECTION, SOLUTION INTRAVENOUS ONCE
Status: COMPLETED | OUTPATIENT
Start: 2020-01-14 | End: 2020-01-14

## 2020-01-14 RX ORDER — HYDROCODONE BITARTRATE AND ACETAMINOPHEN 10; 325 MG/1; MG/1
1 TABLET ORAL EVERY 12 HOURS PRN
Status: DISCONTINUED | OUTPATIENT
Start: 2020-01-14 | End: 2020-01-16 | Stop reason: HOSPADM

## 2020-01-14 RX ORDER — CYCLOBENZAPRINE HCL 10 MG
10 TABLET ORAL DAILY
Status: DISCONTINUED | OUTPATIENT
Start: 2020-01-15 | End: 2020-01-16 | Stop reason: HOSPADM

## 2020-01-14 RX ORDER — BENZONATATE 100 MG/1
200 CAPSULE ORAL 3 TIMES DAILY PRN
Status: DISCONTINUED | OUTPATIENT
Start: 2020-01-14 | End: 2020-01-16 | Stop reason: HOSPADM

## 2020-01-14 RX ORDER — CETIRIZINE HYDROCHLORIDE 10 MG/1
5 TABLET ORAL DAILY
Status: DISCONTINUED | OUTPATIENT
Start: 2020-01-15 | End: 2020-01-16 | Stop reason: HOSPADM

## 2020-01-14 RX ORDER — IPRATROPIUM BROMIDE AND ALBUTEROL SULFATE 2.5; .5 MG/3ML; MG/3ML
3 SOLUTION RESPIRATORY (INHALATION) ONCE
Status: COMPLETED | OUTPATIENT
Start: 2020-01-14 | End: 2020-01-14

## 2020-01-14 RX ORDER — SODIUM CHLORIDE 0.9 % (FLUSH) 0.9 %
10 SYRINGE (ML) INJECTION AS NEEDED
Status: DISCONTINUED | OUTPATIENT
Start: 2020-01-14 | End: 2020-01-16 | Stop reason: HOSPADM

## 2020-01-14 RX ORDER — SODIUM CHLORIDE 0.9 % (FLUSH) 0.9 %
10 SYRINGE (ML) INJECTION EVERY 12 HOURS SCHEDULED
Status: DISCONTINUED | OUTPATIENT
Start: 2020-01-14 | End: 2020-01-16 | Stop reason: HOSPADM

## 2020-01-14 RX ORDER — CEFTRIAXONE 1 G/50ML
1 INJECTION, SOLUTION INTRAVENOUS ONCE
Status: COMPLETED | OUTPATIENT
Start: 2020-01-14 | End: 2020-01-14

## 2020-01-14 RX ORDER — GABAPENTIN 100 MG/1
100 CAPSULE ORAL 3 TIMES DAILY
Status: DISCONTINUED | OUTPATIENT
Start: 2020-01-14 | End: 2020-01-16 | Stop reason: HOSPADM

## 2020-01-14 RX ORDER — GUAIFENESIN 600 MG/1
600 TABLET, EXTENDED RELEASE ORAL EVERY 12 HOURS SCHEDULED
Status: DISCONTINUED | OUTPATIENT
Start: 2020-01-14 | End: 2020-01-16 | Stop reason: HOSPADM

## 2020-01-14 RX ORDER — VENLAFAXINE 75 MG/1
75 TABLET ORAL 2 TIMES DAILY WITH MEALS
Status: DISCONTINUED | OUTPATIENT
Start: 2020-01-14 | End: 2020-01-16 | Stop reason: HOSPADM

## 2020-01-14 RX ORDER — LEVOFLOXACIN 5 MG/ML
250 INJECTION, SOLUTION INTRAVENOUS EVERY 24 HOURS
Status: DISCONTINUED | OUTPATIENT
Start: 2020-01-15 | End: 2020-01-16

## 2020-01-14 RX ORDER — TRAZODONE HYDROCHLORIDE 50 MG/1
25 TABLET ORAL NIGHTLY
Status: DISCONTINUED | OUTPATIENT
Start: 2020-01-14 | End: 2020-01-16 | Stop reason: HOSPADM

## 2020-01-14 RX ORDER — METOPROLOL SUCCINATE 25 MG/1
25 TABLET, EXTENDED RELEASE ORAL NIGHTLY
Status: DISCONTINUED | OUTPATIENT
Start: 2020-01-14 | End: 2020-01-16 | Stop reason: HOSPADM

## 2020-01-14 RX ORDER — ACETAMINOPHEN 325 MG/1
650 TABLET ORAL EVERY 4 HOURS PRN
Status: DISCONTINUED | OUTPATIENT
Start: 2020-01-14 | End: 2020-01-16 | Stop reason: HOSPADM

## 2020-01-14 RX ORDER — KETOTIFEN FUMARATE 0.35 MG/ML
1 SOLUTION/ DROPS OPHTHALMIC 2 TIMES DAILY
Status: DISCONTINUED | OUTPATIENT
Start: 2020-01-14 | End: 2020-01-16 | Stop reason: HOSPADM

## 2020-01-14 RX ORDER — CHOLECALCIFEROL (VITAMIN D3) 125 MCG
10 CAPSULE ORAL NIGHTLY PRN
Status: DISCONTINUED | OUTPATIENT
Start: 2020-01-14 | End: 2020-01-16 | Stop reason: HOSPADM

## 2020-01-14 RX ORDER — IPRATROPIUM BROMIDE AND ALBUTEROL SULFATE 2.5; .5 MG/3ML; MG/3ML
3 SOLUTION RESPIRATORY (INHALATION)
Status: DISCONTINUED | OUTPATIENT
Start: 2020-01-14 | End: 2020-01-16 | Stop reason: HOSPADM

## 2020-01-14 RX ORDER — IPRATROPIUM BROMIDE AND ALBUTEROL SULFATE 2.5; .5 MG/3ML; MG/3ML
3 SOLUTION RESPIRATORY (INHALATION) EVERY 4 HOURS PRN
Status: DISCONTINUED | OUTPATIENT
Start: 2020-01-14 | End: 2020-01-16 | Stop reason: HOSPADM

## 2020-01-14 RX ORDER — METHYLPREDNISOLONE SODIUM SUCCINATE 125 MG/2ML
60 INJECTION, POWDER, LYOPHILIZED, FOR SOLUTION INTRAMUSCULAR; INTRAVENOUS EVERY 6 HOURS
Status: DISCONTINUED | OUTPATIENT
Start: 2020-01-14 | End: 2020-01-16

## 2020-01-14 RX ORDER — PANTOPRAZOLE SODIUM 40 MG/1
40 TABLET, DELAYED RELEASE ORAL EVERY MORNING
Status: DISCONTINUED | OUTPATIENT
Start: 2020-01-15 | End: 2020-01-16 | Stop reason: HOSPADM

## 2020-01-14 RX ORDER — LOSARTAN POTASSIUM 25 MG/1
25 TABLET ORAL NIGHTLY
Status: DISCONTINUED | OUTPATIENT
Start: 2020-01-14 | End: 2020-01-16 | Stop reason: HOSPADM

## 2020-01-14 RX ORDER — METHYLPREDNISOLONE SODIUM SUCCINATE 125 MG/2ML
125 INJECTION, POWDER, LYOPHILIZED, FOR SOLUTION INTRAMUSCULAR; INTRAVENOUS ONCE
Status: COMPLETED | OUTPATIENT
Start: 2020-01-14 | End: 2020-01-14

## 2020-01-14 RX ORDER — LORAZEPAM 0.5 MG/1
0.5 TABLET ORAL 2 TIMES DAILY PRN
Status: DISCONTINUED | OUTPATIENT
Start: 2020-01-14 | End: 2020-01-16 | Stop reason: HOSPADM

## 2020-01-14 RX ORDER — MIRTAZAPINE 15 MG/1
30 TABLET, FILM COATED ORAL NIGHTLY
Status: DISCONTINUED | OUTPATIENT
Start: 2020-01-14 | End: 2020-01-14

## 2020-01-14 RX ADMIN — LOSARTAN POTASSIUM 25 MG: 25 TABLET, FILM COATED ORAL at 20:25

## 2020-01-14 RX ADMIN — LEVOFLOXACIN 500 MG: 5 INJECTION, SOLUTION INTRAVENOUS at 15:44

## 2020-01-14 RX ADMIN — VENLAFAXINE 75 MG: 75 TABLET ORAL at 17:41

## 2020-01-14 RX ADMIN — CEFTRIAXONE 1 G: 1 INJECTION, SOLUTION INTRAVENOUS at 14:16

## 2020-01-14 RX ADMIN — GABAPENTIN 100 MG: 100 CAPSULE ORAL at 16:35

## 2020-01-14 RX ADMIN — METOPROLOL SUCCINATE 25 MG: 25 TABLET, EXTENDED RELEASE ORAL at 20:24

## 2020-01-14 RX ADMIN — TRAZODONE HYDROCHLORIDE 25 MG: 50 TABLET ORAL at 20:24

## 2020-01-14 RX ADMIN — IPRATROPIUM BROMIDE AND ALBUTEROL SULFATE 3 ML: .5; 3 SOLUTION RESPIRATORY (INHALATION) at 23:21

## 2020-01-14 RX ADMIN — METHYLPREDNISOLONE SODIUM SUCCINATE 125 MG: 125 INJECTION, POWDER, FOR SOLUTION INTRAMUSCULAR; INTRAVENOUS at 12:44

## 2020-01-14 RX ADMIN — LORAZEPAM 0.5 MG: 0.5 TABLET ORAL at 20:24

## 2020-01-14 RX ADMIN — AZITHROMYCIN 500 MG: 500 INJECTION, POWDER, LYOPHILIZED, FOR SOLUTION INTRAVENOUS at 14:24

## 2020-01-14 RX ADMIN — IPRATROPIUM BROMIDE AND ALBUTEROL SULFATE 3 ML: .5; 3 SOLUTION RESPIRATORY (INHALATION) at 16:01

## 2020-01-14 RX ADMIN — GUAIFENESIN 600 MG: 600 TABLET, EXTENDED RELEASE ORAL at 20:25

## 2020-01-14 RX ADMIN — IPRATROPIUM BROMIDE AND ALBUTEROL SULFATE 3 ML: .5; 3 SOLUTION RESPIRATORY (INHALATION) at 20:14

## 2020-01-14 RX ADMIN — MELATONIN TAB 5 MG 10 MG: 5 TAB at 20:31

## 2020-01-14 RX ADMIN — IPRATROPIUM BROMIDE AND ALBUTEROL SULFATE 3 ML: .5; 3 SOLUTION RESPIRATORY (INHALATION) at 12:48

## 2020-01-14 RX ADMIN — ENOXAPARIN SODIUM 30 MG: 30 INJECTION SUBCUTANEOUS at 16:35

## 2020-01-14 RX ADMIN — KETOTIFEN FUMARATE 1 DROP: 0.35 SOLUTION/ DROPS OPHTHALMIC at 20:31

## 2020-01-14 RX ADMIN — SODIUM CHLORIDE, PRESERVATIVE FREE 10 ML: 5 INJECTION INTRAVENOUS at 20:24

## 2020-01-14 RX ADMIN — GABAPENTIN 100 MG: 100 CAPSULE ORAL at 20:24

## 2020-01-14 RX ADMIN — METHYLPREDNISOLONE SODIUM SUCCINATE 60 MG: 125 INJECTION, POWDER, FOR SOLUTION INTRAMUSCULAR; INTRAVENOUS at 17:41

## 2020-01-14 RX ADMIN — HYDROCODONE BITARTRATE AND ACETAMINOPHEN 1 TABLET: 10; 325 TABLET ORAL at 15:44

## 2020-01-14 NOTE — PLAN OF CARE
Problem: Patient Care Overview  Goal: Plan of Care Review  Outcome: Ongoing (interventions implemented as appropriate)  Flowsheets (Taken 1/14/2020 1615)  Progress: no change  Plan of Care Reviewed With: patient  Outcome Summary: Pt. admitted today for COPD exacerbation.  VSS.  Pain medication given for chronic pain.  REsting well.

## 2020-01-14 NOTE — H&P
[facility]   HISTORY AND PHYSICAL      Name:  Joelle Yee   Age:  74 y.o.  Sex:  female  :  1945  MRN:  [id number]   Visit Number:  [id number]  Admission Date:  2020  Date Of Service:  20  Primary Care Physician:  Blanquita Clements PA    Chief Complaint:     Shortness of breath, cough    History Of Presenting Illness:      Patient is a 74-year-old female who presents to the second time to the emergency room in as many days with complaints of increased shortness of breath and cough.  She has medical history of COPD, chronic respiratory failure, anxiety, depression, GERD, fibromyalgia, gout, sleep apnea, chronic pain, neuropathy.  Patient states she has basically been short of breath for a month now.  She states she follows with Dr. Esparza and at most recent office visit in December her trelegy was discontinued, she has been on Brovana and Pulmicort in addition to taking azithromycin and prednisone every other day.  She notes shortness of breath worse with lying flat in addition to exertion.  She has been wearing a couple liters of oxygen continuously.  She notes sputum production is thick, green.  No hemoptysis.  She has not been eating well.  Denies any known sick contacts.  She states she has not really been out of the house over  or Shari.  She denies any cardiac history, states she saw Dr. Du a couple years ago and had a stress test and echocardiogram.  She quit smoking 2 years ago.    In the ER, patient afebrile, heart rate in the 90s, respiratory rate of 20, blood pressure 140/70.  She was satting 100% on 3 L nasal cannula.  White blood cell count of 13,000, platelets 357, hemoglobin 15.  Troponin negative.  proBNP 120.  CMP with creatinine 0.87, potassium 44.  Chest x-ray yesterday with evidence of emphysema, no focal opacities or effusions.  A chest CT today demonstrated atelectasis in the right lung base, otherwise unremarkable.  He  was given a DuoNeb treatment, 125 mg Solu-Medrol, and a dose of Rocephin and azithromycin.  We were asked to admit for COPD exacerbation.    Review Of Systems:     General: Denies any fevers, chills or loss of consciousness.   Psych: No history of any hallucinations and delusions.  Ophth: No history of any diplopia or transient loss of vision.  ENT: No history of sore throat, nasal congestion or ear pain.   Allergy/immuno: No history of rash or itching.  Hem/lymph: No history of neck swelling or easy bleeding.  Endo: No history of any recent unintentional weight gain or loss.  Resp: Cough, shortness of breath  Card: No history of chest pain or palpitations.   GI: No history of nausea, vomiting, diarrhea. Denies any abdominal pain.   : No history of dysuria or hematuria.  MSK: No muscle pain. No calf pain.   Neuro: No history of any focal weakness. No loss of consciousness. Denies any numbness.  Derm:: No history of any redness or pruritis.     Past Medical History:    Past Medical History:   Diagnosis Date   • Abdominal pain    • Acute bronchitis    • Ankle pain    • Anxiety 1980   • Atopic dermatitis    • Bronchiectasis (CMS/HCC)    • Cataract, bilateral    • Chest pain     STATES HAS OCCASSIONALLY.  STATES LAST TIME WAS LAST WEEK.  STATES SEES DR. RICH.  STATES HE HAS DONE NUMEROUS TESTS ON HER AND HAS NOT BEEN ABLE TO FIND OUT CAUSE.     • Chronic obstructive lung disease (CMS/HCC) 2008   • Colon cancer screening    • COPD (chronic obstructive pulmonary disease) (CMS/HCC)    • Cystocele    • Depressed    • Depression 1980   • Fatigue     Chronic pafigue 2012   • Fibromyalgia 2008   • Full dentures    • GERD (gastroesophageal reflux disease)    • Gout    • Heartburn     Chronic historu of epigastric heartburn currently controlled on Prilesec 40 mg every morning along with Zantac 300 Mg daily at bedtime   • High cholesterol 2012   • Hip pain    • Hyperlipidemia    • Hypertension    • Insomnia    • Joint pain     • Kidney infection    • Loss of appetite    • Low back pain    • Melena    • Nausea and vomiting    • On home oxygen therapy     2L/NC PRN (STATES SHE HAS BEEN USING CONTINUOSLY LATELY)   • Osteoarthritis    • Pain in limb    • Palpitations    • Pinched nerve     Pinched nerves    • Pneumonia    • Problems with swallowing     HAS TO EAT SLOW AND CHEW FOOD WELL   • Recurrent urinary tract infection    • Sciatica [id number]   • Shortness of breath    • Sinus problem    • Sleep apnea     NO CPAP   • Upset stomach    • Valvular heart disease    • Vitamin B12 deficiency    • Wears glasses    • Weight loss, abnormal     Weight loss is stabel. On pund weight gain since 2016       Past Surgical history:    Past Surgical History:   Procedure Laterality Date   • ABDOMINAL HERNIA REPAIR     • APPENDECTOMY  1965   • BACK SURGERY     • BRONCHOSCOPY N/A 2018    Procedure: BRONCHOSCOPY w/ WASHINGS/BRUSHINGS with MAC;  Surgeon: Rebeka Esparza MD;  Location: [facility];  Service: Pulmonary   • CATARACT EXTRACTION Bilateral     Put in implants    • CHOLECYSTECTOMY     • COLONOSCOPY     • ENDOSCOPY     • KNEE SURGERY Left     Knee Cap   • TUBAL ABDOMINAL LIGATION         Social History:    Social History     Socioeconomic History   • Marital status: Single     Spouse name: Not on file   • Number of children: Not on file   • Years of education: Not on file   • Highest education level: Not on file   Tobacco Use   • Smoking status: Former Smoker     Packs/day: 0.00     Years: 35.00     Pack years: 0.00     Last attempt to quit: 2017     Years since quittin.5   • Smokeless tobacco: Never Used   Substance and Sexual Activity   • Alcohol use: No   • Drug use: No   • Sexual activity: Defer       Family History:    Family History   Problem Relation Age of Onset   • Ovarian cancer Mother    • Cancer Mother    • Lung cancer Father    • Heart disease Brother        Allergies:      Dust mite  extract; Grass; and Mold extract [trichophyton]    Home Medications:    Prior to Admission Medications     Prescriptions Last Dose Informant Patient Reported? Taking?    acetaminophen (TYLENOL) 325 MG tablet  Self No No    Take 2 tablets by mouth Every 4 (Four) Hours As Needed for Headache or Fever (temperature greater than 101.5 F).    ammonium lactate (LAC-HYDRIN) 12 % cream   No No    Apply  topically to the appropriate area as directed 2 (Two) Times a Day.    arformoterol (BROVANA) 15 MCG/2ML nebulizer solution   No No    Take 2 mL by nebulization 2 (Two) Times a Day.    azithromycin (ZITHROMAX) 250 MG tablet   No No    Take 1 tablet by mouth Every Other Day. Take 1 tablet by mouth every other day.    azithromycin (ZITHROMAX) 250 MG tablet   No No    Take 1 tablet by mouth Daily. Take 2 tablets the first day, then 1 tablet daily for 4 days.    benzonatate (TESSALON) 100 MG capsule   Yes No    Take 100 mg by mouth 3 (Three) Times a Day As Needed for Cough.    budesonide (PULMICORT) 0.5 MG/2ML nebulizer solution   No No    Take 2 mL by nebulization 2 (Two) Times a Day.    Camphor-Eucalyptus-Menthol (MEDICATED CHEST RUB EX)   Yes No    Cyanocobalamin (VITAMIN B12 PO)  Self Yes No    Take 500 mcg by mouth Daily.    cyclobenzaprine (FLEXERIL) 10 MG tablet   Yes No    Take 10 mg by mouth Daily.    Diclofenac Sodium (VOLTAREN TD)  Self Yes No    Apply 2 g topically to the appropriate area as directed 2 (Two) Times a Day As Needed. Voltaren GEL     gabapentin (NEURONTIN) 300 MG capsule  Self Yes No    Take 100 mg by mouth 3 (Three) Times a Day.    guaiFENesin (MUCINEX) 600 MG 12 hr tablet   No No    Take 1 tablet by mouth Every 12 (Twelve) Hours.    HYDROcodone-acetaminophen (NORCO)  MG per tablet   Yes No    Take 1 tablet by mouth Every 12 (Twelve) Hours As Needed (Patient will take 3 times daily occasionally).    HYDROcodone-acetaminophen (NORCO) 5-325 MG per tablet   No No    Take 1 tablet by mouth Every 6  (Six) Hours As Needed for Severe Pain .    ipratropium-albuterol (DUO-NEB) 0.5-2.5 mg/3 ml nebulizer   No No    TAKE 3 ML BY NEBULIZATION 4 (FOUR) TIMES A DAY AS NEEDED FOR WHEEZING OR SHORTNESS OF AIR.    ketotifen (ZADITOR) 0.025 % ophthalmic solution  Self Yes No    Administer 1-2 drops to both eyes 2 (Two) Times a Day.    lidocaine viscous (XYLOCAINE) 2 % solution   Yes No    TAKE 2 TSP GARGLE SWISH AND SPIT EVERY 2 TO 3 HOURS    loratadine (CLARITIN) 10 MG tablet   Yes No    Take 10 mg by mouth Daily As Needed for Allergies.    LORazepam (ATIVAN) 0.5 MG tablet  Self Yes No    Take 0.5 mg by mouth 2 (Two) Times a Day As Needed. 1-2 TABLETS DAILY PRN    losartan (COZAAR) 25 MG tablet   Yes No    Take 25 mg by mouth Every Night.    melatonin 5 MG tablet tablet  Self No No    Take 2 tablets by mouth At Night As Needed for sleep    metoprolol succinate XL (TOPROL-XL) 25 MG 24 hr tablet   Yes No    Take 25 mg by mouth Every Night.    mirtazapine (REMERON) 30 MG tablet  Self Yes No    Take 30 mg by mouth Every Night.    Misc. Devices (ROLLATOR) misc  Self No No    1 Units As Needed (ambulation assistance).    Multiple Vitamins-Minerals (MULTIVITAMIN WITH MINERALS) tablet tablet  Self Yes No    Take 1 tablet by mouth Daily.    Nutritional Supplements (ENSURE COMPLETE PO)  Self Yes No    Take 1 can by mouth 2 (Two) Times a Day.    omeprazole (priLOSEC) 40 MG capsule  Self Yes No    Take 40 mg by mouth Daily.    ondansetron ODT (ZOFRAN-ODT) 4 MG disintegrating tablet  Self Yes No    Take 1 tablet by mouth every 6 (six) hours as needed.    OXYGEN-HELIUM IN   Yes No    Oxygen; Patient Sig: Oxygen 2 liter as needed; 0; 21-May-2014; Active    predniSONE (DELTASONE) 10 MG (48) tablet pack   No No    As directed    predniSONE (DELTASONE) 5 MG tablet   No No    Take 1 tablet by mouth every other day.    Respiratory Therapy Supplies (FLUTTER) device  Self No No    1 Device as needed (as directed).    traZODone (DESYREL) 25 MG  half tablet   Yes No    Take 25 mg by mouth Every Night.    urea (CARMOL) 40 % cream   No No    Apply topically to the appropriate area as directed Daily. May substitute as needed for insurance coverage    urea (CARMOL) 40 % cream   No No    Apply  topically to the appropriate area as directed Every 12 (Twelve) Hours. Apply topically every 12 hours.    venlafaxine (EFFEXOR) 75 MG tablet  Self Yes No    Take 1 tablet by mouth 2 (two) times a day.             ED Medications:    Medications   sodium chloride 0.9 % flush 10 mL (has no administration in time range)   ipratropium-albuterol (DUO-NEB) nebulizer solution 3 mL (3 mL Nebulization Given 1/14/20 1248)   methylPREDNISolone sodium succinate (SOLU-Medrol) injection 125 mg (125 mg Intravenous Given 1/14/20 1244)       Vital Signs:    Temp:  [98 °F (36.7 °C)] 98 °F (36.7 °C)  Heart Rate:  [] 95  Resp:  [18-22] 18  BP: (145)/(73) 145/73        01/14/20  1227   Weight: 39 kg (86 lb)       Body mass index is 16.8 kg/m².    Physical Exam:    General Appearance:  Alert and cooperative, not in any acute distress.  Frail appearance.   Head:  Atraumatic and normocephalic, without obvious abnormality.   Eyes:          PERRLA, conjunctivae and sclerae normal, no Icterus. No pallor. Extraocular movements are within normal limits.   Ears:  Ears appear intact with no abnormalities noted.   Throat: No oral lesions, no thrush, oral mucosa dry.   Neck: Supple, trachea midline, no thyromegaly, no carotid bruit.   Back:   No tenderness to palpation, range of motion normal.   Lungs:    Breath sounds mildly diminished.  Faint expiratory wheezes.  There are no crackles.   Heart:  Normal S1 and S2, no murmur, no gallop, no rub. No JVD.   Abdomen:   Normal bowel sounds, no masses, no organomegaly. Soft, non-tender, non-distended, no guarding, no rebound tenderness.   Extremities: Moves all extremities well, no edema, no cyanosis, no clubbing.   Pulses: Pulses palpable and equal  bilaterally.   Skin: No bleeding, bruising or rash.   Neurologic: Alert and oriented x 3. Moves all four limbs equally. No tremors. No facial asymmetry.     Laboratory data:    I have reviewed the labs done in the emergency room.    Results from last 7 days   Lab Units 01/14/20  1232 01/13/20  1349   SODIUM mmol/L 138 135*   POTASSIUM mmol/L 4.4 4.1   CHLORIDE mmol/L 94* 94*   CO2 mmol/L 27.7 28.7   BUN mg/dL 16 13   CREATININE mg/dL 0.87 0.70   CALCIUM mg/dL 10.3 9.2   BILIRUBIN mg/dL 0.3 0.2   ALK PHOS U/L 125* 105   ALT (SGPT) U/L 15 12   AST (SGOT) U/L 30 22   GLUCOSE mg/dL 193* 102*     Results from last 7 days   Lab Units 01/14/20  1232 01/13/20  1349   WBC 10*3/mm3 13.75* 13.15*   HEMOGLOBIN g/dL 15.2 14.7   HEMATOCRIT % 46.8* 46.0   PLATELETS 10*3/mm3 357 302         Results from last 7 days   Lab Units 01/14/20  1232 01/13/20  1349   TROPONIN T ng/mL <0.010 <0.010     Results from last 7 days   Lab Units 01/14/20  1232   PROBNP pg/mL 120.4             Results from last 7 days   Lab Units 01/13/20  1411   PH, ARTERIAL pH units 7.434   PO2 ART mm Hg 59.5*   PCO2, ARTERIAL mm Hg 45.0   HCO3 ART mmol/L 30.2*           Invalid input(s): USDES,  BLOODU, NITRITITE, BACT, EP  Pain Management Panel     There is no flowsheet data to display.              EKG:      EKG with sinus tachycardia, right axis deviation, there are no specific ST abnormalities.    Radiology:    Imaging Results (Last 72 Hours)     Procedure Component Value Units Date/Time    CT Chest Without Contrast [[id number]] Collected:  01/14/20 1351     Updated:  01/14/20 1355    Narrative:       PROCEDURE: CT CHEST WO CONTRAST-     HISTORY: soa     COMPARISON: 06/16/2019.     PROCEDURE:  Multiple axial CT images were obtained from the thoracic  inlet through the upper abdomen without the use of contrast.      FINDINGS:   Soft tissue windows reveal no axillary, hilar or mediastinal adenopathy.  Heart size is normal. The aorta is normal in caliber. There  are no  pleural or pericardial effusions. Lung windows reveal centrilobular  emphysema. Atelectasis is present in the right lung base. The lungs are  otherwise clear. Within the visualized upper abdomen there our cystic  lesion seen in the superior pole of the left kidney and adjacent to the  splenic hilum which are unchanged. Bone windows reveal no acute osseous  abnormalities.       Impression:       No acute process.     193.13 mGy.cm        This study was performed with techniques to keep radiation doses as low  as reasonably achievable (ALARA). Individualized dose reduction  techniques using automated exposure control or adjustment of mA and/or  kV according to the patient size were employed.      This report was finalized on 1/14/2020 1:53 PM by Ignacia Lara M.D..            * No active hospital problems. *      Assessment:    1.  COPD exacerbation, present on admission, acute  2.  End-stage COPD with chronic respiratory failure, present on admission  3.  Prior tobacco abuse, present on admission  4.  Chronic pain, present on admission  5.  Anxiety/depression, present on admission chronic and stable  6.  Gout, present on admission, chronic and stable  7.  GERD, present admission, chronic and stable    Plan:    Patient is hemodynamically stable and overall appears well.  Be admitted for observation.  Seems her symptoms are related to discontinuation of Trelegy in addition to likely progressive emphysema.  Will treat with IV Solu-Medrol, duo nebs, Brovana, supplemental oxygen as needed.  With increased cough, shortness of breath, and sputum production, will add Levaquin for now with history of Pseudomonas in September 2019.  Will consult Dr. Esparza for further recommendations.    Echo from 12/3/2018 with normal EF of 55%, normal left atrial size.  Aortic leaflets with mild to moderate aortic insufficiency.  Mild tricuspid regurgitation with RVSP of 50.  Borderline right ventricular size with normal right  ventricular function.  I am going to repeat a 2D echo as I suspect her COPD and uncontrolled sleep apnea may have be contributing to worsening right-sided pressures.  DVT prophylaxis in place.  Reconcile medications once confirmed.    Otherwise, patient meets observation level care currently.  Anticipate discharge within the next 24 hours if patient clinically improves.  Plan of care discussed with patient who is agreeable    Jeanie Antonio DO  01/14/20  2:08 PM    Dictated utilizing Dragon dictation.

## 2020-01-14 NOTE — ED PROVIDER NOTES
Subjective   74-year-old female presents with shortness of breath, and productive cough.  Patient was seen in the emergency department yesterday for similar symptoms, she has been using her home nebulizers, she is on antibiotics and steroids, but her symptoms are not improving.  She was seen in the ED, had an ABG, labs, neb treatment, chest x-ray, and was released.  She was going to follow-up with her primary care physician, was told to come to the ED because her symptoms persisted.      History provided by:  Patient   used: No        Review of Systems   Respiratory: Positive for cough, shortness of breath and wheezing.    Neurological: Positive for weakness.   All other systems reviewed and are negative.      Past Medical History:   Diagnosis Date   • Abdominal pain    • Acute bronchitis    • Ankle pain    • Anxiety 1980   • Atopic dermatitis    • Bronchiectasis (CMS/HCC)    • Cataract, bilateral    • Chest pain     STATES HAS OCCASSIONALLY.  STATES LAST TIME WAS LAST WEEK.  STATES SEES DR. RICH.  STATES HE HAS DONE NUMEROUS TESTS ON HER AND HAS NOT BEEN ABLE TO FIND OUT CAUSE.     • Chronic obstructive lung disease (CMS/HCC) 2008   • Colon cancer screening    • COPD (chronic obstructive pulmonary disease) (CMS/HCC)    • Cystocele    • Depressed    • Depression 1980   • Fatigue     Chronic pafigue 2012   • Fibromyalgia 2008   • Full dentures    • GERD (gastroesophageal reflux disease)    • Gout    • Heartburn     Chronic historu of epigastric heartburn currently controlled on Prilesec 40 mg every morning along with Zantac 300 Mg daily at bedtime   • High cholesterol 2012   • Hip pain    • Hyperlipidemia    • Hypertension    • Insomnia    • Joint pain    • Kidney infection    • Loss of appetite    • Low back pain 1995   • Melena    • Nausea and vomiting    • On home oxygen therapy     2L/NC PRN (STATES SHE HAS BEEN USING CONTINUOSLY LATELY)   • Osteoarthritis 2010   • Pain in limb    •  Palpitations    • Pinched nerve     Pinched nerves    • Pneumonia    • Problems with swallowing     HAS TO EAT SLOW AND CHEW FOOD WELL   • Recurrent urinary tract infection    • Sciatica [id number]   • Shortness of breath    • Sinus problem    • Sleep apnea     NO CPAP   • Upset stomach    • Valvular heart disease    • Vitamin B12 deficiency    • Wears glasses    • Weight loss, abnormal     Weight loss is stabel. On pund weight gain since 2016       Allergies   Allergen Reactions   • Dust Mite Extract Other (See Comments)     Dust  SINUS   • Grass Other (See Comments)     SINUS   • Mold Extract [Trichophyton] Other (See Comments)     SINUS       Past Surgical History:   Procedure Laterality Date   • ABDOMINAL HERNIA REPAIR     • APPENDECTOMY  1965   • BACK SURGERY     • BRONCHOSCOPY N/A 2018    Procedure: BRONCHOSCOPY w/ WASHINGS/BRUSHINGS with MAC;  Surgeon: Rebeka Esparza MD;  Location: [facility];  Service: Pulmonary   • CATARACT EXTRACTION Bilateral     Put in implants    • CHOLECYSTECTOMY     • COLONOSCOPY     • ENDOSCOPY     • KNEE SURGERY Left 1979    Knee Cap   • TUBAL ABDOMINAL LIGATION         Family History   Problem Relation Age of Onset   • Ovarian cancer Mother    • Cancer Mother    • Lung cancer Father    • Heart disease Brother        Social History     Socioeconomic History   • Marital status: Single     Spouse name: Not on file   • Number of children: Not on file   • Years of education: Not on file   • Highest education level: Not on file   Tobacco Use   • Smoking status: Former Smoker     Packs/day: 0.00     Years: 35.00     Pack years: 0.00     Last attempt to quit: 2017     Years since quittin.5   • Smokeless tobacco: Never Used   Substance and Sexual Activity   • Alcohol use: No   • Drug use: No   • Sexual activity: Defer           Objective   Physical Exam   Constitutional: She is oriented to person, place, and time. She appears well-developed and  well-nourished.   HENT:   Head: Normocephalic.   Eyes: EOM are normal.   Neck: Normal range of motion. Neck supple.   Cardiovascular: Normal rate and regular rhythm.   Pulmonary/Chest: Effort normal. She has decreased breath sounds. She has wheezes.   Abdominal: Soft. Bowel sounds are normal.   Musculoskeletal: Normal range of motion.   Neurological: She is alert and oriented to person, place, and time. She has normal reflexes.   Skin: Skin is warm and dry.   Psychiatric: She has a normal mood and affect.   Nursing note and vitals reviewed.      Procedures           ED Course  ED Course as of Jan 14 1411   Tue Jan 14, 2020   1245 EKG interpreted by me: Sinus tachycardia, right axis, some nonspecific T wave changes, no acute ST changes, this is an abnormal EKG    [MP]   1406 Discussed with Dr. Nicholson , she accepted the patient for admission    [CS]      ED Course User Index  [CS] Main Tee Jr., PA-C  [MP] Hussein Oconnor MD                                               MDM  Number of Diagnoses or Management Options  COPD exacerbation (CMS/[facility]): new and requires workup     Amount and/or Complexity of Data Reviewed  Clinical lab tests: reviewed  Tests in the radiology section of CPT®: reviewed  Decide to obtain previous medical records or to obtain history from someone other than the patient: yes  Discuss the patient with other providers: yes    Risk of Complications, Morbidity, and/or Mortality  Presenting problems: low  Diagnostic procedures: low  Management options: low    Patient Progress  Patient progress: stable      Final diagnoses:   COPD exacerbation (CMS/HCC)            Main Tee Jr., JERARDO  01/14/20 1411

## 2020-01-15 LAB
ANION GAP SERPL CALCULATED.3IONS-SCNC: 14.5 MMOL/L (ref 5–15)
BASOPHILS # BLD AUTO: 0.02 10*3/MM3 (ref 0–0.2)
BASOPHILS NFR BLD AUTO: 0.1 % (ref 0–1.5)
BUN BLD-MCNC: 18 MG/DL (ref 8–23)
BUN/CREAT SERPL: 26.5 (ref 7–25)
CALCIUM SPEC-SCNC: 9.4 MG/DL (ref 8.6–10.5)
CHLORIDE SERPL-SCNC: 101 MMOL/L (ref 98–107)
CO2 SERPL-SCNC: 24.5 MMOL/L (ref 22–29)
CREAT BLD-MCNC: 0.68 MG/DL (ref 0.57–1)
DEPRECATED RDW RBC AUTO: 54.4 FL (ref 37–54)
EOSINOPHIL # BLD AUTO: 0 10*3/MM3 (ref 0–0.4)
EOSINOPHIL NFR BLD AUTO: 0 % (ref 0.3–6.2)
ERYTHROCYTE [DISTWIDTH] IN BLOOD BY AUTOMATED COUNT: 15 % (ref 12.3–15.4)
GFR SERPL CREATININE-BSD FRML MDRD: 85 ML/MIN/1.73
GLUCOSE BLD-MCNC: 158 MG/DL (ref 65–99)
HCT VFR BLD AUTO: 39.1 % (ref 34–46.6)
HGB BLD-MCNC: 12.6 G/DL (ref 12–15.9)
IMM GRANULOCYTES # BLD AUTO: 0.14 10*3/MM3 (ref 0–0.05)
IMM GRANULOCYTES NFR BLD AUTO: 1 % (ref 0–0.5)
LYMPHOCYTES # BLD AUTO: 0.67 10*3/MM3 (ref 0.7–3.1)
LYMPHOCYTES NFR BLD AUTO: 4.8 % (ref 19.6–45.3)
MAXIMAL PREDICTED HEART RATE: 146 BPM
MCH RBC QN AUTO: 31.7 PG (ref 26.6–33)
MCHC RBC AUTO-ENTMCNC: 32.2 G/DL (ref 31.5–35.7)
MCV RBC AUTO: 98.2 FL (ref 79–97)
MONOCYTES # BLD AUTO: 0.42 10*3/MM3 (ref 0.1–0.9)
MONOCYTES NFR BLD AUTO: 3 % (ref 5–12)
NEUTROPHILS # BLD AUTO: 12.72 10*3/MM3 (ref 1.7–7)
NEUTROPHILS NFR BLD AUTO: 91.1 % (ref 42.7–76)
NRBC BLD AUTO-RTO: 0 /100 WBC (ref 0–0.2)
PLATELET # BLD AUTO: 263 10*3/MM3 (ref 140–450)
PMV BLD AUTO: 10.1 FL (ref 6–12)
POTASSIUM BLD-SCNC: 4.5 MMOL/L (ref 3.5–5.2)
RBC # BLD AUTO: 3.98 10*6/MM3 (ref 3.77–5.28)
SODIUM BLD-SCNC: 140 MMOL/L (ref 136–145)
STRESS TARGET HR: 124 BPM
WBC NRBC COR # BLD: 13.97 10*3/MM3 (ref 3.4–10.8)

## 2020-01-15 PROCEDURE — 94799 UNLISTED PULMONARY SVC/PX: CPT

## 2020-01-15 PROCEDURE — 97165 OT EVAL LOW COMPLEX 30 MIN: CPT

## 2020-01-15 PROCEDURE — 25010000002 LEVOFLOXACIN PER 250 MG: Performed by: FAMILY MEDICINE

## 2020-01-15 PROCEDURE — 25010000002 ENOXAPARIN PER 10 MG: Performed by: FAMILY MEDICINE

## 2020-01-15 PROCEDURE — G0378 HOSPITAL OBSERVATION PER HR: HCPCS

## 2020-01-15 PROCEDURE — 80048 BASIC METABOLIC PNL TOTAL CA: CPT | Performed by: FAMILY MEDICINE

## 2020-01-15 PROCEDURE — 97161 PT EVAL LOW COMPLEX 20 MIN: CPT

## 2020-01-15 PROCEDURE — 99215 OFFICE O/P EST HI 40 MIN: CPT | Performed by: INTERNAL MEDICINE

## 2020-01-15 PROCEDURE — 96366 THER/PROPH/DIAG IV INF ADDON: CPT

## 2020-01-15 PROCEDURE — 85025 COMPLETE CBC W/AUTO DIFF WBC: CPT | Performed by: FAMILY MEDICINE

## 2020-01-15 PROCEDURE — 96372 THER/PROPH/DIAG INJ SC/IM: CPT

## 2020-01-15 PROCEDURE — 99225 PR SBSQ OBSERVATION CARE/DAY 25 MINUTES: CPT | Performed by: FAMILY MEDICINE

## 2020-01-15 PROCEDURE — 25010000002 METHYLPREDNISOLONE PER 125 MG: Performed by: FAMILY MEDICINE

## 2020-01-15 PROCEDURE — 96376 TX/PRO/DX INJ SAME DRUG ADON: CPT

## 2020-01-15 RX ORDER — SODIUM CHLORIDE FOR INHALATION 3 %
4 VIAL, NEBULIZER (ML) INHALATION EVERY 8 HOURS
Status: DISPENSED | OUTPATIENT
Start: 2020-01-15 | End: 2020-01-16

## 2020-01-15 RX ADMIN — SODIUM CHLORIDE, PRESERVATIVE FREE 10 ML: 5 INJECTION INTRAVENOUS at 20:46

## 2020-01-15 RX ADMIN — SODIUM CHLORIDE SOLN NEBU 3% 4 ML: 3 NEBU SOLN at 19:06

## 2020-01-15 RX ADMIN — MELATONIN TAB 5 MG 10 MG: 5 TAB at 20:52

## 2020-01-15 RX ADMIN — METOPROLOL SUCCINATE 25 MG: 25 TABLET, EXTENDED RELEASE ORAL at 20:44

## 2020-01-15 RX ADMIN — IPRATROPIUM BROMIDE AND ALBUTEROL SULFATE 3 ML: .5; 3 SOLUTION RESPIRATORY (INHALATION) at 23:54

## 2020-01-15 RX ADMIN — METHYLPREDNISOLONE SODIUM SUCCINATE 60 MG: 125 INJECTION, POWDER, FOR SOLUTION INTRAMUSCULAR; INTRAVENOUS at 01:04

## 2020-01-15 RX ADMIN — IPRATROPIUM BROMIDE AND ALBUTEROL SULFATE 3 ML: .5; 3 SOLUTION RESPIRATORY (INHALATION) at 12:56

## 2020-01-15 RX ADMIN — HYDROCODONE BITARTRATE AND ACETAMINOPHEN 1 TABLET: 10; 325 TABLET ORAL at 03:55

## 2020-01-15 RX ADMIN — KETOTIFEN FUMARATE 1 DROP: 0.35 SOLUTION/ DROPS OPHTHALMIC at 20:43

## 2020-01-15 RX ADMIN — LOSARTAN POTASSIUM 25 MG: 25 TABLET, FILM COATED ORAL at 20:43

## 2020-01-15 RX ADMIN — IPRATROPIUM BROMIDE AND ALBUTEROL SULFATE 3 ML: .5; 3 SOLUTION RESPIRATORY (INHALATION) at 08:04

## 2020-01-15 RX ADMIN — GABAPENTIN 100 MG: 100 CAPSULE ORAL at 15:56

## 2020-01-15 RX ADMIN — CYCLOBENZAPRINE HYDROCHLORIDE 10 MG: 10 TABLET, FILM COATED ORAL at 09:02

## 2020-01-15 RX ADMIN — GUAIFENESIN 600 MG: 600 TABLET, EXTENDED RELEASE ORAL at 20:43

## 2020-01-15 RX ADMIN — TRAZODONE HYDROCHLORIDE 25 MG: 50 TABLET ORAL at 20:43

## 2020-01-15 RX ADMIN — CETIRIZINE HYDROCHLORIDE 5 MG: 10 TABLET, FILM COATED ORAL at 09:02

## 2020-01-15 RX ADMIN — IPRATROPIUM BROMIDE AND ALBUTEROL SULFATE 3 ML: .5; 3 SOLUTION RESPIRATORY (INHALATION) at 16:33

## 2020-01-15 RX ADMIN — VENLAFAXINE 75 MG: 75 TABLET ORAL at 17:13

## 2020-01-15 RX ADMIN — SODIUM CHLORIDE, PRESERVATIVE FREE 10 ML: 5 INJECTION INTRAVENOUS at 09:02

## 2020-01-15 RX ADMIN — IPRATROPIUM BROMIDE AND ALBUTEROL SULFATE 3 ML: .5; 3 SOLUTION RESPIRATORY (INHALATION) at 03:09

## 2020-01-15 RX ADMIN — KETOTIFEN FUMARATE 1 DROP: 0.35 SOLUTION/ DROPS OPHTHALMIC at 09:02

## 2020-01-15 RX ADMIN — IPRATROPIUM BROMIDE AND ALBUTEROL SULFATE 3 ML: .5; 3 SOLUTION RESPIRATORY (INHALATION) at 19:06

## 2020-01-15 RX ADMIN — METHYLPREDNISOLONE SODIUM SUCCINATE 60 MG: 125 INJECTION, POWDER, FOR SOLUTION INTRAMUSCULAR; INTRAVENOUS at 11:35

## 2020-01-15 RX ADMIN — LEVOFLOXACIN 250 MG: 5 INJECTION, SOLUTION INTRAVENOUS at 15:56

## 2020-01-15 RX ADMIN — GUAIFENESIN 600 MG: 600 TABLET, EXTENDED RELEASE ORAL at 09:02

## 2020-01-15 RX ADMIN — PANTOPRAZOLE SODIUM 40 MG: 40 TABLET, DELAYED RELEASE ORAL at 06:41

## 2020-01-15 RX ADMIN — SODIUM CHLORIDE SOLN NEBU 3% 4 ML: 3 NEBU SOLN at 12:56

## 2020-01-15 RX ADMIN — ENOXAPARIN SODIUM 30 MG: 30 INJECTION SUBCUTANEOUS at 15:56

## 2020-01-15 RX ADMIN — METHYLPREDNISOLONE SODIUM SUCCINATE 60 MG: 125 INJECTION, POWDER, FOR SOLUTION INTRAMUSCULAR; INTRAVENOUS at 06:41

## 2020-01-15 RX ADMIN — GABAPENTIN 100 MG: 100 CAPSULE ORAL at 20:43

## 2020-01-15 RX ADMIN — VENLAFAXINE 75 MG: 75 TABLET ORAL at 09:02

## 2020-01-15 RX ADMIN — LORAZEPAM 0.5 MG: 0.5 TABLET ORAL at 09:09

## 2020-01-15 RX ADMIN — GABAPENTIN 100 MG: 100 CAPSULE ORAL at 09:02

## 2020-01-15 RX ADMIN — METHYLPREDNISOLONE SODIUM SUCCINATE 60 MG: 125 INJECTION, POWDER, FOR SOLUTION INTRAMUSCULAR; INTRAVENOUS at 18:11

## 2020-01-15 RX ADMIN — HYDROCODONE BITARTRATE AND ACETAMINOPHEN 1 TABLET: 10; 325 TABLET ORAL at 15:56

## 2020-01-15 NOTE — PROGRESS NOTES
Discharge Planning Assessment  [facility]     Patient Name: Joelle Yee  MRN: [id number]  Today's Date: 1/15/2020    Admit Date: 1/14/2020    Discharge Needs Assessment     Row Name 01/15/20 5431       Living Environment    Primary Care Provided by  self    Provides Primary Care For  no one, unable/limited ability to care for self    Caregiving Concerns  lives alone and is on continuous o2    Family Caregiver if Needed  none    Able to Return to Prior Arrangements  yes    Living Arrangement Comments  Lives alone in an apartment       Discharge Needs Assessment    Readmission Within the Last 30 Days  no previous admission in last 30 days    Concerns to be Addressed  discharge planning    Equipment Currently Used at Home  nebulizer;oxygen;walker, rolling    Anticipated Changes Related to Illness  none    Equipment Needed After Discharge  none    Outpatient/Agency/Support Group Needs  homecare agency    Discharge Facility/Level of Care Needs  home with home health    Provided post acute provider list?  Yes    Post Acute Provider List  Home Health    Post Acute Provider Quality & Resource List  Yes    Delivered To  Patient    Method of Delivery  In person    Patient's Choice of Community Agency(s)  [facility]    Current Discharge Risk  chronically ill        Discharge Plan     Row Name 01/15/20 2957       Plan    Plan Spoke to pt in room.  Has a Living Will and states it is on file.  Address and phone no is correct.  Lives in an apartment, has no steps.  Has Home o2, a nebulizer and a rollator.  Wears o2 at 2 to 4 liters continuously.  O2 and nebulizer is provided by [employer].  Has [facility] waiver and someone comes 3 times a week and does housekeeping.  Uses Uber provided by waiver to go to Dr. Baez.  Received fiancial assistance with transportation from [employer].  Plans to go home at discharge and family will transport.  Wants Home Health and chooses [facility], has had them before.           Expected Discharge Date  and Time     Expected Discharge Date Expected Discharge Time    Jan 17, 2020         Demographic Summary     Row Name 01/15/20 1307       General Information    Admission Type  observation    Arrived From  emergency department    Required Notices Provided  Observation Status Notice    Expected Length of Stay (LOS)  2 to 3 night    Referral Source  admission list    Reason for Consult  discharge planning     Used During This Interaction  no        Functional Status     Row Name 01/15/20 1324       Functional Status    Functional Status Comments  Independent       Functional Status, IADL    Medications  independent    Meal Preparation  independent    Housekeeping  independent    Laundry  independent    Shopping  independent    IADL Comments  Self       Mental Status Summary    Recent Changes in Mental Status/Cognitive Functioning  no changes    Mental Status Comments  alert and oriented                   Amy Santos RN

## 2020-01-15 NOTE — CONSULTS
Date of consultation:   January 15, 2020    Requested by:   Hospitalist Service.     PCP: Blanquita Clements PA    Reason:  SOB.  Pneumonia    History of Present Illness:  74 y.o. female complains of increased shortness of breath and productive cough for the last 4 weeks. She had a productive cough with green sputum and shortness of breath that progressively worsened. She saw her PCP 3 times in December for these symptoms and was given antibiotics and steroids. This week the shortness of breath worsened and she came to the ER and was given antibiotics and steroids and discharged but had to return yesterday due to shortness of breath and weakness of body. She denies any fever or chills.     She was seen by Pulmonology in early December and started on Prednisone every other day. She continued using her nebulized medications and oxygen. She uses the smart vest in 5 minute intervals due to it causing pain.     Review of System: All other review of systems negative except indicated in HPI. Leg cramps occasionally. No diarrhea. No fever.     Past Medical History:  Past Medical History:   Diagnosis Date   • Abdominal pain    • Acute bronchitis    • Ankle pain    • Anxiety 1980   • Atopic dermatitis    • Bronchiectasis (CMS/HCC)    • Cataract, bilateral    • Chest pain     STATES HAS OCCASSIONALLY.  STATES LAST TIME WAS LAST WEEK.  STATES SEES DR. RICH.  STATES HE HAS DONE NUMEROUS TESTS ON HER AND HAS NOT BEEN ABLE TO FIND OUT CAUSE.     • Chronic obstructive lung disease (CMS/HCC) 2008   • Colon cancer screening    • COPD (chronic obstructive pulmonary disease) (CMS/HCC)    • Cystocele    • Depressed    • Depression 1980   • Fatigue     Chronic pafigue 2012   • Fibromyalgia 2008   • Full dentures    • GERD (gastroesophageal reflux disease)    • Gout    • Heartburn     Chronic historu of epigastric heartburn currently controlled on Prilesec 40 mg every morning along with Zantac 300 Mg daily at bedtime   • High cholesterol     • Hip pain    • Hyperlipidemia    • Hypertension    • Insomnia    • Joint pain    • Kidney infection    • Loss of appetite    • Low back pain    • Melena    • Nausea and vomiting    • On home oxygen therapy     2L/NC PRN (STATES SHE HAS BEEN USING CONTINUOSLY LATELY)   • Osteoarthritis    • Pain in limb    • Palpitations    • Pinched nerve     Pinched nerves    • Pneumonia    • Problems with swallowing     HAS TO EAT SLOW AND CHEW FOOD WELL   • Recurrent urinary tract infection    • Sciatica [id number]   • Shortness of breath    • Sinus problem    • Sleep apnea     NO CPAP   • Upset stomach    • Valvular heart disease    • Vitamin B12 deficiency    • Wears glasses    • Weight loss, abnormal     Weight loss is stabel. On pund weight gain since 2016         Past Surgical History:  Past Surgical History:   Procedure Laterality Date   • ABDOMINAL HERNIA REPAIR     • APPENDECTOMY  1965   • BACK SURGERY     • BRONCHOSCOPY N/A 2018    Procedure: BRONCHOSCOPY w/ WASHINGS/BRUSHINGS with MAC;  Surgeon: Rebeka Esparza MD;  Location: [facility];  Service: Pulmonary   • CATARACT EXTRACTION Bilateral     Put in implants    • CHOLECYSTECTOMY     • COLONOSCOPY     • ENDOSCOPY     • KNEE SURGERY Left 1979    Knee Cap   • TUBAL ABDOMINAL LIGATION           Family History:  Family History   Problem Relation Age of Onset   • Ovarian cancer Mother    • Cancer Mother    • Lung cancer Father    • Heart disease Brother          Social History:  Social History     Socioeconomic History   • Marital status: Single     Spouse name: Not on file   • Number of children: Not on file   • Years of education: Not on file   • Highest education level: Not on file   Tobacco Use   • Smoking status: Former Smoker     Packs/day: 0.00     Years: 35.00     Pack years: 0.00     Last attempt to quit: 2017     Years since quittin.5   • Smokeless tobacco: Never Used   Substance and Sexual Activity   •  "Alcohol use: No   • Drug use: No   • Sexual activity: Defer         Physical Exam:  /62 (BP Location: Left arm, Patient Position: Lying)   Pulse 88   Temp 97.4 °F (36.3 °C) (Oral)   Resp 18   Ht 152.4 cm (60\")   Wt 37.9 kg (83 lb 8 oz)   LMP  (LMP Unknown)   SpO2 92%   BMI 16.31 kg/m² 2 lpm    Constitutional:             General: No significant respiratory distress noted.    Head/Face/Eyes:             No significant facial abnormalities seen.             Extra ocular movement was intact.             Pupils appeared equal    ENT:             Hearing was intact.             Dentures noted.              No nasal erythema noted.              Oropharynx was moist. No lesions noted.     Neck:             Supple. No JVD noted.              Thyroid gland did not seem to be enlarged    Cardiovascular:              S1 + S2. Regular.    Respiratory:            Respiratory effort was mildly labored.             Decreased Air Entry Bilaterally with scattered wheezes bilaterally.    Abdomen:            Soft.  Bowel sounds were positive.  No obvious organomegaly.    Extremities:            No edema noted.            No cyanosis noted            No clubbing noted            Gait could not be assessed at this time.    Neurologic/Psychiatric:             Affect appeared fair.             Awake, alert and oriented x 3.             Able to follow simple commands.    Skin:             No rash noted.             Warm and dry      Labs: Reviewed. Pertinent labs were noted.     Lab Results   Component Value Date    WBC 13.97 (H) 01/15/2020    HGB 12.6 01/15/2020    HCT 39.1 01/15/2020    MCV 98.2 (H) 01/15/2020     01/15/2020       Lab Results   Component Value Date    GLUCOSE 158 (H) 01/15/2020    CALCIUM 9.4 01/15/2020     01/15/2020    K 4.5 01/15/2020    CO2 24.5 01/15/2020     01/15/2020    BUN 18 01/15/2020    CREATININE 0.68 01/15/2020    EGFRIFNONA 85 01/15/2020    BCR 26.5 (H) 01/15/2020    " ANIONGAP 14.5 01/15/2020    PROTEINTOT 8.8 (H) 01/14/2020    ALBUMIN 4.80 01/14/2020       Lab Results   Component Value Date    PROBNP 120.4 01/14/2020    PROBNP 25.4 01/13/2020    PROBNP 178.0 (C) 06/16/2019       Lab Results   Component Value Date    INR 1.21 (H) 07/05/2018       Lab Results   Component Value Date    PROCALCITO <0.05 06/17/2019    PROCALCITO <0.05 06/16/2019    PROCALCITO 0.14 12/02/2018       ABG: Reviewed  Lab Results   Component Value Date    PHART 7.434 01/13/2020    IKM8ENO 45.0 01/13/2020    PO2ART 59.5 (L) 01/13/2020    HGBBG 14.3 01/13/2020    C5HIVDQO 91.8 (L) 01/13/2020    CARBOXYHGB 3.1 (H) 01/13/2020         Micro: As of January 15, 2020   Lab Results   Component Value Date    RESPCX Light growth (2+) Pseudomonas aeruginosa (A) 09/16/2019    RESPCX No Normal Respiratory Ashly (A) 09/16/2019    RESPCX Heavy growth (4+) Yeast, Not Candida albicans (A) 06/17/2019     Lab Results   Component Value Date    BLOODCX No growth at less than 24 hours 01/14/2020    BLOODCX No growth at less than 24 hours 01/14/2020    BLOODCX No growth at 5 days 06/16/2019     Lab Results   Component Value Date    URINECX No growth 10/16/2017    URINECX 10,000-20,000 CFU/mL Enterococcus faecalis (A) 02/16/2017       Lab Results   Component Value Date    FLU Negative 10/16/2017    FLU Negative 10/16/2017         Pharmacy to Dose enoxaparin (LOVENOX)        Imaging Study: Latest imaging studies was reviewed personally.   Imaging Results (Last 72 Hours)     Procedure Component Value Units Date/Time    CT Chest Without Contrast [[id number]] Collected:  01/14/20 1351     Updated:  01/14/20 1355    Narrative:       PROCEDURE: CT CHEST WO CONTRAST-     HISTORY: soa     COMPARISON: 06/16/2019.     PROCEDURE:  Multiple axial CT images were obtained from the thoracic  inlet through the upper abdomen without the use of contrast.      FINDINGS:   Soft tissue windows reveal no axillary, hilar or mediastinal adenopathy.  Heart  size is normal. The aorta is normal in caliber. There are no  pleural or pericardial effusions. Lung windows reveal centrilobular  emphysema. Atelectasis is present in the right lung base. The lungs are  otherwise clear. Within the visualized upper abdomen there our cystic  lesion seen in the superior pole of the left kidney and adjacent to the  splenic hilum which are unchanged. Bone windows reveal no acute osseous  abnormalities.       Impression:       No acute process.     193.13 mGy.cm        This study was performed with techniques to keep radiation doses as low  as reasonably achievable (ALARA). Individualized dose reduction  techniques using automated exposure control or adjustment of mA and/or  kV according to the patient size were employed.      This report was finalized on 1/14/2020 1:53 PM by Ignacia Lara M.D..          ECHO:  Results for orders placed during the hospital encounter of 11/30/18   Adult Transthoracic Echo Complete W/ Cont if Necessary Per Protocol    Narrative Technically difficult study   1) Normal LV systolic function ( EF is over 55%)  2) Normal left atrial size with normal LVedp  3) Aortic leaflets with mild to moderate AI   4) Mild TR with RVsp of 50 mm of hg   5) Borderline RV size with normal RV function          Assessment:  1.  AECOPD  2.  Bronchiectasis with acute exacerbation  3.  Chronic hypoxemia  4.  Severe COPD    Discussion/Recommendations:   I have advised the patient to continue flutter valve.     Continue IV steroids.  Nebulized treatments have been adjusted and Brovana was added.     We will follow her closely and recommend continuation of oxygen therapy for now.      It should be remembered that the patient is already on 2 L/min at home.    Her last sputum culture showed pseudomonas which was sensitive.    Will de-escalate antibiotics over the next 48 hours.     The plan was discussed with the patient.  I have also discussed the case with the nursing  staff.    Recommendations were also discussed with the referring provider.     I would like to thank you for the opportunity to participate in the care of this patient.        This document was electronically signed by Rebeka Esparza MD on 01/15/20 at 9:14 AM

## 2020-01-15 NOTE — PROGRESS NOTES
Adult Nutrition  Assessment/PES    Patient Name:  Joelle Yee  YOB: 1945  MRN: [id number]  Admit Date:  1/14/2020    Assessment Date:  1/15/2020    Comments:  Rec. #1: Consider adding high calorie, high protein, low purine to current diet order. Pt. Consuming ~50% of meals on average per NSG. RD added Boost Plus TID and Boost Pudding BID. Rec. #2: Consider adding appetite stimulant and MVI daily. Rec. #3: If pt./family agreeable consider use of enteral nutrition for supplementation to p.o. Diet ordered. RD to follow pt. Consult RD PRN.     Reason for Assessment     Row Name 01/15/20 8386          Reason for Assessment    Reason For Assessment  diagnosis/disease state     Diagnosis  pulmonary disease;other (see comments);substance use/abuse;gastrointestinal disease COPD, Gout, Tobacco use PTA, GERD     Identified At Risk by Screening Criteria  BMI             Labs/Tests/Procedures/Meds     Row Name 01/15/20 6725          Labs/Procedures/Meds    Lab Results Reviewed  reviewed, pertinent     Lab Results Comments  High: Gluc        Medications    Pertinent Medications Reviewed  reviewed, pertinent         Physical Findings     Row Name 01/15/20 1359          Physical Findings    Overall Physical Appearance  underweight         Estimated/Assessed Needs     Row Name 01/15/20 6309          Calculation Measurements    Weight Used For Calculations  45.9 kg (101 lb 1.7 oz)        Estimated/Assessed Needs    Additional Documentation  Monroe-St. Jeor Equation (Group);Calorie Requirements (Group);Fluid Requirements (Group);Protein Requirements (Group)        Calorie Requirements    Estimated Calorie Requirement Comment  7731-1427        Monroe-St. Jeor Equation    RMR (Monroe-St. Jeor Equation)  880.1        Protein Requirements    Weight Used For Protein Calculations  45.9 kg (101 lb 1.7 oz)     Est Protein Requirement Amount (gms/kg)  1.5 gm protein 55-69 gm/day     Estimated Protein Requirements  (gms/day)  68.79        Fluid Requirements    Estimated Fluid Requirement Method  Luiz-Segar Formula     Conde-Segar Method (over 20 kg)  2417.2         Nutrition Prescription Ordered     Row Name 01/15/20 1358          Nutrition Prescription PO    Current PO Diet  Regular         Evaluation of Received Nutrient/Fluid Intake     Row Name 01/15/20 1358          PO Evaluation    Number of Days PO Intake Evaluated  1 day     Number of Meals  1     % PO Intake  50           Malnutrition Severity Assessment     Row Name 01/15/20 1358          Malnutrition Severity Assessment    Malnutrition Type  Chronic Disease - Related Malnutrition        Muscle Loss    Loss of Muscle Mass Findings  Moderate     [place] Region  Moderate - slight depression     Clavicle Bone Region  Moderate - some protrusion in females, visible in males     Acromion Bone Region  Moderate - acromion may slightly protrude     Scapular Bone Region  Moderate - mild depression, bones may show slightly     Dorsal Hand Region  Moderate - slight depression     Patellar Region  Moderate - patella more prominent, less muscle definition around patella     Anterior Thigh Region  Moderate - mild depression on inner thigh     Posterior Calf Region  Moderate - some roundness, slight firmness        Fat Loss    Subcutaneous Fat Loss Findings  Moderate     Orbital Region   Moderate -  somewhat hollowness, slightly dark circles     Upper Arm Region  Moderate - some fat tissue, not ample        Criteria Met (Must meet criteria for severity in at least 2 of these categories: M Wasting, Fat Loss, Fluid, Secondary Signs, Wt. Status, Intake)    Patient meets criteria for   Moderate (non-severe) Malnutrition           Problem/Interventions:  Problem 1     Row Name 01/15/20 1402          Nutrition Diagnoses Problem 1    Problem 1  Malnutrition     Etiology (related to)  Medical Diagnosis     Pulmonary/Critical Care  COPD     Signs/Symptoms (evidenced by)  BMI;% IBW      BMI  16 - 16.9     Percent (%) IBW  82.59 %         Problem 2     Row Name 01/15/20 1403          Nutrition Diagnoses Problem 2    Problem 2  Increased Nutrient Needs     Macronutrient  Kcal;Protein     Micronutrient  Vitamin;Mineral     Etiology (related to)  Medical Diagnosis     Pulmonary/Critical Care  COPD     Signs/Symptoms (evidenced by)  Other (comment) pulmonary dysfunction             Intervention Goal     Row Name 01/15/20 1430          Intervention Goal    General  Meet nutritional needs for age/condition     PO  PO intake (%)     PO Intake %  -- 50-75     Weight  Appropriate weight gain         Nutrition Intervention     Row Name 01/15/20 1430          Nutrition Intervention    RD/Tech Action  Recommend/ordered;Supplement provided;Encourage intake;Interview for preference;Advise available snack;Advise alternate selection     Recommended/Ordered  Supplement         Nutrition Prescription     Row Name 01/15/20 1430          Nutrition Prescription PO    PO Prescription  Begin/change diet;Begin/change supplement     Begin/Change Diet to  Regular     Supplement  Boost Plus;Boost Pudding     Supplement Frequency  2 times a day;3 times a day     Other Modifiers  High protein/high calorie;Low purine     New PO Prescription Ordered?  No, recommended supplements ordered        Other Orders    Obtain Weight  Daily     Obtain Weight Ordered?  No, recommended     Supplement  Vitamin mineral supplement     Supplement Ordered?  No, recommended     Other  consider adding appetite stimulant and MVI         Education/Evaluation     Row Name 01/15/20 1431          Education    Education  Will Instruct as appropriate        Monitor/Evaluation    Monitor  Per protocol;PO intake;Supplement intake;Pertinent labs;Weight           Electronically signed by:  Deirdre Harrison RD  01/15/20 2:32 PM

## 2020-01-15 NOTE — PLAN OF CARE
Problem: Patient Care Overview  Goal: Plan of Care Review  Outcome: Ongoing (interventions implemented as appropriate)  Flowsheets (Taken 1/15/2020 1401)  Plan of Care Reviewed With: patient  Outcome Summary: Patient particiaptes in PT evaluation and demonstrates safe independent mobility tasks.  She does not require the services of PT at this time.

## 2020-01-15 NOTE — PLAN OF CARE
Problem: Patient Care Overview  Goal: Plan of Care Review  Outcome: Ongoing (interventions implemented as appropriate)  Flowsheets (Taken 1/15/2020 1400)  Outcome Summary: Pt seen for OT evaluation today.   Pt is independent with her self care and functional mobility tasks.  Pt has no skilled OT needs at this time.

## 2020-01-15 NOTE — PROGRESS NOTES
Malnutrition Severity Assessment    Patient Name:  Joelle Yee  YOB: 1945  MRN: [id number]  Admit Date:  1/14/2020    Patient meets criteria for : Moderate (non-severe) Malnutrition    Comments:  Rec. #1: Consider adding high calorie, high protein, low purine to current diet order. Pt. Consuming ~50% of meals on average per NSG. RD added Boost Plus TID and Boost Pudding BID. Rec. #2: Consider adding appetite stimulant and MVI daily. Rec. #3: If pt./family agreeable consider use of enteral nutrition for supplementation to p.o. Diet ordered. RD to follow pt. Consult RD PRN.     Malnutrition Severity Assessment  Malnutrition Type: Chronic Disease - Related Malnutrition     Malnutrition Type (last 8 hours)      Malnutrition Severity Assessment     Row Name 01/15/20 9497       Malnutrition Severity Assessment    Malnutrition Type  Chronic Disease - Related Malnutrition    Row Name 01/15/20 1760       Muscle Loss    Loss of Muscle Mass Findings  Moderate    [place] Region  Moderate - slight depression    Clavicle Bone Region  Moderate - some protrusion in females, visible in males    Acromion Bone Region  Moderate - acromion may slightly protrude    Scapular Bone Region  Moderate - mild depression, bones may show slightly    Dorsal Hand Region  Moderate - slight depression    Patellar Region  Moderate - patella more prominent, less muscle definition around patella    Anterior Thigh Region  Moderate - mild depression on inner thigh    Posterior Calf Region  Moderate - some roundness, slight firmness    Row Name 01/15/20 6896       Fat Loss    Subcutaneous Fat Loss Findings  Moderate    Orbital Region   Moderate -  somewhat hollowness, slightly dark circles    Upper Arm Region  Moderate - some fat tissue, not ample    Row Name 01/15/20 3040       Criteria Met (Must meet criteria for severity in at least 2 of these categories: M Wasting, Fat Loss, Fluid, Secondary Signs, Wt. Status, Intake)    Patient  meets criteria for   Moderate (non-severe) Malnutrition          Electronically signed by:  Deirdre Harrison RD  01/15/20 1:59 PM

## 2020-01-15 NOTE — PROGRESS NOTES
[facility]    Name:  Joelle Yee   Age:  74 y.o.  Sex:  female  :  1945  MRN:  [id number]   Visit Number:  [id number]  Admission Date:  2020  Date Of Service:  01/15/20  Primary Care Physician:  Blanquita Clements PA     LOS: 0 days :  Patient Care Team:  Blanquita Clements PA as PCP - General:    Chief Complaint:      Follow-up on COPD    Subjective / Interval History:     Patient denies acute overnight events.  She states her cough is less productive today.  She has been ambulating, with some shortness of breath present still.  She has felt wheezes.  No fevers or chills.  I discussed pulmonologist will see her today.    Prior work-up:  Patient is a 74-year-old female who presents to the second time to the emergency room in as many days with complaints of increased shortness of breath and cough.  She has medical history of COPD, chronic respiratory failure, anxiety, depression, GERD, fibromyalgia, gout, sleep apnea, chronic pain, neuropathy.  Patient states she has basically been short of breath for a month now.  She states she follows with Dr. Esparza and at most recent office visit in December her trelegy was discontinued, she has been on Brovana and Pulmicort in addition to taking azithromycin and prednisone every other day.  She notes shortness of breath worse with lying flat in addition to exertion.  She has been wearing a couple liters of oxygen continuously.  She notes sputum production is thick, green.  No hemoptysis.  She has not been eating well.  Denies any known sick contacts.  She states she has not really been out of the house over  or Shari.  She denies any cardiac history, states she saw Dr. Du a couple years ago and had a stress test and echocardiogram.  She quit smoking 2 years ago.     In the ER, patient afebrile, heart rate in the 90s, respiratory rate of 20, blood pressure 140/70.  She was satting 100% on 3 L  nasal cannula.  White blood cell count of 13,000, platelets 357, hemoglobin 15.  Troponin negative.  proBNP 120.  CMP with creatinine 0.87, potassium 44.  Chest x-ray yesterday with evidence of emphysema, no focal opacities or effusions.  A chest CT today demonstrated atelectasis in the right lung base, otherwise unremarkable.  He was given a DuoNeb treatment, 125 mg Solu-Medrol, and a dose of Rocephin and azithromycin.  We were asked to admit for COPD exacerbation.    Review of Systems:     General ROS: Patient denies any fevers, chills or loss of consciousness.  Respiratory ROS: Nonproductive cough, shortness of breath  Cardiovascular ROS: Denies chest pain or palpitations. No history of exertional chest pain.  Gastrointestinal ROS: Denies nausea and vomiting. Denies any abdominal pain. No diarrhea.  Neurological ROS: Denies any focal weakness. No loss of consciousness. Denies any numbness.  Dermatological ROS: Denies any redness or pruritis.    Vital Signs:    Temp:  [97.4 °F (36.3 °C)-98.4 °F (36.9 °C)] 97.4 °F (36.3 °C)  Heart Rate:  [] 88  Resp:  [16-22] 18  BP: ()/(46-75) 123/62    Intake and output:    I/O last 3 completed shifts:  In: 50 [IV Piggyback:50]  Out: -   No intake/output data recorded.    Physical Examination:    General Appearance:  Alert and cooperative, not in any acute distress.  Frail appearance   Head:  Atraumatic and normocephalic, without obvious abnormality.   Eyes:          PERRLA, conjunctivae and sclerae normal, no Icterus. No pallor. Extraocular movements are within normal limits.   Neck: Supple, trachea midline, no thyromegaly.   Lungs:   Breath sounds heard bilaterally equally.  Expiratory wheezes both lung zones   Heart:  Normal S1 and S2, no murmur, no gallop, no rub. No JVD   Abdomen:   Normal bowel sounds, no masses, no organomegaly. Soft, non-tender, non-distended, no guarding, no rebound tenderness.   Extremities: Moves all extremities well, no edema, no cyanosis,  no clubbing.   Skin: No bleeding, bruising or rash.   Neurologic: Awake, alert and oriented times 3. Moves all 4 extremities equally.     Laboratory results:    Results from last 7 days   Lab Units 01/15/20  0605 01/14/20  1232 01/13/20  1349   SODIUM mmol/L 140 138 135*   POTASSIUM mmol/L 4.5 4.4 4.1   CHLORIDE mmol/L 101 94* 94*   CO2 mmol/L 24.5 27.7 28.7   BUN mg/dL 18 16 13   CREATININE mg/dL 0.68 0.87 0.70   CALCIUM mg/dL 9.4 10.3 9.2   BILIRUBIN mg/dL  --  0.3 0.2   ALK PHOS U/L  --  125* 105   ALT (SGPT) U/L  --  15 12   AST (SGOT) U/L  --  30 22   GLUCOSE mg/dL 158* 193* 102*     Results from last 7 days   Lab Units 01/15/20  0605 01/14/20  1232 01/13/20  1349   WBC 10*3/mm3 13.97* 13.75* 13.15*   HEMOGLOBIN g/dL 12.6 15.2 14.7   HEMATOCRIT % 39.1 46.8* 46.0   PLATELETS 10*3/mm3 263 357 302         Results from last 7 days   Lab Units 01/14/20  1232 01/13/20  1349   TROPONIN T ng/mL <0.010 <0.010     Results from last 7 days   Lab Units 01/14/20  1520 01/14/20  1500   BLOODCX  No growth at less than 24 hours No growth at less than 24 hours       I have reviewed the patient's laboratory results.    Radiology results:    Imaging Results (Last 24 Hours)     Procedure Component Value Units Date/Time    CT Chest Without Contrast [[id number]] Collected:  01/14/20 1351     Updated:  01/14/20 1355    Narrative:       PROCEDURE: CT CHEST WO CONTRAST-     HISTORY: soa     COMPARISON: 06/16/2019.     PROCEDURE:  Multiple axial CT images were obtained from the thoracic  inlet through the upper abdomen without the use of contrast.      FINDINGS:   Soft tissue windows reveal no axillary, hilar or mediastinal adenopathy.  Heart size is normal. The aorta is normal in caliber. There are no  pleural or pericardial effusions. Lung windows reveal centrilobular  emphysema. Atelectasis is present in the right lung base. The lungs are  otherwise clear. Within the visualized upper abdomen there our cystic  lesion seen in the  superior pole of the left kidney and adjacent to the  splenic hilum which are unchanged. Bone windows reveal no acute osseous  abnormalities.       Impression:       No acute process.     193.13 mGy.cm        This study was performed with techniques to keep radiation doses as low  as reasonably achievable (ALARA). Individualized dose reduction  techniques using automated exposure control or adjustment of mA and/or  kV according to the patient size were employed.      This report was finalized on 1/14/2020 1:53 PM by Ignacia Lara M.D..          I have reviewed the patient's radiology reports.    Medication Review:     I have reviewed the patients active and prn medications.       COPD exacerbation (CMS/[facility])      Assessment:    1.  COPD exacerbation, present on admission, acute  2.  End-stage COPD with chronic respiratory failure, present on admission  3.  Prior tobacco abuse, present on admission  4.  Chronic pain, present on admission  5.  Anxiety/depression, present on admission chronic and stable  6.  Gout, present on admission, chronic and stable  7.  GERD, present admission, chronic and stable     Plan:    She has improved.  She is hemodynamically stable.  AFebrile.  Blood cultures negative thus far.  Blood cell count of 13,000.  CMP unremarkable.    We will continue current treatment with duo nebs, Pulmicort, IV steroids, and Levaquin due to history of Pseudomonas.  Pulmonology to see today.  Echocardiogram has been performed, has not been read yet.    Patient currently meets observation level of care, hopefully if she improves, will be discharged tomorrow.    Jeanie Antonio DO  01/15/20  9:16 AM    Dictated utilizing Dragon dictation.

## 2020-01-15 NOTE — THERAPY DISCHARGE NOTE
Acute Care - Occupational Therapy Initial Eval/Discharge   Christopher     Patient Name: Joelle Yee  : 1945  MRN: [id number]  Today's Date: 1/15/2020  Onset of Illness/Injury or Date of Surgery: 20  Date of Referral to OT: 20  Referring Physician: Dr. Antonio      Admit Date: 2020       ICD-10-CM ICD-9-CM   1. COPD exacerbation (CMS/Formerly [facility]) J44.1 491.21   2. Impaired mobility and ADLs Z74.09 799.89     Patient Active Problem List   Diagnosis   • Dyspepsia   • Esophageal reflux   • Anxiety   • High cholesterol   • Bipolar disorder (CMS/Formerly [facility])   • COPD (chronic obstructive pulmonary disease) (CMS/Formerly [facility])   • Depression   • Gout   • Hyperlipidemia   • Insomnia   • Osteoarthritis   • Sciatica   • Sleep apnea   • Vitamin B 12 deficiency   • Pancolitis (CMS/[facility])   • Acute cystitis without hematuria   • C. difficile colitis   • Chronic bronchitis with COPD (chronic obstructive pulmonary disease) (CMS/[facility])   • Pneumonia of right lower lobe due to infectious organism (CMS/[facility])   • COPD exacerbation (CMS/[facility])   • Pneumonia involving right lung   • COPD with acute exacerbation (CMS/[facility])   • Chronic respiratory failure with hypoxia (CMS/[facility])   • Abdominal pain   • Diarrhea   • Heartburn   • Loss of appetite   • Melena   • Nausea and vomiting   • Pseudogout   • Upset stomach   • Abnormal weight loss   • Chronic obstructive pulmonary disease with acute lower respiratory infection (CMS/[facility])   • Right upper lobe pneumonia (CMS/[facility])   • Acute on chronic respiratory failure with hypoxia (CMS/[facility])   • Moderate malnutrition (CMS/[facility])   • Acute exacerbation of chronic obstructive pulmonary disease (COPD) (CMS/[facility])   • Bronchiectasis without complication (CMS/[facility])   • Pneumonia due to gram-negative bacteria (CMS/[facility])   • Sinus tachycardia   • Leukocytosis   • Serratia marcescens infection   • Pneumonia of both lower lobes due to infectious organism (CMS/[facility])   • Acute kidney injury (CMS/[facility])   • BMI less than  19,adult   • Anemia   • Pneumonia of both lungs due to infectious organism   • Lung nodule     Past Medical History:   Diagnosis Date   • Abdominal pain    • Acute bronchitis    • Ankle pain    • Anxiety 1980   • Atopic dermatitis    • Bronchiectasis (CMS/HCC)    • Cataract, bilateral    • Chest pain     STATES HAS OCCASSIONALLY.  STATES LAST TIME WAS LAST WEEK.  STATES SEES DR. RICH.  STATES HE HAS DONE NUMEROUS TESTS ON HER AND HAS NOT BEEN ABLE TO FIND OUT CAUSE.     • Chronic obstructive lung disease (CMS/HCC) 2008   • Colon cancer screening    • COPD (chronic obstructive pulmonary disease) (CMS/HCC)    • Cystocele    • Depressed    • Depression 1980   • Fatigue     Chronic pafigue 2012   • Fibromyalgia 2008   • Full dentures    • GERD (gastroesophageal reflux disease)    • Gout    • Heartburn     Chronic historu of epigastric heartburn currently controlled on Prilesec 40 mg every morning along with Zantac 300 Mg daily at bedtime   • High cholesterol 2012   • Hip pain    • Hyperlipidemia    • Hypertension    • Insomnia    • Joint pain    • Kidney infection    • Loss of appetite    • Low back pain 1995   • Melena    • Nausea and vomiting    • On home oxygen therapy     2L/NC PRN (STATES SHE HAS BEEN USING CONTINUOSLY LATELY)   • Osteoarthritis 2010   • Pain in limb    • Palpitations    • Pinched nerve     Pinched nerves 2011   • Pneumonia    • Problems with swallowing     HAS TO EAT SLOW AND CHEW FOOD WELL   • Recurrent urinary tract infection    • Sciatica [id number]   • Shortness of breath    • Sinus problem    • Sleep apnea 1998    NO CPAP   • Upset stomach    • Valvular heart disease    • Vitamin B12 deficiency    • Wears glasses    • Weight loss, abnormal     Weight loss is stabel. On pund weight gain since 01/2016     Past Surgical History:   Procedure Laterality Date   • ABDOMINAL HERNIA REPAIR  2016   • APPENDECTOMY  1965   • BACK SURGERY  2005   • BRONCHOSCOPY N/A 7/5/2018    Procedure: BRONCHOSCOPY w/  WASHINGS/BRUSHINGS with MAC;  Surgeon: Rebeka Esparza MD;  Location: [facility];  Service: Pulmonary   • CATARACT EXTRACTION Bilateral     Put in implants    • CHOLECYSTECTOMY  1985   • COLONOSCOPY     • ENDOSCOPY     • KNEE SURGERY Left 1979    Knee Cap   • TUBAL ABDOMINAL LIGATION  1971          OT ASSESSMENT FLOWSHEET (last 12 hours)      Occupational Therapy Evaluation     Row Name 01/15/20 1400                   OT Evaluation Time/Intention    Subjective Information  complains of;pain  -        Document Type  discharge evaluation/summary  -        Mode of Treatment  occupational therapy  -        Patient Effort  good  -        Symptoms Noted During/After Treatment  none  -           General Information    Patient Profile Reviewed?  yes  -        Onset of Illness/Injury or Date of Surgery  01/14/20  -        Referring Physician  Dr. Antonio  -        Patient Observations  alert;cooperative;agree to therapy  -        Patient/Family Observations  Pt alone  -        General Observations of Patient  Pt received standing in her room  -        Prior Level of Function  independent:;community mobility;ADL's  -        Equipment Currently Used at Home  oxygen  -        Pertinent History of Current Functional Problem  COPD exacerbation  -        Existing Precautions/Restrictions  oxygen therapy device and L/min  -        Risks Reviewed  patient:;increased discomfort  -        Benefits Reviewed  patient:;improve function;increase independence;increase strength  -           Relationship/Environment    Lives With  alone  -           Resource/Environmental Concerns    Current Living Arrangements  home/apartment/condo  -           Cognitive Assessment/Intervention- PT/OT    Orientation Status (Cognition)  oriented x 4  -        Follows Commands (Cognition)  WFL  -           Safety Issues, Functional Mobility    Impairments Affecting Function (Mobility)  shortness of breath  -            Bed Mobility Assessment/Treatment    Comment (Bed Mobility)  Pt up in her room   -           Functional Mobility    Functional Mobility- Ind. Level  standby assist  -        Functional Mobility-Distance (Feet)  236  -        Functional Mobility- Safety Issues  supplemental O2  -           Transfer Assessment/Treatment    Transfer Assessment/Treatment  sit-stand transfer;stand-sit transfer  -           Sit-Stand Transfer    Sit-Stand [place] (Transfers)  independent  -           Stand-Sit Transfer    Stand-Sit [place] (Transfers)  independent  -           ADL Assessment/Intervention    BADL Assessment/Intervention  bathing;upper body dressing;lower body dressing;grooming;feeding;toileting  -           Bathing Assessment/Intervention    Bathing [place] Level  independent  [facility]           Upper Body Dressing Assessment/Training    Upper Body Dressing [place] Level  independent  -           Lower Body Dressing Assessment/Training    Lower Body Dressing [place] Level  independent  -AH           Grooming Assessment/Training    [place] Level (Grooming)  independent  -AH           Self-Feeding Assessment/Training    [place] Level (Feeding)  independent  -           Toileting Assessment/Training    [place] Level (Toileting)  independent  -           General ROM    GENERAL ROM COMMENTS  [facility]           MMT (Manual Muscle Testing)    General MMT Comments  [facility]           Positioning and Restraints    Pre-Treatment Position  standing in room  -        Post Treatment Position  bed  -        In Bed  sitting EOB;call light within reach  -           Pain Assessment    Additional Documentation  Pain Scale: Numbers Pre/Post-Treatment (Group)  [facility]           Pain Scale: Numbers Pre/Post-Treatment    Pain Scale: Numbers, Pretreatment  7/10  -        Pain Scale: Numbers, Post-Treatment  7/10  [facility]        Pain Location - Orientation  generalized  -         Pain Intervention(s)  Repositioned;Ambulation/increased activity  -           Coping    Observed Emotional State  accepting;cooperative  -        Verbalized Emotional State  acceptance  -           Plan of Care Review    Plan of Care Reviewed With  patient  -        Progress  no change  -           Clinical Impression (OT)    Date of Referral to OT  01/14/20  -        Patient/Family Goals Statement (OT Eval)  Pt wants to d/c home  -        Criteria for Skilled Therapeutic Interventions Met (OT Eval)  no problems identified which require skilled intervention  -        Therapy Frequency (OT Eval)  evaluation only  -        Care Plan Review (OT)  evaluation/treatment results reviewed;patient/other agree to care plan  -        Anticipated Discharge Disposition (OT)  home  -           Vital Signs    O2 Delivery Pre Treatment  supplemental O2  -        O2 Delivery Intra Treatment  supplemental O2  -        O2 Delivery Post Treatment  supplemental O2  -           Living Environment    Home Accessibility  wheelchair accessible  -          User Key  (r) = Recorded By, (t) = Taken By, (c) = Cosigned By    Initials Name Effective Dates     [place] julio 03/07/18 -               OT Recommendation and Plan  Outcome Summary/Treatment Plan (OT)  Anticipated Discharge Disposition (OT): home  Therapy Frequency (OT Eval): evaluation only  Plan of Care Review  Plan of Care Reviewed With: patient  Plan of Care Reviewed With: patient  Outcome Summary: Pt seen for OT evaluation today.   Pt is independent with her self care and functional mobility tasks.  Pt has no skilled OT needs at this time.         Outcome Measures     Row Name 01/15/20 1400             How much help from another is currently needed...    Putting on and taking off regular lower body clothing?  4  -AH      Bathing (including washing, rinsing, and drying)  4  -AH      Toileting (which includes using toilet bed pan or urinal)  4  -AH       Putting on and taking off regular upper body clothing  4  -AH      Taking care of personal grooming (such as brushing teeth)  4  -AH      Eating meals  4  -AH      AM-[facility] 6 Clicks Score (OT)  24  -         Functional Assessment    Outcome Measure Options  [facility] 6 Clicks Daily Activity (OT)  -        User Key  (r) = Recorded By, (t) = Taken By, (c) = Cosigned By    Initials Name Provider Type    Jonna Oneill Occupational Therapist          Time Calculation:   Time Calculation- OT     Row Name 01/15/20 1502             Time Calculation- OT    OT Start Time  1400  -      OT Received On  01/15/20  -        User Key  (r) = Recorded By, (t) = Taken By, (c) = Cosigned By    Initials Name Provider Type    Jonna Oneill Occupational Therapist        Therapy Suggested Charges     Code   Minutes Charges    None           Therapy Charges for Today     Code Description Service Date Service Provider Modifiers Qty    39482741281 HC OT EVAL LOW COMPLEXITY 2 1/15/2020 Jonna Callahan GO 1               OT Discharge Summary  Anticipated Discharge Disposition (OT): home    Jonna Callahan  1/15/2020

## 2020-01-15 NOTE — PLAN OF CARE
Problem: Patient Care Overview  Goal: Plan of Care Review  Outcome: Ongoing (interventions implemented as appropriate)  Flowsheets (Taken 1/15/2020 2964)  Progress: improving  Plan of Care Reviewed With: patient  Outcome Summary: VSS; continue nebs and steroids; encourage eating

## 2020-01-15 NOTE — PLAN OF CARE
Problem: Patient Care Overview  Goal: Plan of Care Review  Outcome: Ongoing (interventions implemented as appropriate)  Flowsheets (Taken 1/15/2020 0061)  Progress: improving  Plan of Care Reviewed With: patient  Outcome Summary: BC Dyson. ambulated in [place]augh[place] today.  She has been resting in bed watching TV.

## 2020-01-16 VITALS
HEART RATE: 90 BPM | DIASTOLIC BLOOD PRESSURE: 74 MMHG | BODY MASS INDEX: 16.85 KG/M2 | OXYGEN SATURATION: 90 % | WEIGHT: 85.8 LBS | TEMPERATURE: 98.2 F | RESPIRATION RATE: 18 BRPM | HEIGHT: 60 IN | SYSTOLIC BLOOD PRESSURE: 100 MMHG

## 2020-01-16 LAB
ANION GAP SERPL CALCULATED.3IONS-SCNC: 12.4 MMOL/L (ref 5–15)
BASOPHILS # BLD AUTO: 0.04 10*3/MM3 (ref 0–0.2)
BASOPHILS NFR BLD AUTO: 0.2 % (ref 0–1.5)
BUN BLD-MCNC: 22 MG/DL (ref 8–23)
BUN/CREAT SERPL: 34.9 (ref 7–25)
CALCIUM SPEC-SCNC: 9.9 MG/DL (ref 8.6–10.5)
CHLORIDE SERPL-SCNC: 102 MMOL/L (ref 98–107)
CO2 SERPL-SCNC: 25.6 MMOL/L (ref 22–29)
CREAT BLD-MCNC: 0.63 MG/DL (ref 0.57–1)
DEPRECATED RDW RBC AUTO: 54.5 FL (ref 37–54)
EOSINOPHIL # BLD AUTO: 0 10*3/MM3 (ref 0–0.4)
EOSINOPHIL NFR BLD AUTO: 0 % (ref 0.3–6.2)
ERYTHROCYTE [DISTWIDTH] IN BLOOD BY AUTOMATED COUNT: 15.3 % (ref 12.3–15.4)
GFR SERPL CREATININE-BSD FRML MDRD: 92 ML/MIN/1.73
GLUCOSE BLD-MCNC: 124 MG/DL (ref 65–99)
HCT VFR BLD AUTO: 38.1 % (ref 34–46.6)
HGB BLD-MCNC: 12.6 G/DL (ref 12–15.9)
IMM GRANULOCYTES # BLD AUTO: 0.27 10*3/MM3 (ref 0–0.05)
IMM GRANULOCYTES NFR BLD AUTO: 1.1 % (ref 0–0.5)
LYMPHOCYTES # BLD AUTO: 0.86 10*3/MM3 (ref 0.7–3.1)
LYMPHOCYTES NFR BLD AUTO: 3.6 % (ref 19.6–45.3)
MCH RBC QN AUTO: 31.9 PG (ref 26.6–33)
MCHC RBC AUTO-ENTMCNC: 33.1 G/DL (ref 31.5–35.7)
MCV RBC AUTO: 96.5 FL (ref 79–97)
MONOCYTES # BLD AUTO: 0.7 10*3/MM3 (ref 0.1–0.9)
MONOCYTES NFR BLD AUTO: 3 % (ref 5–12)
NEUTROPHILS # BLD AUTO: 21.84 10*3/MM3 (ref 1.7–7)
NEUTROPHILS NFR BLD AUTO: 92.1 % (ref 42.7–76)
NRBC BLD AUTO-RTO: 0 /100 WBC (ref 0–0.2)
PLATELET # BLD AUTO: 284 10*3/MM3 (ref 140–450)
PMV BLD AUTO: 10.4 FL (ref 6–12)
POTASSIUM BLD-SCNC: 4.6 MMOL/L (ref 3.5–5.2)
RBC # BLD AUTO: 3.95 10*6/MM3 (ref 3.77–5.28)
SODIUM BLD-SCNC: 140 MMOL/L (ref 136–145)
WBC NRBC COR # BLD: 23.71 10*3/MM3 (ref 3.4–10.8)

## 2020-01-16 PROCEDURE — 94799 UNLISTED PULMONARY SVC/PX: CPT

## 2020-01-16 PROCEDURE — 25010000002 METHYLPREDNISOLONE PER 125 MG: Performed by: FAMILY MEDICINE

## 2020-01-16 PROCEDURE — 80048 BASIC METABOLIC PNL TOTAL CA: CPT | Performed by: FAMILY MEDICINE

## 2020-01-16 PROCEDURE — 96376 TX/PRO/DX INJ SAME DRUG ADON: CPT

## 2020-01-16 PROCEDURE — 99217 PR OBSERVATION CARE DISCHARGE MANAGEMENT: CPT | Performed by: FAMILY MEDICINE

## 2020-01-16 PROCEDURE — 85025 COMPLETE CBC W/AUTO DIFF WBC: CPT | Performed by: FAMILY MEDICINE

## 2020-01-16 PROCEDURE — 99214 OFFICE O/P EST MOD 30 MIN: CPT | Performed by: NURSE PRACTITIONER

## 2020-01-16 PROCEDURE — G0378 HOSPITAL OBSERVATION PER HR: HCPCS

## 2020-01-16 RX ORDER — HYDROCODONE BITARTRATE AND ACETAMINOPHEN 10; 325 MG/1; MG/1
1 TABLET ORAL EVERY 8 HOURS PRN
Qty: 15 TABLET | Refills: 0 | Status: SHIPPED | OUTPATIENT
Start: 2020-01-16 | End: 2020-01-21

## 2020-01-16 RX ORDER — METHYLPREDNISOLONE SODIUM SUCCINATE 40 MG/ML
40 INJECTION, POWDER, LYOPHILIZED, FOR SOLUTION INTRAMUSCULAR; INTRAVENOUS EVERY 8 HOURS
Status: DISCONTINUED | OUTPATIENT
Start: 2020-01-16 | End: 2020-01-16 | Stop reason: HOSPADM

## 2020-01-16 RX ORDER — LEVOFLOXACIN 500 MG/1
500 TABLET, FILM COATED ORAL EVERY 24 HOURS
Qty: 4 TABLET | Refills: 0 | Status: SHIPPED | OUTPATIENT
Start: 2020-01-17 | End: 2020-01-21

## 2020-01-16 RX ORDER — PREDNISONE 20 MG/1
40 TABLET ORAL DAILY
Qty: 14 TABLET | Refills: 0 | Status: SHIPPED | OUTPATIENT
Start: 2020-01-16 | End: 2020-01-23

## 2020-01-16 RX ORDER — LEVOFLOXACIN 500 MG/1
500 TABLET, FILM COATED ORAL EVERY 24 HOURS
Status: DISCONTINUED | OUTPATIENT
Start: 2020-01-16 | End: 2020-01-16

## 2020-01-16 RX ORDER — LEVOFLOXACIN 500 MG/1
250 TABLET, FILM COATED ORAL EVERY 24 HOURS
Status: DISCONTINUED | OUTPATIENT
Start: 2020-01-16 | End: 2020-01-16 | Stop reason: HOSPADM

## 2020-01-16 RX ADMIN — CETIRIZINE HYDROCHLORIDE 5 MG: 10 TABLET, FILM COATED ORAL at 08:04

## 2020-01-16 RX ADMIN — SODIUM CHLORIDE, PRESERVATIVE FREE 10 ML: 5 INJECTION INTRAVENOUS at 08:04

## 2020-01-16 RX ADMIN — CYCLOBENZAPRINE HYDROCHLORIDE 10 MG: 10 TABLET, FILM COATED ORAL at 08:04

## 2020-01-16 RX ADMIN — KETOTIFEN FUMARATE 1 DROP: 0.35 SOLUTION/ DROPS OPHTHALMIC at 08:04

## 2020-01-16 RX ADMIN — VENLAFAXINE 75 MG: 75 TABLET ORAL at 08:04

## 2020-01-16 RX ADMIN — PANTOPRAZOLE SODIUM 40 MG: 40 TABLET, DELAYED RELEASE ORAL at 06:10

## 2020-01-16 RX ADMIN — METHYLPREDNISOLONE SODIUM SUCCINATE 60 MG: 125 INJECTION, POWDER, FOR SOLUTION INTRAMUSCULAR; INTRAVENOUS at 00:13

## 2020-01-16 RX ADMIN — GUAIFENESIN 600 MG: 600 TABLET, EXTENDED RELEASE ORAL at 08:03

## 2020-01-16 RX ADMIN — GABAPENTIN 100 MG: 100 CAPSULE ORAL at 08:04

## 2020-01-16 RX ADMIN — IPRATROPIUM BROMIDE AND ALBUTEROL SULFATE 3 ML: .5; 3 SOLUTION RESPIRATORY (INHALATION) at 06:33

## 2020-01-16 RX ADMIN — LEVOFLOXACIN 250 MG: 500 TABLET, FILM COATED ORAL at 08:10

## 2020-01-16 RX ADMIN — METHYLPREDNISOLONE SODIUM SUCCINATE 60 MG: 125 INJECTION, POWDER, FOR SOLUTION INTRAMUSCULAR; INTRAVENOUS at 06:10

## 2020-01-16 RX ADMIN — HYDROCODONE BITARTRATE AND ACETAMINOPHEN 1 TABLET: 10; 325 TABLET ORAL at 04:31

## 2020-01-16 RX ADMIN — LORAZEPAM 0.5 MG: 0.5 TABLET ORAL at 04:31

## 2020-01-16 NOTE — PROGRESS NOTES
"Continued Stay Note  [facility]     Patient Name: Joelle Yee  MRN: [id number]  Today's Date: 1/16/2020    Admit Date: 1/14/2020    Discharge Plan     Row Name 01/16/20 1042       Plan    Plan Received HH order, spoke to pt in room and wants [facility], states has had them before.  Called to [facility] and spoke to Chely and cannot take any pts.  Spoke to pt again and states \"I just can't believe it, they always take me\".  Called back to [facility] spoke to Cally and explained pt says she already has them as Waiver and wants to make sure they know who she it.  Per Cally they are full and cannot accept anybody else on the skilled side.  Pt chose [facility] as second choice.  Called to [facility] and will accept.          Discharge Codes    No documentation.       Expected Discharge Date and Time     Expected Discharge Date Expected Discharge Time    Jan 16, 2020             Amy Santos RN    "

## 2020-01-16 NOTE — PHARMACY RECOMMENDATION
"* * * IV to PO Conversion * * *    Joelle Yee is a  74 y.o. female.  Height - 152.4 cm (60\")  Weight - 38.9 kg (85 lb 12.8 oz)    Please consider changing Levofloxacin 250 mg q 24 hrs from IV to PO.    Rationale: Pt. received other PO medications within past 48 hrs.    Thank you.    Jeff Haro, Pharm.D.  01/16/20  7:45 AM  "

## 2020-01-16 NOTE — PLAN OF CARE
Patient A+O X 4. Up ad kristin as tolerated. 2L NC, baseline. Pain controlled per MAR. Reports SOA much better. Discharge home with family to transport and assist as needed. Follow- up appointments provided and discharge teaching complete.

## 2020-01-16 NOTE — PROGRESS NOTES
"  CC: Shortness of breath    S: She is feeling better today. She feels 100% better she states. Oxygen saturation 98% on 2 LPM. Some cough but nonproductive.    ROS: Positive for cough, shortness of breath, mild anxiety, and back pain. Denies chest pain, diarrhea or fever.    O: /74 (BP Location: Left arm, Patient Position: Lying)   Pulse 90   Temp 98.2 °F (36.8 °C) (Oral)   Resp 18   Ht 152.4 cm (60\")   Wt 38.9 kg (85 lb 12.8 oz)   LMP  (LMP Unknown)   SpO2 90%   BMI 16.76 kg/m²     Vital signs reviewed.  General/Constitutional: No respiratory distress noted.  Neck: No JVD   Cardiovascular: S1 + S2. Regular.   Lungs/Respiratory: Decreased A/E bilaterally without wheezing heard. No crackles noted.   Musculoskeletal/Extremities: No edema noted.  Neurologic: AAOx3. Was able to follow commands.     Labs: Reviewed.    Results from last 7 days   Lab Units 01/16/20  0618 01/15/20  0605 01/14/20  1232   SODIUM mmol/L 140 140 138   POTASSIUM mmol/L 4.6 4.5 4.4   CHLORIDE mmol/L 102 101 94*   CO2 mmol/L 25.6 24.5 27.7   BUN mg/dL 22 18 16   CREATININE mg/dL 0.63 0.68 0.87   CALCIUM mg/dL 9.9 9.4 10.3   GLUCOSE mg/dL 124* 158* 193*             Results from last 7 days   Lab Units 01/16/20  0618 01/15/20  0605 01/14/20  1232   WBC 10*3/mm3 23.71* 13.97* 13.75*   NEUTROPHIL % % 92.1* 91.1* 83.6*   HEMOGLOBIN g/dL 12.6 12.6 15.2   HEMATOCRIT % 38.1 39.1 46.8*   PLATELETS 10*3/mm3 284 263 357             Lab Results   Component Value Date    PROCALCITO <0.05 06/17/2019    PROCALCITO <0.05 06/16/2019    PROCALCITO 0.14 12/02/2018       Lab Results   Component Value Date    PROBNP 120.4 01/14/2020    PROBNP 25.4 01/13/2020    PROBNP 178.0 (C) 06/16/2019       No results found for: CRP        Pharmacy to Dose enoxaparin (LOVENOX)          CXRay: Latest imaging study was reviewed personally.  Imaging Results (Last 24 Hours)     ** No results found for the last 24 hours. **          Assessment & Recommendations/Plan:   1. "  AECOPD  Continue nebulized treatments and steroids.    2.  Bronchiectasis with acute exacerbation  Continue nebulized treatments, antibiotics, flutter valve as directed.    3.  Chronic hypoxemia  She is on oxygen at 2 L/min which is her baseline oxygen use.    4.  Severe COPD  Continue nebulized medications.    She has an appointment scheduled already for about 6 weeks from now.    We have reviewed patient's current orders and changes, if any, have been suggested to primary care team. Plan was also discussed with nursing staff, as necessary.         This document was electronically signed by DWAYNE Molina on 01/16/20 at 9:15 AM      Dictated utilizing Dragon dictation.

## 2020-01-16 NOTE — PLAN OF CARE
Problem: Patient Care Overview  Goal: Plan of Care Review  Outcome: Ongoing (interventions implemented as appropriate)  Flowsheets (Taken 1/16/2020 0330)  Progress: improving  Plan of Care Reviewed With: patient  Outcome Summary: No acute events this shift.  VSS on 2L O2.  Patient denies being SOA.  Pain controlled per MAR.  Patient anticipates being discharged home today.

## 2020-01-16 NOTE — DISCHARGE SUMMARY
[facility]   DISCHARGE SUMMARY      Name:  Joelle Yee   Age:  74 y.o.  Sex:  female  :  1945  MRN:  [id number]   Visit Number:  [id number]    Admission Date:  2020  Date of Discharge:  2020  Primary Care Physician:  Blanquita Clements PA    Important issues to note:    Please take medications as directed.  Please follow-up with PCP and pulmonologist as directed.  Please return for any concerns.    Discharge Diagnoses:     1.  COPD exacerbation, present on admission, acute  2.  End-stage COPD with chronic respiratory failure, present on admission  3.  Prior tobacco abuse, present on admission  4.  Chronic pain, present on admission  5.  Anxiety/depression, present on admission chronic and stable  6.  Gout, present on admission, chronic and stable  7.  GERD, present admission, chronic and stable         COPD exacerbation (CMS/[facility])      Presenting Problem:    COPD exacerbation (CMS/[facility]) [J44.1]     Consults:     Consults     Date and Time Order Name Status Description    2020 1510 Inpatient Pulmonology Consult Completed         Consulting Physician(s)     Provider Relationship Specialty    Rebeka Esparaz MD Consulting Physician Pulmonary Disease    Jeanie Antonio,  Consulting Physician Hospitalist          Procedures Performed:           History of presenting illness:    She feels well today.  She states she feels 100% better than when she came in.  Denies any productive cough.  No fevers or chills.  Reviewed plan for discharge and need for follow-up with pulmonologist.  She is agreeable    Hospital Course:    Patient is a 74-year-old female who presents to the second time to the emergency room in as many days with complaints of increased shortness of breath and cough.  She has medical history of COPD, chronic respiratory failure, anxiety, depression, GERD, fibromyalgia, gout, sleep apnea, chronic pain, neuropathy.  Patient states she has  basically been short of breath for a month now.  She states she follows with Dr. Esparza and at most recent office visit in December her trelegy was discontinued, she has been on Brovana and Pulmicort in addition to taking azithromycin and prednisone every other day.  She notes shortness of breath worse with lying flat in addition to exertion.  She has been wearing a couple liters of oxygen continuously.  She notes sputum production is thick, green.  No hemoptysis.  She has not been eating well.  Denies any known sick contacts.  She states she has not really been out of the house over [employer] or [place].  She denies any cardiac history, states she saw Dr. Du a couple years ago and had a stress test and echocardiogram.  She quit smoking 2 years ago.     In the ER, patient afebrile, heart rate in the 90s, respiratory rate of 20, blood pressure 140/70.  She was satting 100% on 3 L nasal cannula.  White blood cell count of 13,000, platelets 357, hemoglobin 15.  Troponin negative.  proBNP 120.  CMP with creatinine 0.87, potassium 44.  Chest x-ray yesterday with evidence of emphysema, no focal opacities or effusions.  A chest CT today demonstrated atelectasis in the right lung base, otherwise unremarkable.  sHe was given a DuoNeb treatment, 125 mg Solu-Medrol, and a dose of Rocephin and azithromycin.  We were asked to admit for COPD exacerbation.    Patient did well with underlying COPD treatment.  She was seen and evaluated a pulmonologist who was in agreement with current plan of care.  Currently feeling better and is back to baseline functioning.  She will be discharged with prednisone and Levaquin to complete treatment.  Resume home nebulizer treatments and inhalers.  Follow-up with pulmonology in 2 weeks, PCP in 1 week, and have home health upon discharge.  Plan of care discussed with patient who is agreeable.    Vital Signs:    Temp:  [97.4 °F (36.3 °C)-98.2 °F (36.8 °C)] 98.2 °F (36.8 °C)  Heart Rate:   [] 90  Resp:  [16-18] 18  BP: (100-129)/(48-74) 100/74    Physical Exam:    General Appearance:  Alert and cooperative, not in any acute distress.  Frail   Head:  Atraumatic and normocephalic, without obvious abnormality.   Eyes:          PERRLA, conjunctivae and sclerae normal, no Icterus. No pallor. Extraocular movements are within normal limits.   Ears:  Ears appear intact with no abnormalities noted.   Throat: No oral lesions, no thrush, oral mucosa moist.   Neck: Supple, trachea midline, no thyromegaly, no carotid bruit.   Back:   No tenderness to palpation,   range of motion normal.   Lungs:   Breath sounds heard bilaterally equally.  No crackles or wheezing. No Pleural rub or bronchial breathing.   Heart:  Normal S1 and S2, no murmur, no gallop, no rub. No JVD.   Abdomen:   Normal bowel sounds, no masses, no organomegaly. Soft, non-tender, non-distended, no guarding, no rebound tenderness.   Extremities: Moves all extremities well, no edema, no cyanosis, no clubbing.   Pulses: Pulses palpable and equal bilaterally.   Skin: No bleeding, bruising or rash.   Lymph nodes: No palpable adenopathy.   Neurologic: Alert and oriented x 3. Moves all four limbs equally. No tremors. No facial asymetry.     Pertinent Lab Results:     Results from last 7 days   Lab Units 01/16/20  0618 01/15/20  0605 01/14/20  1232 01/13/20  1349   SODIUM mmol/L 140 140 138 135*   POTASSIUM mmol/L 4.6 4.5 4.4 4.1   CHLORIDE mmol/L 102 101 94* 94*   CO2 mmol/L 25.6 24.5 27.7 28.7   BUN mg/dL 22 18 16 13   CREATININE mg/dL 0.63 0.68 0.87 0.70   CALCIUM mg/dL 9.9 9.4 10.3 9.2   BILIRUBIN mg/dL  --   --  0.3 0.2   ALK PHOS U/L  --   --  125* 105   ALT (SGPT) U/L  --   --  15 12   AST (SGOT) U/L  --   --  30 22   GLUCOSE mg/dL 124* 158* 193* 102*     Results from last 7 days   Lab Units 01/16/20  0618 01/15/20  0605 01/14/20  1232   WBC 10*3/mm3 23.71* 13.97* 13.75*   HEMOGLOBIN g/dL 12.6 12.6 15.2   HEMATOCRIT % 38.1 39.1 46.8*    PLATELETS 10*3/mm3 284 263 357         Results from last 7 days   Lab Units 01/14/20  1232 01/13/20  1349   TROPONIN T ng/mL <0.010 <0.010     Results from last 7 days   Lab Units 01/14/20  1232   PROBNP pg/mL 120.4             Results from last 7 days   Lab Units 01/13/20  1411   PH, ARTERIAL pH units 7.434   PO2 ART mm Hg 59.5*   PCO2, ARTERIAL mm Hg 45.0   HCO3 ART mmol/L 30.2*           Invalid input(s): USDES,  BLOODU, NITRITITE, BACT, EP  Pain Management Panel     There is no flowsheet data to display.        Results from last 7 days   Lab Units 01/14/20  1520 01/14/20  1500   BLOODCX  No growth at 24 hours No growth at 24 hours       Pertinent Radiology Results:    Imaging Results (All)     Procedure Component Value Units Date/Time    CT Chest Without Contrast [[id number]] Collected:  01/14/20 1351     Updated:  01/14/20 1355    Narrative:       PROCEDURE: CT CHEST WO CONTRAST-     HISTORY: soa     COMPARISON: 06/16/2019.     PROCEDURE:  Multiple axial CT images were obtained from the thoracic  inlet through the upper abdomen without the use of contrast.      FINDINGS:   Soft tissue windows reveal no axillary, hilar or mediastinal adenopathy.  Heart size is normal. The aorta is normal in caliber. There are no  pleural or pericardial effusions. Lung windows reveal centrilobular  emphysema. Atelectasis is present in the right lung base. The lungs are  otherwise clear. Within the visualized upper abdomen there our cystic  lesion seen in the superior pole of the left kidney and adjacent to the  splenic hilum which are unchanged. Bone windows reveal no acute osseous  abnormalities.       Impression:       No acute process.     193.13 mGy.cm        This study was performed with techniques to keep radiation doses as low  as reasonably achievable (ALARA). Individualized dose reduction  techniques using automated exposure control or adjustment of mA and/or  kV according to the patient size were employed.      This  report was finalized on 1/14/2020 1:53 PM by Ignaica Lara M.D..          Condition on Discharge:      Stable.    Code status during the hospital stay:    Code Status and Medical Interventions:   Ordered at: 01/14/20 1434     Code Status:    CPR     Medical Interventions (Level of Support Prior to Arrest):    Full       Discharge Disposition:    Home or Self Care    Discharge Medications:       Discharge Medications      New Medications      Instructions Start Date   levoFLOXacin 250 MG tablet  Commonly known as:  LEVAQUIN   500 mg, Oral, Every 24 Hours   Start Date:  January 17, 2020        Changes to Medications      Instructions Start Date   ammonium lactate 12 % cream  Commonly known as:  LAC-HYDRIN  What changed:    · when to take this  · reasons to take this   Topical, 2 Times Daily      azithromycin 250 MG tablet  Commonly known as:  ZITHROMAX  What changed:  Another medication with the same name was removed. Continue taking this medication, and follow the directions you see here.   250 mg, Oral, Every Other Day, Take 1 tablet by mouth every other day.      HYDROcodone-acetaminophen  MG per tablet  Commonly known as:  NORCO  What changed:    · when to take this  · reasons to take this  · Another medication with the same name was removed. Continue taking this medication, and follow the directions you see here.   1 tablet, Oral, Every 8 Hours PRN      predniSONE 5 MG tablet  Commonly known as:  DELTASONE  What changed:    · Another medication with the same name was added. Make sure you understand how and when to take each.  · Another medication with the same name was removed. Continue taking this medication, and follow the directions you see here.   Take 1 tablet by mouth every other day.      predniSONE 20 MG tablet  Commonly known as:  DELTASONE  What changed:  You were already taking a medication with the same name, and this prescription was added. Make sure you understand how and when to take  each.  Replaces:  predniSONE 10 MG (48) tablet pack   40 mg, Oral, Daily      urea 40 % cream  Commonly known as:  CARMOL  What changed:  Another medication with the same name was changed. Make sure you understand how and when to take each.   Topical, Every 24 Hours Scheduled      urea 40 % cream  Commonly known as:  CARMOL  What changed:    · when to take this  · reasons to take this   Topical, Every 12 Hours, Apply topically every 12 hours.         Continue These Medications      Instructions Start Date   acetaminophen 325 MG tablet  Commonly known as:  TYLENOL   650 mg, Oral, Every 4 Hours PRN      arformoterol 15 MCG/2ML nebulizer solution  Commonly known as:  BROVANA   15 mcg, Nebulization, 2 Times Daily - RT      benzonatate 100 MG capsule  Commonly known as:  TESSALON   100 mg, Oral, 3 Times Daily PRN      budesonide 0.5 MG/2ML nebulizer solution  Commonly known as:  PULMICORT   0.5 mg, Nebulization, 2 Times Daily      cyclobenzaprine 10 MG tablet  Commonly known as:  FLEXERIL   10 mg, Oral, Daily      ENSURE COMPLETE PO   1 can, Oral, 2 Times Daily      FLUTTER device   1 Device, Does not apply, As Needed      gabapentin 300 MG capsule  Commonly known as:  NEURONTIN   100 mg, Oral, 3 Times Daily      guaiFENesin 600 MG 12 hr tablet  Commonly known as:  MUCINEX   600 mg, Oral, Every 12 Hours Scheduled      ipratropium-albuterol 0.5-2.5 mg/3 ml nebulizer  Commonly known as:  DUO-NEB   3 mL, Nebulization, 4 Times Daily PRN      ketotifen 0.025 % ophthalmic solution  Commonly known as:  ZADITOR   1-2 drops, Both Eyes, 2 Times Daily      loratadine 10 MG tablet  Commonly known as:  CLARITIN   10 mg, Oral, Daily PRN      LORazepam 0.5 MG tablet  Commonly known as:  ATIVAN   0.5 mg, Oral, 2 Times Daily PRN, 1-2 TABLETS DAILY PRN      losartan 25 MG tablet  Commonly known as:  COZAAR   25 mg, Oral, Nightly      MEDICATED CHEST RUB EX   1 application, Apply externally, Daily PRN      melatonin 5 MG tablet tablet   10  mg, Oral, Nightly PRN      metoprolol succinate XL 25 MG 24 hr tablet  Commonly known as:  TOPROL-XL   25 mg, Oral, Nightly      mirtazapine 30 MG tablet  Commonly known as:  REMERON   30 mg, Oral, Nightly      multivitamin with minerals tablet tablet   1 tablet, Oral, Daily      omeprazole 40 MG capsule  Commonly known as:  priLOSEC   40 mg, Oral, Daily      ondansetron ODT 4 MG disintegrating tablet  Commonly known as:  ZOFRAN-ODT   1 tablet, Oral, Every 6 Hours PRN      OXYGEN-HELIUM IN   Oxygen; Patient Sig: Oxygen 2 liter as needed; 0; 21-May-2014; Active      ROLLATOR misc   1 Units, Does not apply, As Needed      traZODone 25 MG half tablet  Commonly known as:  DESYREL   25 mg, Oral, Nightly      venlafaxine 75 MG tablet  Commonly known as:  EFFEXOR   1 tablet, Oral, 2 Times Daily      VITAMIN B12 PO   500 mcg, Oral, Daily      VOLTAREN TD   2 g, Topical, 2 Times Daily PRN, Voltaren GEL         Stop These Medications    predniSONE 10 MG (48) tablet pack  Commonly known as:  DELTASONE  Replaced by:  predniSONE 20 MG tablet  You also have another medication with the same name that you need to continue taking as instructed.            Discharge Diet:     Diet Instructions     Diet: Regular      Discharge Diet:  Regular          Activity at Discharge:     Activity Instructions     Activity as Tolerated            Follow-up Appointments:    Additional Instructions for the Follow-ups that You Need to Schedule     Ambulatory Referral to Home Health   As directed      Face to Face Visit Date:  1/16/2020    Follow-up provider for Plan of Care?:  I treated the patient in an acute care facility and will not continue treatment after discharge.    Follow-up provider:  HARSHA REYES [[id number]]    Reason/Clinical Findings:  weakness, copd    Describe mobility limitations that make leaving home difficult:  copd, o2 dependent, weakness    Nursing/Therapeutic Services Requested:  Skilled Nursing Physical Therapy    Skilled  nursing orders:  COPD management    PT orders:  Strengthening    Frequency:  1 Week 1         Discharge Follow-up with PCP   As directed       Currently Documented PCP:    Harsha Clements PA    PCP Phone Number:    [phone]     Follow Up Details:  1 WEEK           Follow-up Information     Harsha Clements PA .    Specialty:  Family Medicine  Why:  1 WEEK  Contact information:  [address]  [address]  [facility] [id number] [phone]             Rebeka Esparza MD .    Specialties:  Pulmonary Disease, Sleep Medicine  Contact information:  [address]  [address]  [facility] [address]  [phone]                   Future Appointments   Date Time Provider Department Center   3/3/2020  1:30 PM Kaelyn Dixon APRN [facility] None   7/13/2020 10:15 AM Rebeka Esparza MD [facility] None       Additional Instructions for the Follow-ups that You Need to Schedule     Ambulatory Referral to Home Health   As directed      Face to Face Visit Date:  1/16/2020    Follow-up provider for Plan of Care?:  I treated the patient in an acute care facility and will not continue treatment after discharge.    Follow-up provider:  HARSHA CLEMENTS [[id number]]    Reason/Clinical Findings:  weakness, copd    Describe mobility limitations that make leaving home difficult:  copd, o2 dependent, weakness    Nursing/Therapeutic Services Requested:  Skilled Nursing Physical Therapy    Skilled nursing orders:  COPD management    PT orders:  Strengthening    Frequency:  1 Week 1         Discharge Follow-up with PCP   As directed       Currently Documented PCP:    Harsha Clements PA    PCP Phone Number:    [phone]     Follow Up Details:  1 WEEK               Test Results Pending at Discharge:     Order Current Status    Green Top (No Gel) In process    [place] Draw In process    Blood Culture With YONAS - Blood, Arm, Left Preliminary result    Blood Culture With YONAS - Blood, Hand, Left Preliminary  result             Jeanie Antonio DO  01/16/20  10:02 AM    Time spent: 27 minutes    Dictated utilizing Dragon dictation.

## 2020-01-16 NOTE — THERAPY DISCHARGE NOTE
Patient Name: Joelle Yee  : 1945    MRN: [id number]                              Today's Date: 1/15/2020       Admit Date: 2020    Visit Dx:     ICD-10-CM ICD-9-CM   1. COPD exacerbation (CMS/[facility]) J44.1 491.21   2. Impaired mobility and ADLs Z74.09 799.89     Patient Active Problem List   Diagnosis   • Dyspepsia   • Esophageal reflux   • Anxiety   • High cholesterol   • Bipolar disorder (CMS/HCC)   • COPD (chronic obstructive pulmonary disease) (CMS/[facility])   • Depression   • Gout   • Hyperlipidemia   • Insomnia   • Osteoarthritis   • Sciatica   • Sleep apnea   • Vitamin B 12 deficiency   • Pancolitis (CMS/[facility])   • Acute cystitis without hematuria   • C. difficile colitis   • Chronic bronchitis with COPD (chronic obstructive pulmonary disease) (CMS/[facility])   • Pneumonia of right lower lobe due to infectious organism (CMS/[facility])   • COPD exacerbation (CMS/[facility])   • Pneumonia involving right lung   • COPD with acute exacerbation (CMS/[facility])   • Chronic respiratory failure with hypoxia (CMS/[facility])   • Abdominal pain   • Diarrhea   • Heartburn   • Loss of appetite   • Melena   • Nausea and vomiting   • Pseudogout   • Upset stomach   • Abnormal weight loss   • Chronic obstructive pulmonary disease with acute lower respiratory infection (CMS/[facility])   • Right upper lobe pneumonia (CMS/[facility])   • Acute on chronic respiratory failure with hypoxia (CMS/[facility])   • Moderate malnutrition (CMS/[facility])   • Acute exacerbation of chronic obstructive pulmonary disease (COPD) (CMS/[facility])   • Bronchiectasis without complication (CMS/[facility])   • Pneumonia due to gram-negative bacteria (CMS/[facility])   • Sinus tachycardia   • Leukocytosis   • Serratia marcescens infection   • Pneumonia of both lower lobes due to infectious organism (CMS/HCC)   • Acute kidney injury (CMS/[facility])   • BMI less than 19,adult   • Anemia   • Pneumonia of both lungs due to infectious organism   • Lung nodule     Past Medical History:   Diagnosis Date   • Abdominal pain    •  Acute bronchitis    • Ankle pain    • Anxiety 1980   • Atopic dermatitis    • Bronchiectasis (CMS/HCC)    • Cataract, bilateral    • Chest pain     STATES HAS OCCASSIONALLY.  STATES LAST TIME WAS LAST WEEK.  STATES SEES DR. RICH.  STATES HE HAS DONE NUMEROUS TESTS ON HER AND HAS NOT BEEN ABLE TO FIND OUT CAUSE.     • Chronic obstructive lung disease (CMS/HCC) 2008   • Colon cancer screening    • COPD (chronic obstructive pulmonary disease) (CMS/[facility])    • Cystocele    • Depressed    • Depression 1980   • Fatigue     Chronic pafigue 2012   • Fibromyalgia 2008   • Full dentures    • GERD (gastroesophageal reflux disease)    • Gout    • Heartburn     Chronic historu of epigastric heartburn currently controlled on Prilesec 40 mg every morning along with Zantac 300 Mg daily at bedtime   • High cholesterol 2012   • Hip pain    • Hyperlipidemia    • Hypertension    • Impaired functional mobility, balance, gait, and endurance    • Insomnia    • Joint pain    • Kidney infection    • Loss of appetite    • Low back pain 1995   • Melena    • Nausea and vomiting    • On home oxygen therapy     2L/NC PRN (STATES SHE HAS BEEN USING CONTINUOSLY LATELY)   • Osteoarthritis 2010   • Pain in limb    • Palpitations    • Pinched nerve     Pinched nerves 2011   • Pneumonia    • Problems with swallowing     HAS TO EAT SLOW AND CHEW FOOD WELL   • Recurrent urinary tract infection    • Sciatica [id number]   • Shortness of breath    • Sinus problem    • Sleep apnea 1998    NO CPAP   • Upset stomach    • Valvular heart disease    • Vitamin B12 deficiency    • Wears glasses    • Weight loss, abnormal     Weight loss is stabel. On pund weight gain since 01/2016     Past Surgical History:   Procedure Laterality Date   • ABDOMINAL HERNIA REPAIR  2016   • APPENDECTOMY  1965   • BACK SURGERY  2005   • BRONCHOSCOPY N/A 7/5/2018    Procedure: BRONCHOSCOPY w/ WASHINGS/BRUSHINGS with MAC;  Surgeon: Rebeka Esparza MD;  Location: [facility] OR;  Service:  Pulmonary   • CATARACT EXTRACTION Bilateral     Put in implants    • CHOLECYSTECTOMY  1985   • COLONOSCOPY     • ENDOSCOPY     • KNEE SURGERY Left 1979    Knee Cap   • TUBAL ABDOMINAL LIGATION  1971     General Information     Row Name 01/15/20 1401          PT Evaluation Time/Intention    Document Type  discharge evaluation/summary  -     Mode of Treatment  physical therapy  -     Row Name 01/15/20 1401          General Information    Patient Profile Reviewed?  yes  -JR     Prior Level of Function  independent:;community mobility  -JR     Existing Precautions/Restrictions  oxygen therapy device and L/min  -JR     Barriers to Rehab  none identified  -     Row Name 01/15/20 1401          Relationship/Environment    Lives With  alone  -     Row Name 01/15/20 1401          Resource/Environmental Concerns    Current Living Arrangements  home/apartment/condo  -     Row Name 01/15/20 1401          Home Main Entrance    Number of Stairs, Main Entrance  none ramp  -     Row Name 01/15/20 1401          Safety Issues, Functional Mobility    Impairments Affecting Function (Mobility)  shortness of breath  -       User Key  (r) = Recorded By, (t) = Taken By, (c) = Cosigned By    Initials Name Provider Type    Adelina Munguia, PT Physical Therapist        Mobility     Row Name 01/15/20 1401          Bed Mobility Assessment/Treatment    Comment (Bed Mobility)  sitting EOB and she reports that she is independent with bed mobility  -     Row Name 01/15/20 1401          Sit-Stand Transfer    Sit-Stand [place] (Transfers)  independent  -[facility] Name 01/15/20 1401          Gait/Stairs Assessment/Training    Gait/Stairs Assessment/Training  gait/ambulation independence  -     [place] Level (Gait)  independent  -     Distance in Feet (Gait)  236  -     Pattern (Gait)  step-through  -       User Key  (r) = Recorded By, (t) = Taken By, (c) = Cosigned By    Initials Name Provider Type    Adelina Munguia,  PT Physical Therapist        Obj/Interventions     Row Name 01/15/20 1401          General ROM    GENERAL ROM COMMENTS  WFL  -JR     Row Name 01/15/20 1401          MMT (Manual Muscle Testing)    General MMT Comments  [facility]  -JR     Row Name 01/15/20 1401          Static Sitting Balance    Level of [place] (Unsupported Sitting, Static Balance)  independent  -JR     Row Name 01/15/20 1401          Dynamic Sitting Balance    Level of [place], Reaches Outside Midline (Sitting, Dynamic Balance)  independent  -JR     Row Name 01/15/20 1401          Static Standing Balance    Level of [place] (Supported Standing, Static Balance)  independent  -JR     Row Name 01/15/20 140          Dynamic Standing Balance    Level of [place], Reaches Outside Midline (Standing, Dynamic Balance)  independent  -       User Key  (r) = Recorded By, (t) = Taken By, (c) = Cosigned By    Initials Name Provider Type    Adelina Munguia, PT Physical Therapist        Goals/Plan    No documentation.       Clinical Impression     Row Name 01/15/20 [facility]          Pain Assessment    Additional Documentation  Pain Scale: Numbers Pre/Post-Treatment (Group)  -JR     Row Name 01/15/20 [facility]          Pain Scale: Numbers Pre/Post-Treatment    Pain Scale: Numbers, Pretreatment  7/10  -     Pain Scale: Numbers, Post-Treatment  7/10  -     Pain Location - Orientation  generalized  -JR     Row Name 01/15/20 [facility]          Physical Therapy Clinical Impression    Criteria for Skilled Interventions Met (PT Clinical Impression)  no;current level of function same as previous level of function  -JR     Row Name 01/15/20 1401          Positioning and Restraints    Pre-Treatment Position  in bed  -     Post Treatment Position  bed  -     In Bed  sitting EOB;call light within reach;encouraged to call for assist  -       User Key  (r) = Recorded By, (t) = Taken By, (c) = Cosigned By    Initials Name Provider Type    Adelina Munguia, PT Physical  Therapist        Outcome Measures     Row Name 01/15/20 1401          How much help from another person do you currently need...    Turning from your back to your side while in flat bed without using bedrails?  4  -JR     Moving from lying on back to sitting on the side of a flat bed without bedrails?  4  -JR     Moving to and from a bed to a chair (including a wheelchair)?  4  -JR     Standing up from a chair using your arms (e.g., wheelchair, bedside chair)?  4  -JR     Climbing 3-5 steps with a railing?  4  -JR     To walk in hospital room?  4  -JR     AM-PAC 6 Clicks Score (PT)  24  -JR     Row Name 01/15/20 1401          Functional Assessment    Outcome Measure Options  AM-PAC 6 Clicks Basic Mobility (PT)  -       User Key  (r) = Recorded By, (t) = Taken By, (c) = Cosigned By    Initials Name Provider Type    Adelina Munguia, PT Physical Therapist          PT Recommendation and Plan     Outcome Summary/Treatment Plan (PT)  Anticipated Discharge Disposition (PT): home  Plan of Care Reviewed With: patient  Outcome Summary: Patient particiaptes in PT evaluation and demonstrates safe independent mobility tasks.  She does not require the services of PT at this time.     Time Calculation:   PT Charges     Row Name 01/15/20 2018             Time Calculation    Start Time  1401  -JR      PT Received On  01/15/20  -        User Key  (r) = Recorded By, (t) = Taken By, (c) = Cosigned By    Initials Name Provider Type    Adelina Munguai, PT Physical Therapist        Therapy Charges for Today     Code Description Service Date Service Provider Modifiers Qty    95175526152 HC PT EVAL LOW COMPLEXITY 2 1/15/2020 Adelina Jones, PT GP 1          PT G-Codes  Outcome Measure Options: AM-PAC 6 Clicks Basic Mobility (PT)  AM-PAC 6 Clicks Score (PT): 24  AM-PAC 6 Clicks Score (OT): 24    PT Discharge Summary  Anticipated Discharge Disposition (PT): home    Adelina Jones PT  1/15/2020

## 2020-01-16 NOTE — PROGRESS NOTES
Case Management Discharge Note           Provided post acute provider list?: Yes  Post Acute Provider List: Home Health  Post Acute Provider Quality & Resource List: Yes  Delivered To: Patient  Method of Delivery: In person          Home Medical Care - Selection Complete      Service Provider Request Status Selected Services Address Phone Number Fax Number    [facility] Selected Home Health Services [address] [address] [phone]      Therapy      No service has been selected for the patient.      Community Resources      No service has been selected for the patient.        Transportation Services  Private: Car    Final Discharge Disposition Code: 06 - home with home health care

## 2020-01-19 LAB
BACTERIA SPEC AEROBE CULT: NORMAL
BACTERIA SPEC AEROBE CULT: NORMAL

## 2020-02-04 RX ORDER — AZITHROMYCIN 250 MG/1
250 TABLET, FILM COATED ORAL EVERY OTHER DAY
Qty: 60 TABLET | Refills: 1 | OUTPATIENT
Start: 2020-02-04 | End: 2020-02-15

## 2020-02-04 RX ORDER — FLUCONAZOLE 200 MG/1
200 TABLET ORAL DAILY
Qty: 1 TABLET | Refills: 0 | Status: SHIPPED | OUTPATIENT
Start: 2020-02-04 | End: 2021-01-12 | Stop reason: HOSPADM

## 2020-02-11 ENCOUNTER — OUTSIDE FACILITY SERVICE (OUTPATIENT)
Dept: INTERNAL MEDICINE | Facility: HOSPITAL | Age: 75
End: 2020-02-11

## 2020-02-11 PROCEDURE — G0180 MD CERTIFICATION HHA PATIENT: HCPCS | Performed by: FAMILY MEDICINE

## 2020-02-14 ENCOUNTER — APPOINTMENT (OUTPATIENT)
Dept: GENERAL RADIOLOGY | Facility: HOSPITAL | Age: 75
End: 2020-02-14

## 2020-02-14 ENCOUNTER — HOSPITAL ENCOUNTER (EMERGENCY)
Facility: HOSPITAL | Age: 75
Discharge: HOME | End: 2020-02-15
Attending: EMERGENCY MEDICINE | Admitting: EMERGENCY MEDICINE

## 2020-02-14 DIAGNOSIS — J44.1 COPD WITH ACUTE EXACERBATION (HCC): Primary | ICD-10-CM

## 2020-02-14 LAB
ALBUMIN SERPL-MCNC: 3.9 G/DL (ref 3.5–5.2)
ALBUMIN/GLOB SERPL: 1 G/DL
ALP SERPL-CCNC: 140 U/L (ref 39–117)
ALT SERPL W P-5'-P-CCNC: 16 U/L (ref 1–33)
ANION GAP SERPL CALCULATED.3IONS-SCNC: 13.7 MMOL/L (ref 5–15)
ANISOCYTOSIS BLD QL: ABNORMAL
AST SERPL-CCNC: 20 U/L (ref 1–32)
BILIRUB SERPL-MCNC: <0.2 MG/DL (ref 0.2–1.2)
BUN BLD-MCNC: 13 MG/DL (ref 8–23)
BUN/CREAT SERPL: 18.1 (ref 7–25)
CALCIUM SPEC-SCNC: 9.8 MG/DL (ref 8.6–10.5)
CHLORIDE SERPL-SCNC: 94 MMOL/L (ref 98–107)
CO2 SERPL-SCNC: 30.3 MMOL/L (ref 22–29)
CREAT BLD-MCNC: 0.72 MG/DL (ref 0.57–1)
DEPRECATED RDW RBC AUTO: 60 FL (ref 37–54)
EOSINOPHIL # BLD MANUAL: 0.2 10*3/MM3 (ref 0–0.4)
EOSINOPHIL NFR BLD MANUAL: 1 % (ref 0.3–6.2)
ERYTHROCYTE [DISTWIDTH] IN BLOOD BY AUTOMATED COUNT: 16.3 % (ref 12.3–15.4)
GFR SERPL CREATININE-BSD FRML MDRD: 79 ML/MIN/1.73
GLOBULIN UR ELPH-MCNC: 4 GM/DL
GLUCOSE BLD-MCNC: 76 MG/DL (ref 65–99)
HCT VFR BLD AUTO: 41.2 % (ref 34–46.6)
HGB BLD-MCNC: 13.4 G/DL (ref 12–15.9)
LYMPHOCYTES # BLD MANUAL: 4.92 10*3/MM3 (ref 0.7–3.1)
LYMPHOCYTES NFR BLD MANUAL: 11 % (ref 5–12)
LYMPHOCYTES NFR BLD MANUAL: 25 % (ref 19.6–45.3)
MCH RBC QN AUTO: 32.5 PG (ref 26.6–33)
MCHC RBC AUTO-ENTMCNC: 32.5 G/DL (ref 31.5–35.7)
MCV RBC AUTO: 100 FL (ref 79–97)
METAMYELOCYTES NFR BLD MANUAL: 11 % (ref 0–0)
MONOCYTES # BLD AUTO: 2.16 10*3/MM3 (ref 0.1–0.9)
MYELOCYTES NFR BLD MANUAL: 10 % (ref 0–0)
NEUTROPHILS # BLD AUTO: 8.26 10*3/MM3 (ref 1.7–7)
NEUTROPHILS NFR BLD MANUAL: 27 % (ref 42.7–76)
NEUTS BAND NFR BLD MANUAL: 15 % (ref 0–5)
NT-PROBNP SERPL-MCNC: 131.8 PG/ML (ref 5–900)
PLATELET # BLD AUTO: 432 10*3/MM3 (ref 140–450)
PMV BLD AUTO: 9.2 FL (ref 6–12)
POTASSIUM BLD-SCNC: 3.6 MMOL/L (ref 3.5–5.2)
PROT SERPL-MCNC: 7.9 G/DL (ref 6–8.5)
RBC # BLD AUTO: 4.12 10*6/MM3 (ref 3.77–5.28)
SMALL PLATELETS BLD QL SMEAR: ADEQUATE
SODIUM BLD-SCNC: 138 MMOL/L (ref 136–145)
TROPONIN T SERPL-MCNC: <0.01 NG/ML (ref 0–0.03)
WBC MORPH BLD: NORMAL
WBC NRBC COR # BLD: 19.67 10*3/MM3 (ref 3.4–10.8)

## 2020-02-14 PROCEDURE — 99284 EMERGENCY DEPT VISIT MOD MDM: CPT

## 2020-02-14 PROCEDURE — 25010000002 METHYLPREDNISOLONE PER 125 MG: Performed by: EMERGENCY MEDICINE

## 2020-02-14 PROCEDURE — 94799 UNLISTED PULMONARY SVC/PX: CPT

## 2020-02-14 PROCEDURE — 80053 COMPREHEN METABOLIC PANEL: CPT

## 2020-02-14 PROCEDURE — 71045 X-RAY EXAM CHEST 1 VIEW: CPT

## 2020-02-14 PROCEDURE — 85025 COMPLETE CBC W/AUTO DIFF WBC: CPT

## 2020-02-14 PROCEDURE — 25010000002 MAGNESIUM SULFATE 2 GM/50ML SOLUTION: Performed by: EMERGENCY MEDICINE

## 2020-02-14 PROCEDURE — 83880 ASSAY OF NATRIURETIC PEPTIDE: CPT

## 2020-02-14 PROCEDURE — 84484 ASSAY OF TROPONIN QUANT: CPT

## 2020-02-14 PROCEDURE — 93005 ELECTROCARDIOGRAM TRACING: CPT

## 2020-02-14 PROCEDURE — 94640 AIRWAY INHALATION TREATMENT: CPT

## 2020-02-14 PROCEDURE — 96365 THER/PROPH/DIAG IV INF INIT: CPT

## 2020-02-14 PROCEDURE — 96375 TX/PRO/DX INJ NEW DRUG ADDON: CPT

## 2020-02-14 PROCEDURE — 85007 BL SMEAR W/DIFF WBC COUNT: CPT

## 2020-02-14 RX ORDER — SODIUM CHLORIDE 0.9 % (FLUSH) 0.9 %
10 SYRINGE (ML) INJECTION AS NEEDED
Status: DISCONTINUED | OUTPATIENT
Start: 2020-02-14 | End: 2020-02-15 | Stop reason: HOSPADM

## 2020-02-14 RX ORDER — MAGNESIUM SULFATE HEPTAHYDRATE 40 MG/ML
2 INJECTION, SOLUTION INTRAVENOUS ONCE
Status: COMPLETED | OUTPATIENT
Start: 2020-02-14 | End: 2020-02-14

## 2020-02-14 RX ORDER — IPRATROPIUM BROMIDE AND ALBUTEROL SULFATE 2.5; .5 MG/3ML; MG/3ML
9 SOLUTION RESPIRATORY (INHALATION) ONCE
Status: COMPLETED | OUTPATIENT
Start: 2020-02-14 | End: 2020-02-14

## 2020-02-14 RX ORDER — METHYLPREDNISOLONE SODIUM SUCCINATE 125 MG/2ML
125 INJECTION, POWDER, LYOPHILIZED, FOR SOLUTION INTRAMUSCULAR; INTRAVENOUS ONCE
Status: COMPLETED | OUTPATIENT
Start: 2020-02-14 | End: 2020-02-14

## 2020-02-14 RX ADMIN — MAGNESIUM SULFATE HEPTAHYDRATE 2 G: 40 INJECTION, SOLUTION INTRAVENOUS at 23:10

## 2020-02-14 RX ADMIN — METHYLPREDNISOLONE SODIUM SUCCINATE 125 MG: 125 INJECTION, POWDER, FOR SOLUTION INTRAMUSCULAR; INTRAVENOUS at 23:06

## 2020-02-14 RX ADMIN — IPRATROPIUM BROMIDE AND ALBUTEROL SULFATE 9 ML: .5; 3 SOLUTION RESPIRATORY (INHALATION) at 23:22

## 2020-02-15 VITALS
HEIGHT: 60 IN | BODY MASS INDEX: 17.08 KG/M2 | DIASTOLIC BLOOD PRESSURE: 61 MMHG | OXYGEN SATURATION: 96 % | SYSTOLIC BLOOD PRESSURE: 112 MMHG | HEART RATE: 90 BPM | RESPIRATION RATE: 18 BRPM | TEMPERATURE: 98 F | WEIGHT: 87 LBS

## 2020-02-15 LAB
HOLD SPECIMEN: NORMAL
HOLD SPECIMEN: NORMAL
WHOLE BLOOD HOLD SPECIMEN: NORMAL
WHOLE BLOOD HOLD SPECIMEN: NORMAL

## 2020-02-15 RX ORDER — DOXYCYCLINE 100 MG/1
100 CAPSULE ORAL 2 TIMES DAILY
Qty: 14 CAPSULE | Refills: 0 | Status: SHIPPED | OUTPATIENT
Start: 2020-02-15 | End: 2020-02-22

## 2020-02-15 RX ORDER — PREDNISONE 20 MG/1
60 TABLET ORAL DAILY
Qty: 15 TABLET | Refills: 0 | Status: SHIPPED | OUTPATIENT
Start: 2020-02-15 | End: 2020-02-20

## 2020-02-15 NOTE — ED PROVIDER NOTES
"Subjective   74-year-old female presenting with shortness of breath.  She states that she was admitted to the hospital last month, initially felt better but since leaving has progressively gotten worse each day.  Tonight she \"just could not stand it\" so came in for evaluation.  She has had persistent cough that is productive.  No fevers, chest pain or other complaints.  She has end-stage COPD and is on home oxygen.          Review of Systems   Constitutional: Negative.    HENT: Negative.    Eyes: Negative.    Respiratory: Positive for cough, shortness of breath and wheezing.    Cardiovascular: Negative.    Gastrointestinal: Negative.    Genitourinary: Negative.    Musculoskeletal: Negative.    Skin: Negative.    Neurological: Negative.    Psychiatric/Behavioral: Negative.        Past Medical History:   Diagnosis Date   • Abdominal pain    • Acute bronchitis    • Ankle pain    • Anxiety 1980   • Atopic dermatitis    • Bronchiectasis (CMS/[facility])    • Cataract, bilateral    • Chest pain     STATES HAS OCCASSIONALLY.  STATES LAST TIME WAS LAST WEEK.  STATES SEES DR. RICH.  STATES HE HAS DONE NUMEROUS TESTS ON HER AND HAS NOT BEEN ABLE TO FIND OUT CAUSE.     • Chronic obstructive lung disease (CMS/HCC) 2008   • Colon cancer screening    • COPD (chronic obstructive pulmonary disease) (CMS/HCC)    • Cystocele    • Depressed    • Depression 1980   • Fatigue     Chronic pafigue 2012   • Fibromyalgia 2008   • Full dentures    • GERD (gastroesophageal reflux disease)    • Gout    • Heartburn     Chronic historu of epigastric heartburn currently controlled on Prilesec 40 mg every morning along with Zantac 300 Mg daily at bedtime   • High cholesterol 2012   • Hip pain    • Hyperlipidemia    • Hypertension    • Impaired functional mobility, balance, gait, and endurance    • Insomnia    • Joint pain    • Kidney infection    • Loss of appetite    • Low back pain 1995   • Melena    • Nausea and vomiting    • On home oxygen therapy  "    2L/NC PRN (STATES SHE HAS BEEN USING CONTINUOSLY LATELY)   • Osteoarthritis    • Pain in limb    • Palpitations    • Pinched nerve     Pinched nerves    • Pneumonia    • Problems with swallowing     HAS TO EAT SLOW AND CHEW FOOD WELL   • Recurrent urinary tract infection    • Sciatica [id number]   • Shortness of breath    • Sinus problem    • Sleep apnea     NO CPAP   • Upset stomach    • Valvular heart disease    • Vitamin B12 deficiency    • Wears glasses    • Weight loss, abnormal     Weight loss is stabel. On pund weight gain since 2016       Allergies   Allergen Reactions   • Dust Mite Extract Other (See Comments)     Dust  SINUS   • Grass Other (See Comments)     SINUS   • Mold Extract [Trichophyton] Other (See Comments)     SINUS       Past Surgical History:   Procedure Laterality Date   • ABDOMINAL HERNIA REPAIR     • APPENDECTOMY  1965   • BACK SURGERY     • BRONCHOSCOPY N/A 2018    Procedure: BRONCHOSCOPY w/ WASHINGS/BRUSHINGS with MAC;  Surgeon: Rebeka Esparza MD;  Location: [facility];  Service: Pulmonary   • CATARACT EXTRACTION Bilateral     Put in implants    • CHOLECYSTECTOMY     • COLONOSCOPY     • ENDOSCOPY     • KNEE SURGERY Left     Knee Cap   • TUBAL ABDOMINAL LIGATION         Family History   Problem Relation Age of Onset   • Ovarian cancer Mother    • Cancer Mother    • Lung cancer Father    • Heart disease Brother        Social History     Socioeconomic History   • Marital status: Single     Spouse name: Not on file   • Number of children: Not on file   • Years of education: Not on file   • Highest education level: Not on file   Tobacco Use   • Smoking status: Former Smoker     Packs/day: 0.00     Years: 35.00     Pack years: 0.00     Last attempt to quit: 2017     Years since quittin.6   • Smokeless tobacco: Never Used   Substance and Sexual Activity   • Alcohol use: No   • Drug use: No   • Sexual activity: Defer           Objective    Physical Exam   Constitutional: She is oriented to person, place, and time. No distress.   Frail, chronically ill-appearing   HENT:   Head: Normocephalic and atraumatic.   Right Ear: External ear normal.   Left Ear: External ear normal.   Nose: Nose normal.   Mouth/Throat: Oropharynx is clear and moist.   Eyes: Pupils are equal, round, and reactive to light. Conjunctivae and EOM are normal.   Neck: Normal range of motion. Neck supple.   Cardiovascular: Normal rate, regular rhythm, normal heart sounds and intact distal pulses.   Pulmonary/Chest: Effort normal. No respiratory distress.   No increased work of breathing, prolonged expiration, faint wheezing throughout   Abdominal: Soft. Bowel sounds are normal. She exhibits no distension. There is no tenderness. There is no rebound and no guarding.   Musculoskeletal: Normal range of motion. She exhibits no edema, tenderness or deformity.   Neurological: She is alert and oriented to person, place, and time.   Skin: Skin is warm and dry. No rash noted.   Psychiatric: She has a normal mood and affect. Her behavior is normal.   Nursing note and vitals reviewed.      Procedures           ED Course                                           MDM  Number of Diagnoses or Management Options  COPD with acute exacerbation (CMS/[facility]):   Diagnosis management comments: 74-year-old female with shortness of breath.  Chronically ill-appearing frail elderly lady in no distress with exam as above.  Will obtain labs, EKG and chest x-ray.  Will give symptomatic treatment.  Disposition pending work-up and response to therapy.    DDX: COPD, pneumonia, ACS, CHF    EKG interpreted by me: Sinus rhythm, normal rate, right axis, no acute ST changes, some nonspecific T wave changes, this is an abnormal EKG    Patient refused blood gas.  Lab work notable for leukocytosis with mild bandemia, this is actually improved compared to previous.  Lab works otherwise unremarkable.  Chest x-ray per my  interpretation reveals chronic changes, no acute findings.  On reevaluation she is resting comfortably, no increased work of breathing, lungs are clear.  I do feel she is safe for discharge home.  Outpatient follow-up and strict return to care precautions discussed.  She is comfortable with and understanding of the plan.       Amount and/or Complexity of Data Reviewed  Clinical lab tests: reviewed  Decide to obtain previous medical records or to obtain history from someone other than the patient: yes        Final diagnoses:   COPD with acute exacerbation (CMS/[facility])            Hussein Oconnor MD  02/15/20 0042

## 2020-03-03 ENCOUNTER — OFFICE VISIT (OUTPATIENT)
Dept: PULMONOLOGY | Facility: CLINIC | Age: 75
End: 2020-03-03

## 2020-03-03 VITALS
OXYGEN SATURATION: 98 % | WEIGHT: 88 LBS | DIASTOLIC BLOOD PRESSURE: 72 MMHG | RESPIRATION RATE: 18 BRPM | HEART RATE: 102 BPM | SYSTOLIC BLOOD PRESSURE: 110 MMHG | BODY MASS INDEX: 17.28 KG/M2 | HEIGHT: 60 IN

## 2020-03-03 DIAGNOSIS — R09.02 HYPOXIA: ICD-10-CM

## 2020-03-03 DIAGNOSIS — J44.9 CHRONIC OBSTRUCTIVE PULMONARY DISEASE, UNSPECIFIED COPD TYPE (HCC): ICD-10-CM

## 2020-03-03 DIAGNOSIS — R06.02 SHORTNESS OF BREATH: Primary | ICD-10-CM

## 2020-03-03 DIAGNOSIS — J47.9 BRONCHIECTASIS WITHOUT COMPLICATION (HCC): ICD-10-CM

## 2020-03-03 PROCEDURE — 99214 OFFICE O/P EST MOD 30 MIN: CPT | Performed by: NURSE PRACTITIONER

## 2020-03-03 RX ORDER — ARFORMOTEROL TARTRATE 15 UG/2ML
15 SOLUTION RESPIRATORY (INHALATION)
Qty: 120 ML | Refills: 5 | Status: SHIPPED | OUTPATIENT
Start: 2020-03-03 | End: 2020-03-11 | Stop reason: ALTCHOICE

## 2020-03-03 RX ORDER — AZITHROMYCIN 250 MG/1
TABLET, FILM COATED ORAL
Qty: 30 TABLET | Refills: 3 | Status: SHIPPED | OUTPATIENT
Start: 2020-03-03 | End: 2020-11-16 | Stop reason: SDUPTHER

## 2020-03-03 RX ORDER — BUDESONIDE 0.5 MG/2ML
0.5 INHALANT ORAL 2 TIMES DAILY
Qty: 120 ML | Refills: 5 | Status: SHIPPED | OUTPATIENT
Start: 2020-03-03 | End: 2020-07-13

## 2020-03-03 RX ORDER — PREDNISONE 20 MG/1
TABLET ORAL
Qty: 90 TABLET | Refills: 1 | Status: SHIPPED | OUTPATIENT
Start: 2020-03-03 | End: 2020-07-13

## 2020-03-03 NOTE — PROGRESS NOTES
"Chief Complaint   Patient presents with   • Follow-up   • Shortness of Breath         Subjective   Joelle Yee is a 74 y.o. female.     History of Present Illness   The patient comes in today for follow-up of recent hospitalization.      She continues to complain of being weak and tired and having no energy since she was discharged from the hospital.    She was actually treated in the emergency room 2 to 3 weeks ago for a COPD exacerbation.  She states she was given steroids and antibiotics and sent home however this treatment only seemed to help for a few days and once she finished it her symptoms returned.    She has not been on Brovana at all because she is not getting it from the pharmacy.  This is possibly due to lack of insurance coverage.    She is using duo nebs 3-4 times a day and Pulmicort once a day in her nebulizer.    She continues to take azithromycin every other day and 5 mg of prednisone every other day.    The following portions of the patient's history were reviewed and updated as appropriate: allergies, current medications, past family history, past medical history, past social history and past surgical history.    Review of Systems   Constitutional: Positive for fatigue.   HENT: Negative for rhinorrhea, sinus pressure, sneezing and sore throat.    Respiratory: Positive for cough, shortness of breath and wheezing.        Objective   Visit Vitals  /72   Pulse 102   Resp 18   Ht 152.4 cm (60\")   Wt 39.9 kg (88 lb)   LMP  (LMP Unknown)   SpO2 98%   BMI 17.19 kg/m²     Physical Exam   Constitutional: She is oriented to person, place, and time.   HENT:   Head: Atraumatic.   Crowded oropharynx.   Eyes: EOM are normal.   Neck: Neck supple.   Cardiovascular: Normal rate and regular rhythm.   Pulmonary/Chest: Effort normal. No respiratory distress.   Somewhat decreased A/E with scattered wheezing noted.   Musculoskeletal: She exhibits no edema.   Neurological: She is alert and oriented to " person, place, and time.   Psychiatric: She has a normal mood and affect.   Vitals reviewed.        Assessment/Plan   Joelle was seen today for follow-up and shortness of breath.    Diagnoses and all orders for this visit:    Shortness of breath  -     Pulmonary Function Test; Future    Bronchiectasis without complication (CMS/HCC)    Hypoxia    Chronic obstructive pulmonary disease, unspecified COPD type (CMS/HCC)    Other orders  -     budesonide (PULMICORT) 0.5 MG/2ML nebulizer solution; Take 2 mL by nebulization 2 (Two) Times a Day.  -     arformoterol (BROVANA) 15 MCG/2ML nebulizer solution; Take 2 mL by nebulization 2 (Two) Times a Day.  -     predniSONE (DELTASONE) 20 MG tablet; Take 1 tablet every other day.  -     azithromycin (ZITHROMAX) 250 MG tablet; Take 1 tablet every other day.  -     tiotropium bromide monohydrate (SPIRIVA RESPIMAT) 2.5 MCG/ACT aerosol solution inhaler; Inhale 2 puffs Daily.           Return for keep appt in July with Doc , PFT on follow up.    DISCUSSION (if any):  She has severe obstruction according to her last PFT.  I have increased her prednisone to 10 mg every other day at this time.  I have written for a 20 mg tablet however I have asked her to split those in half.  She has been tried on Daliresp and could not tolerate the medication.     I have asked her to continue using azithromycin every other day.    I have advised her to have a bone mineral density which it seems was ordered by her PCP as soon as possible.  I have discussed the possible side effects of prednisone for chronic use including osteoporosis and increased risk for bone fracture.    I have asked her to use her flutter valve 10 repetitions at least 3 times a day.    She will continue Pulmicort but I have asked her to use this medication twice a day along with either Brovana or Perforomist whichever her insurance will cover.  I have added Spiriva and a sample has been provided today.    She has been in and out of  the hospital and as well as treated outpatient for exacerbations recently so I have discussed the possibility of hospice due to severe COPD.  I have explained that this is an option when she is ready.  I have discussed that she has stage III COPD which is severe and this is a progressive disease so it is possibly worsening.  She verbalizes understanding.    I have ordered a PFT to be completed at her next visit.    She will need to continue oxygen 24/7.    Dictated utilizing Dragon dictation.    This document was electronically signed by DWAYNE Maria March 3, 2020  3:38 PM

## 2020-03-11 RX ORDER — FORMOTEROL FUMARATE 20 UG/2ML
20 SOLUTION RESPIRATORY (INHALATION)
Qty: 120 ML | Refills: 4 | Status: SHIPPED | OUTPATIENT
Start: 2020-03-11 | End: 2020-07-13

## 2020-04-29 RX ORDER — IPRATROPIUM BROMIDE AND ALBUTEROL SULFATE 2.5; .5 MG/3ML; MG/3ML
3 SOLUTION RESPIRATORY (INHALATION) 4 TIMES DAILY PRN
Qty: 360 ML | Refills: 5 | Status: SHIPPED | OUTPATIENT
Start: 2020-04-29 | End: 2020-07-13 | Stop reason: SDUPTHER

## 2020-05-01 RX ORDER — AZITHROMYCIN 250 MG/1
TABLET, FILM COATED ORAL
Qty: 60 TABLET | Refills: 1 | OUTPATIENT
Start: 2020-05-01

## 2020-07-01 ENCOUNTER — OFFICE VISIT (OUTPATIENT)
Dept: ORTHOPEDIC SURGERY | Facility: CLINIC | Age: 75
End: 2020-07-01

## 2020-07-01 VITALS — WEIGHT: 88 LBS | RESPIRATION RATE: 18 BRPM | HEIGHT: 60 IN | BODY MASS INDEX: 17.28 KG/M2

## 2020-07-01 DIAGNOSIS — M25.511 ARTHRALGIA OF RIGHT SHOULDER REGION: Primary | ICD-10-CM

## 2020-07-01 DIAGNOSIS — M12.811 ROTATOR CUFF ARTHROPATHY OF RIGHT SHOULDER: ICD-10-CM

## 2020-07-01 DIAGNOSIS — M19.019 AC JOINT ARTHROPATHY: ICD-10-CM

## 2020-07-01 DIAGNOSIS — M50.30 DDD (DEGENERATIVE DISC DISEASE), CERVICAL: ICD-10-CM

## 2020-07-01 DIAGNOSIS — M19.011 PRIMARY OSTEOARTHRITIS OF RIGHT SHOULDER: ICD-10-CM

## 2020-07-01 PROCEDURE — 20605 DRAIN/INJ JOINT/BURSA W/O US: CPT | Performed by: PHYSICIAN ASSISTANT

## 2020-07-01 PROCEDURE — 20610 DRAIN/INJ JOINT/BURSA W/O US: CPT | Performed by: PHYSICIAN ASSISTANT

## 2020-07-01 PROCEDURE — 99213 OFFICE O/P EST LOW 20 MIN: CPT | Performed by: PHYSICIAN ASSISTANT

## 2020-07-01 RX ORDER — AZITHROMYCIN 250 MG/1
TABLET, FILM COATED ORAL
COMMUNITY
Start: 2020-06-10 | End: 2020-07-01

## 2020-07-01 RX ORDER — METHYLPREDNISOLONE ACETATE 40 MG/ML
40 INJECTION, SUSPENSION INTRA-ARTICULAR; INTRALESIONAL; INTRAMUSCULAR; SOFT TISSUE
Status: COMPLETED | OUTPATIENT
Start: 2020-07-01 | End: 2020-07-01

## 2020-07-01 RX ORDER — ONDANSETRON 4 MG/1
4 TABLET, ORALLY DISINTEGRATING ORAL AS NEEDED
COMMUNITY
Start: 2020-06-26

## 2020-07-01 RX ORDER — OMEPRAZOLE 40 MG/1
40 CAPSULE, DELAYED RELEASE ORAL DAILY
COMMUNITY
Start: 2020-06-11

## 2020-07-01 RX ORDER — FLUCONAZOLE 150 MG/1
TABLET ORAL
COMMUNITY
Start: 2020-04-21 | End: 2020-07-01

## 2020-07-01 RX ORDER — TRAZODONE HYDROCHLORIDE 50 MG/1
25 TABLET ORAL NIGHTLY
COMMUNITY
Start: 2020-06-16

## 2020-07-01 RX ORDER — GABAPENTIN 100 MG/1
CAPSULE ORAL
COMMUNITY
Start: 2020-05-19 | End: 2020-07-13

## 2020-07-01 RX ORDER — TIOTROPIUM BROMIDE INHALATION SPRAY 3.12 UG/1
SPRAY, METERED RESPIRATORY (INHALATION)
COMMUNITY
Start: 2020-06-18 | End: 2020-07-13

## 2020-07-01 RX ORDER — LIDOCAINE HYDROCHLORIDE 10 MG/ML
0.5 INJECTION, SOLUTION INFILTRATION; PERINEURAL
Status: COMPLETED | OUTPATIENT
Start: 2020-07-01 | End: 2020-07-01

## 2020-07-01 RX ORDER — PREDNISONE 20 MG/1
TABLET ORAL
COMMUNITY
Start: 2020-06-26 | End: 2020-07-13

## 2020-07-01 RX ORDER — HYDROCODONE BITARTRATE AND ACETAMINOPHEN 10; 325 MG/1; MG/1
TABLET ORAL EVERY 8 HOURS PRN
Status: ON HOLD | COMMUNITY
Start: 2020-05-20 | End: 2021-02-02 | Stop reason: SDUPTHER

## 2020-07-01 RX ORDER — VENLAFAXINE 75 MG/1
TABLET ORAL
COMMUNITY
Start: 2020-06-26 | End: 2020-07-01

## 2020-07-01 RX ORDER — LIDOCAINE HYDROCHLORIDE 10 MG/ML
2 INJECTION, SOLUTION INFILTRATION; PERINEURAL
Status: COMPLETED | OUTPATIENT
Start: 2020-07-01 | End: 2020-07-01

## 2020-07-01 RX ORDER — TRIAMCINOLONE ACETONIDE 40 MG/ML
20 INJECTION, SUSPENSION INTRA-ARTICULAR; INTRAMUSCULAR
Status: COMPLETED | OUTPATIENT
Start: 2020-07-01 | End: 2020-07-01

## 2020-07-01 RX ADMIN — METHYLPREDNISOLONE ACETATE 40 MG: 40 INJECTION, SUSPENSION INTRA-ARTICULAR; INTRALESIONAL; INTRAMUSCULAR; SOFT TISSUE at 11:06

## 2020-07-01 RX ADMIN — LIDOCAINE HYDROCHLORIDE 0.5 ML: 10 INJECTION, SOLUTION INFILTRATION; PERINEURAL at 11:06

## 2020-07-01 RX ADMIN — LIDOCAINE HYDROCHLORIDE 2 ML: 10 INJECTION, SOLUTION INFILTRATION; PERINEURAL at 11:06

## 2020-07-01 RX ADMIN — TRIAMCINOLONE ACETONIDE 20 MG: 40 INJECTION, SUSPENSION INTRA-ARTICULAR; INTRAMUSCULAR at 11:06

## 2020-07-01 NOTE — PROGRESS NOTES
Subjective   Patient ID: Joelle Yee is a 74 y.o. right hand dominant female  Pain of the Right Shoulder (No injury, no treatment, reports pain ongoing for about six weeks in the shoulder joint, occasionally radiating to elbow)         History of Present Illness    Patient presents with complaints of right shoulder pain that began 6 weeks prior.  There has been no injury or trauma.  Patient denies neck pain.  She does have occasional pain radiating into the right elbow.   She has tried oral analgesics, heat and ice without improvement.    Pain Score: 6  Pain Location: Shoulder  Pain Orientation: Right     Pain Descriptors: Aching  Pain Frequency: Constant/continuous  Pain Onset: Ongoing     Clinical Progression: Gradually worsening  Aggravating Factors: Stretching, Straightening        Pain Intervention(s): Rest, Heat applied  Result of Injury: No       Past Medical History:   Diagnosis Date   • Abdominal pain    • Acute bronchitis    • Ankle pain    • Anxiety 1980   • Atopic dermatitis    • Bronchiectasis (CMS/HCC)    • Cataract, bilateral    • Chest pain     STATES HAS OCCASSIONALLY.  STATES LAST TIME WAS LAST WEEK.  STATES SEES DR. RICH.  STATES HE HAS DONE NUMEROUS TESTS ON HER AND HAS NOT BEEN ABLE TO FIND OUT CAUSE.     • Chronic obstructive lung disease (CMS/HCC) 2008   • Colon cancer screening    • COPD (chronic obstructive pulmonary disease) (CMS/HCC)    • Cystocele    • Depressed    • Depression 1980   • Fatigue     Chronic pafigue 2012   • Fibromyalgia 2008   • Full dentures    • GERD (gastroesophageal reflux disease)    • Gout    • Heartburn     Chronic historu of epigastric heartburn currently controlled on Prilesec 40 mg every morning along with Zantac 300 Mg daily at bedtime   • High cholesterol 2012   • Hip pain    • Hyperlipidemia    • Hypertension    • Impaired functional mobility, balance, gait, and endurance    • Insomnia    • Joint pain    • Kidney infection    • Loss of appetite    •  Low back pain    • Melena    • Nausea and vomiting    • On home oxygen therapy     2L/NC PRN (STATES SHE HAS BEEN USING CONTINUOSLY LATELY)   • Osteoarthritis    • Pain in limb    • Palpitations    • Pinched nerve     Pinched nerves    • Pneumonia    • Problems with swallowing     HAS TO EAT SLOW AND CHEW FOOD WELL   • Recurrent urinary tract infection    • Sciatica [id number]   • Shortness of breath    • Sinus problem    • Sleep apnea     NO CPAP   • Upset stomach    • Valvular heart disease    • Vitamin B12 deficiency    • Wears glasses    • Weight loss, abnormal     Weight loss is stabel. On pund weight gain since 2016        Past Surgical History:   Procedure Laterality Date   • ABDOMINAL HERNIA REPAIR     • APPENDECTOMY  1965   • BACK SURGERY     • BRONCHOSCOPY N/A 2018    Procedure: BRONCHOSCOPY w/ WASHINGS/BRUSHINGS with MAC;  Surgeon: Rebeka Esparza MD;  Location: [facility];  Service: Pulmonary   • CATARACT EXTRACTION Bilateral     Put in implants    • CHOLECYSTECTOMY     • COLONOSCOPY     • ENDOSCOPY     • KNEE SURGERY Left     Knee Cap   • TUBAL ABDOMINAL LIGATION         Family History   Problem Relation Age of Onset   • Ovarian cancer Mother    • Cancer Mother    • Lung cancer Father    • Heart disease Brother        Social History     Socioeconomic History   • Marital status: Single     Spouse name: Not on file   • Number of children: Not on file   • Years of education: Not on file   • Highest education level: Not on file   Tobacco Use   • Smoking status: Former Smoker     Packs/day: 0.00     Years: 35.00     Pack years: 0.00     Last attempt to quit: 2017     Years since quittin.9   • Smokeless tobacco: Never Used   Substance and Sexual Activity   • Alcohol use: No   • Drug use: No   • Sexual activity: Defer         Current Outpatient Medications:   •  acetaminophen (TYLENOL) 325 MG tablet, Take 2 tablets by mouth Every 4 (Four) Hours As Needed for  Headache or Fever (temperature greater than 101.5 F)., Disp: , Rfl:   •  ammonium lactate (LAC-HYDRIN) 12 % cream, Apply  topically to the appropriate area as directed 2 (Two) Times a Day. (Patient taking differently: Apply  topically to the appropriate area as directed 2 (Two) Times a Day As Needed.), Disp: 1 each, Rfl: 0  •  azithromycin (ZITHROMAX) 250 MG tablet, Take 1 tablet every other day., Disp: 30 tablet, Rfl: 3  •  benzonatate (TESSALON) 100 MG capsule, Take 100 mg by mouth 3 (Three) Times a Day As Needed for Cough., Disp: , Rfl:   •  budesonide (PULMICORT) 0.5 MG/2ML nebulizer solution, Take 2 mL by nebulization 2 (Two) Times a Day., Disp: 120 mL, Rfl: 5  •  Camphor-Eucalyptus-Menthol (MEDICATED CHEST RUB EX), Apply 1 application topically Daily As Needed., Disp: , Rfl:   •  Cyanocobalamin (VITAMIN B12 PO), Take 500 mcg by mouth Daily., Disp: , Rfl:   •  cyclobenzaprine (FLEXERIL) 10 MG tablet, Take 10 mg by mouth Daily., Disp: , Rfl:   •  diclofenac (VOLTAREN) 1 % gel gel, , Disp: , Rfl:   •  Diclofenac Sodium (VOLTAREN TD), Apply 2 g topically to the appropriate area as directed 2 (Two) Times a Day As Needed. Voltaren GEL , Disp: , Rfl:   •  fluconazole (DIFLUCAN) 200 MG tablet, Take 1 tablet by mouth Daily., Disp: 1 tablet, Rfl: 0  •  formoterol (PERFOROMIST) 20 MCG/2ML nebulizer solution, Take 2 mL by nebulization 2 (Two) Times a Day., Disp: 120 mL, Rfl: 4  •  gabapentin (NEURONTIN) 100 MG capsule, , Disp: , Rfl:   •  gabapentin (NEURONTIN) 300 MG capsule, Take 100 mg by mouth 3 (Three) Times a Day., Disp: , Rfl:   •  guaiFENesin (MUCINEX) 600 MG 12 hr tablet, Take 1 tablet by mouth Every 12 (Twelve) Hours., Disp: 10 tablet, Rfl: 0  •  HYDROcodone-acetaminophen (NORCO)  MG per tablet, , Disp: , Rfl:   •  ipratropium-albuterol (DUO-NEB) 0.5-2.5 mg/3 ml nebulizer, TAKE 3 ML BY NEBULIZATION 4 (FOUR) TIMES A DAY AS NEEDED FOR WHEEZING OR SHORTNESS OF AIR., Disp: 360 mL, Rfl: 5  •  ketotifen  (ZADITOR) 0.025 % ophthalmic solution, Administer 1-2 drops to both eyes 2 (Two) Times a Day., Disp: , Rfl:   •  loratadine (CLARITIN) 10 MG tablet, Take 10 mg by mouth Daily As Needed for Allergies., Disp: , Rfl:   •  LORazepam (ATIVAN) 0.5 MG tablet, Take 0.5 mg by mouth 2 (Two) Times a Day As Needed. 1-2 TABLETS DAILY PRN, Disp: , Rfl: 0  •  losartan (COZAAR) 25 MG tablet, Take 25 mg by mouth Every Night., Disp: , Rfl: 3  •  melatonin 5 MG tablet tablet, Take 2 tablets by mouth At Night As Needed for sleep, Disp: 60 tablet, Rfl: 0  •  metoprolol succinate XL (TOPROL-XL) 25 MG 24 hr tablet, Take 25 mg by mouth Every Night., Disp: , Rfl:   •  Misc. Devices (ROLLATOR) misc, 1 Units As Needed (ambulation assistance)., Disp: 1 each, Rfl: 0  •  Multiple Vitamins-Minerals (MULTIVITAMIN WITH MINERALS) tablet tablet, Take 1 tablet by mouth Daily., Disp: , Rfl:   •  Nutritional Supplements (ENSURE COMPLETE PO), Take 1 can by mouth 2 (Two) Times a Day., Disp: , Rfl:   •  omeprazole (priLOSEC) 40 MG capsule, , Disp: , Rfl:   •  ondansetron ODT (ZOFRAN-ODT) 4 MG disintegrating tablet, , Disp: , Rfl:   •  OXYGEN-HELIUM IN, Oxygen; Patient Sig: Oxygen 2 liter as needed; 0; 21-May-2014; Active, Disp: , Rfl:   •  predniSONE (DELTASONE) 20 MG tablet, Take 1 tablet every other day., Disp: 90 tablet, Rfl: 1  •  predniSONE (DELTASONE) 20 MG tablet, , Disp: , Rfl:   •  Respiratory Therapy Supplies (FLUTTER) device, 1 Device as needed (as directed)., Disp: 1 each, Rfl: 0  •  SPIRIVA RESPIMAT 2.5 MCG/ACT aerosol solution inhaler, , Disp: , Rfl:   •  tiotropium bromide monohydrate (SPIRIVA RESPIMAT) 2.5 MCG/ACT aerosol solution inhaler, Inhale 2 puffs Daily., Disp: 1 inhaler, Rfl: 5  •  traZODone (DESYREL) 50 MG tablet, , Disp: , Rfl:   •  urea (CARMOL) 40 % cream, Apply topically to the appropriate area as directed Daily. May substitute as needed for insurance coverage, Disp: 85 g, Rfl: 3  •  urea (CARMOL) 40 % cream, Apply  topically  "to the appropriate area as directed Every 12 (Twelve) Hours. Apply topically every 12 hours. (Patient taking differently: Apply  topically to the appropriate area as directed Every 12 (Twelve) Hours As Needed. Apply topically every 12 hours.), Disp: 85 g, Rfl: 1  •  venlafaxine (EFFEXOR) 75 MG tablet, Take 1 tablet by mouth 2 (two) times a day., Disp: , Rfl:     Allergies   Allergen Reactions   • Dust Mite Extract Other (See Comments)     Dust  SINUS   • Grass Other (See Comments)     SINUS   • Mold Extract [Trichophyton] Other (See Comments)     SINUS       Review of Systems   Constitutional: Negative for diaphoresis, fever and unexpected weight change.   HENT: Negative for dental problem and sore throat.    Eyes: Negative for visual disturbance.   Respiratory: Positive for shortness of breath (COPD).    Cardiovascular: Negative for chest pain.   Gastrointestinal: Negative for abdominal pain, constipation, diarrhea, nausea and vomiting.   Genitourinary: Negative for difficulty urinating and frequency.   Musculoskeletal: Positive for arthralgias (right shoulder ).   Neurological: Negative for headaches.   Hematological: Does not bruise/bleed easily.       I have reviewed the medical and surgical history, family history, social history, medications, and/or allergies, and the review of systems of this report.    Objective   Resp 18   Ht 152.4 cm (60\")   Wt 39.9 kg (88 lb)   LMP  (LMP Unknown)   BMI 17.19 kg/m²    Physical Exam   Constitutional: She is oriented to person, place, and time. She appears well-developed and well-nourished.   Eyes: Conjunctivae are normal.   Pulmonary/Chest: Effort normal. No respiratory distress.   Musculoskeletal:        Right shoulder: She exhibits tenderness, crepitus, pain and decreased strength. She exhibits no deformity.        Right elbow: She exhibits normal range of motion. No tenderness found. No radial head and no medial epicondyle tenderness noted.   Neurological: She is " alert and oriented to person, place, and time.   Psychiatric: She has a normal mood and affect. Her behavior is normal.   Nursing note and vitals reviewed.    Right Shoulder Exam     Tenderness   The patient is experiencing tenderness in the acromioclavicular joint.    Range of Motion   Active abduction: 120   Forward flexion: 130   Internal rotation 0 degrees: Sacrum     Muscle Strength   Abduction: 4/5   Supraspinatus: 4/5   Biceps: 4/5     Tests   Apprehension: negative  Cross arm: positive  Impingement: positive  Drop arm: positive  Sulcus: absent    Other   Erythema: absent  Sensation: normal  Pulse: present             Neurologic Exam     Mental Status   Oriented to person, place, and time.              Assessment/Plan   Independent Review of Radiographic Studies:    X-ray of the cervical spine reveals multilevel degenerative change.  X-ray of the right shoulder 3 views reveals glenohumeral degenerative change as well as AC joint arthropathy      Large Joint Arthrocentesis: R subacromial bursa  Date/Time: 7/1/2020 11:06 AM  Consent given by: patient  Site marked: site marked  Timeout: Immediately prior to procedure a time out was called to verify the correct patient, procedure, equipment, support staff and site/side marked as required   Supporting Documentation  Indications: pain   Procedure Details  Location: shoulder - R subacromial bursa  Preparation: Patient was prepped and draped in the usual sterile fashion  Needle size: 22 G  Approach: posterior  Medications administered: 2 mL lidocaine 1 %; 40 mg methylPREDNISolone acetate 40 MG/ML  Patient tolerance: patient tolerated the procedure well with no immediate complications    Medium Joint Arthrocentesis: R acromioclavicular  Date/Time: 7/1/2020 11:06 AM  Consent given by: patient  Site marked: site marked  Timeout: Immediately prior to procedure a time out was called to verify the correct patient, procedure, equipment, support staff and site/side marked as  required   Supporting Documentation  Indications: pain   Procedure Details  Location: shoulder - R acromioclavicular  Preparation: Patient was prepped and draped in the usual sterile fashion  Needle size: 25 G  Approach: superior  Medications administered: 0.5 mL lidocaine 1 %; 20 mg triamcinolone acetonide 40 MG/ML  Patient tolerance: patient tolerated the procedure well with no immediate complications             Joelle was seen today for pain.    Diagnoses and all orders for this visit:    Arthralgia of right shoulder region  -     XR Shoulder 2+ View Right  -     Large Joint Arthrocentesis: R subacromial bursa  -     Medium Joint Arthrocentesis: R acromioclavicular    DDD (degenerative disc disease), cervical  -     XR Spine Cervical 2 or 3 View    AC joint arthropathy    Primary osteoarthritis of right shoulder    Rotator cuff arthropathy of right shoulder       Discussion of orthopedic goals  Risk, benefits, and merits of treatment alternatives reviewed with the patient and questions answered  Call or notify for any adverse effect from injection therapy  Ice, heat, and/or modalities as beneficial    Recommendations/Plan:  Exercise, medications, injections, other patient advice, and return appointment as noted.  Patient is encouraged to call or return for any issues or concerns.    Follow-up in 3 months  Patient agreeable to call or return sooner for any concerns.               EMR Dragon-transcription disclaimer:  This encounter note is an electronic transcription of spoken language to printed text.  Electronic transcription of spoken language may permit erroneous or at times nonsensical words or phrases to be inadvertently transcribed.  Although I have reviewed the note for such errors, some may still exist

## 2020-07-13 ENCOUNTER — OFFICE VISIT (OUTPATIENT)
Dept: PULMONOLOGY | Facility: CLINIC | Age: 75
End: 2020-07-13

## 2020-07-13 ENCOUNTER — HOSPITAL ENCOUNTER (EMERGENCY)
Facility: HOSPITAL | Age: 75
Discharge: HOME | End: 2020-07-13
Attending: EMERGENCY MEDICINE | Admitting: EMERGENCY MEDICINE

## 2020-07-13 VITALS
WEIGHT: 92 LBS | RESPIRATION RATE: 20 BRPM | DIASTOLIC BLOOD PRESSURE: 60 MMHG | SYSTOLIC BLOOD PRESSURE: 108 MMHG | BODY MASS INDEX: 17.97 KG/M2 | OXYGEN SATURATION: 87 % | HEART RATE: 64 BPM | TEMPERATURE: 97.3 F

## 2020-07-13 VITALS
OXYGEN SATURATION: 95 % | TEMPERATURE: 98.1 F | BODY MASS INDEX: 18.1 KG/M2 | WEIGHT: 92.2 LBS | HEIGHT: 60 IN | DIASTOLIC BLOOD PRESSURE: 79 MMHG | HEART RATE: 108 BPM | RESPIRATION RATE: 21 BRPM | SYSTOLIC BLOOD PRESSURE: 128 MMHG

## 2020-07-13 DIAGNOSIS — B42.9 SPOROTRICHOSIS: ICD-10-CM

## 2020-07-13 DIAGNOSIS — S61.412A LACERATION OF LEFT HAND, FOREIGN BODY PRESENCE UNSPECIFIED, INITIAL ENCOUNTER: Primary | ICD-10-CM

## 2020-07-13 DIAGNOSIS — J44.9 CHRONIC OBSTRUCTIVE PULMONARY DISEASE, UNSPECIFIED COPD TYPE (HCC): ICD-10-CM

## 2020-07-13 DIAGNOSIS — Z14.8 ALPHA-1-ANTITRYPSIN DEFICIENCY CARRIER: ICD-10-CM

## 2020-07-13 DIAGNOSIS — R06.02 SHORTNESS OF BREATH: ICD-10-CM

## 2020-07-13 DIAGNOSIS — R06.02 SHORTNESS OF BREATH: Primary | ICD-10-CM

## 2020-07-13 DIAGNOSIS — J47.9 BRONCHIECTASIS WITHOUT COMPLICATION (HCC): ICD-10-CM

## 2020-07-13 DIAGNOSIS — R09.02 HYPOXIA: ICD-10-CM

## 2020-07-13 PROCEDURE — 94060 EVALUATION OF WHEEZING: CPT | Performed by: INTERNAL MEDICINE

## 2020-07-13 PROCEDURE — 99283 EMERGENCY DEPT VISIT LOW MDM: CPT

## 2020-07-13 PROCEDURE — 99214 OFFICE O/P EST MOD 30 MIN: CPT | Performed by: INTERNAL MEDICINE

## 2020-07-13 PROCEDURE — 94726 PLETHYSMOGRAPHY LUNG VOLUMES: CPT | Performed by: INTERNAL MEDICINE

## 2020-07-13 PROCEDURE — 94729 DIFFUSING CAPACITY: CPT | Performed by: INTERNAL MEDICINE

## 2020-07-13 RX ORDER — CEPHALEXIN 500 MG/1
500 CAPSULE ORAL 3 TIMES DAILY
Qty: 15 CAPSULE | Refills: 0 | Status: SHIPPED | OUTPATIENT
Start: 2020-07-13 | End: 2020-07-18

## 2020-07-13 RX ORDER — IPRATROPIUM BROMIDE AND ALBUTEROL SULFATE 2.5; .5 MG/3ML; MG/3ML
3 SOLUTION RESPIRATORY (INHALATION) 4 TIMES DAILY PRN
Qty: 360 ML | Refills: 5 | Status: SHIPPED | OUTPATIENT
Start: 2020-07-13 | End: 2020-11-16 | Stop reason: SDUPTHER

## 2020-07-13 RX ORDER — PREDNISONE 10 MG/1
TABLET ORAL
Qty: 90 TABLET | Refills: 1 | Status: SHIPPED | OUTPATIENT
Start: 2020-07-13 | End: 2020-11-16 | Stop reason: SDUPTHER

## 2020-07-13 RX ORDER — TIOTROPIUM BROMIDE INHALATION SPRAY 3.12 UG/1
SPRAY, METERED RESPIRATORY (INHALATION)
Qty: 4 G | Refills: 5 | Status: SHIPPED | OUTPATIENT
Start: 2020-07-13 | End: 2020-07-13

## 2020-07-13 RX ORDER — MIRTAZAPINE 30 MG/1
30 TABLET, FILM COATED ORAL NIGHTLY
COMMUNITY
Start: 2020-07-06

## 2020-07-13 RX ORDER — BUDESONIDE AND FORMOTEROL FUMARATE DIHYDRATE 160; 4.5 UG/1; UG/1
2 AEROSOL RESPIRATORY (INHALATION)
COMMUNITY
End: 2020-07-13 | Stop reason: SDUPTHER

## 2020-07-13 RX ORDER — BUDESONIDE AND FORMOTEROL FUMARATE DIHYDRATE 80; 4.5 UG/1; UG/1
2 AEROSOL RESPIRATORY (INHALATION)
Qty: 10.2 G | Refills: 11 | Status: SHIPPED | OUTPATIENT
Start: 2020-07-13 | End: 2020-11-16 | Stop reason: SDUPTHER

## 2020-07-13 RX ORDER — CEPHALEXIN 250 MG/1
500 CAPSULE ORAL ONCE
Status: COMPLETED | OUTPATIENT
Start: 2020-07-13 | End: 2020-07-13

## 2020-07-13 RX ADMIN — CEPHALEXIN 500 MG: 250 CAPSULE ORAL at 21:18

## 2020-07-13 NOTE — PROGRESS NOTES
"Chief Complaint   Patient presents with   • Follow-up   • Shortness of Breath       Subjective   Joelle Yee is a 74 y.o. female.     History of Present Illness   Patient was evaluated today for follow up of shortness of breath. Patient says that her symptoms have been worsening since the last clinic visit.     Patient does not report any emergency room visits or recent exacerbations.  The patient says that she occasionally feels chest fullness.    Patient is not using the inhalers and nebulizers, as prescribed. Exercise tolerance has also progressively worsened.     She is still using her Flutter valve and vest and continues to have a \"lot of phlegm\".       The following portions of the patient's history were reviewed and updated as appropriate: allergies, current medications, past family history, past medical history, past social history and past surgical history.    Review of Systems   HENT: Positive for trouble swallowing. Negative for rhinorrhea and sore throat.    Respiratory: Positive for cough, shortness of breath and wheezing.        Objective   Visit Vitals  /60   Pulse 64   Temp 97.3 °F (36.3 °C)   Resp 20   Wt 41.7 kg (92 lb)   LMP  (LMP Unknown)   SpO2 (!) 87% Comment: on RA at rest.   BMI 17.97 kg/m²   96% on 2 LPM at rest.     Physical Exam   Constitutional: She is oriented to person, place, and time. She appears well-developed and well-nourished.   Eyes: EOM are normal.   Neck: Normal range of motion. No JVD present. No thyromegaly present.   Cardiovascular: Normal rate.   Pulmonary/Chest: No respiratory distress. She has no wheezes. She has rales (Minimal basal).   Musculoskeletal: Normal range of motion.   Gait was normal.   Neurological: She is alert and oriented to person, place, and time.   Skin: Skin is warm and dry.   Psychiatric: She has a normal mood and affect. Her behavior is normal.   Vitals reviewed.        Assessment/Plan   Joelle was seen today for follow-up and shortness " of breath.    Diagnoses and all orders for this visit:    Shortness of breath    Bronchiectasis without complication (CMS/HCC)    Chronic obstructive pulmonary disease, unspecified COPD type (CMS/HCC)    Hypoxia    Sporotrichosis  -     Sporothrix Antibody; Future    Alpha-1-antitrypsin deficiency carrier    Other orders  -     budesonide-formoterol (Symbicort) 80-4.5 MCG/ACT inhaler; Inhale 2 puffs 2 (Two) Times a Day. Rinse mouth with water after use  -     ipratropium-albuterol (DUO-NEB) 0.5-2.5 mg/3 ml nebulizer; Take 3 mL by nebulization 4 (Four) Times a Day As Needed for Wheezing or Shortness of Air.  -     predniSONE (DELTASONE) 10 MG tablet; Take 1 tablet every other day.           Return in about 4 months (around 11/13/2020) for Recheck, For Kaelyn.    DISCUSSION (if any):  Last CT scan was reviewed in great detail with the patient. Images reviewed personally.   Results for orders placed during the hospital encounter of 06/07/18   CT Chest Pulmonary Embolism With Contrast    Narrative FINAL REPORT    TECHNIQUE:  Postcontrast axial images of the chest were performed in a CTA  protocol. This study was performed with techniques to keep  radiation doses as low as reasonably achievable, (ALARA).  Individualized dose reduction technique using automated exposure  control or adjustment of mA and/or kV according to the patient's  size were employed.    CLINICAL HISTORY:  Shortness of breath    FINDINGS:  There are dense LAD coronary artery calcifications.  The heart  is normal in size.  No adenopathy is identified.  No pleural or  pericardial effusion is identified.  The thoracic aorta is  normal in caliper with no focal aneurysm or dissection  identified.  There is no filling defect to suggest pulmonary  embolism.  There is severe emphysematous change.  Diffuse  bronchial thickening is seen with evidence of bronchiectasis and  mucus impaction in the right greater than left lower lobe  bronchi.  There is bibasilar  atelectasis.  There is no lobar  consolidation seen.   The images of the upper abdomen  demonstrate postoperative change of cholecystectomy.  There is a  6 cm left renal simple cyst seen.      Impression No evidence for PE on this exam.  Bronchiectasis with mucus  impaction of the lower lobe bronchi.    Authenticated by Ousmane Shahid MD on 06/09/2018 01:39:18 PM     CT from Jan 2020.  Results for orders placed during the hospital encounter of 01/14/20   CT Chest Without Contrast    Narrative PROCEDURE: CT CHEST WO CONTRAST-     HISTORY: soa     COMPARISON: 06/16/2019.     PROCEDURE:  Multiple axial CT images were obtained from the thoracic  inlet through the upper abdomen without the use of contrast.      FINDINGS:   Soft tissue windows reveal no axillary, hilar or mediastinal adenopathy.  Heart size is normal. The aorta is normal in caliber. There are no  pleural or pericardial effusions. Lung windows reveal centrilobular  emphysema. Atelectasis is present in the right lung base. The lungs are  otherwise clear. Within the visualized upper abdomen there our cystic  lesion seen in the superior pole of the left kidney and adjacent to the  splenic hilum which are unchanged. Bone windows reveal no acute osseous  abnormalities.       Impression No acute process.     193.13 mGy.cm        This study was performed with techniques to keep radiation doses as low  as reasonably achievable (ALARA). Individualized dose reduction  techniques using automated exposure control or adjustment of mA and/or  kV according to the patient size were employed.      This report was finalized on 1/14/2020 1:53 PM by Ignacia Lara M.D..       Laboratory data was reviewed with her.   Lab Results   Component Value Date    AFPTM 170 06/01/2016     Lab Results   Component Value Date    [id number] Comment 06/01/2016       Today's PFTs showed severe COPD.  FEV1 slightly better, compared to the last PFT.    We have reviewed her pulmonary  medications in great detail.    Any needed adjustments to her pulmonary medications, either for clinical or insurance coverage reasons, have been made and are reflected in the orders.    Compliance with medications stressed.     Side effects of prescribed medications discussed with the patient    Due to recurrent exacerbations she was started on alternating Prednisone and Azithromycin.    She has had a good response to the above.    We will decrease Prednisone to 10 mg PO QOD.     The patient was advised to continue oxygen 24/7, since she has noticed improvement in some symptoms since using it as prescribed.    She has made her family aware of the AAT carrier state.    Will send sporothrix antibody for the possibility of cutaneous sporotrichosis.     If positive, we will call in Itraconazole 200 mg orally once daily until 2-4 weeks after all lesions have resolved, usually a total of 3-6 months.     Dictated utilizing Dragon dictation.    This document was electronically signed by Rebeka Esparza MD on 07/13/20 at 15:46

## 2020-07-16 ENCOUNTER — LAB (OUTPATIENT)
Dept: LAB | Facility: HOSPITAL | Age: 75
End: 2020-07-16

## 2020-07-16 DIAGNOSIS — B42.9 SPOROTRICHOSIS: ICD-10-CM

## 2020-07-16 PROCEDURE — 36415 COLL VENOUS BLD VENIPUNCTURE: CPT

## 2020-07-30 ENCOUNTER — LAB (OUTPATIENT)
Dept: LAB | Facility: HOSPITAL | Age: 75
End: 2020-07-30

## 2020-07-30 PROCEDURE — 36415 COLL VENOUS BLD VENIPUNCTURE: CPT

## 2020-07-31 LAB — Lab: NORMAL

## 2020-08-05 DIAGNOSIS — M19.011 PRIMARY OSTEOARTHRITIS OF RIGHT SHOULDER: ICD-10-CM

## 2020-08-05 DIAGNOSIS — M25.511 ARTHRALGIA OF RIGHT SHOULDER REGION: Primary | ICD-10-CM

## 2020-08-05 DIAGNOSIS — M12.811 ROTATOR CUFF ARTHROPATHY OF RIGHT SHOULDER: ICD-10-CM

## 2020-08-05 NOTE — INTERVAL H&P NOTE
H&P reviewed. The patient was examined and there are no changes to the H&P.       Review of Systems - All other review of systems were negative except cough, shortness of breath & wheezing.       Rebeka Esparza MD  07/05/18  12:06 PM          
[0] : 2) Feeling down, depressed, or hopeless: Not at all (0)

## 2020-08-08 ENCOUNTER — LAB (OUTPATIENT)
Dept: LAB | Facility: HOSPITAL | Age: 75
End: 2020-08-08

## 2020-08-08 DIAGNOSIS — B42.9 SPOROTRICHOSIS: Primary | ICD-10-CM

## 2020-08-08 PROCEDURE — 36415 COLL VENOUS BLD VENIPUNCTURE: CPT

## 2020-08-08 PROCEDURE — 86671 FUNGUS NES ANTIBODY: CPT

## 2020-08-12 LAB — Lab: NORMAL

## 2020-09-01 DIAGNOSIS — M19.011 PRIMARY OSTEOARTHRITIS OF RIGHT SHOULDER: ICD-10-CM

## 2020-09-01 DIAGNOSIS — M50.30 DDD (DEGENERATIVE DISC DISEASE), CERVICAL: ICD-10-CM

## 2020-09-01 DIAGNOSIS — M25.511 ARTHRALGIA OF RIGHT SHOULDER REGION: Primary | ICD-10-CM

## 2020-09-01 DIAGNOSIS — M19.019 AC JOINT ARTHROPATHY: ICD-10-CM

## 2020-09-01 DIAGNOSIS — M12.811 ROTATOR CUFF ARTHROPATHY OF RIGHT SHOULDER: ICD-10-CM

## 2020-09-16 ENCOUNTER — APPOINTMENT (OUTPATIENT)
Dept: GENERAL RADIOLOGY | Facility: HOSPITAL | Age: 75
End: 2020-09-16

## 2020-09-16 ENCOUNTER — HOSPITAL ENCOUNTER (EMERGENCY)
Facility: HOSPITAL | Age: 75
Discharge: HOME | End: 2020-09-16
Attending: EMERGENCY MEDICINE | Admitting: EMERGENCY MEDICINE

## 2020-09-16 ENCOUNTER — APPOINTMENT (OUTPATIENT)
Dept: CT IMAGING | Facility: HOSPITAL | Age: 75
End: 2020-09-16

## 2020-09-16 VITALS
HEIGHT: 57 IN | HEART RATE: 115 BPM | DIASTOLIC BLOOD PRESSURE: 69 MMHG | RESPIRATION RATE: 20 BRPM | WEIGHT: 90 LBS | OXYGEN SATURATION: 95 % | BODY MASS INDEX: 19.41 KG/M2 | TEMPERATURE: 98.4 F | SYSTOLIC BLOOD PRESSURE: 128 MMHG

## 2020-09-16 DIAGNOSIS — S33.5XXA LUMBAR SPRAIN, INITIAL ENCOUNTER: ICD-10-CM

## 2020-09-16 DIAGNOSIS — S23.9XXA THORACIC SPRAIN: ICD-10-CM

## 2020-09-16 DIAGNOSIS — S13.9XXA NECK SPRAIN, INITIAL ENCOUNTER: Primary | ICD-10-CM

## 2020-09-16 DIAGNOSIS — T14.8XXA MULTIPLE SKIN TEARS: ICD-10-CM

## 2020-09-16 PROCEDURE — 72070 X-RAY EXAM THORAC SPINE 2VWS: CPT

## 2020-09-16 PROCEDURE — 72100 X-RAY EXAM L-S SPINE 2/3 VWS: CPT

## 2020-09-16 PROCEDURE — 72125 CT NECK SPINE W/O DYE: CPT

## 2020-09-16 PROCEDURE — 99284 EMERGENCY DEPT VISIT MOD MDM: CPT

## 2020-09-16 RX ORDER — CEPHALEXIN 500 MG/1
500 CAPSULE ORAL 4 TIMES DAILY
Qty: 28 CAPSULE | Refills: 0 | Status: SHIPPED | OUTPATIENT
Start: 2020-09-16 | End: 2021-01-12 | Stop reason: HOSPADM

## 2020-09-16 NOTE — ED PROVIDER NOTES
Subjective   History of Present Illness    Chief Complaint: Fall  History of Present Illness: 74-year-old female fell off her porch when the chair broke.  Fell into a becky bush.  Sustained skin tears to the right forearm and punctures skin tears to the left arm.  Onset: Just prior to arrival  Duration: Persistent  Exacerbating / Alleviating factors: None  Associated symptoms: Exacerbation of chronic neck and back pain.      Nurses Notes reviewed and agree, including vitals, allergies, social history and prior medical history.     REVIEW OF SYSTEMS: All systems reviewed and not pertinent unless noted.    Positive for: Neck and back pain status post fall.  Skin tears and abrasions    Negative for: LOC vomiting confusion focal weakness  Review of Systems    Past Medical History:   Diagnosis Date   • Abdominal pain    • Acute bronchitis    • Ankle pain    • Anxiety 1980   • Atopic dermatitis    • Bronchiectasis (CMS/[facility])    • Cataract, bilateral    • Chest pain     STATES HAS OCCASSIONALLY.  STATES LAST TIME WAS LAST WEEK.  STATES SEES DR. RICH.  STATES HE HAS DONE NUMEROUS TESTS ON HER AND HAS NOT BEEN ABLE TO FIND OUT CAUSE.     • Chronic obstructive lung disease (CMS/HCC) 2008   • Colon cancer screening    • COPD (chronic obstructive pulmonary disease) (CMS/HCC)    • Cystocele    • Depressed    • Depression 1980   • Fatigue     Chronic pafigue 2012   • Fibromyalgia 2008   • Full dentures    • GERD (gastroesophageal reflux disease)    • Gout    • Heartburn     Chronic historu of epigastric heartburn currently controlled on Prilesec 40 mg every morning along with Zantac 300 Mg daily at bedtime   • High cholesterol 2012   • Hip pain    • Hyperlipidemia    • Hypertension    • Impaired functional mobility, balance, gait, and endurance    • Insomnia    • Joint pain    • Kidney infection    • Loss of appetite    • Low back pain 1995   • Melena    • Nausea and vomiting    • On home oxygen therapy     2L/NC PRN (STATES SHE  HAS BEEN USING CONTINUOSLY LATELY)   • Osteoarthritis 2010   • Pain in limb    • Palpitations    • Pinched nerve     Pinched nerves 2011   • Pneumonia    • Problems with swallowing     HAS TO EAT SLOW AND CHEW FOOD WELL   • Recurrent urinary tract infection    • Sciatica [id number]   • Shortness of breath    • Sinus problem    • Sleep apnea 1998    NO CPAP   • Upset stomach    • Valvular heart disease    • Vitamin B12 deficiency    • Wears glasses    • Weight loss, abnormal     Weight loss is stabel. On pund weight gain since 01/2016       Allergies   Allergen Reactions   • Dust Mite Extract Other (See Comments)     Dust  SINUS   • Grass Other (See Comments)     SINUS   • Mold Extract [Trichophyton] Other (See Comments)     SINUS       Past Surgical History:   Procedure Laterality Date   • ABDOMINAL HERNIA REPAIR  2016   • APPENDECTOMY  1965   • BACK SURGERY  2005   • BRONCHOSCOPY N/A 7/5/2018    Procedure: BRONCHOSCOPY w/ WASHINGS/BRUSHINGS with MAC;  Surgeon: Rebeka Esparza MD;  Location: [facility];  Service: Pulmonary   • CATARACT EXTRACTION Bilateral     Put in implants    • CHOLECYSTECTOMY  1985   • COLONOSCOPY     • ENDOSCOPY     • KNEE SURGERY Left 1979    Knee Cap   • TUBAL ABDOMINAL LIGATION  1971       Family History   Problem Relation Age of Onset   • Ovarian cancer Mother    • Cancer Mother    • Lung cancer Father    • Heart disease Brother        Social History     Socioeconomic History   • Marital status: Single     Spouse name: Not on file   • Number of children: Not on file   • Years of education: Not on file   • Highest education level: Not on file   Tobacco Use   • Smoking status: Former Smoker     Packs/day: 0.00     Years: 35.00     Pack years: 0.00     Quit date: 7/5/2017     Years since quitting: 3.2   • Smokeless tobacco: Never Used   Substance and Sexual Activity   • Alcohol use: No   • Drug use: No   • Sexual activity: Defer           Objective   Physical Exam  GENERAL APPEARANCE: Well  developed, well nourished, nontoxic appearing 74-year-old white female,  in acute distress.  VITAL SIGNS: per nursing, reviewed and noted  SKIN: Skin tears and punctures to the left forearm on the dorsal aspect of the proximal forearm.  Largest is 2 cm.  There is a 4 cm skin tear to the right forearm.  Abrasion to the right earlobe.  Dorsal right hand contusion.  EYES:  EOMI.  ENT: Normal voice.  Patient maintained wearing mask throughout patient encounter due to coronavirus pandemic  LUNGS: No increased work of breathing. No retractions.   CARDIOVASCULAR: Good Peripheral pulses. Good capillary refill. Pink and warm extremities.   MUSCULOSKELETAL: No compartment syndrome. Intact sensation full range of motion of extremities no tenderness palpation of the right hand wrist or other extremities.  NEUROLOGIC: Alert, oriented x 3. No gross deficits. GCS 15.   NECK: Supple, symmetric. No tenderness, Full ROM  Psychiatric: normal affect.   Back: full rom, no paraspinal spasm.     Procedures        Laceration Repair: Skin tear 2 cm left forearm  Consent obtained, discussed with patient all risks and benefits.   Patient underwent sterile prep technique with  Irrigation 4 x 4  Laceration was closed with sterile tissue glue  Patient was examined post procedure and was neurovascularly intact  Tolerated well      Laceration Repair: 4 cm skin tear right forearm  Consent obtained, discussed with patient all risks and benefits.   Patient underwent sterile prep technique with single irrigation 4 x 4  Laceration was closed with tissue glue sterile  Patient was examined post procedure and was neurovascularly intact  Tolerated well        ED Course      Lumbar spine 3 view and thoracic spine 2 view interpreted by me reveal degenerative changes without fracture.  No subluxation.       CT scan per radiologist reveals no fractures or dislocation of the C-spine.                                [facility]  Patient be discharged with Keflex due to  thornbush punctures. No identified retained FB.   Final diagnoses:   Neck sprain, initial encounter   Thoracic sprain   Lumbar sprain, initial encounter   Multiple skin tears            Jaydon Bishop, DO  09/16/20 [id number]

## 2020-09-25 DIAGNOSIS — M19.011 PRIMARY OSTEOARTHRITIS OF RIGHT SHOULDER: ICD-10-CM

## 2020-09-25 DIAGNOSIS — M12.811 ROTATOR CUFF ARTHROPATHY OF RIGHT SHOULDER: Primary | ICD-10-CM

## 2020-09-25 DIAGNOSIS — M50.30 DDD (DEGENERATIVE DISC DISEASE), CERVICAL: ICD-10-CM

## 2020-09-25 DIAGNOSIS — M19.019 AC JOINT ARTHROPATHY: ICD-10-CM

## 2020-09-25 DIAGNOSIS — M25.511 ARTHRALGIA OF RIGHT SHOULDER REGION: ICD-10-CM

## 2020-10-29 ENCOUNTER — TRANSCRIBE ORDERS (OUTPATIENT)
Dept: ADMINISTRATIVE | Facility: HOSPITAL | Age: 75
End: 2020-10-29

## 2020-10-29 DIAGNOSIS — M25.511 RIGHT SHOULDER PAIN, UNSPECIFIED CHRONICITY: Primary | ICD-10-CM

## 2020-11-16 ENCOUNTER — OFFICE VISIT (OUTPATIENT)
Dept: PULMONOLOGY | Facility: CLINIC | Age: 75
End: 2020-11-16

## 2020-11-16 VITALS
BODY MASS INDEX: 20.06 KG/M2 | HEART RATE: 83 BPM | SYSTOLIC BLOOD PRESSURE: 112 MMHG | RESPIRATION RATE: 18 BRPM | WEIGHT: 93 LBS | DIASTOLIC BLOOD PRESSURE: 68 MMHG | HEIGHT: 57 IN | TEMPERATURE: 97.1 F

## 2020-11-16 DIAGNOSIS — J44.9 CHRONIC OBSTRUCTIVE PULMONARY DISEASE, UNSPECIFIED COPD TYPE (HCC): ICD-10-CM

## 2020-11-16 DIAGNOSIS — Z23 NEED FOR VACCINATION: ICD-10-CM

## 2020-11-16 DIAGNOSIS — R09.02 HYPOXIA: ICD-10-CM

## 2020-11-16 DIAGNOSIS — J47.9 BRONCHIECTASIS WITHOUT COMPLICATION (HCC): ICD-10-CM

## 2020-11-16 DIAGNOSIS — R06.02 SHORTNESS OF BREATH: Primary | ICD-10-CM

## 2020-11-16 DIAGNOSIS — Z14.8 ALPHA-1-ANTITRYPSIN DEFICIENCY CARRIER: ICD-10-CM

## 2020-11-16 PROCEDURE — G0008 ADMIN INFLUENZA VIRUS VAC: HCPCS | Performed by: NURSE PRACTITIONER

## 2020-11-16 PROCEDURE — 90694 VACC AIIV4 NO PRSRV 0.5ML IM: CPT | Performed by: NURSE PRACTITIONER

## 2020-11-16 PROCEDURE — 99214 OFFICE O/P EST MOD 30 MIN: CPT | Performed by: NURSE PRACTITIONER

## 2020-11-16 RX ORDER — ALBUTEROL SULFATE 90 UG/1
2 AEROSOL, METERED RESPIRATORY (INHALATION) EVERY 4 HOURS PRN
Qty: 18 G | Refills: 3 | Status: SHIPPED | OUTPATIENT
Start: 2020-11-16 | End: 2021-04-20 | Stop reason: SDUPTHER

## 2020-11-16 RX ORDER — FLUCONAZOLE 150 MG/1
TABLET ORAL
Status: ON HOLD | COMMUNITY
Start: 2020-11-09 | End: 2021-01-04

## 2020-11-16 RX ORDER — BUDESONIDE AND FORMOTEROL FUMARATE DIHYDRATE 160; 4.5 UG/1; UG/1
2 AEROSOL RESPIRATORY (INHALATION)
Qty: 10.2 G | Refills: 5 | Status: SHIPPED | OUTPATIENT
Start: 2020-11-16 | End: 2021-02-12

## 2020-11-16 RX ORDER — AZITHROMYCIN 250 MG/1
TABLET, FILM COATED ORAL
Qty: 30 TABLET | Refills: 3 | Status: SHIPPED | OUTPATIENT
Start: 2020-11-16 | End: 2021-02-02 | Stop reason: HOSPADM

## 2020-11-16 RX ORDER — IPRATROPIUM BROMIDE AND ALBUTEROL SULFATE 2.5; .5 MG/3ML; MG/3ML
3 SOLUTION RESPIRATORY (INHALATION) 4 TIMES DAILY PRN
Qty: 360 ML | Refills: 5 | Status: SHIPPED | OUTPATIENT
Start: 2020-11-16 | End: 2021-05-26 | Stop reason: SDUPTHER

## 2020-11-16 RX ORDER — PREDNISONE 10 MG/1
TABLET ORAL
Qty: 90 TABLET | Refills: 1 | OUTPATIENT
Start: 2020-11-16 | End: 2020-12-27

## 2020-11-16 RX ORDER — GABAPENTIN 100 MG/1
100 CAPSULE ORAL 3 TIMES DAILY
Status: ON HOLD | COMMUNITY
Start: 2020-11-14 | End: 2021-02-02 | Stop reason: SDUPTHER

## 2020-11-16 RX ORDER — TIOTROPIUM BROMIDE INHALATION SPRAY 3.12 UG/1
2 SPRAY, METERED RESPIRATORY (INHALATION) DAILY
Qty: 1 INHALER | Refills: 5 | Status: SHIPPED | OUTPATIENT
Start: 2020-11-16 | End: 2021-04-20 | Stop reason: SDUPTHER

## 2020-11-16 NOTE — PROGRESS NOTES
"Chief Complaint   Patient presents with   • Follow-up   • Shortness of Breath         Subjective   Joelle Yee is a 74 y.o. female.     History of Present Illness   Patient comes today for follow up of shortness of breath and COPD.     Symptoms have been stable since the last clinic visit. Patient reports no recent exacerbations.     Patient is using medications, as prescribed.  She is using Symbicort twice a day.  She takes 10 mg of prednisone every other day and azithromycin every other day.    She uses the flutter valve multiple times a day and reports that she is coughing out a lot of phlegm which is usually greenish in color.      She does not use the vest on a daily basis but uses it several days a week.    Exercise tolerance has also remained stable.  She uses oxygen with activity and at night.    Quit smoking 3 years ago.    The following portions of the patient's history were reviewed and updated as appropriate: allergies, current medications, past family history, past medical history, past social history and past surgical history.    Review of Systems   HENT: Positive for sinus pressure. Negative for sneezing and sore throat.    Respiratory: Positive for cough, shortness of breath and wheezing. Negative for chest tightness.        Objective   Visit Vitals  /68   Pulse 83   Temp 97.1 °F (36.2 °C)   Resp 18   Ht 144.8 cm (57.01\")   Wt 42.2 kg (93 lb)   LMP  (LMP Unknown)   BMI 20.12 kg/m²   87% oxygen saturation on room air.  92% on 2 LPM.      Physical Exam  Vitals signs reviewed.   HENT:      Head: Atraumatic.   Neck:      Musculoskeletal: Neck supple.   Cardiovascular:      Rate and Rhythm: Normal rate and regular rhythm.   Pulmonary:      Effort: Pulmonary effort is normal. No respiratory distress.   Neurological:      Mental Status: She is alert and oriented to person, place, and time.             Assessment/Plan   Diagnoses and all orders for this visit:    1. Shortness of breath " (Primary)    2. Bronchiectasis without complication (CMS/[facility])    3. Chronic obstructive pulmonary disease, unspecified COPD type (CMS/[facility])    4. Hypoxia    5. Alpha-1-antitrypsin deficiency carrier    6. Need for vaccination    Other orders  -     Fluad Quad 65+ yrs ([id number])  -     tiotropium bromide monohydrate (Spiriva Respimat) 2.5 MCG/ACT aerosol solution inhaler; Inhale 2 puffs Daily.  Dispense: 1 inhaler; Refill: 5  -     predniSONE (DELTASONE) 10 MG tablet; Take 1 tablet every other day.  Dispense: 90 tablet; Refill: 1  -     ipratropium-albuterol (DUO-NEB) 0.5-2.5 mg/3 ml nebulizer; Take 3 mL by nebulization 4 (Four) Times a Day As Needed for Wheezing or Shortness of Air.  Dispense: 360 mL; Refill: 5  -     azithromycin (ZITHROMAX) 250 MG tablet; Take 1 tablet every other day.  Dispense: 30 tablet; Refill: 3  -     budesonide-formoterol (Symbicort) 160-4.5 MCG/ACT inhaler; Inhale 2 puffs 2 (Two) Times a Day. Rinse mouth with water after use  Dispense: 10.2 g; Refill: 5  -     albuterol sulfate HFA (Ventolin HFA) 108 (90 Base) MCG/ACT inhaler; Inhale 2 puffs Every 4 (Four) Hours As Needed for Wheezing or Shortness of Air.  Dispense: 18 g; Refill: 3           Return in about 4 months (around 3/16/2021) for Recheck, For Dr. Esparza.    DISCUSSION (if any):  I have changed Symbicort dose to 160/4.5 due to insurance request. I have asked her to resume Spiriva daily. She will need to continue rescue medication, prednisone, and azithromycin as directed.     Compliance with medications stressed.     Side effects of prescribed medications discussed with the patient.    I have asked her to use the vest at least once a day every day.    She should continue using the flutter valve 2-3 times a day.  She is benefiting from both of these devices as she is able to expel a lot of phlegm.    She has requested a flu shot today so one will be given.    She is up-to-date on pneumonia vaccinations.    She will need to continue  using oxygen 24/7.    Dictated utilizing Dragon dictation.    This document was electronically signed by DWAYNE Maria November 16, 2020  11:02 EST

## 2020-11-17 ENCOUNTER — HOSPITAL ENCOUNTER (OUTPATIENT)
Dept: MRI IMAGING | Facility: HOSPITAL | Age: 75
Discharge: HOME | End: 2020-11-17
Admitting: ANESTHESIOLOGY

## 2020-11-17 DIAGNOSIS — M25.511 RIGHT SHOULDER PAIN, UNSPECIFIED CHRONICITY: ICD-10-CM

## 2020-11-17 PROCEDURE — 73221 MRI JOINT UPR EXTREM W/O DYE: CPT

## 2020-11-23 RX ORDER — IPRATROPIUM BROMIDE AND ALBUTEROL SULFATE 2.5; .5 MG/3ML; MG/3ML
3 SOLUTION RESPIRATORY (INHALATION) 4 TIMES DAILY PRN
Qty: 360 ML | Refills: 5 | OUTPATIENT
Start: 2020-11-23

## 2020-12-11 RX ORDER — TIOTROPIUM BROMIDE INHALATION SPRAY 3.12 UG/1
SPRAY, METERED RESPIRATORY (INHALATION)
Qty: 4 G | Refills: 5 | OUTPATIENT
Start: 2020-12-11

## 2020-12-27 ENCOUNTER — APPOINTMENT (OUTPATIENT)
Dept: CT IMAGING | Facility: HOSPITAL | Age: 75
End: 2020-12-27

## 2020-12-27 ENCOUNTER — APPOINTMENT (OUTPATIENT)
Dept: GENERAL RADIOLOGY | Facility: HOSPITAL | Age: 75
End: 2020-12-27

## 2020-12-27 ENCOUNTER — HOSPITAL ENCOUNTER (EMERGENCY)
Facility: HOSPITAL | Age: 75
Discharge: HOME | End: 2020-12-27
Attending: EMERGENCY MEDICINE | Admitting: EMERGENCY MEDICINE

## 2020-12-27 VITALS
WEIGHT: 90 LBS | DIASTOLIC BLOOD PRESSURE: 84 MMHG | TEMPERATURE: 97.9 F | HEIGHT: 57 IN | SYSTOLIC BLOOD PRESSURE: 134 MMHG | RESPIRATION RATE: 20 BRPM | OXYGEN SATURATION: 96 % | HEART RATE: 110 BPM | BODY MASS INDEX: 19.41 KG/M2

## 2020-12-27 DIAGNOSIS — J44.1 COPD EXACERBATION (HCC): Primary | ICD-10-CM

## 2020-12-27 LAB
A-A DO2: 10.3 MMHG
ALBUMIN SERPL-MCNC: 3.7 G/DL (ref 3.5–5.2)
ALBUMIN/GLOB SERPL: 1.1 G/DL
ALP SERPL-CCNC: 128 U/L (ref 39–117)
ALT SERPL W P-5'-P-CCNC: 14 U/L (ref 1–33)
ANION GAP SERPL CALCULATED.3IONS-SCNC: 10.3 MMOL/L (ref 5–15)
ARTERIAL PATENCY WRIST A: POSITIVE
AST SERPL-CCNC: 23 U/L (ref 1–32)
ATMOSPHERIC PRESS: 733 MMHG
BASE EXCESS BLDA CALC-SCNC: 5.6 MMOL/L (ref 0–2)
BASOPHILS # BLD AUTO: 0.13 10*3/MM3 (ref 0–0.2)
BASOPHILS NFR BLD AUTO: 0.9 % (ref 0–1.5)
BDY SITE: ABNORMAL
BILIRUB SERPL-MCNC: <0.2 MG/DL (ref 0–1.2)
BUN SERPL-MCNC: 13 MG/DL (ref 8–23)
BUN/CREAT SERPL: 18.3 (ref 7–25)
CALCIUM SPEC-SCNC: 8.9 MG/DL (ref 8.6–10.5)
CHLORIDE SERPL-SCNC: 99 MMOL/L (ref 98–107)
CO2 SERPL-SCNC: 27.7 MMOL/L (ref 22–29)
COHGB MFR BLD: 3 % (ref 0–2)
CREAT SERPL-MCNC: 0.71 MG/DL (ref 0.57–1)
DEPRECATED RDW RBC AUTO: 57.3 FL (ref 37–54)
EOSINOPHIL # BLD AUTO: 0.26 10*3/MM3 (ref 0–0.4)
EOSINOPHIL NFR BLD AUTO: 1.7 % (ref 0.3–6.2)
ERYTHROCYTE [DISTWIDTH] IN BLOOD BY AUTOMATED COUNT: 15.4 % (ref 12.3–15.4)
FLUAV AG NPH QL: NEGATIVE
FLUBV AG NPH QL IA: NEGATIVE
GAS FLOW AIRWAY: 3 LPM
GFR SERPL CREATININE-BSD FRML MDRD: 80 ML/MIN/1.73
GLOBULIN UR ELPH-MCNC: 3.3 GM/DL
GLUCOSE SERPL-MCNC: 97 MG/DL (ref 65–99)
HCO3 BLDA-SCNC: 31.5 MMOL/L (ref 22–28)
HCT VFR BLD AUTO: 40.8 % (ref 34–46.6)
HCT VFR BLD CALC: 38.7 %
HGB BLD-MCNC: 13.1 G/DL (ref 12–15.9)
HOLD SPECIMEN: NORMAL
HOLD SPECIMEN: NORMAL
IMM GRANULOCYTES # BLD AUTO: 0.51 10*3/MM3 (ref 0–0.05)
IMM GRANULOCYTES NFR BLD AUTO: 3.4 % (ref 0–0.5)
LYMPHOCYTES # BLD AUTO: 3.1 10*3/MM3 (ref 0.7–3.1)
LYMPHOCYTES NFR BLD AUTO: 20.4 % (ref 19.6–45.3)
MCH RBC QN AUTO: 32.1 PG (ref 26.6–33)
MCHC RBC AUTO-ENTMCNC: 32.1 G/DL (ref 31.5–35.7)
MCV RBC AUTO: 100 FL (ref 79–97)
METHGB BLD QL: 1 % (ref 0–1.5)
MODALITY: ABNORMAL
MONOCYTES # BLD AUTO: 1.11 10*3/MM3 (ref 0.1–0.9)
MONOCYTES NFR BLD AUTO: 7.3 % (ref 5–12)
NEUTROPHILS NFR BLD AUTO: 10.11 10*3/MM3 (ref 1.7–7)
NEUTROPHILS NFR BLD AUTO: 66.3 % (ref 42.7–76)
NOTE: ABNORMAL
NRBC BLD AUTO-RTO: 0 /100 WBC (ref 0–0.2)
NT-PROBNP SERPL-MCNC: 196.2 PG/ML (ref 0–1800)
OXYHGB MFR BLDV: 90.8 % (ref 94–99)
PCO2 BLDA: 50.6 MM HG (ref 35–45)
PCO2 TEMP ADJ BLD: ABNORMAL MM[HG]
PH BLDA: 7.4 PH UNITS (ref 7.3–7.5)
PH, TEMP CORRECTED: ABNORMAL
PLATELET # BLD AUTO: 298 10*3/MM3 (ref 140–450)
PMV BLD AUTO: 9.2 FL (ref 6–12)
PO2 BLDA: 76.7 MM HG (ref 75–100)
PO2 TEMP ADJ BLD: ABNORMAL MM[HG]
POTASSIUM SERPL-SCNC: 4.2 MMOL/L (ref 3.5–5.2)
PROCALCITONIN SERPL-MCNC: 0.04 NG/ML (ref 0–0.25)
PROT SERPL-MCNC: 7 G/DL (ref 6–8.5)
RBC # BLD AUTO: 4.08 10*6/MM3 (ref 3.77–5.28)
SAO2 % BLDCOA: 94.6 % (ref 94–100)
SARS-COV-2 RNA PNL SPEC NAA+PROBE: NOT DETECTED
SODIUM SERPL-SCNC: 137 MMOL/L (ref 136–145)
TROPONIN T SERPL-MCNC: <0.01 NG/ML (ref 0–0.03)
VENTILATOR MODE: ABNORMAL
WBC # BLD AUTO: 15.22 10*3/MM3 (ref 3.4–10.8)
WHOLE BLOOD HOLD SPECIMEN: NORMAL
WHOLE BLOOD HOLD SPECIMEN: NORMAL

## 2020-12-27 PROCEDURE — 93005 ELECTROCARDIOGRAM TRACING: CPT | Performed by: EMERGENCY MEDICINE

## 2020-12-27 PROCEDURE — 99284 EMERGENCY DEPT VISIT MOD MDM: CPT

## 2020-12-27 PROCEDURE — 87635 SARS-COV-2 COVID-19 AMP PRB: CPT | Performed by: EMERGENCY MEDICINE

## 2020-12-27 PROCEDURE — 96374 THER/PROPH/DIAG INJ IV PUSH: CPT

## 2020-12-27 PROCEDURE — 25010000002 METHYLPREDNISOLONE PER 125 MG: Performed by: EMERGENCY MEDICINE

## 2020-12-27 PROCEDURE — 83050 HGB METHEMOGLOBIN QUAN: CPT

## 2020-12-27 PROCEDURE — 84484 ASSAY OF TROPONIN QUANT: CPT | Performed by: EMERGENCY MEDICINE

## 2020-12-27 PROCEDURE — 71275 CT ANGIOGRAPHY CHEST: CPT

## 2020-12-27 PROCEDURE — 93005 ELECTROCARDIOGRAM TRACING: CPT

## 2020-12-27 PROCEDURE — 36600 WITHDRAWAL OF ARTERIAL BLOOD: CPT

## 2020-12-27 PROCEDURE — 82375 ASSAY CARBOXYHB QUANT: CPT

## 2020-12-27 PROCEDURE — 83880 ASSAY OF NATRIURETIC PEPTIDE: CPT | Performed by: EMERGENCY MEDICINE

## 2020-12-27 PROCEDURE — 84145 PROCALCITONIN (PCT): CPT | Performed by: EMERGENCY MEDICINE

## 2020-12-27 PROCEDURE — 82805 BLOOD GASES W/O2 SATURATION: CPT

## 2020-12-27 PROCEDURE — 87804 INFLUENZA ASSAY W/OPTIC: CPT | Performed by: EMERGENCY MEDICINE

## 2020-12-27 PROCEDURE — 80053 COMPREHEN METABOLIC PANEL: CPT | Performed by: EMERGENCY MEDICINE

## 2020-12-27 PROCEDURE — 85025 COMPLETE CBC W/AUTO DIFF WBC: CPT

## 2020-12-27 PROCEDURE — 71045 X-RAY EXAM CHEST 1 VIEW: CPT

## 2020-12-27 PROCEDURE — 94640 AIRWAY INHALATION TREATMENT: CPT

## 2020-12-27 PROCEDURE — 25010000002 IOPAMIDOL 61 % SOLUTION

## 2020-12-27 RX ORDER — PREDNISONE 20 MG/1
20 TABLET ORAL 2 TIMES DAILY
Qty: 10 TABLET | Refills: 0 | Status: ON HOLD | OUTPATIENT
Start: 2020-12-27 | End: 2021-01-02

## 2020-12-27 RX ORDER — METHYLPREDNISOLONE SODIUM SUCCINATE 125 MG/2ML
125 INJECTION, POWDER, LYOPHILIZED, FOR SOLUTION INTRAMUSCULAR; INTRAVENOUS ONCE
Status: COMPLETED | OUTPATIENT
Start: 2020-12-27 | End: 2020-12-27

## 2020-12-27 RX ORDER — SODIUM CHLORIDE 0.9 % (FLUSH) 0.9 %
10 SYRINGE (ML) INJECTION AS NEEDED
Status: DISCONTINUED | OUTPATIENT
Start: 2020-12-27 | End: 2020-12-27 | Stop reason: HOSPADM

## 2020-12-27 RX ORDER — ALBUTEROL SULFATE 90 UG/1
2 AEROSOL, METERED RESPIRATORY (INHALATION) ONCE
Status: COMPLETED | OUTPATIENT
Start: 2020-12-27 | End: 2020-12-27

## 2020-12-27 RX ORDER — DOXYCYCLINE 100 MG/1
100 CAPSULE ORAL 2 TIMES DAILY
Qty: 20 CAPSULE | Refills: 0 | Status: SHIPPED | OUTPATIENT
Start: 2020-12-27 | End: 2021-02-02 | Stop reason: HOSPADM

## 2020-12-27 RX ADMIN — METHYLPREDNISOLONE SODIUM SUCCINATE 125 MG: 125 INJECTION, POWDER, FOR SOLUTION INTRAMUSCULAR; INTRAVENOUS at 18:36

## 2020-12-27 RX ADMIN — ALBUTEROL SULFATE 2 PUFF: 90 AEROSOL, METERED RESPIRATORY (INHALATION) at 18:36

## 2020-12-27 RX ADMIN — IOPAMIDOL 80 ML: 612 INJECTION, SOLUTION INTRAVENOUS at 19:02

## 2020-12-29 ENCOUNTER — HOSPITAL ENCOUNTER (EMERGENCY)
Facility: HOSPITAL | Age: 75
Discharge: HOME | End: 2020-12-29
Attending: EMERGENCY MEDICINE | Admitting: EMERGENCY MEDICINE

## 2020-12-29 ENCOUNTER — APPOINTMENT (OUTPATIENT)
Dept: GENERAL RADIOLOGY | Facility: HOSPITAL | Age: 75
End: 2020-12-29

## 2020-12-29 VITALS
HEART RATE: 96 BPM | DIASTOLIC BLOOD PRESSURE: 68 MMHG | SYSTOLIC BLOOD PRESSURE: 119 MMHG | TEMPERATURE: 97.6 F | BODY MASS INDEX: 19.41 KG/M2 | OXYGEN SATURATION: 95 % | WEIGHT: 90 LBS | HEIGHT: 57 IN | RESPIRATION RATE: 24 BRPM

## 2020-12-29 DIAGNOSIS — J18.9 COMMUNITY ACQUIRED PNEUMONIA OF LEFT LOWER LOBE OF LUNG: ICD-10-CM

## 2020-12-29 DIAGNOSIS — J44.1 COPD EXACERBATION (HCC): Primary | ICD-10-CM

## 2020-12-29 LAB
ALBUMIN SERPL-MCNC: 3.5 G/DL (ref 3.5–5.2)
ALBUMIN/GLOB SERPL: 1 G/DL
ALP SERPL-CCNC: 115 U/L (ref 39–117)
ALT SERPL W P-5'-P-CCNC: 12 U/L (ref 1–33)
ANION GAP SERPL CALCULATED.3IONS-SCNC: 10.4 MMOL/L (ref 5–15)
AST SERPL-CCNC: 25 U/L (ref 1–32)
ATMOSPHERIC PRESS: 747 MMHG
BASE EXCESS BLDV CALC-SCNC: 4.8 MMOL/L (ref 0–2)
BASOPHILS # BLD AUTO: 0.05 10*3/MM3 (ref 0–0.2)
BASOPHILS NFR BLD AUTO: 0.3 % (ref 0–1.5)
BDY SITE: ABNORMAL
BILIRUB SERPL-MCNC: 0.2 MG/DL (ref 0–1.2)
BUN SERPL-MCNC: 20 MG/DL (ref 8–23)
BUN/CREAT SERPL: 29.4 (ref 7–25)
CALCIUM SPEC-SCNC: 9.2 MG/DL (ref 8.6–10.5)
CHLORIDE SERPL-SCNC: 104 MMOL/L (ref 98–107)
CO2 SERPL-SCNC: 27.6 MMOL/L (ref 22–29)
CREAT SERPL-MCNC: 0.68 MG/DL (ref 0.57–1)
DEPRECATED RDW RBC AUTO: 57.8 FL (ref 37–54)
EOSINOPHIL # BLD AUTO: 0.01 10*3/MM3 (ref 0–0.4)
EOSINOPHIL NFR BLD AUTO: 0.1 % (ref 0.3–6.2)
ERYTHROCYTE [DISTWIDTH] IN BLOOD BY AUTOMATED COUNT: 15.5 % (ref 12.3–15.4)
GAS FLOW AIRWAY: 3 LPM
GFR SERPL CREATININE-BSD FRML MDRD: 84 ML/MIN/1.73
GLOBULIN UR ELPH-MCNC: 3.5 GM/DL
GLUCOSE SERPL-MCNC: 139 MG/DL (ref 65–99)
HCO3 BLDV-SCNC: 32.8 MMOL/L (ref 22–28)
HCT VFR BLD AUTO: 41.5 % (ref 34–46.6)
HGB BLD-MCNC: 13.2 G/DL (ref 12–15.9)
HOLD SPECIMEN: NORMAL
HOLD SPECIMEN: NORMAL
IMM GRANULOCYTES # BLD AUTO: 0.3 10*3/MM3 (ref 0–0.05)
IMM GRANULOCYTES NFR BLD AUTO: 1.5 % (ref 0–0.5)
INHALED O2 CONCENTRATION: 32 %
LYMPHOCYTES # BLD AUTO: 2.18 10*3/MM3 (ref 0.7–3.1)
LYMPHOCYTES NFR BLD AUTO: 11 % (ref 19.6–45.3)
MCH RBC QN AUTO: 32.1 PG (ref 26.6–33)
MCHC RBC AUTO-ENTMCNC: 31.8 G/DL (ref 31.5–35.7)
MCV RBC AUTO: 101 FL (ref 79–97)
MODALITY: ABNORMAL
MONOCYTES # BLD AUTO: 0.87 10*3/MM3 (ref 0.1–0.9)
MONOCYTES NFR BLD AUTO: 4.4 % (ref 5–12)
NEUTROPHILS NFR BLD AUTO: 16.49 10*3/MM3 (ref 1.7–7)
NEUTROPHILS NFR BLD AUTO: 82.7 % (ref 42.7–76)
NRBC BLD AUTO-RTO: 0 /100 WBC (ref 0–0.2)
NT-PROBNP SERPL-MCNC: 222.8 PG/ML (ref 0–1800)
PCO2 BLDV: 63.1 MM HG (ref 40–50)
PH BLDV: 7.33 PH UNITS (ref 7.32–7.42)
PLATELET # BLD AUTO: 330 10*3/MM3 (ref 140–450)
PMV BLD AUTO: 9.6 FL (ref 6–12)
PO2 BLDV: 20.3 MM HG (ref 30–50)
POTASSIUM SERPL-SCNC: 4.3 MMOL/L (ref 3.5–5.2)
PROT SERPL-MCNC: 7 G/DL (ref 6–8.5)
RBC # BLD AUTO: 4.11 10*6/MM3 (ref 3.77–5.28)
SAO2 % BLDCOV: 30 % (ref 45–75)
SODIUM SERPL-SCNC: 142 MMOL/L (ref 136–145)
TROPONIN T SERPL-MCNC: <0.01 NG/ML (ref 0–0.03)
VENTILATOR MODE: ABNORMAL
WBC # BLD AUTO: 19.9 10*3/MM3 (ref 3.4–10.8)
WHOLE BLOOD HOLD SPECIMEN: NORMAL
WHOLE BLOOD HOLD SPECIMEN: NORMAL

## 2020-12-29 PROCEDURE — 25010000002 METHYLPREDNISOLONE PER 40 MG: Performed by: EMERGENCY MEDICINE

## 2020-12-29 PROCEDURE — 85025 COMPLETE CBC W/AUTO DIFF WBC: CPT | Performed by: EMERGENCY MEDICINE

## 2020-12-29 PROCEDURE — 82805 BLOOD GASES W/O2 SATURATION: CPT

## 2020-12-29 PROCEDURE — 84484 ASSAY OF TROPONIN QUANT: CPT | Performed by: EMERGENCY MEDICINE

## 2020-12-29 PROCEDURE — 25010000002 MAGNESIUM SULFATE 2 GM/50ML SOLUTION: Performed by: EMERGENCY MEDICINE

## 2020-12-29 PROCEDURE — 99284 EMERGENCY DEPT VISIT MOD MDM: CPT

## 2020-12-29 PROCEDURE — 93005 ELECTROCARDIOGRAM TRACING: CPT | Performed by: EMERGENCY MEDICINE

## 2020-12-29 PROCEDURE — 80053 COMPREHEN METABOLIC PANEL: CPT | Performed by: EMERGENCY MEDICINE

## 2020-12-29 PROCEDURE — 96375 TX/PRO/DX INJ NEW DRUG ADDON: CPT

## 2020-12-29 PROCEDURE — 96365 THER/PROPH/DIAG IV INF INIT: CPT

## 2020-12-29 PROCEDURE — 71045 X-RAY EXAM CHEST 1 VIEW: CPT

## 2020-12-29 PROCEDURE — 83880 ASSAY OF NATRIURETIC PEPTIDE: CPT | Performed by: EMERGENCY MEDICINE

## 2020-12-29 RX ORDER — SODIUM CHLORIDE 0.9 % (FLUSH) 0.9 %
10 SYRINGE (ML) INJECTION AS NEEDED
Status: DISCONTINUED | OUTPATIENT
Start: 2020-12-29 | End: 2020-12-29 | Stop reason: HOSPADM

## 2020-12-29 RX ORDER — MAGNESIUM SULFATE HEPTAHYDRATE 40 MG/ML
2 INJECTION, SOLUTION INTRAVENOUS ONCE
Status: COMPLETED | OUTPATIENT
Start: 2020-12-29 | End: 2020-12-29

## 2020-12-29 RX ORDER — METHYLPREDNISOLONE SODIUM SUCCINATE 40 MG/ML
80 INJECTION, POWDER, LYOPHILIZED, FOR SOLUTION INTRAMUSCULAR; INTRAVENOUS ONCE
Status: COMPLETED | OUTPATIENT
Start: 2020-12-29 | End: 2020-12-29

## 2020-12-29 RX ADMIN — MAGNESIUM SULFATE IN WATER 2 G: 40 INJECTION, SOLUTION INTRAVENOUS at 09:11

## 2020-12-29 RX ADMIN — METHYLPREDNISOLONE SODIUM SUCCINATE 80 MG: 40 INJECTION, POWDER, FOR SOLUTION INTRAMUSCULAR; INTRAVENOUS at 09:10

## 2021-01-02 ENCOUNTER — APPOINTMENT (OUTPATIENT)
Dept: GENERAL RADIOLOGY | Facility: HOSPITAL | Age: 76
End: 2021-01-02

## 2021-01-02 ENCOUNTER — HOSPITAL ENCOUNTER (INPATIENT)
Facility: HOSPITAL | Age: 76
LOS: 10 days | Discharge: HOME HEALTH | End: 2021-01-12
Attending: EMERGENCY MEDICINE | Admitting: INTERNAL MEDICINE

## 2021-01-02 DIAGNOSIS — J42 CHRONIC BRONCHITIS, UNSPECIFIED CHRONIC BRONCHITIS TYPE (HCC): ICD-10-CM

## 2021-01-02 DIAGNOSIS — J96.21 ACUTE ON CHRONIC RESPIRATORY FAILURE WITH HYPOXIA AND HYPERCAPNIA (HCC): ICD-10-CM

## 2021-01-02 DIAGNOSIS — J44.1 COPD EXACERBATION (HCC): Primary | ICD-10-CM

## 2021-01-02 DIAGNOSIS — J47.9 BRONCHIECTASIS WITHOUT COMPLICATION (HCC): ICD-10-CM

## 2021-01-02 DIAGNOSIS — J96.22 ACUTE ON CHRONIC RESPIRATORY FAILURE WITH HYPOXIA AND HYPERCAPNIA (HCC): ICD-10-CM

## 2021-01-02 LAB
A-A DO2: ABNORMAL
ALBUMIN SERPL-MCNC: 3.9 G/DL (ref 3.5–5.2)
ALBUMIN/GLOB SERPL: 1.3 G/DL
ALP SERPL-CCNC: 111 U/L (ref 39–117)
ALT SERPL W P-5'-P-CCNC: 29 U/L (ref 1–33)
ANION GAP SERPL CALCULATED.3IONS-SCNC: 8.4 MMOL/L (ref 5–15)
ARTERIAL PATENCY WRIST A: ABNORMAL
AST SERPL-CCNC: 26 U/L (ref 1–32)
ATMOSPHERIC PRESS: 734 MMHG
BASE EXCESS BLDA CALC-SCNC: 8 MMOL/L (ref 0–2)
BASOPHILS # BLD AUTO: 0.1 10*3/MM3 (ref 0–0.2)
BASOPHILS NFR BLD AUTO: 0.5 % (ref 0–1.5)
BDY SITE: ABNORMAL
BILIRUB SERPL-MCNC: 0.2 MG/DL (ref 0–1.2)
BUN SERPL-MCNC: 21 MG/DL (ref 8–23)
BUN/CREAT SERPL: 28.8 (ref 7–25)
CALCIUM SPEC-SCNC: 9.1 MG/DL (ref 8.6–10.5)
CHLORIDE SERPL-SCNC: 96 MMOL/L (ref 98–107)
CO2 SERPL-SCNC: 31.6 MMOL/L (ref 22–29)
COHGB MFR BLD: 1.7 % (ref 0–2)
CREAT SERPL-MCNC: 0.73 MG/DL (ref 0.57–1)
D-LACTATE SERPL-SCNC: 1.3 MMOL/L (ref 0.5–2)
DEPRECATED RDW RBC AUTO: 56.3 FL (ref 37–54)
EOSINOPHIL # BLD AUTO: 0.06 10*3/MM3 (ref 0–0.4)
EOSINOPHIL NFR BLD AUTO: 0.3 % (ref 0.3–6.2)
ERYTHROCYTE [DISTWIDTH] IN BLOOD BY AUTOMATED COUNT: 15.1 % (ref 12.3–15.4)
GAS FLOW AIRWAY: 3 LPM
GFR SERPL CREATININE-BSD FRML MDRD: 78 ML/MIN/1.73
GLOBULIN UR ELPH-MCNC: 2.9 GM/DL
GLUCOSE SERPL-MCNC: 89 MG/DL (ref 65–99)
HCO3 BLDA-SCNC: 34.5 MMOL/L (ref 22–28)
HCT VFR BLD AUTO: 41.1 % (ref 34–46.6)
HCT VFR BLD CALC: 39.9 %
HGB BLD-MCNC: 13.1 G/DL (ref 12–15.9)
HOLD SPECIMEN: NORMAL
HOLD SPECIMEN: NORMAL
IMM GRANULOCYTES # BLD AUTO: 0.54 10*3/MM3 (ref 0–0.05)
IMM GRANULOCYTES NFR BLD AUTO: 2.8 % (ref 0–0.5)
LYMPHOCYTES # BLD AUTO: 1.23 10*3/MM3 (ref 0.7–3.1)
LYMPHOCYTES NFR BLD AUTO: 6.3 % (ref 19.6–45.3)
MCH RBC QN AUTO: 32 PG (ref 26.6–33)
MCHC RBC AUTO-ENTMCNC: 31.9 G/DL (ref 31.5–35.7)
MCV RBC AUTO: 100.2 FL (ref 79–97)
METHGB BLD QL: 1.1 % (ref 0–1.5)
MODALITY: ABNORMAL
MONOCYTES # BLD AUTO: 1.06 10*3/MM3 (ref 0.1–0.9)
MONOCYTES NFR BLD AUTO: 5.4 % (ref 5–12)
NEUTROPHILS NFR BLD AUTO: 16.46 10*3/MM3 (ref 1.7–7)
NEUTROPHILS NFR BLD AUTO: 84.7 % (ref 42.7–76)
NOTE: ABNORMAL
NRBC BLD AUTO-RTO: 0 /100 WBC (ref 0–0.2)
OXYHGB MFR BLDV: 93.8 % (ref 94–99)
PCO2 BLDA: 55 MM HG (ref 35–45)
PCO2 TEMP ADJ BLD: ABNORMAL MM[HG]
PH BLDA: 7.41 PH UNITS (ref 7.3–7.5)
PH, TEMP CORRECTED: ABNORMAL
PLATELET # BLD AUTO: 369 10*3/MM3 (ref 140–450)
PMV BLD AUTO: 9 FL (ref 6–12)
PO2 BLDA: 104 MM HG (ref 75–100)
PO2 TEMP ADJ BLD: ABNORMAL MM[HG]
POTASSIUM SERPL-SCNC: 4.7 MMOL/L (ref 3.5–5.2)
PROCALCITONIN SERPL-MCNC: 0.04 NG/ML (ref 0–0.25)
PROT SERPL-MCNC: 6.8 G/DL (ref 6–8.5)
RBC # BLD AUTO: 4.1 10*6/MM3 (ref 3.77–5.28)
SAO2 % BLDCOA: 96.5 % (ref 94–100)
SODIUM SERPL-SCNC: 136 MMOL/L (ref 136–145)
VENTILATOR MODE: ABNORMAL
WBC # BLD AUTO: 19.45 10*3/MM3 (ref 3.4–10.8)
WHOLE BLOOD HOLD SPECIMEN: NORMAL
WHOLE BLOOD HOLD SPECIMEN: NORMAL

## 2021-01-02 PROCEDURE — 83050 HGB METHEMOGLOBIN QUAN: CPT

## 2021-01-02 PROCEDURE — 93005 ELECTROCARDIOGRAM TRACING: CPT | Performed by: EMERGENCY MEDICINE

## 2021-01-02 PROCEDURE — 25010000002 METHYLPREDNISOLONE PER 40 MG: Performed by: EMERGENCY MEDICINE

## 2021-01-02 PROCEDURE — G0378 HOSPITAL OBSERVATION PER HR: HCPCS

## 2021-01-02 PROCEDURE — 71045 X-RAY EXAM CHEST 1 VIEW: CPT

## 2021-01-02 PROCEDURE — 80053 COMPREHEN METABOLIC PANEL: CPT | Performed by: EMERGENCY MEDICINE

## 2021-01-02 PROCEDURE — 82805 BLOOD GASES W/O2 SATURATION: CPT

## 2021-01-02 PROCEDURE — 99284 EMERGENCY DEPT VISIT MOD MDM: CPT

## 2021-01-02 PROCEDURE — 94640 AIRWAY INHALATION TREATMENT: CPT

## 2021-01-02 PROCEDURE — 25010000002 ENOXAPARIN PER 10 MG: Performed by: EMERGENCY MEDICINE

## 2021-01-02 PROCEDURE — 36600 WITHDRAWAL OF ARTERIAL BLOOD: CPT

## 2021-01-02 PROCEDURE — 25010000002 METHYLPREDNISOLONE PER 125 MG: Performed by: EMERGENCY MEDICINE

## 2021-01-02 PROCEDURE — 25010000002 CEFTRIAXONE PER 250 MG: Performed by: EMERGENCY MEDICINE

## 2021-01-02 PROCEDURE — 25010000002 AZITHROMYCIN 500 MG/250 ML: Performed by: EMERGENCY MEDICINE

## 2021-01-02 PROCEDURE — 83605 ASSAY OF LACTIC ACID: CPT | Performed by: EMERGENCY MEDICINE

## 2021-01-02 PROCEDURE — 82375 ASSAY CARBOXYHB QUANT: CPT

## 2021-01-02 PROCEDURE — 25010000002 MAGNESIUM SULFATE 2 GM/50ML SOLUTION: Performed by: EMERGENCY MEDICINE

## 2021-01-02 PROCEDURE — 25010000002 FUROSEMIDE PER 20 MG: Performed by: EMERGENCY MEDICINE

## 2021-01-02 PROCEDURE — 94799 UNLISTED PULMONARY SVC/PX: CPT

## 2021-01-02 PROCEDURE — 85025 COMPLETE CBC W/AUTO DIFF WBC: CPT | Performed by: EMERGENCY MEDICINE

## 2021-01-02 PROCEDURE — 99220 PR INITIAL OBSERVATION CARE/DAY 70 MINUTES: CPT | Performed by: EMERGENCY MEDICINE

## 2021-01-02 PROCEDURE — 84145 PROCALCITONIN (PCT): CPT | Performed by: EMERGENCY MEDICINE

## 2021-01-02 RX ORDER — ACETAMINOPHEN 325 MG/1
650 TABLET ORAL EVERY 4 HOURS PRN
Status: DISCONTINUED | OUTPATIENT
Start: 2021-01-02 | End: 2021-01-12 | Stop reason: HOSPADM

## 2021-01-02 RX ORDER — METOPROLOL SUCCINATE 25 MG/1
25 TABLET, EXTENDED RELEASE ORAL NIGHTLY
Status: DISCONTINUED | OUTPATIENT
Start: 2021-01-02 | End: 2021-01-12 | Stop reason: HOSPADM

## 2021-01-02 RX ORDER — IPRATROPIUM BROMIDE AND ALBUTEROL SULFATE 2.5; .5 MG/3ML; MG/3ML
3 SOLUTION RESPIRATORY (INHALATION)
Status: DISCONTINUED | OUTPATIENT
Start: 2021-01-02 | End: 2021-01-12 | Stop reason: HOSPADM

## 2021-01-02 RX ORDER — ACETAMINOPHEN 650 MG/1
650 SUPPOSITORY RECTAL EVERY 4 HOURS PRN
Status: DISCONTINUED | OUTPATIENT
Start: 2021-01-02 | End: 2021-01-12 | Stop reason: HOSPADM

## 2021-01-02 RX ORDER — MAGNESIUM SULFATE HEPTAHYDRATE 40 MG/ML
2 INJECTION, SOLUTION INTRAVENOUS ONCE
Status: COMPLETED | OUTPATIENT
Start: 2021-01-02 | End: 2021-01-02

## 2021-01-02 RX ORDER — SODIUM CHLORIDE 0.9 % (FLUSH) 0.9 %
10 SYRINGE (ML) INJECTION EVERY 12 HOURS SCHEDULED
Status: DISCONTINUED | OUTPATIENT
Start: 2021-01-02 | End: 2021-01-12 | Stop reason: HOSPADM

## 2021-01-02 RX ORDER — METHYLPREDNISOLONE SODIUM SUCCINATE 40 MG/ML
40 INJECTION, POWDER, LYOPHILIZED, FOR SOLUTION INTRAMUSCULAR; INTRAVENOUS EVERY 12 HOURS
Status: DISCONTINUED | OUTPATIENT
Start: 2021-01-02 | End: 2021-01-09

## 2021-01-02 RX ORDER — SODIUM CHLORIDE 0.9 % (FLUSH) 0.9 %
10 SYRINGE (ML) INJECTION AS NEEDED
Status: DISCONTINUED | OUTPATIENT
Start: 2021-01-02 | End: 2021-01-12 | Stop reason: HOSPADM

## 2021-01-02 RX ORDER — GABAPENTIN 100 MG/1
100 CAPSULE ORAL 3 TIMES DAILY
Status: DISCONTINUED | OUTPATIENT
Start: 2021-01-02 | End: 2021-01-12 | Stop reason: HOSPADM

## 2021-01-02 RX ORDER — LORAZEPAM 0.5 MG/1
0.5 TABLET ORAL EVERY 12 HOURS SCHEDULED
Status: DISCONTINUED | OUTPATIENT
Start: 2021-01-02 | End: 2021-01-12 | Stop reason: HOSPADM

## 2021-01-02 RX ORDER — PREDNISONE 10 MG/1
10 TABLET ORAL EVERY OTHER DAY
COMMUNITY
End: 2021-02-02 | Stop reason: HOSPADM

## 2021-01-02 RX ORDER — ACETAMINOPHEN 160 MG/5ML
650 SOLUTION ORAL EVERY 4 HOURS PRN
Status: DISCONTINUED | OUTPATIENT
Start: 2021-01-02 | End: 2021-01-12 | Stop reason: HOSPADM

## 2021-01-02 RX ORDER — BUDESONIDE AND FORMOTEROL FUMARATE DIHYDRATE 160; 4.5 UG/1; UG/1
2 AEROSOL RESPIRATORY (INHALATION)
Status: DISCONTINUED | OUTPATIENT
Start: 2021-01-02 | End: 2021-01-10

## 2021-01-02 RX ORDER — HYDROCODONE BITARTRATE AND ACETAMINOPHEN 10; 325 MG/1; MG/1
1 TABLET ORAL EVERY 8 HOURS PRN
Status: DISCONTINUED | OUTPATIENT
Start: 2021-01-02 | End: 2021-01-12 | Stop reason: HOSPADM

## 2021-01-02 RX ORDER — IPRATROPIUM BROMIDE AND ALBUTEROL SULFATE 2.5; .5 MG/3ML; MG/3ML
3 SOLUTION RESPIRATORY (INHALATION) ONCE
Status: COMPLETED | OUTPATIENT
Start: 2021-01-02 | End: 2021-01-02

## 2021-01-02 RX ORDER — PANTOPRAZOLE SODIUM 40 MG/1
40 TABLET, DELAYED RELEASE ORAL EVERY MORNING
Status: DISCONTINUED | OUTPATIENT
Start: 2021-01-03 | End: 2021-01-08

## 2021-01-02 RX ORDER — GUAIFENESIN 600 MG/1
600 TABLET, EXTENDED RELEASE ORAL EVERY 12 HOURS SCHEDULED
Status: DISCONTINUED | OUTPATIENT
Start: 2021-01-02 | End: 2021-01-12 | Stop reason: HOSPADM

## 2021-01-02 RX ORDER — IPRATROPIUM BROMIDE AND ALBUTEROL SULFATE 2.5; .5 MG/3ML; MG/3ML
3 SOLUTION RESPIRATORY (INHALATION) EVERY 4 HOURS PRN
Status: DISCONTINUED | OUTPATIENT
Start: 2021-01-02 | End: 2021-01-12 | Stop reason: HOSPADM

## 2021-01-02 RX ORDER — CYCLOBENZAPRINE HCL 10 MG
10 TABLET ORAL DAILY
Status: DISCONTINUED | OUTPATIENT
Start: 2021-01-02 | End: 2021-01-06

## 2021-01-02 RX ORDER — ONDANSETRON 2 MG/ML
4 INJECTION INTRAMUSCULAR; INTRAVENOUS EVERY 6 HOURS PRN
Status: DISCONTINUED | OUTPATIENT
Start: 2021-01-02 | End: 2021-01-12 | Stop reason: HOSPADM

## 2021-01-02 RX ORDER — FUROSEMIDE 10 MG/ML
20 INJECTION INTRAMUSCULAR; INTRAVENOUS ONCE
Status: COMPLETED | OUTPATIENT
Start: 2021-01-02 | End: 2021-01-02

## 2021-01-02 RX ORDER — METHYLPREDNISOLONE SODIUM SUCCINATE 125 MG/2ML
125 INJECTION, POWDER, LYOPHILIZED, FOR SOLUTION INTRAMUSCULAR; INTRAVENOUS ONCE
Status: COMPLETED | OUTPATIENT
Start: 2021-01-02 | End: 2021-01-02

## 2021-01-02 RX ORDER — VENLAFAXINE 75 MG/1
75 TABLET ORAL 2 TIMES DAILY WITH MEALS
Status: DISCONTINUED | OUTPATIENT
Start: 2021-01-02 | End: 2021-01-12 | Stop reason: HOSPADM

## 2021-01-02 RX ADMIN — GABAPENTIN 100 MG: 100 CAPSULE ORAL at 20:30

## 2021-01-02 RX ADMIN — GUAIFENESIN 600 MG: 600 TABLET, EXTENDED RELEASE ORAL at 20:30

## 2021-01-02 RX ADMIN — HYDROCODONE BITARTRATE AND ACETAMINOPHEN 1 TABLET: 10; 325 TABLET ORAL at 20:55

## 2021-01-02 RX ADMIN — FUROSEMIDE 20 MG: 10 INJECTION, SOLUTION INTRAMUSCULAR; INTRAVENOUS at 20:30

## 2021-01-02 RX ADMIN — VENLAFAXINE 75 MG: 75 TABLET ORAL at 20:30

## 2021-01-02 RX ADMIN — CYCLOBENZAPRINE HYDROCHLORIDE 10 MG: 10 TABLET, FILM COATED ORAL at 20:30

## 2021-01-02 RX ADMIN — ENOXAPARIN SODIUM 40 MG: 40 INJECTION SUBCUTANEOUS at 20:30

## 2021-01-02 RX ADMIN — SODIUM CHLORIDE 500 ML: 9 INJECTION, SOLUTION INTRAVENOUS at 16:23

## 2021-01-02 RX ADMIN — MAGNESIUM SULFATE IN WATER 2 G: 40 INJECTION, SOLUTION INTRAVENOUS at 16:23

## 2021-01-02 RX ADMIN — IPRATROPIUM BROMIDE AND ALBUTEROL SULFATE 3 ML: .5; 3 SOLUTION RESPIRATORY (INHALATION) at 19:13

## 2021-01-02 RX ADMIN — METHYLPREDNISOLONE SODIUM SUCCINATE 40 MG: 40 INJECTION, POWDER, FOR SOLUTION INTRAMUSCULAR; INTRAVENOUS at 20:29

## 2021-01-02 RX ADMIN — AZITHROMYCIN 500 MG: 500 INJECTION, POWDER, LYOPHILIZED, FOR SOLUTION INTRAVENOUS at 18:35

## 2021-01-02 RX ADMIN — METHYLPREDNISOLONE SODIUM SUCCINATE 125 MG: 125 INJECTION, POWDER, FOR SOLUTION INTRAMUSCULAR; INTRAVENOUS at 17:54

## 2021-01-02 RX ADMIN — CEFTRIAXONE SODIUM 1 G: 1 INJECTION, POWDER, FOR SOLUTION INTRAMUSCULAR; INTRAVENOUS at 17:54

## 2021-01-02 RX ADMIN — IPRATROPIUM BROMIDE AND ALBUTEROL SULFATE 3 ML: .5; 3 SOLUTION RESPIRATORY (INHALATION) at 16:04

## 2021-01-02 RX ADMIN — BUDESONIDE AND FORMOTEROL FUMARATE DIHYDRATE 2 PUFF: 160; 4.5 AEROSOL RESPIRATORY (INHALATION) at 21:19

## 2021-01-02 RX ADMIN — LORAZEPAM 0.5 MG: 0.5 TABLET ORAL at 20:30

## 2021-01-02 RX ADMIN — SODIUM CHLORIDE, PRESERVATIVE FREE 10 ML: 5 INJECTION INTRAVENOUS at 20:30

## 2021-01-02 NOTE — ED PROVIDER NOTES
Subjective   75-year-old chronically ill female presents to the ED complaining of shortness of breath cough chest tightness and wheezing.  She has severe COPD and wears 3 L nasal cannula at home at all times.  She states that she has had increase her oxygen requirements over the last few days.  She has been seen here and evaluated 2 other times in the last week.  Attempted to be admitted on 12/29/2020 but she refused and went home on steroids and breathing treatments.  She states that despite steroids and breathing treatments at home she does not feel any better.  Cough is productive of green sputum.  Denies nausea vomiting diarrhea or abdominal pain.  No lightheadedness or dizziness.  No other complaints at this time.          Review of Systems   Constitutional: Negative for fatigue and fever.   Respiratory: Positive for cough, chest tightness, shortness of breath and wheezing.    All other systems reviewed and are negative.      Past Medical History:   Diagnosis Date   • Abdominal pain    • Acute bronchitis    • Ankle pain    • Anxiety 1980   • Atopic dermatitis    • Bronchiectasis (CMS/HCC)    • Cataract, bilateral    • Chest pain     STATES HAS OCCASSIONALLY.  STATES LAST TIME WAS LAST WEEK.  STATES SEES DR. RICH.  STATES HE HAS DONE NUMEROUS TESTS ON HER AND HAS NOT BEEN ABLE TO FIND OUT CAUSE.     • Chronic obstructive lung disease (CMS/HCC) 2008   • Colon cancer screening    • COPD (chronic obstructive pulmonary disease) (CMS/HCC)    • Cystocele    • Depressed    • Depression 1980   • Fatigue     Chronic pafigue 2012   • Fibromyalgia 2008   • Full dentures    • GERD (gastroesophageal reflux disease)    • Gout    • Heartburn     Chronic historu of epigastric heartburn currently controlled on Prilesec 40 mg every morning along with Zantac 300 Mg daily at bedtime   • High cholesterol 2012   • Hip pain    • Hyperlipidemia    • Hypertension    • Impaired functional mobility, balance, gait, and endurance    •  Insomnia    • Joint pain    • Kidney infection    • Loss of appetite    • Low back pain 1995   • Melena    • Nausea and vomiting    • On home oxygen therapy     2L/NC PRN (STATES SHE HAS BEEN USING CONTINUOSLY LATELY)   • Osteoarthritis 2010   • Osteoporosis    • Pain in limb    • Palpitations    • Pinched nerve     Pinched nerves 2011   • Pneumonia    • Problems with swallowing     HAS TO EAT SLOW AND CHEW FOOD WELL   • Recurrent urinary tract infection    • Sciatica [id number]   • Shortness of breath    • Sinus problem    • Sleep apnea 1998    NO CPAP   • Upset stomach    • Valvular heart disease    • Vitamin B12 deficiency    • Wears glasses    • Weight loss, abnormal     Weight loss is stabel. On pund weight gain since 01/2016       Allergies   Allergen Reactions   • Dust Mite Extract Other (See Comments)     Dust  SINUS   • Grass Other (See Comments)     SINUS   • Mold Extract [Trichophyton] Other (See Comments)     SINUS       Past Surgical History:   Procedure Laterality Date   • ABDOMINAL HERNIA REPAIR  2016   • APPENDECTOMY  1965   • BACK SURGERY  2005   • BRONCHOSCOPY N/A 7/5/2018    Procedure: BRONCHOSCOPY w/ WASHINGS/BRUSHINGS with MAC;  Surgeon: Rebeka Esparza MD;  Location: [facility];  Service: Pulmonary   • CATARACT EXTRACTION Bilateral     Put in implants    • CHOLECYSTECTOMY  1985   • COLONOSCOPY     • ENDOSCOPY     • KNEE SURGERY Left 1979    Knee Cap   • TUBAL ABDOMINAL LIGATION  1971       Family History   Problem Relation Age of Onset   • Ovarian cancer Mother    • Cancer Mother    • Lung cancer Father    • Heart disease Brother        Social History     Socioeconomic History   • Marital status: Single     Spouse name: Not on file   • Number of children: Not on file   • Years of education: Not on file   • Highest education level: Not on file   Tobacco Use   • Smoking status: Former Smoker     Packs/day: 0.00     Years: 35.00     Pack years: 0.00     Quit date: 7/5/2017     Years since  quitting: 3.4   • Smokeless tobacco: Never Used   Substance and Sexual Activity   • Alcohol use: No   • Drug use: No   • Sexual activity: Defer           Objective   Physical Exam  Vitals signs and nursing note reviewed.   Constitutional:       General: She is not in acute distress.     Appearance: She is not diaphoretic.      Comments: Chronically ill-appearing.  Thin.  Cachectic.   HENT:      Head: Normocephalic and atraumatic.      Nose: Nose normal.   Eyes:      Conjunctiva/sclera: Conjunctivae normal.   Cardiovascular:      Rate and Rhythm: Normal rate and regular rhythm.   Pulmonary:      Effort: Tachypnea present.      Breath sounds: Wheezing present.      Comments: Mild tachypnea.  Coarse breath sounds bilaterally.  Rhonchi and wheezing bilateral lung fields.  Abdominal:      General: There is no distension.      Palpations: Abdomen is soft.      Tenderness: There is no abdominal tenderness. There is no guarding.   Musculoskeletal:         General: No deformity.   Neurological:      Mental Status: She is oriented to person, place, and time.      Cranial Nerves: No cranial nerve deficit.         Procedures           ED Course  ED Course as of Jan 02 1742   Sat Jan 02, 2021   1530 EKG interpreted by me.  Sinus rhythm.  Tachycardic.  Rate of 105.  Right ventricular conduction delay.  Nonspecific ST segment changes.  Abnormal EKG    [CG]      ED Course User Index  [CG] Michael Daniels,                                            MDM  No significant hypoxia.  Pulse ox of 95% on 3 to 4 L nasal cannula.  She does have increased work of breathing and coarse breath sounds despite interventions.  Feel that she would benefit from observation with continued breathing treatments and steroids.  Discussed this with Dr. Hein who agrees and graciously accepts the patient for admission        Final diagnoses:   COPD exacerbation (CMS/[facility])            Michael Daniels DO  01/02/21 1742

## 2021-01-02 NOTE — ED NOTES
House Supervisor contacted for bed assignment.     [facility]  01/02/21 7973

## 2021-01-02 NOTE — PLAN OF CARE
Goal Outcome Evaluation:        Pt arrived to unit from ED. No complaints at this time. VSS. Will continue to monitor.

## 2021-01-02 NOTE — H&P
[facility]      Name:  Joelle Yee   Age:  75 y.o.  Sex:  female  :  1945  MRN:  [id number]   Visit Number:  [id number]  Admission Date:  2021  Date Of Service:  21  Primary Care Physician:  Blanquita Clements PA    Chief Complaint: Shortness of breath    History Of Presenting Illness:  75 F history of COPD on 3 LNC followed by Dr. Esparza, seen in the ED 3 days ago presents again to the ER with similar complaints of shortness of breath and wheezing. This has been ongoing for the past week or 2 associated with cough.  She denies any sick contacts or known coronavirus exposure.  CT PE performed 2020 reveals bronchitis and bronchiectasis, no PE.  She was given steroids and antibiotics, but not has not improved.  Today, she is requiring 4 LNC on presentation, and reports feeling better with the interventions made in the ED which included Solu-Medrol, magnesium, nebulizers, Rocephin/azithromycin.  She also admits to some swelling this morning that has improved. She denies chest pain, abdominal pain, dysuria,or other acute issues.    Review Of Systems: A full 10 point review of systems was performed and all pertinent positives and negatives are noted in the HPI.  Past Medical History:  Past Medical History:   Diagnosis Date   • Abdominal pain    • Acute bronchitis    • Ankle pain    • Anxiety    • Atopic dermatitis    • Bronchiectasis (CMS/HCC)    • Cataract, bilateral    • Chest pain     STATES HAS OCCASSIONALLY.  STATES LAST TIME WAS LAST WEEK.  STATES SEES DR. RICH.  STATES HE HAS DONE NUMEROUS TESTS ON HER AND HAS NOT BEEN ABLE TO FIND OUT CAUSE.     • Chronic obstructive lung disease (CMS/HCC)    • Colon cancer screening    • COPD (chronic obstructive pulmonary disease) (CMS/HCC)    • Cystocele    • Depressed    • Depression    • Fatigue     Chronic pafigue    • Fibromyalgia    • Full dentures    • GERD  (gastroesophageal reflux disease)    • Gout    • Heartburn     Chronic historu of epigastric heartburn currently controlled on Prilesec 40 mg every morning along with Zantac 300 Mg daily at bedtime   • High cholesterol 2012   • Hip pain    • Hyperlipidemia    • Hypertension    • Impaired functional mobility, balance, gait, and endurance    • Insomnia    • Joint pain    • Kidney infection    • Loss of appetite    • Low back pain 1995   • Melena    • Nausea and vomiting    • On home oxygen therapy     2L/NC PRN (STATES SHE HAS BEEN USING CONTINUOSLY LATELY)   • Osteoarthritis 2010   • Osteoporosis    • Pain in limb    • Palpitations    • Pinched nerve     Pinched nerves 2011   • Pneumonia    • Problems with swallowing     HAS TO EAT SLOW AND CHEW FOOD WELL   • Recurrent urinary tract infection    • Sciatica [id number]   • Shortness of breath    • Sinus problem    • Sleep apnea 1998    NO CPAP   • Upset stomach    • Valvular heart disease    • Vitamin B12 deficiency    • Wears glasses    • Weight loss, abnormal     Weight loss is stabel. On pund weight gain since 01/2016     Past Surgical history:  Past Surgical History:   Procedure Laterality Date   • ABDOMINAL HERNIA REPAIR  2016   • APPENDECTOMY  1965   • BACK SURGERY  2005   • BRONCHOSCOPY N/A 7/5/2018    Procedure: BRONCHOSCOPY w/ WASHINGS/BRUSHINGS with MAC;  Surgeon: Rebeka Esparza MD;  Location: [facility];  Service: Pulmonary   • CATARACT EXTRACTION Bilateral     Put in implants    • CHOLECYSTECTOMY  1985   • COLONOSCOPY     • ENDOSCOPY     • KNEE SURGERY Left 1979    Knee Cap   • TUBAL ABDOMINAL LIGATION  1971     Social History:  Social History     Socioeconomic History   • Marital status: Single     Spouse name: Not on file   • Number of children: Not on file   • Years of education: Not on file   • Highest education level: Not on file   Tobacco Use   • Smoking status: Former Smoker     Packs/day: 0.00     Years: 35.00     Pack years: 0.00     Quit date:  7/5/2017     Years since quitting: 3.4   • Smokeless tobacco: Never Used   Substance and Sexual Activity   • Alcohol use: No   • Drug use: No   • Sexual activity: Defer     Family History:  Family History   Problem Relation Age of Onset   • Ovarian cancer Mother    • Cancer Mother    • Lung cancer Father    • Heart disease Brother      Allergies:  Dust mite extract, Grass, and Mold extract [trichophyton]    Home Medications:  Prior to Admission Medications     Prescriptions Last Dose Informant Patient Reported? Taking?    acetaminophen (TYLENOL) 325 MG tablet  Self No No    Take 2 tablets by mouth Every 4 (Four) Hours As Needed for Headache or Fever (temperature greater than 101.5 F).    albuterol sulfate HFA (Ventolin HFA) 108 (90 Base) MCG/ACT inhaler   No No    Inhale 2 puffs Every 4 (Four) Hours As Needed for Wheezing or Shortness of Air.    ammonium lactate (LAC-HYDRIN) 12 % cream   No No    Apply  topically to the appropriate area as directed 2 (Two) Times a Day.    Patient taking differently:  Apply  topically to the appropriate area as directed 2 (Two) Times a Day As Needed.    azithromycin (ZITHROMAX) 250 MG tablet   No No    Take 1 tablet every other day.    benzonatate (TESSALON) 100 MG capsule   Yes No    Take 100 mg by mouth 3 (Three) Times a Day As Needed for Cough.    budesonide-formoterol (Symbicort) 160-4.5 MCG/ACT inhaler   No No    Inhale 2 puffs 2 (Two) Times a Day. Rinse mouth with water after use    Camphor-Eucalyptus-Menthol (MEDICATED CHEST RUB EX)   Yes No    Apply 1 application topically Daily As Needed.    cephalexin (KEFLEX) 500 MG capsule   No No    Take 1 capsule by mouth 4 (Four) Times a Day.    Cyanocobalamin (VITAMIN B12 PO)  Self Yes No    Take 500 mcg by mouth Daily.    cyclobenzaprine (FLEXERIL) 10 MG tablet   Yes No    Take 10 mg by mouth Daily.    diclofenac (VOLTAREN) 1 % gel gel   Yes No    Diclofenac Sodium (VOLTAREN TD)  Self Yes No    Apply 2 g topically to the appropriate  area as directed 2 (Two) Times a Day As Needed. Voltaren GEL     doxycycline (MONODOX) 100 MG capsule   No No    Take 1 capsule by mouth 2 (Two) Times a Day.    fluconazole (DIFLUCAN) 150 MG tablet   Yes No    fluconazole (DIFLUCAN) 200 MG tablet   No No    Take 1 tablet by mouth Daily.    gabapentin (NEURONTIN) 100 MG capsule   Yes No    gabapentin (NEURONTIN) 300 MG capsule  Self Yes No    Take 100 mg by mouth 3 (Three) Times a Day.    guaiFENesin (MUCINEX) 600 MG 12 hr tablet   No No    Take 1 tablet by mouth Every 12 (Twelve) Hours.    HYDROcodone-acetaminophen (NORCO)  MG per tablet   Yes No    ipratropium-albuterol (DUO-NEB) 0.5-2.5 mg/3 ml nebulizer   No No    Take 3 mL by nebulization 4 (Four) Times a Day As Needed for Wheezing or Shortness of Air.    ketotifen (ZADITOR) 0.025 % ophthalmic solution  Self Yes No    Administer 1-2 drops to both eyes 2 (Two) Times a Day.    loratadine (CLARITIN) 10 MG tablet   Yes No    Take 10 mg by mouth Daily As Needed for Allergies.    LORazepam (ATIVAN) 0.5 MG tablet  Self Yes No    Take 0.5 mg by mouth 2 (Two) Times a Day As Needed. 1-2 TABLETS DAILY PRN    losartan (COZAAR) 25 MG tablet   Yes No    Take 25 mg by mouth Every Night.    melatonin 5 MG tablet tablet  Self No No    Take 2 tablets by mouth At Night As Needed for sleep    metoprolol succinate XL (TOPROL-XL) 25 MG 24 hr tablet   Yes No    Take 25 mg by mouth Every Night.    mirtazapine (REMERON) 30 MG tablet   Yes No    Misc. Devices (ROLLATOR) misc  Self No No    1 Units As Needed (ambulation assistance).    Multiple Vitamins-Minerals (MULTIVITAMIN WITH MINERALS) tablet tablet  Self Yes No    Take 1 tablet by mouth Daily.    Nutritional Supplements (ENSURE COMPLETE PO)  Self Yes No    Take 1 can by mouth 2 (Two) Times a Day.    nystatin (MYCOSTATIN) 050750 UNIT/ML suspension   No No    Take 5 mL by mouth 4 (Four) Times a Day.    omeprazole (priLOSEC) 40 MG capsule   Yes No    ondansetron ODT (ZOFRAN-ODT)  4 MG disintegrating tablet   Yes No    OXYGEN-HELIUM IN   Yes No    Oxygen; Patient Sig: Oxygen 2 liter as needed; 0; 21-May-2014; Active    predniSONE (DELTASONE) 20 MG tablet   No No    Take 1 tablet by mouth 2 (Two) Times a Day for 5 days.    Respiratory Therapy Supplies (FLUTTER) device  Self No No    1 Device as needed (as directed).    tiotropium bromide monohydrate (Spiriva Respimat) 2.5 MCG/ACT aerosol solution inhaler   No No    Inhale 2 puffs Daily.    traZODone (DESYREL) 50 MG tablet   Yes No    25 mg.    urea (CARMOL) 40 % cream   No No    Apply topically to the appropriate area as directed Daily. May substitute as needed for insurance coverage    urea (CARMOL) 40 % cream   No No    Apply  topically to the appropriate area as directed Every 12 (Twelve) Hours. Apply topically every 12 hours.    Patient taking differently:  Apply  topically to the appropriate area as directed Every 12 (Twelve) Hours As Needed. Apply topically every 12 hours.    venlafaxine (EFFEXOR) 75 MG tablet  Self Yes No    Take 1 tablet by mouth 2 (two) times a day.         ED Medications:  Medications   sodium chloride 0.9 % flush 10 mL (has no administration in time range)   sodium chloride 0.9 % flush 10 mL (has no administration in time range)   AZITHROMYCIN 500 MG/250 ML 0.9% NS IVPB (vial-mate) (has no administration in time range)   sodium chloride 0.9 % bolus 500 mL (0 mL Intravenous Stopped 1/2/21 1745)   magnesium sulfate 2g/50 mL (PREMIX) infusion (0 g Intravenous Stopped 1/2/21 1745)   ipratropium-albuterol (DUO-NEB) nebulizer solution 3 mL (3 mL Nebulization Given 1/2/21 1604)   methylPREDNISolone sodium succinate (SOLU-Medrol) injection 125 mg (125 mg Intravenous Given 1/2/21 1754)   cefTRIAXone (ROCEPHIN) 1 g in sodium chloride 0.9 % 100 mL IVPB (1 g Intravenous New Bag 1/2/21 1754)     Vital Signs:  Temp:  [98.5 °F (36.9 °C)] 98.5 °F (36.9 °C)  Heart Rate:  [] 93  Resp:  [24] 24  BP: (125-156)/() 128/74         01/02/21  1504   Weight: 40.8 kg (90 lb)     Body mass index is 19.48 kg/m².    Physical Exam:  General: Mildly labored, resting in bed  HEENT: EOMI, NC/AT  Heart: regular  Lungs: labored breathing with accessory muscle usage, wheezing red  Abdomen: Soft, nondistended, nontender  Extremities: No edema  Neurological: A&O x3, moves all extremities  Psychological: Mood and affect appropriate, speech is coherent  Skin: warm, dry    Laboratory data:I have reviewed the labs done in the emergency room.    Results from last 7 days   Lab Units 01/02/21  1532 12/29/20  0906 12/27/20  1745   SODIUM mmol/L 136 142 137   POTASSIUM mmol/L 4.7 4.3 4.2   CHLORIDE mmol/L 96* 104 99   CO2 mmol/L 31.6* 27.6 27.7   BUN mg/dL 21 20 13   CREATININE mg/dL 0.73 0.68 0.71   CALCIUM mg/dL 9.1 9.2 8.9   BILIRUBIN mg/dL 0.2 0.2 <0.2   ALK PHOS U/L 111 115 128*   ALT (SGPT) U/L 29 12 14   AST (SGOT) U/L 26 25 23   GLUCOSE mg/dL 89 139* 97     Results from last 7 days   Lab Units 01/02/21  1532 12/29/20  0906 12/27/20  1745   WBC 10*3/mm3 19.45* 19.90* 15.22*   HEMOGLOBIN g/dL 13.1 13.2 13.1   HEMATOCRIT % 41.1 41.5 40.8   PLATELETS 10*3/mm3 369 330 298         Results from last 7 days   Lab Units 12/29/20  0906 12/27/20  1745   TROPONIN T ng/mL <0.010 <0.010     Results from last 7 days   Lab Units 12/29/20  0906   PROBNP pg/mL 222.8             Results from last 7 days   Lab Units 01/02/21  1630   PH, ARTERIAL pH units 7.405   PO2 ART mm Hg 104.0*   PCO2, ARTERIAL mm Hg 55.0*   HCO3 ART mmol/L 34.5*           Invalid input(s): USDES,  BLOODU, NITRITITE, BACT, EP  Pain Management Panel     There is no flowsheet data to display.           EKG:  Reviewed and interpreted by myself reveals sinus tachycardia, rate 105, QTc 345, no acute ST segment or ischemic changes  Radiology:  Imaging Results (Last 72 Hours)     Procedure Component Value Units Date/Time    XR Chest 1 View [[id number]] Collected: 01/02/21 1529     Updated: 01/02/21 1548     Narrative:      PROCEDURE: XR CHEST 1 VW-     HISTORY: Simple Sepsis Triage Protocol     COMPARISON: 12/29/2020.     FINDINGS: The heart is normal in size. The mediastinum is unremarkable.  There are emphysematous changes to the lungs. A left basilar opacity is  unchanged. No significant effusions are evident. There is no  pneumothorax.  There are no acute osseous abnormalities.       Impression:      Left basilar opacity which is unchanged compared to the  prior exam and may reflect atelectasis or pneumonia.     Continued followup is recommended.     This report was finalized on 1/2/2021 3:29 PM by Ignacia Lara M.D..        I have personally reviewed the CXR which reveals no acute consolidation, COPD    Assessment:    COPD exacerbation (CMS/HCC)    Acute on chronic respiratory failure with hypoxia and hypercapnia (CMS/HCC)    Bronchiectasis without complication (CMS/HCC)      Plan:  -Antibiotics, bronchodilators, corticosteroids, diureticspul  -Pulmonology consultation as she sees Dr. Esparza outpatient, and based off her ABG would qualify for BiPAP  -high risk, full code, inpatient    Amanda Hein DO  01/02/21  18:28 EST    Dictated utilizing Dragon dictation.

## 2021-01-03 LAB
ANION GAP SERPL CALCULATED.3IONS-SCNC: 9.3 MMOL/L (ref 5–15)
BUN SERPL-MCNC: 25 MG/DL (ref 8–23)
BUN/CREAT SERPL: 41.7 (ref 7–25)
CALCIUM SPEC-SCNC: 8.9 MG/DL (ref 8.6–10.5)
CHLORIDE SERPL-SCNC: 96 MMOL/L (ref 98–107)
CO2 SERPL-SCNC: 32.7 MMOL/L (ref 22–29)
CREAT SERPL-MCNC: 0.6 MG/DL (ref 0.57–1)
GFR SERPL CREATININE-BSD FRML MDRD: 97 ML/MIN/1.73
GLUCOSE SERPL-MCNC: 117 MG/DL (ref 65–99)
POTASSIUM SERPL-SCNC: 4.5 MMOL/L (ref 3.5–5.2)
SODIUM SERPL-SCNC: 138 MMOL/L (ref 136–145)

## 2021-01-03 PROCEDURE — 99225 PR SBSQ OBSERVATION CARE/DAY 25 MINUTES: CPT | Performed by: INTERNAL MEDICINE

## 2021-01-03 PROCEDURE — 86738 MYCOPLASMA ANTIBODY: CPT | Performed by: INTERNAL MEDICINE

## 2021-01-03 PROCEDURE — 94799 UNLISTED PULMONARY SVC/PX: CPT

## 2021-01-03 PROCEDURE — 25010000002 METHYLPREDNISOLONE PER 40 MG: Performed by: EMERGENCY MEDICINE

## 2021-01-03 PROCEDURE — 80048 BASIC METABOLIC PNL TOTAL CA: CPT | Performed by: EMERGENCY MEDICINE

## 2021-01-03 PROCEDURE — 25010000002 CEFTRIAXONE SODIUM-DEXTROSE 1-3.74 GM-%(50ML) RECONSTITUTED SOLUTION: Performed by: INTERNAL MEDICINE

## 2021-01-03 PROCEDURE — 25010000002 AZITHROMYCIN 500 MG/250 ML: Performed by: INTERNAL MEDICINE

## 2021-01-03 PROCEDURE — G0378 HOSPITAL OBSERVATION PER HR: HCPCS

## 2021-01-03 PROCEDURE — 25010000002 ENOXAPARIN PER 10 MG: Performed by: EMERGENCY MEDICINE

## 2021-01-03 RX ORDER — CEFTRIAXONE 1 G/50ML
1 INJECTION, SOLUTION INTRAVENOUS EVERY 24 HOURS
Status: DISCONTINUED | OUTPATIENT
Start: 2021-01-03 | End: 2021-01-05

## 2021-01-03 RX ORDER — GUAIFENESIN AND DEXTROMETHORPHAN HYDROBROMIDE 600; 30 MG/1; MG/1
1 TABLET, EXTENDED RELEASE ORAL 2 TIMES DAILY PRN
Status: DISCONTINUED | OUTPATIENT
Start: 2021-01-03 | End: 2021-01-12 | Stop reason: HOSPADM

## 2021-01-03 RX ADMIN — METHYLPREDNISOLONE SODIUM SUCCINATE 40 MG: 40 INJECTION, POWDER, FOR SOLUTION INTRAMUSCULAR; INTRAVENOUS at 20:38

## 2021-01-03 RX ADMIN — GUAIFENESIN 600 MG: 600 TABLET, EXTENDED RELEASE ORAL at 08:51

## 2021-01-03 RX ADMIN — BUDESONIDE AND FORMOTEROL FUMARATE DIHYDRATE 2 PUFF: 160; 4.5 AEROSOL RESPIRATORY (INHALATION) at 18:31

## 2021-01-03 RX ADMIN — SODIUM CHLORIDE, PRESERVATIVE FREE 10 ML: 5 INJECTION INTRAVENOUS at 08:51

## 2021-01-03 RX ADMIN — ENOXAPARIN SODIUM 30 MG: 60 INJECTION SUBCUTANEOUS at 20:42

## 2021-01-03 RX ADMIN — IPRATROPIUM BROMIDE AND ALBUTEROL SULFATE 3 ML: .5; 3 SOLUTION RESPIRATORY (INHALATION) at 00:28

## 2021-01-03 RX ADMIN — CYCLOBENZAPRINE HYDROCHLORIDE 10 MG: 10 TABLET, FILM COATED ORAL at 08:51

## 2021-01-03 RX ADMIN — METOPROLOL SUCCINATE 25 MG: 25 TABLET, EXTENDED RELEASE ORAL at 20:41

## 2021-01-03 RX ADMIN — GABAPENTIN 100 MG: 100 CAPSULE ORAL at 15:39

## 2021-01-03 RX ADMIN — LORAZEPAM 0.5 MG: 0.5 TABLET ORAL at 08:51

## 2021-01-03 RX ADMIN — ACETAMINOPHEN 650 MG: 325 TABLET, FILM COATED ORAL at 01:18

## 2021-01-03 RX ADMIN — HYDROCODONE BITARTRATE AND ACETAMINOPHEN 1 TABLET: 10; 325 TABLET ORAL at 15:39

## 2021-01-03 RX ADMIN — SODIUM CHLORIDE, PRESERVATIVE FREE 10 ML: 5 INJECTION INTRAVENOUS at 20:42

## 2021-01-03 RX ADMIN — BUDESONIDE AND FORMOTEROL FUMARATE DIHYDRATE 2 PUFF: 160; 4.5 AEROSOL RESPIRATORY (INHALATION) at 07:22

## 2021-01-03 RX ADMIN — CEFTRIAXONE 1 G: 1 INJECTION, SOLUTION INTRAVENOUS at 17:49

## 2021-01-03 RX ADMIN — LORAZEPAM 0.5 MG: 0.5 TABLET ORAL at 20:45

## 2021-01-03 RX ADMIN — IPRATROPIUM BROMIDE AND ALBUTEROL SULFATE 3 ML: .5; 3 SOLUTION RESPIRATORY (INHALATION) at 07:22

## 2021-01-03 RX ADMIN — GABAPENTIN 100 MG: 100 CAPSULE ORAL at 20:41

## 2021-01-03 RX ADMIN — GABAPENTIN 100 MG: 100 CAPSULE ORAL at 08:51

## 2021-01-03 RX ADMIN — VENLAFAXINE 75 MG: 75 TABLET ORAL at 17:49

## 2021-01-03 RX ADMIN — IPRATROPIUM BROMIDE AND ALBUTEROL SULFATE 3 ML: .5; 3 SOLUTION RESPIRATORY (INHALATION) at 18:31

## 2021-01-03 RX ADMIN — GUAIFENESIN 600 MG: 600 TABLET, EXTENDED RELEASE ORAL at 20:41

## 2021-01-03 RX ADMIN — IPRATROPIUM BROMIDE AND ALBUTEROL SULFATE 3 ML: .5; 3 SOLUTION RESPIRATORY (INHALATION) at 12:57

## 2021-01-03 RX ADMIN — AZITHROMYCIN 500 MG: 500 INJECTION, POWDER, LYOPHILIZED, FOR SOLUTION INTRAVENOUS at 18:28

## 2021-01-03 RX ADMIN — METHYLPREDNISOLONE SODIUM SUCCINATE 40 MG: 40 INJECTION, POWDER, FOR SOLUTION INTRAMUSCULAR; INTRAVENOUS at 06:39

## 2021-01-03 RX ADMIN — HYDROCODONE BITARTRATE AND ACETAMINOPHEN 1 TABLET: 10; 325 TABLET ORAL at 06:40

## 2021-01-03 RX ADMIN — PANTOPRAZOLE SODIUM 40 MG: 40 TABLET, DELAYED RELEASE ORAL at 06:40

## 2021-01-03 RX ADMIN — VENLAFAXINE 75 MG: 75 TABLET ORAL at 08:51

## 2021-01-03 NOTE — PLAN OF CARE
Problem: Adult Inpatient Plan of Care  Goal: Plan of Care Review  Outcome: Ongoing, Progressing  Flowsheets (Taken 1/3/2021 0409)  Progress: improving  Plan of Care Reviewed With: patient  Outcome Summary: VSS, O2 AT 3 LITERS WITH O2 SAT >90%.  UP AT BEDSIDE WITH STAND BY ASSISTANCE,  PAIN CONTROLLED WITH [facility] MEDS.   Goal Outcome Evaluation:  Plan of Care Reviewed With: patient  Progress: improving  Outcome Summary: VSS, O2 AT 3 LITERS WITH O2 SAT >90%.  UP AT BEDSIDE WITH STAND BY ASSISTANCE,  PAIN CONTROLLED WITH [facility] MEDS.

## 2021-01-03 NOTE — PROGRESS NOTES
Discharge Planning Assessment  [facility]     Patient Name: Joelle Yee  MRN: [id number]  Today's Date: 1/3/2021    Admit Date: 1/2/2021    Discharge Needs Assessment     Row Name 01/03/21 1350       Living Environment    Lives With  alone    Unique Family Situation  Pt's grandson stays with her a couple times a week    Current Living Arrangements  home/apartment/condo    Duration at Residence  5 years    Primary Care Provided by  self    Provides Primary Care For  no one    Family Caregiver if Needed  child(cain), adult    Family Caregiver Names  Adia Rhodes, daughter [phone]    Quality of Family Relationships  helpful;involved;supportive    Able to Return to Prior Arrangements  yes    Living Arrangement Comments  Pt lives alone but has family support       Resource/Environmental Concerns    Resource/Environmental Concerns  none       Transition Planning    Patient/Family Anticipates Transition to  home    Patient/Family Anticipated Services at Transition  none currently has [facility]    Transportation Anticipated  family or friend will provide       Discharge Needs Assessment    Readmission Within the Last 30 Days  no previous admission in last 30 days    Equipment Currently Used at Home  oxygen;rollator;nebulizer;bath bench O2 managed by [employer]  O2@3Lper NC continuously Percussion machine        Discharge Plan     Row Name 01/03/21 4418       Plan    Plan  Spoke with pt about DCP    Confirmed current address and phone contacts  Cayetano Yee, brother [phone] and Adia Rhodes, daughter [phone]  No POA   PCP Abdirizak JON    Pt states she can do ADL fair  Pt cooks with microwave and and washes few dishes  Pt gets so SOA and tired with any activity   Pt has [facility] currently and they  her monthly commodities and delivers them to her   They also assist with other activities  Pt also utilizes [facility]    DME rollator, SB, concentrator, O2@3L per NC continuously and  managed by [employer], nebulizer and percussion  Pt is currently using [facility] for HH  Will continue assessing pt for any further needs prior to being discharged home        Continued Care and Services - Admitted Since 1/2/2021    Coordination has not been started for this encounter.       Expected Discharge Date and Time     Expected Discharge Date Expected Discharge Time    Jan 5, 2021         Demographic Summary     Row Name 01/03/21 1331       General Information    Admission Type  observation    Arrived From  emergency department    Required Notices Provided  Observation Status Notice    Referral Source  admission list    Reason for Consult  discharge planning    Preferred Language  English       Contact Information    Permission Granted to Share Info With      Contact Information Obtained for          Functional Status     Row Name 01/03/21 1331       Functional Status    Usual Activity Tolerance  fair    Functional Status Comments  Pt states she can perform ADL fair   Pt states she gets winded when she is up very long cooking and cleaning       Functional Status, IADL    Medications  independent    Meal Preparation  independent    Housekeeping  independent    Laundry  independent    Shopping  assistive person Pts grandkids do her shopping   [facility] picks up her monthly commodities and delivers them       Employment/    Employment Status  retired        Psychosocial    No documentation.       Abuse/Neglect    No documentation.       Legal    No documentation.       Substance Abuse    No documentation.       Patient Forms    No documentation.           Ginny Guevara, RN

## 2021-01-03 NOTE — PROGRESS NOTES
[facility]    Name:  Joelle eYe   Age:  75 y.o.  Sex:  female  :  1945  MRN:  [id number]   Visit Number:  [id number]  Admission Date:  2021  Date Of Service:  21  Primary Care Physician:  Blanquita Clements PA     LOS: 0 days :  Patient Care Team:  Blanquita Clements PA as PCP - General:    Chief Complaint:      Shortness of breath    Subjective / Interval History:     Patient seen and evaluated resting comfortably in bed today.  She was on her home requirement of 3 L with oxygen saturation in the high 90s.  However, patient still complaining of difficulty breathing.  No acute events overnight per nursing staff.  No nausea vomiting or diarrhea.    75 F history of COPD on 3 LNC followed by Dr. Esparza, seen in the ED 3 days ago presents again to the ER with similar complaints of shortness of breath and wheezing. This has been ongoing for the past week or 2 associated with cough.  She denies any sick contacts or known coronavirus exposure.  CT PE performed 2020 reveals bronchitis and bronchiectasis, no PE.  Covid from 2020 was negative.  Patient did require 4 to 5 L to maintain appropriate saturation in the ER.  Hospital service contacted for admission.    Review of Systems:     General ROS: Patient denies any fevers, chills or loss of consciousness.  Respiratory ROS: Complains of nonproductive cough and shortness of breath  Cardiovascular ROS: Denies chest pain or palpitations. No history of exertional chest pain.  Gastrointestinal ROS: Denies nausea and vomiting. Denies any abdominal pain. No diarrhea.  Neurological ROS: Denies any focal weakness. No loss of consciousness. Denies any numbness.  Dermatological ROS: Denies any redness or pruritis.    Vital Signs:    Temp:  [97.7 °F (36.5 °C)-98.6 °F (37 °C)] 98 °F (36.7 °C)  Heart Rate:  [] 97  Resp:  [17-24] 18  BP: (108-156)/() 108/85    Intake and output:    I/O last 3  completed shifts:  In: 1150 [P.O.:600; I.V.:50; IV Piggyback:500]  Out: 500 [Urine:500]  I/O this shift:  In: 120 [P.O.:120]  Out: -     Physical Examination:    General Appearance:  Alert and cooperative, not in any acute distress.   Head:  Atraumatic and normocephalic, without obvious abnormality.   Eyes:          PERRLA, conjunctivae and sclerae normal, no Icterus. No pallor.    Neck: Supple, trachea midline   Lungs:    Poor air movement.  Expiratory wheezing bilaterally.  No use of accessory muscles.   Heart:  Normal S1 and S2, no murmur, No JVD   Abdomen:   Normal bowel sounds, Soft, nontender, nondistended, no guarding, no rebound tenderness.   Extremities: Moves all extremities well, no edema, no cyanosis, no clubbing.   Skin: No bleeding, bruising or rash.   Neurologic: Awake, alert and oriented times 3. Moves all 4 extremities equally.     Laboratory results:    Results from last 7 days   Lab Units 01/03/21  0555 01/02/21  1532 12/29/20  0906 12/27/20  1745   SODIUM mmol/L 138 136 142 137   POTASSIUM mmol/L 4.5 4.7 4.3 4.2   CHLORIDE mmol/L 96* 96* 104 99   CO2 mmol/L 32.7* 31.6* 27.6 27.7   BUN mg/dL 25* 21 20 13   CREATININE mg/dL 0.60 0.73 0.68 0.71   CALCIUM mg/dL 8.9 9.1 9.2 8.9   BILIRUBIN mg/dL  --  0.2 0.2 <0.2   ALK PHOS U/L  --  111 115 128*   ALT (SGPT) U/L  --  29 12 14   AST (SGOT) U/L  --  26 25 23   GLUCOSE mg/dL 117* 89 139* 97     Results from last 7 days   Lab Units 01/02/21  1532 12/29/20  0906 12/27/20  1745   WBC 10*3/mm3 19.45* 19.90* 15.22*   HEMOGLOBIN g/dL 13.1 13.2 13.1   HEMATOCRIT % 41.1 41.5 40.8   PLATELETS 10*3/mm3 369 330 298         Results from last 7 days   Lab Units 12/29/20  0906 12/27/20  1745   TROPONIN T ng/mL <0.010 <0.010         Results from last 7 days   Lab Units 01/02/21  1630   PH, ARTERIAL pH units 7.405   PO2 ART mm Hg 104.0*   PCO2, ARTERIAL mm Hg 55.0*   HCO3 ART mmol/L 34.5*       I have reviewed the patient's laboratory results.    Radiology  results:    Imaging Results (Last 24 Hours)     Procedure Component Value Units Date/Time    XR Chest 1 View [[id number]] Collected: 01/02/21 1529     Updated: 01/02/21 1544    Narrative:      PROCEDURE: XR CHEST 1 VW-     HISTORY: Simple Sepsis Triage Protocol     COMPARISON: 12/29/2020.     FINDINGS: The heart is normal in size. The mediastinum is unremarkable.  There are emphysematous changes to the lungs. A left basilar opacity is  unchanged. No significant effusions are evident. There is no  pneumothorax.  There are no acute osseous abnormalities.       Impression:      Left basilar opacity which is unchanged compared to the  prior exam and may reflect atelectasis or pneumonia.     Continued followup is recommended.     This report was finalized on 1/2/2021 3:29 PM by Ignacia Lara M.D..          I have reviewed the patient's radiology reports.    Medication Review:     I have reviewed the patient's active and prn medications.       COPD exacerbation (CMS/HCC)    Acute on chronic respiratory failure with hypoxia and hypercapnia (CMS/HCC)    Bronchiectasis without complication (CMS/HCC)      Assessment:    COPD exacerbation, POA  Acute on chronic respiratory failure with hypoxia and hypercapnia, POA   Bronchiectasis without complication   Hypertension  Hyperlipidemia  Depression  Chronic pain    Plan:    Continue to monitor patient in hospital.  We will continue treatment with antibiotics, bronchodilators, IV steroids.  Patient does follow with Dr. Esparza.  Consult placed.  Patient will likely need BiPAP at home.  Supportive care.  Further orders as clinical course dictates.    Evelio Mckeon DO  01/03/21  11:37 EST    Dictated utilizing Dragon dictation.

## 2021-01-04 LAB
ANION GAP SERPL CALCULATED.3IONS-SCNC: 7.5 MMOL/L (ref 5–15)
BASOPHILS # BLD AUTO: 0.04 10*3/MM3 (ref 0–0.2)
BASOPHILS NFR BLD AUTO: 0.3 % (ref 0–1.5)
BUN SERPL-MCNC: 19 MG/DL (ref 8–23)
BUN/CREAT SERPL: 34.5 (ref 7–25)
CALCIUM SPEC-SCNC: 8.7 MG/DL (ref 8.6–10.5)
CHLORIDE SERPL-SCNC: 96 MMOL/L (ref 98–107)
CO2 SERPL-SCNC: 30.5 MMOL/L (ref 22–29)
CREAT SERPL-MCNC: 0.55 MG/DL (ref 0.57–1)
DEPRECATED RDW RBC AUTO: 54.1 FL (ref 37–54)
EOSINOPHIL # BLD AUTO: 0 10*3/MM3 (ref 0–0.4)
EOSINOPHIL NFR BLD AUTO: 0 % (ref 0.3–6.2)
ERYTHROCYTE [DISTWIDTH] IN BLOOD BY AUTOMATED COUNT: 15.1 % (ref 12.3–15.4)
GFR SERPL CREATININE-BSD FRML MDRD: 108 ML/MIN/1.73
GLUCOSE SERPL-MCNC: 97 MG/DL (ref 65–99)
HCT VFR BLD AUTO: 38.6 % (ref 34–46.6)
HGB BLD-MCNC: 12.6 G/DL (ref 12–15.9)
IMM GRANULOCYTES # BLD AUTO: 0.44 10*3/MM3 (ref 0–0.05)
IMM GRANULOCYTES NFR BLD AUTO: 2.8 % (ref 0–0.5)
L PNEUMO1 AG UR QL IA: NEGATIVE
LYMPHOCYTES # BLD AUTO: 1.44 10*3/MM3 (ref 0.7–3.1)
LYMPHOCYTES NFR BLD AUTO: 9.2 % (ref 19.6–45.3)
MCH RBC QN AUTO: 32.1 PG (ref 26.6–33)
MCHC RBC AUTO-ENTMCNC: 32.6 G/DL (ref 31.5–35.7)
MCV RBC AUTO: 98.5 FL (ref 79–97)
MONOCYTES # BLD AUTO: 0.97 10*3/MM3 (ref 0.1–0.9)
MONOCYTES NFR BLD AUTO: 6.2 % (ref 5–12)
NEUTROPHILS NFR BLD AUTO: 12.78 10*3/MM3 (ref 1.7–7)
NEUTROPHILS NFR BLD AUTO: 81.5 % (ref 42.7–76)
NRBC BLD AUTO-RTO: 0 /100 WBC (ref 0–0.2)
PLATELET # BLD AUTO: 292 10*3/MM3 (ref 140–450)
PMV BLD AUTO: 10.8 FL (ref 6–12)
POTASSIUM SERPL-SCNC: 5.1 MMOL/L (ref 3.5–5.2)
RBC # BLD AUTO: 3.92 10*6/MM3 (ref 3.77–5.28)
RBC MORPH BLD: NORMAL
S PNEUM AG SPEC QL LA: POSITIVE
SMALL PLATELETS BLD QL SMEAR: ADEQUATE
SODIUM SERPL-SCNC: 134 MMOL/L (ref 136–145)
WBC # BLD AUTO: 15.67 10*3/MM3 (ref 3.4–10.8)
WBC MORPH BLD: NORMAL

## 2021-01-04 PROCEDURE — 87077 CULTURE AEROBIC IDENTIFY: CPT | Performed by: INTERNAL MEDICINE

## 2021-01-04 PROCEDURE — 80048 BASIC METABOLIC PNL TOTAL CA: CPT | Performed by: INTERNAL MEDICINE

## 2021-01-04 PROCEDURE — 25010000002 ENOXAPARIN PER 10 MG: Performed by: EMERGENCY MEDICINE

## 2021-01-04 PROCEDURE — 99225 PR SBSQ OBSERVATION CARE/DAY 25 MINUTES: CPT | Performed by: FAMILY MEDICINE

## 2021-01-04 PROCEDURE — 94799 UNLISTED PULMONARY SVC/PX: CPT

## 2021-01-04 PROCEDURE — 87186 SC STD MICRODIL/AGAR DIL: CPT | Performed by: INTERNAL MEDICINE

## 2021-01-04 PROCEDURE — 25010000002 CEFTRIAXONE SODIUM-DEXTROSE 1-3.74 GM-%(50ML) RECONSTITUTED SOLUTION: Performed by: INTERNAL MEDICINE

## 2021-01-04 PROCEDURE — 87070 CULTURE OTHR SPECIMN AEROBIC: CPT | Performed by: INTERNAL MEDICINE

## 2021-01-04 PROCEDURE — 99223 1ST HOSP IP/OBS HIGH 75: CPT | Performed by: INTERNAL MEDICINE

## 2021-01-04 PROCEDURE — 25010000002 AZITHROMYCIN 500 MG/250 ML: Performed by: INTERNAL MEDICINE

## 2021-01-04 PROCEDURE — 25010000002 METHYLPREDNISOLONE PER 40 MG: Performed by: EMERGENCY MEDICINE

## 2021-01-04 PROCEDURE — 87899 AGENT NOS ASSAY W/OPTIC: CPT | Performed by: INTERNAL MEDICINE

## 2021-01-04 PROCEDURE — 85025 COMPLETE CBC W/AUTO DIFF WBC: CPT | Performed by: INTERNAL MEDICINE

## 2021-01-04 PROCEDURE — 87205 SMEAR GRAM STAIN: CPT | Performed by: INTERNAL MEDICINE

## 2021-01-04 PROCEDURE — 85007 BL SMEAR W/DIFF WBC COUNT: CPT | Performed by: INTERNAL MEDICINE

## 2021-01-04 PROCEDURE — G0378 HOSPITAL OBSERVATION PER HR: HCPCS

## 2021-01-04 RX ORDER — SODIUM CHLORIDE FOR INHALATION 3 %
4 VIAL, NEBULIZER (ML) INHALATION
Status: DISPENSED | OUTPATIENT
Start: 2021-01-04 | End: 2021-01-06

## 2021-01-04 RX ORDER — TRAZODONE HYDROCHLORIDE 50 MG/1
25 TABLET ORAL NIGHTLY
Status: DISCONTINUED | OUTPATIENT
Start: 2021-01-04 | End: 2021-01-12 | Stop reason: HOSPADM

## 2021-01-04 RX ORDER — BUDESONIDE 0.5 MG/2ML
0.5 INHALANT ORAL
Status: DISCONTINUED | OUTPATIENT
Start: 2021-01-04 | End: 2021-01-12 | Stop reason: HOSPADM

## 2021-01-04 RX ORDER — GABAPENTIN 100 MG/1
100 CAPSULE ORAL 3 TIMES DAILY
Status: DISCONTINUED | OUTPATIENT
Start: 2021-01-04 | End: 2021-01-04

## 2021-01-04 RX ORDER — LOSARTAN POTASSIUM 25 MG/1
25 TABLET ORAL NIGHTLY
Status: DISCONTINUED | OUTPATIENT
Start: 2021-01-05 | End: 2021-01-06

## 2021-01-04 RX ORDER — SODIUM CHLORIDE FOR INHALATION 3 %
4 VIAL, NEBULIZER (ML) INHALATION ONCE
Status: COMPLETED | OUTPATIENT
Start: 2021-01-04 | End: 2021-01-04

## 2021-01-04 RX ORDER — SODIUM CHLORIDE FOR INHALATION 3 %
VIAL, NEBULIZER (ML) INHALATION
Status: COMPLETED
Start: 2021-01-04 | End: 2021-01-04

## 2021-01-04 RX ADMIN — CYCLOBENZAPRINE HYDROCHLORIDE 10 MG: 10 TABLET, FILM COATED ORAL at 09:30

## 2021-01-04 RX ADMIN — TRAZODONE HYDROCHLORIDE 25 MG: 50 TABLET ORAL at 21:19

## 2021-01-04 RX ADMIN — GUAIFENESIN AND DEXTROMETHORPHAN HYDROBROMIDE 1 TABLET: 600; 30 TABLET, EXTENDED RELEASE ORAL at 21:30

## 2021-01-04 RX ADMIN — IPRATROPIUM BROMIDE AND ALBUTEROL SULFATE 3 ML: .5; 3 SOLUTION RESPIRATORY (INHALATION) at 19:18

## 2021-01-04 RX ADMIN — SODIUM CHLORIDE SOLN NEBU 3% 4 ML: 3 NEBU SOLN at 09:01

## 2021-01-04 RX ADMIN — CEFTRIAXONE 1 G: 1 INJECTION, SOLUTION INTRAVENOUS at 17:30

## 2021-01-04 RX ADMIN — SODIUM CHLORIDE, PRESERVATIVE FREE 10 ML: 5 INJECTION INTRAVENOUS at 21:24

## 2021-01-04 RX ADMIN — BUDESONIDE AND FORMOTEROL FUMARATE DIHYDRATE 2 PUFF: 160; 4.5 AEROSOL RESPIRATORY (INHALATION) at 06:47

## 2021-01-04 RX ADMIN — BUDESONIDE AND FORMOTEROL FUMARATE DIHYDRATE 2 PUFF: 160; 4.5 AEROSOL RESPIRATORY (INHALATION) at 19:19

## 2021-01-04 RX ADMIN — GUAIFENESIN 600 MG: 600 TABLET, EXTENDED RELEASE ORAL at 09:29

## 2021-01-04 RX ADMIN — VENLAFAXINE 75 MG: 75 TABLET ORAL at 09:30

## 2021-01-04 RX ADMIN — METHYLPREDNISOLONE SODIUM SUCCINATE 40 MG: 40 INJECTION, POWDER, FOR SOLUTION INTRAMUSCULAR; INTRAVENOUS at 21:19

## 2021-01-04 RX ADMIN — VENLAFAXINE 75 MG: 75 TABLET ORAL at 17:29

## 2021-01-04 RX ADMIN — IPRATROPIUM BROMIDE 1.5 MG: 0.5 SOLUTION RESPIRATORY (INHALATION) at 09:01

## 2021-01-04 RX ADMIN — GABAPENTIN 100 MG: 100 CAPSULE ORAL at 09:30

## 2021-01-04 RX ADMIN — GABAPENTIN 100 MG: 100 CAPSULE ORAL at 21:19

## 2021-01-04 RX ADMIN — METOPROLOL SUCCINATE 25 MG: 25 TABLET, EXTENDED RELEASE ORAL at 21:19

## 2021-01-04 RX ADMIN — BUDESONIDE 0.5 MG: 0.5 INHALANT RESPIRATORY (INHALATION) at 12:04

## 2021-01-04 RX ADMIN — GUAIFENESIN 600 MG: 600 TABLET, EXTENDED RELEASE ORAL at 21:20

## 2021-01-04 RX ADMIN — SODIUM CHLORIDE, PRESERVATIVE FREE 10 ML: 5 INJECTION INTRAVENOUS at 09:30

## 2021-01-04 RX ADMIN — IPRATROPIUM BROMIDE AND ALBUTEROL SULFATE 3 ML: .5; 3 SOLUTION RESPIRATORY (INHALATION) at 12:04

## 2021-01-04 RX ADMIN — METHYLPREDNISOLONE SODIUM SUCCINATE 40 MG: 40 INJECTION, POWDER, FOR SOLUTION INTRAMUSCULAR; INTRAVENOUS at 18:35

## 2021-01-04 RX ADMIN — AZITHROMYCIN 500 MG: 500 INJECTION, POWDER, LYOPHILIZED, FOR SOLUTION INTRAVENOUS at 18:02

## 2021-01-04 RX ADMIN — IPRATROPIUM BROMIDE AND ALBUTEROL SULFATE 3 ML: .5; 3 SOLUTION RESPIRATORY (INHALATION) at 00:33

## 2021-01-04 RX ADMIN — LORAZEPAM 0.5 MG: 0.5 TABLET ORAL at 09:30

## 2021-01-04 RX ADMIN — PANTOPRAZOLE SODIUM 40 MG: 40 TABLET, DELAYED RELEASE ORAL at 06:42

## 2021-01-04 RX ADMIN — HYDROCODONE BITARTRATE AND ACETAMINOPHEN 1 TABLET: 10; 325 TABLET ORAL at 17:58

## 2021-01-04 RX ADMIN — METHYLPREDNISOLONE SODIUM SUCCINATE 40 MG: 40 INJECTION, POWDER, FOR SOLUTION INTRAMUSCULAR; INTRAVENOUS at 06:42

## 2021-01-04 RX ADMIN — SODIUM CHLORIDE SOLN NEBU 3% 4 ML: 3 NEBU SOLN at 12:03

## 2021-01-04 RX ADMIN — IPRATROPIUM BROMIDE AND ALBUTEROL SULFATE 3 ML: .5; 3 SOLUTION RESPIRATORY (INHALATION) at 06:47

## 2021-01-04 RX ADMIN — GABAPENTIN 100 MG: 100 CAPSULE ORAL at 17:29

## 2021-01-04 RX ADMIN — BUDESONIDE 0.5 MG: 0.5 INHALANT RESPIRATORY (INHALATION) at 19:18

## 2021-01-04 RX ADMIN — Medication 1.5 MG: at 09:01

## 2021-01-04 RX ADMIN — Medication 4 ML: at 09:01

## 2021-01-04 RX ADMIN — LORAZEPAM 0.5 MG: 0.5 TABLET ORAL at 21:20

## 2021-01-04 RX ADMIN — ENOXAPARIN SODIUM 30 MG: 60 INJECTION SUBCUTANEOUS at 21:19

## 2021-01-04 NOTE — CONSULTS
"Date of consultation:   January 4, 2021    Requested by:   Hospitalist Service.     PCP: Blanquita Clements PA    Reason:  SOB.  Pneumonia    History of Present Illness:  75 y.o. female complains of increased shortness of breath and productive cough for the last 4 weeks. She had a productive cough with green sputum and shortness of breath that progressively worsened. She saw her PCP 3 times in December for these symptoms and was given antibiotics and steroids.  The patient says that she actually came to the ER and was given antibiotics and steroids and sent home but she had to come back because her symptoms did not improve.  She was eventually admitted to the hospital    She denies any fever or chills.     She feels that her sputum is \"extremely thick and difficult to bring up\".  She denies any sick contacts.    She continues to use her nebulized medications and oxygen. She uses the smart vest in 5 minute intervals regularly at home as well.    Review of System: All other review of systems negative except indicated in HPI.  No diarrhea. No fever.  Also complains of back pain.    Past Medical History:  Past Medical History:   Diagnosis Date   • Abdominal pain    • Acute bronchitis    • Ankle pain    • Anxiety 1980   • Atopic dermatitis    • Bronchiectasis (CMS/HCC)    • Cataract, bilateral    • Chest pain     STATES HAS OCCASSIONALLY.  STATES LAST TIME WAS LAST WEEK.  STATES SEES DR. RICH.  STATES HE HAS DONE NUMEROUS TESTS ON HER AND HAS NOT BEEN ABLE TO FIND OUT CAUSE.     • Chronic obstructive lung disease (CMS/HCC) 2008   • Colon cancer screening    • COPD (chronic obstructive pulmonary disease) (CMS/HCC)    • Cystocele    • Depressed    • Depression 1980   • Fatigue     Chronic pafigue 2012   • Fibromyalgia 2008   • Full dentures    • GERD (gastroesophageal reflux disease)    • Gout    • Heartburn     Chronic historu of epigastric heartburn currently controlled on Prilesec 40 mg every morning along with Zantac " 300 Mg daily at bedtime   • High cholesterol 2012   • Hip pain    • Hyperlipidemia    • Hypertension    • Impaired functional mobility, balance, gait, and endurance    • Insomnia    • Joint pain    • Kidney infection    • Loss of appetite    • Low back pain 1995   • Melena    • Nausea and vomiting    • On home oxygen therapy     2L/NC PRN (STATES SHE HAS BEEN USING CONTINUOSLY LATELY)   • Osteoarthritis 2010   • Osteoporosis    • Pain in limb    • Palpitations    • Pinched nerve     Pinched nerves 2011   • Pneumonia    • Problems with swallowing     HAS TO EAT SLOW AND CHEW FOOD WELL   • Recurrent urinary tract infection    • Sciatica [id number]   • Shortness of breath    • Sinus problem    • Sleep apnea 1998    NO CPAP   • Upset stomach    • Valvular heart disease    • Vitamin B12 deficiency    • Wears glasses    • Weight loss, abnormal     Weight loss is stabel. On pund weight gain since 01/2016         Past Surgical History:  Past Surgical History:   Procedure Laterality Date   • ABDOMINAL HERNIA REPAIR  2016   • APPENDECTOMY  1965   • BACK SURGERY  2005   • BRONCHOSCOPY N/A 7/5/2018    Procedure: BRONCHOSCOPY w/ WASHINGS/BRUSHINGS with MAC;  Surgeon: Rebeka Esparza MD;  Location: [facility];  Service: Pulmonary   • CATARACT EXTRACTION Bilateral     Put in implants    • CHOLECYSTECTOMY  1985   • COLONOSCOPY     • ENDOSCOPY     • KNEE SURGERY Left 1979    Knee Cap   • TUBAL ABDOMINAL LIGATION  1971         Family History:  Family History   Problem Relation Age of Onset   • Ovarian cancer Mother    • Cancer Mother    • Lung cancer Father    • Heart disease Brother          Social History:  Social History     Socioeconomic History   • Marital status: Single     Spouse name: Not on file   • Number of children: Not on file   • Years of education: Not on file   • Highest education level: Not on file   Tobacco Use   • Smoking status: Former Smoker     Packs/day: 0.00     Years: 35.00     Pack years: 0.00     Quit date:  "7/5/2017     Years since quitting: 3.5   • Smokeless tobacco: Never Used   Substance and Sexual Activity   • Alcohol use: No   • Drug use: No   • Sexual activity: Defer         Physical Exam:  /72 (BP Location: Right arm, Patient Position: Lying)   Pulse 81   Temp 97.7 °F (36.5 °C) (Oral)   Resp 18   Ht 144.8 cm (57\")   Wt 42.2 kg (93 lb 0.6 oz)   LMP  (LMP Unknown)   SpO2 100%   BMI 20.13 kg/m² 2 lpm    Constitutional:             General: Moderate respiratory distress noted.    Head/Face/Eyes:             No significant facial abnormalities seen.             Extra ocular movement was intact.             Pupils appeared equal    ENT:             Hearing was intact.             Dentures noted.              No nasal erythema noted.              Oropharynx was moist. No lesions noted.     Neck:             Supple. No JVD noted.              Thyroid gland did not seem to be enlarged    Cardiovascular:              S1 + S2. Regular.    Respiratory:            Respiratory effort was labored.             Decreased Air Entry Bilaterally with scattered wheezes bilaterally.    Abdomen:            Soft.  Bowel sounds were positive.  No obvious organomegaly.    Extremities:            No edema noted.            No cyanosis noted            No clubbing noted            Gait could not be assessed at this time.    Neurologic/Psychiatric:             Affect appeared fair.             Awake, alert and oriented x 3.             Able to follow simple commands.    Skin:             No rash noted.             Warm and dry      Labs: Reviewed. Pertinent labs were noted.     Lab Results   Component Value Date    WBC 15.67 (H) 01/04/2021    HGB 12.6 01/04/2021    HCT 38.6 01/04/2021    MCV 98.5 (H) 01/04/2021     01/04/2021       Lab Results   Component Value Date    GLUCOSE 97 01/04/2021    CALCIUM 8.7 01/04/2021     (L) 01/04/2021    K 5.1 01/04/2021    CO2 30.5 (H) 01/04/2021    CL 96 (L) 01/04/2021    BUN 19 " 01/04/2021    CREATININE 0.55 (L) 01/04/2021    EGFRIFNONA 108 01/04/2021    BCR 34.5 (H) 01/04/2021    ANIONGAP 7.5 01/04/2021    PROTEINTOT 6.8 01/02/2021    ALBUMIN 3.90 01/02/2021       Lab Results   Component Value Date    PROBNP 222.8 12/29/2020    PROBNP 196.2 12/27/2020    PROBNP 131.8 02/14/2020       Lab Results   Component Value Date    INR 1.21 (H) 07/05/2018       Lab Results   Component Value Date    PROCALCITO 0.04 01/02/2021    PROCALCITO 0.04 12/27/2020    PROCALCITO <0.05 06/17/2019       ABG: Reviewed  Lab Results   Component Value Date    PHART 7.405 01/02/2021    WUC0DDD 55.0 (H) 01/02/2021    PO2ART 104.0 (H) 01/02/2021    HGBBG 14.3 01/13/2020    J5DCIWOF 96.5 01/02/2021    CARBOXYHGB 1.7 01/02/2021         Micro: As of January 4, 2021   Lab Results   Component Value Date    RESPCX Light growth (2+) Pseudomonas aeruginosa (A) 09/16/2019    RESPCX No Normal Respiratory Ashly (A) 09/16/2019    RESPCX Heavy growth (4+) Yeast, Not Candida albicans (A) 06/17/2019     Lab Results   Component Value Date    BLOODCX No growth at 5 days 01/14/2020    BLOODCX No growth at 5 days 01/14/2020    BLOODCX No growth at 5 days 06/16/2019     Lab Results   Component Value Date    URINECX No growth 10/16/2017    URINECX 10,000-20,000 CFU/mL Enterococcus faecalis (A) 02/16/2017       Lab Results   Component Value Date    FLU Negative 12/27/2020    FLU Negative 12/27/2020            Imaging Study: Latest imaging studies was reviewed personally.   Imaging Results (Last 72 Hours)     Procedure Component Value Units Date/Time    XR Chest 1 View [[id number]] Collected: 01/02/21 1529     Updated: 01/02/21 1544    Narrative:      PROCEDURE: XR CHEST 1 VW-     HISTORY: Simple Sepsis Triage Protocol     COMPARISON: 12/29/2020.     FINDINGS: The heart is normal in size. The mediastinum is unremarkable.  There are emphysematous changes to the lungs. A left basilar opacity is  unchanged. No significant effusions are evident.  There is no  pneumothorax.  There are no acute osseous abnormalities.       Impression:      Left basilar opacity which is unchanged compared to the  prior exam and may reflect atelectasis or pneumonia.     Continued followup is recommended.     This report was finalized on 1/2/2021 3:29 PM by Ignacia Lara M.D..          ECHO:  Results for orders placed during the hospital encounter of 01/14/20   Adult Transthoracic Echo Complete With Contrast if Necessary Per Protocol    Narrative Technically adequate study   1) Borderline LVH with normal LV systolic function ( EF is over 55%)  2) Normal left atrial size   3) Aortic valve sclerosis with mild AI   4) Mild TR with normal PA pressures   5) Normal size and function of the RV            Assessment:  1.  AECOPD  2.  Bronchiectasis with acute exacerbation  3.  Chronic hypoxemia  4.  Severe COPD    Discussion/Recommendations:   Continue IV steroids.  Nebulized treatments have been adjusted and we will add hypertonic saline.    Have also ordered Atrovent nebs to be given, for 20-30 minutes continuous, given her ongoing chest tightness and shortness of breath.    Other nebs have been adjusted.    Her last sputum culture showed pseudomonas which was mostly sensitive.    We will consider bronchoscopy, if she continues to have significant symptoms or chest x-ray remains abnormal.    Flutter valve will be ordered.    The plan was discussed with the patient.  I have also discussed the case with the nursing staff.    Recommendations were also discussed with the referring provider.     I would like to thank you for the opportunity to participate in the care of this patient.        This document was electronically signed by Rebeka Esparza MD on 01/04/21 at 09:26 EST

## 2021-01-04 NOTE — PLAN OF CARE
Goal Outcome Evaluation:  Plan of Care Reviewed With: patient  Progress: improving  Outcome Summary: VSS.  Up to bedside commode.  No complaints of pain.  O2 sat >90% on 3L O2.  Will continue to monitor patient.

## 2021-01-04 NOTE — THERAPY DISCHARGE NOTE
Upon attempt to eval for PT, patient's RN, Noelle, stated patient has no skilled needs and has been observed ambulating in her room with SBA. D/C PT order as patient has no skilled needs as present.

## 2021-01-04 NOTE — THERAPY EVALUATION
Spoke with pt, she reports she is independent with self care and functional mobility tasks.  Pt has no need for skilled OT at this time.

## 2021-01-04 NOTE — PROGRESS NOTES
[facility]    Name:  Joelle Yee   Age:  75 y.o.  Sex:  female  :  1945  MRN:  [id number]   Visit Number:  [id number]  Admission Date:  2021  Date Of Service:  21  Primary Care Physician:  Blanquita Clements PA     LOS: 0 days :  Patient Care Team:  Blanquita Clements PA as PCP - General:    Chief Complaint:      Shortness of breath    Subjective / Interval History:     Patient seen and examined at bedside today.  Patient recognizes me from prior hospitalization about a year ago.  She states she continues to have shortness of breath, some chest tightness at times.  No fevers or chills.  Primarily nonproductive cough.  She has been following with Dr. Esparza on outpatient basis.  Denies any recent medication changes.  She notes she does have severe COPD and notes it seems like this has been worsening    Review of Systems:     General ROS: Patient denies any fevers, chills or loss of consciousness.  Respiratory ROS: Some breath, cough  Cardiovascular ROS: Denies chest pain or palpitations. No history of exertional chest pain.  Gastrointestinal ROS: Denies nausea and vomiting. Denies any abdominal pain. No diarrhea.  Neurological ROS: Denies any focal weakness. No loss of consciousness. Denies any numbness.  Dermatological ROS: Denies any redness or pruritis.    Vital Signs:    Temp:  [97.7 °F (36.5 °C)-98.6 °F (37 °C)] 98.6 °F (37 °C)  Heart Rate:  [] 91  Resp:  [16-22] 18  BP: (123-154)/(63-77) 151/74    Intake and output:    I/O last 3 completed shifts:  In: 1273.8 [P.O.:960; I.V.:313.8]  Out: 1625 [Urine:1625]  I/O this shift:  In: 960 [P.O.:960]  Out: -     Physical Examination:    General Appearance:  Alert and cooperative, not in any acute distress.  Ill-appearing elderly female.  Suspect pulmonary cachexia.   Head:  Atraumatic and normocephalic, without obvious abnormality.   Eyes:          PERRLA, conjunctivae and sclerae normal, no  Icterus. No pallor. Extraocular movements are within normal limits.   Neck: Supple, trachea midline, no thyromegaly.   Lungs:   Breath sounds heard bilaterally equally.  Bilateral expiratory wheezes   Heart:  Normal S1 and S2, no murmur, no gallop, no rub. No JVD   Abdomen:   Normal bowel sounds, no masses, no organomegaly. Soft, non-tender, non-distended, no guarding, no rebound tenderness.   Extremities: Moves all extremities well, no edema, no cyanosis, no clubbing.   Skin: No bleeding, bruising or rash.   Neurologic: Awake, alert and oriented times 3. Moves all 4 extremities equally.     Laboratory results:    Results from last 7 days   Lab Units 01/04/21  0652 01/03/21  0555 01/02/21  1532 12/29/20  0906   SODIUM mmol/L 134* 138 136 142   POTASSIUM mmol/L 5.1 4.5 4.7 4.3   CHLORIDE mmol/L 96* 96* 96* 104   CO2 mmol/L 30.5* 32.7* 31.6* 27.6   BUN mg/dL 19 25* 21 20   CREATININE mg/dL 0.55* 0.60 0.73 0.68   CALCIUM mg/dL 8.7 8.9 9.1 9.2   BILIRUBIN mg/dL  --   --  0.2 0.2   ALK PHOS U/L  --   --  111 115   ALT (SGPT) U/L  --   --  29 12   AST (SGOT) U/L  --   --  26 25   GLUCOSE mg/dL 97 117* 89 139*     Results from last 7 days   Lab Units 01/04/21  0652 01/02/21  1532 12/29/20  0906   WBC 10*3/mm3 15.67* 19.45* 19.90*   HEMOGLOBIN g/dL 12.6 13.1 13.2   HEMATOCRIT % 38.6 41.1 41.5   PLATELETS 10*3/mm3 292 369 330         Results from last 7 days   Lab Units 12/29/20  0906   TROPONIN T ng/mL <0.010         Results from last 7 days   Lab Units 01/02/21  1630   PH, ARTERIAL pH units 7.405   PO2 ART mm Hg 104.0*   PCO2, ARTERIAL mm Hg 55.0*   HCO3 ART mmol/L 34.5*       I have reviewed the patient's laboratory results.    Radiology results:    Imaging Results (Last 24 Hours)     ** No results found for the last 24 hours. **          I have reviewed the patient's radiology reports.    Medication Review:     I have reviewed the patient's active and prn medications.       COPD exacerbation (CMS/HCC)    Acute on chronic  respiratory failure with hypoxia and hypercapnia (CMS/HCC)    Bronchiectasis without complication (CMS/HCC)      Assessment:    COPD exacerbation, POA  Acute on chronic respiratory failure with hypoxia and hypercapnia, POA   Bronchiectasis without complication   Hypertension  Hyperlipidemia  Depression  Chronic pain    Plan:    Patient is overall hemodynamically stable on 3 L of oxygen.  She is known to me from prior hospitalizations and has significant history of advanced COPD and bronchiectasis.  She also uses home smart vest in addition to her nebulizer treatments and supplemental oxygen.  Is currently been on IV steroids and bronchodilator therapy.  Pulmonology consult was placed and Dr. Esparza will see the patient today.  Likely with significant bronchiectasis would benefit from hypertonic saline treatment.  She may need bronchoscopy as well.    Continue with current plan of care.  Anticipate multiple day stay remaining depending upon improvement in clinical condition.    Jeanie Antonio DO  01/04/21  14:09 EST    Dictated utilizing Dragon dictation.

## 2021-01-05 ENCOUNTER — APPOINTMENT (OUTPATIENT)
Dept: GENERAL RADIOLOGY | Facility: HOSPITAL | Age: 76
End: 2021-01-05

## 2021-01-05 LAB — M PNEUMO IGM SER IA-ACNC: <770 U/ML (ref 0–769)

## 2021-01-05 PROCEDURE — 71046 X-RAY EXAM CHEST 2 VIEWS: CPT

## 2021-01-05 PROCEDURE — 25010000002 METHYLPREDNISOLONE PER 40 MG: Performed by: EMERGENCY MEDICINE

## 2021-01-05 PROCEDURE — 25010000002 ENOXAPARIN PER 10 MG: Performed by: EMERGENCY MEDICINE

## 2021-01-05 PROCEDURE — 94799 UNLISTED PULMONARY SVC/PX: CPT

## 2021-01-05 PROCEDURE — 99225 PR SBSQ OBSERVATION CARE/DAY 25 MINUTES: CPT | Performed by: FAMILY MEDICINE

## 2021-01-05 PROCEDURE — G0378 HOSPITAL OBSERVATION PER HR: HCPCS

## 2021-01-05 PROCEDURE — 25010000002 LEVOFLOXACIN PER 250 MG: Performed by: FAMILY MEDICINE

## 2021-01-05 PROCEDURE — 99232 SBSQ HOSP IP/OBS MODERATE 35: CPT | Performed by: INTERNAL MEDICINE

## 2021-01-05 PROCEDURE — 25010000002 ONDANSETRON PER 1 MG: Performed by: EMERGENCY MEDICINE

## 2021-01-05 RX ORDER — LEVOFLOXACIN 5 MG/ML
750 INJECTION, SOLUTION INTRAVENOUS
Status: COMPLETED | OUTPATIENT
Start: 2021-01-05 | End: 2021-01-11

## 2021-01-05 RX ADMIN — VENLAFAXINE 75 MG: 75 TABLET ORAL at 09:46

## 2021-01-05 RX ADMIN — METHYLPREDNISOLONE SODIUM SUCCINATE 40 MG: 40 INJECTION, POWDER, FOR SOLUTION INTRAMUSCULAR; INTRAVENOUS at 21:23

## 2021-01-05 RX ADMIN — BUDESONIDE 0.5 MG: 0.5 INHALANT RESPIRATORY (INHALATION) at 07:11

## 2021-01-05 RX ADMIN — IPRATROPIUM BROMIDE AND ALBUTEROL SULFATE 3 ML: .5; 3 SOLUTION RESPIRATORY (INHALATION) at 00:45

## 2021-01-05 RX ADMIN — METHYLPREDNISOLONE SODIUM SUCCINATE 40 MG: 40 INJECTION, POWDER, FOR SOLUTION INTRAMUSCULAR; INTRAVENOUS at 18:30

## 2021-01-05 RX ADMIN — BUDESONIDE AND FORMOTEROL FUMARATE DIHYDRATE 2 PUFF: 160; 4.5 AEROSOL RESPIRATORY (INHALATION) at 07:36

## 2021-01-05 RX ADMIN — GABAPENTIN 100 MG: 100 CAPSULE ORAL at 09:49

## 2021-01-05 RX ADMIN — HYDROCODONE BITARTRATE AND ACETAMINOPHEN 1 TABLET: 10; 325 TABLET ORAL at 06:24

## 2021-01-05 RX ADMIN — SODIUM CHLORIDE, PRESERVATIVE FREE 10 ML: 5 INJECTION INTRAVENOUS at 09:52

## 2021-01-05 RX ADMIN — IPRATROPIUM BROMIDE AND ALBUTEROL SULFATE 3 ML: .5; 3 SOLUTION RESPIRATORY (INHALATION) at 12:55

## 2021-01-05 RX ADMIN — LORAZEPAM 0.5 MG: 0.5 TABLET ORAL at 09:46

## 2021-01-05 RX ADMIN — LOSARTAN POTASSIUM 25 MG: 25 TABLET, FILM COATED ORAL at 21:23

## 2021-01-05 RX ADMIN — LORAZEPAM 0.5 MG: 0.5 TABLET ORAL at 21:23

## 2021-01-05 RX ADMIN — VENLAFAXINE 75 MG: 75 TABLET ORAL at 17:08

## 2021-01-05 RX ADMIN — ONDANSETRON 4 MG: 2 INJECTION INTRAMUSCULAR; INTRAVENOUS at 18:23

## 2021-01-05 RX ADMIN — LEVOFLOXACIN 750 MG: 5 INJECTION, SOLUTION INTRAVENOUS at 17:10

## 2021-01-05 RX ADMIN — SODIUM CHLORIDE SOLN NEBU 3% 4 ML: 3 NEBU SOLN at 00:45

## 2021-01-05 RX ADMIN — ENOXAPARIN SODIUM 30 MG: 60 INJECTION SUBCUTANEOUS at 21:23

## 2021-01-05 RX ADMIN — HYDROCODONE BITARTRATE AND ACETAMINOPHEN 1 TABLET: 10; 325 TABLET ORAL at 15:11

## 2021-01-05 RX ADMIN — CYCLOBENZAPRINE HYDROCHLORIDE 10 MG: 10 TABLET, FILM COATED ORAL at 09:46

## 2021-01-05 RX ADMIN — GUAIFENESIN 600 MG: 600 TABLET, EXTENDED RELEASE ORAL at 09:46

## 2021-01-05 RX ADMIN — GABAPENTIN 100 MG: 100 CAPSULE ORAL at 15:11

## 2021-01-05 RX ADMIN — METOPROLOL SUCCINATE 25 MG: 25 TABLET, EXTENDED RELEASE ORAL at 21:23

## 2021-01-05 RX ADMIN — METHYLPREDNISOLONE SODIUM SUCCINATE 40 MG: 40 INJECTION, POWDER, FOR SOLUTION INTRAMUSCULAR; INTRAVENOUS at 06:20

## 2021-01-05 RX ADMIN — SODIUM CHLORIDE SOLN NEBU 3% 4 ML: 3 NEBU SOLN at 07:11

## 2021-01-05 RX ADMIN — PANTOPRAZOLE SODIUM 40 MG: 40 TABLET, DELAYED RELEASE ORAL at 06:20

## 2021-01-05 RX ADMIN — GUAIFENESIN 600 MG: 600 TABLET, EXTENDED RELEASE ORAL at 21:23

## 2021-01-05 RX ADMIN — TRAZODONE HYDROCHLORIDE 25 MG: 50 TABLET ORAL at 21:23

## 2021-01-05 RX ADMIN — SODIUM CHLORIDE, PRESERVATIVE FREE 10 ML: 5 INJECTION INTRAVENOUS at 21:25

## 2021-01-05 RX ADMIN — IPRATROPIUM BROMIDE AND ALBUTEROL SULFATE 3 ML: .5; 3 SOLUTION RESPIRATORY (INHALATION) at 07:11

## 2021-01-05 RX ADMIN — BUDESONIDE 0.5 MG: 0.5 INHALANT RESPIRATORY (INHALATION) at 18:39

## 2021-01-05 RX ADMIN — GABAPENTIN 100 MG: 100 CAPSULE ORAL at 21:23

## 2021-01-05 RX ADMIN — BUDESONIDE AND FORMOTEROL FUMARATE DIHYDRATE 2 PUFF: 160; 4.5 AEROSOL RESPIRATORY (INHALATION) at 19:31

## 2021-01-05 RX ADMIN — IPRATROPIUM BROMIDE AND ALBUTEROL SULFATE 3 ML: .5; 3 SOLUTION RESPIRATORY (INHALATION) at 18:40

## 2021-01-05 NOTE — PROGRESS NOTES
"  CC: Acute exacerbation of COPD.  Bronchiectasis.    S: Continues to feel chest tightness.  Continues to feel that \"nothing comes up with a cough\".  Also mentions slight improvement in sensation of shortness of breath.    ROS: Positive for cough, shortness of breath, mild anxiety. Denies chest pain, diarrhea or fever.    O: /69 (BP Location: Right arm, Patient Position: Sitting)   Pulse 78   Temp 97.4 °F (36.3 °C) (Oral)   Resp 18   Ht 144.8 cm (57\")   Wt 42.2 kg (93 lb 0.6 oz)   LMP  (LMP Unknown)   SpO2 99%   BMI 20.13 kg/m²     Vital signs reviewed.  General/Constitutional: Mild respiratory distress noted.  Neck: No JVD   Cardiovascular: S1 + S2. Regular.    Lungs/Respiratory: Mild scattered wheezing heard. Basal crackles noted  Musculoskeletal/Extremities: No edema noted.  Neurologic: AAOx3. Was able to follow commands     Labs: Reviewed.     Results from last 7 days   Lab Units 01/04/21  0652 01/02/21  1532   WBC 10*3/mm3 15.67* 19.45*   HEMOGLOBIN g/dL 12.6 13.1   HEMATOCRIT % 38.6 41.1   PLATELETS 10*3/mm3 292 369       Results from last 7 days   Lab Units 01/04/21  0652 01/03/21  0555 01/02/21  1532   SODIUM mmol/L 134* 138 136   POTASSIUM mmol/L 5.1 4.5 4.7   CHLORIDE mmol/L 96* 96* 96*   CO2 mmol/L 30.5* 32.7* 31.6*   BUN mg/dL 19 25* 21   CREATININE mg/dL 0.55* 0.60 0.73   CALCIUM mg/dL 8.7 8.9 9.1   BILIRUBIN mg/dL  --   --  0.2   ALK PHOS U/L  --   --  111   ALT (SGPT) U/L  --   --  29   AST (SGOT) U/L  --   --  26   GLUCOSE mg/dL 97 117* 89   TOTAL PROTEIN g/dL  --   --  6.8   ALBUMIN g/dL  --   --  3.90                   Lab Results   Component Value Date    PROCALCITO 0.04 01/02/2021    PROCALCITO 0.04 12/27/2020    PROCALCITO <0.05 06/17/2019       Lab Results   Component Value Date    PROBNP 222.8 12/29/2020    PROBNP 196.2 12/27/2020    PROBNP 131.8 02/14/2020       No results found for: CRP    No results found for: SEDRATE      Micro: As of January 5, 2021   Lab Results   Component " Value Date    RESPCX Moderate growth (3+) Pseudomonas species (A) 01/04/2021    RESPCX Light growth (2+) Pseudomonas aeruginosa (A) 09/16/2019    RESPCX No Normal Respiratory Ashly (A) 09/16/2019     Lab Results   Component Value Date    BLOODCX No growth at 5 days 01/14/2020    BLOODCX No growth at 5 days 01/14/2020    BLOODCX No growth at 5 days 06/16/2019     Lab Results   Component Value Date    URINECX No growth 10/16/2017    URINECX 10,000-20,000 CFU/mL Enterococcus faecalis (A) 02/16/2017     No results found for: MRSACX  No results found for: MRSAPCR  No results found for: URCX  No components found for: LOWRESPCF  No results found for: THROATCX  No results found for: CULTURES  No components found for: STREPBCX  Lab Results   Component Value Date    STREPPNEUAG Positive (A) 01/04/2021     No results found for: LEGIONELLA  No results found for: MYCOPLASCX  No results found for: GCCX  Lab Results   Component Value Date    WOUNDCX Moderate growth (3+) Staphylococcus aureus (A) 10/25/2018     No results found for: BODYFLDCX    Lab Results   Component Value Date    FLU Negative 12/27/2020    FLU Negative 12/27/2020       No results found for: ADENOVIRUS  Lab Results   Component Value Date    CT400T Not Detected 06/16/2019     Lab Results   Component Value Date    CVHKU1 Not Detected 06/16/2019     Lab Results   Component Value Date    CVNL63 Not Detected 06/16/2019     Lab Results   Component Value Date    CVOC43 Not Detected 06/16/2019     Lab Results   Component Value Date    HUMETPNEVS Not Detected 06/16/2019     Lab Results   Component Value Date    HURVEV Not Detected 06/16/2019     Lab Results   Component Value Date    FLUBPCR Not Detected 06/16/2019     Lab Results   Component Value Date    PARAINFLUE Not Detected 06/16/2019     Lab Results   Component Value Date    PARAFLUV2 Not Detected 06/16/2019     Lab Results   Component Value Date    PARAFLUV3 Not Detected 06/16/2019     Lab Results   Component Value  Date    PARAFLUV4 Not Detected 06/16/2019     Lab Results   Component Value Date    BPERTPCR Not Detected 06/16/2019     No results found for: [id number]  Lab Results   Component Value Date    CPNEUPCR Not Detected 06/16/2019     Lab Results   Component Value Date    MPNEUMO Not Detected 06/16/2019     No results found for: FLUAPCR  Lab Results   Component Value Date    FLUAH3 Not Detected 06/16/2019     Lab Results   Component Value Date    FLUAH1 Not Detected 06/16/2019     Lab Results   Component Value Date    RSV Not Detected 06/16/2019     No results found for: BPARAPCR    COVID 19:  Lab Results   Component Value Date    COVID19 Not Detected 12/27/2020              ABG: Reviewed  Lab Results   Component Value Date    PHART 7.405 01/02/2021    LGW5HQD 55.0 (H) 01/02/2021    PO2ART 104.0 (H) 01/02/2021    HGBBG 14.3 01/13/2020    U2ASNXTX 96.5 01/02/2021    CARBOXYHGB 1.7 01/02/2021         CXRay: Latest imaging study was reviewed personally.   Imaging Results (Last 24 Hours)     Procedure Component Value Units Date/Time    XR Chest 2 View [[id number]] Collected: 01/05/21 1123     Updated: 01/05/21 1215    Narrative:      PROCEDURE: XR CHEST 2 VW-        HISTORY: Pneumonia; J44.1-Chronic obstructive pulmonary disease with  (acute) exacerbation     COMPARISON: January 2, 2021.     FINDINGS: The heart is normal in size. The mediastinum is unremarkable.  There are chronic changes of the lungs bilaterally. There is no  pneumothorax. There are no acute osseous abnormalities.       Impression:      No acute cardiopulmonary process.                       Images were reviewed, interpreted, and dictated by Dr. Ignacia Lara M.D.  Transcribed by Veronique Rivera PA-C.     This report was finalized on 1/5/2021 12:13 PM by Ignacia Lara M.D..          Assessment & Recommendations/Plan:   1.  Acute exacerbation of COPD  Continue steroids for now  Continue nebulized treatments    2.  Bronchiectasis with  exacerbation  Continue flutter valve.  We will continue hypertonic saline for now.  We will consider chest percussion therapy    3.  Chronic hypoxemia  Continue oxygen at home.    4.  Severe COPD  Her last PFTs did confirm severe COPD    5.  Chronic respiratory acidosis  Based on the last blood gas, she may actually qualify for BiPAP or even noninvasive ventilation device but the patient has been reluctant/hesitant in the past.  Will consider discussion with her on an outpatient basis.    We have reviewed patient's current orders and changes, if any, have been suggested to primary care team. Plan was also discussed with nursing staff, as necessary.     This document was electronically signed by Rebeka Esparza MD on 01/05/21 at 12:55 EST      Dictated utilizing Dragon dictation.

## 2021-01-05 NOTE — PHARMACY RECOMMENDATION
"Pharmacy Consult for Dosing, Pharmacokinetics, and Stewardship of Antimicrobial Drugs     Joelle Yee is a  75 y.o. female.  Height - 144.8 cm (57\")  Weight - 42.2 kg (93 lb 0.6 oz)    BMI (Body Mass Index) = 20.13 kg/m²    Dose adjustment per Pharmacy for Levofloxacin.  Indication for use: Pneumonia.      Labs:   Results from last 7 days   Lab Units 01/04/21  0652 01/03/21  0555 01/02/21  1532   WBC 10*3/mm3 15.67*  --  19.45*   CREATININE mg/dL 0.55* 0.60 0.73   PROCALCITONIN ng/mL  --   --  0.04   Estimated Creatinine Clearance: 40.5 mL/min (A) (by C-G formula based on SCr of 0.55 mg/dL (L)).      Micro:  Microbiology Results (last 10 days)       Procedure Component Value - Date/Time    Respiratory Culture - Sputum, Cough [[id number]]  (Abnormal) Collected: 01/04/21 1246    Lab Status: Preliminary result Specimen: Sputum from Cough Updated: 01/05/21 0944     Respiratory Culture Moderate growth (3+) Pseudomonas species     Gram Stain Greater than 20 WBCs per low power field      Less than 10 Epithelial cells per low power field      Moderate (3+) Gram negative bacilli      Rare (1+) Gram negative cocci    S. Pneumo Ag Urine or CSF - Urine, Urine, Clean Catch [[id number]]  (Abnormal) Collected: 01/04/21 0953    Lab Status: Final result Specimen: Urine, Clean Catch Updated: 01/04/21 1846     Strep Pneumo Ag Positive    Legionella Antigen, Urine - Urine, Urine, Clean Catch [[id number]]  (Normal) Collected: 01/04/21 0953    Lab Status: Final result Specimen: Urine, Clean Catch Updated: 01/04/21 1846     LEGIONELLA ANTIGEN, URINE Negative    Mycoplasma Pneumoniae Antibody, IgM - Blood, [[id number]] Collected: 01/03/21 0555    Lab Status: Final result Specimen: Blood Updated: 01/05/21 1308     M pneumoniae IgM Abs <770 U/mL      Comment:                              Negative            <770  Clinically significant amount of M. pneumoniae antibody  not detected.                               Low Positive   770 - " 950  M. pneumoniae specific IgM presumptively detected.  It  is recommended that another sample be collected 1-2  weeks later to assure reactivity.                               Positive            >950  Highly significant amount of M. pneumoniae specific  IgM antibody detected.       Narrative:      Performed at:  [address]  [id number]  : Sarmad Jaimes PhD, Phone:  [phone]    COVID-19,[facility] IN-HOUSE,Nasal Swab No Transport Media 3-4 HR TAT - Swab, Nasal Cavity [[id number]]  (Normal) Collected: 12/27/20 1837    Lab Status: Final result Specimen: Swab from Nasal Cavity Updated: 12/27/20 1912     COVID19 Not Detected    Narrative:      Fact sheet for providers: https://www.fda.gov/media/143065/download     Fact sheet for patients: https://www.fda.gov/media/884657/download    Test performed by PCR.    Influenza Antigen, Rapid - Swab, Nasopharynx [[id number]]  (Normal) Collected: 12/27/20 1837    Lab Status: Final result Specimen: Swab from Nasopharynx Updated: 12/27/20 1856     Influenza A Ag, EIA Negative     Influenza B Ag, EIA Negative            Current Abx:  Anti-Infectives (From admission, onward)      Ordered     Dose/Rate Route Frequency Start Stop    01/05/21 1508  levoFLOXacin (LEVAQUIN) 750 mg/150 mL D5W (premix) (LEVAQUIN) 750 mg     Ordering Provider: Jeanie Antonio DO    750 mg  100 mL/hr over 90 Minutes Intravenous Every 48 Hours 01/05/21 1600 01/13/21 1559    01/02/21 1716  cefTRIAXone (ROCEPHIN) 1 g in sodium chloride 0.9 % 100 mL IVPB     Ordering Provider: Michael Daniels DO    1 g  200 mL/hr over 30 Minutes Intravenous Once 01/02/21 1718 01/02/21 1900    01/02/21 1716  AZITHROMYCIN 500 MG/250 ML 0.9% NS IVPB (vial-mate)     Ordering Provider: Amanda Hein DO    500 mg  over 60 Minutes Intravenous Once 01/02/21 1717 01/02/21 2311            Assessment/Plan:  Renally adjusted Levofloxacin 750 mg IV from q 24 hrs to q 48 hrs for same  duration (7 days) for CrCl < 50 mL/min.    Pharmacy will continue to monitor renal function and adjust dose accordingly.    Thank you.    Jeff Haro, Pharm.D.  01/05/21  15:17 EST

## 2021-01-05 NOTE — PROGRESS NOTES
"Adult Nutrition  Assessment/PES    Patient Name:  Joelle Yee  YOB: 1945  MRN: [id number]  Admit Date:  1/2/2021    Assessment Date:  1/5/2021    Comments:    Recommend:  1. Continue current diet order as medically appropriate and tolerated.  2. Encourage PO intake. PO intake average ~92% x 7 meals.  3. RD ordered Boost Plus BID.  4. Consider a multivitamin with minerals daily.  5. Continue to monitor and replace electrolytes PRN.     RD to follow pt and available PRN.      Reason for Assessment     Row Name 01/05/21 1310          Reason for Assessment    Reason For Assessment  identified at risk by screening criteria     Diagnosis  cardiac disease;pulmonary disease COPD, A/C respiratory failure, HTN, HLD     Identified At Risk by Screening Criteria  other (see comments) LACE           Anthropometrics     Row Name 01/05/21 1311          Anthropometrics    Height  144.8 cm (57\")        Ideal Body Weight (IBW)    Ideal Body Weight (IBW) (kg)  39         Labs/Tests/Procedures/Meds     Row Name 01/05/21 1310          Labs/Procedures/Meds    Lab Results Reviewed  reviewed, pertinent     Lab Results Comments  Low: Na+, Cl-, Cr        Medications    Pertinent Medications Reviewed  reviewed, pertinent     Pertinent Medications Comments  Solu-medrol, Protonix         Physical Findings     Row Name 01/05/21 1311          Physical Findings    Skin  other (see comments) Lower leg skin tears         Estimated/Assessed Needs     Row Name 01/05/21 1311          Calculation Measurements    Weight Used For Calculations  42.2 kg (93 lb 0.6 oz) Actual BW     Height  144.8 cm (57\")        Estimated/Assessed Needs    Additional Documentation  Calorie Requirements (Group);Protein Requirements (Group);[place]-St. Jeor Equation (Group);Fluid Requirements (Group)        Calorie Requirements    Estimated Calorie Need Method  [place]-St [place]visor     Estimated Calorie Requirement Comment  1200 - 1400        [place]-St. Erick " Equation    RMR ([facility] Equation)  790.895     [facility] Activity Factors  1.4 - 1.5     Activity Factors ([facility])  1105.881 - 2827.9176        Protein Requirements    Weight Used For Protein Calculations  42.2 kg (93 lb 0.6 oz) Actual BW     Est Protein Requirement Amount (gms/kg)  1.5 gm protein 51 - 63 gm     Estimated Protein Requirements (gms/day)  63.3        Fluid Requirements    Estimated Fluid Requirement Method  [place]-Segar Formula     [place]-Segar Method (over 20 kg)  2344.04         Nutrition Prescription Ordered     Row Name 01/05/21 1312          Nutrition Prescription PO    Current PO Diet  Regular     Common Modifiers  Cardiac         Evaluation of Received Nutrient/Fluid Intake     Row Name 01/05/21 1312          PO Evaluation    Number of Days PO Intake Evaluated  3 days     Number of Meals  7     % PO Intake  92               Problem/Interventions:  Problem 1     Row Name 01/05/21 1313          Nutrition Diagnoses Problem 1    Problem 1  Increased Nutrient Needs     Macronutrient  Kcal;Protein     Etiology (related to)  Medical Diagnosis     Pulmonary/Critical Care  COPD;A/C respiratory failure     Signs/Symptoms (evidenced by)  Report/Observation     Reported/Observed By  MD               Intervention Goal     Row Name 01/05/21 1313          Intervention Goal    General  Meet nutritional needs for age/condition;Improved nutrition related lab(s)     PO  Meet estimated needs;Maintain intake;PO intake (%)     PO Intake %  -- 75 - 100%     Weight  Maintain weight         Nutrition Intervention     Row Name 01/05/21 1313          Nutrition Intervention    RD/Tech Action  Follow Tx progress;Encourage intake;Recommend/ordered     Recommended/Ordered  Supplement         Nutrition Prescription     Row Name 01/05/21 1313          Nutrition Prescription PO    PO Prescription  Other (comment);Begin/change supplement Continue current diet order as medically appropriate and  tolerated     Supplement  Boost Plus     Supplement Frequency  2 times a day     New PO Prescription Ordered?  Yes Supplements ordered        Other Orders    Obtain Weight  Daily     Obtain Weight Ordered?  No, recommended     Supplement  Vitamin mineral supplement     Supplement Ordered?  No, recommended     Other  Continue to monitor and replace electrolytes PRN         Education/Evaluation     Row Name 01/05/21 1314          Education    Education  Will Instruct as appropriate        Monitor/Evaluation    Monitor  Per protocol;I&O;PO intake;Supplement intake;Pertinent labs;Weight;Skin status           Electronically signed by:  Nicole Marin RD  01/05/21 13:15 EST

## 2021-01-05 NOTE — PROGRESS NOTES
[facility]    [facility]    Name:  Joelle Yee   Age:  75 y.o.  Sex:  female  :  1945  MRN:  [id number]   Visit Number:  [id number]  Admission Date:  2021  Date Of Service:  21  Primary Care Physician:  Blanquita Clements PA     LOS: 0 days :  Patient Care Team:  Blanquita Clements PA as PCP - General:    Chief Complaint:      Shortness of breath    Subjective / Interval History:     Patient seen and examined at bedside today.  No acute changes noted overnight.  Cough mildly more productive today.  No fevers or chills.  Discussed results of her labs and testing with her.    Review of Systems:     General ROS: Patient denies any fevers, chills or loss of consciousness.  Respiratory ROS: Shortness of breath, cough  Cardiovascular ROS: Denies chest pain or palpitations. No history of exertional chest pain.  Gastrointestinal ROS: Denies nausea and vomiting. Denies any abdominal pain. No diarrhea.  Neurological ROS: Denies any focal weakness. No loss of consciousness. Denies any numbness.  Dermatological ROS: Denies any redness or pruritis.    Vital Signs:    Temp:  [97.4 °F (36.3 °C)-99.7 °F (37.6 °C)] 99.7 °F (37.6 °C)  Heart Rate:  [78-94] 82  Resp:  [18] 18  BP: (130-158)/(58-71) 130/58    Intake and output:    I/O last 3 completed shifts:  In: 1753.8 [P.O.:1440; I.V.:313.8]  Out: 2500 [Urine:2500]  I/O this shift:  In: 500 [P.O.:500]  Out: 600 [Urine:600]    Physical Examination:    General Appearance:  Alert and cooperative, not in any acute distress.  Ill-appearing elderly female.  Suspect pulmonary cachexia.   Head:  Atraumatic and normocephalic, without obvious abnormality.   Eyes:          PERRLA, conjunctivae and sclerae normal, no Icterus. No pallor. Extraocular movements are within normal limits.   Neck: Supple, trachea midline, no thyromegaly.   Lungs:   Breath sounds heard bilaterally equally.  Bilateral expiratory wheezes, albeit better than  yesterday.   Heart:  Normal S1 and S2, no murmur, no gallop, no rub. No JVD   Abdomen:   Normal bowel sounds, no masses, no organomegaly. Soft, non-tender, non-distended, no guarding, no rebound tenderness.   Extremities: Moves all extremities well, no edema, no cyanosis, no clubbing.   Skin: No bleeding, bruising or rash.   Neurologic: Awake, alert and oriented times 3. Moves all 4 extremities equally.     Laboratory results:    Results from last 7 days   Lab Units 01/04/21  0652 01/03/21  0555 01/02/21  1532   SODIUM mmol/L 134* 138 136   POTASSIUM mmol/L 5.1 4.5 4.7   CHLORIDE mmol/L 96* 96* 96*   CO2 mmol/L 30.5* 32.7* 31.6*   BUN mg/dL 19 25* 21   CREATININE mg/dL 0.55* 0.60 0.73   CALCIUM mg/dL 8.7 8.9 9.1   BILIRUBIN mg/dL  --   --  0.2   ALK PHOS U/L  --   --  111   ALT (SGPT) U/L  --   --  29   AST (SGOT) U/L  --   --  26   GLUCOSE mg/dL 97 117* 89     Results from last 7 days   Lab Units 01/04/21  0652 01/02/21  1532   WBC 10*3/mm3 15.67* 19.45*   HEMOGLOBIN g/dL 12.6 13.1   HEMATOCRIT % 38.6 41.1   PLATELETS 10*3/mm3 292 369                 Results from last 7 days   Lab Units 01/02/21  1630   PH, ARTERIAL pH units 7.405   PO2 ART mm Hg 104.0*   PCO2, ARTERIAL mm Hg 55.0*   HCO3 ART mmol/L 34.5*       I have reviewed the patient's laboratory results.    Radiology results:    Imaging Results (Last 24 Hours)     Procedure Component Value Units Date/Time    XR Chest 2 View [[id number]] Collected: 01/05/21 1123     Updated: 01/05/21 1215    Narrative:      PROCEDURE: XR CHEST 2 VW-        HISTORY: Pneumonia; J44.1-Chronic obstructive pulmonary disease with  (acute) exacerbation     COMPARISON: January 2, 2021.     FINDINGS: The heart is normal in size. The mediastinum is unremarkable.  There are chronic changes of the lungs bilaterally. There is no  pneumothorax. There are no acute osseous abnormalities.       Impression:      No acute cardiopulmonary process.                       Images were reviewed,  interpreted, and dictated by Dr. Ignacia aLra M.D.  Transcribed by Veronique Rivera PA-C.     This report was finalized on 1/5/2021 12:13 PM by Ignacia Lara M.D..          I have reviewed the patient's radiology reports.    Medication Review:     I have reviewed the patient's active and prn medications.       COPD exacerbation (CMS/HCC)    Acute on chronic respiratory failure with hypoxia and hypercapnia (CMS/HCC)    Bronchiectasis without complication (CMS/HCC)      Assessment:    COPD exacerbation, POA  Acute on chronic respiratory failure with hypoxia and hypercapnia, POA   Bronchiectasis without complication   Hypertension  Hyperlipidemia  Depression  Chronic pain    Plan:    Patient is hemodynamically stable.  Has been on azithromycin and Rocephin, strep pneumo was positive.  Her sputum is positive for Pseudomonas which she has had in the past.  We will switch antibiotic coverage to Levaquin.  Stop azithromycin and Rocephin.  Continue with steroids and bronchodilators.  Hypertonic saline.  Appreciate pulmonology recommendations and management.    Anticipate another 1 to 2-day stay remaining depending upon improvement.    Jeanie Antonio DO  01/05/21  15:08 EST    Dictated utilizing Dragon dictation.

## 2021-01-06 LAB
ALBUMIN SERPL-MCNC: 3.5 G/DL (ref 3.5–5.2)
ALBUMIN/GLOB SERPL: 1.2 G/DL
ALP SERPL-CCNC: 101 U/L (ref 39–117)
ALT SERPL W P-5'-P-CCNC: 21 U/L (ref 1–33)
ANION GAP SERPL CALCULATED.3IONS-SCNC: 7.3 MMOL/L (ref 5–15)
AST SERPL-CCNC: 17 U/L (ref 1–32)
BACTERIA SPEC RESP CULT: ABNORMAL
BACTERIA SPEC RESP CULT: ABNORMAL
BASOPHILS # BLD AUTO: 0.06 10*3/MM3 (ref 0–0.2)
BASOPHILS NFR BLD AUTO: 0.4 % (ref 0–1.5)
BILIRUB SERPL-MCNC: <0.2 MG/DL (ref 0–1.2)
BUN SERPL-MCNC: 22 MG/DL (ref 8–23)
BUN/CREAT SERPL: 42.3 (ref 7–25)
CALCIUM SPEC-SCNC: 9.2 MG/DL (ref 8.6–10.5)
CHLORIDE SERPL-SCNC: 96 MMOL/L (ref 98–107)
CO2 SERPL-SCNC: 30.7 MMOL/L (ref 22–29)
CREAT SERPL-MCNC: 0.52 MG/DL (ref 0.57–1)
DEPRECATED RDW RBC AUTO: 55.5 FL (ref 37–54)
EOSINOPHIL # BLD AUTO: 0 10*3/MM3 (ref 0–0.4)
EOSINOPHIL NFR BLD AUTO: 0 % (ref 0.3–6.2)
ERYTHROCYTE [DISTWIDTH] IN BLOOD BY AUTOMATED COUNT: 15 % (ref 12.3–15.4)
GFR SERPL CREATININE-BSD FRML MDRD: 115 ML/MIN/1.73
GLOBULIN UR ELPH-MCNC: 2.9 GM/DL
GLUCOSE SERPL-MCNC: 117 MG/DL (ref 65–99)
GRAM STN SPEC: ABNORMAL
HCT VFR BLD AUTO: 39.3 % (ref 34–46.6)
HGB BLD-MCNC: 12.8 G/DL (ref 12–15.9)
IMM GRANULOCYTES # BLD AUTO: 0.68 10*3/MM3 (ref 0–0.05)
IMM GRANULOCYTES NFR BLD AUTO: 5 % (ref 0–0.5)
LYMPHOCYTES # BLD AUTO: 1.16 10*3/MM3 (ref 0.7–3.1)
LYMPHOCYTES NFR BLD AUTO: 8.5 % (ref 19.6–45.3)
MCH RBC QN AUTO: 32.4 PG (ref 26.6–33)
MCHC RBC AUTO-ENTMCNC: 32.6 G/DL (ref 31.5–35.7)
MCV RBC AUTO: 99.5 FL (ref 79–97)
MONOCYTES # BLD AUTO: 0.99 10*3/MM3 (ref 0.1–0.9)
MONOCYTES NFR BLD AUTO: 7.2 % (ref 5–12)
NEUTROPHILS NFR BLD AUTO: 10.78 10*3/MM3 (ref 1.7–7)
NEUTROPHILS NFR BLD AUTO: 78.9 % (ref 42.7–76)
NRBC BLD AUTO-RTO: 0 /100 WBC (ref 0–0.2)
PLATELET # BLD AUTO: 372 10*3/MM3 (ref 140–450)
PMV BLD AUTO: 9.2 FL (ref 6–12)
POTASSIUM SERPL-SCNC: 5.4 MMOL/L (ref 3.5–5.2)
PROT SERPL-MCNC: 6.4 G/DL (ref 6–8.5)
RBC # BLD AUTO: 3.95 10*6/MM3 (ref 3.77–5.28)
SODIUM SERPL-SCNC: 134 MMOL/L (ref 136–145)
WBC # BLD AUTO: 13.67 10*3/MM3 (ref 3.4–10.8)

## 2021-01-06 PROCEDURE — 63710000001 INSULIN REGULAR HUMAN PER 5 UNITS: Performed by: FAMILY MEDICINE

## 2021-01-06 PROCEDURE — 99225 PR SBSQ OBSERVATION CARE/DAY 25 MINUTES: CPT | Performed by: FAMILY MEDICINE

## 2021-01-06 PROCEDURE — 80053 COMPREHEN METABOLIC PANEL: CPT | Performed by: FAMILY MEDICINE

## 2021-01-06 PROCEDURE — G0378 HOSPITAL OBSERVATION PER HR: HCPCS

## 2021-01-06 PROCEDURE — 99232 SBSQ HOSP IP/OBS MODERATE 35: CPT | Performed by: INTERNAL MEDICINE

## 2021-01-06 PROCEDURE — 94799 UNLISTED PULMONARY SVC/PX: CPT

## 2021-01-06 PROCEDURE — 25010000002 METHYLPREDNISOLONE PER 40 MG: Performed by: EMERGENCY MEDICINE

## 2021-01-06 PROCEDURE — 85025 COMPLETE CBC W/AUTO DIFF WBC: CPT | Performed by: FAMILY MEDICINE

## 2021-01-06 PROCEDURE — 25010000002 ENOXAPARIN PER 10 MG: Performed by: EMERGENCY MEDICINE

## 2021-01-06 RX ORDER — CHOLECALCIFEROL (VITAMIN D3) 125 MCG
10 CAPSULE ORAL NIGHTLY
Status: DISCONTINUED | OUTPATIENT
Start: 2021-01-06 | End: 2021-01-12 | Stop reason: HOSPADM

## 2021-01-06 RX ORDER — OXYCODONE HYDROCHLORIDE 5 MG/1
5 TABLET ORAL EVERY 8 HOURS PRN
Status: DISCONTINUED | OUTPATIENT
Start: 2021-01-06 | End: 2021-01-12 | Stop reason: HOSPADM

## 2021-01-06 RX ORDER — CYCLOBENZAPRINE HCL 10 MG
10 TABLET ORAL 3 TIMES DAILY PRN
Status: DISCONTINUED | OUTPATIENT
Start: 2021-01-06 | End: 2021-01-12 | Stop reason: HOSPADM

## 2021-01-06 RX ORDER — IPRATROPIUM BROMIDE AND ALBUTEROL SULFATE 2.5; .5 MG/3ML; MG/3ML
6 SOLUTION RESPIRATORY (INHALATION) ONCE
Status: COMPLETED | OUTPATIENT
Start: 2021-01-06 | End: 2021-01-06

## 2021-01-06 RX ORDER — DEXTROSE MONOHYDRATE 25 G/50ML
50 INJECTION, SOLUTION INTRAVENOUS ONCE
Status: COMPLETED | OUTPATIENT
Start: 2021-01-06 | End: 2021-01-06

## 2021-01-06 RX ORDER — ACETYLCYSTEINE 200 MG/ML
1.5 SOLUTION ORAL; RESPIRATORY (INHALATION)
Status: DISPENSED | OUTPATIENT
Start: 2021-01-06 | End: 2021-01-08

## 2021-01-06 RX ORDER — MIRTAZAPINE 15 MG/1
30 TABLET, FILM COATED ORAL NIGHTLY
Status: DISCONTINUED | OUTPATIENT
Start: 2021-01-06 | End: 2021-01-12 | Stop reason: HOSPADM

## 2021-01-06 RX ADMIN — DICLOFENAC 4 G: 10 GEL TOPICAL at 15:10

## 2021-01-06 RX ADMIN — CYCLOBENZAPRINE HYDROCHLORIDE 10 MG: 10 TABLET, FILM COATED ORAL at 15:10

## 2021-01-06 RX ADMIN — GABAPENTIN 100 MG: 100 CAPSULE ORAL at 09:03

## 2021-01-06 RX ADMIN — TRAZODONE HYDROCHLORIDE 25 MG: 50 TABLET ORAL at 21:40

## 2021-01-06 RX ADMIN — SODIUM CHLORIDE, PRESERVATIVE FREE 10 ML: 5 INJECTION INTRAVENOUS at 08:56

## 2021-01-06 RX ADMIN — METHYLPREDNISOLONE SODIUM SUCCINATE 40 MG: 40 INJECTION, POWDER, FOR SOLUTION INTRAMUSCULAR; INTRAVENOUS at 06:18

## 2021-01-06 RX ADMIN — DICLOFENAC 4 G: 10 GEL TOPICAL at 04:36

## 2021-01-06 RX ADMIN — METOPROLOL SUCCINATE 25 MG: 25 TABLET, EXTENDED RELEASE ORAL at 21:40

## 2021-01-06 RX ADMIN — VENLAFAXINE 75 MG: 75 TABLET ORAL at 17:28

## 2021-01-06 RX ADMIN — GUAIFENESIN 600 MG: 600 TABLET, EXTENDED RELEASE ORAL at 09:03

## 2021-01-06 RX ADMIN — BUDESONIDE 0.5 MG: 0.5 INHALANT RESPIRATORY (INHALATION) at 07:06

## 2021-01-06 RX ADMIN — BUDESONIDE AND FORMOTEROL FUMARATE DIHYDRATE 2 PUFF: 160; 4.5 AEROSOL RESPIRATORY (INHALATION) at 07:07

## 2021-01-06 RX ADMIN — MIRTAZAPINE 30 MG: 15 TABLET, FILM COATED ORAL at 21:40

## 2021-01-06 RX ADMIN — GABAPENTIN 100 MG: 100 CAPSULE ORAL at 21:41

## 2021-01-06 RX ADMIN — IPRATROPIUM BROMIDE AND ALBUTEROL SULFATE 3 ML: .5; 3 SOLUTION RESPIRATORY (INHALATION) at 00:11

## 2021-01-06 RX ADMIN — LORAZEPAM 0.5 MG: 0.5 TABLET ORAL at 21:40

## 2021-01-06 RX ADMIN — IPRATROPIUM BROMIDE AND ALBUTEROL SULFATE 3 ML: .5; 3 SOLUTION RESPIRATORY (INHALATION) at 19:07

## 2021-01-06 RX ADMIN — PANTOPRAZOLE SODIUM 40 MG: 40 TABLET, DELAYED RELEASE ORAL at 06:18

## 2021-01-06 RX ADMIN — HUMAN INSULIN 6 UNITS: 100 INJECTION, SOLUTION SUBCUTANEOUS at 08:57

## 2021-01-06 RX ADMIN — METHYLPREDNISOLONE SODIUM SUCCINATE 40 MG: 40 INJECTION, POWDER, FOR SOLUTION INTRAMUSCULAR; INTRAVENOUS at 19:25

## 2021-01-06 RX ADMIN — HYDROCODONE BITARTRATE AND ACETAMINOPHEN 1 TABLET: 10; 325 TABLET ORAL at 19:25

## 2021-01-06 RX ADMIN — LORAZEPAM 0.5 MG: 0.5 TABLET ORAL at 09:03

## 2021-01-06 RX ADMIN — IPRATROPIUM BROMIDE AND ALBUTEROL SULFATE 6 ML: .5; 3 SOLUTION RESPIRATORY (INHALATION) at 14:17

## 2021-01-06 RX ADMIN — Medication 10 MG: at 21:40

## 2021-01-06 RX ADMIN — BUDESONIDE AND FORMOTEROL FUMARATE DIHYDRATE 2 PUFF: 160; 4.5 AEROSOL RESPIRATORY (INHALATION) at 19:07

## 2021-01-06 RX ADMIN — GABAPENTIN 100 MG: 100 CAPSULE ORAL at 17:28

## 2021-01-06 RX ADMIN — SODIUM CHLORIDE, PRESERVATIVE FREE 10 ML: 5 INJECTION INTRAVENOUS at 21:56

## 2021-01-06 RX ADMIN — ENOXAPARIN SODIUM 30 MG: 60 INJECTION SUBCUTANEOUS at 21:39

## 2021-01-06 RX ADMIN — GUAIFENESIN 600 MG: 600 TABLET, EXTENDED RELEASE ORAL at 21:40

## 2021-01-06 RX ADMIN — IPRATROPIUM BROMIDE AND ALBUTEROL SULFATE 3 ML: .5; 3 SOLUTION RESPIRATORY (INHALATION) at 07:06

## 2021-01-06 RX ADMIN — IPRATROPIUM BROMIDE AND ALBUTEROL SULFATE 3 ML: .5; 3 SOLUTION RESPIRATORY (INHALATION) at 13:00

## 2021-01-06 RX ADMIN — DEXTROSE MONOHYDRATE 50 ML: 25 INJECTION, SOLUTION INTRAVENOUS at 08:57

## 2021-01-06 RX ADMIN — VENLAFAXINE 75 MG: 75 TABLET ORAL at 09:03

## 2021-01-06 RX ADMIN — HYDROCODONE BITARTRATE AND ACETAMINOPHEN 1 TABLET: 10; 325 TABLET ORAL at 04:36

## 2021-01-06 RX ADMIN — SODIUM CHLORIDE SOLN NEBU 3% 4 ML: 3 NEBU SOLN at 00:11

## 2021-01-06 RX ADMIN — CYCLOBENZAPRINE HYDROCHLORIDE 10 MG: 10 TABLET, FILM COATED ORAL at 09:03

## 2021-01-06 NOTE — PROGRESS NOTES
Continued Stay Note   [facility]     Patient Name: Joelle Yee  MRN: [id number]  Today's Date: 1/6/2021    Admit Date: 1/2/2021    Discharge Plan     Row Name 01/06/21 0903       Plan    Plan  Pt has [employer] waiver program three days a week does not have home health        Discharge Codes    No documentation.             Sydney Goff RN

## 2021-01-06 NOTE — PROGRESS NOTES
"    CC: Acute exacerbation of COPD.  Bronchiectasis.    S: Continues to feel chest tightness.  Says that she actually had a decent day till about 2 hours ago when she had a coughing spell and since then she is once again feeling \"fullness and inability to cough up any phlegm\".  She is also complaining of midepigastric pain    ROS: Positive for cough, shortness of breath, mild anxiety. Denies chest pain, diarrhea or fever.    O: /69 (BP Location: Right arm, Patient Position: Sitting)   Pulse 91   Temp 98 °F (36.7 °C) (Oral)   Resp 18   Ht 144.8 cm (57\")   Wt 42.2 kg (93 lb 0.6 oz)   LMP  (LMP Unknown)   SpO2 100%   BMI 20.13 kg/m²     Vital signs reviewed.  General/Constitutional: Mild respiratory distress noted.  Neck: No significant JVD   Cardiovascular: S1 + S2.  Regular at this time.  Lungs/Respiratory: Bilateral, somewhat diffuse, wheezing heard. Basal crackles noted  Musculoskeletal/Extremities: No edema noted.  Neurologic: AAOx3. Was able to follow commands     Labs: Reviewed.   Results from last 7 days   Lab Units 01/06/21  0642 01/04/21  0652 01/02/21  1532   WBC 10*3/mm3 13.67* 15.67* 19.45*   HEMOGLOBIN g/dL 12.8 12.6 13.1   HEMATOCRIT % 39.3 38.6 41.1   PLATELETS 10*3/mm3 372 292 369       Results from last 7 days   Lab Units 01/06/21  0643 01/04/21  0652 01/03/21  0555 01/02/21  1532   SODIUM mmol/L 134* 134* 138 136   POTASSIUM mmol/L 5.4* 5.1 4.5 4.7   CHLORIDE mmol/L 96* 96* 96* 96*   CO2 mmol/L 30.7* 30.5* 32.7* 31.6*   BUN mg/dL 22 19 25* 21   CREATININE mg/dL 0.52* 0.55* 0.60 0.73   CALCIUM mg/dL 9.2 8.7 8.9 9.1   BILIRUBIN mg/dL <0.2  --   --  0.2   ALK PHOS U/L 101  --   --  111   ALT (SGPT) U/L 21  --   --  29   AST (SGOT) U/L 17  --   --  26   GLUCOSE mg/dL 117* 97 117* 89   TOTAL PROTEIN g/dL 6.4  --   --  6.8   ALBUMIN g/dL 3.50  --   --  3.90                   Lab Results   Component Value Date    PROCALCITO 0.04 01/02/2021    PROCALCITO 0.04 12/27/2020    PROCALCITO <0.05 " 06/17/2019       Lab Results   Component Value Date    PROBNP 222.8 12/29/2020    PROBNP 196.2 12/27/2020    PROBNP 131.8 02/14/2020       No results found for: CRP    No results found for: SEDRATE      Micro: As of January 6, 2021   Lab Results   Component Value Date    RESPCX Moderate growth (3+) Pseudomonas aeruginosa (A) 01/04/2021    RESPCX No Normal Respiratory Ashly (A) 01/04/2021    RESPCX Light growth (2+) Pseudomonas aeruginosa (A) 09/16/2019    RESPCX No Normal Respiratory Ashly (A) 09/16/2019     Lab Results   Component Value Date    BLOODCX No growth at 5 days 01/14/2020    BLOODCX No growth at 5 days 01/14/2020    BLOODCX No growth at 5 days 06/16/2019     Lab Results   Component Value Date    URINECX No growth 10/16/2017    URINECX 10,000-20,000 CFU/mL Enterococcus faecalis (A) 02/16/2017     No results found for: MRSACX  No results found for: MRSAPCR  No results found for: URCX  No components found for: LOWRESPCF  No results found for: THROATCX  No results found for: CULTURES  No components found for: STREPBCX  Lab Results   Component Value Date    STREPPNEUAG Positive (A) 01/04/2021     No results found for: LEGIONELLA  No results found for: MYCOPLASCX  No results found for: GCCX  Lab Results   Component Value Date    WOUNDCX Moderate growth (3+) Staphylococcus aureus (A) 10/25/2018     No results found for: BODYFLDCX    Lab Results   Component Value Date    FLU Negative 12/27/2020    FLU Negative 12/27/2020     No results found for: ADENOVIRUS  Lab Results   Component Value Date    OC650D Not Detected 06/16/2019     Lab Results   Component Value Date    CVHKU1 Not Detected 06/16/2019     Lab Results   Component Value Date    CVNL63 Not Detected 06/16/2019     Lab Results   Component Value Date    CVOC43 Not Detected 06/16/2019     Lab Results   Component Value Date    HUMETPNEVS Not Detected 06/16/2019     Lab Results   Component Value Date    HURVEV Not Detected 06/16/2019     Lab Results    Component Value Date    FLUBPCR Not Detected 06/16/2019     Lab Results   Component Value Date    PARAINFLUE Not Detected 06/16/2019     Lab Results   Component Value Date    PARAFLUV2 Not Detected 06/16/2019     Lab Results   Component Value Date    PARAFLUV3 Not Detected 06/16/2019     Lab Results   Component Value Date    PARAFLUV4 Not Detected 06/16/2019     Lab Results   Component Value Date    BPERTPCR Not Detected 06/16/2019     No results found for: [id number]  Lab Results   Component Value Date    CPNEUPCR Not Detected 06/16/2019     Lab Results   Component Value Date    MPNEUMO Not Detected 06/16/2019     No results found for: FLUAPCR  Lab Results   Component Value Date    FLUAH3 Not Detected 06/16/2019     Lab Results   Component Value Date    FLUAH1 Not Detected 06/16/2019     Lab Results   Component Value Date    RSV Not Detected 06/16/2019     No results found for: BPARAPCR    COVID 19:  Lab Results   Component Value Date    COVID19 Not Detected 12/27/2020          ABG: Reviewed  Lab Results   Component Value Date    PHART 7.405 01/02/2021    MPZ4LCC 55.0 (H) 01/02/2021    PO2ART 104.0 (H) 01/02/2021    HGBBG 14.3 01/13/2020    K1CZGDKK 96.5 01/02/2021    CARBOXYHGB 1.7 01/02/2021         CXRay: Latest imaging study was reviewed personally.   Imaging Results (Last 24 Hours)     ** No results found for the last 24 hours. **          Assessment & Recommendations/Plan:   1.  Acute exacerbation of COPD  Continue steroids for now.  I am not entirely clear with regards to her worsening today but will continue nebulized treatments    2.  Bronchiectasis with exacerbation  Continue flutter valve.  We will start chest percussion therapy  We will also start the patient on Mucomyst.    3.  Chronic hypoxemia  Continue oxygen at home.    4.  Severe COPD  Her last PFTs did confirm severe COPD    5.  Chronic respiratory acidosis  Based on the last blood gas, she may actually qualify for BiPAP or noninvasive  ventilation device.  Will consider discussion with her on an outpatient basis.    We have reviewed patient's current orders and changes, if any, have been suggested to primary care team. Plan was also discussed with nursing staff, as necessary.     We have updated the admitting attending and nursing staff, as appropriate, on the patient's current status and plan. I will be going off shift tonight and will be unavailable. Please contact oncall pulmonologist, if available.        This document was electronically signed by Rebeka Esparza MD on 01/06/21 at 16:15 EST      Dictated utilizing Dragon dictation.

## 2021-01-06 NOTE — PROGRESS NOTES
[facility]    Name:  Joelle Yee   Age:  75 y.o.  Sex:  female  :  1945  MRN:  [id number]   Visit Number:  [id number]  Admission Date:  2021  Date Of Service:  21  Primary Care Physician:  Blanquita Clements PA     LOS: 0 days :  Patient Care Team:  Blanquita Clements PA as PCP - General:    Chief Complaint:      Shortness of breath    Subjective / Interval History:     Patient seen and examined at bedside this morning.  No acute events noted overnight.  Patient's primary concern was not been able to sleep secondary to back pain.  She also noted some shortness of breath primarily with exertion.  No fevers or chills    Review of Systems:     General ROS: Patient denies any fevers, chills or loss of consciousness.  Respiratory ROS: Shortness of breath, cough  Cardiovascular ROS: Denies chest pain or palpitations. No history of exertional chest pain.  Gastrointestinal ROS: Denies nausea and vomiting. Denies any abdominal pain. No diarrhea.  Neurological ROS: Denies any focal weakness. No loss of consciousness. Denies any numbness.  Dermatological ROS: Denies any redness or pruritis.    Vital Signs:    Temp:  [97.3 °F (36.3 °C)-99.7 °F (37.6 °C)] 97.3 °F (36.3 °C)  Heart Rate:  [81-92] 82  Resp:  [17-20] 17  BP: (113-140)/(58-75) 139/68    Intake and output:    I/O last 3 completed shifts:  In: 620 [P.O.:620]  Out: 2700 [Urine:2700]  No intake/output data recorded.    Physical Examination:    General Appearance:  Alert and cooperative, not in any acute distress.  Ill-appearing elderly female.  Pulmonary cachexia noted.   Head:  Atraumatic and normocephalic, without obvious abnormality.   Eyes:          PERRLA, conjunctivae and sclerae normal, no Icterus. No pallor. Extraocular movements are within normal limits.   Neck: Supple, trachea midline, no thyromegaly.   Lungs:   Breath sounds heard bilaterally equally.  Faint expiratory wheezes at the  bases.  Overall poor air movement noted.   Heart:  Normal S1 and S2, no murmur, no gallop, no rub. No JVD   Abdomen:   Normal bowel sounds, no masses, no organomegaly. Soft, non-tender, non-distended, no guarding, no rebound tenderness.   Extremities: Moves all extremities well, no edema, no cyanosis, no clubbing.   Skin: No bleeding, bruising or rash.   Neurologic: Awake, alert and oriented times 3. Moves all 4 extremities equally.     Laboratory results:    Results from last 7 days   Lab Units 01/06/21  0643 01/04/21  0652 01/03/21  0555 01/02/21  1532   SODIUM mmol/L 134* 134* 138 136   POTASSIUM mmol/L 5.4* 5.1 4.5 4.7   CHLORIDE mmol/L 96* 96* 96* 96*   CO2 mmol/L 30.7* 30.5* 32.7* 31.6*   BUN mg/dL 22 19 25* 21   CREATININE mg/dL 0.52* 0.55* 0.60 0.73   CALCIUM mg/dL 9.2 8.7 8.9 9.1   BILIRUBIN mg/dL <0.2  --   --  0.2   ALK PHOS U/L 101  --   --  111   ALT (SGPT) U/L 21  --   --  29   AST (SGOT) U/L 17  --   --  26   GLUCOSE mg/dL 117* 97 117* 89     Results from last 7 days   Lab Units 01/06/21  0642 01/04/21  0652 01/02/21  1532   WBC 10*3/mm3 13.67* 15.67* 19.45*   HEMOGLOBIN g/dL 12.8 12.6 13.1   HEMATOCRIT % 39.3 38.6 41.1   PLATELETS 10*3/mm3 372 292 369                 Results from last 7 days   Lab Units 01/02/21  1630   PH, ARTERIAL pH units 7.405   PO2 ART mm Hg 104.0*   PCO2, ARTERIAL mm Hg 55.0*   HCO3 ART mmol/L 34.5*       I have reviewed the patient's laboratory results.    Radiology results:    Imaging Results (Last 24 Hours)     ** No results found for the last 24 hours. **          I have reviewed the patient's radiology reports.    Medication Review:     I have reviewed the patient's active and prn medications.       COPD exacerbation (CMS/[facility])    Acute on chronic respiratory failure with hypoxia and hypercapnia (CMS/HCC)    Bronchiectasis without complication (CMS/HCC)      Assessment:    COPD exacerbation, POA  Acute on chronic respiratory failure with hypoxia and hypercapnia, POA    Bronchiectasis without complication   Hypertension  Hyperlipidemia  Depression  Chronic pain    Plan:    Patient has remained hemodynamically stable.  Antibiotic coverage with Levaquin due to history of Pseudomonas and strep pneumo.  Steroids at current dosing will be continued.  Continue with bronchodilators and nebulizer treatments per pulmonology recommendations.  I did adjust some of her pain and anxiety medication as I do think this is contributing to her current symptomatology as well.  Patient is aware that she has advanced COPD and she will have chronic shortness of breath.  Would like the patient to be more ambulatory without significant exertion prior to consideration of discharge.  Appreciate pulmonology assistance.    Jeanie Antonio DO  01/06/21  13:46 EST    Dictated utilizing Dragon dictation.

## 2021-01-07 LAB
ANION GAP SERPL CALCULATED.3IONS-SCNC: 8.2 MMOL/L (ref 5–15)
BUN SERPL-MCNC: 22 MG/DL (ref 8–23)
BUN/CREAT SERPL: 34.9 (ref 7–25)
CALCIUM SPEC-SCNC: 9.3 MG/DL (ref 8.6–10.5)
CHLORIDE SERPL-SCNC: 97 MMOL/L (ref 98–107)
CO2 SERPL-SCNC: 31.8 MMOL/L (ref 22–29)
CREAT SERPL-MCNC: 0.63 MG/DL (ref 0.57–1)
GFR SERPL CREATININE-BSD FRML MDRD: 92 ML/MIN/1.73
GLUCOSE SERPL-MCNC: 125 MG/DL (ref 65–99)
POTASSIUM SERPL-SCNC: 4.5 MMOL/L (ref 3.5–5.2)
SODIUM SERPL-SCNC: 137 MMOL/L (ref 136–145)

## 2021-01-07 PROCEDURE — 25010000002 METHYLPREDNISOLONE PER 40 MG: Performed by: EMERGENCY MEDICINE

## 2021-01-07 PROCEDURE — 99233 SBSQ HOSP IP/OBS HIGH 50: CPT | Performed by: INTERNAL MEDICINE

## 2021-01-07 PROCEDURE — 94799 UNLISTED PULMONARY SVC/PX: CPT

## 2021-01-07 PROCEDURE — 94667 MNPJ CHEST WALL 1ST: CPT

## 2021-01-07 PROCEDURE — 80048 BASIC METABOLIC PNL TOTAL CA: CPT | Performed by: FAMILY MEDICINE

## 2021-01-07 PROCEDURE — G0378 HOSPITAL OBSERVATION PER HR: HCPCS

## 2021-01-07 PROCEDURE — 99225 PR SBSQ OBSERVATION CARE/DAY 25 MINUTES: CPT | Performed by: FAMILY MEDICINE

## 2021-01-07 PROCEDURE — 25010000002 ONDANSETRON PER 1 MG: Performed by: EMERGENCY MEDICINE

## 2021-01-07 PROCEDURE — 25010000002 ENOXAPARIN PER 10 MG: Performed by: EMERGENCY MEDICINE

## 2021-01-07 PROCEDURE — 25010000002 LEVOFLOXACIN PER 250 MG: Performed by: FAMILY MEDICINE

## 2021-01-07 RX ADMIN — Medication 10 MG: at 21:13

## 2021-01-07 RX ADMIN — VENLAFAXINE 75 MG: 75 TABLET ORAL at 18:15

## 2021-01-07 RX ADMIN — IPRATROPIUM BROMIDE AND ALBUTEROL SULFATE 3 ML: .5; 3 SOLUTION RESPIRATORY (INHALATION) at 19:08

## 2021-01-07 RX ADMIN — CYCLOBENZAPRINE HYDROCHLORIDE 10 MG: 10 TABLET, FILM COATED ORAL at 09:33

## 2021-01-07 RX ADMIN — GABAPENTIN 100 MG: 100 CAPSULE ORAL at 15:10

## 2021-01-07 RX ADMIN — LORAZEPAM 0.5 MG: 0.5 TABLET ORAL at 09:27

## 2021-01-07 RX ADMIN — BUDESONIDE AND FORMOTEROL FUMARATE DIHYDRATE 2 PUFF: 160; 4.5 AEROSOL RESPIRATORY (INHALATION) at 19:09

## 2021-01-07 RX ADMIN — MIRTAZAPINE 30 MG: 15 TABLET, FILM COATED ORAL at 21:13

## 2021-01-07 RX ADMIN — GUAIFENESIN 600 MG: 600 TABLET, EXTENDED RELEASE ORAL at 09:27

## 2021-01-07 RX ADMIN — VENLAFAXINE 75 MG: 75 TABLET ORAL at 09:27

## 2021-01-07 RX ADMIN — ACETYLCYSTEINE 1.5 ML: 200 SOLUTION ORAL; RESPIRATORY (INHALATION) at 06:48

## 2021-01-07 RX ADMIN — IPRATROPIUM BROMIDE AND ALBUTEROL SULFATE 3 ML: .5; 3 SOLUTION RESPIRATORY (INHALATION) at 06:48

## 2021-01-07 RX ADMIN — ACETYLCYSTEINE 1.5 ML: 200 SOLUTION ORAL; RESPIRATORY (INHALATION) at 13:04

## 2021-01-07 RX ADMIN — LEVOFLOXACIN 750 MG: 5 INJECTION, SOLUTION INTRAVENOUS at 15:17

## 2021-01-07 RX ADMIN — PANTOPRAZOLE SODIUM 40 MG: 40 TABLET, DELAYED RELEASE ORAL at 06:32

## 2021-01-07 RX ADMIN — BUDESONIDE 0.5 MG: 0.5 INHALANT RESPIRATORY (INHALATION) at 19:08

## 2021-01-07 RX ADMIN — TRAZODONE HYDROCHLORIDE 25 MG: 50 TABLET ORAL at 21:13

## 2021-01-07 RX ADMIN — METHYLPREDNISOLONE SODIUM SUCCINATE 40 MG: 40 INJECTION, POWDER, FOR SOLUTION INTRAMUSCULAR; INTRAVENOUS at 18:30

## 2021-01-07 RX ADMIN — LORAZEPAM 0.5 MG: 0.5 TABLET ORAL at 21:13

## 2021-01-07 RX ADMIN — GABAPENTIN 100 MG: 100 CAPSULE ORAL at 09:27

## 2021-01-07 RX ADMIN — METHYLPREDNISOLONE SODIUM SUCCINATE 40 MG: 40 INJECTION, POWDER, FOR SOLUTION INTRAMUSCULAR; INTRAVENOUS at 06:32

## 2021-01-07 RX ADMIN — OXYCODONE HYDROCHLORIDE 5 MG: 5 TABLET ORAL at 15:10

## 2021-01-07 RX ADMIN — ACETYLCYSTEINE 1.5 ML: 200 SOLUTION ORAL; RESPIRATORY (INHALATION) at 00:20

## 2021-01-07 RX ADMIN — ENOXAPARIN SODIUM 30 MG: 60 INJECTION SUBCUTANEOUS at 21:14

## 2021-01-07 RX ADMIN — BUDESONIDE 0.5 MG: 0.5 INHALANT RESPIRATORY (INHALATION) at 06:48

## 2021-01-07 RX ADMIN — CYCLOBENZAPRINE HYDROCHLORIDE 10 MG: 10 TABLET, FILM COATED ORAL at 17:02

## 2021-01-07 RX ADMIN — HYDROCODONE BITARTRATE AND ACETAMINOPHEN 1 TABLET: 10; 325 TABLET ORAL at 21:13

## 2021-01-07 RX ADMIN — IPRATROPIUM BROMIDE AND ALBUTEROL SULFATE 3 ML: .5; 3 SOLUTION RESPIRATORY (INHALATION) at 00:21

## 2021-01-07 RX ADMIN — HYDROCODONE BITARTRATE AND ACETAMINOPHEN 1 TABLET: 10; 325 TABLET ORAL at 09:33

## 2021-01-07 RX ADMIN — METOPROLOL SUCCINATE 25 MG: 25 TABLET, EXTENDED RELEASE ORAL at 21:13

## 2021-01-07 RX ADMIN — ONDANSETRON 4 MG: 2 INJECTION INTRAMUSCULAR; INTRAVENOUS at 17:02

## 2021-01-07 RX ADMIN — SODIUM CHLORIDE, PRESERVATIVE FREE 10 ML: 5 INJECTION INTRAVENOUS at 09:33

## 2021-01-07 RX ADMIN — GABAPENTIN 100 MG: 100 CAPSULE ORAL at 21:13

## 2021-01-07 RX ADMIN — IPRATROPIUM BROMIDE AND ALBUTEROL SULFATE 3 ML: .5; 3 SOLUTION RESPIRATORY (INHALATION) at 13:04

## 2021-01-07 RX ADMIN — GUAIFENESIN 600 MG: 600 TABLET, EXTENDED RELEASE ORAL at 21:13

## 2021-01-07 NOTE — PLAN OF CARE
Goal Outcome Evaluation:  Plan of Care Reviewed With: patient  Progress: improving   Pt states she is feeling much better than when she was admitted. VSS throughout shift. No acute changes to report. Ambulating in room and using flutter valve as directed. Will continue to monitor.

## 2021-01-07 NOTE — PROGRESS NOTES
Continued Stay Note   Christopher     Patient Name: Joelle Yee  MRN: [id number]  Today's Date: 1/7/2021    Admit Date: 1/2/2021    Discharge Plan     Row Name 01/07/21 1510       Plan    Plan  Spoke to pt she was sitting on side of bed alert orientated She is planning on returning home She has [facility] waiver program She is agreeing to Home health would prefer [facility]         Discharge Codes    No documentation.             Sydney Goff RN

## 2021-01-07 NOTE — PROGRESS NOTES
[facility]    Name:  Joelle Yee   Age:  75 y.o.  Sex:  female  :  1945  MRN:  [id number]   Visit Number:  [id number]  Admission Date:  2021  Date Of Service:  21  Primary Care Physician:  Blanquita Clements PA     LOS: 0 days :  Patient Care Team:  Blanquita Clements PA as PCP - General:    Chief Complaint:      Shortness of breath    Subjective / Interval History:     Patient was seen and examined at bedside this morning.  No significant changes overnight.  She notes she still feels short of breath, nauseous at times.  No fevers or chills.  She does not feel she can go home yet.    Review of Systems:     General ROS: Patient denies any fevers, chills or loss of consciousness.  Respiratory ROS: Shortness of breath, cough  Cardiovascular ROS: Denies chest pain or palpitations. No history of exertional chest pain.  Gastrointestinal ROS: Denies nausea and vomiting. Denies any abdominal pain. No diarrhea.  Neurological ROS: Denies any focal weakness. No loss of consciousness. Denies any numbness.  Dermatological ROS: Denies any redness or pruritis.    Vital Signs:    Temp:  [96.8 °F (36 °C)-98.7 °F (37.1 °C)] 97.8 °F (36.6 °C)  Heart Rate:  [76-94] 91  Resp:  [18] 18  BP: (120-148)/(55-69) 140/62    Intake and output:    I/O last 3 completed shifts:  In: 840 [P.O.:840]  Out: 3 [Urine:3]  I/O this shift:  In: 360 [P.O.:360]  Out: -     Physical Examination:    General Appearance:  Alert and cooperative, not in any acute distress.  Ill-appearing elderly female.  Pulmonary cachexia noted.   Head:  Atraumatic and normocephalic, without obvious abnormality.   Eyes:          PERRLA, conjunctivae and sclerae normal, no Icterus. No pallor. Extraocular movements are within normal limits.   Neck: Supple, trachea midline, no thyromegaly.   Lungs:   Breath sounds heard bilaterally equally. Expiratory wheezes at the bases.  Somewhat improved air movement    Heart:  Normal S1 and S2, no murmur, no gallop, no rub. No JVD   Abdomen:   Normal bowel sounds, no masses, no organomegaly. Soft, non-tender, non-distended, no guarding, no rebound tenderness.   Extremities: Moves all extremities well, no edema, no cyanosis, no clubbing.   Skin: No bleeding, bruising or rash.   Neurologic: Awake, alert and oriented times 3. Moves all 4 extremities equally.     Laboratory results:    Results from last 7 days   Lab Units 01/07/21  0522 01/06/21  0643 01/04/21  0652  01/02/21  1532   SODIUM mmol/L 137 134* 134*   < > 136   POTASSIUM mmol/L 4.5 5.4* 5.1   < > 4.7   CHLORIDE mmol/L 97* 96* 96*   < > 96*   CO2 mmol/L 31.8* 30.7* 30.5*   < > 31.6*   BUN mg/dL 22 22 19   < > 21   CREATININE mg/dL 0.63 0.52* 0.55*   < > 0.73   CALCIUM mg/dL 9.3 9.2 8.7   < > 9.1   BILIRUBIN mg/dL  --  <0.2  --   --  0.2   ALK PHOS U/L  --  101  --   --  111   ALT (SGPT) U/L  --  21  --   --  29   AST (SGOT) U/L  --  17  --   --  26   GLUCOSE mg/dL 125* 117* 97   < > 89    < > = values in this interval not displayed.     Results from last 7 days   Lab Units 01/06/21  0642 01/04/21  0652 01/02/21  1532   WBC 10*3/mm3 13.67* 15.67* 19.45*   HEMOGLOBIN g/dL 12.8 12.6 13.1   HEMATOCRIT % 39.3 38.6 41.1   PLATELETS 10*3/mm3 372 292 369                 Results from last 7 days   Lab Units 01/02/21  1630   PH, ARTERIAL pH units 7.405   PO2 ART mm Hg 104.0*   PCO2, ARTERIAL mm Hg 55.0*   HCO3 ART mmol/L 34.5*       I have reviewed the patient's laboratory results.    Radiology results:    Imaging Results (Last 24 Hours)     ** No results found for the last 24 hours. **          I have reviewed the patient's radiology reports.    Medication Review:     I have reviewed the patient's active and prn medications.       COPD exacerbation (CMS/HCC)    Acute on chronic respiratory failure with hypoxia and hypercapnia (CMS/HCC)    Bronchiectasis without complication (CMS/HCC)      Assessment:    COPD exacerbation,  POA  Acute on chronic respiratory failure with hypoxia and hypercapnia, POA   Bronchiectasis without complication   Hypertension  Hyperlipidemia  Depression  Chronic pain    Plan:    Patient remains hemodynamically stable.  Continue Levaquin.  Vital signs are otherwise stable on home oxygen requirement.  Patient does have severe COPD with recurrent bronchiectasis and pneumonia.  Have pulmonology consulted.  Appreciate the recommendations and assistance.  Patient still with fairly significant shortness of breath with minimal exertion.  She is aware that she will likely always have some dyspnea, would like to improve her shortness of breath and chest tightness.  Encouraged her to continue to ambulate as much as tolerated.  Anticipate another 1 to 2-day stay remaining    Jeanie Antonio DO  01/07/21  14:08 EST    Dictated utilizing Dragon dictation.

## 2021-01-08 PROBLEM — J15.1 PSEUDOMONAS PNEUMONIA (HCC): Status: ACTIVE | Noted: 2021-01-08

## 2021-01-08 LAB
A-A DO2: 76.9 MMHG
ARTERIAL PATENCY WRIST A: POSITIVE
ATMOSPHERIC PRESS: 734 MMHG
BASE EXCESS BLDA CALC-SCNC: 14.7 MMOL/L (ref 0–2)
BDY SITE: ABNORMAL
COHGB MFR BLD: 1.6 % (ref 0–2)
EPAP: 6
HCO3 BLDA-SCNC: 42.7 MMOL/L (ref 22–28)
HCT VFR BLD CALC: 33 %
INHALED O2 CONCENTRATION: 35 %
IPAP: 20
METHGB BLD QL: 0.7 % (ref 0–1.5)
MODALITY: ABNORMAL
NOTE: ABNORMAL
OXYHGB MFR BLDV: 94 % (ref 94–99)
PCO2 BLDA: 73.8 MM HG (ref 35–45)
PCO2 TEMP ADJ BLD: ABNORMAL MM[HG]
PH BLDA: 7.37 PH UNITS (ref 7.3–7.5)
PH, TEMP CORRECTED: ABNORMAL
PO2 BLDA: 81.7 MM HG (ref 75–100)
PO2 TEMP ADJ BLD: ABNORMAL MM[HG]
SAO2 % BLDCOA: 96.2 % (ref 94–100)
SET MECH RESP RATE: 18
VENTILATOR MODE: ABNORMAL

## 2021-01-08 PROCEDURE — 36600 WITHDRAWAL OF ARTERIAL BLOOD: CPT

## 2021-01-08 PROCEDURE — 82375 ASSAY CARBOXYHB QUANT: CPT

## 2021-01-08 PROCEDURE — 83050 HGB METHEMOGLOBIN QUAN: CPT

## 2021-01-08 PROCEDURE — 82805 BLOOD GASES W/O2 SATURATION: CPT

## 2021-01-08 PROCEDURE — 25010000002 METHYLPREDNISOLONE PER 40 MG: Performed by: EMERGENCY MEDICINE

## 2021-01-08 PROCEDURE — 25010000002 ONDANSETRON PER 1 MG: Performed by: EMERGENCY MEDICINE

## 2021-01-08 PROCEDURE — 94799 UNLISTED PULMONARY SVC/PX: CPT

## 2021-01-08 PROCEDURE — 94668 MNPJ CHEST WALL SBSQ: CPT

## 2021-01-08 PROCEDURE — 94660 CPAP INITIATION&MGMT: CPT

## 2021-01-08 PROCEDURE — 93005 ELECTROCARDIOGRAM TRACING: CPT | Performed by: FAMILY MEDICINE

## 2021-01-08 PROCEDURE — 99232 SBSQ HOSP IP/OBS MODERATE 35: CPT | Performed by: FAMILY MEDICINE

## 2021-01-08 PROCEDURE — 25010000002 ENOXAPARIN PER 10 MG: Performed by: EMERGENCY MEDICINE

## 2021-01-08 PROCEDURE — 94669 MECHANICAL CHEST WALL OSCILL: CPT

## 2021-01-08 RX ORDER — BUDESONIDE 3 MG/1
9 CAPSULE, COATED PELLETS ORAL DAILY
Status: DISCONTINUED | OUTPATIENT
Start: 2021-01-08 | End: 2021-01-12 | Stop reason: HOSPADM

## 2021-01-08 RX ORDER — PANTOPRAZOLE SODIUM 40 MG/1
40 TABLET, DELAYED RELEASE ORAL
Status: DISCONTINUED | OUTPATIENT
Start: 2021-01-08 | End: 2021-01-12 | Stop reason: HOSPADM

## 2021-01-08 RX ADMIN — ACETYLCYSTEINE 1.5 ML: 200 SOLUTION ORAL; RESPIRATORY (INHALATION) at 06:41

## 2021-01-08 RX ADMIN — CYCLOBENZAPRINE HYDROCHLORIDE 10 MG: 10 TABLET, FILM COATED ORAL at 16:00

## 2021-01-08 RX ADMIN — OXYCODONE HYDROCHLORIDE 5 MG: 5 TABLET ORAL at 04:43

## 2021-01-08 RX ADMIN — OXYCODONE HYDROCHLORIDE 5 MG: 5 TABLET ORAL at 16:00

## 2021-01-08 RX ADMIN — GABAPENTIN 100 MG: 100 CAPSULE ORAL at 09:37

## 2021-01-08 RX ADMIN — IPRATROPIUM BROMIDE AND ALBUTEROL SULFATE 3 ML: .5; 3 SOLUTION RESPIRATORY (INHALATION) at 00:29

## 2021-01-08 RX ADMIN — GUAIFENESIN AND DEXTROMETHORPHAN HYDROBROMIDE 1 TABLET: 600; 30 TABLET, EXTENDED RELEASE ORAL at 04:50

## 2021-01-08 RX ADMIN — VENLAFAXINE 75 MG: 75 TABLET ORAL at 17:48

## 2021-01-08 RX ADMIN — PANTOPRAZOLE SODIUM 40 MG: 40 TABLET, DELAYED RELEASE ORAL at 17:48

## 2021-01-08 RX ADMIN — BUDESONIDE 0.5 MG: 0.5 INHALANT RESPIRATORY (INHALATION) at 19:10

## 2021-01-08 RX ADMIN — VENLAFAXINE 75 MG: 75 TABLET ORAL at 09:37

## 2021-01-08 RX ADMIN — BUDESONIDE 0.5 MG: 0.5 INHALANT RESPIRATORY (INHALATION) at 06:41

## 2021-01-08 RX ADMIN — GUAIFENESIN 600 MG: 600 TABLET, EXTENDED RELEASE ORAL at 09:37

## 2021-01-08 RX ADMIN — GUAIFENESIN AND DEXTROMETHORPHAN HYDROBROMIDE 1 TABLET: 600; 30 TABLET, EXTENDED RELEASE ORAL at 16:00

## 2021-01-08 RX ADMIN — TRAZODONE HYDROCHLORIDE 25 MG: 50 TABLET ORAL at 20:45

## 2021-01-08 RX ADMIN — LORAZEPAM 0.5 MG: 0.5 TABLET ORAL at 09:37

## 2021-01-08 RX ADMIN — HYDROCODONE BITARTRATE AND ACETAMINOPHEN 1 TABLET: 10; 325 TABLET ORAL at 20:46

## 2021-01-08 RX ADMIN — ENOXAPARIN SODIUM 30 MG: 60 INJECTION SUBCUTANEOUS at 20:46

## 2021-01-08 RX ADMIN — ACETAMINOPHEN 650 MG: 650 SOLUTION ORAL at 09:37

## 2021-01-08 RX ADMIN — ACETAMINOPHEN 650 MG: 325 TABLET, FILM COATED ORAL at 16:00

## 2021-01-08 RX ADMIN — METHYLPREDNISOLONE SODIUM SUCCINATE 40 MG: 40 INJECTION, POWDER, FOR SOLUTION INTRAMUSCULAR; INTRAVENOUS at 18:43

## 2021-01-08 RX ADMIN — BUDESONIDE AND FORMOTEROL FUMARATE DIHYDRATE 2 PUFF: 160; 4.5 AEROSOL RESPIRATORY (INHALATION) at 06:42

## 2021-01-08 RX ADMIN — IPRATROPIUM BROMIDE AND ALBUTEROL SULFATE 3 ML: .5; 3 SOLUTION RESPIRATORY (INHALATION) at 19:10

## 2021-01-08 RX ADMIN — BUDESONIDE AND FORMOTEROL FUMARATE DIHYDRATE 2 PUFF: 160; 4.5 AEROSOL RESPIRATORY (INHALATION) at 19:10

## 2021-01-08 RX ADMIN — BUDESONIDE 9 MG: 3 CAPSULE, GELATIN COATED ORAL at 16:18

## 2021-01-08 RX ADMIN — MIRTAZAPINE 30 MG: 15 TABLET, FILM COATED ORAL at 20:45

## 2021-01-08 RX ADMIN — GUAIFENESIN 600 MG: 600 TABLET, EXTENDED RELEASE ORAL at 20:45

## 2021-01-08 RX ADMIN — Medication 10 MG: at 20:46

## 2021-01-08 RX ADMIN — ONDANSETRON 4 MG: 2 INJECTION INTRAMUSCULAR; INTRAVENOUS at 09:37

## 2021-01-08 RX ADMIN — METOPROLOL SUCCINATE 25 MG: 25 TABLET, EXTENDED RELEASE ORAL at 20:45

## 2021-01-08 RX ADMIN — METHYLPREDNISOLONE SODIUM SUCCINATE 40 MG: 40 INJECTION, POWDER, FOR SOLUTION INTRAMUSCULAR; INTRAVENOUS at 06:12

## 2021-01-08 RX ADMIN — GABAPENTIN 100 MG: 100 CAPSULE ORAL at 16:00

## 2021-01-08 RX ADMIN — ACETYLCYSTEINE 1.5 ML: 200 SOLUTION ORAL; RESPIRATORY (INHALATION) at 00:28

## 2021-01-08 RX ADMIN — PANTOPRAZOLE SODIUM 40 MG: 40 TABLET, DELAYED RELEASE ORAL at 06:11

## 2021-01-08 RX ADMIN — SODIUM CHLORIDE, PRESERVATIVE FREE 10 ML: 5 INJECTION INTRAVENOUS at 09:42

## 2021-01-08 RX ADMIN — HYDROCODONE BITARTRATE AND ACETAMINOPHEN 1 TABLET: 10; 325 TABLET ORAL at 09:37

## 2021-01-08 RX ADMIN — GABAPENTIN 100 MG: 100 CAPSULE ORAL at 20:45

## 2021-01-08 RX ADMIN — IPRATROPIUM BROMIDE AND ALBUTEROL SULFATE 3 ML: .5; 3 SOLUTION RESPIRATORY (INHALATION) at 13:37

## 2021-01-08 RX ADMIN — LORAZEPAM 0.5 MG: 0.5 TABLET ORAL at 20:45

## 2021-01-08 RX ADMIN — CYCLOBENZAPRINE HYDROCHLORIDE 10 MG: 10 TABLET, FILM COATED ORAL at 04:50

## 2021-01-08 RX ADMIN — IPRATROPIUM BROMIDE AND ALBUTEROL SULFATE 3 ML: .5; 3 SOLUTION RESPIRATORY (INHALATION) at 06:41

## 2021-01-08 NOTE — PLAN OF CARE
Goal Outcome Evaluation:  Plan of Care Reviewed With: patient  Progress: improving   Pt still complaining of pain and SOB. VSS. Eating and working with PT/OT. No acute changes to report. Starting on bipap tonight. Taking meds without issue. Requesting pain meds frequently. Will continue to monitor.

## 2021-01-08 NOTE — PLAN OF CARE
Goal Outcome Evaluation:  Plan of Care Reviewed With: patient  Progress: improving     VSS. No complaints noted through the night. Titrated down to 1LNC at this time as o2 saturations remain at 100% on baseline 3LNC. Patient resting well at this time, will continue to monitor.

## 2021-01-08 NOTE — PROGRESS NOTES
[facility]    Name:  Joelle Yee   Age:  75 y.o.  Sex:  female  :  1945  MRN:  [id number]   Visit Number:  [id number]  Admission Date:  2021  Date Of Service:  21  Primary Care Physician:  Blanquita Clements PA     LOS: 0 days :  Patient Care Team:  Blanquita Clements PA as PCP - General:    Chief Complaint:      Shortness of breath    Subjective / Interval History:     Patient seen and examined at bedside this morning.  Her daughter was at bedside.  Patient continues to complain of shortness of breath and nausea.  She was agreeable to try BiPAP today.  I discussed she does have advanced COPD, will have shortness of breath, and at some point it may be difficult to control her symptoms.  No fevers or chills noted.  No chest pain.    Review of Systems:     General ROS: Patient denies any fevers, chills or loss of consciousness.  Respiratory ROS: Shortness of breath, cough  Cardiovascular ROS: Denies chest pain or palpitations. No history of exertional chest pain.  Gastrointestinal ROS: Positive nausea. Denies any abdominal pain. No diarrhea.  Neurological ROS: Denies any focal weakness. No loss of consciousness. Denies any numbness.  Dermatological ROS: Denies any redness or pruritis.    Vital Signs:    Temp:  [97.6 °F (36.4 °C)-98.1 °F (36.7 °C)] 98.1 °F (36.7 °C)  Heart Rate:  [77-96] 96  Resp:  [16-18] 16  BP: (120-146)/(48-67) 128/67    Intake and output:    I/O last 3 completed shifts:  In: 510 [P.O.:360; IV Piggyback:150]  Out: -   I/O this shift:  In: 240 [P.O.:240]  Out: -     Physical Examination:    General Appearance:  Alert and cooperative, not in any acute distress.  Ill-appearing elderly female.  Pulmonary cachexia noted.   Head:  Atraumatic and normocephalic, without obvious abnormality.   Eyes:          PERRLA, conjunctivae and sclerae normal, no Icterus. No pallor. Extraocular movements are within normal limits.   Neck: Supple,  trachea midline, no thyromegaly.   Lungs:   Breath sounds heard bilaterally equally. Expiratory wheezes at the bases.  Overall poor air movement noted.  Nontachypneic.   Heart:  Normal S1 and S2, no murmur, no gallop, no rub. No JVD   Abdomen:   Normal bowel sounds, no masses, no organomegaly. Soft, non-tender, non-distended, no guarding, no rebound tenderness.   Extremities: Moves all extremities well, no edema, no cyanosis, no clubbing.   Skin: No bleeding, bruising or rash.   Neurologic: Awake, alert and oriented times 3. Moves all 4 extremities equally.     Laboratory results:    Results from last 7 days   Lab Units 01/07/21  0522 01/06/21  0643 01/04/21  0652  01/02/21  1532   SODIUM mmol/L 137 134* 134*   < > 136   POTASSIUM mmol/L 4.5 5.4* 5.1   < > 4.7   CHLORIDE mmol/L 97* 96* 96*   < > 96*   CO2 mmol/L 31.8* 30.7* 30.5*   < > 31.6*   BUN mg/dL 22 22 19   < > 21   CREATININE mg/dL 0.63 0.52* 0.55*   < > 0.73   CALCIUM mg/dL 9.3 9.2 8.7   < > 9.1   BILIRUBIN mg/dL  --  <0.2  --   --  0.2   ALK PHOS U/L  --  101  --   --  111   ALT (SGPT) U/L  --  21  --   --  29   AST (SGOT) U/L  --  17  --   --  26   GLUCOSE mg/dL 125* 117* 97   < > 89    < > = values in this interval not displayed.     Results from last 7 days   Lab Units 01/06/21  0642 01/04/21  0652 01/02/21  1532   WBC 10*3/mm3 13.67* 15.67* 19.45*   HEMOGLOBIN g/dL 12.8 12.6 13.1   HEMATOCRIT % 39.3 38.6 41.1   PLATELETS 10*3/mm3 372 292 369                 Results from last 7 days   Lab Units 01/08/21  0213   PH, ARTERIAL pH units 7.371   PO2 ART mm Hg 81.7   PCO2, ARTERIAL mm Hg 73.8*   HCO3 ART mmol/L 42.7*       I have reviewed the patient's laboratory results.    Radiology results:    Imaging Results (Last 24 Hours)     ** No results found for the last 24 hours. **          I have reviewed the patient's radiology reports.    Medication Review:     I have reviewed the patient's active and prn medications.       COPD exacerbation (CMS/[facility])    Acute  on chronic respiratory failure with hypoxia and hypercapnia (CMS/HCC)    Bronchiectasis without complication (CMS/HCC)    Pseudomonas pneumonia (CMS/HCC)      Assessment:    COPD exacerbation, POA  Acute on chronic respiratory failure with hypoxia and hypercapnia, POA   Bronchiectasis without complication   Hypertension  Hyperlipidemia  Depression  Chronic pain    Plan:    Patient continues remain hemodynamically stable.  Has been on Levaquin due to history of Pseudomonas and for concern for bronchiectasis and strep pneumo.  Remains on steroids as well.  Bronchodilators and nebulizer treatments per pulmonology recommendations.  She has had some mild CO2 retention and with no significant provement of shortness of breath, I do think a trial of BiPAP may be beneficial.  Have asked her to wear this today and overnight.  She also has a history of collagenous colitis, will add oral budesonide.  Twice daily PPI therapy.    Patient does not have any significant improvement in her symptomatology in the next couple of days, may need to consider talking with palliative about goals of care moving forward.    Jeanie Antonio DO  01/08/21  15:54 EST    Dictated utilizing Dragon dictation.

## 2021-01-08 NOTE — DISCHARGE PLACEMENT REQUEST
"  Sydney Goff Rn   Check to see if you will accept this still need order   Joelle Yee (75 y.o. Female)     Date of Birth Social Security Number Address Home Phone MRN    1945  [address] [place] [id number] [phone] [id number]    Ep[employer] Marital Status          Sikh Single       Admission Date Admission Type Admitting Provider Attending Provider Department, Room/Bed    1/2/21 Emergency Amanda Hein, Jeanie Miner DO [facility] MED SURG  4, 412/1    Discharge Date Discharge Disposition Discharge Destination                       Attending Provider: Jeanie Antonio,     Allergies: Dust Mite Extract, Grass, Mold Extract [Trichophyton]    Isolation: None   Infection: None   Code Status: CPR    Ht: 144.8 cm (57\")   Wt: 42.2 kg (93 lb 0.6 oz)    Admission Cmt: None   Principal Problem: COPD exacerbation (CMS/[facility]) [J44.1]                 Active Insurance as of 1/2/2021     Primary Coverage     Payor Plan Insurance Group Employer/Plan Group    [facility] MEDICARE REPLACEMENT [facility] MEDICARE REPLACEMENT [id number]     Payor Plan Address Payor Plan Phone Number Payor Plan Fax Number Effective Dates    [address] [address]   1/1/2020 - None Entered    [facility] [id number]       Subscriber Name Subscriber Birth Date Member ID       JOELLE YEE 1945 [id number]           Secondary Coverage     Payor Plan Insurance Group Employer/Plan Group    [place]      Payor Plan Address Payor Plan Phone Number Payor Plan Fax Number Effective Dates    [address] [phone]  3/1/2016 - None Entered    [facility] [id number]       Subscriber Name Subscriber Birth Date Member ID       JOELLE YEE 1945 [id number]                 Emergency Contacts      (Rel.) Home Phone Work Phone Mobile Phone    JoselitoCayetano (Brother) [phone] -- [phone]    SarahAdia de la garza (Daughter) [phone] " -- --    Margaret Julien (Other) [phone] -- [phone]               History & Physical      MelvinaAmanda whitney DO at 21 1811              [facility]   HISTORY AND PHYSICAL      Name:  Joelle Yee   Age:  75 y.o.  Sex:  female  :  1945  MRN:  [id number]   Visit Number:  [id number]  Admission Date:  2021  Date Of Service:  21  Primary Care Physician:  Blnaquita Clements PA    Chief Complaint: Shortness of breath    History Of Presenting Illness:  75 F history of COPD on 3 LNC followed by Dr. Esparza, seen in the ED 3 days ago presents again to the ER with similar complaints of shortness of breath and wheezing. This has been ongoing for the past week or 2 associated with cough.  She denies any sick contacts or known coronavirus exposure.  CT PE performed 2020 reveals bronchitis and bronchiectasis, no PE.  She was given steroids and antibiotics, but not has not improved.  Today, she is requiring 4 LNC on presentation, and reports feeling better with the interventions made in the ED which included Solu-Medrol, magnesium, nebulizers, Rocephin/azithromycin.  She also admits to some swelling this morning that has improved. She denies chest pain, abdominal pain, dysuria,or other acute issues.    Review Of Systems: A full 10 point review of systems was performed and all pertinent positives and negatives are noted in the HPI.  Past Medical History:  Past Medical History:   Diagnosis Date   • Abdominal pain    • Acute bronchitis    • Ankle pain    • Anxiety    • Atopic dermatitis    • Bronchiectasis (CMS/HCC)    • Cataract, bilateral    • Chest pain     STATES HAS OCCASSIONALLY.  STATES LAST TIME WAS LAST WEEK.  STATES SEES DR. RICH.  STATES HE HAS DONE NUMEROUS TESTS ON HER AND HAS NOT BEEN ABLE TO FIND OUT CAUSE.     • Chronic obstructive lung disease (CMS/HCC)    • Colon cancer screening    • COPD (chronic obstructive pulmonary disease) (CMS/HCC)       • Cystocele    • Depressed    • Depression 1980   • Fatigue     Chronic pafigue 2012   • Fibromyalgia 2008   • Full dentures    • GERD (gastroesophageal reflux disease)    • Gout    • Heartburn     Chronic historu of epigastric heartburn currently controlled on Prilesec 40 mg every morning along with Zantac 300 Mg daily at bedtime   • High cholesterol 2012   • Hip pain    • Hyperlipidemia    • Hypertension    • Impaired functional mobility, balance, gait, and endurance    • Insomnia    • Joint pain    • Kidney infection    • Loss of appetite    • Low back pain 1995   • Melena    • Nausea and vomiting    • On home oxygen therapy     2L/NC PRN (STATES SHE HAS BEEN USING CONTINUOSLY LATELY)   • Osteoarthritis 2010   • Osteoporosis    • Pain in limb    • Palpitations    • Pinched nerve     Pinched nerves 2011   • Pneumonia    • Problems with swallowing     HAS TO EAT SLOW AND CHEW FOOD WELL   • Recurrent urinary tract infection    • Sciatica [id number]   • Shortness of breath    • Sinus problem    • Sleep apnea 1998    NO CPAP   • Upset stomach    • Valvular heart disease    • Vitamin B12 deficiency    • Wears glasses    • Weight loss, abnormal     Weight loss is stabel. On pund weight gain since 01/2016     Past Surgical history:  Past Surgical History:   Procedure Laterality Date   • ABDOMINAL HERNIA REPAIR  2016   • APPENDECTOMY  1965   • BACK SURGERY  2005   • BRONCHOSCOPY N/A 7/5/2018    Procedure: BRONCHOSCOPY w/ WASHINGS/BRUSHINGS with MAC;  Surgeon: Rebeka Esparza MD;  Location: [facility];  Service: Pulmonary   • CATARACT EXTRACTION Bilateral     Put in implants    • CHOLECYSTECTOMY  1985   • COLONOSCOPY     • ENDOSCOPY     • KNEE SURGERY Left 1979    Knee Cap   • TUBAL ABDOMINAL LIGATION  1971     Social History:  Social History     Socioeconomic History   • Marital status: Single     Spouse name: Not on file   • Number of children: Not on file   • Years of education: Not on file   • Highest education  level: Not on file   Tobacco Use   • Smoking status: Former Smoker     Packs/day: 0.00     Years: 35.00     Pack years: 0.00     Quit date: 7/5/2017     Years since quitting: 3.4   • Smokeless tobacco: Never Used   Substance and Sexual Activity   • Alcohol use: No   • Drug use: No   • Sexual activity: Defer     Family History:  Family History   Problem Relation Age of Onset   • Ovarian cancer Mother    • Cancer Mother    • Lung cancer Father    • Heart disease Brother      Allergies:  Dust mite extract, Grass, and Mold extract [trichophyton]    Home Medications:  Prior to Admission Medications     Prescriptions Last Dose Informant Patient Reported? Taking?    acetaminophen (TYLENOL) 325 MG tablet  Self No No    Take 2 tablets by mouth Every 4 (Four) Hours As Needed for Headache or Fever (temperature greater than 101.5 F).    albuterol sulfate HFA (Ventolin HFA) 108 (90 Base) MCG/ACT inhaler   No No    Inhale 2 puffs Every 4 (Four) Hours As Needed for Wheezing or Shortness of Air.    ammonium lactate (LAC-HYDRIN) 12 % cream   No No    Apply  topically to the appropriate area as directed 2 (Two) Times a Day.    Patient taking differently:  Apply  topically to the appropriate area as directed 2 (Two) Times a Day As Needed.    azithromycin (ZITHROMAX) 250 MG tablet   No No    Take 1 tablet every other day.    benzonatate (TESSALON) 100 MG capsule   Yes No    Take 100 mg by mouth 3 (Three) Times a Day As Needed for Cough.    budesonide-formoterol (Symbicort) 160-4.5 MCG/ACT inhaler   No No    Inhale 2 puffs 2 (Two) Times a Day. Rinse mouth with water after use    Camphor-Eucalyptus-Menthol (MEDICATED CHEST RUB EX)   Yes No    Apply 1 application topically Daily As Needed.    cephalexin (KEFLEX) 500 MG capsule   No No    Take 1 capsule by mouth 4 (Four) Times a Day.    Cyanocobalamin (VITAMIN B12 PO)  Self Yes No    Take 500 mcg by mouth Daily.    cyclobenzaprine (FLEXERIL) 10 MG tablet   Yes No    Take 10 mg by mouth  Daily.    diclofenac (VOLTAREN) 1 % gel gel   Yes No    Diclofenac Sodium (VOLTAREN TD)  Self Yes No    Apply 2 g topically to the appropriate area as directed 2 (Two) Times a Day As Needed. Voltaren GEL     doxycycline (MONODOX) 100 MG capsule   No No    Take 1 capsule by mouth 2 (Two) Times a Day.    fluconazole (DIFLUCAN) 150 MG tablet   Yes No    fluconazole (DIFLUCAN) 200 MG tablet   No No    Take 1 tablet by mouth Daily.    gabapentin (NEURONTIN) 100 MG capsule   Yes No    gabapentin (NEURONTIN) 300 MG capsule  Self Yes No    Take 100 mg by mouth 3 (Three) Times a Day.    guaiFENesin (MUCINEX) 600 MG 12 hr tablet   No No    Take 1 tablet by mouth Every 12 (Twelve) Hours.    HYDROcodone-acetaminophen (NORCO)  MG per tablet   Yes No    ipratropium-albuterol (DUO-NEB) 0.5-2.5 mg/3 ml nebulizer   No No    Take 3 mL by nebulization 4 (Four) Times a Day As Needed for Wheezing or Shortness of Air.    ketotifen (ZADITOR) 0.025 % ophthalmic solution  Self Yes No    Administer 1-2 drops to both eyes 2 (Two) Times a Day.    loratadine (CLARITIN) 10 MG tablet   Yes No    Take 10 mg by mouth Daily As Needed for Allergies.    LORazepam (ATIVAN) 0.5 MG tablet  Self Yes No    Take 0.5 mg by mouth 2 (Two) Times a Day As Needed. 1-2 TABLETS DAILY PRN    losartan (COZAAR) 25 MG tablet   Yes No    Take 25 mg by mouth Every Night.    melatonin 5 MG tablet tablet  Self No No    Take 2 tablets by mouth At Night As Needed for sleep    metoprolol succinate XL (TOPROL-XL) 25 MG 24 hr tablet   Yes No    Take 25 mg by mouth Every Night.    mirtazapine (REMERON) 30 MG tablet   Yes No    Misc. Devices (ROLLATOR) misc  Self No No    1 Units As Needed (ambulation assistance).    Multiple Vitamins-Minerals (MULTIVITAMIN WITH MINERALS) tablet tablet  Self Yes No    Take 1 tablet by mouth Daily.    Nutritional Supplements (ENSURE COMPLETE PO)  Self Yes No    Take 1 can by mouth 2 (Two) Times a Day.    nystatin (MYCOSTATIN) 634312 UNIT/ML  suspension   No No    Take 5 mL by mouth 4 (Four) Times a Day.    omeprazole (priLOSEC) 40 MG capsule   Yes No    ondansetron ODT (ZOFRAN-ODT) 4 MG disintegrating tablet   Yes No    OXYGEN-HELIUM IN   Yes No    Oxygen; Patient Sig: Oxygen 2 liter as needed; 0; 21-May-2014; Active    predniSONE (DELTASONE) 20 MG tablet   No No    Take 1 tablet by mouth 2 (Two) Times a Day for 5 days.    Respiratory Therapy Supplies (FLUTTER) device  Self No No    1 Device as needed (as directed).    tiotropium bromide monohydrate (Spiriva Respimat) 2.5 MCG/ACT aerosol solution inhaler   No No    Inhale 2 puffs Daily.    traZODone (DESYREL) 50 MG tablet   Yes No    25 mg.    urea (CARMOL) 40 % cream   No No    Apply topically to the appropriate area as directed Daily. May substitute as needed for insurance coverage    urea (CARMOL) 40 % cream   No No    Apply  topically to the appropriate area as directed Every 12 (Twelve) Hours. Apply topically every 12 hours.    Patient taking differently:  Apply  topically to the appropriate area as directed Every 12 (Twelve) Hours As Needed. Apply topically every 12 hours.    venlafaxine (EFFEXOR) 75 MG tablet  Self Yes No    Take 1 tablet by mouth 2 (two) times a day.         ED Medications:  Medications   sodium chloride 0.9 % flush 10 mL (has no administration in time range)   sodium chloride 0.9 % flush 10 mL (has no administration in time range)   AZITHROMYCIN 500 MG/250 ML 0.9% NS IVPB (vial-mate) (has no administration in time range)   sodium chloride 0.9 % bolus 500 mL (0 mL Intravenous Stopped 1/2/21 1745)   magnesium sulfate 2g/50 mL (PREMIX) infusion (0 g Intravenous Stopped 1/2/21 1745)   ipratropium-albuterol (DUO-NEB) nebulizer solution 3 mL (3 mL Nebulization Given 1/2/21 1604)   methylPREDNISolone sodium succinate (SOLU-Medrol) injection 125 mg (125 mg Intravenous Given 1/2/21 1754)   cefTRIAXone (ROCEPHIN) 1 g in sodium chloride 0.9 % 100 mL IVPB (1 g Intravenous New Bag 1/2/21  4248)     Vital Signs:  Temp:  [98.5 °F (36.9 °C)] 98.5 °F (36.9 °C)  Heart Rate:  [] 93  Resp:  [24] 24  BP: (125-156)/() 128/74        01/02/21  1504   Weight: 40.8 kg (90 lb)     Body mass index is 19.48 kg/m².    Physical Exam:  General: Mildly labored, resting in bed  HEENT: EOMI, NC/AT  Heart: regular  Lungs: labored breathing with accessory muscle usage, wheezing red  Abdomen: Soft, nondistended, nontender  Extremities: No edema  Neurological: A&O x3, moves all extremities  Psychological: Mood and affect appropriate, speech is coherent  Skin: warm, dry    Laboratory data:I have reviewed the labs done in the emergency room.    Results from last 7 days   Lab Units 01/02/21  1532 12/29/20  0906 12/27/20  1745   SODIUM mmol/L 136 142 137   POTASSIUM mmol/L 4.7 4.3 4.2   CHLORIDE mmol/L 96* 104 99   CO2 mmol/L 31.6* 27.6 27.7   BUN mg/dL 21 20 13   CREATININE mg/dL 0.73 0.68 0.71   CALCIUM mg/dL 9.1 9.2 8.9   BILIRUBIN mg/dL 0.2 0.2 <0.2   ALK PHOS U/L 111 115 128*   ALT (SGPT) U/L 29 12 14   AST (SGOT) U/L 26 25 23   GLUCOSE mg/dL 89 139* 97     Results from last 7 days   Lab Units 01/02/21  1532 12/29/20  0906 12/27/20  1745   WBC 10*3/mm3 19.45* 19.90* 15.22*   HEMOGLOBIN g/dL 13.1 13.2 13.1   HEMATOCRIT % 41.1 41.5 40.8   PLATELETS 10*3/mm3 369 330 298         Results from last 7 days   Lab Units 12/29/20  0906 12/27/20  1745   TROPONIN T ng/mL <0.010 <0.010     Results from last 7 days   Lab Units 12/29/20  0906   PROBNP pg/mL 222.8             Results from last 7 days   Lab Units 01/02/21  1630   PH, ARTERIAL pH units 7.405   PO2 ART mm Hg 104.0*   PCO2, ARTERIAL mm Hg 55.0*   HCO3 ART mmol/L 34.5*           Invalid input(s): USDES,  BLOODU, NITRITITE, BACT, EP  Pain Management Panel     There is no flowsheet data to display.           EKG:  Reviewed and interpreted by myself reveals sinus tachycardia, rate 105, QTc 345, no acute ST segment or ischemic changes  Radiology:  Imaging Results (Last  72 Hours)     Procedure Component Value Units Date/Time    XR Chest 1 View [[id number]] Collected: 01/02/21 1529     Updated: 01/02/21 1544    Narrative:      PROCEDURE: XR CHEST 1 VW-     HISTORY: Simple Sepsis Triage Protocol     COMPARISON: 12/29/2020.     FINDINGS: The heart is normal in size. The mediastinum is unremarkable.  There are emphysematous changes to the lungs. A left basilar opacity is  unchanged. No significant effusions are evident. There is no  pneumothorax.  There are no acute osseous abnormalities.       Impression:      Left basilar opacity which is unchanged compared to the  prior exam and may reflect atelectasis or pneumonia.     Continued followup is recommended.     This report was finalized on 1/2/2021 3:29 PM by Ignacia Lara M.D..        I have personally reviewed the CXR which reveals no acute consolidation, COPD    Assessment:    COPD exacerbation (CMS/HCC)    Acute on chronic respiratory failure with hypoxia and hypercapnia (CMS/HCC)    Bronchiectasis without complication (CMS/HCC)      Plan:  -Antibiotics, bronchodilators, corticosteroids, diureticspul  -Pulmonology consultation as she sees Dr. Esparza outpatient, and based off her ABG would qualify for BiPAP  -high risk, full code, inpatient    Amanda Hein DO  01/02/21  18:28 EST    Dictated utilizing Dragon dictation.      Electronically signed by Amanda Hein DO at 01/02/21 [id number]

## 2021-01-09 LAB
ANION GAP SERPL CALCULATED.3IONS-SCNC: 8 MMOL/L (ref 5–15)
BUN SERPL-MCNC: 21 MG/DL (ref 8–23)
BUN/CREAT SERPL: 39.6 (ref 7–25)
CALCIUM SPEC-SCNC: 9.5 MG/DL (ref 8.6–10.5)
CHLORIDE SERPL-SCNC: 94 MMOL/L (ref 98–107)
CO2 SERPL-SCNC: 32 MMOL/L (ref 22–29)
CREAT SERPL-MCNC: 0.53 MG/DL (ref 0.57–1)
DEPRECATED RDW RBC AUTO: 57.4 FL (ref 37–54)
ERYTHROCYTE [DISTWIDTH] IN BLOOD BY AUTOMATED COUNT: 15.3 % (ref 12.3–15.4)
GFR SERPL CREATININE-BSD FRML MDRD: 112 ML/MIN/1.73
GLUCOSE SERPL-MCNC: 85 MG/DL (ref 65–99)
HCT VFR BLD AUTO: 41.7 % (ref 34–46.6)
HGB BLD-MCNC: 13.2 G/DL (ref 12–15.9)
MCH RBC QN AUTO: 32 PG (ref 26.6–33)
MCHC RBC AUTO-ENTMCNC: 31.7 G/DL (ref 31.5–35.7)
MCV RBC AUTO: 101 FL (ref 79–97)
PLATELET # BLD AUTO: 281 10*3/MM3 (ref 140–450)
PMV BLD AUTO: 9.3 FL (ref 6–12)
POTASSIUM SERPL-SCNC: 4.3 MMOL/L (ref 3.5–5.2)
RBC # BLD AUTO: 4.13 10*6/MM3 (ref 3.77–5.28)
SODIUM SERPL-SCNC: 134 MMOL/L (ref 136–145)
WBC # BLD AUTO: 21.56 10*3/MM3 (ref 3.4–10.8)

## 2021-01-09 PROCEDURE — 94799 UNLISTED PULMONARY SVC/PX: CPT

## 2021-01-09 PROCEDURE — 99232 SBSQ HOSP IP/OBS MODERATE 35: CPT | Performed by: FAMILY MEDICINE

## 2021-01-09 PROCEDURE — 94660 CPAP INITIATION&MGMT: CPT

## 2021-01-09 PROCEDURE — 25010000002 ONDANSETRON PER 1 MG: Performed by: EMERGENCY MEDICINE

## 2021-01-09 PROCEDURE — 94669 MECHANICAL CHEST WALL OSCILL: CPT

## 2021-01-09 PROCEDURE — 25010000002 ENOXAPARIN PER 10 MG: Performed by: EMERGENCY MEDICINE

## 2021-01-09 PROCEDURE — 80048 BASIC METABOLIC PNL TOTAL CA: CPT | Performed by: FAMILY MEDICINE

## 2021-01-09 PROCEDURE — 25010000002 METHYLPREDNISOLONE PER 40 MG: Performed by: EMERGENCY MEDICINE

## 2021-01-09 PROCEDURE — 85027 COMPLETE CBC AUTOMATED: CPT | Performed by: FAMILY MEDICINE

## 2021-01-09 PROCEDURE — 94668 MNPJ CHEST WALL SBSQ: CPT

## 2021-01-09 PROCEDURE — 25010000002 LEVOFLOXACIN PER 250 MG: Performed by: FAMILY MEDICINE

## 2021-01-09 RX ORDER — PREDNISONE 20 MG/1
40 TABLET ORAL
Status: DISCONTINUED | OUTPATIENT
Start: 2021-01-10 | End: 2021-01-12 | Stop reason: HOSPADM

## 2021-01-09 RX ORDER — FLUCONAZOLE 100 MG/1
100 TABLET ORAL EVERY 24 HOURS
Status: DISCONTINUED | OUTPATIENT
Start: 2021-01-09 | End: 2021-01-12 | Stop reason: HOSPADM

## 2021-01-09 RX ORDER — BISACODYL 10 MG
10 SUPPOSITORY, RECTAL RECTAL DAILY
Status: DISCONTINUED | OUTPATIENT
Start: 2021-01-09 | End: 2021-01-10

## 2021-01-09 RX ORDER — POLYETHYLENE GLYCOL 3350 17 G/17G
17 POWDER, FOR SOLUTION ORAL DAILY
Status: DISCONTINUED | OUTPATIENT
Start: 2021-01-09 | End: 2021-01-12 | Stop reason: HOSPADM

## 2021-01-09 RX ADMIN — GUAIFENESIN 600 MG: 600 TABLET, EXTENDED RELEASE ORAL at 10:06

## 2021-01-09 RX ADMIN — IPRATROPIUM BROMIDE AND ALBUTEROL SULFATE 3 ML: .5; 3 SOLUTION RESPIRATORY (INHALATION) at 00:43

## 2021-01-09 RX ADMIN — HYDROCODONE BITARTRATE AND ACETAMINOPHEN 1 TABLET: 10; 325 TABLET ORAL at 10:06

## 2021-01-09 RX ADMIN — VENLAFAXINE 75 MG: 75 TABLET ORAL at 10:04

## 2021-01-09 RX ADMIN — ENOXAPARIN SODIUM 30 MG: 60 INJECTION SUBCUTANEOUS at 20:19

## 2021-01-09 RX ADMIN — OXYCODONE HYDROCHLORIDE 5 MG: 5 TABLET ORAL at 14:15

## 2021-01-09 RX ADMIN — CYCLOBENZAPRINE HYDROCHLORIDE 10 MG: 10 TABLET, FILM COATED ORAL at 14:14

## 2021-01-09 RX ADMIN — GUAIFENESIN AND DEXTROMETHORPHAN HYDROBROMIDE 1 TABLET: 600; 30 TABLET, EXTENDED RELEASE ORAL at 10:07

## 2021-01-09 RX ADMIN — IPRATROPIUM BROMIDE AND ALBUTEROL SULFATE 3 ML: .5; 3 SOLUTION RESPIRATORY (INHALATION) at 07:08

## 2021-01-09 RX ADMIN — BISACODYL 10 MG: 10 SUPPOSITORY RECTAL at 14:14

## 2021-01-09 RX ADMIN — BUDESONIDE AND FORMOTEROL FUMARATE DIHYDRATE 2 PUFF: 160; 4.5 AEROSOL RESPIRATORY (INHALATION) at 07:09

## 2021-01-09 RX ADMIN — OXYCODONE HYDROCHLORIDE 5 MG: 5 TABLET ORAL at 06:21

## 2021-01-09 RX ADMIN — GUAIFENESIN 600 MG: 600 TABLET, EXTENDED RELEASE ORAL at 20:18

## 2021-01-09 RX ADMIN — VENLAFAXINE 75 MG: 75 TABLET ORAL at 18:12

## 2021-01-09 RX ADMIN — GABAPENTIN 100 MG: 100 CAPSULE ORAL at 10:05

## 2021-01-09 RX ADMIN — TRAZODONE HYDROCHLORIDE 25 MG: 50 TABLET ORAL at 20:18

## 2021-01-09 RX ADMIN — PANTOPRAZOLE SODIUM 40 MG: 40 TABLET, DELAYED RELEASE ORAL at 16:42

## 2021-01-09 RX ADMIN — FLUCONAZOLE 100 MG: 100 TABLET ORAL at 14:00

## 2021-01-09 RX ADMIN — BUDESONIDE 9 MG: 3 CAPSULE, GELATIN COATED ORAL at 10:05

## 2021-01-09 RX ADMIN — ONDANSETRON 4 MG: 2 INJECTION INTRAMUSCULAR; INTRAVENOUS at 17:10

## 2021-01-09 RX ADMIN — MIRTAZAPINE 30 MG: 15 TABLET, FILM COATED ORAL at 20:18

## 2021-01-09 RX ADMIN — BUDESONIDE AND FORMOTEROL FUMARATE DIHYDRATE 2 PUFF: 160; 4.5 AEROSOL RESPIRATORY (INHALATION) at 19:31

## 2021-01-09 RX ADMIN — BUDESONIDE 0.5 MG: 0.5 INHALANT RESPIRATORY (INHALATION) at 19:31

## 2021-01-09 RX ADMIN — IPRATROPIUM BROMIDE AND ALBUTEROL SULFATE 3 ML: .5; 3 SOLUTION RESPIRATORY (INHALATION) at 19:31

## 2021-01-09 RX ADMIN — METOPROLOL SUCCINATE 25 MG: 25 TABLET, EXTENDED RELEASE ORAL at 20:18

## 2021-01-09 RX ADMIN — LORAZEPAM 0.5 MG: 0.5 TABLET ORAL at 20:18

## 2021-01-09 RX ADMIN — ACETAMINOPHEN 650 MG: 650 SOLUTION ORAL at 10:06

## 2021-01-09 RX ADMIN — METHYLPREDNISOLONE SODIUM SUCCINATE 40 MG: 40 INJECTION, POWDER, FOR SOLUTION INTRAMUSCULAR; INTRAVENOUS at 06:09

## 2021-01-09 RX ADMIN — CYCLOBENZAPRINE HYDROCHLORIDE 10 MG: 10 TABLET, FILM COATED ORAL at 10:04

## 2021-01-09 RX ADMIN — LORAZEPAM 0.5 MG: 0.5 TABLET ORAL at 10:06

## 2021-01-09 RX ADMIN — GABAPENTIN 100 MG: 100 CAPSULE ORAL at 20:18

## 2021-01-09 RX ADMIN — GABAPENTIN 100 MG: 100 CAPSULE ORAL at 16:42

## 2021-01-09 RX ADMIN — BUDESONIDE 0.5 MG: 0.5 INHALANT RESPIRATORY (INHALATION) at 07:09

## 2021-01-09 RX ADMIN — PANTOPRAZOLE SODIUM 40 MG: 40 TABLET, DELAYED RELEASE ORAL at 06:09

## 2021-01-09 RX ADMIN — Medication 10 MG: at 20:18

## 2021-01-09 RX ADMIN — SODIUM CHLORIDE, PRESERVATIVE FREE 10 ML: 5 INJECTION INTRAVENOUS at 20:19

## 2021-01-09 RX ADMIN — LEVOFLOXACIN 750 MG: 5 INJECTION, SOLUTION INTRAVENOUS at 16:44

## 2021-01-09 RX ADMIN — SODIUM CHLORIDE, PRESERVATIVE FREE 10 ML: 5 INJECTION INTRAVENOUS at 10:01

## 2021-01-09 RX ADMIN — POLYETHYLENE GLYCOL 3350 17 G: 17 POWDER, FOR SOLUTION ORAL at 14:00

## 2021-01-09 RX ADMIN — IPRATROPIUM BROMIDE AND ALBUTEROL SULFATE 3 ML: .5; 3 SOLUTION RESPIRATORY (INHALATION) at 13:04

## 2021-01-09 NOTE — PROGRESS NOTES
[facility]    Name:  Joelle Yee   Age:  75 y.o.  Sex:  female  :  1945  MRN:  [id number]   Visit Number:  [id number]  Admission Date:  2021  Date Of Service:  21  Primary Care Physician:  Blanquita Clements PA     LOS: 1 day :  Patient Care Team:  Blanquita Clements PA as PCP - General:    Chief Complaint:      Shortness of breath    Subjective / Interval History:     Patient seen and examined once again today.  She noted she did wear BiPAP last night, however nursing notified she only wore this for about an hour.  I instructed if she wants to try this, she needs to wear this for at least 4 hours or so.  She does admit to some nausea, complaining of constipation today.  Appetite about the same.    Review of Systems:     General ROS: Patient denies any fevers, chills or loss of consciousness.  Respiratory ROS: Shortness of breath, cough  Cardiovascular ROS: Denies chest pain or palpitations. No history of exertional chest pain.  Gastrointestinal ROS: Positive nausea. Denies any abdominal pain. No diarrhea.  Neurological ROS: Denies any focal weakness. No loss of consciousness. Denies any numbness.  Dermatological ROS: Denies any redness or pruritis.    Vital Signs:    Temp:  [97.4 °F (36.3 °C)-99.2 °F (37.3 °C)] 97.4 °F (36.3 °C)  Heart Rate:  [] 99  Resp:  [16-18] 18  BP: ()/(54-88) 124/54    Intake and output:    I/O last 3 completed shifts:  In: 860 [P.O.:860]  Out: -   I/O this shift:  In: 600 [P.O.:600]  Out: -     Physical Examination:    General Appearance:  Alert and cooperative, not in any acute distress.  Ill-appearing elderly female.  Pulmonary cachexia noted.   Head:  Atraumatic and normocephalic, without obvious abnormality.   Eyes:          PERRLA, conjunctivae and sclerae normal, no Icterus. No pallor. Extraocular movements are within normal limits.   Neck: Supple, trachea midline, no thyromegaly.   Lungs:   Breath  sounds heard bilaterally equally. Expiratory wheezes at the bases.  Overall poor air movement noted.  Nontachypneic.   Heart:  Normal S1 and S2, no murmur, no gallop, no rub. No JVD   Abdomen:   Normal bowel sounds, no masses, no organomegaly. Soft, non-tender, mild distended, no guarding, no rebound tenderness.   Extremities: Moves all extremities well, no edema, no cyanosis, no clubbing.   Skin: No bleeding, bruising or rash.   Neurologic: Awake, alert and oriented times 3. Moves all 4 extremities equally.     Laboratory results:    Results from last 7 days   Lab Units 01/09/21  0620 01/07/21  0522 01/06/21  0643  01/02/21  1532   SODIUM mmol/L 134* 137 134*   < > 136   POTASSIUM mmol/L 4.3 4.5 5.4*   < > 4.7   CHLORIDE mmol/L 94* 97* 96*   < > 96*   CO2 mmol/L 32.0* 31.8* 30.7*   < > 31.6*   BUN mg/dL 21 22 22   < > 21   CREATININE mg/dL 0.53* 0.63 0.52*   < > 0.73   CALCIUM mg/dL 9.5 9.3 9.2   < > 9.1   BILIRUBIN mg/dL  --   --  <0.2  --  0.2   ALK PHOS U/L  --   --  101  --  111   ALT (SGPT) U/L  --   --  21  --  29   AST (SGOT) U/L  --   --  17  --  26   GLUCOSE mg/dL 85 125* 117*   < > 89    < > = values in this interval not displayed.     Results from last 7 days   Lab Units 01/09/21  0620 01/06/21  0642 01/04/21  0652   WBC 10*3/mm3 21.56* 13.67* 15.67*   HEMOGLOBIN g/dL 13.2 12.8 12.6   HEMATOCRIT % 41.7 39.3 38.6   PLATELETS 10*3/mm3 281 372 292                 Results from last 7 days   Lab Units 01/08/21  0213   PH, ARTERIAL pH units 7.371   PO2 ART mm Hg 81.7   PCO2, ARTERIAL mm Hg 73.8*   HCO3 ART mmol/L 42.7*       I have reviewed the patient's laboratory results.    Radiology results:    Imaging Results (Last 24 Hours)     ** No results found for the last 24 hours. **          I have reviewed the patient's radiology reports.    Medication Review:     I have reviewed the patient's active and prn medications.       COPD exacerbation (CMS/HCC)    Acute on chronic respiratory failure with hypoxia and  hypercapnia (CMS/HCC)    Bronchiectasis without complication (CMS/HCC)    Pseudomonas pneumonia (CMS/HCC)      Assessment:    COPD exacerbation, POA  Acute on chronic respiratory failure with hypoxia and hypercapnia, POA   Bronchiectasis without complication   Hypertension  Hyperlipidemia  Depression  Chronic pain    Plan:    Patient is overall hemodynamically stable.  Mild white count elevation this morning, no acute changes in respiratory status however.  I did place on oral budesonide yesterday due to history of collagenous colitis.  We will start to wean down her IV steroids today.  She has been on Levaquin with history of strep pneumo and Pseudomonas.  Continue on current nebulizer treatments and management.  Ambulate as tolerated.  Continue with PPI therapy.    Did add a bowel regimen today due to complaints of constipation.    Patient does have advanced COPD with bronchiectasis and thus far has had minimal improvement despite fairly aggressive treatment.  Would like to see how patient does with BiPAP again tonight if she can tolerate this.    Jeanie Antonio,   01/09/21  14:03 EST    Dictated utilizing Dragon dictation.

## 2021-01-09 NOTE — PLAN OF CARE
Goal Outcome Evaluation:  Plan of Care Reviewed With: patient  Progress: improving       Vss, on 2LNC through the night. Did not tolerate use of bipap more than an hour. Patient resting well through the night at this time- will continue to monitor.

## 2021-01-09 NOTE — PLAN OF CARE
Goal Outcome Evaluation:  Plan of Care Reviewed With: patient  Progress: improving   Pt has been sleeping intermittently throughout the shift. Still complaining of SOB and pain, but states she feels as though the bipap has helped some. However, only stayed on for an hour overnight per nightshift, and so I encouraged her to try and keep it on longer tonight and perhaps throughout the day today. VSS. Will continue to monitor.

## 2021-01-10 PROCEDURE — 94660 CPAP INITIATION&MGMT: CPT

## 2021-01-10 PROCEDURE — 94799 UNLISTED PULMONARY SVC/PX: CPT

## 2021-01-10 PROCEDURE — 63710000001 PREDNISONE PER 1 MG: Performed by: FAMILY MEDICINE

## 2021-01-10 PROCEDURE — 25010000002 ENOXAPARIN PER 10 MG: Performed by: EMERGENCY MEDICINE

## 2021-01-10 PROCEDURE — 99232 SBSQ HOSP IP/OBS MODERATE 35: CPT | Performed by: FAMILY MEDICINE

## 2021-01-10 PROCEDURE — 94669 MECHANICAL CHEST WALL OSCILL: CPT

## 2021-01-10 RX ADMIN — IPRATROPIUM BROMIDE AND ALBUTEROL SULFATE 3 ML: .5; 3 SOLUTION RESPIRATORY (INHALATION) at 07:44

## 2021-01-10 RX ADMIN — IPRATROPIUM BROMIDE AND ALBUTEROL SULFATE 3 ML: .5; 3 SOLUTION RESPIRATORY (INHALATION) at 19:08

## 2021-01-10 RX ADMIN — HYDROCODONE BITARTRATE AND ACETAMINOPHEN 1 TABLET: 10; 325 TABLET ORAL at 04:31

## 2021-01-10 RX ADMIN — IPRATROPIUM BROMIDE AND ALBUTEROL SULFATE 3 ML: .5; 3 SOLUTION RESPIRATORY (INHALATION) at 01:18

## 2021-01-10 RX ADMIN — BUDESONIDE 9 MG: 3 CAPSULE, GELATIN COATED ORAL at 08:25

## 2021-01-10 RX ADMIN — LORAZEPAM 0.5 MG: 0.5 TABLET ORAL at 20:42

## 2021-01-10 RX ADMIN — PREDNISONE 40 MG: 20 TABLET ORAL at 08:25

## 2021-01-10 RX ADMIN — GUAIFENESIN 600 MG: 600 TABLET, EXTENDED RELEASE ORAL at 20:42

## 2021-01-10 RX ADMIN — METOPROLOL SUCCINATE 25 MG: 25 TABLET, EXTENDED RELEASE ORAL at 20:42

## 2021-01-10 RX ADMIN — IPRATROPIUM BROMIDE AND ALBUTEROL SULFATE 3 ML: .5; 3 SOLUTION RESPIRATORY (INHALATION) at 14:10

## 2021-01-10 RX ADMIN — TRAZODONE HYDROCHLORIDE 25 MG: 50 TABLET ORAL at 20:42

## 2021-01-10 RX ADMIN — LACTULOSE: 10 SOLUTION ORAL; RECTAL at 16:36

## 2021-01-10 RX ADMIN — SODIUM CHLORIDE, PRESERVATIVE FREE 10 ML: 5 INJECTION INTRAVENOUS at 20:42

## 2021-01-10 RX ADMIN — VENLAFAXINE 75 MG: 75 TABLET ORAL at 19:10

## 2021-01-10 RX ADMIN — BUDESONIDE AND FORMOTEROL FUMARATE DIHYDRATE 2 PUFF: 160; 4.5 AEROSOL RESPIRATORY (INHALATION) at 07:44

## 2021-01-10 RX ADMIN — LORAZEPAM 0.5 MG: 0.5 TABLET ORAL at 08:25

## 2021-01-10 RX ADMIN — GABAPENTIN 100 MG: 100 CAPSULE ORAL at 08:26

## 2021-01-10 RX ADMIN — GABAPENTIN 100 MG: 100 CAPSULE ORAL at 20:42

## 2021-01-10 RX ADMIN — PANTOPRAZOLE SODIUM 40 MG: 40 TABLET, DELAYED RELEASE ORAL at 19:10

## 2021-01-10 RX ADMIN — SODIUM CHLORIDE, PRESERVATIVE FREE 10 ML: 5 INJECTION INTRAVENOUS at 08:31

## 2021-01-10 RX ADMIN — GUAIFENESIN 600 MG: 600 TABLET, EXTENDED RELEASE ORAL at 08:26

## 2021-01-10 RX ADMIN — GABAPENTIN 100 MG: 100 CAPSULE ORAL at 16:27

## 2021-01-10 RX ADMIN — FLUCONAZOLE 100 MG: 100 TABLET ORAL at 14:56

## 2021-01-10 RX ADMIN — MIRTAZAPINE 30 MG: 15 TABLET, FILM COATED ORAL at 20:42

## 2021-01-10 RX ADMIN — BUDESONIDE 0.5 MG: 0.5 INHALANT RESPIRATORY (INHALATION) at 07:44

## 2021-01-10 RX ADMIN — HYDROCODONE BITARTRATE AND ACETAMINOPHEN 1 TABLET: 10; 325 TABLET ORAL at 14:56

## 2021-01-10 RX ADMIN — ENOXAPARIN SODIUM 30 MG: 60 INJECTION SUBCUTANEOUS at 20:41

## 2021-01-10 RX ADMIN — PANTOPRAZOLE SODIUM 40 MG: 40 TABLET, DELAYED RELEASE ORAL at 06:31

## 2021-01-10 RX ADMIN — BISACODYL 10 MG: 10 SUPPOSITORY RECTAL at 08:26

## 2021-01-10 RX ADMIN — Medication 10 MG: at 20:42

## 2021-01-10 RX ADMIN — POLYETHYLENE GLYCOL 3350 17 G: 17 POWDER, FOR SOLUTION ORAL at 08:25

## 2021-01-10 RX ADMIN — BUDESONIDE 0.5 MG: 0.5 INHALANT RESPIRATORY (INHALATION) at 19:08

## 2021-01-10 RX ADMIN — VENLAFAXINE 75 MG: 75 TABLET ORAL at 08:26

## 2021-01-10 NOTE — PLAN OF CARE
Goal Outcome Evaluation:  Plan of Care Reviewed With: patient  Progress: no change  Outcome Summary: vital signs stable pt refuses to wear bipap at night will continue to monitor

## 2021-01-10 NOTE — PLAN OF CARE
Goal Outcome Evaluation:  Plan of Care Reviewed With: patient  Progress: no change  Outcome Summary: No significant events today. Pt stable. Pt was compliant with NPPV twice today approx 1.5 - 2 hrs while napping. Pt still reports constipation, Lactulose enema used today with mild result. Possible need for digital disimpaction if not resolved

## 2021-01-10 NOTE — PROGRESS NOTES
[facility]    Name:  Joelle Yee   Age:  75 y.o.  Sex:  female  :  1945  MRN:  [id number]   Visit Number:  [id number]  Admission Date:  2021  Date Of Service:  01/10/21  Primary Care Physician:  Blanquita Clements PA     LOS: 2 days :  Patient Care Team:  Blanquita Clements PA as PCP - General:    Chief Complaint:      Shortness of breath    Subjective / Interval History:     Patient seen and examined again today. She states she wore BiPAP some overnight, was only about an hour. Feels smothered with the mask. She would like to try it during the day. She knows she still feels tightness in her chest. Still with sputum production at times, but feels like it stuck. Notes a small bowel movement with suppository yesterday, is requesting an enema today.    75-year-old female with history of severe COPD, bronchiectasis, who had presented with worsening shortness of breath at home. She follows with pulmonology on outpatient basis. She had some CT imaging which did demonstrate some bilateral lower lobe bronchiectasis and bronchitis with no evidence of pulmonary emboli and left basilar opacity on chest x-ray. Repeat imaging while on antibiotic therapy has shown improvement. She was positive for strep pneumo as well as Pseudomonas in her sputum culture. Has been on Levaquin. Has also been on steroids after discussion with pulmonology. Did have a trial of BiPAP.    Review of Systems:     General ROS: Patient denies any fevers, chills or loss of consciousness.  Respiratory ROS: Shortness of breath, cough  Cardiovascular ROS: Denies chest pain or palpitations. No history of exertional chest pain.  Gastrointestinal ROS: Positive nausea. Denies any abdominal pain. No diarrhea. Constipation  Neurological ROS: Denies any focal weakness. No loss of consciousness. Denies any numbness.  Dermatological ROS: Denies any redness or pruritis.    Vital Signs:    Temp:  [97.7  °F (36.5 °C)-98.1 °F (36.7 °C)] 97.8 °F (36.6 °C)  Heart Rate:  [] 106  Resp:  [17-26] 17  BP: (109-154)/(52-72) 109/67    Intake and output:    I/O last 3 completed shifts:  In: 1320 [P.O.:1320]  Out: -   I/O this shift:  In: 360 [P.O.:360]  Out: -     Physical Examination:    General Appearance:  Alert and cooperative, not in any acute distress.  Ill-appearing elderly female.  Pulmonary cachexia noted.   Head:  Atraumatic and normocephalic, without obvious abnormality.   Eyes:          PERRLA, conjunctivae and sclerae normal, no Icterus. No pallor. Extraocular movements are within normal limits.   Neck: Supple, trachea midline, no thyromegaly.   Lungs:   Breath sounds heard bilaterally equally. Expiratory wheezes at the bases. Mild rhonchi. Nontachypneic.   Heart:  Normal S1 and S2, no murmur, no gallop, no rub. No JVD   Abdomen:   Normal bowel sounds, no masses, no organomegaly. Soft, non-tender, mild distended, no guarding, no rebound tenderness.   Extremities: Moves all extremities well, no edema, no cyanosis, no clubbing.   Skin: No bleeding, bruising or rash.   Neurologic: Awake, alert and oriented times 3. Moves all 4 extremities equally.     Laboratory results:    Results from last 7 days   Lab Units 01/09/21  0620 01/07/21  0522 01/06/21  0643   SODIUM mmol/L 134* 137 134*   POTASSIUM mmol/L 4.3 4.5 5.4*   CHLORIDE mmol/L 94* 97* 96*   CO2 mmol/L 32.0* 31.8* 30.7*   BUN mg/dL 21 22 22   CREATININE mg/dL 0.53* 0.63 0.52*   CALCIUM mg/dL 9.5 9.3 9.2   BILIRUBIN mg/dL  --   --  <0.2   ALK PHOS U/L  --   --  101   ALT (SGPT) U/L  --   --  21   AST (SGOT) U/L  --   --  17   GLUCOSE mg/dL 85 125* 117*     Results from last 7 days   Lab Units 01/09/21  0620 01/06/21  0642 01/04/21  0652   WBC 10*3/mm3 21.56* 13.67* 15.67*   HEMOGLOBIN g/dL 13.2 12.8 12.6   HEMATOCRIT % 41.7 39.3 38.6   PLATELETS 10*3/mm3 281 372 292                 Results from last 7 days   Lab Units 01/08/21  0213   PH, ARTERIAL pH units  7.371   PO2 ART mm Hg 81.7   PCO2, ARTERIAL mm Hg 73.8*   HCO3 ART mmol/L 42.7*       I have reviewed the patient's laboratory results.    Radiology results:    Imaging Results (Last 24 Hours)     ** No results found for the last 24 hours. **          I have reviewed the patient's radiology reports.    Medication Review:     I have reviewed the patient's active and prn medications.       COPD exacerbation (CMS/HCC)    Acute on chronic respiratory failure with hypoxia and hypercapnia (CMS/HCC)    Bronchiectasis without complication (CMS/HCC)    Pseudomonas pneumonia (CMS/HCC)      Assessment:    COPD exacerbation, POA  Acute on chronic respiratory failure with hypoxia and hypercapnia, POA   Bronchiectasis without complication   Hypertension  Hyperlipidemia  Depression  Chronic pain    Plan:    Patient remains hemodynamically stable on current oxygen saturations. Encourage use of BiPAP. We will repeat labs tomorrow, suspect white count due to steroid use. Repeat chest x-ray as needed. Patient has not done well in regards to overall shortness of breath and still has fair amount of mucus production at times. We will have pulmonology reevaluate her again on Monday. She may need bronchoscopy. Have added an enema today to see if this helps with constipation, she is on chronic opioid therapy. Encouraged to ambulate and participate with physical therapy.    If patient's shortness of breath does not improve with use of BiPAP and/or after reevaluation by pulmonology, may need to consider discussing palliative care with her moving forward.    Jeanie Antonio DO  01/10/21  16:58 EST    Dictated utilizing Dragon dictation.

## 2021-01-11 LAB
ALBUMIN SERPL-MCNC: 3.6 G/DL (ref 3.5–5.2)
ALBUMIN/GLOB SERPL: 1.3 G/DL
ALP SERPL-CCNC: 108 U/L (ref 39–117)
ALT SERPL W P-5'-P-CCNC: 27 U/L (ref 1–33)
ANION GAP SERPL CALCULATED.3IONS-SCNC: 7 MMOL/L (ref 5–15)
AST SERPL-CCNC: 23 U/L (ref 1–32)
BASOPHILS # BLD AUTO: 0.01 10*3/MM3 (ref 0–0.2)
BASOPHILS NFR BLD AUTO: 0.1 % (ref 0–1.5)
BILIRUB SERPL-MCNC: 0.3 MG/DL (ref 0–1.2)
BUN SERPL-MCNC: 22 MG/DL (ref 8–23)
BUN/CREAT SERPL: 37.9 (ref 7–25)
CALCIUM SPEC-SCNC: 9.3 MG/DL (ref 8.6–10.5)
CHLORIDE SERPL-SCNC: 95 MMOL/L (ref 98–107)
CO2 SERPL-SCNC: 37 MMOL/L (ref 22–29)
CREAT SERPL-MCNC: 0.58 MG/DL (ref 0.57–1)
DEPRECATED RDW RBC AUTO: 59.1 FL (ref 37–54)
EOSINOPHIL # BLD AUTO: 0.06 10*3/MM3 (ref 0–0.4)
EOSINOPHIL NFR BLD AUTO: 0.3 % (ref 0.3–6.2)
ERYTHROCYTE [DISTWIDTH] IN BLOOD BY AUTOMATED COUNT: 15.7 % (ref 12.3–15.4)
GFR SERPL CREATININE-BSD FRML MDRD: 101 ML/MIN/1.73
GLOBULIN UR ELPH-MCNC: 2.7 GM/DL
GLUCOSE SERPL-MCNC: 81 MG/DL (ref 65–99)
HCT VFR BLD AUTO: 39.8 % (ref 34–46.6)
HGB BLD-MCNC: 12.5 G/DL (ref 12–15.9)
IMM GRANULOCYTES # BLD AUTO: 1.84 10*3/MM3 (ref 0–0.05)
IMM GRANULOCYTES NFR BLD AUTO: 10.3 % (ref 0–0.5)
LYMPHOCYTES # BLD AUTO: 2.23 10*3/MM3 (ref 0.7–3.1)
LYMPHOCYTES NFR BLD AUTO: 12.5 % (ref 19.6–45.3)
MACROCYTES BLD QL SMEAR: NORMAL
MCH RBC QN AUTO: 31.9 PG (ref 26.6–33)
MCHC RBC AUTO-ENTMCNC: 31.4 G/DL (ref 31.5–35.7)
MCV RBC AUTO: 101.5 FL (ref 79–97)
MONOCYTES # BLD AUTO: 1.47 10*3/MM3 (ref 0.1–0.9)
MONOCYTES NFR BLD AUTO: 8.2 % (ref 5–12)
NEUTROPHILS NFR BLD AUTO: 12.25 10*3/MM3 (ref 1.7–7)
NEUTROPHILS NFR BLD AUTO: 68.6 % (ref 42.7–76)
NRBC BLD AUTO-RTO: 0 /100 WBC (ref 0–0.2)
PLATELET # BLD AUTO: 292 10*3/MM3 (ref 140–450)
PMV BLD AUTO: 9.4 FL (ref 6–12)
POTASSIUM SERPL-SCNC: 4.4 MMOL/L (ref 3.5–5.2)
PROT SERPL-MCNC: 6.3 G/DL (ref 6–8.5)
QT INTERVAL: 358 MS
QTC INTERVAL: 423 MS
RBC # BLD AUTO: 3.92 10*6/MM3 (ref 3.77–5.28)
SMALL PLATELETS BLD QL SMEAR: ADEQUATE
SODIUM SERPL-SCNC: 139 MMOL/L (ref 136–145)
WBC # BLD AUTO: 17.86 10*3/MM3 (ref 3.4–10.8)
WBC MORPH BLD: NORMAL

## 2021-01-11 PROCEDURE — 63710000001 PREDNISONE PER 1 MG: Performed by: FAMILY MEDICINE

## 2021-01-11 PROCEDURE — 25010000002 ENOXAPARIN PER 10 MG: Performed by: EMERGENCY MEDICINE

## 2021-01-11 PROCEDURE — 94799 UNLISTED PULMONARY SVC/PX: CPT

## 2021-01-11 PROCEDURE — 94660 CPAP INITIATION&MGMT: CPT

## 2021-01-11 PROCEDURE — 85007 BL SMEAR W/DIFF WBC COUNT: CPT | Performed by: FAMILY MEDICINE

## 2021-01-11 PROCEDURE — 94668 MNPJ CHEST WALL SBSQ: CPT

## 2021-01-11 PROCEDURE — 99232 SBSQ HOSP IP/OBS MODERATE 35: CPT | Performed by: INTERNAL MEDICINE

## 2021-01-11 PROCEDURE — 80053 COMPREHEN METABOLIC PANEL: CPT | Performed by: FAMILY MEDICINE

## 2021-01-11 PROCEDURE — 25010000002 LEVOFLOXACIN PER 250 MG: Performed by: FAMILY MEDICINE

## 2021-01-11 PROCEDURE — 85025 COMPLETE CBC W/AUTO DIFF WBC: CPT | Performed by: FAMILY MEDICINE

## 2021-01-11 PROCEDURE — 99406 BEHAV CHNG SMOKING 3-10 MIN: CPT

## 2021-01-11 PROCEDURE — 94669 MECHANICAL CHEST WALL OSCILL: CPT

## 2021-01-11 RX ADMIN — VENLAFAXINE 75 MG: 75 TABLET ORAL at 17:21

## 2021-01-11 RX ADMIN — PANTOPRAZOLE SODIUM 40 MG: 40 TABLET, DELAYED RELEASE ORAL at 06:31

## 2021-01-11 RX ADMIN — IPRATROPIUM BROMIDE AND ALBUTEROL SULFATE 3 ML: .5; 3 SOLUTION RESPIRATORY (INHALATION) at 07:59

## 2021-01-11 RX ADMIN — PREDNISONE 40 MG: 20 TABLET ORAL at 10:04

## 2021-01-11 RX ADMIN — PANTOPRAZOLE SODIUM 40 MG: 40 TABLET, DELAYED RELEASE ORAL at 17:22

## 2021-01-11 RX ADMIN — OXYCODONE HYDROCHLORIDE 5 MG: 5 TABLET ORAL at 06:31

## 2021-01-11 RX ADMIN — GUAIFENESIN 600 MG: 600 TABLET, EXTENDED RELEASE ORAL at 22:00

## 2021-01-11 RX ADMIN — HYDROCODONE BITARTRATE AND ACETAMINOPHEN 1 TABLET: 10; 325 TABLET ORAL at 16:20

## 2021-01-11 RX ADMIN — SODIUM CHLORIDE, PRESERVATIVE FREE 10 ML: 5 INJECTION INTRAVENOUS at 22:00

## 2021-01-11 RX ADMIN — BUDESONIDE 9 MG: 3 CAPSULE, GELATIN COATED ORAL at 10:03

## 2021-01-11 RX ADMIN — IPRATROPIUM BROMIDE AND ALBUTEROL SULFATE 3 ML: .5; 3 SOLUTION RESPIRATORY (INHALATION) at 18:53

## 2021-01-11 RX ADMIN — GABAPENTIN 100 MG: 100 CAPSULE ORAL at 16:15

## 2021-01-11 RX ADMIN — POLYETHYLENE GLYCOL 3350 17 G: 17 POWDER, FOR SOLUTION ORAL at 10:08

## 2021-01-11 RX ADMIN — ENOXAPARIN SODIUM 30 MG: 60 INJECTION SUBCUTANEOUS at 22:12

## 2021-01-11 RX ADMIN — LORAZEPAM 0.5 MG: 0.5 TABLET ORAL at 22:00

## 2021-01-11 RX ADMIN — MIRTAZAPINE 30 MG: 15 TABLET, FILM COATED ORAL at 22:00

## 2021-01-11 RX ADMIN — FLUCONAZOLE 100 MG: 100 TABLET ORAL at 12:46

## 2021-01-11 RX ADMIN — IPRATROPIUM BROMIDE AND ALBUTEROL SULFATE 3 ML: .5; 3 SOLUTION RESPIRATORY (INHALATION) at 00:16

## 2021-01-11 RX ADMIN — TRAZODONE HYDROCHLORIDE 25 MG: 50 TABLET ORAL at 22:00

## 2021-01-11 RX ADMIN — GABAPENTIN 100 MG: 100 CAPSULE ORAL at 22:00

## 2021-01-11 RX ADMIN — DICLOFENAC 4 G: 10 GEL TOPICAL at 22:14

## 2021-01-11 RX ADMIN — Medication 10 MG: at 22:00

## 2021-01-11 RX ADMIN — ACETAMINOPHEN 650 MG: 325 TABLET, FILM COATED ORAL at 10:04

## 2021-01-11 RX ADMIN — IPRATROPIUM BROMIDE AND ALBUTEROL SULFATE 3 ML: .5; 3 SOLUTION RESPIRATORY (INHALATION) at 12:22

## 2021-01-11 RX ADMIN — HYDROCODONE BITARTRATE AND ACETAMINOPHEN 1 TABLET: 10; 325 TABLET ORAL at 01:12

## 2021-01-11 RX ADMIN — BUDESONIDE 0.5 MG: 0.5 INHALANT RESPIRATORY (INHALATION) at 08:00

## 2021-01-11 RX ADMIN — BUDESONIDE 0.5 MG: 0.5 INHALANT RESPIRATORY (INHALATION) at 18:53

## 2021-01-11 RX ADMIN — VENLAFAXINE 75 MG: 75 TABLET ORAL at 10:03

## 2021-01-11 RX ADMIN — SODIUM CHLORIDE, PRESERVATIVE FREE 10 ML: 5 INJECTION INTRAVENOUS at 10:08

## 2021-01-11 RX ADMIN — GABAPENTIN 100 MG: 100 CAPSULE ORAL at 10:07

## 2021-01-11 RX ADMIN — CYCLOBENZAPRINE HYDROCHLORIDE 10 MG: 10 TABLET, FILM COATED ORAL at 10:04

## 2021-01-11 RX ADMIN — DICLOFENAC 4 G: 10 GEL TOPICAL at 00:53

## 2021-01-11 RX ADMIN — METOPROLOL SUCCINATE 25 MG: 25 TABLET, EXTENDED RELEASE ORAL at 22:00

## 2021-01-11 RX ADMIN — GUAIFENESIN 600 MG: 600 TABLET, EXTENDED RELEASE ORAL at 10:09

## 2021-01-11 RX ADMIN — LORAZEPAM 0.5 MG: 0.5 TABLET ORAL at 10:04

## 2021-01-11 RX ADMIN — LEVOFLOXACIN 750 MG: 5 INJECTION, SOLUTION INTRAVENOUS at 16:16

## 2021-01-11 NOTE — PLAN OF CARE
Goal Outcome Evaluation:  Plan of Care Reviewed With: patient  Progress: no change  Outcome Summary: No acute events overnight. Patient has rested well. Did not wear the bipap through the night however. Continue to encourage usage as much as possible. Continue to monitor.

## 2021-01-11 NOTE — PROGRESS NOTES
[facility]    Name:  Joelle Yee   Age:  75 y.o.  Sex:  female  :  1945  MRN:  [id number]   Visit Number:  [id number]  Admission Date:  2021  Date Of Service:  21  Primary Care Physician:  Blanquita Clements PA     LOS: 3 days :  Patient Care Team:  Blanquita Clements PA as PCP - General:    Chief Complaint:      Shortness of breath, COPD    Subjective / Interval History:     Patient seen and evaluated.  She is resting comfortably in bed on nasal cannula.  Daughter-in-law at bedside.  Patient prefers to go home at discharge, though admits that she is weak and would need help.  No acute events overnight per nursing staff.  Still short of air though at baseline.    Review of Systems:     General ROS: Patient denies any fevers, chills or loss of consciousness.  Respiratory ROS: Denies cough or shortness of breath.  Cardiovascular ROS: Denies chest pain or palpitations. No history of exertional chest pain.  Gastrointestinal ROS: Denies nausea and vomiting. Denies any abdominal pain. No diarrhea.  Neurological ROS: Denies any focal weakness. No loss of consciousness. Denies any numbness.  Dermatological ROS: Denies any redness or pruritis.    Vital Signs:    Temp:  [97.7 °F (36.5 °C)-98.8 °F (37.1 °C)] 97.9 °F (36.6 °C)  Heart Rate:  [] 94  Resp:  [18-20] 20  BP: (121-146)/(52-84) 123/56    Intake and output:    I/O last 3 completed shifts:  In: 720 [P.O.:720]  Out: -   I/O this shift:  In: 600 [P.O.:600]  Out: 5 [Urine:5]    Physical Examination:    General Appearance:  Alert and cooperative, not in any acute distress.   Head:  Atraumatic and normocephalic, without obvious abnormality.   Eyes:          PERRLA, conjunctivae and sclerae normal, no Icterus. No pallor. Extraocular movements are within normal limits.   Neck: Supple, trachea midline,    Lungs:   Chest shape is normal.  Poor air movement, no crackles or wheezing.   Heart:  Normal  S1 and S2, no murmur, No JVD   Abdomen:   Normal bowel sounds, Soft, nontender, nondistended, no guarding, no rebound tenderness.   Extremities: Moves all extremities well, no edema, no cyanosis, no clubbing.   Skin: No bleeding, bruising or rash.   Neurologic: Awake, alert and oriented times 3. Moves all 4 extremities equally.     Laboratory results:    Results from last 7 days   Lab Units 01/11/21  0620 01/09/21  0620 01/07/21  0522 01/06/21  0643   SODIUM mmol/L 139 134* 137 134*   POTASSIUM mmol/L 4.4 4.3 4.5 5.4*   CHLORIDE mmol/L 95* 94* 97* 96*   CO2 mmol/L 37.0* 32.0* 31.8* 30.7*   BUN mg/dL 22 21 22 22   CREATININE mg/dL 0.58 0.53* 0.63 0.52*   CALCIUM mg/dL 9.3 9.5 9.3 9.2   BILIRUBIN mg/dL 0.3  --   --  <0.2   ALK PHOS U/L 108  --   --  101   ALT (SGPT) U/L 27  --   --  21   AST (SGOT) U/L 23  --   --  17   GLUCOSE mg/dL 81 85 125* 117*     Results from last 7 days   Lab Units 01/11/21  0620 01/09/21  0620 01/06/21  0642   WBC 10*3/mm3 17.86* 21.56* 13.67*   HEMOGLOBIN g/dL 12.5 13.2 12.8   HEMATOCRIT % 39.8 41.7 39.3   PLATELETS 10*3/mm3 292 281 372                 Results from last 7 days   Lab Units 01/08/21  0213   PH, ARTERIAL pH units 7.371   PO2 ART mm Hg 81.7   PCO2, ARTERIAL mm Hg 73.8*   HCO3 ART mmol/L 42.7*       I have reviewed the patient's laboratory results.    Radiology results:    Imaging Results (Last 24 Hours)     ** No results found for the last 24 hours. **          I have reviewed the patient's radiology reports.    Medication Review:     I have reviewed the patient's active and prn medications.       COPD exacerbation (CMS/HCC)    Acute on chronic respiratory failure with hypoxia and hypercapnia (CMS/HCC)    Bronchiectasis without complication (CMS/HCC)    Pseudomonas pneumonia (CMS/HCC)      Assessment:    COPD exacerbation, POA  Acute on chronic respiratory failure with hypoxia and hypercapnia, POA   Bronchiectasis without  complication   Hypertension  Hyperlipidemia  Depression  Chronic pain    Plan:    Continue to monitor patient in the hospital.  Sputum culture grew Pseudomonas.  Patient is continued on Levaquin.  Patient being followed by Dr. Esparza.  I personally discussed case with consulting physician.  Long discussion had with patient regarding goals of care.  I have encouraged patient to consider rehab placement.  At this time, she is requesting to return home at discharge.  I did bring up palliative care as an option to assist with home health.  Patient is interested.  Consult placed for further discussion.  Further orders as clinical course dictates.  Anticipate medical stability for discharge in 24 to 48 hours.    Evelio Mckeon DO  01/11/21  15:50 EST    Dictated utilizing Dragon dictation.

## 2021-01-11 NOTE — PROGRESS NOTES
"    CC: Acute exacerbation of COPD.  Bronchiectasis.    S: Feels that the cough has improved although she continues to feel chest tightness, she feels that it has improved.  She is complaining of back pain and feels that it acted up when she sat up in the bed.    ROS: Positive for cough, shortness of breath, mild anxiety. Denies chest pain, diarrhea or fever.    O: /84 (BP Location: Right arm, Patient Position: Lying)   Pulse 85   Temp 97.7 °F (36.5 °C) (Oral)   Resp 18   Ht 144.8 cm (57\")   Wt 40.7 kg (89 lb 11.6 oz)   LMP  (LMP Unknown)   SpO2 98%   BMI 19.42 kg/m²     Vital signs reviewed.  General/Constitutional: Mild respiratory distress noted.  Neck: No significant JVD   Cardiovascular: S1 + S2.  Regular at this time.  Lungs/Respiratory: Minimal scattered wheezing heard. Basal crackles noted.  Musculoskeletal/Extremities: No edema noted.  Neurologic: AAOx3. Was able to follow commands     Labs: Reviewed.   Results from last 7 days   Lab Units 01/11/21  0620 01/09/21  0620 01/06/21  0642   WBC 10*3/mm3 17.86* 21.56* 13.67*   HEMOGLOBIN g/dL 12.5 13.2 12.8   HEMATOCRIT % 39.8 41.7 39.3   PLATELETS 10*3/mm3 292 281 372       Results from last 7 days   Lab Units 01/11/21  0620 01/09/21  0620 01/07/21  0522 01/06/21  0643   SODIUM mmol/L 139 134* 137 134*   POTASSIUM mmol/L 4.4 4.3 4.5 5.4*   CHLORIDE mmol/L 95* 94* 97* 96*   CO2 mmol/L 37.0* 32.0* 31.8* 30.7*   BUN mg/dL 22 21 22 22   CREATININE mg/dL 0.58 0.53* 0.63 0.52*   CALCIUM mg/dL 9.3 9.5 9.3 9.2   BILIRUBIN mg/dL 0.3  --   --  <0.2   ALK PHOS U/L 108  --   --  101   ALT (SGPT) U/L 27  --   --  21   AST (SGOT) U/L 23  --   --  17   GLUCOSE mg/dL 81 85 125* 117*   TOTAL PROTEIN g/dL 6.3  --   --  6.4   ALBUMIN g/dL 3.60  --   --  3.50                   Lab Results   Component Value Date    PROCALCITO 0.04 01/02/2021    PROCALCITO 0.04 12/27/2020    PROCALCITO <0.05 06/17/2019       Lab Results   Component Value Date    PROBNP 222.8 12/29/2020 "    PROBNP 196.2 12/27/2020    PROBNP 131.8 02/14/2020       No results found for: CRP    No results found for: SEDRATE      Micro: As of January 11, 2021   Lab Results   Component Value Date    RESPCX Moderate growth (3+) Pseudomonas aeruginosa (A) 01/04/2021    RESPCX No Normal Respiratory Ashly (A) 01/04/2021    RESPCX Light growth (2+) Pseudomonas aeruginosa (A) 09/16/2019    RESPCX No Normal Respiratory Ashly (A) 09/16/2019     Lab Results   Component Value Date    BLOODCX No growth at 5 days 01/14/2020    BLOODCX No growth at 5 days 01/14/2020    BLOODCX No growth at 5 days 06/16/2019     Lab Results   Component Value Date    URINECX No growth 10/16/2017    URINECX 10,000-20,000 CFU/mL Enterococcus faecalis (A) 02/16/2017     No results found for: MRSACX  No results found for: MRSAPCR  No results found for: URCX  No components found for: LOWRESPCF  No results found for: THROATCX  No results found for: CULTURES  No components found for: STREPBCX  Lab Results   Component Value Date    STREPPNEUAG Positive (A) 01/04/2021     No results found for: LEGIONELLA  No results found for: MYCOPLASCX  No results found for: GCCX  Lab Results   Component Value Date    WOUNDCX Moderate growth (3+) Staphylococcus aureus (A) 10/25/2018     No results found for: BODYFLDCX    Lab Results   Component Value Date    FLU Negative 12/27/2020    FLU Negative 12/27/2020     No results found for: ADENOVIRUS  Lab Results   Component Value Date    LH824J Not Detected 06/16/2019     Lab Results   Component Value Date    CVHKU1 Not Detected 06/16/2019     Lab Results   Component Value Date    CVNL63 Not Detected 06/16/2019     Lab Results   Component Value Date    CVOC43 Not Detected 06/16/2019     Lab Results   Component Value Date    HUMETPNEVS Not Detected 06/16/2019     Lab Results   Component Value Date    HURVEV Not Detected 06/16/2019     Lab Results   Component Value Date    FLUBPCR Not Detected 06/16/2019     Lab Results   Component  Value Date    PARAINFLUE Not Detected 06/16/2019     Lab Results   Component Value Date    PARAFLUV2 Not Detected 06/16/2019     Lab Results   Component Value Date    PARAFLUV3 Not Detected 06/16/2019     Lab Results   Component Value Date    PARAFLUV4 Not Detected 06/16/2019     Lab Results   Component Value Date    BPERTPCR Not Detected 06/16/2019     No results found for: [id number]  Lab Results   Component Value Date    CPNEUPCR Not Detected 06/16/2019     Lab Results   Component Value Date    MPNEUMO Not Detected 06/16/2019     No results found for: FLUAPCR  Lab Results   Component Value Date    FLUAH3 Not Detected 06/16/2019     Lab Results   Component Value Date    FLUAH1 Not Detected 06/16/2019     Lab Results   Component Value Date    RSV Not Detected 06/16/2019     No results found for: BPARAPCR    COVID 19:  Lab Results   Component Value Date    COVID19 Not Detected 12/27/2020          ABG: Reviewed  Lab Results   Component Value Date    PHART 7.371 01/08/2021    HRS9QNJ 73.8 (C) 01/08/2021    PO2ART 81.7 01/08/2021    HGBBG 14.3 01/13/2020    T0XCZGBL 96.2 01/08/2021    CARBOXYHGB 1.6 01/08/2021         CXRay: Latest imaging study was reviewed personally.   Imaging Results (Last 24 Hours)     ** No results found for the last 24 hours. **          Assessment & Recommendations/Plan:   1.  Acute exacerbation of COPD  Continue steroids for now.  I am not entirely clear with regards to her worsening today but will continue nebulized treatments    2.  Bronchiectasis with exacerbation  Continue flutter valve.  We will start chest percussion therapy  We will also start the patient on Mucomyst.    3.  Chronic hypoxemia  Continue oxygen at home.    4.  Severe COPD  Her last PFTs did confirm severe COPD    5.  Chronic respiratory acidosis  Based on the last blood gas, she may actually qualify for BiPAP or noninvasive ventilation device.  Will consider discussion with her on an outpatient basis.    6.  Pseudomonas  in sputum  Continues to be susceptible to almost all antibiotics.    We have reviewed patient's current orders and changes, if any, have been suggested to primary care team. Plan was also discussed with nursing staff, as necessary.         This document was electronically signed by Rebeka Esparza MD on 01/11/21 at 09:57 EST      Dictated utilizing Dragon dictation.

## 2021-01-11 NOTE — PROGRESS NOTES
Continued Stay Note   [facility]     Patient Name: Joelle Yee  MRN: [id number]  Today's Date: 1/11/2021    Admit Date: 1/2/2021    Discharge Plan     Row Name 01/11/21 0913       Plan    Plan  Spoke to pt regarding discharge plans ,She is declining Rehab .She is agreement to Home Health and would prefer [facility] She had them a month ago for  pt for her shoulder        Discharge Codes    No documentation.       Expected Discharge Date and Time     Expected Discharge Date Expected Discharge Time    Jan 8, 2021             Sydney Goff RN

## 2021-01-12 VITALS
OXYGEN SATURATION: 99 % | TEMPERATURE: 97.9 F | HEIGHT: 57 IN | RESPIRATION RATE: 20 BRPM | HEART RATE: 105 BPM | WEIGHT: 89.73 LBS | SYSTOLIC BLOOD PRESSURE: 120 MMHG | DIASTOLIC BLOOD PRESSURE: 51 MMHG | BODY MASS INDEX: 19.36 KG/M2

## 2021-01-12 LAB
ANION GAP SERPL CALCULATED.3IONS-SCNC: 6.2 MMOL/L (ref 5–15)
BASOPHILS # BLD AUTO: 0.01 10*3/MM3 (ref 0–0.2)
BASOPHILS NFR BLD AUTO: 0.1 % (ref 0–1.5)
BUN SERPL-MCNC: 20 MG/DL (ref 8–23)
BUN/CREAT SERPL: 39.2 (ref 7–25)
CALCIUM SPEC-SCNC: 9.2 MG/DL (ref 8.6–10.5)
CHLORIDE SERPL-SCNC: 94 MMOL/L (ref 98–107)
CO2 SERPL-SCNC: 35.8 MMOL/L (ref 22–29)
CREAT SERPL-MCNC: 0.51 MG/DL (ref 0.57–1)
DEPRECATED RDW RBC AUTO: 58 FL (ref 37–54)
EOSINOPHIL # BLD AUTO: 0.04 10*3/MM3 (ref 0–0.4)
EOSINOPHIL NFR BLD AUTO: 0.2 % (ref 0.3–6.2)
ERYTHROCYTE [DISTWIDTH] IN BLOOD BY AUTOMATED COUNT: 15.6 % (ref 12.3–15.4)
GFR SERPL CREATININE-BSD FRML MDRD: 118 ML/MIN/1.73
GLUCOSE SERPL-MCNC: 76 MG/DL (ref 65–99)
HCT VFR BLD AUTO: 41.1 % (ref 34–46.6)
HGB BLD-MCNC: 13 G/DL (ref 12–15.9)
IMM GRANULOCYTES # BLD AUTO: 1.62 10*3/MM3 (ref 0–0.05)
IMM GRANULOCYTES NFR BLD AUTO: 9.8 % (ref 0–0.5)
LYMPHOCYTES # BLD AUTO: 2.22 10*3/MM3 (ref 0.7–3.1)
LYMPHOCYTES NFR BLD AUTO: 13.5 % (ref 19.6–45.3)
MCH RBC QN AUTO: 31.9 PG (ref 26.6–33)
MCHC RBC AUTO-ENTMCNC: 31.6 G/DL (ref 31.5–35.7)
MCV RBC AUTO: 100.7 FL (ref 79–97)
MONOCYTES # BLD AUTO: 1.23 10*3/MM3 (ref 0.1–0.9)
MONOCYTES NFR BLD AUTO: 7.5 % (ref 5–12)
NEUTROPHILS NFR BLD AUTO: 11.34 10*3/MM3 (ref 1.7–7)
NEUTROPHILS NFR BLD AUTO: 68.9 % (ref 42.7–76)
NRBC BLD AUTO-RTO: 0 /100 WBC (ref 0–0.2)
PLAT MORPH BLD: NORMAL
PLATELET # BLD AUTO: 269 10*3/MM3 (ref 140–450)
PMV BLD AUTO: 9.3 FL (ref 6–12)
POTASSIUM SERPL-SCNC: 4.1 MMOL/L (ref 3.5–5.2)
RBC # BLD AUTO: 4.08 10*6/MM3 (ref 3.77–5.28)
RBC MORPH BLD: NORMAL
SODIUM SERPL-SCNC: 136 MMOL/L (ref 136–145)
WBC # BLD AUTO: 16.46 10*3/MM3 (ref 3.4–10.8)
WBC MORPH BLD: NORMAL

## 2021-01-12 PROCEDURE — 94799 UNLISTED PULMONARY SVC/PX: CPT

## 2021-01-12 PROCEDURE — 63710000001 PREDNISONE PER 1 MG: Performed by: FAMILY MEDICINE

## 2021-01-12 PROCEDURE — 94669 MECHANICAL CHEST WALL OSCILL: CPT

## 2021-01-12 PROCEDURE — 99239 HOSP IP/OBS DSCHRG MGMT >30: CPT | Performed by: INTERNAL MEDICINE

## 2021-01-12 PROCEDURE — 85025 COMPLETE CBC W/AUTO DIFF WBC: CPT | Performed by: INTERNAL MEDICINE

## 2021-01-12 PROCEDURE — 94660 CPAP INITIATION&MGMT: CPT

## 2021-01-12 PROCEDURE — 80048 BASIC METABOLIC PNL TOTAL CA: CPT | Performed by: INTERNAL MEDICINE

## 2021-01-12 PROCEDURE — 85007 BL SMEAR W/DIFF WBC COUNT: CPT | Performed by: INTERNAL MEDICINE

## 2021-01-12 RX ORDER — GUAIFENESIN AND DEXTROMETHORPHAN HYDROBROMIDE 600; 30 MG/1; MG/1
1 TABLET, EXTENDED RELEASE ORAL 2 TIMES DAILY PRN
Qty: 60 TABLET | Refills: 0 | Status: SHIPPED | OUTPATIENT
Start: 2021-01-12 | End: 2021-02-12

## 2021-01-12 RX ORDER — BUDESONIDE 3 MG/1
9 CAPSULE, COATED PELLETS ORAL DAILY
Qty: 90 CAPSULE | Refills: 0 | Status: SHIPPED | OUTPATIENT
Start: 2021-01-13 | End: 2021-02-02 | Stop reason: HOSPADM

## 2021-01-12 RX ADMIN — IPRATROPIUM BROMIDE AND ALBUTEROL SULFATE 3 ML: .5; 3 SOLUTION RESPIRATORY (INHALATION) at 06:50

## 2021-01-12 RX ADMIN — GUAIFENESIN 600 MG: 600 TABLET, EXTENDED RELEASE ORAL at 08:24

## 2021-01-12 RX ADMIN — FLUCONAZOLE 100 MG: 100 TABLET ORAL at 12:37

## 2021-01-12 RX ADMIN — BUDESONIDE 0.5 MG: 0.5 INHALANT RESPIRATORY (INHALATION) at 06:50

## 2021-01-12 RX ADMIN — HYDROCODONE BITARTRATE AND ACETAMINOPHEN 1 TABLET: 10; 325 TABLET ORAL at 15:08

## 2021-01-12 RX ADMIN — BUDESONIDE 9 MG: 3 CAPSULE, GELATIN COATED ORAL at 08:24

## 2021-01-12 RX ADMIN — PANTOPRAZOLE SODIUM 40 MG: 40 TABLET, DELAYED RELEASE ORAL at 06:46

## 2021-01-12 RX ADMIN — VENLAFAXINE 75 MG: 75 TABLET ORAL at 08:24

## 2021-01-12 RX ADMIN — LORAZEPAM 0.5 MG: 0.5 TABLET ORAL at 08:24

## 2021-01-12 RX ADMIN — HYDROCODONE BITARTRATE AND ACETAMINOPHEN 1 TABLET: 10; 325 TABLET ORAL at 06:47

## 2021-01-12 RX ADMIN — CYCLOBENZAPRINE HYDROCHLORIDE 10 MG: 10 TABLET, FILM COATED ORAL at 15:07

## 2021-01-12 RX ADMIN — GABAPENTIN 100 MG: 100 CAPSULE ORAL at 08:24

## 2021-01-12 RX ADMIN — IPRATROPIUM BROMIDE AND ALBUTEROL SULFATE 3 ML: .5; 3 SOLUTION RESPIRATORY (INHALATION) at 00:05

## 2021-01-12 RX ADMIN — POLYETHYLENE GLYCOL 3350 17 G: 17 POWDER, FOR SOLUTION ORAL at 08:56

## 2021-01-12 RX ADMIN — ACETAMINOPHEN 650 MG: 325 TABLET, FILM COATED ORAL at 08:24

## 2021-01-12 RX ADMIN — SODIUM CHLORIDE, PRESERVATIVE FREE 10 ML: 5 INJECTION INTRAVENOUS at 08:55

## 2021-01-12 RX ADMIN — PREDNISONE 40 MG: 20 TABLET ORAL at 08:24

## 2021-01-12 RX ADMIN — IPRATROPIUM BROMIDE AND ALBUTEROL SULFATE 3 ML: .5; 3 SOLUTION RESPIRATORY (INHALATION) at 12:59

## 2021-01-12 NOTE — ACP (ADVANCE CARE PLANNING)
A new Living Will form was completed and notarized.  The form was copied by the unit secretary and placed in the pt's file.  Pt designated her daughter and son as her decision makers.

## 2021-01-12 NOTE — PLAN OF CARE
Goal Outcome Evaluation:Spoke with pt at her bedside.  Pt had requested in completing a new living will.  Conversation about the decisions to be made allowed the pt to express and document her desires and name those she wants to be her surrogate decision makers.  The Living Will form was completed and notarized and copies made by unit secretary for pt's file.

## 2021-01-12 NOTE — PLAN OF CARE
Goal Outcome Evaluation:  Plan of Care Reviewed With: patient  Progress: no change  Outcome Summary: Patient tried to wear bipap but was having difficulty. She dangled at bedside during the night. Vitals have been  stable and will continue to monitor.

## 2021-01-12 NOTE — PROGRESS NOTES
Pt presents to the hospital for c/o shortness of breath, wheezing and cough.    Admit dx:  COPD exacerbation; Acute on chronic respiratory failure with hypoxia & hypercapnia; Bronchiectasis without complications    PMH:  COPD    Visited pt this date in her room; she is alone.  Pt is laying in bed on her side resting.  Once conversation started, she sits up on the side of bed.  Pt reports that she lives alone.  She uses a rollator as needed.  She states that she has wall to wall furniture and doesn't really need to use it inside of her apartment.  She has waiver services through [employer] and [employer] gets her supplemental.  Waiver services sees pt 2x/week.  She reports that she qualifies for more services if she needs it.  She reports that she has purchased lots of nice things recently with her stimulus check and has things set up the way she likes them at home.  Her grandchildren come to her place to use her Internet and do their schoolwork.  Pt reports that she is able to care for herself.  Do her laundry and light cooking. Pt was asked about her ability to care for herself since she has been in the hospital.  During the end of the conversation, she made a point to get up and move around the room to get some personal belongings to show she is able to walk on her own. She plans to return home at discharge.     Code status is full and living will on chart.  LW reviewed and pt has designated her two children to be HC surrogates.  She elects to have all treatment interventions for 90 days.  LW completed in 2005.  Code status discussed to include  Chest compressions and ventilation.  Pt confirms her LW information and states that she knows she needs to review.  She reports having to make HC goals for her mother and she may feel differently about what she wants.  PC nurse will plan to print LW and give to patient for review.  A LW brochure will be provided for pt to make changes as needed.    Palliative services discussed to  provide an extra layer of care for pt at home.   Pt again states that she has people that call and check up on her, but she didn't know from which services they were from and states that she is eligible for more services through her waiver program, if needed.  I then explained palliative services in more depth to include a nurse and SW that could provide assistance with managing her care at home in an effort to avoid hospitalization.  Pt then asks how close she was to needing hospice.   Hospice criteria discussed to include functional decline, frequent hospitalizations and having a physician to say they wouldn't be surprised if she would pass within 6 mos.   Pt would like to think about what she will need.  Palliative services will plan to f/u with pt tomorrow.    Diet:  Cardiace with supplements (ensure products).  Pt is noted to be eating 50 to 100% of meals.    Activity:  Pt reports that she has been up to the bathroom and moves around her room without assistance.

## 2021-01-12 NOTE — PROGRESS NOTES
Continued Stay Note   [facility]     Patient Name: Joelle Yee  MRN: [id number]  Today's Date: 1/12/2021    Admit Date: 1/2/2021    Discharge Plan     Row Name 01/12/21 1246       Plan    Plan  per md may d/c today, will be zero change with oxygen; wants HH; called [facility] for referral, will assess and call us back  13:02 EST   [facility] called back and stated unable to accept due to staffing and pt had multiple missed visits and only dc'ed their care on 12/20/20  13:49 EST  Called [facility], stated will run insurance and let us know; would only be able to see for therapy beginning on Thursday or Friday and no nursing available; spoke with Femi/RT director and he stated already completed with pt this admission education  for smoking cessation,  COPD, Nebs, o2, and respiratory medication  14:12 EST  [facility] called back and accepted pt, as long as for PT/OT; asked to let her know when discharged  15:08 EST  Spoke with Pam/JAMES related to consult on medication/cost; she called [facility] and confirmed medication is covered under her insurance at no cost to pt  15:14 EST  Informed Michael pt being discharge today ; spoke with nurse and she spoke with pt, has oxygen available for transport home; informed pt that [facility] will be calling her        Discharge Codes    No documentation.       Expected Discharge Date and Time     Expected Discharge Date Expected Discharge Time    Jan 13, 2021             Ml Ruby RN

## 2021-01-12 NOTE — PROGRESS NOTES
Followed up with pt.  She is dressed and sitting up on the side of the bed eating lunch.  Pt confirms that she is being discharged home today.  During the conversation, her daughter comes into visit. Daughter is in agreement that pt needs to update her LW.  Copy of old LW provided to patient along with a LW booklet.  Daughter would like pt to update while in the hospital.  marisol Gomezor, updated and will f/u.    Palliative services discussed further.  Daughter and pt feel it will be a good service and are in agreement for a referral to be made.    Called HCP; spoke to Sonia.  Palliative referral info given.  Sonia will pull needed info from Epic.

## 2021-01-13 ENCOUNTER — READMISSION MANAGEMENT (OUTPATIENT)
Dept: CALL CENTER | Facility: HOSPITAL | Age: 76
End: 2021-01-13

## 2021-01-13 NOTE — OUTREACH NOTE
Prep Survey      Responses   [facility] discharged [facility]?  [facility]   Is LACE score < 7 ?  No   Emergency Room discharge w/ pulse ox?  No   Eligibility  Readm Mgmt   Discharge diagnosis   COPD exacerbation    Does the patient have one of the following disease processes/diagnoses(primary or secondary)?  COPD/Pneumonia   Does the patient have Home health ordered?  Yes   What is the Home health agency?   [facility]    Is there a DME ordered?  No   Prep survey completed?  Yes          Yessenia Coleman RN

## 2021-01-13 NOTE — DISCHARGE SUMMARY
[facility]   DISCHARGE SUMMARY      Name:  Joelle Yee   Age:  75 y.o.  Sex:  female  :  1945  MRN:  [id number]   Visit Number:  [id number]    Admission Date:  2021  Date of Discharge:  2021  Primary Care Physician:  Blanquita Clements PA    Discharge Diagnoses:   COPD exacerbation, POA  Acute on chronic respiratory failure with hypoxia and hypercapnia, POA   Bronchiectasis without complication   Hypertension  Hyperlipidemia  Depression  Chronic pain      COPD exacerbation (CMS/HCC)    Acute on chronic respiratory failure with hypoxia and hypercapnia (CMS/HCC)    Bronchiectasis without complication (CMS/HCC)    Pseudomonas pneumonia (CMS/HCC)      Presenting Problem:    COPD exacerbation (CMS/HCC) [J44.1]  Pseudomonas pneumonia (CMS/HCC) [J15.1]     Consults:     Consults     Date and Time Order Name Status Description    2021 0820 Inpatient Pulmonology Consult Completed         Consulting Physician(s)     Provider Relationship Specialty    Rebeka Esparza MD Consulting Physician Pulmonary Disease          Procedures Performed:               Hospital Course:    75 F history of COPD on 3 LNC followed by Dr. Esparza, seen in the ED 3 days ago presents again to the ER with similar complaints of shortness of breath and wheezing.   CT PE performed 2020 reveals bronchitis and bronchiectasis, no PE.  She was given steroids and antibiotics, but not has not improved.    Upon return to the ER, patient did require up to 4 L nasal cannula to maintain appropriate saturations.  She is admitted to the hospital for treatment of COPD exacerbation.  She was provided Solu-Medrol, magnesium, nebulizers, Rocephin and azithromycin.  Patient was also ordered BiPAP.  At first, patient refused BiPAP but she was encouraged and was able to tolerate it for short periods of time.  Would recommend follow-up with pulmonology as an outpatient for consideration of home BiPAP.  At  this time, patient stated that she did not want a BiPAP to go home with her.  On day of discharge she was on her home requirement of oxygen.    During her hospital stay, patient was found to be positive for strep pneumo as well as Pseudomonas in her sputum culture.  Based on sensitivities, she was treated effectively with Levaquin.    Vital Signs:         Physical Exam:    General Appearance:  Alert and cooperative, not in any acute distress.   Head:  Atraumatic and normocephalic, without obvious abnormality.   Eyes:          PERRLA, conjunctivae and sclerae normal, no icterus. No pallor. Extraocular movements are within normal limits.   Ears:  Ears appear intact with no abnormalities noted.   Throat: No oral lesions, no thrush, oral mucosa moist.   Neck: Supple, trachea midline, no thyromegaly, no carotid bruit.       Lungs:   Chest shape is normal. Breath sounds heard bilaterally equally.  No crackles or wheezing.    Heart:  Normal S1 and S2, no murmur, no gallop, no rub. No JVD.   Abdomen:   Normal bowel sounds, no masses, no organomegaly. Soft, nontender, nondistended, no guarding, no rebound tenderness.   Extremities: Moves all extremities well, no edema, no cyanosis, no clubbing.   Pulses: Pulses palpable and equal bilaterally.   Skin: No bleeding, bruising or rash.       Neurologic: Alert and oriented x 3. Moves all four limbs equally. No tremors. No facial asymmetry.     Pertinent Lab Results:     Results from last 7 days   Lab Units 01/12/21 0644 01/11/21 0620 01/09/21 0620   SODIUM mmol/L 136 139 134*   POTASSIUM mmol/L 4.1 4.4 4.3   CHLORIDE mmol/L 94* 95* 94*   CO2 mmol/L 35.8* 37.0* 32.0*   BUN mg/dL 20 22 21   CREATININE mg/dL 0.51* 0.58 0.53*   CALCIUM mg/dL 9.2 9.3 9.5   BILIRUBIN mg/dL  --  0.3  --    ALK PHOS U/L  --  108  --    ALT (SGPT) U/L  --  27  --    AST (SGOT) U/L  --  23  --    GLUCOSE mg/dL 76 81 85     Results from last 7 days   Lab Units 01/12/21  0644 01/11/21 0620 01/09/21 0620    WBC 10*3/mm3 16.46* 17.86* 21.56*   HEMOGLOBIN g/dL 13.0 12.5 13.2   HEMATOCRIT % 41.1 39.8 41.7   PLATELETS 10*3/mm3 269 292 281                         Results from last 7 days   Lab Units 01/08/21  0213   PH, ARTERIAL pH units 7.371   PO2 ART mm Hg 81.7   PCO2, ARTERIAL mm Hg 73.8*   HCO3 ART mmol/L 42.7*           Invalid input(s): USDES,  BLOODU, NITRITITE, BACT, EP  Pain Management Panel     There is no flowsheet data to display.              Pertinent Radiology Results:    Imaging Results (All)     Procedure Component Value Units Date/Time    XR Chest 2 View [[id number]] Collected: 01/05/21 1123     Updated: 01/05/21 1215    Narrative:      PROCEDURE: XR CHEST 2 VW-        HISTORY: Pneumonia; J44.1-Chronic obstructive pulmonary disease with  (acute) exacerbation     COMPARISON: January 2, 2021.     FINDINGS: The heart is normal in size. The mediastinum is unremarkable.  There are chronic changes of the lungs bilaterally. There is no  pneumothorax. There are no acute osseous abnormalities.       Impression:      No acute cardiopulmonary process.                       Images were reviewed, interpreted, and dictated by Dr. Ignacia Lara M.D.  Transcribed by Veronique Rivera PA-C.     This report was finalized on 1/5/2021 12:13 PM by Ignacia Lara M.D..    XR Chest 1 View [[id number]] Collected: 01/02/21 1529     Updated: 01/02/21 1544    Narrative:      PROCEDURE: XR CHEST 1 VW-     HISTORY: Simple Sepsis Triage Protocol     COMPARISON: 12/29/2020.     FINDINGS: The heart is normal in size. The mediastinum is unremarkable.  There are emphysematous changes to the lungs. A left basilar opacity is  unchanged. No significant effusions are evident. There is no  pneumothorax.  There are no acute osseous abnormalities.       Impression:      Left basilar opacity which is unchanged compared to the  prior exam and may reflect atelectasis or pneumonia.     Continued followup is recommended.     This report was  finalized on 1/2/2021 3:29 PM by Ignacia Lara M.D..          Echo:    Results for orders placed during the hospital encounter of 01/14/20   Adult Transthoracic Echo Complete With Contrast if Necessary Per Protocol    Narrative Technically adequate study   1) Borderline LVH with normal LV systolic function ( EF is over 55%)  2) Normal left atrial size   3) Aortic valve sclerosis with mild AI   4) Mild TR with normal PA pressures   5) Normal size and function of the RV          Condition on Discharge:      Stable.    Code status during the hospital stay:    Code Status and Medical Interventions:   Ordered at: 01/02/21 1834     Code Status:    CPR     Medical Interventions (Level of Support Prior to Arrest):    Full       Discharge Disposition:    Home-Health Care c    Discharge Medications:       Discharge Medications      New Medications      Instructions Start Date   Budesonide 3 MG 24 hr capsule  Commonly known as: ENTOCORT EC   9 mg, Oral, Daily      guaifenesin-dextromethorphan  MG tablet sustained-release 12 hour tablet   1 tablet, Oral, 2 Times Daily PRN         Changes to Medications      Instructions Start Date   ammonium lactate 12 % cream  Commonly known as: Lac-Hydrin  What changed:   · when to take this  · reasons to take this   Topical, 2 Times Daily      azithromycin 250 MG tablet  Commonly known as: ZITHROMAX  What changed:   · how much to take  · how to take this  · when to take this  · additional instructions   Take 1 tablet every other day.      urea 40 % cream  Commonly known as: CARMOL  What changed:   · when to take this  · reasons to take this  · Another medication with the same name was removed. Continue taking this medication, and follow the directions you see here.   Topical, Every 12 Hours, Apply topically every 12 hours.         Continue These Medications      Instructions Start Date   acetaminophen 325 MG tablet  Commonly known as: TYLENOL   650 mg, Oral, Every 4 Hours PRN       albuterol sulfate  (90 Base) MCG/ACT inhaler  Commonly known as: Ventolin HFA   2 puffs, Inhalation, Every 4 Hours PRN      budesonide-formoterol 160-4.5 MCG/ACT inhaler  Commonly known as: Symbicort   2 puffs, Inhalation, 2 Times Daily - RT, Rinse mouth with water after use      cyclobenzaprine 10 MG tablet  Commonly known as: FLEXERIL   10 mg, Oral, Daily      doxycycline 100 MG capsule  Commonly known as: MONODOX   100 mg, Oral, 2 Times Daily      ENSURE COMPLETE PO   1 can, Oral, 2 Times Daily      Flutter device   1 Device, Does not apply, As Needed      gabapentin 100 MG capsule  Commonly known as: NEURONTIN   100 mg, Oral, 3 Times Daily      guaiFENesin 600 MG 12 hr tablet  Commonly known as: MUCINEX   600 mg, Oral, Every 12 Hours Scheduled      HYDROcodone-acetaminophen  MG per tablet  Commonly known as: NORCO   Every 8 Hours PRN      ipratropium-albuterol 0.5-2.5 mg/3 ml nebulizer  Commonly known as: DUO-NEB   3 mL, Nebulization, 4 Times Daily PRN      ketotifen 0.025 % ophthalmic solution  Commonly known as: ZADITOR   1-2 drops, Both Eyes, 2 Times Daily      loratadine 10 MG tablet  Commonly known as: CLARITIN   10 mg, Oral, Daily PRN      LORazepam 0.5 MG tablet  Commonly known as: ATIVAN   0.5 mg, Oral, 2 Times Daily PRN, 1-2 TABLETS DAILY PRN      losartan 25 MG tablet  Commonly known as: COZAAR   25 mg, Oral, Nightly      MEDICATED CHEST RUB EX   Apply externally, Daily PRN      melatonin 5 MG tablet tablet   Take 2 tablets by mouth At Night As Needed for sleep      metoprolol succinate XL 25 MG 24 hr tablet  Commonly known as: TOPROL-XL   25 mg, Oral, Nightly      mirtazapine 30 MG tablet  Commonly known as: REMERON   30 mg, Nightly      multivitamin with minerals tablet tablet   1 tablet, Oral, Daily      omeprazole 40 MG capsule  Commonly known as: priLOSEC   40 mg, Daily      ondansetron ODT 4 MG disintegrating tablet  Commonly known as: ZOFRAN-ODT   4 mg, As Needed       OXYGEN-HELIUM IN   Oxygen; Patient Sig: Oxygen 2 liter as needed; 0; 21-May-2014; Active      predniSONE 10 MG tablet  Commonly known as: DELTASONE   10 mg, Oral, Every Other Day, Alternate every other day with Azithromycin 250 mg      Rollator misc   1 Units, Does not apply, As Needed      Spiriva Respimat 2.5 MCG/ACT aerosol solution inhaler  Generic drug: tiotropium bromide monohydrate   2 puffs, Inhalation, Daily      traZODone 50 MG tablet  Commonly known as: DESYREL   25 mg, Oral, Nightly      venlafaxine 75 MG tablet  Commonly known as: EFFEXOR   1 tablet, Oral, 2 Times Daily      VITAMIN B12 PO   500 mcg, Oral, Daily      VOLTAREN TD   2 g, Topical, 2 Times Daily PRN, Voltaren GEL         Stop These Medications    cephalexin 500 MG capsule  Commonly known as: KEFLEX     fluconazole 200 MG tablet  Commonly known as: DIFLUCAN     nystatin 322589 UNIT/ML suspension  Commonly known as: MYCOSTATIN            Discharge Diet:         Activity at Discharge:         Follow-up Appointments:    Additional Instructions for the Follow-ups that You Need to Schedule     Ambulatory Referral to Home Health   As directed      Face to Face Visit Date: 1/12/2021    Follow-up provider for Plan of Care?: I treated the patient in an acute care facility and will not continue treatment after discharge.    Follow-up provider: BLANQUITA CLEMENTS [[id number]]    Reason/Clinical Findings: advanced copd, bronchiectasis    Describe mobility limitations that make leaving home difficult: advanced copd, bronchiectasis    Nursing/Therapeutic Services Requested: Physical Therapy Occupational Therapy    PT orders: Strengthening Transfer training    Occupational orders: Energy conservation Strengthening Activities of daily living    Frequency: 1 Week 1         Discharge Follow-up with PCP   As directed       Currently Documented PCP:    Blanquita Clements, PA    PCP Phone Number:    [phone]     Follow Up Details: 1-2 weeks            Contact  information for follow-up providers     Harsha Reyes PA Follow up in 1 week(s).    Specialty: Family Medicine  Why: Follow up on Tuesday January 19, 2021 at 2:00 pm.   Contact information:  [address]  [address]  [facility] [place] [facility] [place] [address]  [phone]             Harsha Reyes PA .    Specialty: Family Medicine  Contact information:  [address]  [address]  [facility] [place] [facility] [place] [address]  [phone]                   Contact information for after-discharge care     Home Medical Care     [facility] .    Service: Home Health Services  Contact information:  [address] [place] [address] [phone]                             Future Appointments   Date Time Provider Department Center   3/16/2021 11:30 AM Rebeka Esparza MD [facility] None       Additional Instructions for the Follow-ups that You Need to Schedule     Ambulatory Referral to Home Health   As directed      Face to Face Visit Date: 1/12/2021    Follow-up provider for Plan of Care?: I treated the patient in an acute care facility and will not continue treatment after discharge.    Follow-up provider: HARSHA REYES [[id number]]    Reason/Clinical Findings: advanced copd, bronchiectasis    Describe mobility limitations that make leaving home difficult: advanced copd, bronchiectasis    Nursing/Therapeutic Services Requested: Physical Therapy Occupational Therapy    PT orders: Strengthening Transfer training    Occupational orders: Energy conservation Strengthening Activities of daily living    Frequency: 1 Week 1         Discharge Follow-up with PCP   As directed       Currently Documented PCP:    Harsha Reyes PA    PCP Phone Number:    [phone]     Follow Up Details: 1-2 weeks               Test Results Pending at Discharge:    Pending Labs     Order Current Status    Green Top (No Gel) In process    [place] Draw In process             Evelio Mckeon DO  01/13/21  15:23  EST    Time spent: Time: I spent  >30 minutes on this discharge activity which included: face-to-face encounter with the patient, reviewing the data in the system, coordination of the care with the nursing staff as well as consultants, documentation, and entering orders.        Dictated utilizing Dragon dictation.

## 2021-01-13 NOTE — OUTREACH NOTE
COPD/PN Week 1 Survey      Responses   [facility] patient discharged from?  [facility]   Does the patient have one of the following disease processes/diagnoses(primary or secondary)?  COPD/Pneumonia   Was the primary reason for admission:  COPD exacerbation   Week 1 attempt successful?  No   Unsuccessful attempts  Attempt 1          Jonna Graham RN

## 2021-01-14 ENCOUNTER — READMISSION MANAGEMENT (OUTPATIENT)
Dept: CALL CENTER | Facility: HOSPITAL | Age: 76
End: 2021-01-14

## 2021-01-14 NOTE — OUTREACH NOTE
"COPD/PN Week 1 Survey      Responses   [facility] patient discharged from?  [facility]   Does the patient have one of the following disease processes/diagnoses(primary or secondary)?  COPD/Pneumonia   Was the primary reason for admission:  COPD exacerbation   Week 1 attempt successful?  Yes   Call start time  1052   Call end time  1105   Discharge diagnosis  COPD exacerbation    Meds reviewed with patient/caregiver?  Yes   Is the patient having any side effects they believe may be caused by any medication additions or changes?  No   Does the patient have all medications ordered at discharge?  Yes   Is the patient taking all medications as directed (includes completed medication regime)?  Yes   Comments regarding appointments  Appt with pulmonary on 3/16/21   Does the patient have a primary care provider?   Yes   Does the patient have an appointment with their PCP or specialist within 7 days of discharge?  Yes   Comments regarding PCP  PCP is to call patient today r/t refill on lorazepam. She follows up with PCP monthly for refill. Hospital d/c f/u appt is on 1/19/20 at 2:00 pm. Pt reports she doesn't see any need to see PCP on 1/19/21.   Has the patient kept scheduled appointments due by today?  N/A   What is the Home health agency?   [facility].    Home health comments  PT and OT will visit at the same time on 1/14/21. RN called [facility] to confirm there is no availability for nursing care.    DME comments  Pt wears 3 L of O2 continuous except when she's cooking as she has a gas stove. She reports she's to have BiPAP. Daughter was on the phone with \"them\" r/t BiPAP yesterday.    Pulse Ox monitoring  Intermittent   Pulse Ox device source  Patient   O2 Sat comments  Not checked today   O2 Sat: education provided  Sat levels, When to seek care   Psychosocial issues?  No   Psychosocial comments  She reports she's not going to take a bath while she's alone. She reports she has \"all kinds of food\". Grandson " visits almost everyday and sometimes 3-4 times per day. Daughter works for sports medicine provider.    Did the patient receive a copy of their discharge instructions?  Yes   Nursing interventions  Reviewed instructions with patient   What is the patient's perception of their health status since discharge?  Same   Nursing Interventions  Nurse provided patient education   Are the patient's immunizations up to date?   Yes [Pt is interested in COVID vaccine. Advised her to speak with PCP or pulmonary for instructions.]   If the patient is a current smoker, are they able to teach back resources for cessation?  Not a smoker   Is the patient/caregiver able to teach back the hierarchy of who to call/visit for symptoms/problems? PCP, Specialist, Home health nurse, Urgent Care, ED, 911  Yes [Pt verbalized she has life alert, a cell phone, and 2 house phones. She keeps a phone on her at all times. ]   Is the patient able to teach back COPD zones?  Yes   Nursing interventions  Education provided on various zones   Patient reports what zone on this call?  Yellow Zone   Yellow Zone  Increased shortness of air, Poor sleep and symptoms work patient up, Poor Appetite   Yellow interventions  Continue to use daily medications, Use oxygen as ordered   Week 1 call completed?  Yes   Wrap up additional comments  Pt reports she refused rehab as her family is in and out checking on her and she has everything.           Liliana Campos RN

## 2021-01-16 ENCOUNTER — HOSPITAL ENCOUNTER (EMERGENCY)
Facility: HOSPITAL | Age: 76
Discharge: HOME | End: 2021-01-16
Attending: EMERGENCY MEDICINE | Admitting: EMERGENCY MEDICINE

## 2021-01-16 ENCOUNTER — APPOINTMENT (OUTPATIENT)
Dept: GENERAL RADIOLOGY | Facility: HOSPITAL | Age: 76
End: 2021-01-16

## 2021-01-16 VITALS
BODY MASS INDEX: 22.09 KG/M2 | DIASTOLIC BLOOD PRESSURE: 59 MMHG | HEART RATE: 102 BPM | SYSTOLIC BLOOD PRESSURE: 103 MMHG | WEIGHT: 98.19 LBS | RESPIRATION RATE: 18 BRPM | TEMPERATURE: 98.9 F | HEIGHT: 56 IN | OXYGEN SATURATION: 93 %

## 2021-01-16 DIAGNOSIS — J44.1 COPD EXACERBATION (HCC): Primary | ICD-10-CM

## 2021-01-16 LAB
A-A DO2: 25.3 MMHG
ALBUMIN SERPL-MCNC: 3.4 G/DL (ref 3.5–5.2)
ALBUMIN/GLOB SERPL: 1.1 G/DL
ALP SERPL-CCNC: 119 U/L (ref 39–117)
ALT SERPL W P-5'-P-CCNC: 31 U/L (ref 1–33)
ANION GAP SERPL CALCULATED.3IONS-SCNC: 7.8 MMOL/L (ref 5–15)
ARTERIAL PATENCY WRIST A: ABNORMAL
AST SERPL-CCNC: 34 U/L (ref 1–32)
ATMOSPHERIC PRESS: 729 MMHG
BASE EXCESS BLDA CALC-SCNC: 6.4 MMOL/L (ref 0–2)
BASOPHILS # BLD AUTO: 0.07 10*3/MM3 (ref 0–0.2)
BASOPHILS NFR BLD AUTO: 0.4 % (ref 0–1.5)
BDY SITE: ABNORMAL
BILIRUB SERPL-MCNC: 0.3 MG/DL (ref 0–1.2)
BUN SERPL-MCNC: 16 MG/DL (ref 8–23)
BUN/CREAT SERPL: 34 (ref 7–25)
CALCIUM SPEC-SCNC: 8.4 MG/DL (ref 8.6–10.5)
CHLORIDE SERPL-SCNC: 98 MMOL/L (ref 98–107)
CO2 SERPL-SCNC: 29.2 MMOL/L (ref 22–29)
COHGB MFR BLD: 1.9 % (ref 0–2)
CREAT SERPL-MCNC: 0.47 MG/DL (ref 0.57–1)
DEPRECATED RDW RBC AUTO: 59.7 FL (ref 37–54)
EOSINOPHIL # BLD AUTO: 0.14 10*3/MM3 (ref 0–0.4)
EOSINOPHIL NFR BLD AUTO: 0.9 % (ref 0.3–6.2)
ERYTHROCYTE [DISTWIDTH] IN BLOOD BY AUTOMATED COUNT: 16.3 % (ref 12.3–15.4)
GAS FLOW AIRWAY: 3 LPM
GFR SERPL CREATININE-BSD FRML MDRD: 129 ML/MIN/1.73
GLOBULIN UR ELPH-MCNC: 3 GM/DL
GLUCOSE SERPL-MCNC: 94 MG/DL (ref 65–99)
HCO3 BLDA-SCNC: 32.2 MMOL/L (ref 22–28)
HCT VFR BLD AUTO: 36.4 % (ref 34–46.6)
HCT VFR BLD CALC: 35.7 %
HGB BLD-MCNC: 11.6 G/DL (ref 12–15.9)
IMM GRANULOCYTES # BLD AUTO: 0.66 10*3/MM3 (ref 0–0.05)
IMM GRANULOCYTES NFR BLD AUTO: 4.2 % (ref 0–0.5)
LYMPHOCYTES # BLD AUTO: 2.11 10*3/MM3 (ref 0.7–3.1)
LYMPHOCYTES NFR BLD AUTO: 13.5 % (ref 19.6–45.3)
MCH RBC QN AUTO: 32.1 PG (ref 26.6–33)
MCHC RBC AUTO-ENTMCNC: 31.9 G/DL (ref 31.5–35.7)
MCV RBC AUTO: 100.8 FL (ref 79–97)
METHGB BLD QL: 1.1 % (ref 0–1.5)
MODALITY: ABNORMAL
MONOCYTES # BLD AUTO: 1 10*3/MM3 (ref 0.1–0.9)
MONOCYTES NFR BLD AUTO: 6.4 % (ref 5–12)
NEUTROPHILS NFR BLD AUTO: 11.69 10*3/MM3 (ref 1.7–7)
NEUTROPHILS NFR BLD AUTO: 74.6 % (ref 42.7–76)
NOTE: ABNORMAL
NRBC BLD AUTO-RTO: 0 /100 WBC (ref 0–0.2)
NT-PROBNP SERPL-MCNC: 285.4 PG/ML (ref 0–1800)
OXYHGB MFR BLDV: 87.8 % (ref 94–99)
PCO2 BLDA: 50.9 MM HG (ref 35–45)
PCO2 TEMP ADJ BLD: ABNORMAL MM[HG]
PH BLDA: 7.41 PH UNITS (ref 7.3–7.5)
PH, TEMP CORRECTED: ABNORMAL
PLATELET # BLD AUTO: 195 10*3/MM3 (ref 140–450)
PMV BLD AUTO: 9.1 FL (ref 6–12)
PO2 BLDA: 60.4 MM HG (ref 75–100)
PO2 TEMP ADJ BLD: ABNORMAL MM[HG]
POTASSIUM SERPL-SCNC: 4.3 MMOL/L (ref 3.5–5.2)
PROT SERPL-MCNC: 6.4 G/DL (ref 6–8.5)
RBC # BLD AUTO: 3.61 10*6/MM3 (ref 3.77–5.28)
SAO2 % BLDCOA: 90.5 % (ref 94–100)
SARS-COV-2 RNA PNL SPEC NAA+PROBE: NOT DETECTED
SODIUM SERPL-SCNC: 135 MMOL/L (ref 136–145)
TROPONIN T SERPL-MCNC: <0.01 NG/ML (ref 0–0.03)
VENTILATOR MODE: ABNORMAL
WBC # BLD AUTO: 15.67 10*3/MM3 (ref 3.4–10.8)

## 2021-01-16 PROCEDURE — 83050 HGB METHEMOGLOBIN QUAN: CPT

## 2021-01-16 PROCEDURE — 87635 SARS-COV-2 COVID-19 AMP PRB: CPT | Performed by: PHYSICIAN ASSISTANT

## 2021-01-16 PROCEDURE — 84484 ASSAY OF TROPONIN QUANT: CPT | Performed by: PHYSICIAN ASSISTANT

## 2021-01-16 PROCEDURE — 36600 WITHDRAWAL OF ARTERIAL BLOOD: CPT

## 2021-01-16 PROCEDURE — 71045 X-RAY EXAM CHEST 1 VIEW: CPT

## 2021-01-16 PROCEDURE — 82375 ASSAY CARBOXYHB QUANT: CPT

## 2021-01-16 PROCEDURE — 96374 THER/PROPH/DIAG INJ IV PUSH: CPT

## 2021-01-16 PROCEDURE — 25010000002 METHYLPREDNISOLONE PER 125 MG: Performed by: PHYSICIAN ASSISTANT

## 2021-01-16 PROCEDURE — 80053 COMPREHEN METABOLIC PANEL: CPT | Performed by: PHYSICIAN ASSISTANT

## 2021-01-16 PROCEDURE — 99283 EMERGENCY DEPT VISIT LOW MDM: CPT

## 2021-01-16 PROCEDURE — 82805 BLOOD GASES W/O2 SATURATION: CPT

## 2021-01-16 PROCEDURE — 83880 ASSAY OF NATRIURETIC PEPTIDE: CPT | Performed by: PHYSICIAN ASSISTANT

## 2021-01-16 PROCEDURE — 93005 ELECTROCARDIOGRAM TRACING: CPT | Performed by: PHYSICIAN ASSISTANT

## 2021-01-16 PROCEDURE — 85025 COMPLETE CBC W/AUTO DIFF WBC: CPT | Performed by: PHYSICIAN ASSISTANT

## 2021-01-16 RX ORDER — CEFUROXIME AXETIL 500 MG/1
500 TABLET ORAL 2 TIMES DAILY
Qty: 10 TABLET | Refills: 0 | Status: SHIPPED | OUTPATIENT
Start: 2021-01-16 | End: 2021-02-02 | Stop reason: HOSPADM

## 2021-01-16 RX ORDER — SODIUM CHLORIDE 0.9 % (FLUSH) 0.9 %
10 SYRINGE (ML) INJECTION AS NEEDED
Status: DISCONTINUED | OUTPATIENT
Start: 2021-01-16 | End: 2021-01-16 | Stop reason: HOSPADM

## 2021-01-16 RX ORDER — PREDNISONE 20 MG/1
50 TABLET ORAL DAILY
Qty: 10 TABLET | Refills: 0 | Status: SHIPPED | OUTPATIENT
Start: 2021-01-16 | End: 2021-02-02 | Stop reason: HOSPADM

## 2021-01-16 RX ORDER — METHYLPREDNISOLONE SODIUM SUCCINATE 125 MG/2ML
125 INJECTION, POWDER, LYOPHILIZED, FOR SOLUTION INTRAMUSCULAR; INTRAVENOUS ONCE
Status: COMPLETED | OUTPATIENT
Start: 2021-01-16 | End: 2021-01-16

## 2021-01-16 RX ADMIN — METHYLPREDNISOLONE SODIUM SUCCINATE 125 MG: 125 INJECTION, POWDER, FOR SOLUTION INTRAMUSCULAR; INTRAVENOUS at 17:40

## 2021-01-16 NOTE — ED PROVIDER NOTES
Subjective   This patient comes in for evaluation of shortness of breath.  She states she has soreness in her chest when she coughs.  She has a nonproductive cough.  No known fever.  She states she was just discharged from this facility less than a week ago after a 10-day admission for COPD exacerbation.  She sees Dr. Esparza, pulmonology, she states she takes azithromycin and prednisone chronically.  She wears 3 L nasal cannula continuously.  She states she was supposed be getting set up for CPAP in the next week or so for home use.  She also states she feels like she is swelling a little bit in her legs and her hands.          Review of Systems   Constitutional: Negative.  Negative for fever.   HENT: Negative.    Eyes: Negative.    Respiratory: Positive for cough and shortness of breath.    Cardiovascular: Positive for leg swelling.   Gastrointestinal: Negative.    Genitourinary: Negative.    Musculoskeletal: Negative.    Skin: Negative.    Neurological: Negative.    Psychiatric/Behavioral: Negative.        Past Medical History:   Diagnosis Date   • Abdominal pain    • Acute bronchitis    • Ankle pain    • Anxiety 1980   • Atopic dermatitis    • Bronchiectasis (CMS/[facility])    • Cataract, bilateral    • Chest pain     STATES HAS OCCASSIONALLY.  STATES LAST TIME WAS LAST WEEK.  STATES SEES DR. RICH.  STATES HE HAS DONE NUMEROUS TESTS ON HER AND HAS NOT BEEN ABLE TO FIND OUT CAUSE.     • Chronic obstructive lung disease (CMS/HCC) 2008   • Colon cancer screening    • COPD (chronic obstructive pulmonary disease) (CMS/HCC)    • Cystocele    • Depressed    • Depression 1980   • Fatigue     Chronic pafigue 2012   • Fibromyalgia 2008   • Full dentures    • GERD (gastroesophageal reflux disease)    • Gout    • Heartburn     Chronic historu of epigastric heartburn currently controlled on Prilesec 40 mg every morning along with Zantac 300 Mg daily at bedtime   • High cholesterol 2012   • Hip pain    • Hyperlipidemia    •  Hypertension    • Impaired functional mobility, balance, gait, and endurance    • Insomnia    • Joint pain    • Kidney infection    • Loss of appetite    • Low back pain 1995   • Melena    • Nausea and vomiting    • On home oxygen therapy     2L/NC PRN (STATES SHE HAS BEEN USING CONTINUOSLY LATELY)   • Osteoarthritis 2010   • Osteoporosis    • Pain in limb    • Palpitations    • Pinched nerve     Pinched nerves 2011   • Pneumonia    • Problems with swallowing     HAS TO EAT SLOW AND CHEW FOOD WELL   • Recurrent urinary tract infection    • Sciatica [id number]   • Shortness of breath    • Sinus problem    • Sleep apnea 1998    NO CPAP   • Upset stomach    • Valvular heart disease    • Vitamin B12 deficiency    • Wears glasses    • Weight loss, abnormal     Weight loss is stabel. On pund weight gain since 01/2016       Allergies   Allergen Reactions   • Dust Mite Extract Other (See Comments)     Dust  SINUS   • Grass Other (See Comments)     SINUS   • Mold Extract [Trichophyton] Other (See Comments)     SINUS       Past Surgical History:   Procedure Laterality Date   • ABDOMINAL HERNIA REPAIR  2016   • APPENDECTOMY  1965   • BACK SURGERY  2005   • BRONCHOSCOPY N/A 7/5/2018    Procedure: BRONCHOSCOPY w/ WASHINGS/BRUSHINGS with MAC;  Surgeon: Rebeka Esparza MD;  Location: [facility];  Service: Pulmonary   • CATARACT EXTRACTION Bilateral     Put in implants    • CHOLECYSTECTOMY  1985   • COLONOSCOPY     • ENDOSCOPY     • KNEE SURGERY Left 1979    Knee Cap   • TUBAL ABDOMINAL LIGATION  1971       Family History   Problem Relation Age of Onset   • Ovarian cancer Mother    • Cancer Mother    • Lung cancer Father    • Heart disease Brother        Social History     Socioeconomic History   • Marital status: Single     Spouse name: Not on file   • Number of children: Not on file   • Years of education: Not on file   • Highest education level: Not on file   Tobacco Use   • Smoking status: Former Smoker     Packs/day: 0.00      Years: 35.00     Pack years: 0.00     Quit date: 7/5/2017     Years since quitting: 3.5   • Smokeless tobacco: Never Used   Substance and Sexual Activity   • Alcohol use: No   • Drug use: No   • Sexual activity: Defer           Objective   Physical Exam  Vitals signs and nursing note reviewed.   Constitutional:       Appearance: She is well-developed.      Comments: Thin and elderly appearing   Eyes:      Extraocular Movements: Extraocular movements intact.   Neck:      Musculoskeletal: Normal range of motion.   Cardiovascular:      Rate and Rhythm: Regular rhythm. Tachycardia present.   Pulmonary:      Effort: Pulmonary effort is normal.      Breath sounds: Decreased breath sounds and wheezing present.   Musculoskeletal: Normal range of motion.      Right lower leg: No edema.      Left lower leg: No edema.   Skin:     General: Skin is warm and dry.   Neurological:      General: No focal deficit present.      Mental Status: She is alert.   Psychiatric:         Mood and Affect: Mood normal.         Behavior: Behavior normal.         Procedures           ED Course  ED Course as of Jan 16 1939   Sat Jan 16, 2021   1820 COVID19: Not Detected [TM]      ED Course User Index  [TM] Hill Chang PA-C                                           [facility]  Discussed today's findings with Dr. Esparza, pulmonology, he states he will see the patient in the office first thing Monday morning.  Discussed case with Dr. Domingo, will treat with cephalosporin as well as a burst of steroids.  Final diagnoses:   COPD exacerbation (CMS/[facility])            Hill Chang PA-C  01/16/21 1939

## 2021-01-17 NOTE — ED NOTES
Dr. Esparza called per JERARDO Alejandre with answer. Call transferred.      Sohail Meadows  01/16/21 1928

## 2021-01-18 ENCOUNTER — DOCUMENTATION (OUTPATIENT)
Dept: PULMONOLOGY | Facility: CLINIC | Age: 76
End: 2021-01-18

## 2021-01-18 ENCOUNTER — OFFICE VISIT (OUTPATIENT)
Dept: PULMONOLOGY | Facility: CLINIC | Age: 76
End: 2021-01-18

## 2021-01-18 VITALS
HEART RATE: 74 BPM | HEIGHT: 56 IN | OXYGEN SATURATION: 76 % | DIASTOLIC BLOOD PRESSURE: 78 MMHG | WEIGHT: 94 LBS | RESPIRATION RATE: 18 BRPM | BODY MASS INDEX: 21.15 KG/M2 | SYSTOLIC BLOOD PRESSURE: 128 MMHG | TEMPERATURE: 96.9 F

## 2021-01-18 DIAGNOSIS — J47.9 BRONCHIECTASIS WITHOUT COMPLICATION (HCC): ICD-10-CM

## 2021-01-18 DIAGNOSIS — J44.9 CHRONIC OBSTRUCTIVE PULMONARY DISEASE, UNSPECIFIED COPD TYPE (HCC): ICD-10-CM

## 2021-01-18 DIAGNOSIS — J96.12 CHRONIC RESPIRATORY FAILURE WITH HYPOXIA AND HYPERCAPNIA (HCC): ICD-10-CM

## 2021-01-18 DIAGNOSIS — R06.02 SHORTNESS OF BREATH: Primary | ICD-10-CM

## 2021-01-18 DIAGNOSIS — J96.11 CHRONIC RESPIRATORY FAILURE WITH HYPOXIA AND HYPERCAPNIA (HCC): ICD-10-CM

## 2021-01-18 DIAGNOSIS — R09.02 HYPOXIA: ICD-10-CM

## 2021-01-18 PROCEDURE — 99214 OFFICE O/P EST MOD 30 MIN: CPT | Performed by: NURSE PRACTITIONER

## 2021-01-18 RX ORDER — FUROSEMIDE 20 MG/1
20 TABLET ORAL DAILY
Qty: 10 TABLET | Refills: 0 | Status: SHIPPED | OUTPATIENT
Start: 2021-01-18 | End: 2021-02-02 | Stop reason: HOSPADM

## 2021-01-18 RX ORDER — POTASSIUM CHLORIDE 20 MEQ/1
20 TABLET, EXTENDED RELEASE ORAL DAILY
Qty: 10 TABLET | Refills: 0 | Status: SHIPPED | OUTPATIENT
Start: 2021-01-18

## 2021-01-18 NOTE — PROGRESS NOTES
"Chief Complaint   Patient presents with   • Follow-up   • Shortness of Breath         Subjective   Joelle Yee is a 75 y.o. female.     History of Present Illness   The patient comes in today for follow-up of recent hospitalization.    She was discharged from the hospital last week but returned to the hospital over the weekend due to swelling of legs and feet.  She states this is new and she usually does not have any swelling.    She was prescribed more steroids from the ER.  However she states she had stopped the steroids which she had been discharged with because she had diarrhea.  She states she has a colon issue and always has diarrhea but she felt the steroids made it worse.    She is using the flutter valve 1-2 times a day.    She is using the nebulizer 4 times a day.  She uses Symbicort twice a day and Spiriva daily.    The following portions of the patient's history were reviewed and updated as appropriate: allergies, current medications, past family history, past medical history, past social history and past surgical history.    Review of Systems   HENT: Negative for sinus pressure, sneezing and sore throat.    Respiratory: Positive for cough, chest tightness, shortness of breath and wheezing.        Objective   Visit Vitals  /78   Pulse 74   Temp 96.9 °F (36.1 °C)   Resp 18   Ht 142.2 cm (55.98\")   Wt 42.6 kg (94 lb)   LMP  (LMP Unknown)   SpO2 (!) 76% Comment: on room air (REST)   BMI 21.09 kg/m²   O2: 96% on 3LPM      Physical Exam  Vitals signs reviewed.   HENT:      Head: Atraumatic.      Mouth/Throat:      Mouth: Mucous membranes are moist.      Pharynx: Oropharynx is clear.   Eyes:      Extraocular Movements: Extraocular movements intact.   Neck:      Musculoskeletal: Neck supple.   Cardiovascular:      Rate and Rhythm: Normal rate and regular rhythm.   Pulmonary:      Effort: No respiratory distress.      Comments: She has diffuse wheezing throughout.  Musculoskeletal:      Comments: " She is in a wheelchair.  She has 2+ edema in the lower extremities and her feet are cool to touch.  Dorsalis pedis is palpable on both feet, posterior tibial is not palpable on either foot.   Skin:     General: Skin is warm.   Neurological:      Mental Status: She is alert and oriented to person, place, and time.             Assessment/Plan   Diagnoses and all orders for this visit:    1. Shortness of breath (Primary)    2. Bronchiectasis without complication (CMS/HCC)    3. Chronic obstructive pulmonary disease, unspecified COPD type (CMS/HCC)  -     BIPAP / CPAP Adjustment    4. Hypoxia    5. Chronic respiratory failure with hypoxia and hypercapnia (CMS/HCC)  -     BIPAP / CPAP Adjustment    Other orders  -     furosemide (LASIX) 20 MG tablet; Take 1 tablet by mouth Daily.  Dispense: 10 tablet; Refill: 0  -     potassium chloride (K-DUR,KLOR-CON) 20 MEQ CR tablet; Take 1 tablet by mouth Daily.  Dispense: 10 tablet; Refill: 0           Return for keep appt in March.    DISCUSSION (if any):  I reviewed her discharge summary, pertinent labs and radiology.    I have asked her to restart the steroids and to take the dose that was given to her at discharge from the hospital before continuing the steroids that the ER has prescribed.    I have prescribed Lasix daily for 10 days along with potassium.    Due to her severe COPD and recent exacerbation with hospitalization I have ordered noninvasive ventilation.  I feel she would greatly benefit from this machine as her respiratory issues are due to her severe COPD and BiPAP alone would not be sufficient.    The patient will need to use the noninvasive ventilator for nocturnal and daytime use. Due to severity of the condition, BiPAP alone would be insufficient. Severe COPD seems to be the primary cause of chronic respiratory failure in this patient requiring noninvasive ventilator. There maybe serious detriment to the patient's health, including the possibility of recurrent  exacerbations, worsening symptoms/respiratory status etc., if noninvasive ventilator is not used.      Results for DEMETRIUS HERNANDES (MRN [id number]) as of 1/18/2021 10:55   Ref. Range 1/16/2021 18:50   pH, Arterial Latest Ref Range: 7.300 - 7.500 pH units 7.410   pCO2, Arterial Latest Ref Range: 35.0 - 45.0 mm Hg 50.9 (H)   pO2, Arterial Latest Ref Range: 75.0 - 100.0 mm Hg 60.4 (L)   HCO3, Arterial Latest Ref Range: 22.0 - 28.0 mmol/L 32.2 (H)   Base Excess Latest Ref Range: 0.0 - 2.0 mmol/L 6.4 (H)   O2 Saturation, Arterial Latest Ref Range: 94.0 - 100.0 % 90.5 (L)       I have asked the patient to call the office Wednesday or Thursday if she is not some better.    Dictated utilizing Dragon dictation.    This document was electronically signed by DWAYNE Maria January 18, 2021  11:08 EST

## 2021-01-19 ENCOUNTER — READMISSION MANAGEMENT (OUTPATIENT)
Dept: CALL CENTER | Facility: HOSPITAL | Age: 76
End: 2021-01-19

## 2021-01-19 NOTE — OUTREACH NOTE
COPD/PN Week 1 Survey      Responses   [facility] patient discharged from?  [facility]   Does the patient have one of the following disease processes/diagnoses(primary or secondary)?  COPD/Pneumonia   Was the primary reason for admission:  COPD exacerbation   Week 1 attempt successful?  No   Unsuccessful attempts  Attempt 3   Call end time  2403   Discharge diagnosis  COPD exacerbation           Goldy Cueva RN

## 2021-01-21 ENCOUNTER — READMISSION MANAGEMENT (OUTPATIENT)
Dept: CALL CENTER | Facility: HOSPITAL | Age: 76
End: 2021-01-21

## 2021-01-21 NOTE — OUTREACH NOTE
COPD/PN Week 2 Survey      Responses   [facility] patient discharged from?  [facility]   Does the patient have one of the following disease processes/diagnoses(primary or secondary)?  COPD/Pneumonia   Was the primary reason for admission:  COPD exacerbation   Week 2 attempt successful?  Yes   Call start time  1056   Call end time  1105   Discharge diagnosis  COPD exacerbation    Meds reviewed with patient/caregiver?  Yes   Is the patient having any side effects they believe may be caused by any medication additions or changes?  No   Does the patient have all medications ordered at discharge?  Yes   Is the patient taking all medications as directed (includes completed medication regime)?  Yes   Does the patient have a primary care provider?   Yes   Does the patient have an appointment with their PCP or specialist within 7 days of discharge?  Yes   Comments regarding PCP  She saw the PCP, received Lasix x 5 days.   Has the patient kept scheduled appointments due by today?  Yes   What is the Home health agency?   [facility].    Has home health visited the patient within 72 hours of discharge?  Yes   Home health comments  PT and OT will start next week.   Has all DME been delivered?  Yes   DME comments  BIPap still coming. She is using 3L of O2   Pulse Ox monitoring  Intermittent   Pulse Ox device source  Patient   O2 Sat comments  Says 96% last time checked.   O2 Sat: education provided  Sat levels, Monitoring frequency, When to seek care   O2 Sat education comments  If 90% or below and stays there, call 911   Psychosocial issues?  No   Psychosocial comments  She has food, she cooks, says she has shrunk 4 inches, needs a step stool to get on her bed. She is interested in a transport wheelchair.   Did the patient receive a copy of their discharge instructions?  Yes   Nursing interventions  Reviewed instructions with patient   What is the patient's perception of their health status since discharge?  Same  "  Nursing Interventions  Nurse provided patient education   Are the patient's immunizations up to date?   Yes   Nursing interventions  Educated on importance of maintaining up to date immunizations as advised by provider   If the patient is a current smoker, are they able to teach back resources for cessation?  Not a smoker   Is the patient/caregiver able to teach back the hierarchy of who to call/visit for symptoms/problems? PCP, Specialist, Home health nurse, Urgent Care, ED, 911  Yes [She keeps a phone with her.]   Additional teach back comments  Anxiety happens, encouraged deep breathing and singular focus. Encouraged pulse ox with her as well.   Is the patient able to teach back COPD zones?  Yes   Nursing interventions  Education provided on various zones   Patient reports what zone on this call?  Green Zone   Green Zone  Reports doing well, Breathing without shortness of breath, Usual activity and exercise level, Sleeping well, Appetite is good   Green Zone interventions:  Take daily medications, Use oxygen as prescribed, Continue regular exercise/diet plan   Week 2 call completed?  Yes   Wrap up additional comments  \"Hard to eat when you can't breath\" She eats many small meals.          Ester Anguiano RN  "

## 2021-01-22 ENCOUNTER — APPOINTMENT (OUTPATIENT)
Dept: GENERAL RADIOLOGY | Facility: HOSPITAL | Age: 76
End: 2021-01-22

## 2021-01-22 ENCOUNTER — APPOINTMENT (OUTPATIENT)
Dept: CT IMAGING | Facility: HOSPITAL | Age: 76
End: 2021-01-22

## 2021-01-22 ENCOUNTER — HOSPITAL ENCOUNTER (INPATIENT)
Facility: HOSPITAL | Age: 76
LOS: 11 days | Discharge: NURSING, REHAB OR LONG-TERM CARE | End: 2021-02-02
Attending: EMERGENCY MEDICINE | Admitting: FAMILY MEDICINE

## 2021-01-22 DIAGNOSIS — M15.9 PRIMARY OSTEOARTHRITIS INVOLVING MULTIPLE JOINTS: ICD-10-CM

## 2021-01-22 DIAGNOSIS — J69.0 ASPIRATION PNEUMONIA OF BOTH LOWER LOBES, UNSPECIFIED ASPIRATION PNEUMONIA TYPE (HCC): Primary | ICD-10-CM

## 2021-01-22 DIAGNOSIS — J44.1 COPD WITH ACUTE EXACERBATION (HCC): ICD-10-CM

## 2021-01-22 DIAGNOSIS — R06.02 SHORTNESS OF BREATH: ICD-10-CM

## 2021-01-22 DIAGNOSIS — S22.059D CLOSED FRACTURE OF SIXTH THORACIC VERTEBRA WITH ROUTINE HEALING, UNSPECIFIED FRACTURE MORPHOLOGY, SUBSEQUENT ENCOUNTER: ICD-10-CM

## 2021-01-22 PROBLEM — J96.22 ACUTE ON CHRONIC RESPIRATORY FAILURE WITH HYPERCAPNIA: Status: ACTIVE | Noted: 2021-01-22

## 2021-01-22 LAB
A-A DO2: 21.5 MMHG
ALBUMIN SERPL-MCNC: 3.9 G/DL (ref 3.5–5.2)
ALBUMIN/GLOB SERPL: 1.3 G/DL
ALP SERPL-CCNC: 138 U/L (ref 39–117)
ALT SERPL W P-5'-P-CCNC: 49 U/L (ref 1–33)
ANION GAP SERPL CALCULATED.3IONS-SCNC: 8.1 MMOL/L (ref 5–15)
ARTERIAL PATENCY WRIST A: POSITIVE
AST SERPL-CCNC: 34 U/L (ref 1–32)
ATMOSPHERIC PRESS: 734 MMHG
BASE EXCESS BLDA CALC-SCNC: 9.2 MMOL/L (ref 0–2)
BDY SITE: ABNORMAL
BILIRUB SERPL-MCNC: 0.2 MG/DL (ref 0–1.2)
BUN SERPL-MCNC: 23 MG/DL (ref 8–23)
BUN/CREAT SERPL: 34.3 (ref 7–25)
CALCIUM SPEC-SCNC: 9.2 MG/DL (ref 8.6–10.5)
CHLORIDE SERPL-SCNC: 94 MMOL/L (ref 98–107)
CO2 SERPL-SCNC: 32.9 MMOL/L (ref 22–29)
COHGB MFR BLD: 2.1 % (ref 0–2)
CREAT SERPL-MCNC: 0.67 MG/DL (ref 0.57–1)
DEPRECATED RDW RBC AUTO: 61.6 FL (ref 37–54)
ERYTHROCYTE [DISTWIDTH] IN BLOOD BY AUTOMATED COUNT: 16.5 % (ref 12.3–15.4)
GAS FLOW AIRWAY: 3 LPM
GFR SERPL CREATININE-BSD FRML MDRD: 86 ML/MIN/1.73
GLOBULIN UR ELPH-MCNC: 2.9 GM/DL
GLUCOSE SERPL-MCNC: 80 MG/DL (ref 65–99)
HCO3 BLDA-SCNC: 35.7 MMOL/L (ref 22–28)
HCT VFR BLD AUTO: 39.9 % (ref 34–46.6)
HCT VFR BLD CALC: 39.9 %
HGB BLD-MCNC: 12.7 G/DL (ref 12–15.9)
HOLD SPECIMEN: NORMAL
HOLD SPECIMEN: NORMAL
LYMPHOCYTES # BLD MANUAL: 2.56 10*3/MM3 (ref 0.7–3.1)
LYMPHOCYTES NFR BLD MANUAL: 19 % (ref 19.6–45.3)
LYMPHOCYTES NFR BLD MANUAL: 4 % (ref 5–12)
MCH RBC QN AUTO: 32 PG (ref 26.6–33)
MCHC RBC AUTO-ENTMCNC: 31.8 G/DL (ref 31.5–35.7)
MCV RBC AUTO: 100.5 FL (ref 79–97)
METAMYELOCYTES NFR BLD MANUAL: 7 % (ref 0–0)
METHGB BLD QL: 1 % (ref 0–1.5)
MODALITY: ABNORMAL
MONOCYTES # BLD AUTO: 0.54 10*3/MM3 (ref 0.1–0.9)
MYELOCYTES NFR BLD MANUAL: 3 % (ref 0–0)
NEUTROPHILS # BLD AUTO: 9.01 10*3/MM3 (ref 1.7–7)
NEUTROPHILS NFR BLD MANUAL: 66 % (ref 42.7–76)
NEUTS BAND NFR BLD MANUAL: 1 % (ref 0–5)
NOTE: ABNORMAL
NT-PROBNP SERPL-MCNC: 150.6 PG/ML (ref 0–1800)
OXYHGB MFR BLDV: 87.7 % (ref 94–99)
PCO2 BLDA: 56.1 MM HG (ref 35–45)
PCO2 TEMP ADJ BLD: ABNORMAL MM[HG]
PH BLDA: 7.41 PH UNITS (ref 7.3–7.5)
PH, TEMP CORRECTED: ABNORMAL
PLATELET # BLD AUTO: 258 10*3/MM3 (ref 140–450)
PMV BLD AUTO: 9 FL (ref 6–12)
PO2 BLDA: 59.5 MM HG (ref 75–100)
PO2 TEMP ADJ BLD: ABNORMAL MM[HG]
POTASSIUM SERPL-SCNC: 4.7 MMOL/L (ref 3.5–5.2)
PROT SERPL-MCNC: 6.8 G/DL (ref 6–8.5)
RBC # BLD AUTO: 3.97 10*6/MM3 (ref 3.77–5.28)
RBC MORPH BLD: NORMAL
SAO2 % BLDCOA: 90.5 % (ref 94–100)
SARS-COV-2 RNA PNL SPEC NAA+PROBE: NOT DETECTED
SCAN SLIDE: NORMAL
SMALL PLATELETS BLD QL SMEAR: ADEQUATE
SODIUM SERPL-SCNC: 135 MMOL/L (ref 136–145)
TROPONIN T SERPL-MCNC: <0.01 NG/ML (ref 0–0.03)
VENTILATOR MODE: ABNORMAL
WBC # BLD AUTO: 13.45 10*3/MM3 (ref 3.4–10.8)
WBC MORPH BLD: NORMAL
WHOLE BLOOD HOLD SPECIMEN: NORMAL
WHOLE BLOOD HOLD SPECIMEN: NORMAL

## 2021-01-22 PROCEDURE — 86738 MYCOPLASMA ANTIBODY: CPT | Performed by: FAMILY MEDICINE

## 2021-01-22 PROCEDURE — 25010000002 IOPAMIDOL 61 % SOLUTION: Performed by: EMERGENCY MEDICINE

## 2021-01-22 PROCEDURE — 80053 COMPREHEN METABOLIC PANEL: CPT | Performed by: EMERGENCY MEDICINE

## 2021-01-22 PROCEDURE — 87040 BLOOD CULTURE FOR BACTERIA: CPT | Performed by: PHYSICIAN ASSISTANT

## 2021-01-22 PROCEDURE — 25010000002 ENOXAPARIN PER 10 MG: Performed by: FAMILY MEDICINE

## 2021-01-22 PROCEDURE — 93005 ELECTROCARDIOGRAM TRACING: CPT | Performed by: EMERGENCY MEDICINE

## 2021-01-22 PROCEDURE — 94799 UNLISTED PULMONARY SVC/PX: CPT

## 2021-01-22 PROCEDURE — 82805 BLOOD GASES W/O2 SATURATION: CPT

## 2021-01-22 PROCEDURE — 25010000002 METHYLPREDNISOLONE PER 125 MG: Performed by: PHYSICIAN ASSISTANT

## 2021-01-22 PROCEDURE — 36600 WITHDRAWAL OF ARTERIAL BLOOD: CPT

## 2021-01-22 PROCEDURE — 83050 HGB METHEMOGLOBIN QUAN: CPT

## 2021-01-22 PROCEDURE — 71046 X-RAY EXAM CHEST 2 VIEWS: CPT

## 2021-01-22 PROCEDURE — 25010000002 PIPERACILLIN SOD-TAZOBACTAM PER 1 G: Performed by: PHYSICIAN ASSISTANT

## 2021-01-22 PROCEDURE — 85007 BL SMEAR W/DIFF WBC COUNT: CPT | Performed by: EMERGENCY MEDICINE

## 2021-01-22 PROCEDURE — 25010000002 METHYLPREDNISOLONE PER 40 MG: Performed by: FAMILY MEDICINE

## 2021-01-22 PROCEDURE — 83880 ASSAY OF NATRIURETIC PEPTIDE: CPT | Performed by: EMERGENCY MEDICINE

## 2021-01-22 PROCEDURE — 84484 ASSAY OF TROPONIN QUANT: CPT | Performed by: EMERGENCY MEDICINE

## 2021-01-22 PROCEDURE — 25010000002 MAGNESIUM SULFATE 2 GM/50ML SOLUTION: Performed by: PHYSICIAN ASSISTANT

## 2021-01-22 PROCEDURE — 87641 MR-STAPH DNA AMP PROBE: CPT | Performed by: FAMILY MEDICINE

## 2021-01-22 PROCEDURE — 25010000002 FUROSEMIDE PER 20 MG: Performed by: PHYSICIAN ASSISTANT

## 2021-01-22 PROCEDURE — 87635 SARS-COV-2 COVID-19 AMP PRB: CPT | Performed by: PHYSICIAN ASSISTANT

## 2021-01-22 PROCEDURE — 99284 EMERGENCY DEPT VISIT MOD MDM: CPT

## 2021-01-22 PROCEDURE — 25010000002 VANCOMYCIN PER 500 MG: Performed by: PHYSICIAN ASSISTANT

## 2021-01-22 PROCEDURE — 71275 CT ANGIOGRAPHY CHEST: CPT

## 2021-01-22 PROCEDURE — 99222 1ST HOSP IP/OBS MODERATE 55: CPT | Performed by: FAMILY MEDICINE

## 2021-01-22 PROCEDURE — 25010000002 FLUCONAZOLE PER 200 MG: Performed by: FAMILY MEDICINE

## 2021-01-22 PROCEDURE — 85025 COMPLETE CBC W/AUTO DIFF WBC: CPT | Performed by: EMERGENCY MEDICINE

## 2021-01-22 PROCEDURE — 82375 ASSAY CARBOXYHB QUANT: CPT

## 2021-01-22 PROCEDURE — 94640 AIRWAY INHALATION TREATMENT: CPT

## 2021-01-22 RX ORDER — CHOLECALCIFEROL (VITAMIN D3) 125 MCG
10 CAPSULE ORAL NIGHTLY PRN
Status: DISCONTINUED | OUTPATIENT
Start: 2021-01-22 | End: 2021-02-02 | Stop reason: HOSPADM

## 2021-01-22 RX ORDER — SODIUM CHLORIDE 0.9 % (FLUSH) 0.9 %
10 SYRINGE (ML) INJECTION AS NEEDED
Status: DISCONTINUED | OUTPATIENT
Start: 2021-01-22 | End: 2021-02-02 | Stop reason: HOSPADM

## 2021-01-22 RX ORDER — LORAZEPAM 0.5 MG/1
0.5 TABLET ORAL 2 TIMES DAILY PRN
Status: DISCONTINUED | OUTPATIENT
Start: 2021-01-22 | End: 2021-02-02 | Stop reason: HOSPADM

## 2021-01-22 RX ORDER — BUDESONIDE 0.5 MG/2ML
0.5 INHALANT ORAL ONCE
Status: COMPLETED | OUTPATIENT
Start: 2021-01-22 | End: 2021-01-22

## 2021-01-22 RX ORDER — HYDROCODONE BITARTRATE AND ACETAMINOPHEN 10; 325 MG/1; MG/1
1 TABLET ORAL EVERY 8 HOURS PRN
Status: DISCONTINUED | OUTPATIENT
Start: 2021-01-22 | End: 2021-02-02 | Stop reason: HOSPADM

## 2021-01-22 RX ORDER — TRAZODONE HYDROCHLORIDE 50 MG/1
25 TABLET ORAL NIGHTLY
Status: DISCONTINUED | OUTPATIENT
Start: 2021-01-22 | End: 2021-02-02 | Stop reason: HOSPADM

## 2021-01-22 RX ORDER — BUDESONIDE 0.5 MG/2ML
0.5 INHALANT ORAL
Status: DISCONTINUED | OUTPATIENT
Start: 2021-01-22 | End: 2021-01-26

## 2021-01-22 RX ORDER — CYCLOBENZAPRINE HCL 10 MG
10 TABLET ORAL NIGHTLY PRN
Status: DISCONTINUED | OUTPATIENT
Start: 2021-01-22 | End: 2021-02-02 | Stop reason: HOSPADM

## 2021-01-22 RX ORDER — CHOLECALCIFEROL (VITAMIN D3) 125 MCG
500 CAPSULE ORAL DAILY
Status: DISCONTINUED | OUTPATIENT
Start: 2021-01-23 | End: 2021-02-02 | Stop reason: HOSPADM

## 2021-01-22 RX ORDER — ACETAMINOPHEN 650 MG/1
650 SUPPOSITORY RECTAL EVERY 4 HOURS PRN
Status: DISCONTINUED | OUTPATIENT
Start: 2021-01-22 | End: 2021-02-02 | Stop reason: HOSPADM

## 2021-01-22 RX ORDER — VENLAFAXINE 75 MG/1
75 TABLET ORAL 2 TIMES DAILY WITH MEALS
Status: DISCONTINUED | OUTPATIENT
Start: 2021-01-22 | End: 2021-02-02 | Stop reason: HOSPADM

## 2021-01-22 RX ORDER — LIDOCAINE 50 MG/G
1 PATCH TOPICAL DAILY PRN
Status: DISCONTINUED | OUTPATIENT
Start: 2021-01-22 | End: 2021-02-02 | Stop reason: HOSPADM

## 2021-01-22 RX ORDER — METHYLPREDNISOLONE SODIUM SUCCINATE 125 MG/2ML
125 INJECTION, POWDER, LYOPHILIZED, FOR SOLUTION INTRAMUSCULAR; INTRAVENOUS ONCE
Status: COMPLETED | OUTPATIENT
Start: 2021-01-22 | End: 2021-01-22

## 2021-01-22 RX ORDER — FUROSEMIDE 10 MG/ML
20 INJECTION INTRAMUSCULAR; INTRAVENOUS ONCE
Status: COMPLETED | OUTPATIENT
Start: 2021-01-22 | End: 2021-01-22

## 2021-01-22 RX ORDER — METHYLPREDNISOLONE SODIUM SUCCINATE 40 MG/ML
40 INJECTION, POWDER, LYOPHILIZED, FOR SOLUTION INTRAMUSCULAR; INTRAVENOUS EVERY 8 HOURS
Status: DISCONTINUED | OUTPATIENT
Start: 2021-01-22 | End: 2021-01-28

## 2021-01-22 RX ORDER — GABAPENTIN 100 MG/1
100 CAPSULE ORAL 3 TIMES DAILY
Status: DISCONTINUED | OUTPATIENT
Start: 2021-01-22 | End: 2021-02-02 | Stop reason: HOSPADM

## 2021-01-22 RX ORDER — ONDANSETRON 2 MG/ML
4 INJECTION INTRAMUSCULAR; INTRAVENOUS EVERY 6 HOURS PRN
Status: DISCONTINUED | OUTPATIENT
Start: 2021-01-22 | End: 2021-02-02 | Stop reason: HOSPADM

## 2021-01-22 RX ORDER — AMMONIUM LACTATE 12 G/100G
CREAM TOPICAL 2 TIMES DAILY PRN
Status: DISCONTINUED | OUTPATIENT
Start: 2021-01-22 | End: 2021-02-02 | Stop reason: HOSPADM

## 2021-01-22 RX ORDER — ACETAMINOPHEN 325 MG/1
650 TABLET ORAL EVERY 4 HOURS PRN
Status: DISCONTINUED | OUTPATIENT
Start: 2021-01-22 | End: 2021-02-02 | Stop reason: HOSPADM

## 2021-01-22 RX ORDER — MIRTAZAPINE 15 MG/1
30 TABLET, FILM COATED ORAL NIGHTLY
Status: DISCONTINUED | OUTPATIENT
Start: 2021-01-22 | End: 2021-01-22

## 2021-01-22 RX ORDER — IPRATROPIUM BROMIDE AND ALBUTEROL SULFATE 2.5; .5 MG/3ML; MG/3ML
3 SOLUTION RESPIRATORY (INHALATION) 4 TIMES DAILY PRN
Status: DISCONTINUED | OUTPATIENT
Start: 2021-01-22 | End: 2021-02-02 | Stop reason: HOSPADM

## 2021-01-22 RX ORDER — LOSARTAN POTASSIUM 25 MG/1
25 TABLET ORAL NIGHTLY
Status: DISCONTINUED | OUTPATIENT
Start: 2021-01-22 | End: 2021-02-02 | Stop reason: HOSPADM

## 2021-01-22 RX ORDER — MAGNESIUM SULFATE HEPTAHYDRATE 40 MG/ML
2 INJECTION, SOLUTION INTRAVENOUS ONCE
Status: COMPLETED | OUTPATIENT
Start: 2021-01-22 | End: 2021-01-22

## 2021-01-22 RX ORDER — GUAIFENESIN 600 MG/1
600 TABLET, EXTENDED RELEASE ORAL EVERY 12 HOURS SCHEDULED
Status: DISCONTINUED | OUTPATIENT
Start: 2021-01-22 | End: 2021-02-02 | Stop reason: HOSPADM

## 2021-01-22 RX ORDER — IPRATROPIUM BROMIDE AND ALBUTEROL SULFATE 2.5; .5 MG/3ML; MG/3ML
6 SOLUTION RESPIRATORY (INHALATION) ONCE
Status: COMPLETED | OUTPATIENT
Start: 2021-01-22 | End: 2021-01-22

## 2021-01-22 RX ORDER — FLUCONAZOLE 2 MG/ML
200 INJECTION, SOLUTION INTRAVENOUS EVERY 24 HOURS
Status: DISCONTINUED | OUTPATIENT
Start: 2021-01-22 | End: 2021-01-25

## 2021-01-22 RX ORDER — SODIUM CHLORIDE 9 MG/ML
100 INJECTION, SOLUTION INTRAVENOUS CONTINUOUS
Status: DISCONTINUED | OUTPATIENT
Start: 2021-01-22 | End: 2021-01-23

## 2021-01-22 RX ORDER — SODIUM CHLORIDE 0.9 % (FLUSH) 0.9 %
10 SYRINGE (ML) INJECTION EVERY 12 HOURS SCHEDULED
Status: DISCONTINUED | OUTPATIENT
Start: 2021-01-22 | End: 2021-02-02 | Stop reason: HOSPADM

## 2021-01-22 RX ORDER — ACETAMINOPHEN 160 MG/5ML
650 SOLUTION ORAL EVERY 4 HOURS PRN
Status: DISCONTINUED | OUTPATIENT
Start: 2021-01-22 | End: 2021-02-02 | Stop reason: HOSPADM

## 2021-01-22 RX ORDER — IPRATROPIUM BROMIDE AND ALBUTEROL SULFATE 2.5; .5 MG/3ML; MG/3ML
3 SOLUTION RESPIRATORY (INHALATION)
Status: DISCONTINUED | OUTPATIENT
Start: 2021-01-22 | End: 2021-02-02 | Stop reason: HOSPADM

## 2021-01-22 RX ORDER — CHOLECALCIFEROL (VITAMIN D3) 125 MCG
5 CAPSULE ORAL NIGHTLY PRN
Status: DISCONTINUED | OUTPATIENT
Start: 2021-01-22 | End: 2021-01-22 | Stop reason: ALTCHOICE

## 2021-01-22 RX ORDER — PANTOPRAZOLE SODIUM 40 MG/1
40 TABLET, DELAYED RELEASE ORAL
Status: DISCONTINUED | OUTPATIENT
Start: 2021-01-23 | End: 2021-02-02 | Stop reason: HOSPADM

## 2021-01-22 RX ORDER — METOPROLOL SUCCINATE 25 MG/1
25 TABLET, EXTENDED RELEASE ORAL NIGHTLY
Status: DISCONTINUED | OUTPATIENT
Start: 2021-01-22 | End: 2021-02-02 | Stop reason: HOSPADM

## 2021-01-22 RX ADMIN — TAZOBACTAM SODIUM AND PIPERACILLIN SODIUM 3.38 G: 375; 3 INJECTION, SOLUTION INTRAVENOUS at 16:40

## 2021-01-22 RX ADMIN — GUAIFENESIN 600 MG: 600 TABLET, EXTENDED RELEASE ORAL at 20:30

## 2021-01-22 RX ADMIN — DICLOFENAC 2 G: 10 GEL TOPICAL at 20:30

## 2021-01-22 RX ADMIN — METHYLPREDNISOLONE SODIUM SUCCINATE 40 MG: 40 INJECTION, POWDER, FOR SOLUTION INTRAMUSCULAR; INTRAVENOUS at 21:50

## 2021-01-22 RX ADMIN — METHYLPREDNISOLONE SODIUM SUCCINATE 125 MG: 125 INJECTION, POWDER, FOR SOLUTION INTRAMUSCULAR; INTRAVENOUS at 14:12

## 2021-01-22 RX ADMIN — VANCOMYCIN HYDROCHLORIDE 750 MG: 750 INJECTION, SOLUTION INTRAVENOUS at 17:22

## 2021-01-22 RX ADMIN — GABAPENTIN 100 MG: 100 CAPSULE ORAL at 20:30

## 2021-01-22 RX ADMIN — MAGNESIUM SULFATE IN WATER 2 G: 40 INJECTION, SOLUTION INTRAVENOUS at 14:16

## 2021-01-22 RX ADMIN — BUDESONIDE 0.5 MG: 0.5 INHALANT RESPIRATORY (INHALATION) at 19:20

## 2021-01-22 RX ADMIN — LORAZEPAM 0.5 MG: 0.5 TABLET ORAL at 21:50

## 2021-01-22 RX ADMIN — IPRATROPIUM BROMIDE AND ALBUTEROL SULFATE 3 ML: .5; 3 SOLUTION RESPIRATORY (INHALATION) at 19:20

## 2021-01-22 RX ADMIN — ENOXAPARIN SODIUM 40 MG: 40 INJECTION SUBCUTANEOUS at 20:41

## 2021-01-22 RX ADMIN — IOPAMIDOL 100 ML: 612 INJECTION, SOLUTION INTRAVENOUS at 16:09

## 2021-01-22 RX ADMIN — SODIUM CHLORIDE 1000 ML: 9 INJECTION, SOLUTION INTRAVENOUS at 17:22

## 2021-01-22 RX ADMIN — FUROSEMIDE 20 MG: 10 INJECTION, SOLUTION INTRAMUSCULAR; INTRAVENOUS at 15:44

## 2021-01-22 RX ADMIN — TAZOBACTAM SODIUM AND PIPERACILLIN SODIUM 3.38 G: 375; 3 INJECTION, SOLUTION INTRAVENOUS at 23:54

## 2021-01-22 RX ADMIN — SODIUM CHLORIDE 100 ML/HR: 9 INJECTION, SOLUTION INTRAVENOUS at 18:36

## 2021-01-22 RX ADMIN — IPRATROPIUM BROMIDE AND ALBUTEROL SULFATE 6 ML: .5; 3 SOLUTION RESPIRATORY (INHALATION) at 15:33

## 2021-01-22 RX ADMIN — BUDESONIDE 0.5 MG: 0.5 INHALANT RESPIRATORY (INHALATION) at 14:37

## 2021-01-22 RX ADMIN — METOPROLOL SUCCINATE 25 MG: 25 TABLET, EXTENDED RELEASE ORAL at 20:30

## 2021-01-22 RX ADMIN — CYCLOBENZAPRINE HYDROCHLORIDE 10 MG: 10 TABLET, FILM COATED ORAL at 21:50

## 2021-01-22 RX ADMIN — TRAZODONE HYDROCHLORIDE 25 MG: 50 TABLET ORAL at 20:30

## 2021-01-22 RX ADMIN — SODIUM CHLORIDE, PRESERVATIVE FREE 10 ML: 5 INJECTION INTRAVENOUS at 20:30

## 2021-01-22 RX ADMIN — FLUCONAZOLE 200 MG: 2 INJECTION, SOLUTION INTRAVENOUS at 20:30

## 2021-01-22 RX ADMIN — HYDROCODONE BITARTRATE AND ACETAMINOPHEN 1 TABLET: 10; 325 TABLET ORAL at 20:30

## 2021-01-22 RX ADMIN — Medication 10 MG: at 20:30

## 2021-01-23 ENCOUNTER — READMISSION MANAGEMENT (OUTPATIENT)
Dept: CALL CENTER | Facility: HOSPITAL | Age: 76
End: 2021-01-23

## 2021-01-23 LAB
ANION GAP SERPL CALCULATED.3IONS-SCNC: 10.8 MMOL/L (ref 5–15)
BACTERIA SPEC RESP CULT: NORMAL
BASOPHILS # BLD AUTO: 0.03 10*3/MM3 (ref 0–0.2)
BASOPHILS NFR BLD AUTO: 0.3 % (ref 0–1.5)
BUN SERPL-MCNC: 19 MG/DL (ref 8–23)
BUN/CREAT SERPL: 32.8 (ref 7–25)
CALCIUM SPEC-SCNC: 8.1 MG/DL (ref 8.6–10.5)
CHLORIDE SERPL-SCNC: 100 MMOL/L (ref 98–107)
CO2 SERPL-SCNC: 28.2 MMOL/L (ref 22–29)
CREAT SERPL-MCNC: 0.58 MG/DL (ref 0.57–1)
DEPRECATED RDW RBC AUTO: 61.2 FL (ref 37–54)
EOSINOPHIL # BLD AUTO: 0 10*3/MM3 (ref 0–0.4)
EOSINOPHIL NFR BLD AUTO: 0 % (ref 0.3–6.2)
ERYTHROCYTE [DISTWIDTH] IN BLOOD BY AUTOMATED COUNT: 16.6 % (ref 12.3–15.4)
GFR SERPL CREATININE-BSD FRML MDRD: 101 ML/MIN/1.73
GLUCOSE SERPL-MCNC: 124 MG/DL (ref 65–99)
HCT VFR BLD AUTO: 35.6 % (ref 34–46.6)
HGB BLD-MCNC: 11.4 G/DL (ref 12–15.9)
IMM GRANULOCYTES # BLD AUTO: 0.49 10*3/MM3 (ref 0–0.05)
IMM GRANULOCYTES NFR BLD AUTO: 4.8 % (ref 0–0.5)
L PNEUMO1 AG UR QL IA: NEGATIVE
LYMPHOCYTES # BLD AUTO: 0.71 10*3/MM3 (ref 0.7–3.1)
LYMPHOCYTES NFR BLD AUTO: 6.9 % (ref 19.6–45.3)
MCH RBC QN AUTO: 32.2 PG (ref 26.6–33)
MCHC RBC AUTO-ENTMCNC: 32 G/DL (ref 31.5–35.7)
MCV RBC AUTO: 100.6 FL (ref 79–97)
MONOCYTES # BLD AUTO: 0.15 10*3/MM3 (ref 0.1–0.9)
MONOCYTES NFR BLD AUTO: 1.5 % (ref 5–12)
MRSA DNA SPEC QL NAA+PROBE: NORMAL
NEUTROPHILS NFR BLD AUTO: 8.89 10*3/MM3 (ref 1.7–7)
NEUTROPHILS NFR BLD AUTO: 86.5 % (ref 42.7–76)
NRBC BLD AUTO-RTO: 0 /100 WBC (ref 0–0.2)
PLATELET # BLD AUTO: 242 10*3/MM3 (ref 140–450)
PMV BLD AUTO: 9.6 FL (ref 6–12)
POTASSIUM SERPL-SCNC: 4.6 MMOL/L (ref 3.5–5.2)
RBC # BLD AUTO: 3.54 10*6/MM3 (ref 3.77–5.28)
S PNEUM AG SPEC QL LA: NEGATIVE
SODIUM SERPL-SCNC: 139 MMOL/L (ref 136–145)
WBC # BLD AUTO: 10.27 10*3/MM3 (ref 3.4–10.8)

## 2021-01-23 PROCEDURE — 25010000002 METHYLPREDNISOLONE PER 40 MG: Performed by: FAMILY MEDICINE

## 2021-01-23 PROCEDURE — 87899 AGENT NOS ASSAY W/OPTIC: CPT | Performed by: FAMILY MEDICINE

## 2021-01-23 PROCEDURE — 99232 SBSQ HOSP IP/OBS MODERATE 35: CPT | Performed by: FAMILY MEDICINE

## 2021-01-23 PROCEDURE — 25010000002 FLUCONAZOLE PER 200 MG: Performed by: FAMILY MEDICINE

## 2021-01-23 PROCEDURE — 97161 PT EVAL LOW COMPLEX 20 MIN: CPT

## 2021-01-23 PROCEDURE — 25010000002 ENOXAPARIN PER 10 MG: Performed by: FAMILY MEDICINE

## 2021-01-23 PROCEDURE — 25010000002 PIPERACILLIN SOD-TAZOBACTAM PER 1 G: Performed by: FAMILY MEDICINE

## 2021-01-23 PROCEDURE — 94799 UNLISTED PULMONARY SVC/PX: CPT

## 2021-01-23 PROCEDURE — 94660 CPAP INITIATION&MGMT: CPT

## 2021-01-23 PROCEDURE — 25010000002 VANCOMYCIN PER 500 MG: Performed by: FAMILY MEDICINE

## 2021-01-23 PROCEDURE — 80048 BASIC METABOLIC PNL TOTAL CA: CPT | Performed by: FAMILY MEDICINE

## 2021-01-23 PROCEDURE — 85025 COMPLETE CBC W/AUTO DIFF WBC: CPT | Performed by: FAMILY MEDICINE

## 2021-01-23 RX ADMIN — TAZOBACTAM SODIUM AND PIPERACILLIN SODIUM 3.38 G: 375; 3 INJECTION, SOLUTION INTRAVENOUS at 08:06

## 2021-01-23 RX ADMIN — HYDROCODONE BITARTRATE AND ACETAMINOPHEN 1 TABLET: 10; 325 TABLET ORAL at 16:51

## 2021-01-23 RX ADMIN — BUDESONIDE 0.5 MG: 0.5 INHALANT RESPIRATORY (INHALATION) at 19:40

## 2021-01-23 RX ADMIN — PANTOPRAZOLE SODIUM 40 MG: 40 TABLET, DELAYED RELEASE ORAL at 05:09

## 2021-01-23 RX ADMIN — GABAPENTIN 100 MG: 100 CAPSULE ORAL at 20:11

## 2021-01-23 RX ADMIN — HYDROCODONE BITARTRATE AND ACETAMINOPHEN 1 TABLET: 10; 325 TABLET ORAL at 08:13

## 2021-01-23 RX ADMIN — CYANOCOBALAMIN TAB 500 MCG 500 MCG: 500 TAB at 08:06

## 2021-01-23 RX ADMIN — BUDESONIDE 0.5 MG: 0.5 INHALANT RESPIRATORY (INHALATION) at 06:49

## 2021-01-23 RX ADMIN — LORAZEPAM 0.5 MG: 0.5 TABLET ORAL at 20:09

## 2021-01-23 RX ADMIN — VANCOMYCIN HYDROCHLORIDE 750 MG: 750 INJECTION, SOLUTION INTRAVENOUS at 10:08

## 2021-01-23 RX ADMIN — DICLOFENAC 2 G: 10 GEL TOPICAL at 08:07

## 2021-01-23 RX ADMIN — ENOXAPARIN SODIUM 40 MG: 40 INJECTION SUBCUTANEOUS at 20:10

## 2021-01-23 RX ADMIN — GABAPENTIN 100 MG: 100 CAPSULE ORAL at 08:06

## 2021-01-23 RX ADMIN — GUAIFENESIN 600 MG: 600 TABLET, EXTENDED RELEASE ORAL at 20:10

## 2021-01-23 RX ADMIN — LOSARTAN POTASSIUM 25 MG: 25 TABLET, FILM COATED ORAL at 20:11

## 2021-01-23 RX ADMIN — VENLAFAXINE 75 MG: 75 TABLET ORAL at 08:06

## 2021-01-23 RX ADMIN — METHYLPREDNISOLONE SODIUM SUCCINATE 40 MG: 40 INJECTION, POWDER, FOR SOLUTION INTRAMUSCULAR; INTRAVENOUS at 05:09

## 2021-01-23 RX ADMIN — IPRATROPIUM BROMIDE AND ALBUTEROL SULFATE 3 ML: .5; 3 SOLUTION RESPIRATORY (INHALATION) at 13:58

## 2021-01-23 RX ADMIN — GUAIFENESIN 600 MG: 600 TABLET, EXTENDED RELEASE ORAL at 08:06

## 2021-01-23 RX ADMIN — METHYLPREDNISOLONE SODIUM SUCCINATE 40 MG: 40 INJECTION, POWDER, FOR SOLUTION INTRAMUSCULAR; INTRAVENOUS at 21:51

## 2021-01-23 RX ADMIN — IPRATROPIUM BROMIDE AND ALBUTEROL SULFATE 3 ML: .5; 3 SOLUTION RESPIRATORY (INHALATION) at 19:40

## 2021-01-23 RX ADMIN — GABAPENTIN 100 MG: 100 CAPSULE ORAL at 15:07

## 2021-01-23 RX ADMIN — METHYLPREDNISOLONE SODIUM SUCCINATE 40 MG: 40 INJECTION, POWDER, FOR SOLUTION INTRAMUSCULAR; INTRAVENOUS at 13:10

## 2021-01-23 RX ADMIN — IPRATROPIUM BROMIDE AND ALBUTEROL SULFATE 3 ML: .5; 3 SOLUTION RESPIRATORY (INHALATION) at 00:49

## 2021-01-23 RX ADMIN — TAZOBACTAM SODIUM AND PIPERACILLIN SODIUM 3.38 G: 375; 3 INJECTION, SOLUTION INTRAVENOUS at 15:07

## 2021-01-23 RX ADMIN — SODIUM CHLORIDE, PRESERVATIVE FREE 10 ML: 5 INJECTION INTRAVENOUS at 20:10

## 2021-01-23 RX ADMIN — LORAZEPAM 0.5 MG: 0.5 TABLET ORAL at 08:13

## 2021-01-23 RX ADMIN — VENLAFAXINE 75 MG: 75 TABLET ORAL at 20:11

## 2021-01-23 RX ADMIN — SODIUM CHLORIDE, PRESERVATIVE FREE 10 ML: 5 INJECTION INTRAVENOUS at 08:08

## 2021-01-23 RX ADMIN — FLUCONAZOLE 200 MG: 2 INJECTION, SOLUTION INTRAVENOUS at 20:17

## 2021-01-23 RX ADMIN — Medication 10 MG: at 20:11

## 2021-01-23 RX ADMIN — METOPROLOL SUCCINATE 25 MG: 25 TABLET, EXTENDED RELEASE ORAL at 20:11

## 2021-01-23 RX ADMIN — TAZOBACTAM SODIUM AND PIPERACILLIN SODIUM 3.38 G: 375; 3 INJECTION, SOLUTION INTRAVENOUS at 23:37

## 2021-01-23 RX ADMIN — IPRATROPIUM BROMIDE AND ALBUTEROL SULFATE 3 ML: .5; 3 SOLUTION RESPIRATORY (INHALATION) at 06:49

## 2021-01-23 RX ADMIN — TRAZODONE HYDROCHLORIDE 25 MG: 50 TABLET ORAL at 20:11

## 2021-01-23 RX ADMIN — DICLOFENAC 2 G: 10 GEL TOPICAL at 20:13

## 2021-01-23 NOTE — OUTREACH NOTE
COPD/PN Week 3 Survey      Responses   [facility] patient discharged from?  [facility]   Does the patient have one of the following disease processes/diagnoses(primary or secondary)?  COPD/Pneumonia   Was the primary reason for admission:  COPD exacerbation   Week 3 attempt successful?  No   Revoke  Readmitted          Adelina Grimaldo RN

## 2021-01-24 LAB
ANION GAP SERPL CALCULATED.3IONS-SCNC: 6.8 MMOL/L (ref 5–15)
BASOPHILS # BLD AUTO: 0.04 10*3/MM3 (ref 0–0.2)
BASOPHILS NFR BLD AUTO: 0.3 % (ref 0–1.5)
BUN SERPL-MCNC: 22 MG/DL (ref 8–23)
BUN/CREAT SERPL: 41.5 (ref 7–25)
CALCIUM SPEC-SCNC: 8.8 MG/DL (ref 8.6–10.5)
CHLORIDE SERPL-SCNC: 100 MMOL/L (ref 98–107)
CO2 SERPL-SCNC: 30.2 MMOL/L (ref 22–29)
CREAT SERPL-MCNC: 0.53 MG/DL (ref 0.57–1)
DEPRECATED RDW RBC AUTO: 61.1 FL (ref 37–54)
EOSINOPHIL # BLD AUTO: 0 10*3/MM3 (ref 0–0.4)
EOSINOPHIL NFR BLD AUTO: 0 % (ref 0.3–6.2)
ERYTHROCYTE [DISTWIDTH] IN BLOOD BY AUTOMATED COUNT: 16.1 % (ref 12.3–15.4)
GFR SERPL CREATININE-BSD FRML MDRD: 112 ML/MIN/1.73
GLUCOSE SERPL-MCNC: 153 MG/DL (ref 65–99)
HCT VFR BLD AUTO: 32.8 % (ref 34–46.6)
HGB BLD-MCNC: 10.2 G/DL (ref 12–15.9)
IMM GRANULOCYTES # BLD AUTO: 0.47 10*3/MM3 (ref 0–0.05)
IMM GRANULOCYTES NFR BLD AUTO: 3.3 % (ref 0–0.5)
LYMPHOCYTES # BLD AUTO: 0.49 10*3/MM3 (ref 0.7–3.1)
LYMPHOCYTES NFR BLD AUTO: 3.5 % (ref 19.6–45.3)
MCH RBC QN AUTO: 31.9 PG (ref 26.6–33)
MCHC RBC AUTO-ENTMCNC: 31.1 G/DL (ref 31.5–35.7)
MCV RBC AUTO: 102.5 FL (ref 79–97)
MONOCYTES # BLD AUTO: 0.27 10*3/MM3 (ref 0.1–0.9)
MONOCYTES NFR BLD AUTO: 1.9 % (ref 5–12)
NEUTROPHILS NFR BLD AUTO: 12.78 10*3/MM3 (ref 1.7–7)
NEUTROPHILS NFR BLD AUTO: 91 % (ref 42.7–76)
NRBC BLD AUTO-RTO: 0 /100 WBC (ref 0–0.2)
PLATELET # BLD AUTO: 230 10*3/MM3 (ref 140–450)
PMV BLD AUTO: 9.3 FL (ref 6–12)
POTASSIUM SERPL-SCNC: 4.3 MMOL/L (ref 3.5–5.2)
PROCALCITONIN SERPL-MCNC: 0.03 NG/ML (ref 0–0.25)
RBC # BLD AUTO: 3.2 10*6/MM3 (ref 3.77–5.28)
SODIUM SERPL-SCNC: 137 MMOL/L (ref 136–145)
WBC # BLD AUTO: 14.05 10*3/MM3 (ref 3.4–10.8)

## 2021-01-24 PROCEDURE — 94660 CPAP INITIATION&MGMT: CPT

## 2021-01-24 PROCEDURE — 94799 UNLISTED PULMONARY SVC/PX: CPT

## 2021-01-24 PROCEDURE — 25010000002 FLUCONAZOLE PER 200 MG: Performed by: FAMILY MEDICINE

## 2021-01-24 PROCEDURE — 25010000002 VANCOMYCIN PER 500 MG: Performed by: FAMILY MEDICINE

## 2021-01-24 PROCEDURE — 25010000002 METHYLPREDNISOLONE PER 40 MG: Performed by: FAMILY MEDICINE

## 2021-01-24 PROCEDURE — 84145 PROCALCITONIN (PCT): CPT | Performed by: FAMILY MEDICINE

## 2021-01-24 PROCEDURE — 25010000002 PIPERACILLIN SOD-TAZOBACTAM PER 1 G: Performed by: FAMILY MEDICINE

## 2021-01-24 PROCEDURE — 80048 BASIC METABOLIC PNL TOTAL CA: CPT | Performed by: FAMILY MEDICINE

## 2021-01-24 PROCEDURE — 99232 SBSQ HOSP IP/OBS MODERATE 35: CPT | Performed by: FAMILY MEDICINE

## 2021-01-24 PROCEDURE — 85025 COMPLETE CBC W/AUTO DIFF WBC: CPT | Performed by: FAMILY MEDICINE

## 2021-01-24 PROCEDURE — 25010000002 ENOXAPARIN PER 10 MG: Performed by: FAMILY MEDICINE

## 2021-01-24 RX ADMIN — GUAIFENESIN 600 MG: 600 TABLET, EXTENDED RELEASE ORAL at 21:03

## 2021-01-24 RX ADMIN — VANCOMYCIN HYDROCHLORIDE 750 MG: 750 INJECTION, SOLUTION INTRAVENOUS at 05:40

## 2021-01-24 RX ADMIN — DICLOFENAC 2 G: 10 GEL TOPICAL at 09:23

## 2021-01-24 RX ADMIN — LORAZEPAM 0.5 MG: 0.5 TABLET ORAL at 09:31

## 2021-01-24 RX ADMIN — TAZOBACTAM SODIUM AND PIPERACILLIN SODIUM 3.38 G: 375; 3 INJECTION, SOLUTION INTRAVENOUS at 15:30

## 2021-01-24 RX ADMIN — TRAZODONE HYDROCHLORIDE 25 MG: 50 TABLET ORAL at 21:04

## 2021-01-24 RX ADMIN — BUDESONIDE 0.5 MG: 0.5 INHALANT RESPIRATORY (INHALATION) at 19:12

## 2021-01-24 RX ADMIN — METHYLPREDNISOLONE SODIUM SUCCINATE 40 MG: 40 INJECTION, POWDER, FOR SOLUTION INTRAMUSCULAR; INTRAVENOUS at 15:30

## 2021-01-24 RX ADMIN — SODIUM CHLORIDE, PRESERVATIVE FREE 10 ML: 5 INJECTION INTRAVENOUS at 21:03

## 2021-01-24 RX ADMIN — DICLOFENAC 2 G: 10 GEL TOPICAL at 21:03

## 2021-01-24 RX ADMIN — LOSARTAN POTASSIUM 25 MG: 25 TABLET, FILM COATED ORAL at 21:03

## 2021-01-24 RX ADMIN — SODIUM CHLORIDE, PRESERVATIVE FREE 10 ML: 5 INJECTION INTRAVENOUS at 09:23

## 2021-01-24 RX ADMIN — GABAPENTIN 100 MG: 100 CAPSULE ORAL at 15:30

## 2021-01-24 RX ADMIN — VANCOMYCIN HYDROCHLORIDE 750 MG: 750 INJECTION, SOLUTION INTRAVENOUS at 23:01

## 2021-01-24 RX ADMIN — CYANOCOBALAMIN TAB 500 MCG 500 MCG: 500 TAB at 09:23

## 2021-01-24 RX ADMIN — GABAPENTIN 100 MG: 100 CAPSULE ORAL at 21:03

## 2021-01-24 RX ADMIN — HYDROCODONE BITARTRATE AND ACETAMINOPHEN 1 TABLET: 10; 325 TABLET ORAL at 09:23

## 2021-01-24 RX ADMIN — ENOXAPARIN SODIUM 30 MG: 30 INJECTION SUBCUTANEOUS at 21:03

## 2021-01-24 RX ADMIN — VENLAFAXINE 75 MG: 75 TABLET ORAL at 21:03

## 2021-01-24 RX ADMIN — HYDROCODONE BITARTRATE AND ACETAMINOPHEN 1 TABLET: 10; 325 TABLET ORAL at 21:04

## 2021-01-24 RX ADMIN — IPRATROPIUM BROMIDE AND ALBUTEROL SULFATE 3 ML: .5; 3 SOLUTION RESPIRATORY (INHALATION) at 19:12

## 2021-01-24 RX ADMIN — METHYLPREDNISOLONE SODIUM SUCCINATE 40 MG: 40 INJECTION, POWDER, FOR SOLUTION INTRAMUSCULAR; INTRAVENOUS at 05:33

## 2021-01-24 RX ADMIN — IPRATROPIUM BROMIDE AND ALBUTEROL SULFATE 3 ML: .5; 3 SOLUTION RESPIRATORY (INHALATION) at 06:58

## 2021-01-24 RX ADMIN — METOPROLOL SUCCINATE 25 MG: 25 TABLET, EXTENDED RELEASE ORAL at 21:03

## 2021-01-24 RX ADMIN — GUAIFENESIN 600 MG: 600 TABLET, EXTENDED RELEASE ORAL at 09:23

## 2021-01-24 RX ADMIN — LORAZEPAM 0.5 MG: 0.5 TABLET ORAL at 21:48

## 2021-01-24 RX ADMIN — TAZOBACTAM SODIUM AND PIPERACILLIN SODIUM 3.38 G: 375; 3 INJECTION, SOLUTION INTRAVENOUS at 09:22

## 2021-01-24 RX ADMIN — VENLAFAXINE 75 MG: 75 TABLET ORAL at 09:23

## 2021-01-24 RX ADMIN — BUDESONIDE 0.5 MG: 0.5 INHALANT RESPIRATORY (INHALATION) at 06:58

## 2021-01-24 RX ADMIN — IPRATROPIUM BROMIDE AND ALBUTEROL SULFATE 3 ML: .5; 3 SOLUTION RESPIRATORY (INHALATION) at 12:24

## 2021-01-24 RX ADMIN — FLUCONAZOLE 200 MG: 2 INJECTION, SOLUTION INTRAVENOUS at 21:48

## 2021-01-24 RX ADMIN — IPRATROPIUM BROMIDE AND ALBUTEROL SULFATE 3 ML: .5; 3 SOLUTION RESPIRATORY (INHALATION) at 01:33

## 2021-01-24 RX ADMIN — GABAPENTIN 100 MG: 100 CAPSULE ORAL at 09:23

## 2021-01-24 RX ADMIN — PANTOPRAZOLE SODIUM 40 MG: 40 TABLET, DELAYED RELEASE ORAL at 05:33

## 2021-01-24 RX ADMIN — METHYLPREDNISOLONE SODIUM SUCCINATE 40 MG: 40 INJECTION, POWDER, FOR SOLUTION INTRAMUSCULAR; INTRAVENOUS at 21:48

## 2021-01-24 RX ADMIN — Medication 10 MG: at 21:03

## 2021-01-25 PROBLEM — S22.059A CLOSED FRACTURE OF SIXTH THORACIC VERTEBRA (HCC): Status: ACTIVE | Noted: 2021-01-25

## 2021-01-25 PROBLEM — J44.9 PULMONARY CACHEXIA DUE TO COPD (HCC): Status: ACTIVE | Noted: 2021-01-25

## 2021-01-25 PROBLEM — R64 PULMONARY CACHEXIA DUE TO COPD (HCC): Status: ACTIVE | Noted: 2021-01-25

## 2021-01-25 LAB
ANION GAP SERPL CALCULATED.3IONS-SCNC: 7.1 MMOL/L (ref 5–15)
BASOPHILS # BLD AUTO: 0.02 10*3/MM3 (ref 0–0.2)
BASOPHILS NFR BLD AUTO: 0.2 % (ref 0–1.5)
BUN SERPL-MCNC: 23 MG/DL (ref 8–23)
BUN/CREAT SERPL: 43.4 (ref 7–25)
CALCIUM SPEC-SCNC: 8.5 MG/DL (ref 8.6–10.5)
CHLORIDE SERPL-SCNC: 95 MMOL/L (ref 98–107)
CO2 SERPL-SCNC: 32.9 MMOL/L (ref 22–29)
CREAT SERPL-MCNC: 0.53 MG/DL (ref 0.57–1)
CRP SERPL-MCNC: 0.28 MG/DL (ref 0–0.5)
DEPRECATED RDW RBC AUTO: 60 FL (ref 37–54)
EOSINOPHIL # BLD AUTO: 0 10*3/MM3 (ref 0–0.4)
EOSINOPHIL NFR BLD AUTO: 0 % (ref 0.3–6.2)
ERYTHROCYTE [DISTWIDTH] IN BLOOD BY AUTOMATED COUNT: 15.9 % (ref 12.3–15.4)
GFR SERPL CREATININE-BSD FRML MDRD: 112 ML/MIN/1.73
GLUCOSE SERPL-MCNC: 118 MG/DL (ref 65–99)
HCT VFR BLD AUTO: 33.7 % (ref 34–46.6)
HGB BLD-MCNC: 10.7 G/DL (ref 12–15.9)
IMM GRANULOCYTES # BLD AUTO: 0.5 10*3/MM3 (ref 0–0.05)
IMM GRANULOCYTES NFR BLD AUTO: 4.1 % (ref 0–0.5)
LYMPHOCYTES # BLD AUTO: 0.46 10*3/MM3 (ref 0.7–3.1)
LYMPHOCYTES NFR BLD AUTO: 3.8 % (ref 19.6–45.3)
MCH RBC QN AUTO: 32.2 PG (ref 26.6–33)
MCHC RBC AUTO-ENTMCNC: 31.8 G/DL (ref 31.5–35.7)
MCV RBC AUTO: 101.5 FL (ref 79–97)
MONOCYTES # BLD AUTO: 0.22 10*3/MM3 (ref 0.1–0.9)
MONOCYTES NFR BLD AUTO: 1.8 % (ref 5–12)
NEUTROPHILS NFR BLD AUTO: 10.94 10*3/MM3 (ref 1.7–7)
NEUTROPHILS NFR BLD AUTO: 90.1 % (ref 42.7–76)
NRBC BLD AUTO-RTO: 0 /100 WBC (ref 0–0.2)
PLATELET # BLD AUTO: 223 10*3/MM3 (ref 140–450)
PMV BLD AUTO: 9.3 FL (ref 6–12)
POTASSIUM SERPL-SCNC: 4.3 MMOL/L (ref 3.5–5.2)
PROCALCITONIN SERPL-MCNC: 0.02 NG/ML (ref 0–0.25)
RBC # BLD AUTO: 3.32 10*6/MM3 (ref 3.77–5.28)
SODIUM SERPL-SCNC: 135 MMOL/L (ref 136–145)
WBC # BLD AUTO: 12.14 10*3/MM3 (ref 3.4–10.8)

## 2021-01-25 PROCEDURE — 80048 BASIC METABOLIC PNL TOTAL CA: CPT | Performed by: FAMILY MEDICINE

## 2021-01-25 PROCEDURE — 25010000002 METHYLPREDNISOLONE PER 40 MG: Performed by: FAMILY MEDICINE

## 2021-01-25 PROCEDURE — 25010000002 ENOXAPARIN PER 10 MG: Performed by: FAMILY MEDICINE

## 2021-01-25 PROCEDURE — 86140 C-REACTIVE PROTEIN: CPT | Performed by: FAMILY MEDICINE

## 2021-01-25 PROCEDURE — 94799 UNLISTED PULMONARY SVC/PX: CPT

## 2021-01-25 PROCEDURE — 94660 CPAP INITIATION&MGMT: CPT

## 2021-01-25 PROCEDURE — 85025 COMPLETE CBC W/AUTO DIFF WBC: CPT | Performed by: FAMILY MEDICINE

## 2021-01-25 PROCEDURE — 92610 EVALUATE SWALLOWING FUNCTION: CPT

## 2021-01-25 PROCEDURE — 94669 MECHANICAL CHEST WALL OSCILL: CPT

## 2021-01-25 PROCEDURE — 25010000002 PIPERACILLIN SOD-TAZOBACTAM PER 1 G: Performed by: FAMILY MEDICINE

## 2021-01-25 PROCEDURE — 84145 PROCALCITONIN (PCT): CPT | Performed by: FAMILY MEDICINE

## 2021-01-25 PROCEDURE — 99223 1ST HOSP IP/OBS HIGH 75: CPT | Performed by: INTERNAL MEDICINE

## 2021-01-25 PROCEDURE — 99232 SBSQ HOSP IP/OBS MODERATE 35: CPT | Performed by: INTERNAL MEDICINE

## 2021-01-25 PROCEDURE — 97116 GAIT TRAINING THERAPY: CPT

## 2021-01-25 RX ORDER — FUROSEMIDE 20 MG/1
20 TABLET ORAL DAILY
Qty: 10 TABLET | Refills: 0 | OUTPATIENT
Start: 2021-01-25

## 2021-01-25 RX ORDER — POTASSIUM CHLORIDE 20 MEQ/1
20 TABLET, EXTENDED RELEASE ORAL DAILY
Qty: 10 TABLET | Refills: 0 | OUTPATIENT
Start: 2021-01-25

## 2021-01-25 RX ORDER — AMOXICILLIN AND CLAVULANATE POTASSIUM 875; 125 MG/1; MG/1
1 TABLET, FILM COATED ORAL EVERY 12 HOURS SCHEDULED
Status: DISCONTINUED | OUTPATIENT
Start: 2021-01-25 | End: 2021-01-28

## 2021-01-25 RX ORDER — SODIUM CHLORIDE FOR INHALATION 3 %
4 VIAL, NEBULIZER (ML) INHALATION EVERY 8 HOURS
Status: DISPENSED | OUTPATIENT
Start: 2021-01-25 | End: 2021-01-31

## 2021-01-25 RX ADMIN — TAZOBACTAM SODIUM AND PIPERACILLIN SODIUM 3.38 G: 375; 3 INJECTION, SOLUTION INTRAVENOUS at 07:55

## 2021-01-25 RX ADMIN — AMOXICILLIN AND CLAVULANATE POTASSIUM 1 TABLET: 875; 125 TABLET, FILM COATED ORAL at 20:26

## 2021-01-25 RX ADMIN — GUAIFENESIN 600 MG: 600 TABLET, EXTENDED RELEASE ORAL at 08:02

## 2021-01-25 RX ADMIN — AMOXICILLIN AND CLAVULANATE POTASSIUM 1 TABLET: 875; 125 TABLET, FILM COATED ORAL at 10:41

## 2021-01-25 RX ADMIN — GABAPENTIN 100 MG: 100 CAPSULE ORAL at 16:58

## 2021-01-25 RX ADMIN — IPRATROPIUM BROMIDE AND ALBUTEROL SULFATE 3 ML: .5; 3 SOLUTION RESPIRATORY (INHALATION) at 19:22

## 2021-01-25 RX ADMIN — DICLOFENAC 2 G: 10 GEL TOPICAL at 20:28

## 2021-01-25 RX ADMIN — HYDROCODONE BITARTRATE AND ACETAMINOPHEN 1 TABLET: 10; 325 TABLET ORAL at 10:41

## 2021-01-25 RX ADMIN — METOPROLOL SUCCINATE 25 MG: 25 TABLET, EXTENDED RELEASE ORAL at 20:27

## 2021-01-25 RX ADMIN — TRAZODONE HYDROCHLORIDE 25 MG: 50 TABLET ORAL at 20:27

## 2021-01-25 RX ADMIN — BUDESONIDE 0.5 MG: 0.5 INHALANT RESPIRATORY (INHALATION) at 07:02

## 2021-01-25 RX ADMIN — TAZOBACTAM SODIUM AND PIPERACILLIN SODIUM 3.38 G: 375; 3 INJECTION, SOLUTION INTRAVENOUS at 00:23

## 2021-01-25 RX ADMIN — METHYLPREDNISOLONE SODIUM SUCCINATE 40 MG: 40 INJECTION, POWDER, FOR SOLUTION INTRAMUSCULAR; INTRAVENOUS at 21:00

## 2021-01-25 RX ADMIN — METHYLPREDNISOLONE SODIUM SUCCINATE 40 MG: 40 INJECTION, POWDER, FOR SOLUTION INTRAMUSCULAR; INTRAVENOUS at 05:26

## 2021-01-25 RX ADMIN — IPRATROPIUM BROMIDE AND ALBUTEROL SULFATE 3 ML: .5; 3 SOLUTION RESPIRATORY (INHALATION) at 01:36

## 2021-01-25 RX ADMIN — DICLOFENAC 2 G: 10 GEL TOPICAL at 08:02

## 2021-01-25 RX ADMIN — GABAPENTIN 100 MG: 100 CAPSULE ORAL at 08:02

## 2021-01-25 RX ADMIN — HYDROCODONE BITARTRATE AND ACETAMINOPHEN 1 TABLET: 10; 325 TABLET ORAL at 20:33

## 2021-01-25 RX ADMIN — CYANOCOBALAMIN TAB 500 MCG 500 MCG: 500 TAB at 08:02

## 2021-01-25 RX ADMIN — METHYLPREDNISOLONE SODIUM SUCCINATE 40 MG: 40 INJECTION, POWDER, FOR SOLUTION INTRAMUSCULAR; INTRAVENOUS at 15:10

## 2021-01-25 RX ADMIN — BUDESONIDE 0.5 MG: 0.5 INHALANT RESPIRATORY (INHALATION) at 19:22

## 2021-01-25 RX ADMIN — GABAPENTIN 100 MG: 100 CAPSULE ORAL at 20:26

## 2021-01-25 RX ADMIN — VENLAFAXINE 75 MG: 75 TABLET ORAL at 08:02

## 2021-01-25 RX ADMIN — ENOXAPARIN SODIUM 30 MG: 30 INJECTION SUBCUTANEOUS at 20:27

## 2021-01-25 RX ADMIN — PANTOPRAZOLE SODIUM 40 MG: 40 TABLET, DELAYED RELEASE ORAL at 05:26

## 2021-01-25 RX ADMIN — SODIUM CHLORIDE, PRESERVATIVE FREE 10 ML: 5 INJECTION INTRAVENOUS at 08:07

## 2021-01-25 RX ADMIN — SODIUM CHLORIDE, PRESERVATIVE FREE 10 ML: 5 INJECTION INTRAVENOUS at 20:28

## 2021-01-25 RX ADMIN — GUAIFENESIN 600 MG: 600 TABLET, EXTENDED RELEASE ORAL at 20:26

## 2021-01-25 RX ADMIN — LORAZEPAM 0.5 MG: 0.5 TABLET ORAL at 20:33

## 2021-01-25 RX ADMIN — IPRATROPIUM BROMIDE AND ALBUTEROL SULFATE 3 ML: .5; 3 SOLUTION RESPIRATORY (INHALATION) at 13:16

## 2021-01-25 RX ADMIN — LOSARTAN POTASSIUM 25 MG: 25 TABLET, FILM COATED ORAL at 20:33

## 2021-01-25 RX ADMIN — VENLAFAXINE 75 MG: 75 TABLET ORAL at 20:27

## 2021-01-25 RX ADMIN — IPRATROPIUM BROMIDE AND ALBUTEROL SULFATE 3 ML: .5; 3 SOLUTION RESPIRATORY (INHALATION) at 07:03

## 2021-01-25 RX ADMIN — LORAZEPAM 0.5 MG: 0.5 TABLET ORAL at 09:17

## 2021-01-26 ENCOUNTER — APPOINTMENT (OUTPATIENT)
Dept: GENERAL RADIOLOGY | Facility: HOSPITAL | Age: 76
End: 2021-01-26

## 2021-01-26 LAB
M PNEUMO IGG SER IA-ACNC: <100 U/ML (ref 0–99)
M PNEUMO IGM SER IA-ACNC: <770 U/ML (ref 0–769)

## 2021-01-26 PROCEDURE — 94660 CPAP INITIATION&MGMT: CPT

## 2021-01-26 PROCEDURE — 97166 OT EVAL MOD COMPLEX 45 MIN: CPT

## 2021-01-26 PROCEDURE — 94668 MNPJ CHEST WALL SBSQ: CPT

## 2021-01-26 PROCEDURE — 99232 SBSQ HOSP IP/OBS MODERATE 35: CPT | Performed by: INTERNAL MEDICINE

## 2021-01-26 PROCEDURE — 25010000002 METHYLPREDNISOLONE PER 40 MG: Performed by: FAMILY MEDICINE

## 2021-01-26 PROCEDURE — 25010000002 ONDANSETRON PER 1 MG: Performed by: FAMILY MEDICINE

## 2021-01-26 PROCEDURE — 25010000002 ENOXAPARIN PER 10 MG: Performed by: FAMILY MEDICINE

## 2021-01-26 PROCEDURE — 94799 UNLISTED PULMONARY SVC/PX: CPT

## 2021-01-26 PROCEDURE — 87070 CULTURE OTHR SPECIMN AEROBIC: CPT | Performed by: FAMILY MEDICINE

## 2021-01-26 PROCEDURE — 87077 CULTURE AEROBIC IDENTIFY: CPT | Performed by: FAMILY MEDICINE

## 2021-01-26 PROCEDURE — 94669 MECHANICAL CHEST WALL OSCILL: CPT

## 2021-01-26 PROCEDURE — 87205 SMEAR GRAM STAIN: CPT | Performed by: FAMILY MEDICINE

## 2021-01-26 PROCEDURE — 99233 SBSQ HOSP IP/OBS HIGH 50: CPT | Performed by: INTERNAL MEDICINE

## 2021-01-26 PROCEDURE — 87186 SC STD MICRODIL/AGAR DIL: CPT | Performed by: FAMILY MEDICINE

## 2021-01-26 PROCEDURE — 97116 GAIT TRAINING THERAPY: CPT

## 2021-01-26 PROCEDURE — 71045 X-RAY EXAM CHEST 1 VIEW: CPT

## 2021-01-26 RX ORDER — ACETYLCYSTEINE 200 MG/ML
1.5 SOLUTION ORAL; RESPIRATORY (INHALATION)
Status: DISPENSED | OUTPATIENT
Start: 2021-01-26 | End: 2021-01-29

## 2021-01-26 RX ORDER — AMLODIPINE BESYLATE 5 MG/1
5 TABLET ORAL
Status: DISCONTINUED | OUTPATIENT
Start: 2021-01-26 | End: 2021-02-02 | Stop reason: HOSPADM

## 2021-01-26 RX ORDER — BUDESONIDE 0.5 MG/2ML
1 INHALANT ORAL
Status: DISCONTINUED | OUTPATIENT
Start: 2021-01-26 | End: 2021-02-02 | Stop reason: HOSPADM

## 2021-01-26 RX ADMIN — IPRATROPIUM BROMIDE AND ALBUTEROL SULFATE 3 ML: .5; 3 SOLUTION RESPIRATORY (INHALATION) at 07:04

## 2021-01-26 RX ADMIN — IPRATROPIUM BROMIDE AND ALBUTEROL SULFATE 3 ML: .5; 3 SOLUTION RESPIRATORY (INHALATION) at 19:11

## 2021-01-26 RX ADMIN — VENLAFAXINE 75 MG: 75 TABLET ORAL at 10:26

## 2021-01-26 RX ADMIN — AMLODIPINE BESYLATE 5 MG: 5 TABLET ORAL at 10:25

## 2021-01-26 RX ADMIN — METHYLPREDNISOLONE SODIUM SUCCINATE 40 MG: 40 INJECTION, POWDER, FOR SOLUTION INTRAMUSCULAR; INTRAVENOUS at 15:26

## 2021-01-26 RX ADMIN — IPRATROPIUM BROMIDE AND ALBUTEROL SULFATE 3 ML: .5; 3 SOLUTION RESPIRATORY (INHALATION) at 01:22

## 2021-01-26 RX ADMIN — GABAPENTIN 100 MG: 100 CAPSULE ORAL at 15:26

## 2021-01-26 RX ADMIN — GABAPENTIN 100 MG: 100 CAPSULE ORAL at 22:04

## 2021-01-26 RX ADMIN — IPRATROPIUM BROMIDE 0.5 MG: 0.5 SOLUTION RESPIRATORY (INHALATION) at 09:04

## 2021-01-26 RX ADMIN — HYDROCODONE BITARTRATE AND ACETAMINOPHEN 1 TABLET: 10; 325 TABLET ORAL at 22:05

## 2021-01-26 RX ADMIN — AMOXICILLIN AND CLAVULANATE POTASSIUM 1 TABLET: 875; 125 TABLET, FILM COATED ORAL at 22:05

## 2021-01-26 RX ADMIN — DICLOFENAC 2 G: 10 GEL TOPICAL at 10:27

## 2021-01-26 RX ADMIN — IPRATROPIUM BROMIDE 0.5 MG: 0.5 SOLUTION RESPIRATORY (INHALATION) at 12:31

## 2021-01-26 RX ADMIN — AMOXICILLIN AND CLAVULANATE POTASSIUM 1 TABLET: 875; 125 TABLET, FILM COATED ORAL at 10:26

## 2021-01-26 RX ADMIN — LORAZEPAM 0.5 MG: 0.5 TABLET ORAL at 10:25

## 2021-01-26 RX ADMIN — ENOXAPARIN SODIUM 30 MG: 30 INJECTION SUBCUTANEOUS at 21:43

## 2021-01-26 RX ADMIN — VENLAFAXINE 75 MG: 75 TABLET ORAL at 22:05

## 2021-01-26 RX ADMIN — BUDESONIDE 0.5 MG: 0.5 INHALANT RESPIRATORY (INHALATION) at 07:04

## 2021-01-26 RX ADMIN — METHYLPREDNISOLONE SODIUM SUCCINATE 40 MG: 40 INJECTION, POWDER, FOR SOLUTION INTRAMUSCULAR; INTRAVENOUS at 21:43

## 2021-01-26 RX ADMIN — GABAPENTIN 100 MG: 100 CAPSULE ORAL at 10:25

## 2021-01-26 RX ADMIN — SODIUM CHLORIDE, PRESERVATIVE FREE 10 ML: 5 INJECTION INTRAVENOUS at 10:42

## 2021-01-26 RX ADMIN — PANTOPRAZOLE SODIUM 40 MG: 40 TABLET, DELAYED RELEASE ORAL at 05:32

## 2021-01-26 RX ADMIN — GUAIFENESIN 600 MG: 600 TABLET, EXTENDED RELEASE ORAL at 10:27

## 2021-01-26 RX ADMIN — METHYLPREDNISOLONE SODIUM SUCCINATE 40 MG: 40 INJECTION, POWDER, FOR SOLUTION INTRAMUSCULAR; INTRAVENOUS at 05:31

## 2021-01-26 RX ADMIN — LORAZEPAM 0.5 MG: 0.5 TABLET ORAL at 22:04

## 2021-01-26 RX ADMIN — LOSARTAN POTASSIUM 25 MG: 25 TABLET, FILM COATED ORAL at 22:05

## 2021-01-26 RX ADMIN — ONDANSETRON 4 MG: 2 INJECTION INTRAMUSCULAR; INTRAVENOUS at 14:46

## 2021-01-26 RX ADMIN — SODIUM CHLORIDE, PRESERVATIVE FREE 10 ML: 5 INJECTION INTRAVENOUS at 21:43

## 2021-01-26 RX ADMIN — ONDANSETRON 4 MG: 2 INJECTION INTRAMUSCULAR; INTRAVENOUS at 21:43

## 2021-01-26 RX ADMIN — TRAZODONE HYDROCHLORIDE 25 MG: 50 TABLET ORAL at 22:05

## 2021-01-26 RX ADMIN — SODIUM CHLORIDE, PRESERVATIVE FREE 10 ML: 5 INJECTION INTRAVENOUS at 10:29

## 2021-01-26 RX ADMIN — SODIUM CHLORIDE, PRESERVATIVE FREE 10 ML: 5 INJECTION INTRAVENOUS at 05:32

## 2021-01-26 RX ADMIN — CYANOCOBALAMIN TAB 500 MCG 500 MCG: 500 TAB at 10:26

## 2021-01-26 RX ADMIN — IPRATROPIUM BROMIDE AND ALBUTEROL SULFATE 3 ML: .5; 3 SOLUTION RESPIRATORY (INHALATION) at 12:32

## 2021-01-26 RX ADMIN — METOPROLOL SUCCINATE 25 MG: 25 TABLET, EXTENDED RELEASE ORAL at 22:05

## 2021-01-26 RX ADMIN — GUAIFENESIN 600 MG: 600 TABLET, EXTENDED RELEASE ORAL at 22:06

## 2021-01-26 RX ADMIN — BUDESONIDE 1 MG: 0.5 INHALANT RESPIRATORY (INHALATION) at 19:11

## 2021-01-27 ENCOUNTER — APPOINTMENT (OUTPATIENT)
Dept: GENERAL RADIOLOGY | Facility: HOSPITAL | Age: 76
End: 2021-01-27

## 2021-01-27 LAB
BACTERIA SPEC AEROBE CULT: NORMAL
BACTERIA SPEC AEROBE CULT: NORMAL

## 2021-01-27 PROCEDURE — 94799 UNLISTED PULMONARY SVC/PX: CPT

## 2021-01-27 PROCEDURE — 94660 CPAP INITIATION&MGMT: CPT

## 2021-01-27 PROCEDURE — 99232 SBSQ HOSP IP/OBS MODERATE 35: CPT | Performed by: INTERNAL MEDICINE

## 2021-01-27 PROCEDURE — 71045 X-RAY EXAM CHEST 1 VIEW: CPT

## 2021-01-27 PROCEDURE — 25010000002 ENOXAPARIN PER 10 MG: Performed by: FAMILY MEDICINE

## 2021-01-27 PROCEDURE — 25010000002 METHYLPREDNISOLONE PER 40 MG: Performed by: FAMILY MEDICINE

## 2021-01-27 PROCEDURE — 97535 SELF CARE MNGMENT TRAINING: CPT

## 2021-01-27 PROCEDURE — 94669 MECHANICAL CHEST WALL OSCILL: CPT

## 2021-01-27 RX ADMIN — GUAIFENESIN 600 MG: 600 TABLET, EXTENDED RELEASE ORAL at 08:24

## 2021-01-27 RX ADMIN — BUDESONIDE 1 MG: 0.5 INHALANT RESPIRATORY (INHALATION) at 19:24

## 2021-01-27 RX ADMIN — LORAZEPAM 0.5 MG: 0.5 TABLET ORAL at 15:21

## 2021-01-27 RX ADMIN — AMLODIPINE BESYLATE 5 MG: 5 TABLET ORAL at 08:25

## 2021-01-27 RX ADMIN — METHYLPREDNISOLONE SODIUM SUCCINATE 40 MG: 40 INJECTION, POWDER, FOR SOLUTION INTRAMUSCULAR; INTRAVENOUS at 15:21

## 2021-01-27 RX ADMIN — LOSARTAN POTASSIUM 25 MG: 25 TABLET, FILM COATED ORAL at 21:14

## 2021-01-27 RX ADMIN — ACETYLCYSTEINE 1.5 ML: 200 SOLUTION ORAL; RESPIRATORY (INHALATION) at 00:21

## 2021-01-27 RX ADMIN — METHYLPREDNISOLONE SODIUM SUCCINATE 40 MG: 40 INJECTION, POWDER, FOR SOLUTION INTRAMUSCULAR; INTRAVENOUS at 21:16

## 2021-01-27 RX ADMIN — VENLAFAXINE 75 MG: 75 TABLET ORAL at 08:25

## 2021-01-27 RX ADMIN — ACETYLCYSTEINE 1.5 ML: 200 SOLUTION ORAL; RESPIRATORY (INHALATION) at 12:32

## 2021-01-27 RX ADMIN — IPRATROPIUM BROMIDE AND ALBUTEROL SULFATE 3 ML: .5; 3 SOLUTION RESPIRATORY (INHALATION) at 06:47

## 2021-01-27 RX ADMIN — Medication 10 MG: at 21:14

## 2021-01-27 RX ADMIN — METOPROLOL SUCCINATE 25 MG: 25 TABLET, EXTENDED RELEASE ORAL at 21:14

## 2021-01-27 RX ADMIN — TRAZODONE HYDROCHLORIDE 25 MG: 50 TABLET ORAL at 21:13

## 2021-01-27 RX ADMIN — CYANOCOBALAMIN TAB 500 MCG 500 MCG: 500 TAB at 08:25

## 2021-01-27 RX ADMIN — DICLOFENAC 2 G: 10 GEL TOPICAL at 08:26

## 2021-01-27 RX ADMIN — METHYLPREDNISOLONE SODIUM SUCCINATE 40 MG: 40 INJECTION, POWDER, FOR SOLUTION INTRAMUSCULAR; INTRAVENOUS at 06:06

## 2021-01-27 RX ADMIN — AMOXICILLIN AND CLAVULANATE POTASSIUM 1 TABLET: 875; 125 TABLET, FILM COATED ORAL at 21:14

## 2021-01-27 RX ADMIN — VENLAFAXINE 75 MG: 75 TABLET ORAL at 21:14

## 2021-01-27 RX ADMIN — GABAPENTIN 100 MG: 100 CAPSULE ORAL at 08:24

## 2021-01-27 RX ADMIN — ACETYLCYSTEINE 1.5 ML: 200 SOLUTION ORAL; RESPIRATORY (INHALATION) at 19:23

## 2021-01-27 RX ADMIN — GUAIFENESIN 600 MG: 600 TABLET, EXTENDED RELEASE ORAL at 21:14

## 2021-01-27 RX ADMIN — BUDESONIDE 1 MG: 0.5 INHALANT RESPIRATORY (INHALATION) at 06:47

## 2021-01-27 RX ADMIN — SODIUM CHLORIDE, PRESERVATIVE FREE 10 ML: 5 INJECTION INTRAVENOUS at 08:26

## 2021-01-27 RX ADMIN — GABAPENTIN 100 MG: 100 CAPSULE ORAL at 15:21

## 2021-01-27 RX ADMIN — IPRATROPIUM BROMIDE AND ALBUTEROL SULFATE 3 ML: .5; 3 SOLUTION RESPIRATORY (INHALATION) at 19:24

## 2021-01-27 RX ADMIN — AMOXICILLIN AND CLAVULANATE POTASSIUM 1 TABLET: 875; 125 TABLET, FILM COATED ORAL at 08:25

## 2021-01-27 RX ADMIN — GABAPENTIN 100 MG: 100 CAPSULE ORAL at 21:14

## 2021-01-27 RX ADMIN — IPRATROPIUM BROMIDE AND ALBUTEROL SULFATE 3 ML: .5; 3 SOLUTION RESPIRATORY (INHALATION) at 12:32

## 2021-01-27 RX ADMIN — SODIUM CHLORIDE, PRESERVATIVE FREE 10 ML: 5 INJECTION INTRAVENOUS at 21:17

## 2021-01-27 RX ADMIN — ENOXAPARIN SODIUM 30 MG: 30 INJECTION SUBCUTANEOUS at 21:22

## 2021-01-27 RX ADMIN — PANTOPRAZOLE SODIUM 40 MG: 40 TABLET, DELAYED RELEASE ORAL at 06:06

## 2021-01-27 RX ADMIN — HYDROCODONE BITARTRATE AND ACETAMINOPHEN 1 TABLET: 10; 325 TABLET ORAL at 21:14

## 2021-01-27 RX ADMIN — IPRATROPIUM BROMIDE AND ALBUTEROL SULFATE 3 ML: .5; 3 SOLUTION RESPIRATORY (INHALATION) at 00:20

## 2021-01-27 RX ADMIN — ACETYLCYSTEINE 1.5 ML: 200 SOLUTION ORAL; RESPIRATORY (INHALATION) at 06:47

## 2021-01-27 RX ADMIN — DICLOFENAC 2 G: 10 GEL TOPICAL at 21:16

## 2021-01-28 LAB
ALBUMIN SERPL-MCNC: 4.3 G/DL (ref 3.5–5.2)
ALBUMIN/GLOB SERPL: 1.9 G/DL
ALP SERPL-CCNC: 108 U/L (ref 39–117)
ALT SERPL W P-5'-P-CCNC: 29 U/L (ref 1–33)
ANION GAP SERPL CALCULATED.3IONS-SCNC: 3.6 MMOL/L (ref 5–15)
AST SERPL-CCNC: 22 U/L (ref 1–32)
BILIRUB SERPL-MCNC: 0.3 MG/DL (ref 0–1.2)
BUN SERPL-MCNC: 22 MG/DL (ref 8–23)
BUN/CREAT SERPL: 43.1 (ref 7–25)
CALCIUM SPEC-SCNC: 9.4 MG/DL (ref 8.6–10.5)
CHLORIDE SERPL-SCNC: 92 MMOL/L (ref 98–107)
CO2 SERPL-SCNC: 40.4 MMOL/L (ref 22–29)
CREAT SERPL-MCNC: 0.51 MG/DL (ref 0.57–1)
DEPRECATED RDW RBC AUTO: 57 FL (ref 37–54)
ERYTHROCYTE [DISTWIDTH] IN BLOOD BY AUTOMATED COUNT: 15.2 % (ref 12.3–15.4)
GFR SERPL CREATININE-BSD FRML MDRD: 118 ML/MIN/1.73
GLOBULIN UR ELPH-MCNC: 2.3 GM/DL
GLUCOSE SERPL-MCNC: 98 MG/DL (ref 65–99)
HCT VFR BLD AUTO: 41.6 % (ref 34–46.6)
HGB BLD-MCNC: 13.2 G/DL (ref 12–15.9)
MCH RBC QN AUTO: 32 PG (ref 26.6–33)
MCHC RBC AUTO-ENTMCNC: 31.7 G/DL (ref 31.5–35.7)
MCV RBC AUTO: 101 FL (ref 79–97)
PLATELET # BLD AUTO: 340 10*3/MM3 (ref 140–450)
PMV BLD AUTO: 8.7 FL (ref 6–12)
POTASSIUM SERPL-SCNC: 4.9 MMOL/L (ref 3.5–5.2)
PROCALCITONIN SERPL-MCNC: 0.03 NG/ML (ref 0–0.25)
PROT SERPL-MCNC: 6.6 G/DL (ref 6–8.5)
RBC # BLD AUTO: 4.12 10*6/MM3 (ref 3.77–5.28)
SODIUM SERPL-SCNC: 136 MMOL/L (ref 136–145)
WBC # BLD AUTO: 12.05 10*3/MM3 (ref 3.4–10.8)

## 2021-01-28 PROCEDURE — 97110 THERAPEUTIC EXERCISES: CPT

## 2021-01-28 PROCEDURE — 99232 SBSQ HOSP IP/OBS MODERATE 35: CPT | Performed by: INTERNAL MEDICINE

## 2021-01-28 PROCEDURE — 94669 MECHANICAL CHEST WALL OSCILL: CPT

## 2021-01-28 PROCEDURE — 25010000002 ENOXAPARIN PER 10 MG: Performed by: FAMILY MEDICINE

## 2021-01-28 PROCEDURE — 99233 SBSQ HOSP IP/OBS HIGH 50: CPT | Performed by: INTERNAL MEDICINE

## 2021-01-28 PROCEDURE — 94799 UNLISTED PULMONARY SVC/PX: CPT

## 2021-01-28 PROCEDURE — 97116 GAIT TRAINING THERAPY: CPT

## 2021-01-28 PROCEDURE — 94660 CPAP INITIATION&MGMT: CPT

## 2021-01-28 PROCEDURE — 85027 COMPLETE CBC AUTOMATED: CPT | Performed by: INTERNAL MEDICINE

## 2021-01-28 PROCEDURE — 84145 PROCALCITONIN (PCT): CPT | Performed by: INTERNAL MEDICINE

## 2021-01-28 PROCEDURE — 25010000002 LEVOFLOXACIN PER 250 MG: Performed by: INTERNAL MEDICINE

## 2021-01-28 PROCEDURE — 25010000002 METHYLPREDNISOLONE PER 40 MG: Performed by: FAMILY MEDICINE

## 2021-01-28 PROCEDURE — 80053 COMPREHEN METABOLIC PANEL: CPT | Performed by: INTERNAL MEDICINE

## 2021-01-28 RX ORDER — POTASSIUM CHLORIDE 20 MEQ/1
20 TABLET, EXTENDED RELEASE ORAL DAILY
Qty: 10 TABLET | Refills: 0 | OUTPATIENT
Start: 2021-01-28

## 2021-01-28 RX ORDER — CARBOXYMETHYLCELLULOSE SODIUM 5 MG/ML
2 SOLUTION/ DROPS OPHTHALMIC 3 TIMES DAILY PRN
Status: DISCONTINUED | OUTPATIENT
Start: 2021-01-28 | End: 2021-02-02 | Stop reason: HOSPADM

## 2021-01-28 RX ORDER — LEVOFLOXACIN 5 MG/ML
500 INJECTION, SOLUTION INTRAVENOUS ONCE
Status: DISCONTINUED | OUTPATIENT
Start: 2021-01-28 | End: 2021-01-28

## 2021-01-28 RX ORDER — FUROSEMIDE 20 MG/1
20 TABLET ORAL DAILY
Qty: 10 TABLET | Refills: 0 | OUTPATIENT
Start: 2021-01-28

## 2021-01-28 RX ORDER — METHYLPREDNISOLONE SODIUM SUCCINATE 40 MG/ML
40 INJECTION, POWDER, LYOPHILIZED, FOR SOLUTION INTRAMUSCULAR; INTRAVENOUS EVERY 24 HOURS
Status: DISCONTINUED | OUTPATIENT
Start: 2021-01-29 | End: 2021-01-29

## 2021-01-28 RX ORDER — LEVOFLOXACIN 5 MG/ML
750 INJECTION, SOLUTION INTRAVENOUS
Status: DISCONTINUED | OUTPATIENT
Start: 2021-01-28 | End: 2021-01-29

## 2021-01-28 RX ADMIN — HYDROCODONE BITARTRATE AND ACETAMINOPHEN 1 TABLET: 10; 325 TABLET ORAL at 14:10

## 2021-01-28 RX ADMIN — SODIUM CHLORIDE, PRESERVATIVE FREE 10 ML: 5 INJECTION INTRAVENOUS at 20:29

## 2021-01-28 RX ADMIN — BUDESONIDE 1 MG: 0.5 INHALANT RESPIRATORY (INHALATION) at 18:52

## 2021-01-28 RX ADMIN — PANTOPRAZOLE SODIUM 40 MG: 40 TABLET, DELAYED RELEASE ORAL at 05:56

## 2021-01-28 RX ADMIN — ACETYLCYSTEINE 1.5 ML: 200 SOLUTION ORAL; RESPIRATORY (INHALATION) at 01:06

## 2021-01-28 RX ADMIN — LOSARTAN POTASSIUM 25 MG: 25 TABLET, FILM COATED ORAL at 20:26

## 2021-01-28 RX ADMIN — LORAZEPAM 0.5 MG: 0.5 TABLET ORAL at 05:56

## 2021-01-28 RX ADMIN — IPRATROPIUM BROMIDE AND ALBUTEROL SULFATE 3 ML: .5; 3 SOLUTION RESPIRATORY (INHALATION) at 01:06

## 2021-01-28 RX ADMIN — IPRATROPIUM BROMIDE AND ALBUTEROL SULFATE 3 ML: .5; 3 SOLUTION RESPIRATORY (INHALATION) at 06:44

## 2021-01-28 RX ADMIN — ACETYLCYSTEINE 1.5 ML: 200 SOLUTION ORAL; RESPIRATORY (INHALATION) at 18:52

## 2021-01-28 RX ADMIN — VENLAFAXINE 75 MG: 75 TABLET ORAL at 20:26

## 2021-01-28 RX ADMIN — GABAPENTIN 100 MG: 100 CAPSULE ORAL at 20:26

## 2021-01-28 RX ADMIN — GUAIFENESIN 600 MG: 600 TABLET, EXTENDED RELEASE ORAL at 20:26

## 2021-01-28 RX ADMIN — AMOXICILLIN AND CLAVULANATE POTASSIUM 1 TABLET: 875; 125 TABLET, FILM COATED ORAL at 09:08

## 2021-01-28 RX ADMIN — AMLODIPINE BESYLATE 5 MG: 5 TABLET ORAL at 09:08

## 2021-01-28 RX ADMIN — GABAPENTIN 100 MG: 100 CAPSULE ORAL at 16:02

## 2021-01-28 RX ADMIN — HYDROCODONE BITARTRATE AND ACETAMINOPHEN 1 TABLET: 10; 325 TABLET ORAL at 05:56

## 2021-01-28 RX ADMIN — GUAIFENESIN 600 MG: 600 TABLET, EXTENDED RELEASE ORAL at 09:08

## 2021-01-28 RX ADMIN — LEVOFLOXACIN 750 MG: 5 INJECTION, SOLUTION INTRAVENOUS at 14:11

## 2021-01-28 RX ADMIN — CARBOXYMETHYLCELLULOSE SODIUM 2 DROP: 5 SOLUTION/ DROPS OPHTHALMIC at 12:45

## 2021-01-28 RX ADMIN — SODIUM CHLORIDE, PRESERVATIVE FREE 10 ML: 5 INJECTION INTRAVENOUS at 09:09

## 2021-01-28 RX ADMIN — ACETYLCYSTEINE 1.5 ML: 200 SOLUTION ORAL; RESPIRATORY (INHALATION) at 06:45

## 2021-01-28 RX ADMIN — Medication 10 MG: at 20:26

## 2021-01-28 RX ADMIN — LORAZEPAM 0.5 MG: 0.5 TABLET ORAL at 20:26

## 2021-01-28 RX ADMIN — HYDROCODONE BITARTRATE AND ACETAMINOPHEN 1 TABLET: 10; 325 TABLET ORAL at 21:30

## 2021-01-28 RX ADMIN — TRAZODONE HYDROCHLORIDE 25 MG: 50 TABLET ORAL at 20:24

## 2021-01-28 RX ADMIN — BUDESONIDE 1 MG: 0.5 INHALANT RESPIRATORY (INHALATION) at 06:45

## 2021-01-28 RX ADMIN — DICLOFENAC 2 G: 10 GEL TOPICAL at 20:29

## 2021-01-28 RX ADMIN — CYANOCOBALAMIN TAB 500 MCG 500 MCG: 500 TAB at 09:08

## 2021-01-28 RX ADMIN — METOPROLOL SUCCINATE 25 MG: 25 TABLET, EXTENDED RELEASE ORAL at 20:24

## 2021-01-28 RX ADMIN — GABAPENTIN 100 MG: 100 CAPSULE ORAL at 09:08

## 2021-01-28 RX ADMIN — VENLAFAXINE 75 MG: 75 TABLET ORAL at 09:08

## 2021-01-28 RX ADMIN — ENOXAPARIN SODIUM 30 MG: 30 INJECTION SUBCUTANEOUS at 20:28

## 2021-01-28 RX ADMIN — IPRATROPIUM BROMIDE AND ALBUTEROL SULFATE 3 ML: .5; 3 SOLUTION RESPIRATORY (INHALATION) at 18:52

## 2021-01-28 RX ADMIN — METHYLPREDNISOLONE SODIUM SUCCINATE 40 MG: 40 INJECTION, POWDER, FOR SOLUTION INTRAMUSCULAR; INTRAVENOUS at 05:56

## 2021-01-29 ENCOUNTER — APPOINTMENT (OUTPATIENT)
Dept: GENERAL RADIOLOGY | Facility: HOSPITAL | Age: 76
End: 2021-01-29

## 2021-01-29 LAB
BACTERIA SPEC RESP CULT: ABNORMAL
BACTERIA SPEC RESP CULT: ABNORMAL
GRAM STN SPEC: ABNORMAL

## 2021-01-29 PROCEDURE — 94660 CPAP INITIATION&MGMT: CPT

## 2021-01-29 PROCEDURE — 25010000002 ENOXAPARIN PER 10 MG: Performed by: FAMILY MEDICINE

## 2021-01-29 PROCEDURE — 97110 THERAPEUTIC EXERCISES: CPT

## 2021-01-29 PROCEDURE — 99232 SBSQ HOSP IP/OBS MODERATE 35: CPT | Performed by: INTERNAL MEDICINE

## 2021-01-29 PROCEDURE — 63710000001 PREDNISONE PER 1 MG: Performed by: INTERNAL MEDICINE

## 2021-01-29 PROCEDURE — 94799 UNLISTED PULMONARY SVC/PX: CPT

## 2021-01-29 PROCEDURE — 25010000002 ONDANSETRON PER 1 MG: Performed by: FAMILY MEDICINE

## 2021-01-29 PROCEDURE — 25010000002 METHYLPREDNISOLONE PER 40 MG: Performed by: INTERNAL MEDICINE

## 2021-01-29 PROCEDURE — 97530 THERAPEUTIC ACTIVITIES: CPT

## 2021-01-29 PROCEDURE — 25010000002 CEFEPIME PER 500 MG: Performed by: INTERNAL MEDICINE

## 2021-01-29 PROCEDURE — 94669 MECHANICAL CHEST WALL OSCILL: CPT

## 2021-01-29 RX ORDER — PREDNISONE 20 MG/1
40 TABLET ORAL
Status: DISCONTINUED | OUTPATIENT
Start: 2021-01-29 | End: 2021-02-02 | Stop reason: HOSPADM

## 2021-01-29 RX ORDER — CEFEPIME HYDROCHLORIDE 2 G/50ML
2 INJECTION, SOLUTION INTRAVENOUS ONCE
Status: DISCONTINUED | OUTPATIENT
Start: 2021-01-29 | End: 2021-01-29

## 2021-01-29 RX ORDER — L.ACID,PARA/B.BIFIDUM/S.THERM 8B CELL
1 CAPSULE ORAL 2 TIMES DAILY
Status: DISCONTINUED | OUTPATIENT
Start: 2021-01-29 | End: 2021-02-02 | Stop reason: HOSPADM

## 2021-01-29 RX ORDER — CEFEPIME HYDROCHLORIDE 2 G/50ML
2 INJECTION, SOLUTION INTRAVENOUS EVERY 12 HOURS
Status: DISCONTINUED | OUTPATIENT
Start: 2021-01-30 | End: 2021-01-29

## 2021-01-29 RX ADMIN — SODIUM CHLORIDE, PRESERVATIVE FREE 10 ML: 5 INJECTION INTRAVENOUS at 08:32

## 2021-01-29 RX ADMIN — IPRATROPIUM BROMIDE AND ALBUTEROL SULFATE 3 ML: .5; 3 SOLUTION RESPIRATORY (INHALATION) at 13:43

## 2021-01-29 RX ADMIN — METHYLPREDNISOLONE SODIUM SUCCINATE 40 MG: 40 INJECTION, POWDER, FOR SOLUTION INTRAMUSCULAR; INTRAVENOUS at 06:18

## 2021-01-29 RX ADMIN — CYANOCOBALAMIN TAB 500 MCG 500 MCG: 500 TAB at 08:32

## 2021-01-29 RX ADMIN — ACETYLCYSTEINE 1.5 ML: 200 SOLUTION ORAL; RESPIRATORY (INHALATION) at 01:14

## 2021-01-29 RX ADMIN — IPRATROPIUM BROMIDE AND ALBUTEROL SULFATE 3 ML: .5; 3 SOLUTION RESPIRATORY (INHALATION) at 19:07

## 2021-01-29 RX ADMIN — GUAIFENESIN 600 MG: 600 TABLET, EXTENDED RELEASE ORAL at 21:47

## 2021-01-29 RX ADMIN — METOPROLOL SUCCINATE 25 MG: 25 TABLET, EXTENDED RELEASE ORAL at 21:46

## 2021-01-29 RX ADMIN — GABAPENTIN 100 MG: 100 CAPSULE ORAL at 08:32

## 2021-01-29 RX ADMIN — GUAIFENESIN 600 MG: 600 TABLET, EXTENDED RELEASE ORAL at 08:32

## 2021-01-29 RX ADMIN — PREDNISONE 40 MG: 20 TABLET ORAL at 10:37

## 2021-01-29 RX ADMIN — AMLODIPINE BESYLATE 5 MG: 5 TABLET ORAL at 08:37

## 2021-01-29 RX ADMIN — Medication 10 MG: at 21:47

## 2021-01-29 RX ADMIN — HYDROCODONE BITARTRATE AND ACETAMINOPHEN 1 TABLET: 10; 325 TABLET ORAL at 21:47

## 2021-01-29 RX ADMIN — IPRATROPIUM BROMIDE AND ALBUTEROL SULFATE 3 ML: .5; 3 SOLUTION RESPIRATORY (INHALATION) at 07:00

## 2021-01-29 RX ADMIN — BUDESONIDE 1 MG: 0.5 INHALANT RESPIRATORY (INHALATION) at 19:07

## 2021-01-29 RX ADMIN — DICLOFENAC 2 G: 10 GEL TOPICAL at 21:54

## 2021-01-29 RX ADMIN — LOSARTAN POTASSIUM 25 MG: 25 TABLET, FILM COATED ORAL at 21:47

## 2021-01-29 RX ADMIN — LORAZEPAM 0.5 MG: 0.5 TABLET ORAL at 08:31

## 2021-01-29 RX ADMIN — BUDESONIDE 1 MG: 0.5 INHALANT RESPIRATORY (INHALATION) at 07:01

## 2021-01-29 RX ADMIN — GABAPENTIN 100 MG: 100 CAPSULE ORAL at 15:25

## 2021-01-29 RX ADMIN — IPRATROPIUM BROMIDE AND ALBUTEROL SULFATE 3 ML: .5; 3 SOLUTION RESPIRATORY (INHALATION) at 01:14

## 2021-01-29 RX ADMIN — GABAPENTIN 100 MG: 100 CAPSULE ORAL at 21:47

## 2021-01-29 RX ADMIN — VENLAFAXINE 75 MG: 75 TABLET ORAL at 21:47

## 2021-01-29 RX ADMIN — ENOXAPARIN SODIUM 30 MG: 30 INJECTION SUBCUTANEOUS at 21:46

## 2021-01-29 RX ADMIN — SODIUM CHLORIDE, PRESERVATIVE FREE 10 ML: 5 INJECTION INTRAVENOUS at 21:46

## 2021-01-29 RX ADMIN — CARBOXYMETHYLCELLULOSE SODIUM 2 DROP: 5 SOLUTION/ DROPS OPHTHALMIC at 08:31

## 2021-01-29 RX ADMIN — Medication 1 CAPSULE: at 21:47

## 2021-01-29 RX ADMIN — ONDANSETRON 4 MG: 2 INJECTION INTRAMUSCULAR; INTRAVENOUS at 16:46

## 2021-01-29 RX ADMIN — ACETYLCYSTEINE 1.5 ML: 200 SOLUTION ORAL; RESPIRATORY (INHALATION) at 13:43

## 2021-01-29 RX ADMIN — VENLAFAXINE 75 MG: 75 TABLET ORAL at 08:32

## 2021-01-29 RX ADMIN — CEFEPIME 2 G: 1 INJECTION, POWDER, FOR SOLUTION INTRAMUSCULAR; INTRAVENOUS at 13:20

## 2021-01-29 RX ADMIN — ACETYLCYSTEINE 1.5 ML: 200 SOLUTION ORAL; RESPIRATORY (INHALATION) at 07:00

## 2021-01-29 RX ADMIN — HYDROCODONE BITARTRATE AND ACETAMINOPHEN 1 TABLET: 10; 325 TABLET ORAL at 06:17

## 2021-01-29 RX ADMIN — PANTOPRAZOLE SODIUM 40 MG: 40 TABLET, DELAYED RELEASE ORAL at 06:17

## 2021-01-29 RX ADMIN — TRAZODONE HYDROCHLORIDE 25 MG: 50 TABLET ORAL at 21:47

## 2021-01-29 RX ADMIN — LORAZEPAM 0.5 MG: 0.5 TABLET ORAL at 21:47

## 2021-01-29 RX ADMIN — Medication 1 CAPSULE: at 08:37

## 2021-01-30 PROCEDURE — 05HY33Z INSERTION OF INFUSION DEVICE INTO UPPER VEIN, PERCUTANEOUS APPROACH: ICD-10-PCS | Performed by: FAMILY MEDICINE

## 2021-01-30 PROCEDURE — C1751 CATH, INF, PER/CENT/MIDLINE: HCPCS

## 2021-01-30 PROCEDURE — 94669 MECHANICAL CHEST WALL OSCILL: CPT

## 2021-01-30 PROCEDURE — 94660 CPAP INITIATION&MGMT: CPT

## 2021-01-30 PROCEDURE — 94799 UNLISTED PULMONARY SVC/PX: CPT

## 2021-01-30 PROCEDURE — 63710000001 PREDNISONE PER 1 MG: Performed by: INTERNAL MEDICINE

## 2021-01-30 PROCEDURE — 25010000002 ENOXAPARIN PER 10 MG: Performed by: FAMILY MEDICINE

## 2021-01-30 PROCEDURE — 99232 SBSQ HOSP IP/OBS MODERATE 35: CPT | Performed by: INTERNAL MEDICINE

## 2021-01-30 PROCEDURE — 25010000002 CEFEPIME PER 500 MG: Performed by: INTERNAL MEDICINE

## 2021-01-30 PROCEDURE — C1894 INTRO/SHEATH, NON-LASER: HCPCS

## 2021-01-30 RX ORDER — SODIUM CHLORIDE 0.9 % (FLUSH) 0.9 %
20 SYRINGE (ML) INJECTION AS NEEDED
Status: DISCONTINUED | OUTPATIENT
Start: 2021-01-30 | End: 2021-02-02 | Stop reason: HOSPADM

## 2021-01-30 RX ORDER — SODIUM CHLORIDE 0.9 % (FLUSH) 0.9 %
10 SYRINGE (ML) INJECTION EVERY 12 HOURS SCHEDULED
Status: DISCONTINUED | OUTPATIENT
Start: 2021-01-30 | End: 2021-02-02 | Stop reason: HOSPADM

## 2021-01-30 RX ORDER — SODIUM CHLORIDE 0.9 % (FLUSH) 0.9 %
10 SYRINGE (ML) INJECTION AS NEEDED
Status: DISCONTINUED | OUTPATIENT
Start: 2021-01-30 | End: 2021-02-02 | Stop reason: HOSPADM

## 2021-01-30 RX ADMIN — ENOXAPARIN SODIUM 30 MG: 30 INJECTION SUBCUTANEOUS at 21:03

## 2021-01-30 RX ADMIN — IPRATROPIUM BROMIDE AND ALBUTEROL SULFATE 3 ML: .5; 3 SOLUTION RESPIRATORY (INHALATION) at 06:32

## 2021-01-30 RX ADMIN — GUAIFENESIN 600 MG: 600 TABLET, EXTENDED RELEASE ORAL at 21:04

## 2021-01-30 RX ADMIN — SODIUM CHLORIDE, PRESERVATIVE FREE 10 ML: 5 INJECTION INTRAVENOUS at 12:23

## 2021-01-30 RX ADMIN — Medication 1 CAPSULE: at 08:43

## 2021-01-30 RX ADMIN — SODIUM CHLORIDE SOLN NEBU 3% 4 ML: 3 NEBU SOLN at 19:42

## 2021-01-30 RX ADMIN — LORAZEPAM 0.5 MG: 0.5 TABLET ORAL at 21:04

## 2021-01-30 RX ADMIN — BUDESONIDE 1 MG: 0.5 INHALANT RESPIRATORY (INHALATION) at 06:32

## 2021-01-30 RX ADMIN — SODIUM CHLORIDE, PRESERVATIVE FREE 10 ML: 5 INJECTION INTRAVENOUS at 08:43

## 2021-01-30 RX ADMIN — CEFEPIME 2 G: 1 INJECTION, POWDER, FOR SOLUTION INTRAMUSCULAR; INTRAVENOUS at 12:22

## 2021-01-30 RX ADMIN — VENLAFAXINE 75 MG: 75 TABLET ORAL at 08:43

## 2021-01-30 RX ADMIN — AMLODIPINE BESYLATE 5 MG: 5 TABLET ORAL at 08:43

## 2021-01-30 RX ADMIN — Medication 1 CAPSULE: at 21:03

## 2021-01-30 RX ADMIN — GABAPENTIN 100 MG: 100 CAPSULE ORAL at 16:25

## 2021-01-30 RX ADMIN — SODIUM CHLORIDE, PRESERVATIVE FREE 10 ML: 5 INJECTION INTRAVENOUS at 21:08

## 2021-01-30 RX ADMIN — GUAIFENESIN 600 MG: 600 TABLET, EXTENDED RELEASE ORAL at 08:43

## 2021-01-30 RX ADMIN — DICLOFENAC 2 G: 10 GEL TOPICAL at 21:07

## 2021-01-30 RX ADMIN — METOPROLOL SUCCINATE 25 MG: 25 TABLET, EXTENDED RELEASE ORAL at 21:03

## 2021-01-30 RX ADMIN — SODIUM CHLORIDE, PRESERVATIVE FREE 10 ML: 5 INJECTION INTRAVENOUS at 21:07

## 2021-01-30 RX ADMIN — SODIUM CHLORIDE, PRESERVATIVE FREE 10 ML: 5 INJECTION INTRAVENOUS at 12:22

## 2021-01-30 RX ADMIN — HYDROCODONE BITARTRATE AND ACETAMINOPHEN 1 TABLET: 10; 325 TABLET ORAL at 14:24

## 2021-01-30 RX ADMIN — DICLOFENAC 2 G: 10 GEL TOPICAL at 08:44

## 2021-01-30 RX ADMIN — PREDNISONE 40 MG: 20 TABLET ORAL at 08:43

## 2021-01-30 RX ADMIN — GABAPENTIN 100 MG: 100 CAPSULE ORAL at 21:04

## 2021-01-30 RX ADMIN — CYANOCOBALAMIN TAB 500 MCG 500 MCG: 500 TAB at 08:43

## 2021-01-30 RX ADMIN — LOSARTAN POTASSIUM 25 MG: 25 TABLET, FILM COATED ORAL at 21:03

## 2021-01-30 RX ADMIN — BUDESONIDE 1 MG: 0.5 INHALANT RESPIRATORY (INHALATION) at 19:42

## 2021-01-30 RX ADMIN — SODIUM CHLORIDE SOLN NEBU 3% 4 ML: 3 NEBU SOLN at 06:32

## 2021-01-30 RX ADMIN — IPRATROPIUM BROMIDE AND ALBUTEROL SULFATE 3 ML: .5; 3 SOLUTION RESPIRATORY (INHALATION) at 19:42

## 2021-01-30 RX ADMIN — TRAZODONE HYDROCHLORIDE 25 MG: 50 TABLET ORAL at 21:04

## 2021-01-30 RX ADMIN — PANTOPRAZOLE SODIUM 40 MG: 40 TABLET, DELAYED RELEASE ORAL at 06:19

## 2021-01-30 RX ADMIN — IPRATROPIUM BROMIDE AND ALBUTEROL SULFATE 3 ML: .5; 3 SOLUTION RESPIRATORY (INHALATION) at 12:11

## 2021-01-30 RX ADMIN — CEFEPIME 2 G: 1 INJECTION, POWDER, FOR SOLUTION INTRAMUSCULAR; INTRAVENOUS at 01:28

## 2021-01-30 RX ADMIN — GABAPENTIN 100 MG: 100 CAPSULE ORAL at 08:43

## 2021-01-30 RX ADMIN — Medication 10 MG: at 21:03

## 2021-01-30 RX ADMIN — VENLAFAXINE 75 MG: 75 TABLET ORAL at 21:04

## 2021-01-31 PROCEDURE — 63710000001 PREDNISONE PER 1 MG: Performed by: INTERNAL MEDICINE

## 2021-01-31 PROCEDURE — 94669 MECHANICAL CHEST WALL OSCILL: CPT

## 2021-01-31 PROCEDURE — 99232 SBSQ HOSP IP/OBS MODERATE 35: CPT | Performed by: INTERNAL MEDICINE

## 2021-01-31 PROCEDURE — 25010000002 CEFEPIME PER 500 MG: Performed by: INTERNAL MEDICINE

## 2021-01-31 PROCEDURE — 94660 CPAP INITIATION&MGMT: CPT

## 2021-01-31 PROCEDURE — 94799 UNLISTED PULMONARY SVC/PX: CPT

## 2021-01-31 PROCEDURE — 25010000002 ENOXAPARIN PER 10 MG: Performed by: FAMILY MEDICINE

## 2021-01-31 RX ADMIN — IPRATROPIUM BROMIDE AND ALBUTEROL SULFATE 3 ML: .5; 3 SOLUTION RESPIRATORY (INHALATION) at 01:13

## 2021-01-31 RX ADMIN — SODIUM CHLORIDE, PRESERVATIVE FREE 10 ML: 5 INJECTION INTRAVENOUS at 21:17

## 2021-01-31 RX ADMIN — IPRATROPIUM BROMIDE AND ALBUTEROL SULFATE 3 ML: .5; 3 SOLUTION RESPIRATORY (INHALATION) at 19:26

## 2021-01-31 RX ADMIN — VENLAFAXINE 75 MG: 75 TABLET ORAL at 08:14

## 2021-01-31 RX ADMIN — IPRATROPIUM BROMIDE AND ALBUTEROL SULFATE 3 ML: .5; 3 SOLUTION RESPIRATORY (INHALATION) at 06:35

## 2021-01-31 RX ADMIN — METOPROLOL SUCCINATE 25 MG: 25 TABLET, EXTENDED RELEASE ORAL at 21:15

## 2021-01-31 RX ADMIN — ENOXAPARIN SODIUM 30 MG: 30 INJECTION SUBCUTANEOUS at 21:16

## 2021-01-31 RX ADMIN — SODIUM CHLORIDE, PRESERVATIVE FREE 10 ML: 5 INJECTION INTRAVENOUS at 08:15

## 2021-01-31 RX ADMIN — Medication 1 CAPSULE: at 21:16

## 2021-01-31 RX ADMIN — GUAIFENESIN 600 MG: 600 TABLET, EXTENDED RELEASE ORAL at 08:14

## 2021-01-31 RX ADMIN — AMLODIPINE BESYLATE 5 MG: 5 TABLET ORAL at 08:14

## 2021-01-31 RX ADMIN — BUDESONIDE 1 MG: 0.5 INHALANT RESPIRATORY (INHALATION) at 19:26

## 2021-01-31 RX ADMIN — DICLOFENAC 2 G: 10 GEL TOPICAL at 21:16

## 2021-01-31 RX ADMIN — DICLOFENAC 2 G: 10 GEL TOPICAL at 08:15

## 2021-01-31 RX ADMIN — CYANOCOBALAMIN TAB 500 MCG 500 MCG: 500 TAB at 08:14

## 2021-01-31 RX ADMIN — SODIUM CHLORIDE, PRESERVATIVE FREE 10 ML: 5 INJECTION INTRAVENOUS at 08:14

## 2021-01-31 RX ADMIN — GABAPENTIN 100 MG: 100 CAPSULE ORAL at 16:18

## 2021-01-31 RX ADMIN — LORAZEPAM 0.5 MG: 0.5 TABLET ORAL at 08:57

## 2021-01-31 RX ADMIN — GABAPENTIN 100 MG: 100 CAPSULE ORAL at 08:14

## 2021-01-31 RX ADMIN — SODIUM CHLORIDE SOLN NEBU 3% 4 ML: 3 NEBU SOLN at 16:00

## 2021-01-31 RX ADMIN — PREDNISONE 40 MG: 20 TABLET ORAL at 08:14

## 2021-01-31 RX ADMIN — TRAZODONE HYDROCHLORIDE 25 MG: 50 TABLET ORAL at 21:16

## 2021-01-31 RX ADMIN — Medication 10 MG: at 21:15

## 2021-01-31 RX ADMIN — LORAZEPAM 0.5 MG: 0.5 TABLET ORAL at 21:16

## 2021-01-31 RX ADMIN — CEFEPIME 2 G: 1 INJECTION, POWDER, FOR SOLUTION INTRAMUSCULAR; INTRAVENOUS at 01:42

## 2021-01-31 RX ADMIN — CEFEPIME 2 G: 1 INJECTION, POWDER, FOR SOLUTION INTRAMUSCULAR; INTRAVENOUS at 13:44

## 2021-01-31 RX ADMIN — IPRATROPIUM BROMIDE AND ALBUTEROL SULFATE 3 ML: .5; 3 SOLUTION RESPIRATORY (INHALATION) at 12:03

## 2021-01-31 RX ADMIN — GABAPENTIN 100 MG: 100 CAPSULE ORAL at 21:16

## 2021-01-31 RX ADMIN — PANTOPRAZOLE SODIUM 40 MG: 40 TABLET, DELAYED RELEASE ORAL at 06:10

## 2021-01-31 RX ADMIN — VENLAFAXINE 75 MG: 75 TABLET ORAL at 21:16

## 2021-01-31 RX ADMIN — Medication 1 CAPSULE: at 08:14

## 2021-01-31 RX ADMIN — GUAIFENESIN 600 MG: 600 TABLET, EXTENDED RELEASE ORAL at 21:16

## 2021-01-31 RX ADMIN — LOSARTAN POTASSIUM 25 MG: 25 TABLET, FILM COATED ORAL at 21:15

## 2021-01-31 RX ADMIN — BUDESONIDE 1 MG: 0.5 INHALANT RESPIRATORY (INHALATION) at 06:36

## 2021-02-01 LAB
ALBUMIN SERPL-MCNC: 3.6 G/DL (ref 3.5–5.2)
ALBUMIN/GLOB SERPL: 1.8 G/DL
ALP SERPL-CCNC: 90 U/L (ref 39–117)
ALT SERPL W P-5'-P-CCNC: 35 U/L (ref 1–33)
ANION GAP SERPL CALCULATED.3IONS-SCNC: 8.5 MMOL/L (ref 5–15)
AST SERPL-CCNC: 33 U/L (ref 1–32)
BILIRUB SERPL-MCNC: 0.4 MG/DL (ref 0–1.2)
BUN SERPL-MCNC: 21 MG/DL (ref 8–23)
BUN/CREAT SERPL: 51.2 (ref 7–25)
CALCIUM SPEC-SCNC: 8.8 MG/DL (ref 8.6–10.5)
CHLORIDE SERPL-SCNC: 96 MMOL/L (ref 98–107)
CO2 SERPL-SCNC: 31.5 MMOL/L (ref 22–29)
CREAT SERPL-MCNC: 0.41 MG/DL (ref 0.57–1)
DEPRECATED RDW RBC AUTO: 57.6 FL (ref 37–54)
ERYTHROCYTE [DISTWIDTH] IN BLOOD BY AUTOMATED COUNT: 15.7 % (ref 12.3–15.4)
GFR SERPL CREATININE-BSD FRML MDRD: >150 ML/MIN/1.73
GLOBULIN UR ELPH-MCNC: 2 GM/DL
GLUCOSE SERPL-MCNC: 73 MG/DL (ref 65–99)
HCT VFR BLD AUTO: 39 % (ref 34–46.6)
HGB BLD-MCNC: 12.7 G/DL (ref 12–15.9)
MCH RBC QN AUTO: 32.4 PG (ref 26.6–33)
MCHC RBC AUTO-ENTMCNC: 32.6 G/DL (ref 31.5–35.7)
MCV RBC AUTO: 99.5 FL (ref 79–97)
PLATELET # BLD AUTO: 369 10*3/MM3 (ref 140–450)
PMV BLD AUTO: 9.3 FL (ref 6–12)
POTASSIUM SERPL-SCNC: 5 MMOL/L (ref 3.5–5.2)
PROT SERPL-MCNC: 5.6 G/DL (ref 6–8.5)
RBC # BLD AUTO: 3.92 10*6/MM3 (ref 3.77–5.28)
SODIUM SERPL-SCNC: 136 MMOL/L (ref 136–145)
WBC # BLD AUTO: 14.72 10*3/MM3 (ref 3.4–10.8)

## 2021-02-01 PROCEDURE — 63710000001 PREDNISONE PER 1 MG: Performed by: INTERNAL MEDICINE

## 2021-02-01 PROCEDURE — 25010000002 CEFEPIME PER 500 MG: Performed by: INTERNAL MEDICINE

## 2021-02-01 PROCEDURE — 94669 MECHANICAL CHEST WALL OSCILL: CPT

## 2021-02-01 PROCEDURE — 94799 UNLISTED PULMONARY SVC/PX: CPT

## 2021-02-01 PROCEDURE — 94660 CPAP INITIATION&MGMT: CPT

## 2021-02-01 PROCEDURE — 25010000002 ENOXAPARIN PER 10 MG: Performed by: FAMILY MEDICINE

## 2021-02-01 PROCEDURE — 80053 COMPREHEN METABOLIC PANEL: CPT | Performed by: INTERNAL MEDICINE

## 2021-02-01 PROCEDURE — 85027 COMPLETE CBC AUTOMATED: CPT | Performed by: INTERNAL MEDICINE

## 2021-02-01 PROCEDURE — 99232 SBSQ HOSP IP/OBS MODERATE 35: CPT | Performed by: FAMILY MEDICINE

## 2021-02-01 RX ADMIN — GABAPENTIN 100 MG: 100 CAPSULE ORAL at 16:39

## 2021-02-01 RX ADMIN — IPRATROPIUM BROMIDE AND ALBUTEROL SULFATE 3 ML: .5; 3 SOLUTION RESPIRATORY (INHALATION) at 07:10

## 2021-02-01 RX ADMIN — METOPROLOL SUCCINATE 25 MG: 25 TABLET, EXTENDED RELEASE ORAL at 20:41

## 2021-02-01 RX ADMIN — SODIUM CHLORIDE, PRESERVATIVE FREE 10 ML: 5 INJECTION INTRAVENOUS at 08:00

## 2021-02-01 RX ADMIN — BUDESONIDE 1 MG: 0.5 INHALANT RESPIRATORY (INHALATION) at 07:11

## 2021-02-01 RX ADMIN — HYDROCODONE BITARTRATE AND ACETAMINOPHEN 1 TABLET: 10; 325 TABLET ORAL at 13:38

## 2021-02-01 RX ADMIN — GABAPENTIN 100 MG: 100 CAPSULE ORAL at 08:00

## 2021-02-01 RX ADMIN — CEFEPIME 2 G: 1 INJECTION, POWDER, FOR SOLUTION INTRAMUSCULAR; INTRAVENOUS at 01:47

## 2021-02-01 RX ADMIN — LOSARTAN POTASSIUM 25 MG: 25 TABLET, FILM COATED ORAL at 20:41

## 2021-02-01 RX ADMIN — ENOXAPARIN SODIUM 30 MG: 30 INJECTION SUBCUTANEOUS at 20:41

## 2021-02-01 RX ADMIN — LORAZEPAM 0.5 MG: 0.5 TABLET ORAL at 08:00

## 2021-02-01 RX ADMIN — IPRATROPIUM BROMIDE AND ALBUTEROL SULFATE 3 ML: .5; 3 SOLUTION RESPIRATORY (INHALATION) at 13:00

## 2021-02-01 RX ADMIN — VENLAFAXINE 75 MG: 75 TABLET ORAL at 20:41

## 2021-02-01 RX ADMIN — CEFEPIME 2 G: 1 INJECTION, POWDER, FOR SOLUTION INTRAMUSCULAR; INTRAVENOUS at 12:45

## 2021-02-01 RX ADMIN — DICLOFENAC 2 G: 10 GEL TOPICAL at 20:41

## 2021-02-01 RX ADMIN — GABAPENTIN 100 MG: 100 CAPSULE ORAL at 20:41

## 2021-02-01 RX ADMIN — DICLOFENAC 2 G: 10 GEL TOPICAL at 08:01

## 2021-02-01 RX ADMIN — GUAIFENESIN 600 MG: 600 TABLET, EXTENDED RELEASE ORAL at 08:00

## 2021-02-01 RX ADMIN — IPRATROPIUM BROMIDE AND ALBUTEROL SULFATE 3 ML: .5; 3 SOLUTION RESPIRATORY (INHALATION) at 01:20

## 2021-02-01 RX ADMIN — SODIUM CHLORIDE, PRESERVATIVE FREE 10 ML: 5 INJECTION INTRAVENOUS at 20:41

## 2021-02-01 RX ADMIN — BUDESONIDE 1 MG: 0.5 INHALANT RESPIRATORY (INHALATION) at 19:17

## 2021-02-01 RX ADMIN — VENLAFAXINE 75 MG: 75 TABLET ORAL at 08:00

## 2021-02-01 RX ADMIN — TRAZODONE HYDROCHLORIDE 25 MG: 50 TABLET ORAL at 20:41

## 2021-02-01 RX ADMIN — AMLODIPINE BESYLATE 5 MG: 5 TABLET ORAL at 08:00

## 2021-02-01 RX ADMIN — IPRATROPIUM BROMIDE AND ALBUTEROL SULFATE 3 ML: .5; 3 SOLUTION RESPIRATORY (INHALATION) at 19:18

## 2021-02-01 RX ADMIN — SODIUM CHLORIDE, PRESERVATIVE FREE 10 ML: 5 INJECTION INTRAVENOUS at 08:01

## 2021-02-01 RX ADMIN — Medication 1 CAPSULE: at 08:00

## 2021-02-01 RX ADMIN — PREDNISONE 40 MG: 20 TABLET ORAL at 08:00

## 2021-02-01 RX ADMIN — HYDROCODONE BITARTRATE AND ACETAMINOPHEN 1 TABLET: 10; 325 TABLET ORAL at 21:39

## 2021-02-01 RX ADMIN — PANTOPRAZOLE SODIUM 40 MG: 40 TABLET, DELAYED RELEASE ORAL at 06:01

## 2021-02-01 RX ADMIN — CYANOCOBALAMIN TAB 500 MCG 500 MCG: 500 TAB at 08:00

## 2021-02-01 RX ADMIN — LORAZEPAM 0.5 MG: 0.5 TABLET ORAL at 20:41

## 2021-02-01 RX ADMIN — GUAIFENESIN 600 MG: 600 TABLET, EXTENDED RELEASE ORAL at 20:41

## 2021-02-01 RX ADMIN — Medication 1 CAPSULE: at 20:41

## 2021-02-01 NOTE — PLAN OF CARE
Problem: Adult Inpatient Plan of Care  Goal: Plan of Care Review  Outcome: Ongoing, Progressing  Flowsheets (Taken 2/1/2021 0121)  Progress: improving  Plan of Care Reviewed With: patient  Outcome Summary: VSS, O2 AT 3 LITERS,  IV ANTIBIOTICS ONGOING AS ORDERED   Goal Outcome Evaluation:  Plan of Care Reviewed With: patient  Progress: improving  Outcome Summary: VSS, O2 AT 3 LITERS,  IV ANTIBIOTICS ONGOING AS ORDERED

## 2021-02-01 NOTE — PROGRESS NOTES
[facility]    Name:  Joelle Yee   Age:  75 y.o.  Sex:  female  :  1945  MRN:  [id number]   Visit Number:  [id number]  Admission Date:  2021  Date Of Service:  21  Primary Care Physician:  Blanquita Clements PA     LOS: 10 days :      Chief Complaint: Follow-up pneumonia        Subjective / Interval History: The patient was seen this morning.  She was lying in bed feeling generally weak but no acute events occurred overnight.  She has improved cough and shortness of breath.  She states she is feeling somewhat better but agrees she will need acute rehab.  She denies chest pain, shortness of breath, abdominal pain.  She is starting to eat and drink better on her own.      Review of Systems:     General ROS: Patient denies any fevers, chills or loss of consciousness.  Positive generalized weakness  Ophthalmic ROS: Denies any diplopia or transient loss of vision.  ENT ROS: Denies sore throat, nasal congestion or ear pain.   Respiratory ROS: Denies cough or shortness of breath.  Cardiovascular ROS: Denies chest pain or palpitations. No history of exertional chest pain.   Gastrointestinal ROS: Denies nausea and vomiting. Denies any abdominal pain. No diarrhea.  Genitourinary ROS: Denies dysuria or hematuria.  Musculoskeletal ROS: Complains of chronic back pain. No muscle pain. No calf pain.  Neurological ROS: Denies any focal weakness. No loss of consciousness. Denies any numbness. Denies neck pain.   Dermatological ROS: Denies any redness or pruritis.    Vital Signs:    Temp:  [98 °F (36.7 °C)-98.5 °F (36.9 °C)] 98.3 °F (36.8 °C)  Heart Rate:  [70-96] 96  Resp:  [17-23] 17  BP: (103-129)/(53-85) 129/69    Intake and output:    I/O last 3 completed shifts:  In: 1180 [P.O.:1080; IV Piggyback:100]  Out: 600 [Urine:600]  I/O this shift:  In: 880 [P.O.:780; IV Piggyback:100]  Out: 200 [Urine:200]    Physical Examination:    General Appearance:    Elderly lady, frail, lying in bed.  Awake and alert and cooperative, not in any acute distress.   Head:    Atraumatic and normocephalic, without obvious abnormality.   Eyes:            PERRLA, conjunctivae and sclerae normal, no Icterus. No pallor. Extraocular movements are within normal limits.   Throat:   No oral lesions, no thrush, oral mucosa moist.   Neck:   Supple, trachea midline, no thyromegaly, no carotid bruit.   Lungs:     Chest shape is normal. Breath sounds heard bilaterally equally.  No crackles or wheezing. No Pleural rub or bronchial breathing.    Heart:    Normal S1 and S2, no murmur, no gallop, no rub. No JVD   Abdomen:     Normal bowel sounds, no masses, no organomegaly. Soft     nontender, nondistended, no guarding, no rebound                tenderness   Extremities:   Moves all extremities well, no edema, no cyanosis, no           clubbing   Skin:   No bleeding, bruising or rash.   Neurologic:  No tremor, sensation intact, Motor power is normal and equal bilaterally.   Laboratory results:    Results from last 7 days   Lab Units 02/01/21  0535 01/28/21  0755   SODIUM mmol/L 136 136   POTASSIUM mmol/L 5.0 4.9   CHLORIDE mmol/L 96* 92*   CO2 mmol/L 31.5* 40.4*   BUN mg/dL 21 22   CREATININE mg/dL 0.41* 0.51*   CALCIUM mg/dL 8.8 9.4   BILIRUBIN mg/dL 0.4 0.3   ALK PHOS U/L 90 108   ALT (SGPT) U/L 35* 29   AST (SGOT) U/L 33* 22   GLUCOSE mg/dL 73 98     Results from last 7 days   Lab Units 02/01/21  0535 01/28/21  0755   WBC 10*3/mm3 14.72* 12.05*   HEMOGLOBIN g/dL 12.7 13.2   HEMATOCRIT % 39.0 41.6   PLATELETS 10*3/mm3 369 340                       I have reviewed the patient's laboratory results.    Radiology results:    Imaging Results (Last 24 Hours)     ** No results found for the last 24 hours. **          I have reviewed the patient's radiology reports.    Medication Review:     I have reviewed the patient's active and prn medications.     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  Chronic hypoxic respiratory failure on home oxygen.  Acute COPD and bronchiectasis exacerbation with Pseudomonas infection.  Mild dehydration, improved.  Sinus tachycardia, improved.  T6 compression fracture, possibly acute, patient states chronic back pain  Progressive functional decline.  Pulmonary cachexia.    Plan:  She will remain in the hospital requiring IV antibiotic therapy.  She will likely be discharged to acute rehab/[facility] in the next 1 to 2 days if insurance approves this.  They have agreed to continue her IV cefepime for the remaining doses.  Continue to titrate oxygen.  Continue physical therapy and Occupational Therapy.  Repeat labs in the morning.  Telemetry can be discontinued at this time.    Medication risks and benefits were discussed in detail. Patient reported satisfaction with care delivered and treatment plan.     Adelina Nicholson DO  02/01/21  15:03 EST

## 2021-02-01 NOTE — PROGRESS NOTES
Continued Stay Note  [facility]     Patient Name: Joelle Yee  MRN: [id number]  Today's Date: 2/1/2021    Admit Date: 1/22/2021    Discharge Plan     Row Name 02/01/21 0728       Plan    Plan Per Tonya at [facility] ,pt was denied by insurance for [facility] rehab.  Cm will follow up to see details for appeal etc.  07:56 EST  Received call back from Tonya at [facility] and [facility] has denied Pul Rehab. Spoke to Dr Nicholson, can call and appeal by 3pm today.  Per Dr Nicholson feels  Skilled Rehab would be appropriate if pt willing to go, she is now receiving IV Cefepime via a [facility] Line.  Had been previously accepted at [facility] for Skilled Rehab. Spoke to pt in room and informed and she is willing to go to [facility].  Called to Daughter Adia and explained also and in agreement.  Called to My at [facility] and will follow up with bed situation and review pt's clinicals, will call back this am. Imm Explained and Verbalized understanding.   14:53 EST  Spoke to pt in room and informed [facility] is waiting on precert,it will likely be tomorrow.  Dr Nicholson updated.          Discharge Codes    No documentation.       Expected Discharge Date and Time     Expected Discharge Date Expected Discharge Time    Jan 26, 2021             Amy Santos RN

## 2021-02-02 VITALS
HEIGHT: 60 IN | TEMPERATURE: 98.1 F | SYSTOLIC BLOOD PRESSURE: 110 MMHG | DIASTOLIC BLOOD PRESSURE: 53 MMHG | WEIGHT: 98.99 LBS | HEART RATE: 82 BPM | BODY MASS INDEX: 19.43 KG/M2 | RESPIRATION RATE: 18 BRPM | OXYGEN SATURATION: 97 %

## 2021-02-02 PROCEDURE — 94799 UNLISTED PULMONARY SVC/PX: CPT

## 2021-02-02 PROCEDURE — 63710000001 PREDNISONE PER 1 MG: Performed by: INTERNAL MEDICINE

## 2021-02-02 PROCEDURE — 99239 HOSP IP/OBS DSCHRG MGMT >30: CPT | Performed by: FAMILY MEDICINE

## 2021-02-02 PROCEDURE — 25010000002 CEFEPIME PER 500 MG: Performed by: INTERNAL MEDICINE

## 2021-02-02 RX ORDER — PREDNISONE 20 MG/1
TABLET ORAL
Qty: 9 TABLET | Refills: 0 | Status: SHIPPED | OUTPATIENT
Start: 2021-02-02 | End: 2021-02-25

## 2021-02-02 RX ORDER — GABAPENTIN 100 MG/1
100 CAPSULE ORAL 3 TIMES DAILY
Qty: 9 CAPSULE | Refills: 0 | Status: SHIPPED | OUTPATIENT
Start: 2021-02-02 | End: 2021-02-03 | Stop reason: SDUPTHER

## 2021-02-02 RX ORDER — HYDROCODONE BITARTRATE AND ACETAMINOPHEN 10; 325 MG/1; MG/1
1 TABLET ORAL EVERY 8 HOURS PRN
Qty: 9 TABLET | Refills: 0 | Status: SHIPPED | OUTPATIENT
Start: 2021-02-02 | End: 2021-02-03 | Stop reason: SDUPTHER

## 2021-02-02 RX ORDER — LIDOCAINE 50 MG/G
1 PATCH TOPICAL DAILY PRN
Start: 2021-02-02

## 2021-02-02 RX ORDER — AMLODIPINE BESYLATE 5 MG/1
5 TABLET ORAL DAILY
Qty: 30 TABLET | Refills: 0 | Status: SHIPPED | OUTPATIENT
Start: 2021-02-02

## 2021-02-02 RX ADMIN — BUDESONIDE 1 MG: 0.5 INHALANT RESPIRATORY (INHALATION) at 06:32

## 2021-02-02 RX ADMIN — Medication 1 CAPSULE: at 08:06

## 2021-02-02 RX ADMIN — HYDROCODONE BITARTRATE AND ACETAMINOPHEN 1 TABLET: 10; 325 TABLET ORAL at 16:56

## 2021-02-02 RX ADMIN — PREDNISONE 40 MG: 20 TABLET ORAL at 08:07

## 2021-02-02 RX ADMIN — GUAIFENESIN 600 MG: 600 TABLET, EXTENDED RELEASE ORAL at 08:06

## 2021-02-02 RX ADMIN — LORAZEPAM 0.5 MG: 0.5 TABLET ORAL at 08:06

## 2021-02-02 RX ADMIN — VENLAFAXINE 75 MG: 75 TABLET ORAL at 08:07

## 2021-02-02 RX ADMIN — CYCLOBENZAPRINE HYDROCHLORIDE 10 MG: 10 TABLET, FILM COATED ORAL at 01:00

## 2021-02-02 RX ADMIN — IPRATROPIUM BROMIDE AND ALBUTEROL SULFATE 3 ML: .5; 3 SOLUTION RESPIRATORY (INHALATION) at 00:58

## 2021-02-02 RX ADMIN — IPRATROPIUM BROMIDE AND ALBUTEROL SULFATE 3 ML: .5; 3 SOLUTION RESPIRATORY (INHALATION) at 06:32

## 2021-02-02 RX ADMIN — SODIUM CHLORIDE, PRESERVATIVE FREE 10 ML: 5 INJECTION INTRAVENOUS at 08:07

## 2021-02-02 RX ADMIN — CEFEPIME 2 G: 1 INJECTION, POWDER, FOR SOLUTION INTRAMUSCULAR; INTRAVENOUS at 00:53

## 2021-02-02 RX ADMIN — GABAPENTIN 100 MG: 100 CAPSULE ORAL at 08:08

## 2021-02-02 RX ADMIN — Medication 10 MG: at 01:00

## 2021-02-02 RX ADMIN — DICLOFENAC 2 G: 10 GEL TOPICAL at 08:08

## 2021-02-02 RX ADMIN — SODIUM CHLORIDE, PRESERVATIVE FREE 10 ML: 5 INJECTION INTRAVENOUS at 08:08

## 2021-02-02 RX ADMIN — GABAPENTIN 100 MG: 100 CAPSULE ORAL at 16:57

## 2021-02-02 RX ADMIN — IPRATROPIUM BROMIDE AND ALBUTEROL SULFATE 3 ML: .5; 3 SOLUTION RESPIRATORY (INHALATION) at 13:07

## 2021-02-02 RX ADMIN — CYANOCOBALAMIN TAB 500 MCG 500 MCG: 500 TAB at 08:07

## 2021-02-02 RX ADMIN — PANTOPRAZOLE SODIUM 40 MG: 40 TABLET, DELAYED RELEASE ORAL at 06:47

## 2021-02-02 RX ADMIN — CEFEPIME 2 G: 1 INJECTION, POWDER, FOR SOLUTION INTRAMUSCULAR; INTRAVENOUS at 12:19

## 2021-02-02 RX ADMIN — AMLODIPINE BESYLATE 5 MG: 5 TABLET ORAL at 08:07

## 2021-02-02 NOTE — PROGRESS NOTES
Discharge Planning Assessment  [facility]     Patient Name: Joelle Yee  MRN: [id number]  Today's Date: 2/2/2021    Admit Date: 1/22/2021    Discharge Needs Assessment    No documentation.       Discharge Plan     Row Name 02/02/21 0848       Plan    Plan Comments  My from [facility] has insruance approval and will accept patient today        Continued Care and Services - Admitted Since 1/22/2021     Destination Coordination complete    Service Provider Request Status Selected Services Address Phone Fax Patient Preferred    [facility]   Selected Inpatient [facility] [address] [facility] [phone] --            Selected Continued Care - Prior Encounters Includes selections from prior encounters from 10/24/2020 to 2/2/2021    Discharged on 1/12/2021 Admission date: 1/2/2021 - Discharge disposition: Home-Health Care Svc    Home Medical Care     Service Provider Selected Services Address Phone Fax Patient Preferred    [facility]  Home Health Services [address] [facility] [phone] --                    Expected Discharge Date and Time     Expected Discharge Date Expected Discharge Time    Feb 2, 2021         Demographic Summary    No documentation.       Functional Status    No documentation.       Psychosocial    No documentation.       Abuse/Neglect    No documentation.       Legal    No documentation.       Substance Abuse    No documentation.       Patient Forms    No documentation.           Sydney Kendrick, RN

## 2021-02-02 NOTE — PLAN OF CARE
Goal Outcome Evaluation:  Plan of Care Reviewed With: patient, family   To be discharged to [facility] with family to drive.  No reports of pain.  Maintaining SAT on oxygen at 3 liters pnc.  Report called to receiving nurse.

## 2021-02-02 NOTE — PROGRESS NOTES
Adult Nutrition  Assessment/PES    Patient Name:  Joelle Yee  YOB: 1945  MRN: [id number]  Admit Date:  1/22/2021    Assessment Date:  2/2/2021    Comments:    Recommend:  1. Continue current diet order as medically appropriate and tolerated.  2. Encourage PO intake. Average intake ~78% x 9 meals.   3. Continue with Boost Pudding BID and Magic Cup daily.  4. Consider a multivitamin with minerals daily.  5. Continue to monitor and replace electrolytes PRN.      RD to follow pt and available PRN.      Reason for Assessment     Row Name 02/02/21 1349          Reason for Assessment    Reason For Assessment  follow-up protocol     Diagnosis  pulmonary disease;cardiac disease;gastrointestinal disease;fluid status COPD, HTN, GERD, Aspiration PNA, A/C hypercapnic repiratory failure, Chronic respiratory failure, T6 compression fracture, Mild Dehydration     Identified At Risk by Screening Criteria  other (see comments) LACE             Labs/Tests/Procedures/Meds     Row Name 02/02/21 1349          Labs/Procedures/Meds    Lab Results Reviewed  reviewed, pertinent     Lab Results Comments  Low: Cl, Cr; High: ALT        Medications    Pertinent Medications Reviewed  reviewed, pertinent     Pertinent Medications Comments  Protonix,Prednisone, Vitamin B12             Nutrition Prescription Ordered     Row Name 02/02/21 1350          Nutrition Prescription PO    Current PO Diet  Dysphagia no eggs     Dysphagia Level  4  Mechanical soft no mixed consistencies     Fluid Consistency  Thin SLP re-eval notes pt can progress to thin liquids     Supplement  Boost Pudding (Ensure Pudding);Magic Cup         Evaluation of Received Nutrient/Fluid Intake     Row Name 02/02/21 1350          PO Evaluation    Number of Days PO Intake Evaluated  3 days     Number of Meals  9     % PO Intake  78               Problem/Interventions:  Problem 1     Row Name 02/02/21 1351          Nutrition Diagnoses Problem 1    Problem 1   Increased Nutrient Needs     Macronutrient  Kcal;Protein     Etiology (related to)  Medical Diagnosis     Pulmonary/Critical Care  COPD;Other (comment) Respiratory failure     Signs/Symptoms (evidenced by)  Other (comment) Pulmonary dysfunction               Intervention Goal     Row Name 02/02/21 1351          Intervention Goal    General  Improved nutrition related lab(s);Meet nutritional needs for age/condition     PO  Meet estimated needs;Maintain intake     Weight  Maintain weight         Nutrition Intervention     Row Name 02/02/21 1352          Nutrition Intervention    RD/Tech Action  Follow Tx progress;Encourage intake         Nutrition Prescription     Row Name 02/02/21 1352          Nutrition Prescription PO    PO Prescription  Other (comment) Continue diet and supplements as medically appropriate and tolerated     New PO Prescription Ordered?  No, recommended        Other Orders    Obtain Weight  Daily     Obtain Weight Ordered?  No, recommended     Supplement  Vitamin mineral supplement     Supplement Ordered?  No, recommended     Other  Continue to monitor and replace electrolytes PRN         Education/Evaluation     Row Name 02/02/21 1354          Education    Education  Education not appropriate at this time     Please explain  Other (comment) D/C to rehab        Monitor/Evaluation    Monitor  Per protocol;I&O;PO intake;Supplement intake;Pertinent labs;Skin status;Weight           Electronically signed by:  Mavis Langley RD  02/02/21 13:53 EST

## 2021-02-02 NOTE — PLAN OF CARE
Goal Outcome Evaluation:  Plan of Care Reviewed With: patient  Progress: improving  Outcome Summary: VSS, pt rested well, maintaining o2 sats >90% on 3.5L NC, using IS

## 2021-02-02 NOTE — DISCHARGE SUMMARY
[facility]   DISCHARGE SUMMARY      Name:  Joelle Yee   Age:  75 y.o.  Sex:  female  :  1945  MRN:  [id number]   Visit Number:  [id number]  Primary Care Physician:  Blanquita Clements PA  Date of Discharge:  2021  Admission Date:  2021    Discharge Diagnosis:   Bilateral aspiration pneumonia secondary to Pseudomonas, POA  Acute on chronic hypercapnic respiratory failure.  Chronic hypoxic respiratory failure on home oxygen.  Acute COPD and bronchiectasis exacerbation with Pseudomonas infection.  Mild dehydration, improved.  Sinus tachycardia, improved.  T6 compression fracture, possibly acute, patient states chronic back pain  Progressive functional decline.  Pulmonary cachexia.  Severe COPD     History of Present Illness/Hospital Course:  The patient is a 75-year-old lady with end-stage COPD on chronic 3 L nasal cannula at home, bronchiectasis, GERD, hypertension, sleep apnea with noncompliance of BiPAP therapy.  The patient presents to the emergency room feeling like she has pneumonia again.  She had recently been diagnosed with Pseudomonas in January and discharged on 2021.  She returns to the emergency room on 2021 and was started on a cephalosporin and prednisone taper.  The patient states she has continued to cough small amounts only of sputum with progressively worsening shortness of breath over the past couple of days.  On 20 she went to office to see Kaelyn ,  Pulmonologist, recently and had been started on Lasix. Patient says she doesn't have a bipap at home but her note says that adjustments were made and she has been noncompliant here in hospital previously.  Now, the edema in her lower legs had resolved but she looks clinically dehydrated.  She states she has not been able to eat or drink well due to just feeling generally unwell.  She denied fever, chest pain, nausea or vomiting.  She states that she did not realize she had any  "trouble swallowing.  A CT PE protocol was negative for PE but did showsigns of aspiration versus mucous plugging and pneumonia in both lower lobes.  She also has a T6 compression fracture possibly acute. She has only complained of chronic pain.      Hospitalist team admitted. She was initiated on zosyn and changed to Cefepime with review of sputum culture positive for Pseudomonas. Her bacterial blood cultures are negative. Pulmonary followed in consultation. Patient admits to noncompliance and poor tolerance to AVAPS machine. \"It poly me\". She did agree to acute rehab placement which will be helpful with improving her strength and functioning with ADLs as well as help provide her IV antibiotics.      She is very weak and will require additional help before she would be safe to return home.    She does not want surgery for her spine fractures and with underlying COPD severe she would not be good candidate for it either. She has remained stable on gabapentin, Lortab, and TLSO brace. PT, OT will be continued.     Pseudomonas pneumonia noted. She will continue Cefepime bid x 8 IV more days.     She is eating and drinking well. She had bowel movement yesterday. She states feeling much better but still very weak. She denies chest pain, shortness of breath, abdominal pain. No acute events occurred overnight. She is stable improved for discharge today to [facility].    Discharge instructions:  1. TLSO brace should be applied when walking, especially to reduce pain  2. Continue PICC line care   3. Continue oxygen to keep sat >90% and titrate accordingly  4. Recommend AVAPS, but patient declined to use it today: Here are settings: , rate 15, max pressure 25, min pressure 14  5. Aspiration precautions  6. Mechanical soft diet initiated here  7. Cefepime bid x 8 more days IV   8. Continue Palliative care services  9. Continue wet to dry dressing changes to lower extremity chronic ulcers    Consults: "     Consults     Date and Time Order Name Status Description    1/25/2021 1049 Inpatient Pulmonology Consult      1/24/2021 0030 Inpatient Pulmonology Consult Completed     1/4/2021 0820 Inpatient Pulmonology Consult Completed           Procedures Performed: none             Vital Signs:    Temp:  [97.8 °F (36.6 °C)-98.4 °F (36.9 °C)] 98.1 °F (36.7 °C)  Heart Rate:  [62-91] 82  Resp:  [18] 18  BP: (102-124)/(51-82) 110/53    Physical Exam:      General Appearance:    Chronically ill elderly lady. Frail. Awake and smiling. Was cleaning out her purse on arrival. Alert, cooperative, in no acute distress   Head:    Normocephalic, without obvious abnormality, atraumatic   Eyes:            Lids and lashes normal, conjunctivae and sclerae normal, no   icterus, no pallor, corneas clear, PERRLA   Ears:    Ears appear intact with no abnormalities noted   Throat:   No oral lesions, no thrush, oral mucosa moist   Neck:   No adenopathy, supple, trachea midline, no thyromegaly   Back:     + kyphosis present,  no skin lesions,       erythema or scars, no tenderness to percussion or                   palpation,   range of motion normal; no midline tenderness   Lungs:     Clear to auscultation,respirations regular, even and                   Unlabored; barrel chest    Heart:    Regular rhythm and normal rate, normal S1 and S2, no            murmur, no gallop, no rub, no click   Breast Exam:    Deferred   Abdomen:     Normal bowel sounds, no masses, no organomegaly, soft        non-tender, non-distended, no guarding, no rebound                 tenderness   Genitalia:    Deferred   Extremities:   Moves all extremities well, no edema, no cyanosis, no              redness   Pulses:   Pulses palpable and equal bilaterally   Skin:   No bleeding, bruising or rash; chronic stable lower extremity ulcers lower legs   Lymph nodes:   No palpable adenopathy   Neurologic:   No tremor, sensation intact, DTR        present and equal bilaterally          Pertinent Lab Results:     Results from last 7 days   Lab Units 02/01/21  0535 01/28/21  0755   SODIUM mmol/L 136 136   POTASSIUM mmol/L 5.0 4.9   CHLORIDE mmol/L 96* 92*   CO2 mmol/L 31.5* 40.4*   BUN mg/dL 21 22   CREATININE mg/dL 0.41* 0.51*   CALCIUM mg/dL 8.8 9.4   BILIRUBIN mg/dL 0.4 0.3   ALK PHOS U/L 90 108   ALT (SGPT) U/L 35* 29   AST (SGOT) U/L 33* 22   GLUCOSE mg/dL 73 98     Results from last 7 days   Lab Units 02/01/21  0535 01/28/21  0755   WBC 10*3/mm3 14.72* 12.05*   HEMOGLOBIN g/dL 12.7 13.2   HEMATOCRIT % 39.0 41.6   PLATELETS 10*3/mm3 369 340                                   Invalid input(s): USDES,  BLOODU, NITRITITE, BACT, EP  Pain Management Panel     There is no flowsheet data to display.              Pertinent Radiology Results:    Imaging Results (All)     Procedure Component Value Units Date/Time    XR Chest 1 View [[id number]] Collected: 01/27/21 0919     Updated: 01/27/21 0926    Narrative:      XR CHEST 1 VIEW-     HISTORY: Respiratory failure. J69.0-Pneumonitis due to inhalation of  food and vomit; R06.02-Shortness of breath; J44.1-Chronic obstructive  pulmonary disease with (acute) exacerbation.     COMPARISON:  01/26/2021     FINDINGS:  Portable view of the chest demonstrates coarse interstitial  changes in the left lung base compatible with prior scarring. Right lung  is clear. There is no evidence of effusion, pneumothorax or other  significant pleural disease. The mediastinum is unremarkable.     The heart size is normal.       Impression:      No significant change.      This report was finalized on 1/27/2021 9:24 AM by Hansel Fernández MD.    XR Chest 1 View [[id number]] Collected: 01/26/21 0926     Updated: 01/26/21 1010    Narrative:      PROCEDURE: XR CHEST 1 VW-        HISTORY: Resp Failure.; J69.0-Pneumonitis due to inhalation of food and  vomit; R06.02-Shortness of breath; J44.1-Chronic obstructive pulmonary  disease with (acute) exacerbation     COMPARISON:  01/22/2021.     FINDINGS: The heart is normal in size. The mediastinum is unremarkable.  Bibasilar opacities have improved. There is no new infiltrate or pleural  effusion. There is no pneumothorax. There are no acute osseous  abnormalities.       Impression:      Interval improvement in bibasilar opacities. Continued  follow-up is recommended.                       Images were reviewed, interpreted, and dictated by Dr. Gretta Gutierrez MD  Transcribed by Veronique Rivera PA-C.     This report was finalized on 1/26/2021 10:08 AM by Gretta Gutierrez MD.    CT Chest Pulmonary Embolism [[id number]] Collected: 01/22/21 1614     Updated: 01/22/21 1620    Narrative:      PROCEDURE: CT CHEST PULMONARY EMBOLISM-     HISTORY: Hypoxia, pleuritic chest pain     COMPARISON: None .     TECHNIQUE: Multiple axial CT images were obtained from the thoracic  inlet through the upper abdomen following the administration of Isovue  300 per the CT PE protocol. Coronal and oblique 3D MIP images were  reconstructed from the original axial data set.      FINDINGS: There are no filling defects within the pulmonary arteries to  suggest an embolus. The thoracic aorta is normal in caliber with no  evidence of dissection. The heart is normal in size. There are no  pleural or pericardial effusions. There is no adenopathy. Lung windows  reveal emphysematous changes. There is bronchiectasis in the lung bases  and right middle lobe. There is material within both lower lobe bronchi  with patchy airspace disease in the lung bases and right middle lobe  either reflecting aspiration or mucous plugging and pneumonia. Within  the visualized upper abdomen there is a left renal cyst. The patient is  status post cholecystectomy. Bone windows reveal an acute appearing T6  compression fracture.       Impression:      1. No evidence of pulmonary embolus or aortic dissection.  2. Aspiration versus mucous plugging/pneumonia in both lung bases.  3. Acute appearing T6  compression fracture.            160.55 mGy.cm        This study was performed with techniques to keep radiation doses as low  as reasonably achievable (ALARA). Individualized dose reduction  techniques using automated exposure control or adjustment of mA and/or  kV according to the patient size were employed.      This report was finalized on 1/22/2021 4:18 PM by Ignacia Lara M.D..    XR Chest 2 View [[id number]] Collected: 01/22/21 1448     Updated: 01/22/21 1454    Narrative:      PROCEDURE: XR CHEST 2 VW-        HISTORY: SOA     COMPARISON: January 5, 2021.     FINDINGS: The heart is normal in size. The mediastinum is unremarkable.  Bibasilar opacities are unchanged and may be due to atelectasis or  pneumonia. There is no pneumothorax. There are no acute osseous  abnormalities.       Impression:      Stable bibasilar opacities may be due to atelectasis or  pneumonia.                       Images were reviewed, interpreted, and dictated by Dr. Ignacia Lara M.D.  Transcribed by Veronique Rivera PA-C.     This report was finalized on 1/22/2021 2:51 PM by Ignacia Lara M.D..          ECHO:    Results for orders placed during the hospital encounter of 01/14/20   Adult Transthoracic Echo Complete With Contrast if Necessary Per Protocol    Narrative Technically adequate study   1) Borderline LVH with normal LV systolic function ( EF is over 55%)  2) Normal left atrial size   3) Aortic valve sclerosis with mild AI   4) Mild TR with normal PA pressures   5) Normal size and function of the RV          Condition on Discharge:  Stable        Code status during the hospital stay:  Full code       Discharge Disposition:    Skilled Nursing Facility (DC - External)    Discharge Medication:       Discharge Medications      New Medications      Instructions Start Date   amLODIPine 5 MG tablet  Commonly known as: NORVASC   5 mg, Oral, Daily      cefepime 2 g in sodium chloride 0.9 % 100 mL IVPB   2 g,  Intravenous, Every 12 Hours   Start Date: February 3, 2021     lidocaine 5 %  Commonly known as: LIDODERM   1 patch, Transdermal, Daily PRN, Remove & Discard patch within 12 hours or as directed by MD         Changes to Medications      Instructions Start Date   ammonium lactate 12 % cream  Commonly known as: Lac-Hydrin  What changed:   · when to take this  · reasons to take this   Topical, 2 Times Daily      HYDROcodone-acetaminophen  MG per tablet  Commonly known as: NORCO  What changed:   · how much to take  · how to take this  · reasons to take this   1 tablet, Oral, Every 8 Hours PRN      predniSONE 10 MG tablet  Commonly known as: DELTASONE  What changed: Another medication with the same name was changed. Make sure you understand how and when to take each.   10 mg, Oral, Every Other Day, Alternate every other day with Azithromycin 250 mg      predniSONE 20 MG tablet  Commonly known as: DELTASONE  What changed:   · how much to take  · how to take this  · when to take this  · additional instructions   Take 1 tab daily x 6 day then half tab daily x 6 day then stop      urea 40 % cream  Commonly known as: CARMOL  What changed:   · when to take this  · reasons to take this   Topical, Every 12 Hours, Apply topically every 12 hours.         Continue These Medications      Instructions Start Date   acetaminophen 325 MG tablet  Commonly known as: TYLENOL   650 mg, Oral, Every 4 Hours PRN      albuterol sulfate  (90 Base) MCG/ACT inhaler  Commonly known as: Ventolin HFA   2 puffs, Inhalation, Every 4 Hours PRN      budesonide-formoterol 160-4.5 MCG/ACT inhaler  Commonly known as: Symbicort   2 puffs, Inhalation, 2 Times Daily - RT, Rinse mouth with water after use      cyclobenzaprine 10 MG tablet  Commonly known as: FLEXERIL   10 mg, Oral, Daily      ENSURE COMPLETE PO   1 can, Oral, 2 Times Daily      Flutter device   1 Device, Does not apply, As Needed      gabapentin 100 MG capsule  Commonly known as:  NEURONTIN   100 mg, Oral, 3 Times Daily      guaiFENesin 600 MG 12 hr tablet  Commonly known as: MUCINEX   600 mg, Oral, Every 12 Hours Scheduled      guaifenesin-dextromethorphan  MG tablet sustained-release 12 hour tablet   1 tablet, Oral, 2 Times Daily PRN      ipratropium-albuterol 0.5-2.5 mg/3 ml nebulizer  Commonly known as: DUO-NEB   3 mL, Nebulization, 4 Times Daily PRN      ketotifen 0.025 % ophthalmic solution  Commonly known as: ZADITOR   1-2 drops, Both Eyes, 2 Times Daily      loratadine 10 MG tablet  Commonly known as: CLARITIN   10 mg, Oral, Daily PRN      LORazepam 0.5 MG tablet  Commonly known as: ATIVAN   0.5 mg, Oral, 2 Times Daily PRN, 1-2 TABLETS DAILY PRN      losartan 25 MG tablet  Commonly known as: COZAAR   25 mg, Oral, Nightly      MEDICATED CHEST RUB EX   Apply externally, Daily PRN      melatonin 5 MG tablet tablet   Take 2 tablets by mouth At Night As Needed for sleep      metoprolol succinate XL 25 MG 24 hr tablet  Commonly known as: TOPROL-XL   25 mg, Oral, Nightly      mirtazapine 30 MG tablet  Commonly known as: REMERON   30 mg, Nightly      multivitamin with minerals tablet tablet   1 tablet, Oral, Daily      omeprazole 40 MG capsule  Commonly known as: priLOSEC   40 mg, Daily      ondansetron ODT 4 MG disintegrating tablet  Commonly known as: ZOFRAN-ODT   4 mg, As Needed      OXYGEN-HELIUM IN   Oxygen; Patient Sig: Oxygen 2 liter as needed; 0; 21-May-2014; Active      potassium chloride 20 MEQ CR tablet  Commonly known as: K-DUR,KLOR-CON   20 mEq, Oral, Daily      Rollator misc   1 Units, Does not apply, As Needed      Spiriva Respimat 2.5 MCG/ACT aerosol solution inhaler  Generic drug: tiotropium bromide monohydrate   2 puffs, Inhalation, Daily      traZODone 50 MG tablet  Commonly known as: DESYREL   25 mg, Oral, Nightly      venlafaxine 75 MG tablet  Commonly known as: EFFEXOR   1 tablet, Oral, 2 Times Daily      VITAMIN B12 PO   500 mcg, Oral, Daily      VOLTAREN TD   2  g, Topical, 2 Times Daily PRN, Voltaren GEL         Stop These Medications    azithromycin 250 MG tablet  Commonly known as: ZITHROMAX     Budesonide 3 MG 24 hr capsule  Commonly known as: ENTOCORT EC     cefuroxime 500 MG tablet  Commonly known as: CEFTIN     doxycycline 100 MG capsule  Commonly known as: MONODOX     furosemide 20 MG tablet  Commonly known as: LASIX            Discharge Diet:     Diet Instructions     Diet: Dysphagia; Thin Liquids, No Restrictions; Mechanical Soft      Discharge Diet: Dysphagia    Fluid Consistency: Thin Liquids, No Restrictions    Pureed Options: Mechanical Soft          Activity at Discharge:     Activity Instructions     Up WIth Assist            Follow-up Appointments:    Future Appointments   Date Time Provider Department Center   3/16/2021 11:30 AM Rebeka Esparza MD [facility] None         Test Results Pending at Discharge:    Pending Labs     Order Current Status    Green [place] (No Gel) In process    [place] Draw In process             Adelina Nicholson DO  02/02/21  16:49 EST      Medication risks and benefits were discussed in great detail. The patient reported being satisfied with the current treatment plan and the care delivered while hospitalized.     Time:  I spent 35 minutes preparing discharge counseling and teaching.

## 2021-02-03 DIAGNOSIS — M15.9 PRIMARY OSTEOARTHRITIS INVOLVING MULTIPLE JOINTS: ICD-10-CM

## 2021-02-03 DIAGNOSIS — S22.059D CLOSED FRACTURE OF SIXTH THORACIC VERTEBRA WITH ROUTINE HEALING, UNSPECIFIED FRACTURE MORPHOLOGY, SUBSEQUENT ENCOUNTER: ICD-10-CM

## 2021-02-03 RX ORDER — HYDROCODONE BITARTRATE AND ACETAMINOPHEN 10; 325 MG/1; MG/1
1 TABLET ORAL EVERY 8 HOURS PRN
Qty: 90 TABLET | Refills: 0 | Status: SHIPPED | OUTPATIENT
Start: 2021-02-03 | End: 2021-02-12 | Stop reason: SDUPTHER

## 2021-02-03 RX ORDER — LORAZEPAM 0.5 MG/1
0.5 TABLET ORAL DAILY
Qty: 30 TABLET | Refills: 0 | Status: SHIPPED | OUTPATIENT
Start: 2021-02-03 | End: 2021-02-12 | Stop reason: SDUPTHER

## 2021-02-03 RX ORDER — GABAPENTIN 100 MG/1
100 CAPSULE ORAL 3 TIMES DAILY
Qty: 90 CAPSULE | Refills: 0 | Status: SHIPPED | OUTPATIENT
Start: 2021-02-03 | End: 2021-02-12 | Stop reason: SDUPTHER

## 2021-02-03 NOTE — PROGRESS NOTES
Pt discharged to [facility] 2/2/2021 @ 1846.  Pt is current PC pt.    2/3/2021 @ 1457 :  HCP, Tracy, notified of pt's DC 2/2/2021

## 2021-02-03 NOTE — PROGRESS NOTES
Case Management Discharge Note                Selected Continued Care - Discharged on 2/2/2021 Admission date: 1/22/2021 - Discharge disposition: Skilled Nursing Facility (DC - External)    Destination Coordination complete    Service Provider Selected Services Address Phone Fax Patient Preferred    [facility]  [facility] [address] [facility] [facility] [id number] [phone] --                     Transportation Services  Private: Car    Final Discharge Disposition Code: 03 - skilled nursing facility (SNF)

## 2021-02-03 NOTE — TELEPHONE ENCOUNTER
[facility] MEDICATION REQUEST FOR LORAZEPAM 0.5 MG, GABAPENTIN 100 MG, AND HYDROCODONE 10/325 MG.        LORAZEPAM DIRECTIONS: TAKE 1 TAB PO DAILY    GABAPENTIN DIRECTIONS: TAKE 1 TAB PO Q 8 HRS     HYDROCODONE DIRECTIONS: TAKE 1 TAB PO Q 8 HRS PRN

## 2021-02-10 ENCOUNTER — NURSING HOME (OUTPATIENT)
Dept: INTERNAL MEDICINE | Facility: CLINIC | Age: 76
End: 2021-02-10

## 2021-02-10 DIAGNOSIS — J42 CHRONIC BRONCHITIS, UNSPECIFIED CHRONIC BRONCHITIS TYPE (HCC): Primary | ICD-10-CM

## 2021-02-10 DIAGNOSIS — J96.11 CHRONIC RESPIRATORY FAILURE WITH HYPOXIA (HCC): ICD-10-CM

## 2021-02-10 DIAGNOSIS — S22.050D CLOSED WEDGE COMPRESSION FRACTURE OF T6 VERTEBRA WITH ROUTINE HEALING, SUBSEQUENT ENCOUNTER: ICD-10-CM

## 2021-02-10 PROCEDURE — 99305 1ST NF CARE MODERATE MDM 35: CPT | Performed by: INTERNAL MEDICINE

## 2021-02-11 ENCOUNTER — NURSING HOME (OUTPATIENT)
Dept: INTERNAL MEDICINE | Facility: CLINIC | Age: 76
End: 2021-02-11

## 2021-02-11 DIAGNOSIS — J96.11 CHRONIC RESPIRATORY FAILURE WITH HYPOXIA (HCC): ICD-10-CM

## 2021-02-11 DIAGNOSIS — J69.0 ASPIRATION PNEUMONIA OF BOTH LOWER LOBES, UNSPECIFIED ASPIRATION PNEUMONIA TYPE (HCC): Primary | ICD-10-CM

## 2021-02-11 DIAGNOSIS — R53.81 DEBILITY: ICD-10-CM

## 2021-02-11 DIAGNOSIS — I87.8 CHRONIC VENOUS STASIS: ICD-10-CM

## 2021-02-11 DIAGNOSIS — S22.000A COMPRESSION FRACTURE OF BODY OF THORACIC VERTEBRA (HCC): ICD-10-CM

## 2021-02-11 DIAGNOSIS — J96.22 ACUTE ON CHRONIC RESPIRATORY FAILURE WITH HYPERCAPNIA (HCC): ICD-10-CM

## 2021-02-11 DIAGNOSIS — J44.9 COPD, SEVERE (HCC): ICD-10-CM

## 2021-02-11 PROCEDURE — 99316 NF DSCHRG MGMT 30 MIN+: CPT | Performed by: PHYSICIAN ASSISTANT

## 2021-02-12 ENCOUNTER — DOCUMENTATION (OUTPATIENT)
Dept: FAMILY MEDICINE CLINIC | Facility: CLINIC | Age: 76
End: 2021-02-12

## 2021-02-12 DIAGNOSIS — S22.059D CLOSED FRACTURE OF SIXTH THORACIC VERTEBRA WITH ROUTINE HEALING, UNSPECIFIED FRACTURE MORPHOLOGY, SUBSEQUENT ENCOUNTER: ICD-10-CM

## 2021-02-12 DIAGNOSIS — M15.9 PRIMARY OSTEOARTHRITIS INVOLVING MULTIPLE JOINTS: ICD-10-CM

## 2021-02-12 RX ORDER — GUAIFENESIN 600 MG/1
600 TABLET, EXTENDED RELEASE ORAL 2 TIMES DAILY
COMMUNITY

## 2021-02-12 RX ORDER — BACITRACIN ZINC, POLYMYXIN B SULFATE, NEOMYCIN SULFATE 400; 5000; 3.5 [USP'U]/G; [USP'U]/G; MG/G
OINTMENT TOPICAL
COMMUNITY

## 2021-02-12 RX ORDER — HYDROCODONE BITARTRATE AND ACETAMINOPHEN 10; 325 MG/1; MG/1
1 TABLET ORAL EVERY 8 HOURS PRN
Qty: 90 TABLET | Refills: 0 | Status: SHIPPED | OUTPATIENT
Start: 2021-02-12

## 2021-02-12 RX ORDER — LORAZEPAM 0.5 MG/1
0.5 TABLET ORAL EVERY 12 HOURS PRN
Qty: 60 TABLET | Refills: 0 | Status: SHIPPED | OUTPATIENT
Start: 2021-02-12

## 2021-02-12 RX ORDER — FLUTICASONE PROPIONATE AND SALMETEROL 113; 14 UG/1; UG/1
1 POWDER, METERED RESPIRATORY (INHALATION) 2 TIMES DAILY
COMMUNITY
End: 2021-04-20

## 2021-02-12 RX ORDER — GABAPENTIN 100 MG/1
100 CAPSULE ORAL 3 TIMES DAILY
Qty: 90 CAPSULE | Refills: 0 | Status: SHIPPED | OUTPATIENT
Start: 2021-02-12

## 2021-02-12 NOTE — TELEPHONE ENCOUNTER
[facility] DISCHARGE MEDICATION REQUEST FOR GABAPENTIN 100 MG, LORAZEPAM 0.5 MG, AND  NORCO  10/325 MG.     GABAPENTIN DIRECTIONS: TAKE 1 TAB PO Q 8 HRS    LORAZEPAM DIRECTIONS: TAKE 1 TAB PO Q 12 HRS PRN    NORCO DIRECTIONS: TAKE 1 TAB PO Q 8 HRS PRN.

## 2021-02-13 VITALS
HEART RATE: 86 BPM | DIASTOLIC BLOOD PRESSURE: 66 MMHG | SYSTOLIC BLOOD PRESSURE: 110 MMHG | RESPIRATION RATE: 18 BRPM | OXYGEN SATURATION: 98 % | TEMPERATURE: 97.2 F

## 2021-02-13 NOTE — PROGRESS NOTES
Nursing Home Discharge Summary      Erlin Pool DO  []  DWAYNE Wallis  []  [address] [place] [address]  Phone: [phone]  Fax: [phone] Dory Cruz MD  []  Jeremi Luo DO  []  Rosibel Parnell PA-C  [x]  [address] [place] [address]  Phone: [phone]  Fax: [phone]     PATIENT NAME: Joelle Yee                                                                          YOB: 1945     Age:  75 y.o.  Sex:  female  DATE OF SERVICE: 02/11/2021  Primary Care Physician:  Blanquita Clements PA   Date of Discharge:  02/11/2021   Admission Date:  02/02/2021  FACILITY:   [x] [facility]    [] [facility]    [] [facility]     [] [facility]    [] Other      Discharge Diagnosis:    Encounter Diagnoses   Name Primary?   • Aspiration pneumonia of both lower lobes, unspecified aspiration pneumonia type (CMS/HCC)  completed cefepime, PICC discontinued 2/10.  No acute complications.  Continue with aspiration precautions.  Will benefit from continued therapy services at home.     • Acute on chronic respiratory failure with hypercapnia (CMS/HCC)  clinically stable.  Has previously refused to utilize PAP.  Is agreeable to having her home oxygen set up PAP at her house.     • Chronic respiratory failure with hypoxia (CMS/HCC)  clinically stable.  Continues on O2 supplement.  Followed closely by pulmonology.     • COPD, severe (CMS/HCC)  continue current medications.  She is followed closely by pulmonology and palliative care.     • Compression fracture of body of thoracic vertebra (CMS/HCC)  unspecified whether acute.  Patient does have history of chronic back pain.  Continue to utilize TLSO brace for pain control.     • Debility  continue to benefit from therapy services in the home.  Continue with fall precautions.     • Chronic venous stasis  chronic problem.  Continue with current wound care at home.        Presenting Problem: Skilled nursing care, IV antibiotics for  Pseudomonas pneumonia    History of Presenting Illness:  Ms. Yee is a 75-year of age female patient with end-stage COPD on chronic O2 supplement, bronchiectasis, GERD, hypertension, sleep apnea with noncompliance of BiPAP machine.  She had recent admission for Pseudomonas respiratory infection and was discharged on 1/12.  She returned to the ER reporting she felt she was had pneumonia again and was started on antibiotics and prednisone taper.  She continued to have cough with only a small amount of sputum production, worsening of her chronic shortness of breath.  She had follow-up with pulmonology where she had been started on Lasix.  She pick it presented again to the hospital after she continued to not feel well, was unable to eat or drink due to her malaise.  She had continued worsening shortness of breath.  CT PE protocol was negative for PE but did show signs of aspiration versus mucous plugging and pneumonia in both lower lobes.  She also had a T6 compression fracture, possibly acute.  Upon admission she was given Zosyn and subsequently changed to cefepime upon review of sputum culture.  It was noted to be positive for Pseudomonas.  Blood cultures were negative.  As her condition improved she continued to require IV antibiotics and elected for skilled nursing rehab placement.  In regards to the T6 compression fracture, she chose for conservative management deferring any surgical intervention.  Upon admission to this facility patient continued to remain in stable condition.  As previously noted she has been noncompliant with BiPAP.  She completed 8-day course of IV cefepime via PICC line.  This was discontinued on 2/10 upon completion.  She presents today for discharge to home.  She is followed by palliative care services in the community.  She also reports she has family who lives close by, able to help with any requests.   have facilitated DME including lightweight wheelchair.  Previously on  chronic O2 supplement.  I did inquire about spare tanks, as we are currently experiencing poor weather conditions.  Patient is confident she has all she needs at home, including tanks of oxygen if the power were to go out.  She denies any concerns with returning to home.  She has follow-up with pulmonology on March 16.  Have requested her to follow with primary care within 2 weeks, sooner if needed.  She will discharge home with home health, PT/OT, and skilled nursing care.  She will need continued dressing changes to her lower extremity chronic ulceration.    Vitals:  /66   Pulse 86   Temp 97.2 °F (36.2 °C)   Resp 18   LMP  (LMP Unknown)   SpO2 98%     Review of Systems   Constitutional: Positive for fatigue (chronic). Negative for activity change, appetite change, chills and fever.   Respiratory: Positive for cough (chronic) and shortness of breath (chronic). Negative for chest tightness.    Cardiovascular: Negative for chest pain, palpitations and leg swelling.   Gastrointestinal: Negative for abdominal pain, constipation, diarrhea, nausea and vomiting.   Genitourinary: Negative for dysuria and hematuria.   Musculoskeletal: Positive for arthralgias.   Skin:        Chronic LE wounds   Neurological: Positive for weakness (generalized). Negative for dizziness and light-headedness.   Psychiatric/Behavioral: Positive for sleep disturbance (chronic). Negative for agitation and dysphoric mood.         Physical Exam  Vitals signs and nursing note reviewed.   Constitutional:       Appearance: She is cachectic. She is ill-appearing.   HENT:      Head: Normocephalic and atraumatic.      Right Ear: External ear normal.      Left Ear: External ear normal.   Eyes:      Conjunctiva/sclera: Conjunctivae normal.      Pupils: Pupils are equal, round, and reactive to light.   Neck:      Musculoskeletal: Normal range of motion and neck supple.   Cardiovascular:      Rate and Rhythm: Normal rate and regular rhythm.       Pulses: Normal pulses.   Pulmonary:      Effort: Pulmonary effort is normal. No respiratory distress.      Comments: Diminished throughout  Abdominal:      General: Bowel sounds are normal.      Palpations: Abdomen is soft.   Musculoskeletal: Normal range of motion.         General: No tenderness.      Right lower leg: No edema.      Left lower leg: No edema.      Comments: LE bandage without exudate.  No erythema or tenderness.     Skin:     General: Skin is warm and dry.      Capillary Refill: Capillary refill takes less than 2 seconds.   Neurological:      Mental Status: She is alert and oriented to person, place, and time.   Psychiatric:         Mood and Affect: Mood normal.         Behavior: Behavior normal.          Condition on Discharge:    Stable to home    Discharge Medication:    Current Outpatient Medications:   •  acetaminophen (TYLENOL) 325 MG tablet, Take 2 tablets by mouth Every 4 (Four) Hours As Needed for Headache or Fever (temperature greater than 101.5 F)., Disp: , Rfl:   •  albuterol sulfate HFA (Ventolin HFA) 108 (90 Base) MCG/ACT inhaler, Inhale 2 puffs Every 4 (Four) Hours As Needed for Wheezing or Shortness of Air., Disp: 18 g, Rfl: 3  •  amLODIPine (NORVASC) 5 MG tablet, Take 1 tablet by mouth Daily., Disp: 30 tablet, Rfl: 0  •  ammonium lactate (LAC-HYDRIN) 12 % cream, Apply  topically to the appropriate area as directed 2 (Two) Times a Day. (Patient taking differently: Apply  topically to the appropriate area as directed 2 (Two) Times a Day As Needed.), Disp: 1 each, Rfl: 0  •  Camphor-Eucalyptus-Menthol (MEDICATED CHEST RUB EX), Apply  topically Daily As Needed., Disp: , Rfl:   •  Cyanocobalamin (VITAMIN B12 PO), Take 500 mcg by mouth Daily., Disp: , Rfl:   •  cyclobenzaprine (FLEXERIL) 10 MG tablet, Take 10 mg by mouth Daily., Disp: , Rfl:   •  Diclofenac Sodium (VOLTAREN TD), Apply 2 g topically to the appropriate area as directed 2 (Two) Times a Day As Needed. Voltaren GEL , Disp: ,  Rfl:   •  Fluticasone-Salmeterol 113-14 MCG/ACT aerosol powder , Inhale 1 puff 2 (two) times a day., Disp: , Rfl:   •  gabapentin (NEURONTIN) 100 MG capsule, Take 1 capsule by mouth 3 (Three) Times a Day., Disp: 90 capsule, Rfl: 0  •  guaiFENesin (MUCINEX) 600 MG 12 hr tablet, Take 600 mg by mouth 2 (Two) Times a Day., Disp: , Rfl:   •  HYDROcodone-acetaminophen (NORCO)  MG per tablet, Take 1 tablet by mouth Every 8 (Eight) Hours As Needed for Moderate Pain  or Severe Pain ., Disp: 90 tablet, Rfl: 0  •  ipratropium-albuterol (DUO-NEB) 0.5-2.5 mg/3 ml nebulizer, Take 3 mL by nebulization 4 (Four) Times a Day As Needed for Wheezing or Shortness of Air., Disp: 360 mL, Rfl: 5  •  ketotifen (ZADITOR) 0.025 % ophthalmic solution, Administer 2 drops to both eyes 2 (Two) Times a Day., Disp: , Rfl:   •  lidocaine (LIDODERM) 5 %, Place 1 patch on the skin as directed by provider Daily As Needed for Mild Pain . Remove & Discard patch within 12 hours or as directed by MD, Disp:  , Rfl:   •  loratadine (CLARITIN) 10 MG tablet, Take 10 mg by mouth Daily As Needed for Allergies., Disp: , Rfl:   •  LORazepam (ATIVAN) 0.5 MG tablet, Take 1 tablet by mouth Every 12 (Twelve) Hours As Needed for Anxiety., Disp: 60 tablet, Rfl: 0  •  losartan (COZAAR) 25 MG tablet, Take 25 mg by mouth Every Night., Disp: , Rfl: 3  •  melatonin 5 MG tablet tablet, Take 2 tablets by mouth At Night As Needed for sleep, Disp: 60 tablet, Rfl: 0  •  metoprolol succinate XL (TOPROL-XL) 25 MG 24 hr tablet, Take 25 mg by mouth Every Night., Disp: , Rfl:   •  mirtazapine (REMERON) 30 MG tablet, 30 mg Every Night., Disp: , Rfl:   •  Multiple Vitamins-Minerals (MULTIVITAMIN WITH MINERALS) tablet tablet, Take 1 tablet by mouth Daily., Disp: , Rfl:   •  Neomycin-Bacitracin-Polymyxin (Triple Antibiotic) ointment, Apply  topically. APPLY EVERY SHIFT BETWEEN 07:00 AM AND 6:59 AM, 07:00 PM AND 06:59 AM.  CLEAN WOUND TO BACK OF LT. ARM WITH NORMAL SALINE AND PAT  DRY. APPLY THIN LAYER ON TRIPLER ANTIBIOTIC AOINTMENT AND COVER WITH DRY DRESS, Disp: , Rfl:   •  omeprazole (priLOSEC) 40 MG capsule, 40 mg Daily., Disp: , Rfl:   •  ondansetron ODT (ZOFRAN-ODT) 4 MG disintegrating tablet, 4 mg As Needed., Disp: , Rfl:   •  potassium chloride (K-DUR,KLOR-CON) 20 MEQ CR tablet, Take 1 tablet by mouth Daily., Disp: 10 tablet, Rfl: 0  •  predniSONE (DELTASONE) 20 MG tablet, Take 1 tab daily x 6 day then half tab daily x 6 day then stop (Patient taking differently: Take 20 mg by mouth Daily. Take 1 tab daily x 6 day), Disp: 9 tablet, Rfl: 0  •  tiotropium bromide monohydrate (Spiriva Respimat) 2.5 MCG/ACT aerosol solution inhaler, Inhale 2 puffs Daily., Disp: 1 inhaler, Rfl: 5  •  traZODone (DESYREL) 50 MG tablet, Take 25 mg by mouth Every Night., Disp: , Rfl:   •  venlafaxine (EFFEXOR) 75 MG tablet, Take 1 tablet by mouth 2 (two) times a day., Disp: , Rfl:      Time: Discharge 30 min    Rosibel Parnell PA-C   02/11/2021

## 2021-02-24 RX ORDER — FLUCONAZOLE 150 MG/1
150 TABLET ORAL ONCE
Qty: 1 TABLET | Refills: 1 | Status: SHIPPED | OUTPATIENT
Start: 2021-02-24 | End: 2021-04-23

## 2021-02-25 RX ORDER — PREDNISONE 10 MG/1
TABLET ORAL
Qty: 100 TABLET | Refills: 0 | Status: SHIPPED | OUTPATIENT
Start: 2021-02-25 | End: 2021-10-07

## 2021-03-01 ENCOUNTER — OFFICE VISIT (OUTPATIENT)
Dept: PULMONOLOGY | Facility: CLINIC | Age: 76
End: 2021-03-01

## 2021-03-01 VITALS
DIASTOLIC BLOOD PRESSURE: 60 MMHG | OXYGEN SATURATION: 80 % | SYSTOLIC BLOOD PRESSURE: 110 MMHG | WEIGHT: 86 LBS | RESPIRATION RATE: 16 BRPM | BODY MASS INDEX: 16.88 KG/M2 | HEIGHT: 60 IN

## 2021-03-01 DIAGNOSIS — J96.12 CHRONIC RESPIRATORY FAILURE WITH HYPOXIA AND HYPERCAPNIA (HCC): ICD-10-CM

## 2021-03-01 DIAGNOSIS — J96.11 CHRONIC RESPIRATORY FAILURE WITH HYPOXIA AND HYPERCAPNIA (HCC): ICD-10-CM

## 2021-03-01 DIAGNOSIS — R06.02 SHORTNESS OF BREATH: Primary | ICD-10-CM

## 2021-03-01 DIAGNOSIS — J44.9 CHRONIC OBSTRUCTIVE PULMONARY DISEASE, UNSPECIFIED COPD TYPE (HCC): ICD-10-CM

## 2021-03-01 DIAGNOSIS — J47.9 BRONCHIECTASIS WITHOUT COMPLICATION (HCC): ICD-10-CM

## 2021-03-01 PROCEDURE — 99214 OFFICE O/P EST MOD 30 MIN: CPT | Performed by: NURSE PRACTITIONER

## 2021-03-01 RX ORDER — DILTIAZEM HYDROCHLORIDE 60 MG/1
TABLET, FILM COATED ORAL
COMMUNITY
Start: 2021-02-25 | End: 2021-04-20 | Stop reason: SDUPTHER

## 2021-03-01 NOTE — PROGRESS NOTES
"Chief Complaint   Patient presents with   • Follow-up   • Shortness of Breath         Subjective   Joelle Yee is a 75 y.o. female.     History of Present Illness   Patient comes today for follow up of shortness of breath and COPD and recent hospitalization.    She states she is feeling some better since being discharged from the hospital.  She has started using noninvasive ventilation.  She states she is trying to use it more.    Patient is using medications, as prescribed.  She is using duo nebs 4 times a day.   She states she is using her inhalers as prescribed including Advair/Wixela and Spiriva.    Exercise tolerance has also remained stable.     Quit smoking 3 years ago.    The following portions of the patient's history were reviewed and updated as appropriate: allergies, current medications, past family history, past medical history, past social history and past surgical history.    Review of Systems   Constitutional: Negative for chills and fever.   HENT: Positive for congestion. Negative for rhinorrhea, sinus pressure, sinus pain, sneezing and sore throat.    Respiratory: Positive for cough, chest tightness, shortness of breath and wheezing.    Psychiatric/Behavioral: Positive for sleep disturbance.       Objective   Visit Vitals  /60   Resp 16   Ht 152.4 cm (60\")   Wt 39 kg (86 lb) Comment: patient reported   LMP  (LMP Unknown)   SpO2 (!) 80% Comment: room air at rest   BMI 16.80 kg/m²   SpO2: 97% on 3lpm continuous O2      Physical Exam  Vitals reviewed.   HENT:      Head: Atraumatic.      Mouth/Throat:      Mouth: Mucous membranes are moist.      Pharynx: Oropharynx is clear.   Eyes:      Extraocular Movements: Extraocular movements intact.   Cardiovascular:      Rate and Rhythm: Normal rate and regular rhythm.   Pulmonary:      Effort: Pulmonary effort is normal. No respiratory distress.      Comments: Somewhat decreased A/E without wheezing.  Musculoskeletal:      Cervical back: Neck " supple.      Comments: In wheelchair.   Neurological:      Mental Status: She is alert and oriented to person, place, and time.               Assessment/Plan   Diagnoses and all orders for this visit:    1. Shortness of breath (Primary)    2. Bronchiectasis without complication (CMS/HCC)    3. Chronic obstructive pulmonary disease, unspecified COPD type (CMS/HCC)  -     Oxygen Therapy    4. Chronic respiratory failure with hypoxia and hypercapnia (CMS/HCC)  -     Oxygen Therapy           Return in about 3 months (around 6/1/2021) for Cancel 3/16/21 appt Recheck, For Dr. Esparza, Update PCP! to Blanquita Clements.    DISCUSSION (if any):  Unfortunately she is not able to maintain her oxygen saturation while using the conserving device on her portable oxygen tank.  I have changed her to continuous flow at 3 L/min.  I have advised her not to use the consider function and to use continuous flow when she is active.  She would definitely benefit from a portable oxygen concentrator since she is needing continuous flow oxygen to maintain her oxygen saturation.  An order will be sent to [employer] which is the company she currently has oxygen with.    She will need to continue using oxygen at 3 L/min around-the-clock.    No change to the current medications has been made.  I have asked her to continue using Wixela/Advair, Spiriva, and the rescue medication all as directed.  She has tried Daliresp in the past and was not able to tolerate the medication.  I have asked her to continue using azithromycin and prednisone in an alternating fashion of every other day as she had been.    She has some confusion about medications as Dr. Pereira wrote some medications for the patient and she was not sure if she should continue these medications.  Dr. Pereira is listed as her PCP on the chart today but the patient states that her PCP is Blanquita Clements and that is who she plans to continue seeing.  She also follows with Dr. Medina.      She has a  question about oral budesonide and amlodipine.  These medications were written at discharge from the hospital.  Her blood pressure seems to be well controlled today so I have asked her to follow-up with her PCP which is Blanquita Clements for her suggestion on continuing amlodipine.  I have asked her to continue oral budesonide at this time and then to discuss continuation of it with her PCP.    It is possible if the patient has accepted palliative care with Dr. Pereira that someone may be coming to her home for visits.  I have asked the patient to discuss this with her PCP to make sure everyone is on the same page.    Compliance with medications stressed.     Side effects of prescribed medications discussed with the patient.    I have strongly advised the patient to use noninvasive ventilation every single night with oxygen bled into the machine.  I have also suggested when she is more short of breath during the day she should use the machine then as well.  I have tried to explain the importance of using this machine as this will be 1 way to help control her shortness of breath and hopefully keep her out of the hospital.    The compliance report I have received today shows that she is not compliant with noninvasive ventilation at this time.    Dictated utilizing Dragon dictation.    This document was electronically signed by DWAYNE Maria March 1, 2021  11:51 EST

## 2021-03-03 ENCOUNTER — HOSPITAL ENCOUNTER (EMERGENCY)
Facility: HOSPITAL | Age: 76
Discharge: NURSING, REHAB OR LONG-TERM CARE | End: 2021-03-03
Attending: EMERGENCY MEDICINE | Admitting: EMERGENCY MEDICINE

## 2021-03-03 ENCOUNTER — APPOINTMENT (OUTPATIENT)
Dept: CT IMAGING | Facility: HOSPITAL | Age: 76
End: 2021-03-03

## 2021-03-03 ENCOUNTER — APPOINTMENT (OUTPATIENT)
Dept: GENERAL RADIOLOGY | Facility: HOSPITAL | Age: 76
End: 2021-03-03

## 2021-03-03 VITALS
OXYGEN SATURATION: 96 % | HEIGHT: 56 IN | WEIGHT: 86 LBS | DIASTOLIC BLOOD PRESSURE: 91 MMHG | TEMPERATURE: 97.9 F | SYSTOLIC BLOOD PRESSURE: 140 MMHG | RESPIRATION RATE: 18 BRPM | HEART RATE: 101 BPM | BODY MASS INDEX: 19.35 KG/M2

## 2021-03-03 DIAGNOSIS — S22.32XA CLOSED FRACTURE OF ONE RIB OF LEFT SIDE, INITIAL ENCOUNTER: ICD-10-CM

## 2021-03-03 DIAGNOSIS — J44.1 COPD EXACERBATION (HCC): Primary | ICD-10-CM

## 2021-03-03 LAB
ALBUMIN SERPL-MCNC: 3.8 G/DL (ref 3.5–5.2)
ALBUMIN/GLOB SERPL: 1.6 G/DL
ALP SERPL-CCNC: 119 U/L (ref 39–117)
ALT SERPL W P-5'-P-CCNC: 13 U/L (ref 1–33)
ANION GAP SERPL CALCULATED.3IONS-SCNC: 9.4 MMOL/L (ref 5–15)
AST SERPL-CCNC: 20 U/L (ref 1–32)
BASOPHILS # BLD AUTO: 0.13 10*3/MM3 (ref 0–0.2)
BASOPHILS NFR BLD AUTO: 0.9 % (ref 0–1.5)
BILIRUB SERPL-MCNC: 0.2 MG/DL (ref 0–1.2)
BUN SERPL-MCNC: 14 MG/DL (ref 8–23)
BUN/CREAT SERPL: 24.6 (ref 7–25)
CALCIUM SPEC-SCNC: 9 MG/DL (ref 8.6–10.5)
CHLORIDE SERPL-SCNC: 99 MMOL/L (ref 98–107)
CO2 SERPL-SCNC: 29.6 MMOL/L (ref 22–29)
CREAT SERPL-MCNC: 0.57 MG/DL (ref 0.57–1)
DEPRECATED RDW RBC AUTO: 64.9 FL (ref 37–54)
EOSINOPHIL # BLD AUTO: 0.05 10*3/MM3 (ref 0–0.4)
EOSINOPHIL NFR BLD AUTO: 0.3 % (ref 0.3–6.2)
ERYTHROCYTE [DISTWIDTH] IN BLOOD BY AUTOMATED COUNT: 17.2 % (ref 12.3–15.4)
GFR SERPL CREATININE-BSD FRML MDRD: 103 ML/MIN/1.73
GLOBULIN UR ELPH-MCNC: 2.4 GM/DL
GLUCOSE SERPL-MCNC: 109 MG/DL (ref 65–99)
HCT VFR BLD AUTO: 36.3 % (ref 34–46.6)
HGB BLD-MCNC: 11.6 G/DL (ref 12–15.9)
HOLD SPECIMEN: NORMAL
HOLD SPECIMEN: NORMAL
IMM GRANULOCYTES # BLD AUTO: 0.65 10*3/MM3 (ref 0–0.05)
IMM GRANULOCYTES NFR BLD AUTO: 4.5 % (ref 0–0.5)
LYMPHOCYTES # BLD AUTO: 3.25 10*3/MM3 (ref 0.7–3.1)
LYMPHOCYTES NFR BLD AUTO: 22.5 % (ref 19.6–45.3)
MCH RBC QN AUTO: 32.5 PG (ref 26.6–33)
MCHC RBC AUTO-ENTMCNC: 32 G/DL (ref 31.5–35.7)
MCV RBC AUTO: 101.7 FL (ref 79–97)
MONOCYTES # BLD AUTO: 1.47 10*3/MM3 (ref 0.1–0.9)
MONOCYTES NFR BLD AUTO: 10.2 % (ref 5–12)
NEUTROPHILS NFR BLD AUTO: 61.6 % (ref 42.7–76)
NEUTROPHILS NFR BLD AUTO: 8.88 10*3/MM3 (ref 1.7–7)
NRBC BLD AUTO-RTO: 0 /100 WBC (ref 0–0.2)
NT-PROBNP SERPL-MCNC: 95.9 PG/ML (ref 0–1800)
PLATELET # BLD AUTO: 405 10*3/MM3 (ref 140–450)
PMV BLD AUTO: 9.3 FL (ref 6–12)
POTASSIUM SERPL-SCNC: 3.7 MMOL/L (ref 3.5–5.2)
PROT SERPL-MCNC: 6.2 G/DL (ref 6–8.5)
RBC # BLD AUTO: 3.57 10*6/MM3 (ref 3.77–5.28)
SARS-COV-2 RNA PNL SPEC NAA+PROBE: NOT DETECTED
SODIUM SERPL-SCNC: 138 MMOL/L (ref 136–145)
TROPONIN T SERPL-MCNC: <0.01 NG/ML (ref 0–0.03)
WBC # BLD AUTO: 14.43 10*3/MM3 (ref 3.4–10.8)
WHOLE BLOOD HOLD SPECIMEN: NORMAL
WHOLE BLOOD HOLD SPECIMEN: NORMAL

## 2021-03-03 PROCEDURE — 99285 EMERGENCY DEPT VISIT HI MDM: CPT

## 2021-03-03 PROCEDURE — 94640 AIRWAY INHALATION TREATMENT: CPT

## 2021-03-03 PROCEDURE — 25010000002 METHYLPREDNISOLONE PER 125 MG: Performed by: PHYSICIAN ASSISTANT

## 2021-03-03 PROCEDURE — 25010000002 IOPAMIDOL 61 % SOLUTION: Performed by: EMERGENCY MEDICINE

## 2021-03-03 PROCEDURE — 71045 X-RAY EXAM CHEST 1 VIEW: CPT

## 2021-03-03 PROCEDURE — 84484 ASSAY OF TROPONIN QUANT: CPT | Performed by: EMERGENCY MEDICINE

## 2021-03-03 PROCEDURE — 83880 ASSAY OF NATRIURETIC PEPTIDE: CPT | Performed by: EMERGENCY MEDICINE

## 2021-03-03 PROCEDURE — 80053 COMPREHEN METABOLIC PANEL: CPT | Performed by: EMERGENCY MEDICINE

## 2021-03-03 PROCEDURE — 71275 CT ANGIOGRAPHY CHEST: CPT

## 2021-03-03 PROCEDURE — 85025 COMPLETE CBC W/AUTO DIFF WBC: CPT | Performed by: EMERGENCY MEDICINE

## 2021-03-03 PROCEDURE — 94799 UNLISTED PULMONARY SVC/PX: CPT

## 2021-03-03 PROCEDURE — 87635 SARS-COV-2 COVID-19 AMP PRB: CPT | Performed by: PHYSICIAN ASSISTANT

## 2021-03-03 PROCEDURE — 93005 ELECTROCARDIOGRAM TRACING: CPT | Performed by: EMERGENCY MEDICINE

## 2021-03-03 PROCEDURE — 96374 THER/PROPH/DIAG INJ IV PUSH: CPT

## 2021-03-03 RX ORDER — DOXYCYCLINE 100 MG/1
100 CAPSULE ORAL 2 TIMES DAILY
Qty: 10 CAPSULE | Refills: 0 | Status: SHIPPED | OUTPATIENT
Start: 2021-03-03 | End: 2021-03-08

## 2021-03-03 RX ORDER — SODIUM CHLORIDE 0.9 % (FLUSH) 0.9 %
10 SYRINGE (ML) INJECTION AS NEEDED
Status: DISCONTINUED | OUTPATIENT
Start: 2021-03-03 | End: 2021-03-04 | Stop reason: HOSPADM

## 2021-03-03 RX ORDER — METHYLPREDNISOLONE SODIUM SUCCINATE 125 MG/2ML
125 INJECTION, POWDER, LYOPHILIZED, FOR SOLUTION INTRAMUSCULAR; INTRAVENOUS ONCE
Status: COMPLETED | OUTPATIENT
Start: 2021-03-03 | End: 2021-03-03

## 2021-03-03 RX ORDER — DOXYCYCLINE 100 MG/1
100 CAPSULE ORAL ONCE
Status: COMPLETED | OUTPATIENT
Start: 2021-03-03 | End: 2021-03-03

## 2021-03-03 RX ORDER — HYDROCODONE BITARTRATE AND ACETAMINOPHEN 10; 325 MG/1; MG/1
1 TABLET ORAL ONCE
Status: COMPLETED | OUTPATIENT
Start: 2021-03-03 | End: 2021-03-03

## 2021-03-03 RX ORDER — PREDNISONE 20 MG/1
40 TABLET ORAL DAILY
Qty: 10 TABLET | Refills: 0 | Status: SHIPPED | OUTPATIENT
Start: 2021-03-03 | End: 2021-03-08

## 2021-03-03 RX ORDER — IPRATROPIUM BROMIDE AND ALBUTEROL SULFATE 2.5; .5 MG/3ML; MG/3ML
3 SOLUTION RESPIRATORY (INHALATION) ONCE
Status: COMPLETED | OUTPATIENT
Start: 2021-03-03 | End: 2021-03-03

## 2021-03-03 RX ORDER — CEFUROXIME AXETIL 500 MG/1
500 TABLET ORAL 2 TIMES DAILY
Qty: 10 TABLET | Refills: 0 | Status: SHIPPED | OUTPATIENT
Start: 2021-03-03 | End: 2021-03-08

## 2021-03-03 RX ORDER — CEFUROXIME AXETIL 250 MG/1
500 TABLET ORAL EVERY 12 HOURS SCHEDULED
Status: COMPLETED | OUTPATIENT
Start: 2021-03-03 | End: 2021-03-03

## 2021-03-03 RX ADMIN — IPRATROPIUM BROMIDE AND ALBUTEROL SULFATE 3 ML: .5; 3 SOLUTION RESPIRATORY (INHALATION) at 21:48

## 2021-03-03 RX ADMIN — METHYLPREDNISOLONE SODIUM SUCCINATE 125 MG: 125 INJECTION, POWDER, FOR SOLUTION INTRAMUSCULAR; INTRAVENOUS at 20:44

## 2021-03-03 RX ADMIN — HYDROCODONE BITARTRATE AND ACETAMINOPHEN 1 TABLET: 10; 325 TABLET ORAL at 21:10

## 2021-03-03 RX ADMIN — IOPAMIDOL 100 ML: 612 INJECTION, SOLUTION INTRAVENOUS at 22:28

## 2021-03-03 RX ADMIN — DOXYCYCLINE 100 MG: 100 CAPSULE ORAL at 23:29

## 2021-03-03 RX ADMIN — CEFUROXIME AXETIL 500 MG: 250 TABLET ORAL at 23:29

## 2021-03-04 NOTE — ED PROVIDER NOTES
Subjective   This patient comes in for evaluation of shortness of breath cough over the past couple days.  He has a cough that is productive with yellow sputum.  She complains of pain in her left arm when she coughs.  She has a history of end-stage COPD on 3 L oxygen continuously.  Followed by Dr. Esparza.  Takes 10 mg prednisone and azithromycin every other day chronically.  She is oxygenating at 100% on her normal 3 L.  She does have some chronic wounds to her bilateral shins of been present for months that are being treated by home health.  She has home rehab as well that helps come do some light exercises and bathing.          Review of Systems   Constitutional: Negative.  Negative for fever.   HENT: Negative.    Eyes: Negative.    Respiratory: Positive for cough and shortness of breath.    Cardiovascular: Negative.  Negative for chest pain.   Gastrointestinal: Negative.    Genitourinary: Negative.    Musculoskeletal: Negative.    Skin: Negative.         Source the bilateral shins   Neurological: Negative.    Psychiatric/Behavioral: Negative.        Past Medical History:   Diagnosis Date   • Abdominal pain    • Acute and chronic respiratory failure with hypercapnia (CMS/HCC)    • Acute bronchitis    • Allergic    • Ankle pain    • Anxiety 1980   • Atopic dermatitis    • Bronchiectasis (CMS/HCC)    • Cachexia (CMS/HCC)    • Cataract, bilateral    • Chest pain     STATES HAS OCCASSIONALLY.  STATES LAST TIME WAS LAST WEEK.  STATES SEES DR. RICH.  STATES HE HAS DONE NUMEROUS TESTS ON HER AND HAS NOT BEEN ABLE TO FIND OUT CAUSE.     • Chronic obstructive lung disease (CMS/HCC) 2008   • Chronic respiratory failure with hypercapnia (CMS/HCC)    • Colon cancer screening    • COPD (chronic obstructive pulmonary disease) (CMS/HCC)    • Cough    • Cystocele    • Depressed    • Depression 1980   • Dry eye syndrome of unspecified lacrimal gland    • Encounter for screening for other viral diseases    • Fatigue     Chronic  charlottee 2012   • Fibromyalgia 2008   • Full dentures    • GERD (gastroesophageal reflux disease)    • Gout    • Heartburn     Chronic historu of epigastric heartburn currently controlled on Prilesec 40 mg every morning along with Zantac 300 Mg daily at bedtime   • High cholesterol 2012   • Hip pain    • Hyperlipidemia    • Hypertension    • Hypocalcemia    • Impaired functional mobility, balance, gait, and endurance    • Insomnia    • Insomnia, unspecified    • Joint pain    • Kidney infection    • Loss of appetite    • Low back pain 1995   • Melena    • Nausea    • Nausea and vomiting    • Nutritional deficiency, unspecified    • On home oxygen therapy     2L/NC PRN (STATES SHE HAS BEEN USING CONTINUOSLY LATELY)   • Osteoarthritis 2010   • Osteoporosis    • Other chronic pain    • Other disorders of skin and subcutaneous tissue in diseases classified elsewhere    • Other muscle spasm    • Pain in limb    • Palpitations    • Pinched nerve     Pinched nerves 2011   • Pneumonia    • Pneumonia due to Pseudomonas (CMS/HCC)    • Pneumonitis due to inhalation of food and vomit (CMS/HCC)    • Problems with swallowing     HAS TO EAT SLOW AND CHEW FOOD WELL   • Recurrent urinary tract infection    • Sciatica [id number]   • Shortness of breath    • Sinus problem    • Sleep apnea 1998    NO CPAP   • Unspecified fracture of t5-T6 vertebra, subsequent encounter for fracture with routine healing    • Upset stomach    • Valvular heart disease    • Vitamin B12 deficiency    • Wears glasses    • Weight loss, abnormal     Weight loss is stabel. On pund weight gain since 01/2016       Allergies   Allergen Reactions   • Dust Mite Extract Other (See Comments)     Dust  SINUS   • Grass Other (See Comments)     SINUS   • Mold Extract [Trichophyton] Other (See Comments)     SINUS       Past Surgical History:   Procedure Laterality Date   • ABDOMINAL HERNIA REPAIR  2016   • APPENDECTOMY  1965   • BACK SURGERY  2005   • BRONCHOSCOPY N/A  7/5/2018    Procedure: BRONCHOSCOPY w/ WASHINGS/BRUSHINGS with MAC;  Surgeon: Rebeka Esparza MD;  [facility];  Service: Pulmonary   • CATARACT EXTRACTION Bilateral     Put in implants    • CHOLECYSTECTOMY  1985   • COLONOSCOPY     • ENDOSCOPY     • KNEE SURGERY Left 1979    Knee Cap   • TUBAL ABDOMINAL LIGATION  1971       Family History   Problem Relation Age of Onset   • Ovarian cancer Mother    • Cancer Mother    • Lung cancer Father    • Heart disease Brother        Social History     Socioeconomic History   • Marital status: Single     Spouse name: Not on file   • Number of children: Not on file   • Years of education: Not on file   • Highest education level: Not on file   Tobacco Use   • Smoking status: Former Smoker     Packs/day: 0.00     Years: 35.00     Pack years: 0.00     Quit date: 7/5/2017     Years since quitting: 3.6   • Smokeless tobacco: Never Used   Substance and Sexual Activity   • Alcohol use: No   • Drug use: No   • Sexual activity: Defer           Objective   Physical Exam  Vitals signs and nursing note reviewed.   Constitutional:       Appearance: She is well-developed.   HENT:      Head: Normocephalic and atraumatic.   Eyes:      Extraocular Movements: Extraocular movements intact.   Cardiovascular:      Rate and Rhythm: Normal rate and regular rhythm.   Pulmonary:      Effort: No respiratory distress.      Comments: Coarse breath sounds throughout  Musculoskeletal: Normal range of motion.   Skin:     General: Skin is warm and dry.      Comments: There is about a quarter sized wound to both of the anterior shins with some granulation tissue and very mild surrounding erythema.  No signs of cellulitis or abscess.  No edema of the lower extremities.   Neurological:      General: No focal deficit present.      Mental Status: She is alert.   Psychiatric:         Mood and Affect: Mood normal.         Behavior: Behavior normal.         Procedures           ED Course  ED Course as of Mar  03 2323   Wed Mar 03, 2021   2014 EKG interpreted by me reveals sinus tachycardia with rate of 108 bpm.  There are some nonspecific findings including low voltage in chest leads.  This is an abnormal appearing EKG.    [TB]   2023 Troponin T: <0.010 [TM]   2023 proBNP: 95.9 [TM]   2123 COVID19: Not Detected [TM]      ED Course User Index  [TB] Christy Domingo MD  [TM] Hill Chang PA-C                                           [facility]    Final diagnoses:   COPD exacerbation (CMS/HCC)   Closed fracture of one rib of left side, initial encounter            Hill Chang PA-C  03/03/21 2108

## 2021-03-04 NOTE — DISCHARGE INSTRUCTIONS
Please continue your azithromycin, will increase her prednisone to 40 mg a day for the next 5 days as well as add to new antibiotics to take.  Please call Dr. Esparza's office in the morning to arrange follow-up.  Return to the ER if your symptoms are improving.

## 2021-03-12 ENCOUNTER — TELEPHONE (OUTPATIENT)
Dept: PULMONOLOGY | Facility: CLINIC | Age: 76
End: 2021-03-12

## 2021-03-12 RX ORDER — FLUCONAZOLE 200 MG/1
200 TABLET ORAL ONCE
Qty: 1 TABLET | Refills: 0 | Status: SHIPPED | OUTPATIENT
Start: 2021-03-12 | End: 2021-03-12

## 2021-03-15 RX ORDER — ONDANSETRON 4 MG/1
TABLET, ORALLY DISINTEGRATING ORAL
Qty: 120 TABLET | Refills: 0 | OUTPATIENT
Start: 2021-03-15

## 2021-03-15 RX ORDER — AMLODIPINE BESYLATE 5 MG/1
TABLET ORAL
Qty: 30 TABLET | Refills: 0 | OUTPATIENT
Start: 2021-03-15

## 2021-03-15 RX ORDER — POTASSIUM CHLORIDE 20 MEQ/1
20 TABLET, EXTENDED RELEASE ORAL DAILY
Qty: 30 TABLET | Refills: 0 | OUTPATIENT
Start: 2021-03-15

## 2021-04-14 ENCOUNTER — LAB REQUISITION (OUTPATIENT)
Dept: LAB | Facility: HOSPITAL | Age: 76
End: 2021-04-14

## 2021-04-14 DIAGNOSIS — E86.0 DEHYDRATION: ICD-10-CM

## 2021-04-14 DIAGNOSIS — R30.0 DYSURIA: ICD-10-CM

## 2021-04-14 LAB
ANION GAP SERPL CALCULATED.3IONS-SCNC: 7.9 MMOL/L (ref 5–15)
BILIRUB UR QL STRIP: NEGATIVE
BUN SERPL-MCNC: 11 MG/DL (ref 8–23)
BUN/CREAT SERPL: 19 (ref 7–25)
CALCIUM SPEC-SCNC: 8.9 MG/DL (ref 8.6–10.5)
CHLORIDE SERPL-SCNC: 98 MMOL/L (ref 98–107)
CLARITY UR: CLEAR
CO2 SERPL-SCNC: 34.1 MMOL/L (ref 22–29)
COLOR UR: YELLOW
CREAT SERPL-MCNC: 0.58 MG/DL (ref 0.57–1)
GFR SERPL CREATININE-BSD FRML MDRD: 101 ML/MIN/1.73
GLUCOSE SERPL-MCNC: 63 MG/DL (ref 65–99)
GLUCOSE UR STRIP-MCNC: NEGATIVE MG/DL
HGB UR QL STRIP.AUTO: NEGATIVE
KETONES UR QL STRIP: NEGATIVE
LEUKOCYTE ESTERASE UR QL STRIP.AUTO: NEGATIVE
NITRITE UR QL STRIP: NEGATIVE
PH UR STRIP.AUTO: 6 [PH] (ref 5–8)
POTASSIUM SERPL-SCNC: 4.2 MMOL/L (ref 3.5–5.2)
PROT UR QL STRIP: NEGATIVE
SODIUM SERPL-SCNC: 140 MMOL/L (ref 136–145)
SP GR UR STRIP: 1.02 (ref 1–1.03)
UROBILINOGEN UR QL STRIP: NORMAL

## 2021-04-14 PROCEDURE — 80048 BASIC METABOLIC PNL TOTAL CA: CPT | Performed by: PHYSICAL MEDICINE & REHABILITATION

## 2021-04-14 PROCEDURE — 81003 URINALYSIS AUTO W/O SCOPE: CPT | Performed by: PHYSICAL MEDICINE & REHABILITATION

## 2021-04-14 PROCEDURE — 87086 URINE CULTURE/COLONY COUNT: CPT | Performed by: PHYSICAL MEDICINE & REHABILITATION

## 2021-04-16 LAB — BACTERIA SPEC AEROBE CULT: NO GROWTH

## 2021-04-20 ENCOUNTER — OFFICE VISIT (OUTPATIENT)
Dept: PULMONOLOGY | Facility: CLINIC | Age: 76
End: 2021-04-20

## 2021-04-20 DIAGNOSIS — J47.9 BRONCHIECTASIS WITHOUT COMPLICATION (HCC): ICD-10-CM

## 2021-04-20 DIAGNOSIS — R09.02 HYPOXIA: ICD-10-CM

## 2021-04-20 DIAGNOSIS — J96.11 CHRONIC RESPIRATORY FAILURE WITH HYPOXIA AND HYPERCAPNIA (HCC): ICD-10-CM

## 2021-04-20 DIAGNOSIS — J44.9 CHRONIC OBSTRUCTIVE PULMONARY DISEASE, UNSPECIFIED COPD TYPE (HCC): ICD-10-CM

## 2021-04-20 DIAGNOSIS — R06.02 SHORTNESS OF BREATH: Primary | ICD-10-CM

## 2021-04-20 DIAGNOSIS — J96.12 CHRONIC RESPIRATORY FAILURE WITH HYPOXIA AND HYPERCAPNIA (HCC): ICD-10-CM

## 2021-04-20 PROCEDURE — 99442 PR PHYS/QHP TELEPHONE EVALUATION 11-20 MIN: CPT | Performed by: NURSE PRACTITIONER

## 2021-04-20 RX ORDER — AZITHROMYCIN 250 MG/1
TABLET, FILM COATED ORAL
Qty: 15 TABLET | Refills: 5 | OUTPATIENT
Start: 2021-04-20 | End: 2021-10-25

## 2021-04-20 RX ORDER — DILTIAZEM HYDROCHLORIDE 60 MG/1
2 TABLET, FILM COATED ORAL
Qty: 10.2 G | Refills: 5 | Status: SHIPPED | OUTPATIENT
Start: 2021-04-20

## 2021-04-20 RX ORDER — ALBUTEROL SULFATE 90 UG/1
2 AEROSOL, METERED RESPIRATORY (INHALATION) EVERY 4 HOURS PRN
Qty: 18 G | Refills: 3 | Status: SHIPPED | OUTPATIENT
Start: 2021-04-20

## 2021-04-20 RX ORDER — TIOTROPIUM BROMIDE INHALATION SPRAY 3.12 UG/1
2 SPRAY, METERED RESPIRATORY (INHALATION) DAILY
Qty: 4 G | Refills: 5 | Status: SHIPPED | OUTPATIENT
Start: 2021-04-20

## 2021-04-20 NOTE — PROGRESS NOTES
You have chosen to receive care through a telephone visit. Do you consent to use a telephone visit for your medical care today? 0Yes    Chief Complaint   Patient presents with   • Follow-up   • Shortness of Breath       Subjective   Joelle Yee is a 75 y.o. female.     History of Present Illness   The patient joins via telephone visit today     She states she is following with [facility] who comes to her home now is her new PCP.    She has home health coming as well.    She states she is very weak and too sick to leave home.    She was sick about 1 month ago and was given 2 rounds of antibiotics. She continues to have some increased coughing which is productive with green sputum.     Has non-invasive ventilation at home and uses it as much as she can tolerate it. She uses it maybe 4 hours at night.  She states the DME company is checking in with her and trying to help her increase her usage of noninvasive ventilation.    She uses the nebulizer 4 times per day.     She is taking azithromycin and prednisone every other day.  We have tried Daliresp in the past and she was not able to tolerate this medication.    She is using Symbicort twice a day and Spiriva daily.  She uses the nebulizer 4 times a day on average.    The following portions of the patient's history were reviewed and updated as appropriate: allergies, current medications, past family history, past medical history, past social history and past surgical history.  Due to this being a telehealth visit, medication review may not be entirely accurate.    Review of Systems   HENT: Negative for sinus pressure, sneezing and sore throat.    Respiratory: Positive for cough, shortness of breath and wheezing. Negative for chest tightness.        Objective     Physical Exam  This was a remote visit. Physical exam not performed.       Assessment/Plan   Diagnoses and all orders for this visit:    1. Shortness of breath (Primary)    2. Bronchiectasis without  complication (CMS/[facility])    3. Chronic obstructive pulmonary disease, unspecified COPD type (CMS/[facility])    4. Hypoxia    5. Chronic respiratory failure with hypoxia and hypercapnia (CMS/[facility])    Other orders  -     tiotropium bromide monohydrate (Spiriva Respimat) 2.5 MCG/ACT aerosol solution inhaler; Inhale 2 puffs Daily.  Dispense: 4 g; Refill: 5  -     Symbicort 80-4.5 MCG/ACT inhaler; Inhale 2 puffs 2 (Two) Times a Day.  Dispense: 10.2 g; Refill: 5  -     albuterol sulfate HFA (Ventolin HFA) 108 (90 Base) MCG/ACT inhaler; Inhale 2 puffs Every 4 (Four) Hours As Needed for Wheezing or Shortness of Air.  Dispense: 18 g; Refill: 3  -     azithromycin (ZITHROMAX) 250 MG tablet; Take 1 tablet every other day, indefintely.  Dispense: 15 tablet; Refill: 5           Return for keep appt in June.    DISCUSSION (if any):  Unfortunately, the patient is likely experiencing progression of COPD.  I have discussed this in detail today with the patient.  Her last PFT in July 2020 revealed severe obstruction.  No restriction with suggestion of air trapping.  Severe decreased diffusion capacity.    It seems azithromycin at some point was discontinued however the patient tells me she is still had the medication and only stopped taking it when she was prescribed the other antibiotics about a month ago.  She also continues taking prednisone every other day.  I have reordered the azithromycin today with the instructions to take 1 tablet every other day and at this time the patient will continue on this indefinitely.  She will also need to continue prednisone 10 mg every other day.    As mentioned previously, she has failed therapy with Daliresp as she could not tolerate the medication.    No change to the current medications has been made.  I have asked her to continue using Symbicort, Spiriva, rescue medication, azithromycin, and prednisone all as directed.    I have explained to the patient the importance of using noninvasive ventilation  as much as possible at night and during the day.  She verbalizes understanding.  She was set up with noninvasive ventilation in February after being discharged from the hospital.      Compliance with medications stressed.     Side effects of prescribed medications discussed with the patient.    I will asked the office staff to try to get chest x-ray results from [facility] as the patient states they came to her home and did the chest x-ray.    This visit has been rescheduled as a telehealth/video visit to comply with patient safety concerns in accordance with CDC recommendations. Total time of discussion was 20 minutes.    Dictated utilizing Dragon dictation.    This document was electronically signed by DWAYNE Molina on 04/20/21 at 12:31 EDT

## 2021-04-23 RX ORDER — FLUCONAZOLE 150 MG/1
150 TABLET ORAL ONCE
Qty: 1 TABLET | Refills: 0 | Status: SHIPPED | OUTPATIENT
Start: 2021-04-23 | End: 2021-04-23

## 2021-05-24 DIAGNOSIS — J44.9 CHRONIC OBSTRUCTIVE PULMONARY DISEASE, UNSPECIFIED COPD TYPE (HCC): Primary | ICD-10-CM

## 2021-05-24 DIAGNOSIS — R06.02 SHORTNESS OF BREATH: ICD-10-CM

## 2021-05-26 ENCOUNTER — HOSPITAL ENCOUNTER (EMERGENCY)
Facility: HOSPITAL | Age: 76
Discharge: HOME | End: 2021-05-26
Attending: EMERGENCY MEDICINE | Admitting: EMERGENCY MEDICINE

## 2021-05-26 ENCOUNTER — APPOINTMENT (OUTPATIENT)
Dept: GENERAL RADIOLOGY | Facility: HOSPITAL | Age: 76
End: 2021-05-26

## 2021-05-26 VITALS
HEIGHT: 56 IN | RESPIRATION RATE: 18 BRPM | WEIGHT: 85 LBS | TEMPERATURE: 97.9 F | SYSTOLIC BLOOD PRESSURE: 133 MMHG | HEART RATE: 82 BPM | OXYGEN SATURATION: 95 % | BODY MASS INDEX: 19.12 KG/M2 | DIASTOLIC BLOOD PRESSURE: 61 MMHG

## 2021-05-26 DIAGNOSIS — J44.9 CHRONIC OBSTRUCTIVE PULMONARY DISEASE, UNSPECIFIED COPD TYPE (HCC): ICD-10-CM

## 2021-05-26 DIAGNOSIS — R33.9 URINARY RETENTION: Primary | ICD-10-CM

## 2021-05-26 LAB
ALBUMIN SERPL-MCNC: 3.8 G/DL (ref 3.5–5.2)
ALBUMIN/GLOB SERPL: 1.5 G/DL
ALP SERPL-CCNC: 110 U/L (ref 39–117)
ALT SERPL W P-5'-P-CCNC: 11 U/L (ref 1–33)
ANION GAP SERPL CALCULATED.3IONS-SCNC: 7.6 MMOL/L (ref 5–15)
AST SERPL-CCNC: 21 U/L (ref 1–32)
BASOPHILS # BLD AUTO: 0.06 10*3/MM3 (ref 0–0.2)
BASOPHILS NFR BLD AUTO: 0.4 % (ref 0–1.5)
BILIRUB SERPL-MCNC: <0.2 MG/DL (ref 0–1.2)
BILIRUB UR QL STRIP: NEGATIVE
BUN SERPL-MCNC: 14 MG/DL (ref 8–23)
BUN/CREAT SERPL: 23 (ref 7–25)
CALCIUM SPEC-SCNC: 9.1 MG/DL (ref 8.6–10.5)
CHLORIDE SERPL-SCNC: 101 MMOL/L (ref 98–107)
CLARITY UR: CLEAR
CO2 SERPL-SCNC: 29.4 MMOL/L (ref 22–29)
COLOR UR: YELLOW
CREAT SERPL-MCNC: 0.61 MG/DL (ref 0.57–1)
DEPRECATED RDW RBC AUTO: 54.5 FL (ref 37–54)
EOSINOPHIL # BLD AUTO: 0.23 10*3/MM3 (ref 0–0.4)
EOSINOPHIL NFR BLD AUTO: 1.4 % (ref 0.3–6.2)
ERYTHROCYTE [DISTWIDTH] IN BLOOD BY AUTOMATED COUNT: 15 % (ref 12.3–15.4)
GFR SERPL CREATININE-BSD FRML MDRD: 96 ML/MIN/1.73
GLOBULIN UR ELPH-MCNC: 2.6 GM/DL
GLUCOSE SERPL-MCNC: 93 MG/DL (ref 65–99)
GLUCOSE UR STRIP-MCNC: NEGATIVE MG/DL
HCT VFR BLD AUTO: 37.6 % (ref 34–46.6)
HGB BLD-MCNC: 11.9 G/DL (ref 12–15.9)
HGB UR QL STRIP.AUTO: NEGATIVE
HOLD SPECIMEN: NORMAL
IMM GRANULOCYTES # BLD AUTO: 0.12 10*3/MM3 (ref 0–0.05)
IMM GRANULOCYTES NFR BLD AUTO: 0.7 % (ref 0–0.5)
KETONES UR QL STRIP: NEGATIVE
LEUKOCYTE ESTERASE UR QL STRIP.AUTO: NEGATIVE
LYMPHOCYTES # BLD AUTO: 2.71 10*3/MM3 (ref 0.7–3.1)
LYMPHOCYTES NFR BLD AUTO: 16.4 % (ref 19.6–45.3)
MCH RBC QN AUTO: 31 PG (ref 26.6–33)
MCHC RBC AUTO-ENTMCNC: 31.6 G/DL (ref 31.5–35.7)
MCV RBC AUTO: 97.9 FL (ref 79–97)
MONOCYTES # BLD AUTO: 0.68 10*3/MM3 (ref 0.1–0.9)
MONOCYTES NFR BLD AUTO: 4.1 % (ref 5–12)
NEUTROPHILS NFR BLD AUTO: 12.76 10*3/MM3 (ref 1.7–7)
NEUTROPHILS NFR BLD AUTO: 77 % (ref 42.7–76)
NITRITE UR QL STRIP: NEGATIVE
NRBC BLD AUTO-RTO: 0 /100 WBC (ref 0–0.2)
NT-PROBNP SERPL-MCNC: 263.8 PG/ML (ref 0–1800)
PH UR STRIP.AUTO: 5.5 [PH] (ref 5–8)
PLATELET # BLD AUTO: 300 10*3/MM3 (ref 140–450)
PMV BLD AUTO: 10.3 FL (ref 6–12)
POTASSIUM SERPL-SCNC: 3.7 MMOL/L (ref 3.5–5.2)
PROT SERPL-MCNC: 6.4 G/DL (ref 6–8.5)
PROT UR QL STRIP: NEGATIVE
RBC # BLD AUTO: 3.84 10*6/MM3 (ref 3.77–5.28)
SODIUM SERPL-SCNC: 138 MMOL/L (ref 136–145)
SP GR UR STRIP: 1.01 (ref 1–1.03)
TROPONIN T SERPL-MCNC: <0.01 NG/ML (ref 0–0.03)
UROBILINOGEN UR QL STRIP: NORMAL
WBC # BLD AUTO: 16.56 10*3/MM3 (ref 3.4–10.8)
WHOLE BLOOD HOLD SPECIMEN: NORMAL
WHOLE BLOOD HOLD SPECIMEN: NORMAL

## 2021-05-26 PROCEDURE — 99284 EMERGENCY DEPT VISIT MOD MDM: CPT

## 2021-05-26 PROCEDURE — 80053 COMPREHEN METABOLIC PANEL: CPT

## 2021-05-26 PROCEDURE — 94799 UNLISTED PULMONARY SVC/PX: CPT

## 2021-05-26 PROCEDURE — 93005 ELECTROCARDIOGRAM TRACING: CPT

## 2021-05-26 PROCEDURE — 84484 ASSAY OF TROPONIN QUANT: CPT

## 2021-05-26 PROCEDURE — 83880 ASSAY OF NATRIURETIC PEPTIDE: CPT

## 2021-05-26 PROCEDURE — 25010000002 ONDANSETRON PER 1 MG: Performed by: EMERGENCY MEDICINE

## 2021-05-26 PROCEDURE — 85025 COMPLETE CBC W/AUTO DIFF WBC: CPT

## 2021-05-26 PROCEDURE — 25010000002 LORAZEPAM PER 2 MG: Performed by: EMERGENCY MEDICINE

## 2021-05-26 PROCEDURE — 71045 X-RAY EXAM CHEST 1 VIEW: CPT

## 2021-05-26 PROCEDURE — 96374 THER/PROPH/DIAG INJ IV PUSH: CPT

## 2021-05-26 PROCEDURE — 96375 TX/PRO/DX INJ NEW DRUG ADDON: CPT

## 2021-05-26 PROCEDURE — P9612 CATHETERIZE FOR URINE SPEC: HCPCS

## 2021-05-26 PROCEDURE — 25010000002 FENTANYL CITRATE (PF) 50 MCG/ML SOLUTION: Performed by: EMERGENCY MEDICINE

## 2021-05-26 PROCEDURE — 81003 URINALYSIS AUTO W/O SCOPE: CPT | Performed by: NURSE PRACTITIONER

## 2021-05-26 PROCEDURE — 94640 AIRWAY INHALATION TREATMENT: CPT

## 2021-05-26 RX ORDER — ONDANSETRON 2 MG/ML
4 INJECTION INTRAMUSCULAR; INTRAVENOUS ONCE
Status: COMPLETED | OUTPATIENT
Start: 2021-05-26 | End: 2021-05-26

## 2021-05-26 RX ORDER — LORAZEPAM 2 MG/ML
0.25 INJECTION INTRAMUSCULAR ONCE
Status: COMPLETED | OUTPATIENT
Start: 2021-05-26 | End: 2021-05-26

## 2021-05-26 RX ORDER — IPRATROPIUM BROMIDE AND ALBUTEROL SULFATE 2.5; .5 MG/3ML; MG/3ML
3 SOLUTION RESPIRATORY (INHALATION) ONCE
Status: COMPLETED | OUTPATIENT
Start: 2021-05-26 | End: 2021-05-26

## 2021-05-26 RX ORDER — IPRATROPIUM BROMIDE AND ALBUTEROL SULFATE 2.5; .5 MG/3ML; MG/3ML
3 SOLUTION RESPIRATORY (INHALATION) 4 TIMES DAILY PRN
Qty: 360 ML | Refills: 0 | Status: SHIPPED | OUTPATIENT
Start: 2021-05-26

## 2021-05-26 RX ORDER — SODIUM CHLORIDE 0.9 % (FLUSH) 0.9 %
10 SYRINGE (ML) INJECTION AS NEEDED
Status: DISCONTINUED | OUTPATIENT
Start: 2021-05-26 | End: 2021-05-26 | Stop reason: HOSPADM

## 2021-05-26 RX ORDER — FENTANYL CITRATE 50 UG/ML
25 INJECTION, SOLUTION INTRAMUSCULAR; INTRAVENOUS ONCE
Status: COMPLETED | OUTPATIENT
Start: 2021-05-26 | End: 2021-05-26

## 2021-05-26 RX ADMIN — LORAZEPAM 0.25 MG: 2 INJECTION INTRAMUSCULAR; INTRAVENOUS at 18:04

## 2021-05-26 RX ADMIN — FENTANYL CITRATE 25 MCG: 50 INJECTION, SOLUTION INTRAMUSCULAR; INTRAVENOUS at 17:14

## 2021-05-26 RX ADMIN — IPRATROPIUM BROMIDE AND ALBUTEROL SULFATE 3 ML: .5; 3 SOLUTION RESPIRATORY (INHALATION) at 19:47

## 2021-05-26 RX ADMIN — ONDANSETRON 4 MG: 2 INJECTION INTRAMUSCULAR; INTRAVENOUS at 17:13

## 2021-07-13 NOTE — PROGRESS NOTES
[facility]    Nursing Home History and Physical        Erlinremedios Pool DO []  DWAYNE Wallis []  [address] [place] [address]  Phone: [phone]  Fax: [phone] Dory Cruz MD[x]  Jeremi Luo DO []   DONTRELL Gama []    [address] [place] [address]  Phone: [phone]  Fax: [phone]     PATIENT NAME: Joelle Yee                                                                          YOB: 1945           DATE OF SERVICE: 02/10/2021  FACILITY:   [x] [facility] [] [facility]  [] [facility]    [] [facility]      ______________________________________________________________________    CHIEF COMPLAINT:  Initial visit, COPD and venous stasis wounds      HISTORY OF PRESENT ILLNESS:   Mrs. Yee is a 75 years old female with advanced COPD, bronchiectasis, chronic respiratory failure, on supplemental oxygen, nonadherent with BiPAP therapy, chronic essential hypertension and GERD.  Patient was recently hospitalized with a diagnosis of bilateral pneumonia felt to be secondary to aspiration which was addressed with IV Zosyn, changed to cefepime based on culture review.  Meanwhile she was found to have T6 compression fracture which is not amenable to surgical intervention; conservative management was recommended instead.  Additionally, patient was found to have bilateral lower extremity chronic venous stasis wounds.  Patient was discharged to this facility for PT, OT and skilled nursing care with plans for IV cefepime x8 days via PICC line.  Since admission, patient remained in stable condition with no reported fever, chills or other acute systemic issues.  During my visit, patient was calm, comfortable and pleasantly denied new complaints; she is looking forward to be discharged home.    PAST MEDICAL & SURGICAL HISTORY:   Past Medical History:   Diagnosis Date   • Abdominal pain    • Acute and chronic respiratory failure with hypercapnia (CMS/HCC)    • Acute  bronchitis    • Allergic    • Ankle pain    • Anxiety 1980   • Atopic dermatitis    • Bronchiectasis (CMS/HCC)    • Cachexia (CMS/HCC)    • Cataract, bilateral    • Chest pain     STATES HAS OCCASSIONALLY.  STATES LAST TIME WAS LAST WEEK.  STATES SEES DR. RICH.  STATES HE HAS DONE NUMEROUS TESTS ON HER AND HAS NOT BEEN ABLE TO FIND OUT CAUSE.     • Chronic obstructive lung disease (CMS/HCC) 2008   • Chronic respiratory failure with hypercapnia (CMS/HCC)    • Colon cancer screening    • COPD (chronic obstructive pulmonary disease) (CMS/HCC)    • Cough    • Cystocele    • Depressed    • Depression 1980   • Dry eye syndrome of unspecified lacrimal gland    • Encounter for screening for other viral diseases    • Fatigue     Chronic pafigue 2012   • Fibromyalgia 2008   • Full dentures    • GERD (gastroesophageal reflux disease)    • Gout    • Heartburn     Chronic historu of epigastric heartburn currently controlled on Prilesec 40 mg every morning along with Zantac 300 Mg daily at bedtime   • High cholesterol 2012   • Hip pain    • Hyperlipidemia    • Hypertension    • Hypocalcemia    • Impaired functional mobility, balance, gait, and endurance    • Insomnia    • Insomnia, unspecified    • Joint pain    • Kidney infection    • Loss of appetite    • Low back pain 1995   • Melena    • Nausea    • Nausea and vomiting    • Nutritional deficiency, unspecified    • On home oxygen therapy     2L/NC PRN (STATES SHE HAS BEEN USING CONTINUOSLY LATELY)   • Osteoarthritis 2010   • Osteoporosis    • Other chronic pain    • Other disorders of skin and subcutaneous tissue in diseases classified elsewhere    • Other muscle spasm    • Pain in limb    • Palpitations    • Pinched nerve     Pinched nerves 2011   • Pneumonia    • Pneumonia due to Pseudomonas (CMS/HCC)    • Pneumonitis due to inhalation of food and vomit (CMS/HCC)    • Problems with swallowing     HAS TO EAT SLOW AND CHEW FOOD WELL   • Recurrent urinary tract infection    •  Sciatica [id number]   • Shortness of breath    • Sinus problem    • Sleep apnea     NO CPAP   • Unspecified fracture of t5-T6 vertebra, subsequent encounter for fracture with routine healing    • Upset stomach    • Valvular heart disease    • Vitamin B12 deficiency    • Wears glasses    • Weight loss, abnormal     Weight loss is stabel. On pund weight gain since 2016      Past Surgical History:   Procedure Laterality Date   • ABDOMINAL HERNIA REPAIR     • APPENDECTOMY  1965   • BACK SURGERY  2005   • BRONCHOSCOPY N/A 2018    Procedure: BRONCHOSCOPY w/ WASHINGS/BRUSHINGS with MAC;  Surgeon: Rebeka Esparza MD;  Location: [facility] OR;  Service: Pulmonary   • CATARACT EXTRACTION Bilateral     Put in implants    • CHOLECYSTECTOMY     • COLONOSCOPY     • ENDOSCOPY     • KNEE SURGERY Left     Knee Cap   • TUBAL ABDOMINAL LIGATION           MEDICATIONS:  I have reviewed and reconciled the patients medication list in the patients chart at the skilled nursing facility today.      ALLERGIES:  Allergies   Allergen Reactions   • Dust Mite Extract Other (See Comments)     Dust  SINUS   • Grass Other (See Comments)     SINUS   • Mold Extract [Trichophyton] Other (See Comments)     SINUS         SOCIAL HISTORY:  Social History     Socioeconomic History   • Marital status: Single     Spouse name: Not on file   • Number of children: Not on file   • Years of education: Not on file   • Highest education level: Not on file   Tobacco Use   • Smoking status: Former Smoker     Packs/day: 0.00     Years: 35.00     Pack years: 0.00     Quit date: 2017     Years since quittin.0   • Smokeless tobacco: Never Used   Vaping Use   • Vaping Use: Some days   • Substances: Nicotine   • Devices: Refillable tank   Substance and Sexual Activity   • Alcohol use: No   • Drug use: No   • Sexual activity: Defer       FAMILY HISTORY:  Family History   Problem Relation Age of Onset   • Ovarian cancer Mother    • Cancer  Mother    • Lung cancer Father    • Heart disease Brother        REVIEW OF SYSTEMS:  Review of Systems  • General: Fatigue, no reported fever or chills   • Psychological: No history of any hallucinations and delusions.  • Respiratory: Periodic dry cough and shortness of breath reported  • Cardiovascular: No history of chest pain or palpitations.   • Gastrointestinal: Nausea and vomiting  • Genitourinary: No history of dysuria or hematuria.  • Musculoskeletal:  Complains of myalgias and arthralgias  • Neurological: No symptoms to suggest focal neurological deficits  • Dermatological: No history of any redness or pruritis.    PHYSICAL EXAMINATION:   Vital signs: /72, HR 84/min, RR 18/min, temp 97.8 °F, O2 sat 98% on 3 L nasal cannula    Physical Exam  General Appearance:  Patient appears frail and cachectic   Head:  Atraumatic and normocephalic, without obvious abnormality.   Eyes:          PERRLA, conjunctivae and sclerae normal, no Icterus. No pallor. Extraocular movements are within normal limits.   Ears:  Ears appear intact with no abnormalities noted.   Throat: No oral lesions, no thrush, oral mucosa moist.   Neck: Supple, trachea midline, no thyromegaly, no carotid bruit.   Back:   No kyphoscoliosis present. No tenderness to palpation,  range of motion normal.   Lungs:   Chest shape is normal. BS are diminished but heard bilaterally equally.  No crackles or wheezing.    Heart:  Normal S1 and S2, no murmur, no gallop, no rub. No JVD.   Abdomen:   Normal BS, no masses, no organomegaly. Soft, nontender, nondistended, no guarding, no rebound tenderness.   Extremities:  Lateral lower extremity chronic venous stasis noted   Pulses: Pulses palpable and equal bilaterally.   Skin: Right upper extremity PICC line site noted; lower extremity venous stasis wounds noted   Neurologic: No signs to suggest focal neurological deficits.         RECORDS REVIEW:   I have reviewed in detail available records including  discharge summary and workup    ASSESSMENT:  · Advanced chronic obstructive pulmonary disease  · Chronic hypoxic respiratory failure on supplemental oxygen, 3 L nasal cannula; refusing BiPAP  · Status post aspiration pneumonia  · Thoracic vertebral compression fracture  · Lower extremity chronic venous stasis with wounds    PLAN:  · Preadmission medications reviewed, reconciled and reviewed  · Continue cefepime as recommended via PICC line  · PT and OT as clinically indicated  · Fall and aspiration precautions   · Routine baseline labs  · Wound care as documented per record  · Nutritional support recommended and monitored with interdisciplinary support  · Patient is being monitored closely, further plans were modified accordingly  · With consultants as recommended    [x]  Discussed Patient in detail with nursing/staff, addressed all needs today.     [x]  Plan of Care Reviewed   [x]  PT/OT Reviewed     []  Wound assessment and management documentation reviewed    []  Antipsychotic medications and benzodiazepine addressed  []  Order Changes  []  Opioid medications addressed  []  Order Changes  []  Discharge Plans Reviewed   []  Advance Directive on file with Nursing Home.   []  POA on file with Nursing Home.   [x]  Code Status listed on patients chart at the nursing home facility.          Dory Cruz MD.  2/10/2021

## 2021-07-23 ENCOUNTER — APPOINTMENT (OUTPATIENT)
Dept: GENERAL RADIOLOGY | Facility: HOSPITAL | Age: 76
End: 2021-07-23

## 2021-07-23 ENCOUNTER — HOSPITAL ENCOUNTER (EMERGENCY)
Facility: HOSPITAL | Age: 76
Discharge: HOME | End: 2021-07-23
Attending: EMERGENCY MEDICINE | Admitting: EMERGENCY MEDICINE

## 2021-07-23 VITALS
DIASTOLIC BLOOD PRESSURE: 68 MMHG | TEMPERATURE: 99.3 F | OXYGEN SATURATION: 98 % | RESPIRATION RATE: 20 BRPM | SYSTOLIC BLOOD PRESSURE: 128 MMHG | HEART RATE: 92 BPM

## 2021-07-23 DIAGNOSIS — L03.119 CELLULITIS OF HAND: ICD-10-CM

## 2021-07-23 DIAGNOSIS — S51.812A LACERATION OF LEFT FOREARM, INITIAL ENCOUNTER: Primary | ICD-10-CM

## 2021-07-23 PROCEDURE — 73130 X-RAY EXAM OF HAND: CPT

## 2021-07-23 PROCEDURE — 73090 X-RAY EXAM OF FOREARM: CPT

## 2021-07-23 PROCEDURE — 99283 EMERGENCY DEPT VISIT LOW MDM: CPT

## 2021-07-23 RX ORDER — LIDOCAINE HYDROCHLORIDE AND EPINEPHRINE 10; 10 MG/ML; UG/ML
10 INJECTION, SOLUTION INFILTRATION; PERINEURAL ONCE
Status: COMPLETED | OUTPATIENT
Start: 2021-07-23 | End: 2021-07-23

## 2021-07-23 RX ORDER — CEPHALEXIN 250 MG/1
500 CAPSULE ORAL ONCE
Status: COMPLETED | OUTPATIENT
Start: 2021-07-23 | End: 2021-07-23

## 2021-07-23 RX ORDER — HYDROCODONE BITARTRATE AND ACETAMINOPHEN 5; 325 MG/1; MG/1
1 TABLET ORAL ONCE
Status: COMPLETED | OUTPATIENT
Start: 2021-07-23 | End: 2021-07-23

## 2021-07-23 RX ORDER — CEPHALEXIN 500 MG/1
500 CAPSULE ORAL 4 TIMES DAILY
Qty: 28 CAPSULE | Refills: 0 | OUTPATIENT
Start: 2021-07-23 | End: 2021-10-25

## 2021-07-23 RX ADMIN — CEPHALEXIN 500 MG: 250 CAPSULE ORAL at 02:51

## 2021-07-23 RX ADMIN — LIDOCAINE HYDROCHLORIDE AND EPINEPHRINE 10 ML: 10; 10 INJECTION, SOLUTION INFILTRATION; PERINEURAL at 02:08

## 2021-07-23 RX ADMIN — HYDROCODONE BITARTRATE AND ACETAMINOPHEN 1 TABLET: 5; 325 TABLET ORAL at 01:31

## 2021-08-30 ENCOUNTER — LAB REQUISITION (OUTPATIENT)
Dept: LAB | Facility: HOSPITAL | Age: 76
End: 2021-08-30

## 2021-08-30 DIAGNOSIS — Z11.52 ENCOUNTER FOR SCREENING FOR COVID-19: ICD-10-CM

## 2021-08-30 PROCEDURE — U0004 COV-19 TEST NON-CDC HGH THRU: HCPCS | Performed by: INTERNAL MEDICINE

## 2021-08-31 LAB — SARS-COV-2 RNA NOSE QL NAA+PROBE: NOT DETECTED

## 2021-10-06 ENCOUNTER — APPOINTMENT (OUTPATIENT)
Dept: GENERAL RADIOLOGY | Facility: HOSPITAL | Age: 76
End: 2021-10-06

## 2021-10-06 ENCOUNTER — HOSPITAL ENCOUNTER (EMERGENCY)
Facility: HOSPITAL | Age: 76
Discharge: HOME | End: 2021-10-07
Attending: EMERGENCY MEDICINE | Admitting: FAMILY MEDICINE

## 2021-10-06 DIAGNOSIS — J44.1 COPD EXACERBATION (HCC): Primary | ICD-10-CM

## 2021-10-06 LAB
ALBUMIN SERPL-MCNC: 4 G/DL (ref 3.5–5.2)
ALBUMIN/GLOB SERPL: 1.6 G/DL
ALP SERPL-CCNC: 138 U/L (ref 39–117)
ALT SERPL W P-5'-P-CCNC: 16 U/L (ref 1–33)
ANION GAP SERPL CALCULATED.3IONS-SCNC: 7.6 MMOL/L (ref 5–15)
AST SERPL-CCNC: 26 U/L (ref 1–32)
BASOPHILS # BLD AUTO: 0.1 10*3/MM3 (ref 0–0.2)
BASOPHILS NFR BLD AUTO: 0.9 % (ref 0–1.5)
BILIRUB SERPL-MCNC: <0.2 MG/DL (ref 0–1.2)
BUN SERPL-MCNC: 11 MG/DL (ref 8–23)
BUN/CREAT SERPL: 20.4 (ref 7–25)
CALCIUM SPEC-SCNC: 9 MG/DL (ref 8.6–10.5)
CHLORIDE SERPL-SCNC: 96 MMOL/L (ref 98–107)
CO2 SERPL-SCNC: 30.4 MMOL/L (ref 22–29)
CREAT SERPL-MCNC: 0.54 MG/DL (ref 0.57–1)
DEPRECATED RDW RBC AUTO: 53.6 FL (ref 37–54)
EOSINOPHIL # BLD AUTO: 0.3 10*3/MM3 (ref 0–0.4)
EOSINOPHIL NFR BLD AUTO: 2.6 % (ref 0.3–6.2)
ERYTHROCYTE [DISTWIDTH] IN BLOOD BY AUTOMATED COUNT: 15.4 % (ref 12.3–15.4)
GFR SERPL CREATININE-BSD FRML MDRD: 110 ML/MIN/1.73
GLOBULIN UR ELPH-MCNC: 2.5 GM/DL
GLUCOSE SERPL-MCNC: 116 MG/DL (ref 65–99)
HCT VFR BLD AUTO: 34.4 % (ref 34–46.6)
HGB BLD-MCNC: 11.1 G/DL (ref 12–15.9)
HOLD SPECIMEN: NORMAL
HOLD SPECIMEN: NORMAL
IMM GRANULOCYTES # BLD AUTO: 0.13 10*3/MM3 (ref 0–0.05)
IMM GRANULOCYTES NFR BLD AUTO: 1.1 % (ref 0–0.5)
LYMPHOCYTES # BLD AUTO: 3.24 10*3/MM3 (ref 0.7–3.1)
LYMPHOCYTES NFR BLD AUTO: 27.9 % (ref 19.6–45.3)
MCH RBC QN AUTO: 30.6 PG (ref 26.6–33)
MCHC RBC AUTO-ENTMCNC: 32.3 G/DL (ref 31.5–35.7)
MCV RBC AUTO: 94.8 FL (ref 79–97)
MONOCYTES # BLD AUTO: 1.31 10*3/MM3 (ref 0.1–0.9)
MONOCYTES NFR BLD AUTO: 11.3 % (ref 5–12)
NEUTROPHILS NFR BLD AUTO: 56.2 % (ref 42.7–76)
NEUTROPHILS NFR BLD AUTO: 6.52 10*3/MM3 (ref 1.7–7)
NRBC BLD AUTO-RTO: 0 /100 WBC (ref 0–0.2)
NT-PROBNP SERPL-MCNC: 117.6 PG/ML (ref 0–1800)
PLATELET # BLD AUTO: 300 10*3/MM3 (ref 140–450)
PMV BLD AUTO: 9.8 FL (ref 6–12)
POTASSIUM SERPL-SCNC: 3.7 MMOL/L (ref 3.5–5.2)
PROT SERPL-MCNC: 6.5 G/DL (ref 6–8.5)
RBC # BLD AUTO: 3.63 10*6/MM3 (ref 3.77–5.28)
SARS-COV-2 RNA PNL SPEC NAA+PROBE: NOT DETECTED
SODIUM SERPL-SCNC: 134 MMOL/L (ref 136–145)
TROPONIN T SERPL-MCNC: <0.01 NG/ML (ref 0–0.03)
WBC # BLD AUTO: 11.6 10*3/MM3 (ref 3.4–10.8)
WHOLE BLOOD HOLD SPECIMEN: NORMAL
WHOLE BLOOD HOLD SPECIMEN: NORMAL

## 2021-10-06 PROCEDURE — 80053 COMPREHEN METABOLIC PANEL: CPT | Performed by: FAMILY MEDICINE

## 2021-10-06 PROCEDURE — 93005 ELECTROCARDIOGRAM TRACING: CPT | Performed by: FAMILY MEDICINE

## 2021-10-06 PROCEDURE — 96366 THER/PROPH/DIAG IV INF ADDON: CPT

## 2021-10-06 PROCEDURE — 94640 AIRWAY INHALATION TREATMENT: CPT

## 2021-10-06 PROCEDURE — 71045 X-RAY EXAM CHEST 1 VIEW: CPT

## 2021-10-06 PROCEDURE — 94799 UNLISTED PULMONARY SVC/PX: CPT

## 2021-10-06 PROCEDURE — 25010000002 LORAZEPAM PER 2 MG: Performed by: NURSE PRACTITIONER

## 2021-10-06 PROCEDURE — 83880 ASSAY OF NATRIURETIC PEPTIDE: CPT | Performed by: FAMILY MEDICINE

## 2021-10-06 PROCEDURE — 99283 EMERGENCY DEPT VISIT LOW MDM: CPT

## 2021-10-06 PROCEDURE — 25010000002 MAGNESIUM SULFATE 2 GM/50ML SOLUTION: Performed by: NURSE PRACTITIONER

## 2021-10-06 PROCEDURE — 84484 ASSAY OF TROPONIN QUANT: CPT | Performed by: FAMILY MEDICINE

## 2021-10-06 PROCEDURE — 96375 TX/PRO/DX INJ NEW DRUG ADDON: CPT

## 2021-10-06 PROCEDURE — 87635 SARS-COV-2 COVID-19 AMP PRB: CPT | Performed by: NURSE PRACTITIONER

## 2021-10-06 PROCEDURE — 85025 COMPLETE CBC W/AUTO DIFF WBC: CPT | Performed by: FAMILY MEDICINE

## 2021-10-06 PROCEDURE — 25010000002 DEXAMETHASONE SODIUM PHOSPHATE 10 MG/ML SOLUTION: Performed by: NURSE PRACTITIONER

## 2021-10-06 PROCEDURE — 96365 THER/PROPH/DIAG IV INF INIT: CPT

## 2021-10-06 RX ORDER — DEXAMETHASONE SODIUM PHOSPHATE 10 MG/ML
10 INJECTION, SOLUTION INTRAMUSCULAR; INTRAVENOUS ONCE
Status: COMPLETED | OUTPATIENT
Start: 2021-10-06 | End: 2021-10-06

## 2021-10-06 RX ORDER — SODIUM CHLORIDE 0.9 % (FLUSH) 0.9 %
10 SYRINGE (ML) INJECTION AS NEEDED
Status: DISCONTINUED | OUTPATIENT
Start: 2021-10-06 | End: 2021-10-07 | Stop reason: HOSPADM

## 2021-10-06 RX ORDER — IPRATROPIUM BROMIDE AND ALBUTEROL SULFATE 2.5; .5 MG/3ML; MG/3ML
3 SOLUTION RESPIRATORY (INHALATION) ONCE
Status: COMPLETED | OUTPATIENT
Start: 2021-10-06 | End: 2021-10-06

## 2021-10-06 RX ORDER — NALOXONE HYDROCHLORIDE 1 MG/ML
INJECTION INTRAMUSCULAR; INTRAVENOUS; SUBCUTANEOUS
Status: DISCONTINUED
Start: 2021-10-06 | End: 2021-10-07 | Stop reason: HOSPADM

## 2021-10-06 RX ORDER — DOXYCYCLINE 100 MG/1
100 CAPSULE ORAL ONCE
Status: COMPLETED | OUTPATIENT
Start: 2021-10-06 | End: 2021-10-06

## 2021-10-06 RX ORDER — MAGNESIUM SULFATE HEPTAHYDRATE 40 MG/ML
2 INJECTION, SOLUTION INTRAVENOUS ONCE
Status: COMPLETED | OUTPATIENT
Start: 2021-10-06 | End: 2021-10-07

## 2021-10-06 RX ORDER — LORAZEPAM 2 MG/ML
1 INJECTION INTRAMUSCULAR ONCE
Status: COMPLETED | OUTPATIENT
Start: 2021-10-06 | End: 2021-10-06

## 2021-10-06 RX ADMIN — MAGNESIUM SULFATE HEPTAHYDRATE 2 G: 2 INJECTION, SOLUTION INTRAVENOUS at 22:55

## 2021-10-06 RX ADMIN — LORAZEPAM 1 MG: 2 INJECTION INTRAMUSCULAR; INTRAVENOUS at 22:41

## 2021-10-06 RX ADMIN — IPRATROPIUM BROMIDE AND ALBUTEROL SULFATE 3 ML: .5; 3 SOLUTION RESPIRATORY (INHALATION) at 22:57

## 2021-10-06 RX ADMIN — DEXAMETHASONE SODIUM PHOSPHATE 10 MG: 10 INJECTION INTRAMUSCULAR; INTRAVENOUS at 21:58

## 2021-10-06 RX ADMIN — DOXYCYCLINE 100 MG: 100 CAPSULE ORAL at 22:41

## 2021-10-07 VITALS
DIASTOLIC BLOOD PRESSURE: 68 MMHG | HEART RATE: 98 BPM | TEMPERATURE: 98.3 F | SYSTOLIC BLOOD PRESSURE: 133 MMHG | OXYGEN SATURATION: 93 % | HEIGHT: 56 IN | WEIGHT: 85 LBS | BODY MASS INDEX: 19.12 KG/M2 | RESPIRATION RATE: 20 BRPM

## 2021-10-07 RX ORDER — DOXYCYCLINE HYCLATE 100 MG/1
100 TABLET, DELAYED RELEASE ORAL 2 TIMES DAILY
Qty: 20 TABLET | Refills: 0 | Status: SHIPPED | OUTPATIENT
Start: 2021-10-07 | End: 2021-10-17

## 2021-10-07 RX ORDER — PREDNISONE 20 MG/1
20 TABLET ORAL 3 TIMES DAILY
Qty: 15 TABLET | Refills: 0 | Status: SHIPPED | OUTPATIENT
Start: 2021-10-07 | End: 2021-10-12

## 2021-10-07 NOTE — ED PROVIDER NOTES
CONSTITUTIONAL: 75-year-old . oxygen dependent female  VITAL SIGNS: per nursing, reviewed and noted  SKIN: exposed skin with no rashes, ulcerations or petechiae.  EYES: perrla. EOMI.  ENT: Normal voice.  Patient wore oxygen cannula during exam   RESPIRATORY: Mild increased work of breathing.  Slight retractions.  CARDIOVASCULAR:  regular rate and rhythm, no murmurs.  Good Peripheral pulses. Good cap refill to extremities.  GI: Abdomen soft, nontender, normal bowel sounds. No hernia. No ascites.  MUSCULOSKELETAL:  No tenderness. Full ROM. Strength and tone grossly normal.  no spasms. no neck or back tenderness or spasm.  NEUROLOGIC: Alert, oriented x 3. No gross deficits. GCS 15.  PSYCH: appropriate affect.  : no bladder tenderness or distention, no CVA tenderness      Review of Systems   Constitutional: Positive for fatigue.   HENT: Positive for congestion.    Eyes: Negative.    Respiratory: Positive for cough, shortness of breath and wheezing.    Cardiovascular: Negative for chest pain, palpitations and leg swelling.   Gastrointestinal: Negative.    Endocrine: Negative.    Genitourinary: Negative.    Musculoskeletal: Negative.    Skin: Negative.    Allergic/Immunologic: Negative.    Neurological: Negative.    Hematological: Negative.    Psychiatric/Behavioral: Negative.         Past Medical History:   Diagnosis Date   • Abdominal pain    • Acute and chronic respiratory failure with hypercapnia (HCC)    • Acute bronchitis    • Allergic    • Ankle pain    • Anxiety 1980   • Atopic dermatitis    • Bronchiectasis (HCC)    • Cachexia (HCC)    • Cataract, bilateral    • Chest pain     STATES HAS OCCASSIONALLY.  STATES LAST TIME WAS LAST WEEK.  STATES SEES DR. RICH.  STATES HE HAS DONE NUMEROUS TESTS ON HER AND HAS NOT BEEN ABLE TO FIND OUT CAUSE.     • Chronic obstructive lung disease (HCC) 2008   • Chronic respiratory failure with hypercapnia (HCC)    • Colon cancer screening    • COPD (chronic obstructive pulmonary  disease) ([facility])    • Cough    • Cystocele    • Depressed    • Depression 1980   • Dry eye syndrome of unspecified lacrimal gland    • Encounter for screening for other viral diseases    • Fatigue     Chronic pafigue 2012   • Fibromyalgia 2008   • Full dentures    • GERD (gastroesophageal reflux disease)    • Gout    • Heartburn     Chronic historu of epigastric heartburn currently controlled on Prilesec 40 mg every morning along with Zantac 300 Mg daily at bedtime   • High cholesterol 2012   • Hip pain    • Hyperlipidemia    • Hypertension    • Hypocalcemia    • Impaired functional mobility, balance, gait, and endurance    • Insomnia    • Insomnia, unspecified    • Joint pain    • Kidney infection    • Loss of appetite    • Low back pain 1995   • Melena    • Nausea    • Nausea and vomiting    • Nutritional deficiency, unspecified    • On home oxygen therapy     2L/NC PRN (STATES SHE HAS BEEN USING CONTINUOSLY LATELY)   • Osteoarthritis 2010   • Osteoporosis    • Other chronic pain    • Other disorders of skin and subcutaneous tissue in diseases classified elsewhere    • Other muscle spasm    • Pain in limb    • Palpitations    • Pinched nerve     Pinched nerves 2011   • Pneumonia    • Pneumonia due to Pseudomonas ([facility])    • Pneumonitis due to inhalation of food and vomit ([facility])    • Problems with swallowing     HAS TO EAT SLOW AND CHEW FOOD WELL   • Recurrent urinary tract infection    • Sciatica [id number]   • Shortness of breath    • Sinus problem    • Sleep apnea 1998    NO CPAP   • Unspecified fracture of t5-T6 vertebra, subsequent encounter for fracture with routine healing    • Upset stomach    • Valvular heart disease    • Vitamin B12 deficiency    • Wears glasses    • Weight loss, abnormal     Weight loss is stabel. On pund weight gain since 01/2016       Allergies   Allergen Reactions   • Dust Mite Extract Other (See Comments)     Dust  SINUS   • Grass Other (See Comments)     SINUS   • Mold Extract  [Trichophyton] Other (See Comments)     SINUS       Past Surgical History:   Procedure Laterality Date   • ABDOMINAL HERNIA REPAIR  2016   • APPENDECTOMY  1965   • BACK SURGERY     • BRONCHOSCOPY N/A 2018    Procedure: BRONCHOSCOPY w/ WASHINGS/BRUSHINGS with MAC;  Surgeon: Rebeka Esparza MD;  Location: [facility];  Service: Pulmonary   • CATARACT EXTRACTION Bilateral     Put in implants    • CHOLECYSTECTOMY     • COLONOSCOPY     • ENDOSCOPY     • KNEE SURGERY Left 1979    Knee Cap   • TUBAL ABDOMINAL LIGATION         Family History   Problem Relation Age of Onset   • Ovarian cancer Mother    • Cancer Mother    • Lung cancer Father    • Heart disease Brother        Social History     Socioeconomic History   • Marital status: Single     Spouse name: Not on file   • Number of children: Not on file   • Years of education: Not on file   • Highest education level: Not on file   Tobacco Use   • Smoking status: Former Smoker     Packs/day: 0.00     Years: 35.00     Pack years: 0.00     Quit date: 2017     Years since quittin.2   • Smokeless tobacco: Never Used   Vaping Use   • Vaping Use: Some days   • Substances: Nicotine   • Devices: Refillable tank   Substance and Sexual Activity   • Alcohol use: No   • Drug use: No   • Sexual activity: Defer           Objective   Physical Exam  CONSTITUTIONAL: Frail 75-year-old female in mild distress on oxygen  VITAL SIGNS: per nursing, reviewed and noted  SKIN: exposed skin with no rashes, ulcerations or petechiae.  EYES: perrla. EOMI.  ENT: Normal voice.    RESPIRATORY: Mild increased work of breathing.  Slight retractions.  CARDIOVASCULAR:  regular rate and rhythm, no murmurs.  Good Peripheral pulses. Good cap refill to extremities.  GI: Abdomen soft, nontender, normal bowel sounds. No hernia. No ascites.  MUSCULOSKELETAL:  No tenderness. Full ROM. Strength and tone grossly normal.  no spasms. no neck or back tenderness or spasm.  NEUROLOGIC: Alert,  oriented x 3. No gross deficits. GCS 15.  PSYCH: appropriate affect.  : no bladder tenderness or distention, no CVA tenderness.    Procedures           ED Course  ED Course as of Oct 06 2035   Wed Oct 06, 2021   2022 EKG interpretation time is 2015 sinus rhythm 83 bpm QRS duration is 84 QT is 336 QTC is 376 there is no evidence of acute ST elevation.    [MH]      ED Course User Index  [] Lita Ball DO                                           [facility]    Final diagnoses:   None     COPD exacerbation with acute respiratory distress    ED Disposition  ED Disposition     None          No follow-up provider specified.       Medication List      No changes were made to your prescriptions during this visit.          Morena Granados, APRN  10/06/21 2049

## 2021-10-25 ENCOUNTER — APPOINTMENT (OUTPATIENT)
Dept: GENERAL RADIOLOGY | Facility: HOSPITAL | Age: 76
End: 2021-10-25

## 2021-10-25 ENCOUNTER — HOSPITAL ENCOUNTER (EMERGENCY)
Facility: HOSPITAL | Age: 76
Discharge: HOME | End: 2021-10-25
Attending: EMERGENCY MEDICINE | Admitting: EMERGENCY MEDICINE

## 2021-10-25 VITALS
HEIGHT: 56 IN | SYSTOLIC BLOOD PRESSURE: 117 MMHG | WEIGHT: 85 LBS | HEART RATE: 116 BPM | RESPIRATION RATE: 20 BRPM | DIASTOLIC BLOOD PRESSURE: 84 MMHG | BODY MASS INDEX: 19.12 KG/M2 | TEMPERATURE: 98.9 F | OXYGEN SATURATION: 96 %

## 2021-10-25 DIAGNOSIS — J18.9 COMMUNITY ACQUIRED PNEUMONIA, UNSPECIFIED LATERALITY: ICD-10-CM

## 2021-10-25 DIAGNOSIS — J44.1 COPD EXACERBATION (HCC): Primary | ICD-10-CM

## 2021-10-25 LAB
ALBUMIN SERPL-MCNC: 3.5 G/DL (ref 3.5–5.2)
ALBUMIN/GLOB SERPL: 1.3 G/DL
ALP SERPL-CCNC: 182 U/L (ref 39–117)
ALT SERPL W P-5'-P-CCNC: 28 U/L (ref 1–33)
ANION GAP SERPL CALCULATED.3IONS-SCNC: 7 MMOL/L (ref 5–15)
AST SERPL-CCNC: 22 U/L (ref 1–32)
BASOPHILS # BLD AUTO: 0.07 10*3/MM3 (ref 0–0.2)
BASOPHILS NFR BLD AUTO: 0.5 % (ref 0–1.5)
BILIRUB SERPL-MCNC: 0.2 MG/DL (ref 0–1.2)
BUN SERPL-MCNC: 11 MG/DL (ref 8–23)
BUN/CREAT SERPL: 18 (ref 7–25)
CALCIUM SPEC-SCNC: 8.4 MG/DL (ref 8.6–10.5)
CHLORIDE SERPL-SCNC: 94 MMOL/L (ref 98–107)
CO2 SERPL-SCNC: 32 MMOL/L (ref 22–29)
CREAT SERPL-MCNC: 0.61 MG/DL (ref 0.57–1)
DEPRECATED RDW RBC AUTO: 55.8 FL (ref 37–54)
EOSINOPHIL # BLD AUTO: 0.14 10*3/MM3 (ref 0–0.4)
EOSINOPHIL NFR BLD AUTO: 1 % (ref 0.3–6.2)
ERYTHROCYTE [DISTWIDTH] IN BLOOD BY AUTOMATED COUNT: 15.8 % (ref 12.3–15.4)
GFR SERPL CREATININE-BSD FRML MDRD: 96 ML/MIN/1.73
GLOBULIN UR ELPH-MCNC: 2.8 GM/DL
GLUCOSE SERPL-MCNC: 116 MG/DL (ref 65–99)
HCT VFR BLD AUTO: 32.6 % (ref 34–46.6)
HGB BLD-MCNC: 10.5 G/DL (ref 12–15.9)
HOLD SPECIMEN: NORMAL
HOLD SPECIMEN: NORMAL
IMM GRANULOCYTES # BLD AUTO: 0.11 10*3/MM3 (ref 0–0.05)
IMM GRANULOCYTES NFR BLD AUTO: 0.8 % (ref 0–0.5)
LYMPHOCYTES # BLD AUTO: 1.9 10*3/MM3 (ref 0.7–3.1)
LYMPHOCYTES NFR BLD AUTO: 13.9 % (ref 19.6–45.3)
MCH RBC QN AUTO: 30.8 PG (ref 26.6–33)
MCHC RBC AUTO-ENTMCNC: 32.2 G/DL (ref 31.5–35.7)
MCV RBC AUTO: 95.6 FL (ref 79–97)
MONOCYTES # BLD AUTO: 0.92 10*3/MM3 (ref 0.1–0.9)
MONOCYTES NFR BLD AUTO: 6.7 % (ref 5–12)
NEUTROPHILS NFR BLD AUTO: 10.5 10*3/MM3 (ref 1.7–7)
NEUTROPHILS NFR BLD AUTO: 77.1 % (ref 42.7–76)
NRBC BLD AUTO-RTO: 0 /100 WBC (ref 0–0.2)
NT-PROBNP SERPL-MCNC: 129 PG/ML (ref 0–1800)
PLATELET # BLD AUTO: 228 10*3/MM3 (ref 140–450)
PMV BLD AUTO: 9.8 FL (ref 6–12)
POTASSIUM SERPL-SCNC: 4.2 MMOL/L (ref 3.5–5.2)
PROT SERPL-MCNC: 6.3 G/DL (ref 6–8.5)
RBC # BLD AUTO: 3.41 10*6/MM3 (ref 3.77–5.28)
SARS-COV-2 RNA PNL SPEC NAA+PROBE: NOT DETECTED
SODIUM SERPL-SCNC: 133 MMOL/L (ref 136–145)
TROPONIN T SERPL-MCNC: <0.01 NG/ML (ref 0–0.03)
WBC # BLD AUTO: 13.64 10*3/MM3 (ref 3.4–10.8)
WHOLE BLOOD HOLD SPECIMEN: NORMAL
WHOLE BLOOD HOLD SPECIMEN: NORMAL

## 2021-10-25 PROCEDURE — 85025 COMPLETE CBC W/AUTO DIFF WBC: CPT

## 2021-10-25 PROCEDURE — 25010000002 METHYLPREDNISOLONE PER 40 MG: Performed by: EMERGENCY MEDICINE

## 2021-10-25 PROCEDURE — 71045 X-RAY EXAM CHEST 1 VIEW: CPT

## 2021-10-25 PROCEDURE — 84484 ASSAY OF TROPONIN QUANT: CPT

## 2021-10-25 PROCEDURE — 83880 ASSAY OF NATRIURETIC PEPTIDE: CPT

## 2021-10-25 PROCEDURE — 87635 SARS-COV-2 COVID-19 AMP PRB: CPT | Performed by: EMERGENCY MEDICINE

## 2021-10-25 PROCEDURE — 99285 EMERGENCY DEPT VISIT HI MDM: CPT

## 2021-10-25 PROCEDURE — 93005 ELECTROCARDIOGRAM TRACING: CPT

## 2021-10-25 PROCEDURE — 80053 COMPREHEN METABOLIC PANEL: CPT

## 2021-10-25 PROCEDURE — 96374 THER/PROPH/DIAG INJ IV PUSH: CPT

## 2021-10-25 RX ORDER — LEVOFLOXACIN 500 MG/1
500 TABLET, FILM COATED ORAL ONCE
Status: COMPLETED | OUTPATIENT
Start: 2021-10-25 | End: 2021-10-25

## 2021-10-25 RX ORDER — SODIUM CHLORIDE 0.9 % (FLUSH) 0.9 %
10 SYRINGE (ML) INJECTION AS NEEDED
Status: DISCONTINUED | OUTPATIENT
Start: 2021-10-25 | End: 2021-10-25 | Stop reason: HOSPADM

## 2021-10-25 RX ORDER — HYDROCODONE BITARTRATE AND ACETAMINOPHEN 5; 325 MG/1; MG/1
1 TABLET ORAL ONCE
Status: COMPLETED | OUTPATIENT
Start: 2021-10-25 | End: 2021-10-25

## 2021-10-25 RX ORDER — LEVOFLOXACIN 500 MG/1
500 TABLET, FILM COATED ORAL DAILY
Qty: 5 TABLET | Refills: 0 | Status: SHIPPED | OUTPATIENT
Start: 2021-10-25

## 2021-10-25 RX ORDER — PREDNISONE 20 MG/1
TABLET ORAL
Qty: 10 TABLET | Refills: 0 | Status: SHIPPED | OUTPATIENT
Start: 2021-10-25

## 2021-10-25 RX ORDER — METHYLPREDNISOLONE SODIUM SUCCINATE 40 MG/ML
80 INJECTION, POWDER, LYOPHILIZED, FOR SOLUTION INTRAMUSCULAR; INTRAVENOUS ONCE
Status: COMPLETED | OUTPATIENT
Start: 2021-10-25 | End: 2021-10-25

## 2021-10-25 RX ADMIN — METHYLPREDNISOLONE SODIUM SUCCINATE 80 MG: 40 INJECTION, POWDER, FOR SOLUTION INTRAMUSCULAR; INTRAVENOUS at 13:56

## 2021-10-25 RX ADMIN — LEVOFLOXACIN 500 MG: 500 TABLET, FILM COATED ORAL at 16:29

## 2021-10-25 RX ADMIN — HYDROCODONE BITARTRATE AND ACETAMINOPHEN 1 TABLET: 5; 325 TABLET ORAL at 16:29

## 2021-10-25 NOTE — ED PROVIDER NOTES
Subjective   History of Present Illness    Chief Complaint: Shortness of breath productive cough  History of Present Illness: 75-year-old female history of end-stage COPD home oxygen 3 L nasal cannula, hospice care.  Here with complaints of shortness of breath and productive cough.  No fever no sick contacts no travel.  Did receive a nebulizer in route by EMS and is feeling some improvement  Onset: Today  Duration: Procedure  Exacerbating / Alleviating factors: Home meds without relief  Associated symptoms: None      Nurses Notes reviewed and agree, including vitals, allergies, social history and prior medical history.     REVIEW OF SYSTEMS: All systems reviewed and not pertinent unless noted.    Positive for: Shortness of breath cough wheezing    Negative for: Fever hemoptysis syncope palpitations chest pain  Review of Systems    Past Medical History:   Diagnosis Date   • Abdominal pain    • Acute and chronic respiratory failure with hypercapnia (HCC)    • Acute bronchitis    • Allergic    • Ankle pain    • Anxiety 1980   • Atopic dermatitis    • Bronchiectasis (HCC)    • Cachexia (HCC)    • Cataract, bilateral    • Chest pain     STATES HAS OCCASSIONALLY.  STATES LAST TIME WAS LAST WEEK.  STATES SEES DR. RICH.  STATES HE HAS DONE NUMEROUS TESTS ON HER AND HAS NOT BEEN ABLE TO FIND OUT CAUSE.     • Chronic obstructive lung disease (HCC) 2008   • Chronic respiratory failure with hypercapnia (HCC)    • Colon cancer screening    • COPD (chronic obstructive pulmonary disease) (HCC)    • Cough    • Cystocele    • Depressed    • Depression 1980   • Dry eye syndrome of unspecified lacrimal gland    • Encounter for screening for other viral diseases    • Fatigue     Chronic pafigue 2012   • Fibromyalgia 2008   • Full dentures    • GERD (gastroesophageal reflux disease)    • Gout    • Heartburn     Chronic historu of epigastric heartburn currently controlled on Prilesec 40 mg every morning along with Zantac 300 Mg daily at  bedtime   • High cholesterol 2012   • Hip pain    • Hyperlipidemia    • Hypertension    • Hypocalcemia    • Impaired functional mobility, balance, gait, and endurance    • Insomnia    • Insomnia, unspecified    • Joint pain    • Kidney infection    • Loss of appetite    • Low back pain 1995   • Melena    • Nausea    • Nausea and vomiting    • Nutritional deficiency, unspecified    • On home oxygen therapy     2L/NC PRN (STATES SHE HAS BEEN USING CONTINUOSLY LATELY)   • Osteoarthritis 2010   • Osteoporosis    • Other chronic pain    • Other disorders of skin and subcutaneous tissue in diseases classified elsewhere    • Other muscle spasm    • Pain in limb    • Palpitations    • Pinched nerve     Pinched nerves 2011   • Pneumonia    • Pneumonia due to Pseudomonas (HCC)    • Pneumonitis due to inhalation of food and vomit (HCC)    • Problems with swallowing     HAS TO EAT SLOW AND CHEW FOOD WELL   • Recurrent urinary tract infection    • Sciatica [id number]   • Shortness of breath    • Sinus problem    • Sleep apnea 1998    NO CPAP   • Unspecified fracture of t5-T6 vertebra, subsequent encounter for fracture with routine healing    • Upset stomach    • Valvular heart disease    • Vitamin B12 deficiency    • Wears glasses    • Weight loss, abnormal     Weight loss is stabel. On pund weight gain since 01/2016       Allergies   Allergen Reactions   • Dust Mite Extract Other (See Comments)     Dust  SINUS   • Grass Other (See Comments)     SINUS   • Mold Extract [Trichophyton] Other (See Comments)     SINUS       Past Surgical History:   Procedure Laterality Date   • ABDOMINAL HERNIA REPAIR  2016   • APPENDECTOMY  1965   • BACK SURGERY  2005   • BRONCHOSCOPY N/A 7/5/2018    Procedure: BRONCHOSCOPY w/ WASHINGS/BRUSHINGS with MAC;  Surgeon: Rebeka Esparza MD;  Location: [facility];  Service: Pulmonary   • CATARACT EXTRACTION Bilateral     Put in implants    • CHOLECYSTECTOMY  1985   • COLONOSCOPY     • ENDOSCOPY     • KNEE  SURGERY Left 1979    Knee Cap   • TUBAL ABDOMINAL LIGATION         Family History   Problem Relation Age of Onset   • Ovarian cancer Mother    • Cancer Mother    • Lung cancer Father    • Heart disease Brother        Social History     Socioeconomic History   • Marital status: Single   Tobacco Use   • Smoking status: Former Smoker     Packs/day: 0.00     Years: 35.00     Pack years: 0.00     Quit date: 2017     Years since quittin.3   • Smokeless tobacco: Never Used   Vaping Use   • Vaping Use: Some days   • Substances: Nicotine   • Devices: Refillable tank   Substance and Sexual Activity   • Alcohol use: No   • Drug use: No   • Sexual activity: Defer           Objective   Physical Exam    CONSTITUTIONAL: Well developed, frail 75-year-old  female,  in no acute distress.  VITAL SIGNS: per nursing, reviewed and noted  SKIN: exposed skin with no rashes, ulcerations or petechiae.  EYES: perrla. EOMI.  ENT: Normal voice.  Patient maintained wearing a mask throughout patient encounter due to coronavirus pandemic  RESPIRATORY:  No increased work of breathing. No retractions.  Mild diffuse wheezes and faint basilar crackles  CARDIOVASCULAR:  regular rate and rhythm, no murmurs.  Good Peripheral pulses. Good cap refill to extremities.   GI: Abdomen soft, nontender, normal bowel sounds. No hernia. No ascites.  MUSCULOSKELETAL:  No tenderness. Full ROM. Strength and tone grossly normal.  no spasms. no neck or back tenderness or spasm.   NEUROLOGIC: Alert, oriented x 3. No gross deficits. GCS 15.   PSYCH: appropriate affect.  : no bladder tenderness or distention, no CVA tenderness      Procedures     No attending physician procedures were performed on this patient.      ED Course  ED Course as of 10/25/21 1533   Mon Oct 25, 2021   1306 EKG interpreted by me reveals sinus rate 114. No ectopy no ischemic changes, right ventricular conduction delay [PF]   1330 Narrative & Impression  PROCEDURE: XR CHEST 1  VW-     HISTORY: SOA Triage Protocol     COMPARISON: 10/6/2021 and 5/26/2021.     FINDINGS: The heart is normal in size. The mediastinum is unremarkable.  There are chronic interstitial lung changes. There are bibasilar  opacities which are unchanged and are felt to reflect either atelectasis  or scarring. There is a new left perihilar opacity which is ill-defined  and is suspicious for pneumonia. There is no pneumothorax.  There are no  acute osseous abnormalities.     IMPRESSION:  New ill-defined left perihilar opacities suspicious for  pneumonia.     Continued followup is recommended.     This report was finalized on 10/25/2021 12:56 PM by Ignacia Lara, [PF]   1332 Troponin T: <0.010 [PF]   1332 proBNP: 129.0 [PF]   1332 Glucose(!): 116 [PF]   1332 BUN: 11 [PF]   1332 Creatinine: 0.61 [PF]   1332 Sodium(!): 133 [PF]   1332 Potassium: 4.2 [PF]   1332 Chloride(!): 94 [PF]   1332 CO2(!): 32.0 [PF]   1332 Calcium(!): 8.4 [PF]   1332 Total Protein: 6.3 [PF]   1332 Albumin: 3.50 [PF]   1332 ALT (SGPT): 28 [PF]   1332 AST (SGOT): 22 [PF]   1332 Alkaline Phosphatase(!): 182 [PF]   1332 Total Bilirubin: 0.2 [PF]   1332 WBC(!): 13.64 [PF]   1332 Hemoglobin(!): 10.5 [PF]   1332 Hematocrit(!): 32.6 [PF]   1332 Platelets: 228 [PF]   1515 COVID19: Not Detected [PF]      ED Course User Index  [PF] Jaydon Bishop W, DO                                           MDM  75-year-old female presented with productive cough wheezing, history of end-stage COPD on home oxygen 3 L nasal cannula.  Work-up consistent with COPD exacerbation and pneumonia.  Patient maintains normal sats at 98% on her baseline oxygen.  Afebrile normotensive.  Reasonable for home trial of antibiotics and steroids.  Continue other home interventions.  Outpatient follow-up precautions discussed.  Final diagnoses:   COPD exacerbation (HCC)   Community acquired pneumonia, unspecified laterality       ED Disposition  ED Disposition     ED Disposition Condition  Comment    Discharge Stable           No follow-up provider specified.       Medication List      New Prescriptions    levoFLOXacin 500 MG tablet  Commonly known as: LEVAQUIN  Take 1 tablet by mouth Daily.     predniSONE 20 MG tablet  Commonly known as: DELTASONE  Take 2 tablets daily.        Changed    ammonium lactate 12 % cream  Commonly known as: Lac-Hydrin  Apply  topically to the appropriate area as directed 2 (Two) Times a Day.  What changed:   · when to take this  · reasons to take this        Stop    azithromycin 250 MG tablet  Commonly known as: ZITHROMAX     cephalexin 500 MG capsule  Commonly known as: KEFLEX           Where to Get Your Medications      These medications were sent to [place], [place] - [address] [place] [address] - [phone]  - [phone] [address] [address]    Phone: [phone]   · levoFLOXacin 500 MG tablet  · predniSONE 20 MG tablet          Jaydon Bishop DO  10/25/21 1530

## 2024-06-18 NOTE — PLAN OF CARE
Patient PO challenged. Patient able to tolerate water without episode of emesis or nausea.    Problem: Fall Risk (Adult)  Goal: Identify Related Risk Factors and Signs and Symptoms  Outcome: Ongoing (interventions implemented as appropriate)   07/11/18 8178   Fall Risk (Adult)   Related Risk Factors (Fall Risk) age-related changes;gait/mobility problems;environment unfamiliar   Signs and Symptoms (Fall Risk) presence of risk factors

## (undated) DEVICE — THE HOBBS MICROBIOLOGY BRUSH IS INTENDED TO BE USED IN COMBINATION WITH A COMPATIBLE FLEXIBLE ENDOSCOPE TO COLLECT CELLS OF THE MUCOSA FOR PATHOLOGICAL DIAGNOSIS DURING ENDOSCOPY. IT IS INSERTED THROUGH THE WORKING CHANNEL OF THE ENDOSCOPE AND CONSISTS OF A FLEXIBLE INSERTION PORTION MADE OF A METAL COIL INSIDE A PLASTIC TUBE, WHOSE DISTAL END IS EQUIPPED WITH PLASTIC BRISTLES FOR HARVESTING AND PROTECTING MUCOSAL CELLS, E.G., DURING ENDOSCOPY. THIS IS A SINGLE-USE DEVICE.: Brand: HOBBS MICROBIOLOGY BRUSH

## (undated) DEVICE — TRAP,MUCUS SPECIMEN,40CC: Brand: MEDLINE

## (undated) DEVICE — GLV SURG SENSICARE W/ALOE PF LF 7.5 STRL

## (undated) DEVICE — 1860S HEALTH CARE RESPIRATOR N95 120EA/C: Brand: 3M™

## (undated) DEVICE — CUFF SCD HEMOFORCE SEQ CALF STD MD